# Patient Record
Sex: MALE | Race: WHITE | Employment: OTHER | URBAN - METROPOLITAN AREA
[De-identification: names, ages, dates, MRNs, and addresses within clinical notes are randomized per-mention and may not be internally consistent; named-entity substitution may affect disease eponyms.]

---

## 2017-01-23 ENCOUNTER — OFFICE VISIT (OUTPATIENT)
Dept: FAMILY MEDICINE CLINIC | Age: 72
End: 2017-01-23

## 2017-01-23 ENCOUNTER — HOSPITAL ENCOUNTER (OUTPATIENT)
Dept: GENERAL RADIOLOGY | Age: 72
Discharge: HOME | End: 2017-01-23
Payer: MEDICARE

## 2017-01-23 ENCOUNTER — HOSPITAL ENCOUNTER (OUTPATIENT)
Age: 72
Discharge: HOME | End: 2017-01-23
Payer: MEDICARE

## 2017-01-23 ENCOUNTER — TELEPHONE (OUTPATIENT)
Dept: FAMILY MEDICINE CLINIC | Age: 72
End: 2017-01-23

## 2017-01-23 DIAGNOSIS — R06.2 WHEEZE: ICD-10-CM

## 2017-01-23 DIAGNOSIS — J01.40 ACUTE PANSINUSITIS, RECURRENCE NOT SPECIFIED: Primary | ICD-10-CM

## 2017-01-23 PROCEDURE — G8427 DOCREV CUR MEDS BY ELIG CLIN: HCPCS | Performed by: NURSE PRACTITIONER

## 2017-01-23 PROCEDURE — G8598 ASA/ANTIPLAT THER USED: HCPCS | Performed by: NURSE PRACTITIONER

## 2017-01-23 PROCEDURE — 1036F TOBACCO NON-USER: CPT | Performed by: NURSE PRACTITIONER

## 2017-01-23 PROCEDURE — 1123F ACP DISCUSS/DSCN MKR DOCD: CPT | Performed by: NURSE PRACTITIONER

## 2017-01-23 PROCEDURE — 71020 XR CHEST STANDARD TWO VW: CPT

## 2017-01-23 PROCEDURE — 99213 OFFICE O/P EST LOW 20 MIN: CPT | Performed by: NURSE PRACTITIONER

## 2017-01-23 PROCEDURE — G8420 CALC BMI NORM PARAMETERS: HCPCS | Performed by: NURSE PRACTITIONER

## 2017-01-23 PROCEDURE — 4040F PNEUMOC VAC/ADMIN/RCVD: CPT | Performed by: NURSE PRACTITIONER

## 2017-01-23 PROCEDURE — 3017F COLORECTAL CA SCREEN DOC REV: CPT | Performed by: NURSE PRACTITIONER

## 2017-01-23 PROCEDURE — G8484 FLU IMMUNIZE NO ADMIN: HCPCS | Performed by: NURSE PRACTITIONER

## 2017-01-23 RX ORDER — DOXYCYCLINE HYCLATE 100 MG
100 TABLET ORAL 2 TIMES DAILY
Qty: 20 TABLET | Refills: 0 | Status: SHIPPED | OUTPATIENT
Start: 2017-01-23 | End: 2017-02-02

## 2017-01-23 RX ORDER — PREDNISONE 20 MG/1
40 TABLET ORAL DAILY
Qty: 10 TABLET | Refills: 0 | Status: SHIPPED | OUTPATIENT
Start: 2017-01-23 | End: 2017-01-28

## 2017-01-30 VITALS
BODY MASS INDEX: 27.77 KG/M2 | HEART RATE: 60 BPM | OXYGEN SATURATION: 94 % | SYSTOLIC BLOOD PRESSURE: 138 MMHG | DIASTOLIC BLOOD PRESSURE: 72 MMHG | WEIGHT: 198.38 LBS | HEIGHT: 71 IN | TEMPERATURE: 98.3 F

## 2017-01-30 ASSESSMENT — ENCOUNTER SYMPTOMS
EYE PAIN: 0
SORE THROAT: 0
NAUSEA: 0
SINUS PAIN: 1
EYE DISCHARGE: 0
DIARRHEA: 0
SWOLLEN GLANDS: 0
RHINORRHEA: 1
COUGH: 1
SHORTNESS OF BREATH: 0
TROUBLE SWALLOWING: 0
ABDOMINAL PAIN: 0
WHEEZING: 1
SINUS PRESSURE: 1
VOMITING: 0
FACIAL SWELLING: 0

## 2017-03-03 ENCOUNTER — HOSPITAL ENCOUNTER (OUTPATIENT)
Dept: LAB | Age: 72
Discharge: HOME | End: 2017-03-03
Payer: MEDICARE

## 2017-03-03 DIAGNOSIS — E11.9 TYPE 2 DIABETES MELLITUS WITHOUT COMPLICATION, WITHOUT LONG-TERM CURRENT USE OF INSULIN (HCC): ICD-10-CM

## 2017-03-03 DIAGNOSIS — I10 ESSENTIAL HYPERTENSION: Chronic | ICD-10-CM

## 2017-03-03 DIAGNOSIS — E78.5 HYPERLIPIDEMIA, UNSPECIFIED HYPERLIPIDEMIA TYPE: ICD-10-CM

## 2017-03-03 DIAGNOSIS — E83.52 HYPERCALCEMIA: Primary | ICD-10-CM

## 2017-03-03 LAB
ALBUMIN SERPL-MCNC: 4.4 G/DL (ref 3.9–4.9)
ALP BLD-CCNC: 50 U/L (ref 35–104)
ALT SERPL-CCNC: 32 U/L (ref 0–41)
ANION GAP SERPL CALCULATED.3IONS-SCNC: 12 MEQ/L (ref 7–13)
ANISOCYTOSIS: ABNORMAL
AST SERPL-CCNC: 24 U/L (ref 0–40)
BANDED NEUTROPHILS RELATIVE PERCENT: 3 %
BASOPHILS ABSOLUTE: 0 K/UL (ref 0–0.2)
BASOPHILS RELATIVE PERCENT: 0 %
BILIRUB SERPL-MCNC: 0.3 MG/DL (ref 0–1.2)
BUN BLDV-MCNC: 15 MG/DL (ref 8–23)
CALCIUM SERPL-MCNC: 10.5 MG/DL (ref 8.6–10.2)
CHLORIDE BLD-SCNC: 97 MEQ/L (ref 98–107)
CO2: 29 MEQ/L (ref 22–29)
CREAT SERPL-MCNC: 1.05 MG/DL (ref 0.7–1.2)
CREATININE URINE: 52.9 MG/DL
EOSINOPHILS ABSOLUTE: 0.2 K/UL (ref 0–0.7)
EOSINOPHILS RELATIVE PERCENT: 2 %
FOLATE: >20 NG/ML (ref 7.3–26.1)
GFR AFRICAN AMERICAN: >60
GFR NON-AFRICAN AMERICAN: >60
GLOBULIN: 2.7 G/DL (ref 2.3–3.5)
GLUCOSE BLD-MCNC: 98 MG/DL (ref 74–109)
HBA1C MFR BLD: 6.7 % (ref 4.8–5.9)
HCT VFR BLD CALC: 49.4 % (ref 42–52)
HEMOGLOBIN: 16.8 G/DL (ref 14–18)
LYMPHOCYTES ABSOLUTE: 2.5 K/UL (ref 1–4.8)
LYMPHOCYTES RELATIVE PERCENT: 30 %
MCH RBC QN AUTO: 31.7 PG (ref 27–31.3)
MCHC RBC AUTO-ENTMCNC: 33.9 % (ref 33–37)
MCV RBC AUTO: 93.5 FL (ref 80–100)
MICROALBUMIN UR-MCNC: 7.4 MG/DL
MICROALBUMIN/CREAT UR-RTO: 139.9 MG/G (ref 0–30)
MONOCYTES ABSOLUTE: 0.7 K/UL (ref 0.2–0.8)
MONOCYTES RELATIVE PERCENT: 8 %
NEUTROPHILS ABSOLUTE: 5 K/UL (ref 1.4–6.5)
NEUTROPHILS RELATIVE PERCENT: 57 %
PDW BLD-RTO: 13.4 % (ref 11.5–14.5)
PLATELET # BLD: 197 K/UL (ref 130–400)
PLATELET SLIDE REVIEW: ADEQUATE
POTASSIUM SERPL-SCNC: 3.7 MEQ/L (ref 3.5–5.1)
PROSTATE SPECIFIC ANTIGEN: 1.98 NG/ML (ref 0–6.22)
RBC # BLD: 5.28 M/UL (ref 4.7–6.1)
SLIDE REVIEW: ABNORMAL
SODIUM BLD-SCNC: 138 MEQ/L (ref 132–144)
TOTAL PROTEIN: 7.1 G/DL (ref 6.4–8.1)
VITAMIN B-12: 469 PG/ML (ref 211–946)
VITAMIN D 25-HYDROXY: 34.6 NG/ML (ref 30–100)
WBC # BLD: 8.4 K/UL (ref 4.8–10.8)

## 2017-03-03 PROCEDURE — 84403 ASSAY OF TOTAL TESTOSTERONE: CPT

## 2017-03-03 PROCEDURE — 83036 HEMOGLOBIN GLYCOSYLATED A1C: CPT

## 2017-03-03 PROCEDURE — 82607 VITAMIN B-12: CPT

## 2017-03-03 PROCEDURE — 84270 ASSAY OF SEX HORMONE GLOBUL: CPT

## 2017-03-03 PROCEDURE — 82306 VITAMIN D 25 HYDROXY: CPT

## 2017-03-03 PROCEDURE — 85025 COMPLETE CBC W/AUTO DIFF WBC: CPT

## 2017-03-03 PROCEDURE — 82627 DEHYDROEPIANDROSTERONE: CPT

## 2017-03-03 PROCEDURE — G0103 PSA SCREENING: HCPCS

## 2017-03-03 PROCEDURE — 82746 ASSAY OF FOLIC ACID SERUM: CPT

## 2017-03-03 PROCEDURE — 36415 COLL VENOUS BLD VENIPUNCTURE: CPT

## 2017-03-03 PROCEDURE — 82570 ASSAY OF URINE CREATININE: CPT

## 2017-03-03 PROCEDURE — 82043 UR ALBUMIN QUANTITATIVE: CPT

## 2017-03-03 PROCEDURE — 80053 COMPREHEN METABOLIC PANEL: CPT

## 2017-03-04 LAB
DHEAS (DHEA SULFATE): 129 UG/DL (ref 28–175)
SEX HORMONE BINDING GLOBULIN: 44 NMOL/L (ref 11–80)
TESTOSTERONE FREE PERCENT: 1.7 % (ref 1.6–2.9)
TESTOSTERONE FREE, CALC: 103 PG/ML (ref 47–244)
TESTOSTERONE TOTAL-MALE: 619 NG/DL (ref 300–720)

## 2017-03-06 ENCOUNTER — OFFICE VISIT (OUTPATIENT)
Dept: FAMILY MEDICINE CLINIC | Age: 72
End: 2017-03-06

## 2017-03-06 VITALS
WEIGHT: 195 LBS | SYSTOLIC BLOOD PRESSURE: 150 MMHG | HEART RATE: 60 BPM | BODY MASS INDEX: 27.3 KG/M2 | TEMPERATURE: 97.3 F | DIASTOLIC BLOOD PRESSURE: 78 MMHG | RESPIRATION RATE: 20 BRPM | HEIGHT: 71 IN

## 2017-03-06 DIAGNOSIS — N52.9 ERECTILE DYSFUNCTION, UNSPECIFIED ERECTILE DYSFUNCTION TYPE: Primary | Chronic | ICD-10-CM

## 2017-03-06 DIAGNOSIS — K21.9 GASTROESOPHAGEAL REFLUX DISEASE, ESOPHAGITIS PRESENCE NOT SPECIFIED: Chronic | ICD-10-CM

## 2017-03-06 DIAGNOSIS — E11.29 TYPE 2 DIABETES MELLITUS WITH MICROALBUMINURIA, WITHOUT LONG-TERM CURRENT USE OF INSULIN (HCC): Primary | Chronic | ICD-10-CM

## 2017-03-06 DIAGNOSIS — N52.9 ERECTILE DYSFUNCTION, UNSPECIFIED ERECTILE DYSFUNCTION TYPE: Chronic | ICD-10-CM

## 2017-03-06 DIAGNOSIS — Z23 NEED FOR VACCINATION: ICD-10-CM

## 2017-03-06 DIAGNOSIS — E83.52 HYPERCALCEMIA: ICD-10-CM

## 2017-03-06 DIAGNOSIS — I10 BENIGN HYPERTENSION: Chronic | ICD-10-CM

## 2017-03-06 DIAGNOSIS — I25.10 CAD IN NATIVE ARTERY: Chronic | ICD-10-CM

## 2017-03-06 DIAGNOSIS — R80.9 TYPE 2 DIABETES MELLITUS WITH MICROALBUMINURIA, WITHOUT LONG-TERM CURRENT USE OF INSULIN (HCC): Primary | Chronic | ICD-10-CM

## 2017-03-06 LAB — DIABETIC RETINOPATHY: NORMAL

## 2017-03-06 PROCEDURE — G0009 ADMIN PNEUMOCOCCAL VACCINE: HCPCS | Performed by: FAMILY MEDICINE

## 2017-03-06 PROCEDURE — 99214 OFFICE O/P EST MOD 30 MIN: CPT | Performed by: FAMILY MEDICINE

## 2017-03-06 PROCEDURE — G8427 DOCREV CUR MEDS BY ELIG CLIN: HCPCS | Performed by: FAMILY MEDICINE

## 2017-03-06 PROCEDURE — 3017F COLORECTAL CA SCREEN DOC REV: CPT | Performed by: FAMILY MEDICINE

## 2017-03-06 PROCEDURE — G8598 ASA/ANTIPLAT THER USED: HCPCS | Performed by: FAMILY MEDICINE

## 2017-03-06 PROCEDURE — G8420 CALC BMI NORM PARAMETERS: HCPCS | Performed by: FAMILY MEDICINE

## 2017-03-06 PROCEDURE — 90670 PCV13 VACCINE IM: CPT | Performed by: FAMILY MEDICINE

## 2017-03-06 PROCEDURE — 4040F PNEUMOC VAC/ADMIN/RCVD: CPT | Performed by: FAMILY MEDICINE

## 2017-03-06 PROCEDURE — 1036F TOBACCO NON-USER: CPT | Performed by: FAMILY MEDICINE

## 2017-03-06 PROCEDURE — 3044F HG A1C LEVEL LT 7.0%: CPT | Performed by: FAMILY MEDICINE

## 2017-03-06 PROCEDURE — G8484 FLU IMMUNIZE NO ADMIN: HCPCS | Performed by: FAMILY MEDICINE

## 2017-03-06 PROCEDURE — 1123F ACP DISCUSS/DSCN MKR DOCD: CPT | Performed by: FAMILY MEDICINE

## 2017-03-06 RX ORDER — RANITIDINE 150 MG/1
150 TABLET ORAL NIGHTLY PRN
Qty: 30 TABLET | Refills: 5 | Status: SHIPPED | OUTPATIENT
Start: 2017-03-06 | End: 2017-05-22 | Stop reason: SDUPTHER

## 2017-03-06 RX ORDER — TADALAFIL 20 MG/1
20 TABLET ORAL PRN
Qty: 5 TABLET | Refills: 3 | Status: SHIPPED | OUTPATIENT
Start: 2017-03-06 | End: 2017-06-06 | Stop reason: ALTCHOICE

## 2017-03-06 RX ORDER — LISINOPRIL 10 MG/1
10 TABLET ORAL DAILY
Qty: 30 TABLET | Refills: 11 | Status: SHIPPED | OUTPATIENT
Start: 2017-03-06 | End: 2017-05-22 | Stop reason: SDUPTHER

## 2017-03-06 RX ORDER — SILDENAFIL 100 MG/1
100 TABLET, FILM COATED ORAL PRN
Qty: 5 TABLET | Refills: 3 | Status: SHIPPED | OUTPATIENT
Start: 2017-03-06 | End: 2017-03-06 | Stop reason: CLARIF

## 2017-03-06 ASSESSMENT — ENCOUNTER SYMPTOMS
BLOOD IN STOOL: 0
SHORTNESS OF BREATH: 0
CONSTIPATION: 0
VOMITING: 0
CHEST TIGHTNESS: 0
WHEEZING: 0
NAUSEA: 0
ABDOMINAL PAIN: 0
DIARRHEA: 0
COUGH: 0

## 2017-03-10 DIAGNOSIS — R80.9 TYPE 2 DIABETES MELLITUS WITH MICROALBUMINURIA, WITHOUT LONG-TERM CURRENT USE OF INSULIN (HCC): Primary | Chronic | ICD-10-CM

## 2017-03-10 DIAGNOSIS — E11.29 TYPE 2 DIABETES MELLITUS WITH MICROALBUMINURIA, WITHOUT LONG-TERM CURRENT USE OF INSULIN (HCC): Primary | Chronic | ICD-10-CM

## 2017-03-13 DIAGNOSIS — N52.9 ERECTILE DYSFUNCTION, UNSPECIFIED ERECTILE DYSFUNCTION TYPE: Chronic | ICD-10-CM

## 2017-03-13 RX ORDER — SILDENAFIL 100 MG/1
100 TABLET, FILM COATED ORAL PRN
Qty: 27 TABLET | Refills: 3 | Status: SHIPPED | OUTPATIENT
Start: 2017-03-13 | End: 2017-03-14 | Stop reason: SDUPTHER

## 2017-03-14 RX ORDER — SILDENAFIL 100 MG/1
100 TABLET, FILM COATED ORAL PRN
Qty: 6 TABLET | Refills: 0 | COMMUNITY
Start: 2017-03-14 | End: 2017-12-11 | Stop reason: SDUPTHER

## 2017-03-27 ENCOUNTER — NURSE ONLY (OUTPATIENT)
Dept: FAMILY MEDICINE CLINIC | Age: 72
End: 2017-03-27

## 2017-03-27 ENCOUNTER — TELEPHONE (OUTPATIENT)
Dept: FAMILY MEDICINE CLINIC | Age: 72
End: 2017-03-27

## 2017-03-27 VITALS
SYSTOLIC BLOOD PRESSURE: 126 MMHG | TEMPERATURE: 98.5 F | WEIGHT: 195 LBS | BODY MASS INDEX: 27.2 KG/M2 | DIASTOLIC BLOOD PRESSURE: 72 MMHG | HEART RATE: 84 BPM | OXYGEN SATURATION: 96 %

## 2017-03-27 DIAGNOSIS — I15.9 SECONDARY HYPERTENSION: Primary | ICD-10-CM

## 2017-05-22 DIAGNOSIS — I10 BENIGN HYPERTENSION: Chronic | ICD-10-CM

## 2017-05-22 DIAGNOSIS — K21.9 GASTROESOPHAGEAL REFLUX DISEASE, ESOPHAGITIS PRESENCE NOT SPECIFIED: Chronic | ICD-10-CM

## 2017-05-22 DIAGNOSIS — E11.29 TYPE 2 DIABETES MELLITUS WITH MICROALBUMINURIA, WITHOUT LONG-TERM CURRENT USE OF INSULIN (HCC): Chronic | ICD-10-CM

## 2017-05-22 DIAGNOSIS — R80.9 TYPE 2 DIABETES MELLITUS WITH MICROALBUMINURIA, WITHOUT LONG-TERM CURRENT USE OF INSULIN (HCC): Chronic | ICD-10-CM

## 2017-05-22 RX ORDER — LISINOPRIL 10 MG/1
10 TABLET ORAL DAILY
Qty: 90 TABLET | Refills: 3 | Status: SHIPPED | OUTPATIENT
Start: 2017-05-22 | End: 2017-05-23 | Stop reason: SDUPTHER

## 2017-05-22 RX ORDER — RANITIDINE 150 MG/1
150 TABLET ORAL NIGHTLY PRN
Qty: 90 TABLET | Refills: 1 | Status: SHIPPED | OUTPATIENT
Start: 2017-05-22 | End: 2017-08-28

## 2017-05-23 ENCOUNTER — NURSE ONLY (OUTPATIENT)
Dept: FAMILY MEDICINE CLINIC | Age: 72
End: 2017-05-23

## 2017-05-23 ENCOUNTER — TELEPHONE (OUTPATIENT)
Dept: FAMILY MEDICINE CLINIC | Age: 72
End: 2017-05-23

## 2017-05-23 VITALS — DIASTOLIC BLOOD PRESSURE: 70 MMHG | SYSTOLIC BLOOD PRESSURE: 132 MMHG

## 2017-05-23 DIAGNOSIS — I10 BENIGN HYPERTENSION: Primary | Chronic | ICD-10-CM

## 2017-05-23 DIAGNOSIS — I10 BENIGN HYPERTENSION: Chronic | ICD-10-CM

## 2017-05-23 DIAGNOSIS — E11.29 TYPE 2 DIABETES MELLITUS WITH MICROALBUMINURIA, WITHOUT LONG-TERM CURRENT USE OF INSULIN (HCC): Chronic | ICD-10-CM

## 2017-05-23 DIAGNOSIS — R80.9 TYPE 2 DIABETES MELLITUS WITH MICROALBUMINURIA, WITHOUT LONG-TERM CURRENT USE OF INSULIN (HCC): Chronic | ICD-10-CM

## 2017-05-23 RX ORDER — LISINOPRIL 20 MG/1
20 TABLET ORAL DAILY
Qty: 90 TABLET | Refills: 3 | Status: SHIPPED | OUTPATIENT
Start: 2017-05-23 | End: 2017-08-14 | Stop reason: SDUPTHER

## 2017-06-06 ENCOUNTER — CARE COORDINATOR VISIT (OUTPATIENT)
Dept: FAMILY MEDICINE CLINIC | Age: 72
End: 2017-06-06

## 2017-06-06 ENCOUNTER — OFFICE VISIT (OUTPATIENT)
Dept: FAMILY MEDICINE CLINIC | Age: 72
End: 2017-06-06

## 2017-06-06 VITALS
HEIGHT: 71 IN | OXYGEN SATURATION: 96 % | HEART RATE: 75 BPM | BODY MASS INDEX: 27.16 KG/M2 | RESPIRATION RATE: 16 BRPM | SYSTOLIC BLOOD PRESSURE: 123 MMHG | DIASTOLIC BLOOD PRESSURE: 65 MMHG | TEMPERATURE: 97.3 F | WEIGHT: 194 LBS

## 2017-06-06 DIAGNOSIS — I25.10 CAD IN NATIVE ARTERY: Chronic | ICD-10-CM

## 2017-06-06 DIAGNOSIS — N52.9 ERECTILE DYSFUNCTION, UNSPECIFIED ERECTILE DYSFUNCTION TYPE: Chronic | ICD-10-CM

## 2017-06-06 DIAGNOSIS — Z91.81 AT HIGH RISK FOR FALLS: ICD-10-CM

## 2017-06-06 DIAGNOSIS — E11.29 TYPE 2 DIABETES MELLITUS WITH MICROALBUMINURIA, WITHOUT LONG-TERM CURRENT USE OF INSULIN (HCC): Primary | Chronic | ICD-10-CM

## 2017-06-06 DIAGNOSIS — J44.9 CHRONIC OBSTRUCTIVE PULMONARY DISEASE, UNSPECIFIED COPD TYPE (HCC): ICD-10-CM

## 2017-06-06 DIAGNOSIS — Z23 NEED FOR VACCINATION: ICD-10-CM

## 2017-06-06 DIAGNOSIS — R80.9 TYPE 2 DIABETES MELLITUS WITH MICROALBUMINURIA, WITHOUT LONG-TERM CURRENT USE OF INSULIN (HCC): Primary | Chronic | ICD-10-CM

## 2017-06-06 DIAGNOSIS — K21.9 GASTROESOPHAGEAL REFLUX DISEASE, ESOPHAGITIS PRESENCE NOT SPECIFIED: Chronic | ICD-10-CM

## 2017-06-06 DIAGNOSIS — E78.5 HYPERLIPIDEMIA, UNSPECIFIED HYPERLIPIDEMIA TYPE: Chronic | ICD-10-CM

## 2017-06-06 DIAGNOSIS — I10 BENIGN HYPERTENSION: Chronic | ICD-10-CM

## 2017-06-06 PROCEDURE — 1123F ACP DISCUSS/DSCN MKR DOCD: CPT | Performed by: FAMILY MEDICINE

## 2017-06-06 PROCEDURE — 1036F TOBACCO NON-USER: CPT | Performed by: FAMILY MEDICINE

## 2017-06-06 PROCEDURE — G8598 ASA/ANTIPLAT THER USED: HCPCS | Performed by: FAMILY MEDICINE

## 2017-06-06 PROCEDURE — G8420 CALC BMI NORM PARAMETERS: HCPCS | Performed by: FAMILY MEDICINE

## 2017-06-06 PROCEDURE — G8427 DOCREV CUR MEDS BY ELIG CLIN: HCPCS | Performed by: FAMILY MEDICINE

## 2017-06-06 PROCEDURE — 99214 OFFICE O/P EST MOD 30 MIN: CPT | Performed by: FAMILY MEDICINE

## 2017-06-06 PROCEDURE — 3044F HG A1C LEVEL LT 7.0%: CPT | Performed by: FAMILY MEDICINE

## 2017-06-06 PROCEDURE — G8926 SPIRO NO PERF OR DOC: HCPCS | Performed by: FAMILY MEDICINE

## 2017-06-06 PROCEDURE — 4040F PNEUMOC VAC/ADMIN/RCVD: CPT | Performed by: FAMILY MEDICINE

## 2017-06-06 PROCEDURE — 3017F COLORECTAL CA SCREEN DOC REV: CPT | Performed by: FAMILY MEDICINE

## 2017-06-06 PROCEDURE — 3023F SPIROM DOC REV: CPT | Performed by: FAMILY MEDICINE

## 2017-06-06 ASSESSMENT — ENCOUNTER SYMPTOMS
VOMITING: 0
WHEEZING: 0
ABDOMINAL PAIN: 0
SHORTNESS OF BREATH: 0
COUGH: 0
CHEST TIGHTNESS: 0
DIARRHEA: 0
NAUSEA: 0

## 2017-06-12 ENCOUNTER — HOSPITAL ENCOUNTER (OUTPATIENT)
Dept: LAB | Age: 72
Discharge: HOME | End: 2017-06-12
Payer: MEDICARE

## 2017-06-12 DIAGNOSIS — E78.5 HYPERLIPIDEMIA, UNSPECIFIED HYPERLIPIDEMIA TYPE: Chronic | ICD-10-CM

## 2017-06-12 DIAGNOSIS — R80.9 TYPE 2 DIABETES MELLITUS WITH MICROALBUMINURIA, WITHOUT LONG-TERM CURRENT USE OF INSULIN (HCC): Chronic | ICD-10-CM

## 2017-06-12 DIAGNOSIS — E11.29 TYPE 2 DIABETES MELLITUS WITH MICROALBUMINURIA, WITHOUT LONG-TERM CURRENT USE OF INSULIN (HCC): Chronic | ICD-10-CM

## 2017-06-12 LAB
ANION GAP SERPL CALCULATED.3IONS-SCNC: 11 MEQ/L (ref 7–13)
BUN BLDV-MCNC: 12 MG/DL (ref 8–23)
CALCIUM SERPL-MCNC: 9.7 MG/DL (ref 8.6–10.2)
CHLORIDE BLD-SCNC: 98 MEQ/L (ref 98–107)
CHOLESTEROL, TOTAL: 169 MG/DL (ref 0–199)
CO2: 28 MEQ/L (ref 22–29)
CREAT SERPL-MCNC: 0.93 MG/DL (ref 0.7–1.2)
GFR AFRICAN AMERICAN: >60
GFR NON-AFRICAN AMERICAN: >60
GLUCOSE BLD-MCNC: 126 MG/DL (ref 74–109)
HBA1C MFR BLD: 6.8 % (ref 4.8–5.9)
HDLC SERPL-MCNC: 40 MG/DL (ref 40–59)
LDL CHOLESTEROL CALCULATED: 75 MG/DL (ref 0–129)
POTASSIUM SERPL-SCNC: 4 MEQ/L (ref 3.5–5.1)
SODIUM BLD-SCNC: 137 MEQ/L (ref 132–144)
TRIGL SERPL-MCNC: 269 MG/DL (ref 0–200)

## 2017-06-12 PROCEDURE — 80048 BASIC METABOLIC PNL TOTAL CA: CPT

## 2017-06-12 PROCEDURE — 80061 LIPID PANEL: CPT

## 2017-06-12 PROCEDURE — 36415 COLL VENOUS BLD VENIPUNCTURE: CPT

## 2017-06-12 PROCEDURE — 83036 HEMOGLOBIN GLYCOSYLATED A1C: CPT

## 2017-06-20 ENCOUNTER — CARE COORDINATION (OUTPATIENT)
Dept: FAMILY MEDICINE CLINIC | Age: 72
End: 2017-06-20

## 2017-06-27 ENCOUNTER — CARE COORDINATION (OUTPATIENT)
Dept: FAMILY MEDICINE CLINIC | Age: 72
End: 2017-06-27

## 2017-07-06 ENCOUNTER — CARE COORDINATION (OUTPATIENT)
Dept: FAMILY MEDICINE CLINIC | Age: 72
End: 2017-07-06

## 2017-07-11 ENCOUNTER — CARE COORDINATION (OUTPATIENT)
Dept: FAMILY MEDICINE CLINIC | Age: 72
End: 2017-07-11

## 2017-07-20 ENCOUNTER — CARE COORDINATION (OUTPATIENT)
Dept: FAMILY MEDICINE CLINIC | Age: 72
End: 2017-07-20

## 2017-07-28 DIAGNOSIS — J30.89 PERENNIAL ALLERGIC RHINITIS, UNSPECIFIED ALLERGIC RHINITIS TRIGGER: ICD-10-CM

## 2017-07-28 RX ORDER — FLUTICASONE PROPIONATE 50 MCG
SPRAY, SUSPENSION (ML) NASAL
Qty: 48 G | Refills: 2 | Status: SHIPPED | OUTPATIENT
Start: 2017-07-28 | End: 2018-02-20 | Stop reason: SDUPTHER

## 2017-08-03 ENCOUNTER — CARE COORDINATION (OUTPATIENT)
Dept: FAMILY MEDICINE CLINIC | Age: 72
End: 2017-08-03

## 2017-08-09 ENCOUNTER — CARE COORDINATION (OUTPATIENT)
Dept: FAMILY MEDICINE CLINIC | Age: 72
End: 2017-08-09

## 2017-08-12 ENCOUNTER — HOSPITAL ENCOUNTER (EMERGENCY)
Age: 72
Discharge: HOME | End: 2017-08-12
Attending: EMERGENCY MEDICINE
Payer: MEDICARE

## 2017-08-12 ENCOUNTER — APPOINTMENT (OUTPATIENT)
Dept: CT IMAGING | Age: 72
End: 2017-08-12
Payer: MEDICARE

## 2017-08-12 VITALS
DIASTOLIC BLOOD PRESSURE: 76 MMHG | HEIGHT: 71 IN | BODY MASS INDEX: 26.6 KG/M2 | RESPIRATION RATE: 16 BRPM | WEIGHT: 190 LBS | TEMPERATURE: 98.1 F | OXYGEN SATURATION: 94 % | HEART RATE: 78 BPM | SYSTOLIC BLOOD PRESSURE: 136 MMHG

## 2017-08-12 DIAGNOSIS — I10 POORLY-CONTROLLED HYPERTENSION: Primary | ICD-10-CM

## 2017-08-12 LAB
ANION GAP SERPL CALCULATED.3IONS-SCNC: 14 MEQ/L (ref 7–13)
BACTERIA: ABNORMAL /HPF
BASOPHILS ABSOLUTE: 0 K/UL (ref 0–0.2)
BASOPHILS RELATIVE PERCENT: 0.4 %
BILIRUBIN URINE: NEGATIVE
BLOOD, URINE: NORMAL
BUN BLDV-MCNC: 15 MG/DL (ref 8–23)
CALCIUM SERPL-MCNC: 10.1 MG/DL (ref 8.6–10.2)
CHLORIDE BLD-SCNC: 97 MEQ/L (ref 98–107)
CLARITY: CLEAR
CO2: 28 MEQ/L (ref 22–29)
COLOR: YELLOW
CREAT SERPL-MCNC: 0.89 MG/DL (ref 0.7–1.2)
EOSINOPHILS ABSOLUTE: 0.2 K/UL (ref 0–0.7)
EOSINOPHILS RELATIVE PERCENT: 2.6 %
EPITHELIAL CELLS, UA: ABNORMAL /HPF
GFR AFRICAN AMERICAN: >60
GFR NON-AFRICAN AMERICAN: >60
GLUCOSE BLD-MCNC: 104 MG/DL (ref 74–109)
GLUCOSE URINE: NEGATIVE MG/DL
HCT VFR BLD CALC: 51.4 % (ref 42–52)
HEMOGLOBIN: 18 G/DL (ref 14–18)
KETONES, URINE: NEGATIVE MG/DL
LEUKOCYTE ESTERASE, URINE: NEGATIVE
LYMPHOCYTES ABSOLUTE: 2.3 K/UL (ref 1–4.8)
LYMPHOCYTES RELATIVE PERCENT: 25.2 %
MCH RBC QN AUTO: 32.8 PG (ref 27–31.3)
MCHC RBC AUTO-ENTMCNC: 35.1 % (ref 33–37)
MCV RBC AUTO: 93.4 FL (ref 80–100)
MONOCYTES ABSOLUTE: 0.8 K/UL (ref 0.2–0.8)
MONOCYTES RELATIVE PERCENT: 8.2 %
NEUTROPHILS ABSOLUTE: 5.9 K/UL (ref 1.4–6.5)
NEUTROPHILS RELATIVE PERCENT: 63.6 %
NITRITE, URINE: NEGATIVE
PDW BLD-RTO: 13.4 % (ref 11.5–14.5)
PH UA: 7 (ref 5–9)
PLATELET # BLD: 174 K/UL (ref 130–400)
POTASSIUM SERPL-SCNC: 4.3 MEQ/L (ref 3.5–5.1)
PROTEIN UA: NORMAL MG/DL
RBC # BLD: 5.5 M/UL (ref 4.7–6.1)
RBC UA: ABNORMAL /HPF (ref 0–2)
SODIUM BLD-SCNC: 139 MEQ/L (ref 132–144)
SPECIFIC GRAVITY UA: 1.01 (ref 1–1.03)
TROPONIN: <0.01 NG/ML (ref 0–0.01)
URINE REFLEX TO CULTURE: YES
UROBILINOGEN, URINE: 0.2 E.U./DL
WBC # BLD: 9.2 K/UL (ref 4.8–10.8)
WBC UA: ABNORMAL /HPF (ref 0–5)

## 2017-08-12 PROCEDURE — 84484 ASSAY OF TROPONIN QUANT: CPT

## 2017-08-12 PROCEDURE — 85025 COMPLETE CBC W/AUTO DIFF WBC: CPT

## 2017-08-12 PROCEDURE — 87086 URINE CULTURE/COLONY COUNT: CPT

## 2017-08-12 PROCEDURE — 93005 ELECTROCARDIOGRAM TRACING: CPT

## 2017-08-12 PROCEDURE — 80048 BASIC METABOLIC PNL TOTAL CA: CPT

## 2017-08-12 PROCEDURE — 99284 EMERGENCY DEPT VISIT MOD MDM: CPT

## 2017-08-12 PROCEDURE — 36415 COLL VENOUS BLD VENIPUNCTURE: CPT

## 2017-08-12 PROCEDURE — 70450 CT HEAD/BRAIN W/O DYE: CPT

## 2017-08-12 PROCEDURE — 81001 URINALYSIS AUTO W/SCOPE: CPT

## 2017-08-12 ASSESSMENT — ENCOUNTER SYMPTOMS
SORE THROAT: 0
ABDOMINAL PAIN: 0
SHORTNESS OF BREATH: 0
BACK PAIN: 0
DIARRHEA: 0
VOMITING: 0
NAUSEA: 0

## 2017-08-14 ENCOUNTER — OFFICE VISIT (OUTPATIENT)
Dept: FAMILY MEDICINE CLINIC | Age: 72
End: 2017-08-14

## 2017-08-14 ENCOUNTER — HOSPITAL ENCOUNTER (OUTPATIENT)
Age: 72
Setting detail: SPECIMEN
Discharge: HOME | End: 2017-08-14
Payer: MEDICARE

## 2017-08-14 VITALS
RESPIRATION RATE: 16 BRPM | HEIGHT: 71 IN | DIASTOLIC BLOOD PRESSURE: 82 MMHG | BODY MASS INDEX: 27.38 KG/M2 | HEART RATE: 89 BPM | SYSTOLIC BLOOD PRESSURE: 158 MMHG | TEMPERATURE: 97.7 F | WEIGHT: 195.6 LBS | OXYGEN SATURATION: 96 %

## 2017-08-14 DIAGNOSIS — R31.21 ASYMPTOMATIC MICROSCOPIC HEMATURIA: ICD-10-CM

## 2017-08-14 DIAGNOSIS — I10 BENIGN HYPERTENSION: Primary | Chronic | ICD-10-CM

## 2017-08-14 LAB
BILIRUBIN, POC: NORMAL
BLOOD URINE, POC: NORMAL
CLARITY, POC: CLEAR
COLOR, POC: YELLOW
GLUCOSE URINE, POC: NORMAL
KETONES, POC: NORMAL
LEUKOCYTE EST, POC: NORMAL
NITRITE, POC: NORMAL
PH, POC: 6
PROTEIN, POC: NORMAL
SPECIFIC GRAVITY, POC: 1.03
URINE CULTURE, ROUTINE: NORMAL
UROBILINOGEN, POC: NORMAL

## 2017-08-14 PROCEDURE — 99213 OFFICE O/P EST LOW 20 MIN: CPT | Performed by: FAMILY MEDICINE

## 2017-08-14 PROCEDURE — 1036F TOBACCO NON-USER: CPT | Performed by: FAMILY MEDICINE

## 2017-08-14 PROCEDURE — 1123F ACP DISCUSS/DSCN MKR DOCD: CPT | Performed by: FAMILY MEDICINE

## 2017-08-14 PROCEDURE — 87086 URINE CULTURE/COLONY COUNT: CPT

## 2017-08-14 PROCEDURE — G8427 DOCREV CUR MEDS BY ELIG CLIN: HCPCS | Performed by: FAMILY MEDICINE

## 2017-08-14 PROCEDURE — 81003 URINALYSIS AUTO W/O SCOPE: CPT | Performed by: FAMILY MEDICINE

## 2017-08-14 PROCEDURE — G8419 CALC BMI OUT NRM PARAM NOF/U: HCPCS | Performed by: FAMILY MEDICINE

## 2017-08-14 PROCEDURE — 81001 URINALYSIS AUTO W/SCOPE: CPT

## 2017-08-14 PROCEDURE — G8598 ASA/ANTIPLAT THER USED: HCPCS | Performed by: FAMILY MEDICINE

## 2017-08-14 PROCEDURE — 3017F COLORECTAL CA SCREEN DOC REV: CPT | Performed by: FAMILY MEDICINE

## 2017-08-14 PROCEDURE — 4040F PNEUMOC VAC/ADMIN/RCVD: CPT | Performed by: FAMILY MEDICINE

## 2017-08-14 RX ORDER — TOBRAMYCIN AND DEXAMETHASONE 3; 1 MG/ML; MG/ML
SUSPENSION/ DROPS OPHTHALMIC
COMMUNITY
Start: 2017-06-13 | End: 2018-09-20 | Stop reason: ALTCHOICE

## 2017-08-14 RX ORDER — LISINOPRIL 40 MG/1
40 TABLET ORAL DAILY
Qty: 90 TABLET | Refills: 3 | Status: SHIPPED | OUTPATIENT
Start: 2017-08-14 | End: 2017-11-07 | Stop reason: SDUPTHER

## 2017-08-14 RX ORDER — TESTOSTERONE CYPIONATE 200 MG/ML
200 INJECTION INTRAMUSCULAR ONCE
COMMUNITY
End: 2017-10-03 | Stop reason: SDUPTHER

## 2017-08-14 ASSESSMENT — ENCOUNTER SYMPTOMS
SHORTNESS OF BREATH: 0
DIARRHEA: 0
CHEST TIGHTNESS: 0
ABDOMINAL PAIN: 0
VOMITING: 0
WHEEZING: 0
CONSTIPATION: 0
NAUSEA: 0

## 2017-08-15 LAB
BACTERIA: NORMAL /HPF
BILIRUBIN URINE: NEGATIVE
BLOOD, URINE: NEGATIVE
CLARITY: CLEAR
COLOR: YELLOW
CRYSTALS, UA: NORMAL
EPITHELIAL CELLS, UA: NORMAL /HPF
GLUCOSE URINE: 100 MG/DL
KETONES, URINE: NEGATIVE MG/DL
LEUKOCYTE ESTERASE, URINE: ABNORMAL
NITRITE, URINE: NEGATIVE
PH UA: 6 (ref 5–9)
PROTEIN UA: ABNORMAL MG/DL
RBC UA: NORMAL /HPF (ref 0–2)
SPECIFIC GRAVITY UA: 1.02 (ref 1–1.03)
UROBILINOGEN, URINE: 0.2 E.U./DL
WBC UA: NORMAL /HPF (ref 0–5)

## 2017-08-16 ENCOUNTER — TELEPHONE (OUTPATIENT)
Dept: FAMILY MEDICINE CLINIC | Age: 72
End: 2017-08-16

## 2017-08-17 LAB — URINE CULTURE, ROUTINE: NORMAL

## 2017-08-24 ENCOUNTER — CARE COORDINATION (OUTPATIENT)
Dept: FAMILY MEDICINE CLINIC | Age: 72
End: 2017-08-24

## 2017-08-28 ENCOUNTER — TELEPHONE (OUTPATIENT)
Dept: FAMILY MEDICINE CLINIC | Age: 72
End: 2017-08-28

## 2017-08-28 ENCOUNTER — NURSE ONLY (OUTPATIENT)
Dept: FAMILY MEDICINE CLINIC | Age: 72
End: 2017-08-28

## 2017-08-28 ENCOUNTER — HOSPITAL ENCOUNTER (OUTPATIENT)
Dept: LAB | Age: 72
Discharge: HOME | End: 2017-08-28
Payer: MEDICARE

## 2017-08-28 ENCOUNTER — OFFICE VISIT (OUTPATIENT)
Dept: UROLOGY | Age: 72
End: 2017-08-28

## 2017-08-28 VITALS — SYSTOLIC BLOOD PRESSURE: 140 MMHG | DIASTOLIC BLOOD PRESSURE: 70 MMHG

## 2017-08-28 VITALS
HEART RATE: 76 BPM | DIASTOLIC BLOOD PRESSURE: 60 MMHG | SYSTOLIC BLOOD PRESSURE: 122 MMHG | WEIGHT: 195 LBS | HEIGHT: 71 IN | BODY MASS INDEX: 27.3 KG/M2

## 2017-08-28 DIAGNOSIS — E29.1 HYPOGONADISM IN MALE: Primary | ICD-10-CM

## 2017-08-28 DIAGNOSIS — I10 ESSENTIAL HYPERTENSION: Chronic | ICD-10-CM

## 2017-08-28 DIAGNOSIS — I10 BENIGN HYPERTENSION: Chronic | ICD-10-CM

## 2017-08-28 DIAGNOSIS — Z01.30 BLOOD PRESSURE CHECK: Primary | ICD-10-CM

## 2017-08-28 LAB
ANION GAP SERPL CALCULATED.3IONS-SCNC: 14 MEQ/L (ref 7–13)
BUN BLDV-MCNC: 14 MG/DL (ref 8–23)
CALCIUM SERPL-MCNC: 9.8 MG/DL (ref 8.6–10.2)
CHLORIDE BLD-SCNC: 96 MEQ/L (ref 98–107)
CO2: 29 MEQ/L (ref 22–29)
CREAT SERPL-MCNC: 0.9 MG/DL (ref 0.7–1.2)
GFR AFRICAN AMERICAN: >60
GFR NON-AFRICAN AMERICAN: >60
GLUCOSE BLD-MCNC: 183 MG/DL (ref 74–109)
POTASSIUM SERPL-SCNC: 4.7 MEQ/L (ref 3.5–5.1)
SODIUM BLD-SCNC: 139 MEQ/L (ref 132–144)

## 2017-08-28 PROCEDURE — 1123F ACP DISCUSS/DSCN MKR DOCD: CPT | Performed by: UROLOGY

## 2017-08-28 PROCEDURE — G8427 DOCREV CUR MEDS BY ELIG CLIN: HCPCS | Performed by: UROLOGY

## 2017-08-28 PROCEDURE — G8419 CALC BMI OUT NRM PARAM NOF/U: HCPCS | Performed by: UROLOGY

## 2017-08-28 PROCEDURE — 80048 BASIC METABOLIC PNL TOTAL CA: CPT

## 2017-08-28 PROCEDURE — 36415 COLL VENOUS BLD VENIPUNCTURE: CPT

## 2017-08-28 PROCEDURE — G8598 ASA/ANTIPLAT THER USED: HCPCS | Performed by: UROLOGY

## 2017-08-28 PROCEDURE — 4040F PNEUMOC VAC/ADMIN/RCVD: CPT | Performed by: UROLOGY

## 2017-08-28 PROCEDURE — 1036F TOBACCO NON-USER: CPT | Performed by: UROLOGY

## 2017-08-28 PROCEDURE — 99213 OFFICE O/P EST LOW 20 MIN: CPT | Performed by: UROLOGY

## 2017-08-28 PROCEDURE — 3017F COLORECTAL CA SCREEN DOC REV: CPT | Performed by: UROLOGY

## 2017-08-28 RX ORDER — METOPROLOL SUCCINATE 50 MG/1
150 TABLET, EXTENDED RELEASE ORAL DAILY
Qty: 90 TABLET | Refills: 5 | Status: SHIPPED | OUTPATIENT
Start: 2017-08-28 | End: 2018-02-21 | Stop reason: SDUPTHER

## 2017-09-11 ENCOUNTER — OFFICE VISIT (OUTPATIENT)
Dept: FAMILY MEDICINE CLINIC | Age: 72
End: 2017-09-11

## 2017-09-11 VITALS
RESPIRATION RATE: 16 BRPM | HEART RATE: 74 BPM | HEIGHT: 71 IN | WEIGHT: 197 LBS | SYSTOLIC BLOOD PRESSURE: 138 MMHG | OXYGEN SATURATION: 95 % | TEMPERATURE: 98 F | BODY MASS INDEX: 27.58 KG/M2 | DIASTOLIC BLOOD PRESSURE: 68 MMHG

## 2017-09-11 DIAGNOSIS — J44.9 CHRONIC OBSTRUCTIVE PULMONARY DISEASE, UNSPECIFIED COPD TYPE (HCC): Chronic | ICD-10-CM

## 2017-09-11 DIAGNOSIS — R80.9 TYPE 2 DIABETES MELLITUS WITH MICROALBUMINURIA, WITHOUT LONG-TERM CURRENT USE OF INSULIN (HCC): Chronic | ICD-10-CM

## 2017-09-11 DIAGNOSIS — E11.29 TYPE 2 DIABETES MELLITUS WITH MICROALBUMINURIA, WITHOUT LONG-TERM CURRENT USE OF INSULIN (HCC): Chronic | ICD-10-CM

## 2017-09-11 DIAGNOSIS — I25.10 CAD IN NATIVE ARTERY: Chronic | ICD-10-CM

## 2017-09-11 DIAGNOSIS — C43.30 MALIGNANT MELANOMA OF SKIN OF FACE (HCC): Chronic | ICD-10-CM

## 2017-09-11 DIAGNOSIS — Z23 NEED FOR INFLUENZA VACCINATION: ICD-10-CM

## 2017-09-11 DIAGNOSIS — E78.5 HYPERLIPIDEMIA, UNSPECIFIED HYPERLIPIDEMIA TYPE: Chronic | ICD-10-CM

## 2017-09-11 DIAGNOSIS — I10 ESSENTIAL HYPERTENSION: Primary | Chronic | ICD-10-CM

## 2017-09-11 DIAGNOSIS — I83.93 VARICOSE VEINS OF BOTH LOWER EXTREMITIES: Chronic | ICD-10-CM

## 2017-09-11 LAB — HBA1C MFR BLD: 7.5 %

## 2017-09-11 PROCEDURE — G8427 DOCREV CUR MEDS BY ELIG CLIN: HCPCS | Performed by: FAMILY MEDICINE

## 2017-09-11 PROCEDURE — 99214 OFFICE O/P EST MOD 30 MIN: CPT | Performed by: FAMILY MEDICINE

## 2017-09-11 PROCEDURE — G8926 SPIRO NO PERF OR DOC: HCPCS | Performed by: FAMILY MEDICINE

## 2017-09-11 PROCEDURE — 1036F TOBACCO NON-USER: CPT | Performed by: FAMILY MEDICINE

## 2017-09-11 PROCEDURE — 3046F HEMOGLOBIN A1C LEVEL >9.0%: CPT | Performed by: FAMILY MEDICINE

## 2017-09-11 PROCEDURE — 3017F COLORECTAL CA SCREEN DOC REV: CPT | Performed by: FAMILY MEDICINE

## 2017-09-11 PROCEDURE — G8598 ASA/ANTIPLAT THER USED: HCPCS | Performed by: FAMILY MEDICINE

## 2017-09-11 PROCEDURE — 83036 HEMOGLOBIN GLYCOSYLATED A1C: CPT | Performed by: FAMILY MEDICINE

## 2017-09-11 PROCEDURE — 1123F ACP DISCUSS/DSCN MKR DOCD: CPT | Performed by: FAMILY MEDICINE

## 2017-09-11 PROCEDURE — 4040F PNEUMOC VAC/ADMIN/RCVD: CPT | Performed by: FAMILY MEDICINE

## 2017-09-11 PROCEDURE — G0008 ADMIN INFLUENZA VIRUS VAC: HCPCS | Performed by: FAMILY MEDICINE

## 2017-09-11 PROCEDURE — 3023F SPIROM DOC REV: CPT | Performed by: FAMILY MEDICINE

## 2017-09-11 PROCEDURE — 90662 IIV NO PRSV INCREASED AG IM: CPT | Performed by: FAMILY MEDICINE

## 2017-09-11 PROCEDURE — G8417 CALC BMI ABV UP PARAM F/U: HCPCS | Performed by: FAMILY MEDICINE

## 2017-09-11 RX ORDER — ALBUTEROL SULFATE 90 UG/1
2 AEROSOL, METERED RESPIRATORY (INHALATION) EVERY 6 HOURS PRN
Qty: 1 INHALER | Refills: 3 | Status: SHIPPED | OUTPATIENT
Start: 2017-09-11 | End: 2020-07-28 | Stop reason: SDUPTHER

## 2017-09-11 ASSESSMENT — PATIENT HEALTH QUESTIONNAIRE - PHQ9
SUM OF ALL RESPONSES TO PHQ9 QUESTIONS 1 & 2: 0
SUM OF ALL RESPONSES TO PHQ QUESTIONS 1-9: 0
1. LITTLE INTEREST OR PLEASURE IN DOING THINGS: 0
2. FEELING DOWN, DEPRESSED OR HOPELESS: 0

## 2017-09-11 ASSESSMENT — ENCOUNTER SYMPTOMS
DIARRHEA: 0
SHORTNESS OF BREATH: 0
VOMITING: 0
NAUSEA: 0
WHEEZING: 0
COUGH: 0
CONSTIPATION: 0
CHEST TIGHTNESS: 0
ABDOMINAL PAIN: 0

## 2017-09-12 ENCOUNTER — OFFICE VISIT (OUTPATIENT)
Dept: FAMILY MEDICINE CLINIC | Age: 72
End: 2017-09-12

## 2017-09-12 VITALS
OXYGEN SATURATION: 92 % | BODY MASS INDEX: 27.51 KG/M2 | DIASTOLIC BLOOD PRESSURE: 60 MMHG | HEART RATE: 83 BPM | WEIGHT: 196.5 LBS | HEIGHT: 71 IN | RESPIRATION RATE: 18 BRPM | SYSTOLIC BLOOD PRESSURE: 110 MMHG | TEMPERATURE: 99.5 F

## 2017-09-12 DIAGNOSIS — J40 BRONCHITIS: Primary | ICD-10-CM

## 2017-09-12 PROCEDURE — 1123F ACP DISCUSS/DSCN MKR DOCD: CPT | Performed by: NURSE PRACTITIONER

## 2017-09-12 PROCEDURE — 1036F TOBACCO NON-USER: CPT | Performed by: NURSE PRACTITIONER

## 2017-09-12 PROCEDURE — G8598 ASA/ANTIPLAT THER USED: HCPCS | Performed by: NURSE PRACTITIONER

## 2017-09-12 PROCEDURE — 4040F PNEUMOC VAC/ADMIN/RCVD: CPT | Performed by: NURSE PRACTITIONER

## 2017-09-12 PROCEDURE — 99213 OFFICE O/P EST LOW 20 MIN: CPT | Performed by: NURSE PRACTITIONER

## 2017-09-12 PROCEDURE — G8427 DOCREV CUR MEDS BY ELIG CLIN: HCPCS | Performed by: NURSE PRACTITIONER

## 2017-09-12 PROCEDURE — 3017F COLORECTAL CA SCREEN DOC REV: CPT | Performed by: NURSE PRACTITIONER

## 2017-09-12 PROCEDURE — G8417 CALC BMI ABV UP PARAM F/U: HCPCS | Performed by: NURSE PRACTITIONER

## 2017-09-12 RX ORDER — LEVOFLOXACIN 500 MG/1
500 TABLET, FILM COATED ORAL DAILY
Qty: 10 TABLET | Refills: 0 | Status: SHIPPED | OUTPATIENT
Start: 2017-09-12 | End: 2017-09-22

## 2017-09-12 RX ORDER — PREDNISONE 20 MG/1
40 TABLET ORAL DAILY
Qty: 10 TABLET | Refills: 0 | Status: SHIPPED | OUTPATIENT
Start: 2017-09-12 | End: 2017-09-17

## 2017-09-19 LAB
EKG ATRIAL RATE: 83 BPM
EKG P AXIS: 75 DEGREES
EKG P-R INTERVAL: 148 MS
EKG Q-T INTERVAL: 354 MS
EKG QRS DURATION: 104 MS
EKG QTC CALCULATION (BAZETT): 415 MS
EKG R AXIS: 34 DEGREES
EKG T AXIS: 67 DEGREES
EKG VENTRICULAR RATE: 83 BPM

## 2017-09-23 ASSESSMENT — ENCOUNTER SYMPTOMS
NAUSEA: 0
SORE THROAT: 1
SWOLLEN GLANDS: 0
DIARRHEA: 0
SINUS PAIN: 0
SINUS PRESSURE: 0
COUGH: 1
ABDOMINAL PAIN: 0
RHINORRHEA: 0
EYE DISCHARGE: 0
WHEEZING: 1
VOMITING: 0
SHORTNESS OF BREATH: 1

## 2017-09-26 ENCOUNTER — CARE COORDINATION (OUTPATIENT)
Dept: FAMILY MEDICINE CLINIC | Age: 72
End: 2017-09-26

## 2017-10-03 ENCOUNTER — OFFICE VISIT (OUTPATIENT)
Dept: SURGERY | Age: 72
End: 2017-10-03

## 2017-10-03 VITALS
DIASTOLIC BLOOD PRESSURE: 76 MMHG | WEIGHT: 194 LBS | HEIGHT: 71 IN | SYSTOLIC BLOOD PRESSURE: 139 MMHG | HEART RATE: 73 BPM | BODY MASS INDEX: 27.16 KG/M2

## 2017-10-03 DIAGNOSIS — N40.0 BENIGN NON-NODULAR PROSTATIC HYPERPLASIA, PRESENCE OF LOWER URINARY TRACT SYMPTOMS UNSPECIFIED: ICD-10-CM

## 2017-10-03 DIAGNOSIS — E29.1 HYPOGONADISM MALE: Primary | ICD-10-CM

## 2017-10-03 PROCEDURE — 1123F ACP DISCUSS/DSCN MKR DOCD: CPT | Performed by: INTERNAL MEDICINE

## 2017-10-03 PROCEDURE — 4040F PNEUMOC VAC/ADMIN/RCVD: CPT | Performed by: INTERNAL MEDICINE

## 2017-10-03 PROCEDURE — G8484 FLU IMMUNIZE NO ADMIN: HCPCS | Performed by: INTERNAL MEDICINE

## 2017-10-03 PROCEDURE — G8598 ASA/ANTIPLAT THER USED: HCPCS | Performed by: INTERNAL MEDICINE

## 2017-10-03 PROCEDURE — 1036F TOBACCO NON-USER: CPT | Performed by: INTERNAL MEDICINE

## 2017-10-03 PROCEDURE — G8417 CALC BMI ABV UP PARAM F/U: HCPCS | Performed by: INTERNAL MEDICINE

## 2017-10-03 PROCEDURE — G8427 DOCREV CUR MEDS BY ELIG CLIN: HCPCS | Performed by: INTERNAL MEDICINE

## 2017-10-03 PROCEDURE — 3017F COLORECTAL CA SCREEN DOC REV: CPT | Performed by: INTERNAL MEDICINE

## 2017-10-03 PROCEDURE — 99203 OFFICE O/P NEW LOW 30 MIN: CPT | Performed by: INTERNAL MEDICINE

## 2017-10-03 RX ORDER — TESTOSTERONE CYPIONATE 200 MG/ML
INJECTION INTRAMUSCULAR
Qty: 10 ML | Refills: 1
Start: 2017-10-03 | End: 2018-12-06 | Stop reason: SDUPTHER

## 2017-10-03 NOTE — MR AVS SNAPSHOT
After Visit Summary             Berna Perez   10/3/2017 9:00 AM   Office Visit    Description:  Male : 1945   Provider:  Carlos Alberto Fernandez MD   Department:   [address] and Future Appointments         Below is a list of your follow-up and future appointments. This may not be a complete list as you may have made appointments directly with providers that we are not aware of or your providers may have made some for you. Please call your providers to confirm appointments. It is important to keep your appointments. Please bring your current insurance card, photo ID, co-pay, and all medication bottles to your appointment. If self-pay, payment is expected at the time of service. Your To-Do List     Future Appointments Provider Department Dept Phone    10/6/2017 9:30 AM [address] [phone]    Please arrive 15 minutes prior to appointment, bring photo ID and insurance card. 10/18/2017 9:30 AM SCHEDULE, [address] [facility] [phone]    If this is a fasting lab, please do not eat or drink past midnight other than water. 2017 9:30 AM Jean Claude Garcia MD [facility] at [phone]    Please arrive 15 minutes prior to appointment, bring photo ID and insurance card.     Future Orders Complete By Expires    CBC [CPU286 Custom]  10/3/2017 10/3/2018    PSA, Diagnostic [MIU7591 Custom]  10/3/2017 10/3/2018    Testosterone Free And Total Male [TXC3883 Custom]  10/3/2017 10/3/2018         Information from Your Visit        Department     Name Address Phone Fax    [facility] [address] [id number] [phone]      You Were Seen for:         Comments    Hypogonadism male   [[id number]]         Vital Signs     Blood Pressure Pulse Height Weight Body Mass Index Smoking Status 139/76 (Site: Right Arm, Position: Sitting, Cuff Size: Large Adult) 73 5' 11\" (1.803 m) 194 lb (88 kg) 27.06 kg/m2 Former Smoker      Additional Information about your Body Mass Index (BMI)           Your BMI as listed above is considered overweight (25.0-29.9). BMI is an estimate of body fat, calculated from your height and weight. The higher your BMI, the greater your risk of heart disease, high blood pressure, type 2 diabetes, stroke, gallstones, arthritis, sleep apnea, and certain cancers. BMI is not perfect. It may overestimate body fat in athletes and people who are more muscular. If your body fat is high you can improve your BMI by decreasing your calorie intake and becoming more physically active. Learn more at: [email or web address]             Today's Medication Changes          These changes are accurate as of: 10/3/17  9:31 AM.  If you have any questions, ask your nurse or doctor. CHANGE how you take these medications           testosterone cypionate 200 MG/ML injection   Commonly known as:  DEPOTESTOTERONE CYPIONATE   Instructions:  1 ML every 2 weeks   Quantity:  10 mL   Refills:  01   What changed:    - how much to take  - how to take this  - when to take this  - additional instructions   Changed by: Dennis Ames MD            Where to Get Your Medications      Information about where to get these medications is not yet available     !  Ask your nurse or doctor about these medications     testosterone cypionate 200 MG/ML injection               Your Current Medications Are              testosterone cypionate (DEPOTESTOTERONE CYPIONATE) 200 MG/ML injection 1 ML every 2 weeks    albuterol sulfate HFA (PROAIR HFA) 108 (90 Base) MCG/ACT inhaler Inhale 2 puffs into the lungs every 6 hours as needed for Wheezing    metoprolol succinate (TOPROL XL) 50 MG extended release tablet Take 3 tablets by mouth daily UNABLE TO FIND SUPER BETA PROSTATE OTC 250MG BETA-SITOSTEROL AND 400IU VIT D    tobramycin-dexamethasone (TOBRADEX) 0.3-0.1 % ophthalmic suspension     lisinopril (PRINIVIL;ZESTRIL) 40 MG tablet Take 1 tablet by mouth daily    fluticasone (FLONASE) 50 MCG/ACT nasal spray Use 2 sprays nasally daily    sildenafil (VIAGRA) 100 MG tablet Take 1 tablet by mouth as needed for Erectile Dysfunction Lot [id number] exp 01/2018 #4 Lot [id number] exp  #2    triamterene-hydrochlorothiazide (MAXZIDE-25) 37.5-25 MG per tablet Take 0.5 tablets by mouth daily    ascorbic acid (VITAMIN C) 500 MG tablet Take 1,000 mg by mouth daily    glipiZIDE (GLUCOTROL) 10 MG tablet Take 1 tablet by mouth 2 times daily (before meals)    loratadine (CLARITIN) 10 MG tablet Take 1 tablet by mouth daily    rosuvastatin (CRESTOR) 20 MG tablet Take 1 tablet by mouth nightly    tamsulosin (FLOMAX) 0.4 MG capsule Take 2 capsules by mouth daily    vitamin D (CHOLECALCIFEROL) 1000 UNITS TABS tablet Take 1,000 Units by mouth    acetaminophen (TYLENOL EX ST ARTHRITIS PAIN) 500 MG tablet Take 1 tablet by mouth every 6 hours as needed for Pain    CINNAMON PO Take 500 mg by mouth 2 times daily. aspirin 81 MG EC tablet Take 81 mg by mouth daily.         Allergies              Invokana [Canagliflozin] Diarrhea    Metformin And Related Diarrhea         Additional Information        Basic Information     Date Of Birth Sex Race Ethnicity Preferred Language    1945 Male White Non-/Non  English      Problem List as of 10/3/2017  Date Reviewed: 10/3/2017                Varicose veins of both lower extremities (Chronic)    Type 2 diabetes mellitus with microalbuminuria, without long-term current use of insulin (HCC) (Chronic)    Erectile dysfunction (Chronic)    Perennial allergic rhinitis (Chronic)    Hyperlipidemia (Chronic)    Personal history of colonic polyps (Chronic)    Osteoarthritis of multiple joints (Chronic) Pseudophakia of both eyes (Chronic)    Astigmatism    Astigmatism, regular    Fatty infiltration of liver    Lipoma of spermatic cord    Atypical chest pain    Bilateral leg pain    BPH (benign prostatic hypertrophy) (Chronic)    HTN (hypertension) (Chronic)    Chronic low back pain    Malignant melanoma of skin of face (HCC) (Chronic)    CAD in native artery (Chronic)    COPD (chronic obstructive pulmonary disease) (HCC) (Chronic)    Disorder of lipid metabolism    History of tobacco use      Your Goals as of 10/3/2017 at 9:31 AM              9/26/17    9/11/17    8/3/17       Care Coordination    Self Monitoring   Improving    Improving    Notes    Self-Monitored Blood Glucose - I will check my blood sugar Fasting blood sugar  I will notify my provider of any trends of increasing or decreasing blood sugars over a 1 month period. Blood Pressure - I will take my blood pressure as directed - Daily  I will notify my provider of any trends of increasing or decreasing blood pressures over a month period of time. Patient Reported Blood Pressure No flowsheet data found.     Barriers: none  Plan for overcoming my barriers: N/A  Confidence: 10/10  Anticipated Goal Completion Date: 7/31/2017           Result Component    HEMOGLOBIN A1C < 7.0     7.5      Related Problems    Type II or unspecified type diabetes mellitus without mention of complication, uncontrolled      Immunizations as of 10/3/2017     Name Date    Influenza, High Dose 9/11/2017, 9/25/2016, 11/23/2015    Pneumococcal 13-valent Conjugate ([id number]) 3/6/2017    Pneumococcal Conjugate 7-valent 9/17/2006    Tdap (Boostrix, Adacel) 8/1/2017    Zoster 8/1/2017      Preventive Care        Date Due    Eye Exam By An Eye Doctor 3/6/2018    Pneumococcal Vaccines (two) for all adults aged 72 and over (2 of 2 - PPSV23) 3/6/2018    Cholesterol Screening 6/12/2018    Diabetic Foot Exam 9/11/2018    Hemoglobin A1C (Test For Long-Term Glucose Control) 9/11/2018

## 2017-10-06 ENCOUNTER — OFFICE VISIT (OUTPATIENT)
Dept: FAMILY MEDICINE CLINIC | Age: 72
End: 2017-10-06

## 2017-10-06 VITALS
HEIGHT: 71 IN | RESPIRATION RATE: 16 BRPM | HEART RATE: 62 BPM | DIASTOLIC BLOOD PRESSURE: 80 MMHG | TEMPERATURE: 97.3 F | WEIGHT: 197 LBS | BODY MASS INDEX: 27.58 KG/M2 | SYSTOLIC BLOOD PRESSURE: 138 MMHG

## 2017-10-06 DIAGNOSIS — E78.5 HYPERLIPIDEMIA, UNSPECIFIED HYPERLIPIDEMIA TYPE: Chronic | ICD-10-CM

## 2017-10-06 DIAGNOSIS — E11.29 TYPE 2 DIABETES MELLITUS WITH MICROALBUMINURIA, WITHOUT LONG-TERM CURRENT USE OF INSULIN (HCC): Primary | Chronic | ICD-10-CM

## 2017-10-06 DIAGNOSIS — R80.9 TYPE 2 DIABETES MELLITUS WITH MICROALBUMINURIA, WITHOUT LONG-TERM CURRENT USE OF INSULIN (HCC): Primary | Chronic | ICD-10-CM

## 2017-10-06 DIAGNOSIS — I25.10 CAD IN NATIVE ARTERY: Chronic | ICD-10-CM

## 2017-10-06 DIAGNOSIS — J44.9 CHRONIC OBSTRUCTIVE PULMONARY DISEASE, UNSPECIFIED COPD TYPE (HCC): Chronic | ICD-10-CM

## 2017-10-06 DIAGNOSIS — N52.9 ERECTILE DYSFUNCTION, UNSPECIFIED ERECTILE DYSFUNCTION TYPE: Chronic | ICD-10-CM

## 2017-10-06 DIAGNOSIS — I10 ESSENTIAL HYPERTENSION: Chronic | ICD-10-CM

## 2017-10-06 PROCEDURE — G8417 CALC BMI ABV UP PARAM F/U: HCPCS | Performed by: FAMILY MEDICINE

## 2017-10-06 PROCEDURE — 3023F SPIROM DOC REV: CPT | Performed by: FAMILY MEDICINE

## 2017-10-06 PROCEDURE — 1036F TOBACCO NON-USER: CPT | Performed by: FAMILY MEDICINE

## 2017-10-06 PROCEDURE — 1123F ACP DISCUSS/DSCN MKR DOCD: CPT | Performed by: FAMILY MEDICINE

## 2017-10-06 PROCEDURE — G8598 ASA/ANTIPLAT THER USED: HCPCS | Performed by: FAMILY MEDICINE

## 2017-10-06 PROCEDURE — 3046F HEMOGLOBIN A1C LEVEL >9.0%: CPT | Performed by: FAMILY MEDICINE

## 2017-10-06 PROCEDURE — G8484 FLU IMMUNIZE NO ADMIN: HCPCS | Performed by: FAMILY MEDICINE

## 2017-10-06 PROCEDURE — G8427 DOCREV CUR MEDS BY ELIG CLIN: HCPCS | Performed by: FAMILY MEDICINE

## 2017-10-06 PROCEDURE — G8926 SPIRO NO PERF OR DOC: HCPCS | Performed by: FAMILY MEDICINE

## 2017-10-06 PROCEDURE — 4040F PNEUMOC VAC/ADMIN/RCVD: CPT | Performed by: FAMILY MEDICINE

## 2017-10-06 PROCEDURE — 99214 OFFICE O/P EST MOD 30 MIN: CPT | Performed by: FAMILY MEDICINE

## 2017-10-06 PROCEDURE — 3017F COLORECTAL CA SCREEN DOC REV: CPT | Performed by: FAMILY MEDICINE

## 2017-10-06 RX ORDER — RANITIDINE 150 MG/1
TABLET ORAL
COMMUNITY
Start: 2017-10-01 | End: 2017-12-27 | Stop reason: SDUPTHER

## 2017-10-06 RX ORDER — LOSARTAN POTASSIUM 50 MG/1
50 TABLET ORAL
COMMUNITY
Start: 2017-09-22 | End: 2018-02-07 | Stop reason: SDUPTHER

## 2017-10-06 ASSESSMENT — ENCOUNTER SYMPTOMS
WHEEZING: 0
ABDOMINAL PAIN: 0
COUGH: 0
VOMITING: 0
NAUSEA: 0
CHEST TIGHTNESS: 0
SHORTNESS OF BREATH: 0
DIARRHEA: 0

## 2017-10-06 NOTE — MR AVS SNAPSHOT
After Visit Summary             Shilpi Pathak   10/6/2017 9:30 AM   Office Visit    Description:  Male : 1945   Provider:  Leonora Aleman MD   Department:  [facility]              Your Follow-Up and Future Appointments         Below is a list of your follow-up and future appointments. This may not be a complete list as you may have made appointments directly with providers that we are not aware of or your providers may have made some for you. Please call your providers to confirm appointments. It is important to keep your appointments. Please bring your current insurance card, photo ID, co-pay, and all medication bottles to your appointment. If self-pay, payment is expected at the time of service. Your To-Do List     Future Appointments Provider Department Dept Phone    10/18/2017 9:30 AM SCHEDULE, Professor Leonel Pollard [facility] [phone]    If this is a fasting lab, please do not eat or drink past midnight other than water. 2017 9:30 AM Leonora Aleman MD [facility] [phone]    Future Orders Complete By Expires    Comprehensive Metabolic Panel [WXO23 Custom]  2017 (Approximate) 10/6/2018    Hemoglobin A1C [LAB90 Custom]  2017 (Approximate) 10/6/2018    Lipid Panel [LAB18 Custom]  2017 (Approximate) 10/6/2018    Follow-Up    Return for keep next scheduled AWV visit.          Information from Your Visit        Department     Name Address Phone Fax    [facility] [phone]      You Were Seen for:         Comments    Type 2 diabetes mellitus with microalbuminuria, without long-term current use of insulin [facility])   [[id number]]         Vital Signs     Blood Pressure Pulse Temperature Respirations Height Weight    138/80 (Site: Left Arm, Position: Sitting, Cuff Size: Large Adult) 62 97.3 °F (36.3 °C) (Temporal) 16 5' 11\" (1.803 m) 197 lb (89.4 kg) (before meals)    loratadine (CLARITIN) 10 MG tablet Take 1 tablet by mouth daily    rosuvastatin (CRESTOR) 20 MG tablet Take 1 tablet by mouth nightly    tamsulosin (FLOMAX) 0.4 MG capsule Take 2 capsules by mouth daily    vitamin D (CHOLECALCIFEROL) 1000 UNITS TABS tablet Take 1,000 Units by mouth    acetaminophen (TYLENOL EX ST ARTHRITIS PAIN) 500 MG tablet Take 1 tablet by mouth every 6 hours as needed for Pain    CINNAMON PO Take 500 mg by mouth 2 times daily. aspirin 81 MG EC tablet Take 81 mg by mouth daily.         Allergies              Invokana [Canagliflozin] Diarrhea    Metformin And Related Diarrhea         Additional Information        Basic Information     Date Of Birth Sex Race Ethnicity Preferred Language    1945 Male White Non-/Non  English      Problem List as of 10/6/2017  Date Reviewed: 10/6/2017                Varicose veins of both lower extremities (Chronic)    Type 2 diabetes mellitus with microalbuminuria, without long-term current use of insulin (HCC) (Chronic)    Erectile dysfunction (Chronic)    Perennial allergic rhinitis (Chronic)    Hyperlipidemia (Chronic)    Personal history of colonic polyps (Chronic)    Osteoarthritis of multiple joints (Chronic)    Pseudophakia of both eyes (Chronic)    Astigmatism    Astigmatism, regular    Fatty infiltration of liver    Lipoma of spermatic cord    Atypical chest pain    Bilateral leg pain    BPH (benign prostatic hypertrophy) (Chronic)    HTN (hypertension) (Chronic)    Chronic low back pain    Malignant melanoma of skin of face (HCC) (Chronic)    CAD in native artery (Chronic)    COPD (chronic obstructive pulmonary disease) (HCC) (Chronic)    Disorder of lipid metabolism    History of tobacco use      Your Goals as of 10/6/2017 at 10:19 AM              9/26/17    9/11/17    8/3/17       Care Coordination    Self Monitoring   Improving    Improving    Notes Self-Monitored Blood Glucose - I will check my blood sugar Fasting blood sugar  I will notify my provider of any trends of increasing or decreasing blood sugars over a 1 month period. Blood Pressure - I will take my blood pressure as directed - Daily  I will notify my provider of any trends of increasing or decreasing blood pressures over a month period of time. Patient Reported Blood Pressure No flowsheet data found. Barriers: none  Plan for overcoming my barriers: N/A  Confidence: 10/10  Anticipated Goal Completion Date: 7/31/2017           Result Component    HEMOGLOBIN A1C < 7.0     7.5      Related Problems    Type II or unspecified type diabetes mellitus without mention of complication, uncontrolled      Immunizations as of 10/6/2017     Name Date    Influenza, High Dose 9/11/2017, 9/25/2016, 11/23/2015    Pneumococcal 13-valent Conjugate ([id number]) 3/6/2017    Pneumococcal Conjugate 7-valent 9/17/2006    Tdap (Boostrix, Adacel) 8/1/2017    Zoster 8/1/2017      Preventive Care        Date Due    Eye Exam By An Eye Doctor 3/6/2018    Pneumococcal Vaccines (two) for all adults aged 72 and over (2 of 2 - PPSV23) 3/6/2018    Cholesterol Screening 6/12/2018    Diabetic Foot Exam 9/11/2018    Hemoglobin A1C (Test For Long-Term Glucose Control) 9/11/2018    Colonoscopy 3/7/2023    Tetanus Combination Vaccine (2 - Td) 8/1/2027            [employer] Signup           Our records indicate that you have an active [employer] account. You can view your After Visit Summary by going to [email or web address] and logging in with your [employer] username and password. If you don't have a [employer] username and password but a parent or guardian has access to your record, the parent or guardian should login with their own [employer] username and password and access your record to view the After Visit Summary.      Additional Information If you have questions, please contact the physician practice where you receive care. Remember, MyChart is NOT to be used for urgent needs. For medical emergencies, dial 911. For questions regarding your MyChart account call [phone]. If you have a clinical question, please call your doctor's office.

## 2017-10-06 NOTE — PROGRESS NOTES
Subjective:      Patient ID: Saad Miller is a 67 y.o. male who presents today for:  Chief Complaint   Patient presents with    Hypertension     Presents today for a routein follow up on HTN. Patient did get a BP cuff and has been checking his values at home. His BP has been running about 140/70-80 at home. [facility] [place] just RX'd a new BP medication (heart doctor) Cozzar 50 MG    URI     Also here to follow up on URI SX. Seen Thu Hyman NP on 9/12/2017 and RX Levaquin and Prednisone. States his SX  have gone away       HPI     Patient here for chronic diabetes, hypertension, lipid, COPD follow-up visit. He reports being seen by cardiology in the past month and cozaar 50 mg daily has been added to his regimen. He denies any chest pain, palpitations, lightheadedness, edema, or syncope. He has been checking home BG values once daily and getting random values 110's-160's. He denies adhering to any specific lower carbohydrate diet. No reported polydipsia or polyuria. No vision changes and no paresthesias or weakness.     Past Medical History:   Diagnosis Date    BPH (benign prostatic hypertrophy)     CAD (coronary artery disease)     Cataracts, bilateral     Chronic low back pain     COPD (chronic obstructive pulmonary disease) ([address] 6/1/2006    DM2 (diabetes mellitus, type 2) (HCC)     Duodenitis     Erectile dysfunction 3/6/2017    GERD (gastroesophageal reflux disease)     HTN (hypertension)     Hyperlipidemia     Malignant melanoma of face ([address]     Nephrolithiasis     Osteoarthritis of multiple joints     hands, hips    Perennial allergic rhinitis     Personal history of colonic polyps     Pseudophakia of both eyes     Type 2 diabetes mellitus with microalbuminuria, without long-term current use of insulin ([address] 3/6/2017    Varicose veins of both lower extremities 9/11/2017     Past Surgical History:   Procedure Laterality Date    ANGIOPLASTY      CARDIAC CATHETERIZATION      #17 01/2018 #4 Lot [id number] exp  #2 6 tablet 0    triamterene-hydrochlorothiazide (MAXZIDE-25) 37.5-25 MG per tablet Take 0.5 tablets by mouth daily 45 tablet 3    ascorbic acid (VITAMIN C) 500 MG tablet Take 1,000 mg by mouth daily      glipiZIDE (GLUCOTROL) 10 MG tablet Take 1 tablet by mouth 2 times daily (before meals) 180 tablet 3    loratadine (CLARITIN) 10 MG tablet Take 1 tablet by mouth daily 30 tablet 5    rosuvastatin (CRESTOR) 20 MG tablet Take 1 tablet by mouth nightly 90 tablet 3    tamsulosin (FLOMAX) 0.4 MG capsule Take 2 capsules by mouth daily 180 capsule 3    vitamin D (CHOLECALCIFEROL) 1000 UNITS TABS tablet Take 1,000 Units by mouth      acetaminophen (TYLENOL EX ST ARTHRITIS PAIN) 500 MG tablet Take 1 tablet by mouth every 6 hours as needed for Pain 120 tablet 3    CINNAMON PO Take 500 mg by mouth 2 times daily.  aspirin 81 MG EC tablet Take 81 mg by mouth daily. No current facility-administered medications on file prior to visit. Allergies:  Invokana [canagliflozin] and Metformin and related    Review of Systems   Constitutional: Negative for appetite change, chills, diaphoresis, fatigue and fever. Eyes: Negative for visual disturbance. Respiratory: Negative for cough, chest tightness, shortness of breath and wheezing. Cardiovascular: Negative for chest pain, palpitations and leg swelling. No orthopnea, No PND   Gastrointestinal: Negative for abdominal pain, diarrhea, nausea and vomiting. Endocrine: Negative for cold intolerance, heat intolerance, polydipsia, polyphagia and polyuria. Genitourinary: Negative for dysuria and hematuria. Musculoskeletal: Negative for myalgias. Skin: Negative for rash. Neurological: Negative for dizziness, syncope, weakness, light-headedness, numbness and headaches. Psychiatric/Behavioral: Negative for dysphoric mood and sleep disturbance. The patient is not nervous/anxious.         Objective:     /80 type  Will follow labwork over time    - Comprehensive Metabolic Panel; Future  - Lipid Panel; Future    5. Chronic obstructive pulmonary disease, unspecified COPD type ([facility]  No reported respiratory complaints. Continue current management    6. Erectile dysfunction, unspecified erectile dysfunction type  Patient has been established with endocrinology office for long-term management of low testosterone and erectile dysfunction. Samples of Viagra (2 tabs, 100 mg dose) given today in the office      Modified Medications    No medications on file       New Prescriptions    No medications on file       There are no discontinued medications. Return for keep next scheduled AWV visit.     Tj Tan MD

## 2017-10-07 NOTE — PROGRESS NOTES
Subjective:      Patient ID: Chris Honeycutt is a 67 y.o. male. HPIPt is 67 yy old presenting with hypogonadism testosterone levels reviewed between  does have a history of BPH and diabetes does c/o fatigue low libido and low muscle  strength   reviewed labs    Results for Reddy Syed (MRN [id number]) as of 10/9/2017 06:47   Ref.  Range 8/4/2016 09:29 3/3/2017 07:59   Total Testosterone Latest Ref Range: 300 - 720 ng/dL 88 (L) 619       Patient Active Problem List   Diagnosis    BPH (benign prostatic hypertrophy)    HTN (hypertension)    Chronic low back pain    Malignant melanoma of skin of face (HCC)    CAD in native artery    Bilateral leg pain    Chronic venous insufficiency    Acid reflux    Abnormal LFTs    Lipoma of spermatic cord    Fatty infiltration of liver    Perennial allergic rhinitis    Hyperlipidemia    Personal history of colonic polyps    Osteoarthritis of multiple joints    Pseudophakia of both eyes    Astigmatism    Atypical chest pain    COPD (chronic obstructive pulmonary disease) (HCC)    Disorder of lipid metabolism    History of tobacco use    Astigmatism, regular    Type 2 diabetes mellitus with microalbuminuria, without long-term current use of insulin (HCC)    Erectile dysfunction    Varicose veins of both lower extremities     Social History     Social History    Marital status:      Spouse name: Vanessa Inman Number of children: 2    Years of education: 12+     Occupational History    Farming      Social History Main Topics    Smoking status: Former Smoker     Packs/day: 1.00     Years: 41.00     Types: Cigarettes     Start date: 1961     Quit date: 9/28/2002    Smokeless tobacco: Never Used    Alcohol use 0.0 oz/week      Comment: drinks one beer at night occassionally    Drug use: No    Sexual activity: Not Currently     Partners: Female     Other Topics Concern    Not on file     Social History Narrative     Past Surgical History: Procedure Laterality Date    ANGIOPLASTY      CARDIAC CATHETERIZATION      #17    CATARACT REMOVAL WITH IMPLANT Left 1992    CATARACT REMOVAL WITH IMPLANT Right 1992    COLONOSCOPY  2009    tubular adenomas    COLONOSCOPY  2013    SKIN CANCER EXCISION Left 08/2017    TONSILLECTOMY  1955    UPPER GASTROINTESTINAL ENDOSCOPY      duodenitis/ poss early signs of celiac disease     Family History   Problem Relation Age of Onset    Heart Disease Mother     Heart Disease Brother     Diabetes Maternal Grandfather      Allergies   Allergen Reactions    Invokana [Canagliflozin] Diarrhea    Metformin And Related Diarrhea       Current Outpatient Prescriptions:     testosterone cypionate (DEPOTESTOTERONE CYPIONATE) 200 MG/ML injection, 1 ML every 2 weeks, Disp: 10 mL, Rfl: 01    albuterol sulfate HFA (PROAIR HFA) 108 (90 Base) MCG/ACT inhaler, Inhale 2 puffs into the lungs every 6 hours as needed for Wheezing, Disp: 1 Inhaler, Rfl: 3    metoprolol succinate (TOPROL XL) 50 MG extended release tablet, Take 3 tablets by mouth daily, Disp: 90 tablet, Rfl: 5    UNABLE TO FIND, SUPER BETA PROSTATE OTC 250MG BETA-SITOSTEROL AND 400IU VIT D, Disp: , Rfl:     tobramycin-dexamethasone (TOBRADEX) 0.3-0.1 % ophthalmic suspension, , Disp: , Rfl:     lisinopril (PRINIVIL;ZESTRIL) 40 MG tablet, Take 1 tablet by mouth daily, Disp: 90 tablet, Rfl: 3    fluticasone (FLONASE) 50 MCG/ACT nasal spray, Use 2 sprays nasally daily, Disp: 48 g, Rfl: 2    sildenafil (VIAGRA) 100 MG tablet, Take 1 tablet by mouth as needed for Erectile Dysfunction Lot [id number] exp 01/2018 #4 Lot [id number] exp  #2, Disp: 6 tablet, Rfl: 0    triamterene-hydrochlorothiazide (MAXZIDE-25) 37.5-25 MG per tablet, Take 0.5 tablets by mouth daily, Disp: 45 tablet, Rfl: 3    ascorbic acid (VITAMIN C) 500 MG tablet, Take 1,000 mg by mouth daily, Disp: , Rfl:     glipiZIDE (GLUCOTROL) 10 MG tablet, Take 1 tablet by mouth 2 times daily (before meals), Disp: 180 tablet, Rfl: 3    loratadine (CLARITIN) 10 MG tablet, Take 1 tablet by mouth daily, Disp: 30 tablet, Rfl: 5    rosuvastatin (CRESTOR) 20 MG tablet, Take 1 tablet by mouth nightly, Disp: 90 tablet, Rfl: 3    tamsulosin (FLOMAX) 0.4 MG capsule, Take 2 capsules by mouth daily, Disp: 180 capsule, Rfl: 3    vitamin D (CHOLECALCIFEROL) 1000 UNITS TABS tablet, Take 1,000 Units by mouth, Disp: , Rfl:     acetaminophen (TYLENOL EX ST ARTHRITIS PAIN) 500 MG tablet, Take 1 tablet by mouth every 6 hours as needed for Pain, Disp: 120 tablet, Rfl: 3    CINNAMON PO, Take 500 mg by mouth 2 times daily. , Disp: , Rfl:     aspirin 81 MG EC tablet, Take 81 mg by mouth daily. , Disp: , Rfl:     losartan (COZAAR) 50 MG tablet, Take 50 mg by mouth, Disp: , Rfl:     ranitidine (ZANTAC) 150 MG tablet, , Disp: , Rfl:     Review of Systems   Constitutional: Positive for fatigue. Eyes: Negative. Respiratory: Negative. Cardiovascular: Negative. Endocrine: Negative for cold intolerance and heat intolerance. Genitourinary: Positive for frequency. Psychiatric/Behavioral: Negative for dysphoric mood. The patient is not nervous/anxious. All other systems reviewed and are negative. Vitals:    10/03/17 0902   BP: 139/76   Site: Right Arm   Position: Sitting   Cuff Size: Large Adult   Pulse: 73   Weight: 194 lb (88 kg)   Height: 5' 11\" (1.803 m)       Objective:   Physical Exam   Constitutional: He is oriented to person, place, and time. He appears well-developed and well-nourished. HENT:   Head: Normocephalic and atraumatic. Right Ear: External ear normal.   Left Ear: External ear normal.   Eyes: Right eye exhibits no discharge. Left eye exhibits no discharge. No scleral icterus. Neck: No JVD present. No thyromegaly present. Cardiovascular: Normal rate and normal heart sounds. Pulmonary/Chest: Effort normal and breath sounds normal. He has no wheezes. He has no rales.    Musculoskeletal: Normal

## 2017-10-09 ASSESSMENT — ENCOUNTER SYMPTOMS
RESPIRATORY NEGATIVE: 1
EYES NEGATIVE: 1

## 2017-10-24 ENCOUNTER — HOSPITAL ENCOUNTER (OUTPATIENT)
Dept: LAB | Age: 72
Discharge: HOME | End: 2017-10-24
Payer: MEDICARE

## 2017-10-24 DIAGNOSIS — E29.1 HYPOGONADISM MALE: ICD-10-CM

## 2017-10-24 LAB
HCT VFR BLD CALC: 49.5 % (ref 42–52)
HEMOGLOBIN: 16.6 G/DL (ref 14–18)
MCH RBC QN AUTO: 33 PG (ref 27–31.3)
MCHC RBC AUTO-ENTMCNC: 33.6 % (ref 33–37)
MCV RBC AUTO: 98.2 FL (ref 80–100)
PDW BLD-RTO: 14.2 % (ref 11.5–14.5)
PLATELET # BLD: 185 K/UL (ref 130–400)
PROSTATE SPECIFIC ANTIGEN: 2.19 NG/ML (ref 0–6.22)
RBC # BLD: 5.04 M/UL (ref 4.7–6.1)
WBC # BLD: 7.8 K/UL (ref 4.8–10.8)

## 2017-10-24 PROCEDURE — 84153 ASSAY OF PSA TOTAL: CPT

## 2017-10-24 PROCEDURE — 84403 ASSAY OF TOTAL TESTOSTERONE: CPT

## 2017-10-24 PROCEDURE — 36415 COLL VENOUS BLD VENIPUNCTURE: CPT

## 2017-10-24 PROCEDURE — 84270 ASSAY OF SEX HORMONE GLOBUL: CPT

## 2017-10-24 PROCEDURE — 85027 COMPLETE CBC AUTOMATED: CPT

## 2017-10-26 ENCOUNTER — CARE COORDINATION (OUTPATIENT)
Dept: FAMILY MEDICINE CLINIC | Age: 72
End: 2017-10-26

## 2017-10-26 LAB
SEX HORMONE BINDING GLOBULIN: 39 NMOL/L (ref 11–80)
TESTOSTERONE FREE PERCENT: 2.1 % (ref 1.6–2.9)
TESTOSTERONE FREE, CALC: 271 PG/ML (ref 47–244)
TESTOSTERONE TOTAL-MALE: 1299 NG/DL (ref 300–720)

## 2017-10-26 NOTE — CARE COORDINATION
Ambulatory Care Coordination Note  10/26/2017  CM Risk Score: 2  Lottie Mortality Risk Score: 11.29    ACC: Maritza Gagnon RN    Summary Note: [facility] and he says that he is doing very well. He feels that his breathing is on track. 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 He tells me that his cab is completely enclosed with heat and air conditioning. He does say that he does take his Claritin from time to time. But, he tells me that he has not used his rescue inhaler in quite a while. He did say that his blood pressure is up a bit and he is taking his medications for this. He says that it has been running 140-150/70. He says that this is higher than it has been. He says that he usually checks this about an hour after he takes his medications. He denies any changes in his vision or any headaches. He says that he is staying about the same with his weight between 195-197. Diabetes Assessment    Medic Alert ID:  No  Meal Planning:  Avoidance of concentrated sweets, Calorie counting, Carb counting   How often do you test your blood sugar?:  Daily   Do you have barriers with adherence to non-pharmacologic self-management interventions?  (Nutrition/Exercise/Self-Monitoring):  No   Have you ever had to go to the ED for symptoms of low blood sugar?:  No       No patient-reported symptoms       and   COPD Assessment    Does the patient understand envrionmental exposure?:  Yes  Is the patient able to verbalize Rescue vs. Long Acting medications?:  Yes  Does the patient have a nebulizer?:  No  Does the patient use a space with inhaled medications?:  No     No patient-reported symptoms         Symptoms:               Care Coordination Interventions    Program Enrollment:  Rising Risk  Referral from Primary Care Provider:

## 2017-11-01 ENCOUNTER — NURSE ONLY (OUTPATIENT)
Dept: SURGERY | Age: 72
End: 2017-11-01

## 2017-11-01 DIAGNOSIS — E29.1 HYPOGONADISM MALE: ICD-10-CM

## 2017-11-01 PROCEDURE — 99999 PR OFFICE/OUTPT VISIT,PROCEDURE ONLY: CPT | Performed by: INTERNAL MEDICINE

## 2017-11-01 PROCEDURE — 96372 THER/PROPH/DIAG INJ SC/IM: CPT | Performed by: INTERNAL MEDICINE

## 2017-11-01 RX ORDER — TESTOSTERONE CYPIONATE 200 MG/ML
200 INJECTION INTRAMUSCULAR ONCE
Status: COMPLETED | OUTPATIENT
Start: 2017-11-01 | End: 2017-11-01

## 2017-11-01 RX ADMIN — TESTOSTERONE CYPIONATE 200 MG: 200 INJECTION INTRAMUSCULAR at 09:43

## 2017-11-07 DIAGNOSIS — I10 BENIGN HYPERTENSION: Chronic | ICD-10-CM

## 2017-11-07 RX ORDER — LISINOPRIL 40 MG/1
40 TABLET ORAL DAILY
Qty: 90 TABLET | Refills: 3 | Status: SHIPPED | OUTPATIENT
Start: 2017-11-07 | End: 2017-11-08 | Stop reason: ALTCHOICE

## 2017-11-08 ENCOUNTER — TELEPHONE (OUTPATIENT)
Dept: FAMILY MEDICINE CLINIC | Age: 72
End: 2017-11-08

## 2017-11-08 NOTE — TELEPHONE ENCOUNTER
Received phone call back from Dr. Nathan Gordillo office that the patient should be on Cozaar only. Form is on your desk to be revised.

## 2017-11-08 NOTE — TELEPHONE ENCOUNTER
Received fax from [facility] for clarification if patient should be both on Lisinopril and Cozaar. Dr. Elma Calabrese started patient on Lisinopril 40 MG 3/6/2017. Dr. Girma Mejia started patient on Cozaar 50 MG 9/22/17. I called Dr. Suzzane Brunner office and left a message with the  for Dr. Girma Mejia to call us back for clarification. Did Dr. Girma Mejia know he was on Lisinopril? Does he want him to take the Cozaar instead of Lisinopril? Is it ok if Cozaar is D/C and he continue with the Lisinopril? Form is on my desk with doc sig for D/C of Cozaar  If Dr. Girma Mejia calls back and wants Lisinopril D/C then we will need doc to revise the fax.

## 2017-11-09 NOTE — TELEPHONE ENCOUNTER
Called and spoke with patient he is aware to discontinue the Lisinopril and continue with Cozaar that was prescribed by by Dr. Dari Hernandez. Patient is concerned about his BP levels. Made patient aware that if his levels increase to give our office and/or Dr. Argelia Montero office a call in regards to increase to see what providers advise patient to do. Patient gives verbal understanding and will call either office if he notices any difference in BP levels. Patient was also made aware that once forms are filled out we will give him a call making him aware. Please advise.

## 2017-11-15 ENCOUNTER — NURSE ONLY (OUTPATIENT)
Dept: SURGERY | Age: 72
End: 2017-11-15

## 2017-11-15 DIAGNOSIS — E29.1 HYPOGONADISM MALE: Primary | ICD-10-CM

## 2017-11-15 PROCEDURE — 99999 PR OFFICE/OUTPT VISIT,PROCEDURE ONLY: CPT | Performed by: INTERNAL MEDICINE

## 2017-11-15 PROCEDURE — 96372 THER/PROPH/DIAG INJ SC/IM: CPT | Performed by: INTERNAL MEDICINE

## 2017-11-15 RX ORDER — TESTOSTERONE CYPIONATE 200 MG/ML
200 INJECTION INTRAMUSCULAR ONCE
Status: COMPLETED | OUTPATIENT
Start: 2017-11-15 | End: 2017-11-15

## 2017-11-15 RX ADMIN — TESTOSTERONE CYPIONATE 200 MG: 200 INJECTION INTRAMUSCULAR at 10:11

## 2017-11-20 RX ORDER — TAMSULOSIN HYDROCHLORIDE 0.4 MG/1
0.8 CAPSULE ORAL DAILY
Qty: 180 CAPSULE | Refills: 3 | Status: SHIPPED | OUTPATIENT
Start: 2017-11-20 | End: 2018-10-25 | Stop reason: SDUPTHER

## 2017-11-28 ENCOUNTER — CARE COORDINATION (OUTPATIENT)
Dept: CARE COORDINATION | Age: 72
End: 2017-11-28

## 2017-11-28 NOTE — CARE COORDINATION
Ambulatory Care Coordination Note  11/28/2017  CM Risk Score: 2  Lottie Mortality Risk Score: 11.29    ACC: Arie Martinez, RN    Summary Note: I called to check in on [employer] and I spoke with his wife Diallo Harmon. She tells me that he is doing very well. She says that he did have a recent check up with his endocrinologist and cardiologist at the [facility] and he was given a good report. 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 She says that he does get plenty of exercise and he is out moving everyday. She says that he is eating and drinking being very vigilant to what he needs to stay away from for his diabetes. She says that even for Thanks giving he had a piece of pie but that was all he ate as far as desserts. She tells me that he has not said anything about his breathing. She feels that he is doing well overall. I have asked her to call me with any questions or concerns for [employer]. Diabetes Assessment    Medic Alert ID:  No  Meal Planning:  Avoidance of concentrated sweets, Calorie counting, Carb counting   How often do you test your blood sugar?:  Daily   Do you have barriers with adherence to non-pharmacologic self-management interventions?  (Nutrition/Exercise/Self-Monitoring):  No   Have you ever had to go to the ED for symptoms of low blood sugar?:  No       No patient-reported symptoms       and   COPD Assessment    Does the patient understand envrionmental exposure?:  Yes  Is the patient able to verbalize Rescue vs. Long Acting medications?:  Yes  Does the patient have a nebulizer?:  No  Does the patient use a space with inhaled medications?:  No     No patient-reported symptoms         Symptoms:               Care Coordination Interventions    Program Enrollment:  Rising Risk  Referral from Primary Care Provider:  No  Suggested Interventions and Community Resources         Goals Addressed             Most Recent     Self Monitoring   On track (11/28/2017)             Self-Monitored Blood Glucose - I will check my blood sugar Fasting blood sugar  I will notify my provider of any trends of increasing or decreasing blood sugars over a 1 month period. Blood Pressure - I will take my blood pressure as directed - Daily  I will notify my provider of any trends of increasing or decreasing blood pressures over a month period of time. Patient Reported Blood Pressure No flowsheet data found. Barriers: none  Plan for overcoming my barriers: N/A  Confidence: 10/10  Anticipated Goal Completion Date: 7/31/2017              Prior to Admission medications    Medication Sig Start Date End Date Taking?  Authorizing Provider   tamsulosin (FLOMAX) 0.4 MG capsule Take 2 capsules by mouth daily 11/20/17   Omid Gonsalez MD   losartan (COZAAR) 50 MG tablet Take 50 mg by mouth 9/22/17   Historical Provider, MD   ranitidine (ZANTAC) 150 MG tablet  10/1/17   Historical Provider, MD   testosterone cypionate (DEPOTESTOTERONE CYPIONATE) 200 MG/ML injection 1 ML every 2 weeks 10/3/17   Joe Maldonado MD   albuterol sulfate HFA (PROAIR HFA) 108 (90 Base) MCG/ACT inhaler Inhale 2 puffs into the lungs every 6 hours as needed for Wheezing 9/11/17   Omid Gonsalez MD   metoprolol succinate (TOPROL XL) 50 MG extended release tablet Take 3 tablets by mouth daily 8/28/17   Omid Gonsalez MD   UNABLE TO FIND SUPER BETA PROSTATE OTC 250MG BETA-SITOSTEROL AND 400IU VIT D    Historical Provider, MD   tobramycin-dexamethasone (TOBRADEX) 0.3-0.1 % ophthalmic suspension  6/13/17   Historical Provider, MD   fluticasone [place]) 50 MCG/ACT nasal spray Use 2 sprays nasally daily 7/28/17   Vanita Sy NP   sildenafil (VIAGRA) 100 MG tablet Take 1 tablet by mouth as needed for Erectile Dysfunction Lot [id number] exp 01/2018 #4 Lot [id number] exp  #2 3/14/17   Omid Gonsalez MD   triamterene-hydrochlorothiazide [facility]) 37.5-25 MG per tablet Take 0.5 tablets by mouth daily 2/1/17   Omid Gonsalez MD   ascorbic acid

## 2017-11-29 ENCOUNTER — HOSPITAL ENCOUNTER (OUTPATIENT)
Dept: LAB | Age: 72
Discharge: HOME | End: 2017-11-29
Payer: MEDICARE

## 2017-11-29 ENCOUNTER — NURSE ONLY (OUTPATIENT)
Dept: SURGERY | Age: 72
End: 2017-11-29

## 2017-11-29 DIAGNOSIS — I25.10 CAD IN NATIVE ARTERY: Chronic | ICD-10-CM

## 2017-11-29 DIAGNOSIS — I10 ESSENTIAL HYPERTENSION: Chronic | ICD-10-CM

## 2017-11-29 DIAGNOSIS — E29.1 HYPOGONADISM MALE: Primary | ICD-10-CM

## 2017-11-29 DIAGNOSIS — R80.9 TYPE 2 DIABETES MELLITUS WITH MICROALBUMINURIA, WITHOUT LONG-TERM CURRENT USE OF INSULIN (HCC): Chronic | ICD-10-CM

## 2017-11-29 DIAGNOSIS — E78.5 HYPERLIPIDEMIA, UNSPECIFIED HYPERLIPIDEMIA TYPE: Chronic | ICD-10-CM

## 2017-11-29 DIAGNOSIS — E11.29 TYPE 2 DIABETES MELLITUS WITH MICROALBUMINURIA, WITHOUT LONG-TERM CURRENT USE OF INSULIN (HCC): Chronic | ICD-10-CM

## 2017-11-29 LAB
ALBUMIN SERPL-MCNC: 4.3 G/DL (ref 3.9–4.9)
ALP BLD-CCNC: 44 U/L (ref 35–104)
ALT SERPL-CCNC: 29 U/L (ref 0–41)
ANION GAP SERPL CALCULATED.3IONS-SCNC: 13 MEQ/L (ref 7–13)
AST SERPL-CCNC: 19 U/L (ref 0–40)
BILIRUB SERPL-MCNC: 0.4 MG/DL (ref 0–1.2)
BUN BLDV-MCNC: 14 MG/DL (ref 8–23)
CALCIUM SERPL-MCNC: 9.7 MG/DL (ref 8.6–10.2)
CHLORIDE BLD-SCNC: 97 MEQ/L (ref 98–107)
CHOLESTEROL, TOTAL: 190 MG/DL (ref 0–199)
CO2: 30 MEQ/L (ref 22–29)
CREAT SERPL-MCNC: 1 MG/DL (ref 0.7–1.2)
GFR AFRICAN AMERICAN: >60
GFR NON-AFRICAN AMERICAN: >60
GLOBULIN: 2.8 G/DL (ref 2.3–3.5)
GLUCOSE BLD-MCNC: 133 MG/DL (ref 74–109)
HBA1C MFR BLD: 7.2 % (ref 4.8–5.9)
HDLC SERPL-MCNC: 41 MG/DL (ref 40–59)
LDL CHOLESTEROL CALCULATED: 93 MG/DL (ref 0–129)
POTASSIUM SERPL-SCNC: 4.5 MEQ/L (ref 3.5–5.1)
SODIUM BLD-SCNC: 140 MEQ/L (ref 132–144)
TOTAL PROTEIN: 7.1 G/DL (ref 6.4–8.1)
TRIGL SERPL-MCNC: 278 MG/DL (ref 0–200)

## 2017-11-29 PROCEDURE — 80061 LIPID PANEL: CPT

## 2017-11-29 PROCEDURE — 96372 THER/PROPH/DIAG INJ SC/IM: CPT | Performed by: INTERNAL MEDICINE

## 2017-11-29 PROCEDURE — 83036 HEMOGLOBIN GLYCOSYLATED A1C: CPT

## 2017-11-29 PROCEDURE — 80053 COMPREHEN METABOLIC PANEL: CPT

## 2017-11-29 PROCEDURE — 99999 PR OFFICE/OUTPT VISIT,PROCEDURE ONLY: CPT | Performed by: INTERNAL MEDICINE

## 2017-11-29 PROCEDURE — 36415 COLL VENOUS BLD VENIPUNCTURE: CPT

## 2017-11-29 RX ORDER — TESTOSTERONE CYPIONATE 200 MG/ML
200 INJECTION INTRAMUSCULAR ONCE
Status: COMPLETED | OUTPATIENT
Start: 2017-11-29 | End: 2017-11-29

## 2017-11-29 RX ADMIN — TESTOSTERONE CYPIONATE 200 MG: 200 INJECTION INTRAMUSCULAR at 09:26

## 2017-11-30 DIAGNOSIS — R80.9 TYPE 2 DIABETES MELLITUS WITH MICROALBUMINURIA, WITHOUT LONG-TERM CURRENT USE OF INSULIN (HCC): Primary | Chronic | ICD-10-CM

## 2017-11-30 DIAGNOSIS — E11.29 TYPE 2 DIABETES MELLITUS WITH MICROALBUMINURIA, WITHOUT LONG-TERM CURRENT USE OF INSULIN (HCC): Primary | Chronic | ICD-10-CM

## 2017-12-11 ENCOUNTER — OFFICE VISIT (OUTPATIENT)
Dept: FAMILY MEDICINE CLINIC | Age: 72
End: 2017-12-11

## 2017-12-11 VITALS
DIASTOLIC BLOOD PRESSURE: 70 MMHG | RESPIRATION RATE: 16 BRPM | SYSTOLIC BLOOD PRESSURE: 138 MMHG | WEIGHT: 198.2 LBS | BODY MASS INDEX: 27.75 KG/M2 | HEIGHT: 71 IN | TEMPERATURE: 97.2 F | HEART RATE: 80 BPM

## 2017-12-11 DIAGNOSIS — J44.9 CHRONIC OBSTRUCTIVE PULMONARY DISEASE, UNSPECIFIED COPD TYPE (HCC): Chronic | ICD-10-CM

## 2017-12-11 DIAGNOSIS — I83.93 VARICOSE VEINS OF BOTH LOWER EXTREMITIES: Chronic | ICD-10-CM

## 2017-12-11 DIAGNOSIS — I87.2 CHRONIC VENOUS INSUFFICIENCY: ICD-10-CM

## 2017-12-11 DIAGNOSIS — R80.9 TYPE 2 DIABETES MELLITUS WITH MICROALBUMINURIA, WITHOUT LONG-TERM CURRENT USE OF INSULIN (HCC): Chronic | ICD-10-CM

## 2017-12-11 DIAGNOSIS — M15.9 PRIMARY OSTEOARTHRITIS INVOLVING MULTIPLE JOINTS: Chronic | ICD-10-CM

## 2017-12-11 DIAGNOSIS — E11.29 TYPE 2 DIABETES MELLITUS WITH MICROALBUMINURIA, WITHOUT LONG-TERM CURRENT USE OF INSULIN (HCC): Chronic | ICD-10-CM

## 2017-12-11 DIAGNOSIS — N52.9 ERECTILE DYSFUNCTION, UNSPECIFIED ERECTILE DYSFUNCTION TYPE: Chronic | ICD-10-CM

## 2017-12-11 DIAGNOSIS — Z00.00 ROUTINE GENERAL MEDICAL EXAMINATION AT A HEALTH CARE FACILITY: Primary | ICD-10-CM

## 2017-12-11 DIAGNOSIS — E78.5 HYPERLIPIDEMIA, UNSPECIFIED HYPERLIPIDEMIA TYPE: Chronic | ICD-10-CM

## 2017-12-11 DIAGNOSIS — K76.0 FATTY INFILTRATION OF LIVER: ICD-10-CM

## 2017-12-11 DIAGNOSIS — R35.1 BENIGN PROSTATIC HYPERPLASIA WITH NOCTURIA: Chronic | ICD-10-CM

## 2017-12-11 DIAGNOSIS — J30.89 PERENNIAL ALLERGIC RHINITIS: Chronic | ICD-10-CM

## 2017-12-11 DIAGNOSIS — I10 ESSENTIAL HYPERTENSION: Chronic | ICD-10-CM

## 2017-12-11 DIAGNOSIS — K21.9 GASTROESOPHAGEAL REFLUX DISEASE, ESOPHAGITIS PRESENCE NOT SPECIFIED: Chronic | ICD-10-CM

## 2017-12-11 DIAGNOSIS — Z98.890 HISTORY OF MELANOMA EXCISION: Chronic | ICD-10-CM

## 2017-12-11 DIAGNOSIS — I25.10 CAD IN NATIVE ARTERY: Chronic | ICD-10-CM

## 2017-12-11 DIAGNOSIS — Z85.820 HISTORY OF MELANOMA EXCISION: Chronic | ICD-10-CM

## 2017-12-11 DIAGNOSIS — E29.1 HYPOGONADISM MALE: ICD-10-CM

## 2017-12-11 DIAGNOSIS — N40.1 BENIGN PROSTATIC HYPERPLASIA WITH NOCTURIA: Chronic | ICD-10-CM

## 2017-12-11 PROCEDURE — G8598 ASA/ANTIPLAT THER USED: HCPCS | Performed by: FAMILY MEDICINE

## 2017-12-11 PROCEDURE — 3045F PR MOST RECENT HEMOGLOBIN A1C LEVEL 7.0-9.0%: CPT | Performed by: FAMILY MEDICINE

## 2017-12-11 PROCEDURE — G0438 PPPS, INITIAL VISIT: HCPCS | Performed by: FAMILY MEDICINE

## 2017-12-11 RX ORDER — SILDENAFIL 100 MG/1
100 TABLET, FILM COATED ORAL PRN
Qty: 2 TABLET | Refills: 0 | COMMUNITY
Start: 2017-12-11 | End: 2018-06-12 | Stop reason: SDUPTHER

## 2017-12-11 ASSESSMENT — ENCOUNTER SYMPTOMS
WHEEZING: 0
NAUSEA: 0
CONSTIPATION: 0
VOMITING: 0
SHORTNESS OF BREATH: 0
COUGH: 0
CHEST TIGHTNESS: 0
ABDOMINAL PAIN: 0

## 2017-12-11 ASSESSMENT — ANXIETY QUESTIONNAIRES: GAD7 TOTAL SCORE: 0

## 2017-12-11 ASSESSMENT — PATIENT HEALTH QUESTIONNAIRE - PHQ9: SUM OF ALL RESPONSES TO PHQ QUESTIONS 1-9: 0

## 2017-12-11 ASSESSMENT — LIFESTYLE VARIABLES: HOW OFTEN DO YOU HAVE A DRINK CONTAINING ALCOHOL: 0

## 2017-12-11 NOTE — PROGRESS NOTES
Medicare Annual Wellness Visit    Name: Catrachita Alvarez Date: 2017   MRN: [id number] Sex: male   : 1945 Age: 67 y.o. Amelia Kaba is here for   Chief Complaint   Patient presents with   [facility]     Screenings for behavioral, psychosocial and functional/safety risks, and cognitive dysfunction are all negative except as indicated below. These results, as well as other patient data from the [address] form, are documented in Flowsheets linked to this Encounter. Allergies   Allergen Reactions    Invokana [Canagliflozin] Diarrhea    Metformin And Related Diarrhea       Prior to Visit Medications    Medication Sig Taking?  Authorizing Provider   sildenafil (VIAGRA) 100 MG tablet Take 1 tablet by mouth as needed for Erectile Dysfunction Yes Chavez Collado MD   SITagliptin (JANUVIA) 100 MG tablet Take 1 tablet by mouth daily Yes Chavez Collado MD   tamsulosin (FLOMAX) 0.4 MG capsule Take 2 capsules by mouth daily Yes Chavez Collado MD   losartan (COZAAR) 50 MG tablet Take 50 mg by mouth Yes Historical Provider, MD   ranitidine (ZANTAC) 150 MG tablet  Yes Historical Provider, MD   testosterone cypionate (DEPOTESTOTERONE CYPIONATE) 200 MG/ML injection 1 ML every 2 weeks Yes Corey Lane MD   albuterol sulfate HFA (PROAIR HFA) 108 (90 Base) MCG/ACT inhaler Inhale 2 puffs into the lungs every 6 hours as needed for Wheezing Yes Chavez Collado MD   metoprolol succinate (TOPROL XL) 50 MG extended release tablet Take 3 tablets by mouth daily Yes Chavez Collado MD   UNABLE TO FIND SUPER BETA PROSTATE OTC 250MG BETA-SITOSTEROL AND 400IU VIT D Yes Historical Provider, MD   tobramycin-dexamethasone (TOBRADEX) 0.3-0.1 % ophthalmic suspension  Yes Historical Provider, MD   fluticasone (FLONASE) 50 MCG/ACT nasal spray Use 2 sprays nasally daily Yes Cynthia Torrez NP   triamterene-hydrochlorothiazide (MAXZIDE-25) 37.5-25 MG per tablet Take 0.5 tablets by mouth daily Yes Theo Munoz MD   ascorbic acid (VITAMIN C) 500 MG tablet Take 1,000 mg by mouth daily Yes Historical Provider, MD   glipiZIDE (GLUCOTROL) 10 MG tablet Take 1 tablet by mouth 2 times daily (before meals) Yes Theo Munoz MD   loratadine (CLARITIN) 10 MG tablet Take 1 tablet by mouth daily Yes Theo Munoz MD   rosuvastatin (CRESTOR) 20 MG tablet Take 1 tablet by mouth nightly Yes Theo Munoz MD   vitamin D (CHOLECALCIFEROL) 1000 UNITS TABS tablet Take 1,000 Units by mouth Yes Historical Provider, MD   acetaminophen (TYLENOL EX ST ARTHRITIS PAIN) 500 MG tablet Take 1 tablet by mouth every 6 hours as needed for Pain Yes Gabriella Perez MD   CINNAMON PO Take 500 mg by mouth 2 times daily. Yes Historical Provider, MD   aspirin 81 MG EC tablet Take 81 mg by mouth daily.    Yes Historical Provider, MD     Past Medical History:   Diagnosis Date    BPH (benign prostatic hypertrophy)     CAD (coronary artery disease)     Cataracts, bilateral     Chronic low back pain     COPD (chronic obstructive pulmonary disease) ([address] 6/1/2006    DM2 (diabetes mellitus, type 2) ([address]     Duodenitis     Erectile dysfunction 3/6/2017    GERD (gastroesophageal reflux disease)     History of melanoma excision 12/11/2017    Left ear 09/2017    HTN (hypertension)     Hyperlipidemia     Malignant melanoma of face ([address]     Malignant melanoma of skin of face ([address]     RT ear 2000, LT ear 2017    Nephrolithiasis     Osteoarthritis of multiple joints     hands, hips    Perennial allergic rhinitis     Personal history of colonic polyps     Pseudophakia of both eyes     Type 2 diabetes mellitus with microalbuminuria, without long-term current use of insulin ([address] 3/6/2017    Varicose veins of both lower extremities 9/11/2017     Past Surgical History:   Procedure Laterality Date    ANGIOPLASTY      CARDIAC CATHETERIZATION      #17    CATARACT REMOVAL WITH IMPLANT Left 1992    CATARACT REMOVAL WITH IMPLANT Right 1992    COLONOSCOPY  2009    tubular adenomas    COLONOSCOPY  2013    diverticulosis, polyps, 5y repeat (DR Magaly Kulkarni)    MOHS SURGERY Left 09/2017    melanoma of left ear (DR GODWIN)    SKIN CANCER EXCISION Left 08/2017    TONSILLECTOMY  1955    UPPER GASTROINTESTINAL ENDOSCOPY      duodenitis/ poss early signs of celiac disease       Family History   Problem Relation Age of Onset    Heart Disease Mother     Heart Disease Brother     Diabetes Maternal Grandfather        CareTeam (Including outside providers/suppliers regularly involved in providing care):   Patient Care Team:  Rozina Hernandez MD as PCP - General (Family Medicine)  Rozina Hernandez MD as PCP - S Attributed Provider  Lloyd Vivar MD as Consulting Physician (Urology)  Reece Diamond DO as Consulting Physician (Internal Medicine Cardiovascular Disease)  Mark Lopez MD as Consulting Physician (Gastroenterology)  Sharita Weeks MD as Consulting Physician (Dermatology)  Osmel Tovar (Optometry)  Sheng Faith RN as Care Coordinator  Divya Sims MD as Surgeon (General Surgery)    Wt Readings from Last 3 Encounters:   12/11/17 198 lb 3.2 oz (89.9 kg)   10/06/17 197 lb (89.4 kg)   10/03/17 194 lb (88 kg)       Vitals:    12/11/17 0926   BP: 138/70   Site: Left Arm   Position: Sitting   Cuff Size: Large Adult   Pulse: 80   Resp: 16   Temp: 97.2 °F (36.2 °C)   TempSrc: Temporal   Weight: 198 lb 3.2 oz (89.9 kg)   Height: 5' 11\" (1.803 m)       Review of Systems   Constitutional: Negative for appetite change, chills, diaphoresis, fatigue and fever. Eyes: Negative for visual disturbance. Respiratory: Negative for cough, chest tightness, shortness of breath and wheezing. Cardiovascular: Negative for chest pain, palpitations and leg swelling. No orthopnea, No PND   Gastrointestinal: Negative for abdominal pain, constipation, nausea and vomiting.         No heartburn, No melena   Endocrine: Negative for No medications on file       Medications Discontinued During This Encounter   Medication Reason    sildenafil (VIAGRA) 630 MG tablet Duplicate Order       Return for Chronic Disease Check in 3-4 Months.     Kevin Lopez MD

## 2017-12-11 NOTE — PATIENT INSTRUCTIONS
good cholesterol, this type of cholesterol actually carries cholesterol away from your arteries and may, therefore, help lower your risk of having a heart attack. You want this level to be high (ideally greater than 60). It is a risk to have a level less than 40. You can raise this good cholesterol by eating olive oil, canola oil, avocados, or nuts. Exercise raises this level, too. Fat    Fat is calorie dense and packs a lot of calories into a small amount of food. Even though fats should be limited due to their high calorie content, not all fats are bad. In fact, some fats are quite healthful. Fat can be broken down into four main types. The good-for-you fats are:   Monounsaturated fat  found in oils such as olive and canola, avocados, and nuts and natural nut butters; can decrease cholesterol levels, while keeping levels of HDL cholesterol high   Polyunsaturated fat  found in oils such as safflower, sunflower, soybean, corn, and sesame; can decrease total cholesterol and LDL cholesterol   Omega-3 fatty acids  particularly those found in fatty fish (such as salmon, trout, tuna, mackerel, herring, and sardines); can decrease risk of arrhythmias, decrease triglyceride levels, and slightly lower blood pressure   The fats that you want to limit are:   Saturated fat  found in animal products, many fast foods, and a few vegetables; increases total blood cholesterol, including LDL levels   Animal fats that are saturated include: butter, lard, whole-milk dairy products, meat fat, and poultry skin   Vegetable fats that are saturated include: hydrogenated shortening, palm oil, coconut oil, cocoa butter   Hydrogenated or trans fat  found in margarine and vegetable shortening, most shelf stable snack foods, and fried foods; increases LDL and decreases HDL     It is generally recommended that you limit your total fat for the day to less than 30% of your total calories.  If you follow an 1800-calorie heart healthy diet, for example, this would mean 60 grams of fat or less per day. Saturated fat and trans fat in your diet raises your blood cholesterol the most, much more than dietary cholesterol does. For this reason, on a heart-healthy diet, less than 7% of your calories should come from saturated fat and ideally 0% from trans fat. On an 1800-calorie diet, this translates into less than 14 grams of saturated fat per day, leaving 46 grams of fat to come from mono- and polyunsaturated fats.    Food Choices on a Heart Healthy Diet   Food Category   Foods Recommended   Foods to Avoid   Grains   Breads and rolls without salted tops Most dry and cooked cereals Unsalted crackers and breadsticks Low-sodium or homemade breadcrumbs or stuffing All rice and pastas   Breads, rolls, and crackers with salted tops High-fat baked goods (eg, muffins, donuts, pastries) Quick breads, self-rising flour, and biscuit mixes Regular bread crumbs Instant hot cereals Commercially prepared rice, pasta, or stuffing mixes   Vegetables   Most fresh, frozen, and low-sodium canned vegetables Low-sodium and salt-free vegetable juices Canned vegetables if unsalted or rinsed   Regular canned vegetables and juices, including sauerkraut and pickled vegetables Frozen vegetables with sauces Commercially prepared potato and vegetable mixes   Fruits   Most fresh, frozen, and canned fruits All fruit juices   Fruits processed with salt or sodium   Milk   Nonfat or low-fat (1%) milk Nonfat or low-fat yogurt Cottage cheese, low-fat ricotta, cheeses labeled as low-fat and low-sodium   Whole milk Reduced-fat (2%) milk Malted and chocolate milk Full fat yogurt Most cheeses (unless low-fat and low salt) Buttermilk (no more than 1 cup per week)   Meats and Beans   Lean cuts of fresh or frozen beef, veal, lamb, or pork (look for the word loin) Fresh or frozen poultry without the skin Fresh or frozen fish and some shellfish Egg whites and egg substitutes (Limit whole eggs to three per and cholesterol amounts. For products low in sodium, look for sodium free, very low sodium, low sodium, no added salt, and unsalted   Skip the salt when cooking or at the table; if food needs more flavor, get creative and try out different herbs and spices. Garlic and onion also add substantial flavor to foods. Trim any visible fat off meat and poultry before cooking, and drain the fat off after chavez. Use cooking methods that require little or no added fat, such as grilling, boiling, baking, poaching, broiling, roasting, steaming, stir-frying, and sauting. Avoid fast food and convenience food. They tend to be high in saturated and trans fat and have a lot of added salt. Talk to a registered dietitian for individualized diet advice. Last Reviewed: March 2011 William Becerra MS, MPH, RD   Updated: 3/29/2011   ·   Patient Education        Learning About Physical Activity  What is physical activity? Physical activity is any kind of activity that gets your body moving. The types of physical activity that can help you get fit and stay healthy include:  Aerobic or \"cardio\" activities that make your heart beat faster and make you breathe harder, such as brisk walking, riding a bike, or running. Aerobic activities strengthen your heart and lungs and build up your endurance. Strength training activities that make your muscles work against, or \"resist,\" something, such as lifting weights or doing push-ups. These activities help tone and strengthen your muscles. Stretches that allow you to move your joints and muscles through their full range of motion. Stretching helps you be more flexible and avoid injury. What are the benefits of physical activity? Being active is one of the best things you can do to get fit and stay healthy. It helps you to:  Feel stronger and have more energy to do all the things you like to do. Focus better at school or work and perform better in sports.   Feel, think, and sleep better. Reach and stay at a healthy weight. Lose fat and build lean muscle. Lower your risk for serious health problems. Keep your bones, muscles, and joints strong. Being fit lets you do more physical activity. And it lets you work out harder without as much effort. How can you make physical activity part of your life? Get at least 30 minutes of exercise on most days of the week. Walking is a good choice. You also may want to do other activities, such as running, swimming, cycling, or playing tennis or team sports. Pick activities that you like-ones that make your heart beat faster, your muscles stronger, and your muscles and joints more flexible. If you find more than one thing you like doing, do them all. You don't have to do the same thing every day. Get your heart pumping every day. Any activity that makes your heart beat faster and keeps it at that rate for a while counts. Here are some great ways to get your heart beating faster:  Go for a brisk walk, run, or bike ride. Go for a hike or swim. Go in-line skating. Play a game of touch football, basketball, or soccer. Ride a bike. Play tennis or racquetball. Climb stairs. Even some household chores can be aerobic-just do them at a faster pace. Vacuuming, raking or mowing the lawn, sweeping the garage, and washing and waxing the car all can help get your heart rate up. Strengthen your muscles during the week. You don't have to lift heavy weights or grow big, bulky muscles to get stronger. Doing a few simple activities that make your muscles work against, or \"resist,\" something can help you get stronger. For example, you can:  Do push-ups or sit-ups, which use your own body weight as resistance. Lift weights or dumbbells or use stretch bands at home or in a gym or community center. Stretch your muscles often. Stretching will help you as you become more active. It can help you stay flexible, loosen tight muscles, and avoid injury.  It can also help improve your balance and posture and can be a great way to relax. Be sure to stretch the muscles you'll be using when you work out. It's best to warm your muscles slightly before you stretch them. Walk or do some other light aerobic activity for a few minutes, and then start stretching. When you stretch your muscles:  Do it slowly. Stretching is not about going fast or making sudden movements. Don't push or bounce during a stretch. Hold each stretch for at least 15 to 30 seconds, if you can. You should feel a stretch in the muscle, but not pain. Breathe out as you do the stretch. Then breathe in as you hold the stretch. Don't hold your breath. If you're worried about how more activity might affect your health, have a checkup before you start. Follow any special advice your doctor gives you for getting a smart start. Where can you learn more? Go to https://[employer]. org and sign in to your [employer] account. Enter Q311 in the [employer] box to learn more about \"Learning About Physical Activity. \"     If you do not have an account, please click on the \"Sign Up Now\" link. Current as of: March 13, 2017  Content Version: 11.4  © 1421-8625 Healthwise, Incorporated. Care instructions adapted under license by [facility] ([facility]). If you have questions about a medical condition or this instruction, always ask your healthcare professional. [place] [address] any warranty or liability for your use of this information.

## 2017-12-13 ENCOUNTER — NURSE ONLY (OUTPATIENT)
Dept: SURGERY | Age: 72
End: 2017-12-13

## 2017-12-13 DIAGNOSIS — E29.1 HYPOGONADISM MALE: Primary | ICD-10-CM

## 2017-12-13 PROCEDURE — 99999 PR OFFICE/OUTPT VISIT,PROCEDURE ONLY: CPT | Performed by: INTERNAL MEDICINE

## 2017-12-13 PROCEDURE — 96372 THER/PROPH/DIAG INJ SC/IM: CPT | Performed by: INTERNAL MEDICINE

## 2017-12-13 RX ORDER — TESTOSTERONE CYPIONATE 200 MG/ML
200 INJECTION INTRAMUSCULAR ONCE
Status: COMPLETED | OUTPATIENT
Start: 2017-12-13 | End: 2017-12-13

## 2017-12-13 RX ADMIN — TESTOSTERONE CYPIONATE 200 MG: 200 INJECTION INTRAMUSCULAR at 09:24

## 2017-12-26 ENCOUNTER — TELEPHONE (OUTPATIENT)
Dept: CARE COORDINATION | Age: 72
End: 2017-12-26

## 2017-12-26 ENCOUNTER — CARE COORDINATION (OUTPATIENT)
Dept: CARE COORDINATION | Age: 72
End: 2017-12-26

## 2017-12-26 DIAGNOSIS — K21.9 GASTROESOPHAGEAL REFLUX DISEASE, ESOPHAGITIS PRESENCE NOT SPECIFIED: Chronic | ICD-10-CM

## 2017-12-26 NOTE — TELEPHONE ENCOUNTER
Myrna Farr is asking for his ranitidine to be sent to [facility] as it is less expensive.    Thank you

## 2017-12-27 RX ORDER — RANITIDINE 150 MG/1
150 TABLET ORAL NIGHTLY PRN
Qty: 90 TABLET | Refills: 1 | Status: SHIPPED | OUTPATIENT
Start: 2017-12-27 | End: 2018-05-07 | Stop reason: SDUPTHER

## 2018-01-03 ENCOUNTER — NURSE ONLY (OUTPATIENT)
Dept: SURGERY | Age: 73
End: 2018-01-03

## 2018-01-03 DIAGNOSIS — E29.1 HYPOGONADISM MALE: Primary | ICD-10-CM

## 2018-01-03 PROCEDURE — 96372 THER/PROPH/DIAG INJ SC/IM: CPT | Performed by: INTERNAL MEDICINE

## 2018-01-03 PROCEDURE — 99999 PR OFFICE/OUTPT VISIT,PROCEDURE ONLY: CPT | Performed by: INTERNAL MEDICINE

## 2018-01-03 RX ORDER — TESTOSTERONE CYPIONATE 200 MG/ML
200 INJECTION INTRAMUSCULAR ONCE
Status: COMPLETED | OUTPATIENT
Start: 2018-01-03 | End: 2018-01-03

## 2018-01-03 RX ADMIN — TESTOSTERONE CYPIONATE 200 MG: 200 INJECTION INTRAMUSCULAR at 09:33

## 2018-01-17 ENCOUNTER — NURSE ONLY (OUTPATIENT)
Dept: SURGERY | Age: 73
End: 2018-01-17

## 2018-01-17 DIAGNOSIS — E29.1 HYPOGONADISM MALE: Primary | ICD-10-CM

## 2018-01-17 PROCEDURE — 99999 PR OFFICE/OUTPT VISIT,PROCEDURE ONLY: CPT | Performed by: INTERNAL MEDICINE

## 2018-01-17 PROCEDURE — 96372 THER/PROPH/DIAG INJ SC/IM: CPT | Performed by: INTERNAL MEDICINE

## 2018-01-17 RX ORDER — TESTOSTERONE CYPIONATE 200 MG/ML
200 INJECTION INTRAMUSCULAR ONCE
Status: COMPLETED | OUTPATIENT
Start: 2018-01-17 | End: 2018-01-17

## 2018-01-17 RX ADMIN — TESTOSTERONE CYPIONATE 200 MG: 200 INJECTION INTRAMUSCULAR at 09:34

## 2018-01-22 DIAGNOSIS — E11.9 TYPE 2 DIABETES MELLITUS WITHOUT COMPLICATION, WITHOUT LONG-TERM CURRENT USE OF INSULIN (HCC): ICD-10-CM

## 2018-01-22 RX ORDER — GLIPIZIDE 10 MG/1
10 TABLET ORAL
Qty: 180 TABLET | Refills: 3 | Status: SHIPPED | OUTPATIENT
Start: 2018-01-22 | End: 2018-07-30 | Stop reason: ALTCHOICE

## 2018-01-22 RX ORDER — TRIAMTERENE AND HYDROCHLOROTHIAZIDE 37.5; 25 MG/1; MG/1
0.5 TABLET ORAL DAILY
Qty: 45 TABLET | Refills: 3 | Status: SHIPPED | OUTPATIENT
Start: 2018-01-22 | End: 2018-02-13 | Stop reason: SDUPTHER

## 2018-01-25 ENCOUNTER — CARE COORDINATION (OUTPATIENT)
Dept: CARE COORDINATION | Age: 73
End: 2018-01-25

## 2018-01-25 NOTE — CARE COORDINATION
pressure as directed - Daily  I will notify my provider of any trends of increasing or decreasing blood pressures over a month period of time. Patient Reported Blood Pressure No flowsheet data found. Barriers: none  Plan for overcoming my barriers: N/A  Confidence: 10/10  Anticipated Goal Completion Date: 7/31/2017              Prior to Admission medications    Medication Sig Start Date End Date Taking?  Authorizing Provider   glipiZIDE (GLUCOTROL) 10 MG tablet Take 1 tablet by mouth 2 times daily (before meals) 1/22/18   Nathalia Reynolds MD   triamterene-hydrochlorothiazide [facility]) 37.5-25 MG per tablet Take 0.5 tablets by mouth daily 1/22/18   Nathalia Reynolds MD   ranitidine (ZANTAC) 150 MG tablet Take 1 tablet by mouth nightly as needed for Heartburn 12/27/17   Nathalia Reynolds MD   sildenafil (VIAGRA) 100 MG tablet Take 1 tablet by mouth as needed for Erectile Dysfunction 12/11/17   Nathalia Reynolds MD   SITagliptin (JANUVIA) 100 MG tablet Take 1 tablet by mouth daily 11/30/17   Nathalia Reynolds MD   tamsulosin (FLOMAX) 0.4 MG capsule Take 2 capsules by mouth daily 11/20/17   Nathalia Reynolds MD   losartan (COZAAR) 50 MG tablet Take 50 mg by mouth 9/22/17   Historical Provider, MD   testosterone cypionate (DEPOTESTOTERONE CYPIONATE) 200 MG/ML injection 1 ML every 2 weeks 10/3/17   Yoanna Lunsford MD   albuterol sulfate HFA (PROAIR HFA) 108 (90 Base) MCG/ACT inhaler Inhale 2 puffs into the lungs every 6 hours as needed for Wheezing 9/11/17   Nathalia Reynolds MD   metoprolol succinate (TOPROL XL) 50 MG extended release tablet Take 3 tablets by mouth daily 8/28/17   Nathalia Reynolds MD   UNABLE TO FIND SUPER BETA PROSTATE OTC 250MG BETA-SITOSTEROL AND 400IU VIT D    Historical Provider, MD   tobramycin-dexamethasone (TOBRADEX) 0.3-0.1 % ophthalmic suspension  6/13/17   Historical Provider, MD   fluticasone [facility]) 50 MCG/ACT nasal spray Use 2 sprays nasally daily 7/28/17   Catrachita Appl, NP ascorbic acid (VITAMIN C) 500 MG tablet Take 1,000 mg by mouth daily    Historical Provider, MD   loratadine (CLARITIN) 10 MG tablet Take 1 tablet by mouth daily 11/22/16   Juan J Mosley MD   rosuvastatin (CRESTOR) 20 MG tablet Take 1 tablet by mouth nightly 10/24/16   Juan J Mosley MD   vitamin D (CHOLECALCIFEROL) 1000 UNITS TABS tablet Take 1,000 Units by mouth    Historical Provider, MD   acetaminophen (TYLENOL EX ST ARTHRITIS PAIN) 500 MG tablet Take 1 tablet by mouth every 6 hours as needed for Pain 8/24/15   Marifer Padron MD   CINNAMON PO Take 500 mg by mouth 2 times daily. Historical Provider, MD   aspirin 81 MG EC tablet Take 81 mg by mouth daily.       Historical Provider, MD       Future Appointments  Date Time Provider Kay Tri   1/31/2018 9:20 AM SCHEDULE, [place] [address]   3/20/2018 9:00 AM Juan J Mosley MD [address]

## 2018-01-31 ENCOUNTER — NURSE ONLY (OUTPATIENT)
Dept: ENDOCRINOLOGY | Age: 73
End: 2018-01-31
Payer: MEDICARE

## 2018-01-31 DIAGNOSIS — E29.1 HYPOGONADISM MALE: Primary | ICD-10-CM

## 2018-01-31 PROCEDURE — 99999 PR OFFICE/OUTPT VISIT,PROCEDURE ONLY: CPT | Performed by: INTERNAL MEDICINE

## 2018-01-31 PROCEDURE — 96372 THER/PROPH/DIAG INJ SC/IM: CPT | Performed by: INTERNAL MEDICINE

## 2018-01-31 RX ORDER — TESTOSTERONE CYPIONATE 200 MG/ML
200 INJECTION INTRAMUSCULAR ONCE
Status: COMPLETED | OUTPATIENT
Start: 2018-01-31 | End: 2018-01-31

## 2018-01-31 RX ADMIN — TESTOSTERONE CYPIONATE 200 MG: 200 INJECTION INTRAMUSCULAR at 09:52

## 2018-02-07 ENCOUNTER — TELEPHONE (OUTPATIENT)
Dept: FAMILY MEDICINE CLINIC | Age: 73
End: 2018-02-07

## 2018-02-07 ENCOUNTER — CARE COORDINATION (OUTPATIENT)
Dept: FAMILY MEDICINE CLINIC | Age: 73
End: 2018-02-07

## 2018-02-07 DIAGNOSIS — I10 ESSENTIAL HYPERTENSION: Primary | Chronic | ICD-10-CM

## 2018-02-07 RX ORDER — LOSARTAN POTASSIUM 100 MG/1
100 TABLET ORAL DAILY
Qty: 30 TABLET | Refills: 5 | Status: SHIPPED | OUTPATIENT
Start: 2018-02-07 | End: 2018-09-20 | Stop reason: SDUPTHER

## 2018-02-07 NOTE — TELEPHONE ENCOUNTER
I spoke with Anshu's wife and made her aware to increase the losartan to two tablets daily. She repeated this information back to me. I also made her aware that he needs to come in one week for a nurse visit for blood pressure check.      Thank you

## 2018-02-07 NOTE — TELEPHONE ENCOUNTER
Please instruct patient to increase his dose of losartan to 100 mg daily and return to the office in 1 week for a nurse visit BP check. He can simply take two of his current 50 mg losartan tablets at the same time until he runs out of them. I have sent a new prescription for the 100 mg dose to his pharmacy.  Please help him schedule nurse visit

## 2018-02-13 ENCOUNTER — TELEPHONE (OUTPATIENT)
Dept: FAMILY MEDICINE CLINIC | Age: 73
End: 2018-02-13

## 2018-02-13 ENCOUNTER — NURSE ONLY (OUTPATIENT)
Dept: FAMILY MEDICINE CLINIC | Age: 73
End: 2018-02-13

## 2018-02-13 VITALS — SYSTOLIC BLOOD PRESSURE: 150 MMHG | DIASTOLIC BLOOD PRESSURE: 76 MMHG

## 2018-02-13 VITALS — SYSTOLIC BLOOD PRESSURE: 150 MMHG | DIASTOLIC BLOOD PRESSURE: 70 MMHG

## 2018-02-13 DIAGNOSIS — I10 ESSENTIAL HYPERTENSION: Primary | Chronic | ICD-10-CM

## 2018-02-13 RX ORDER — TRIAMTERENE AND HYDROCHLOROTHIAZIDE 37.5; 25 MG/1; MG/1
1 TABLET ORAL DAILY
Qty: 90 TABLET | Refills: 3 | Status: SHIPPED | OUTPATIENT
Start: 2018-02-13 | End: 2018-10-25 | Stop reason: SDUPTHER

## 2018-02-15 ENCOUNTER — NURSE ONLY (OUTPATIENT)
Dept: FAMILY MEDICINE CLINIC | Age: 73
End: 2018-02-15
Payer: MEDICARE

## 2018-02-15 DIAGNOSIS — E29.1 HYPOGONADISM MALE: Primary | ICD-10-CM

## 2018-02-15 PROCEDURE — 96372 THER/PROPH/DIAG INJ SC/IM: CPT | Performed by: FAMILY MEDICINE

## 2018-02-15 RX ORDER — TESTOSTERONE CYPIONATE 200 MG/ML
200 INJECTION INTRAMUSCULAR ONCE
Status: COMPLETED | OUTPATIENT
Start: 2018-02-15 | End: 2018-02-15

## 2018-02-15 RX ADMIN — TESTOSTERONE CYPIONATE 200 MG: 200 INJECTION INTRAMUSCULAR at 10:18

## 2018-02-20 ENCOUNTER — NURSE ONLY (OUTPATIENT)
Dept: FAMILY MEDICINE CLINIC | Age: 73
End: 2018-02-20

## 2018-02-20 ENCOUNTER — TELEPHONE (OUTPATIENT)
Dept: PEDIATRICS CLINIC | Age: 73
End: 2018-02-20

## 2018-02-20 VITALS — SYSTOLIC BLOOD PRESSURE: 138 MMHG | DIASTOLIC BLOOD PRESSURE: 78 MMHG

## 2018-02-20 DIAGNOSIS — Z01.30 BLOOD PRESSURE CHECK: Primary | ICD-10-CM

## 2018-02-20 DIAGNOSIS — I10 ESSENTIAL HYPERTENSION: Chronic | ICD-10-CM

## 2018-02-20 RX ORDER — FLUTICASONE PROPIONATE 50 MCG
SPRAY, SUSPENSION (ML) NASAL
Qty: 3 BOTTLE | Refills: 3 | Status: SHIPPED | OUTPATIENT
Start: 2018-02-20 | End: 2018-10-25 | Stop reason: SDUPTHER

## 2018-02-20 NOTE — TELEPHONE ENCOUNTER
Patient in office today for BP check. Last Nurse Visit you increased his Microzide to a full tablet daily  BP has been running 136/80's at home since the increase     BP today was 138/78      BP Readings from Last 3 Encounters:   02/13/18 (!) 150/70   02/13/18 (!) 150/76   12/11/17 138/70       Please advise.

## 2018-02-21 DIAGNOSIS — I10 ESSENTIAL HYPERTENSION: Chronic | ICD-10-CM

## 2018-02-21 RX ORDER — METOPROLOL SUCCINATE 50 MG/1
150 TABLET, EXTENDED RELEASE ORAL DAILY
Qty: 90 TABLET | Refills: 1 | Status: SHIPPED | OUTPATIENT
Start: 2018-02-21 | End: 2018-06-20 | Stop reason: SDUPTHER

## 2018-02-21 NOTE — TELEPHONE ENCOUNTER
Pt requesting refill states the rx was sent to [facility] and this makes it too expensive, pt requesting this be resent to [facility]. Please approve or deny this refill request. The order is pended with the correct pharmacy. Thank you.     LOV 12/11/2017    Next Visit Date:  Future Appointments  Date Time Provider Kay Tri   3/1/2018 10:15 AM SCHEDULE, [facility] PCP TESTOSTERONE MLOX [facility]   3/20/2018 9:00 AM MD [place] [address]     Requested Prescriptions     Pending Prescriptions Disp Refills    metoprolol succinate (TOPROL XL) 50 MG extended release tablet 90 tablet 5     Sig: Take 3 tablets by mouth daily

## 2018-02-27 ENCOUNTER — CARE COORDINATION (OUTPATIENT)
Dept: FAMILY MEDICINE CLINIC | Age: 73
End: 2018-02-27

## 2018-02-27 RX ORDER — GLUCOSAMINE HCL/CHONDROITIN SU 500-400 MG
CAPSULE ORAL
Qty: 300 STRIP | Refills: 3 | Status: SHIPPED | OUTPATIENT
Start: 2018-02-27 | End: 2018-03-02 | Stop reason: SDUPTHER

## 2018-02-27 NOTE — CARE COORDINATION
notify my provider of any trends of increasing or decreasing blood sugars over a 1 month period. Blood Pressure - I will take my blood pressure as directed - Daily  I will notify my provider of any trends of increasing or decreasing blood pressures over a month period of time. Patient Reported Blood Pressure No flowsheet data found. Barriers: none  Plan for overcoming my barriers: N/A  Confidence: 10/10  Anticipated Goal Completion Date: 7/31/2017              Prior to Admission medications    Medication Sig Start Date End Date Taking?  Authorizing Provider   metoprolol succinate (TOPROL XL) 50 MG extended release tablet Take 3 tablets by mouth daily 2/21/18   Radha Clemente MD   fluticasone [facility]) 50 MCG/ACT nasal spray Use 2 sprays nasally daily 2/20/18   Phillip Bell MD   triamterene-hydrochlorothiazide [facility]) 37.5-25 MG per tablet Take 1 tablet by mouth daily 2/13/18   Phillip Bell MD   losartan (COZAAR) 100 MG tablet Take 1 tablet by mouth daily 2/7/18   Phillip Bell MD   glipiZIDE (GLUCOTROL) 10 MG tablet Take 1 tablet by mouth 2 times daily (before meals) 1/22/18   Phillip Bell MD   ranitidine (ZANTAC) 150 MG tablet Take 1 tablet by mouth nightly as needed for Heartburn 12/27/17   Phillip Bell MD   sildenafil (VIAGRA) 100 MG tablet Take 1 tablet by mouth as needed for Erectile Dysfunction 12/11/17   Phillip Bell MD   SITagliptin (JANUVIA) 100 MG tablet Take 1 tablet by mouth daily 11/30/17   Phillip Bell MD   tamsulosin (FLOMAX) 0.4 MG capsule Take 2 capsules by mouth daily 11/20/17   Phillip Bell MD   testosterone cypionate (DEPOTESTOTERONE CYPIONATE) 200 MG/ML injection 1 ML every 2 weeks 10/3/17   Baldo Mendenhall MD   albuterol sulfate HFA (PROAIR HFA) 108 (90 Base) MCG/ACT inhaler Inhale 2 puffs into the lungs every 6 hours as needed for Wheezing 9/11/17   Phillip Bell MD   UNABLE TO FIND SUPER BETA PROSTATE OTC 250MG BETA-SITOSTEROL AND 400IU VIT D Historical Provider, MD   tobramycin-dexamethasone (TOBRADEX) 0.3-0.1 % ophthalmic suspension  6/13/17   Historical Provider, MD   ascorbic acid (VITAMIN C) 500 MG tablet Take 1,000 mg by mouth daily    Historical Provider, MD   loratadine (CLARITIN) 10 MG tablet Take 1 tablet by mouth daily 11/22/16   May MD Emerald   rosuvastatin (CRESTOR) 20 MG tablet Take 1 tablet by mouth nightly 10/24/16   May MD [place]   vitamin D (CHOLECALCIFEROL) 1000 UNITS TABS tablet Take 1,000 Units by mouth    Historical Provider, MD   acetaminophen (TYLENOL EX ST ARTHRITIS PAIN) 500 MG tablet Take 1 tablet by mouth every 6 hours as needed for Pain 8/24/15   Juan Don MD   CINNAMON PO Take 500 mg by mouth 2 times daily. Historical Provider, MD   aspirin 81 MG EC tablet Take 81 mg by mouth daily.       Historical Provider, MD       Future Appointments  Date Time Provider Kay Bartlett   3/1/2018 10:15 AM [place] [facility]CP TESTOSTERONE MLOX [facility]   3/20/2018 9:00 AM MD [place]

## 2018-03-01 ENCOUNTER — NURSE ONLY (OUTPATIENT)
Dept: FAMILY MEDICINE CLINIC | Age: 73
End: 2018-03-01
Payer: MEDICARE

## 2018-03-01 DIAGNOSIS — E29.1 HYPOGONADISM MALE: Primary | ICD-10-CM

## 2018-03-01 PROCEDURE — 96372 THER/PROPH/DIAG INJ SC/IM: CPT | Performed by: FAMILY MEDICINE

## 2018-03-01 RX ORDER — TESTOSTERONE CYPIONATE 200 MG/ML
200 INJECTION INTRAMUSCULAR ONCE
Status: COMPLETED | OUTPATIENT
Start: 2018-03-01 | End: 2018-03-01

## 2018-03-01 RX ADMIN — TESTOSTERONE CYPIONATE 200 MG: 200 INJECTION INTRAMUSCULAR at 10:07

## 2018-03-02 RX ORDER — GLUCOSAMINE HCL/CHONDROITIN SU 500-400 MG
CAPSULE ORAL
Qty: 300 STRIP | Refills: 3 | Status: SHIPPED | OUTPATIENT
Start: 2018-03-02 | End: 2018-03-07 | Stop reason: SDUPTHER

## 2018-03-07 RX ORDER — GLUCOSAMINE HCL/CHONDROITIN SU 500-400 MG
CAPSULE ORAL
Qty: 300 STRIP | Refills: 3 | OUTPATIENT
Start: 2018-03-07 | End: 2018-03-20 | Stop reason: SDUPTHER

## 2018-03-08 RX ORDER — LANCETS 30 GAUGE
EACH MISCELLANEOUS
Qty: 100 EACH | Refills: 3 | Status: SHIPPED | OUTPATIENT
Start: 2018-03-08

## 2018-03-14 ENCOUNTER — HOSPITAL ENCOUNTER (OUTPATIENT)
Dept: LAB | Age: 73
Discharge: HOME | End: 2018-03-14
Payer: MEDICARE

## 2018-03-14 DIAGNOSIS — I10 ESSENTIAL HYPERTENSION: Chronic | ICD-10-CM

## 2018-03-14 DIAGNOSIS — R80.9 TYPE 2 DIABETES MELLITUS WITH MICROALBUMINURIA, WITHOUT LONG-TERM CURRENT USE OF INSULIN (HCC): Chronic | ICD-10-CM

## 2018-03-14 DIAGNOSIS — I25.10 CAD IN NATIVE ARTERY: Chronic | ICD-10-CM

## 2018-03-14 DIAGNOSIS — K76.0 FATTY INFILTRATION OF LIVER: ICD-10-CM

## 2018-03-14 DIAGNOSIS — E11.29 TYPE 2 DIABETES MELLITUS WITH MICROALBUMINURIA, WITHOUT LONG-TERM CURRENT USE OF INSULIN (HCC): Chronic | ICD-10-CM

## 2018-03-14 DIAGNOSIS — E78.5 HYPERLIPIDEMIA, UNSPECIFIED HYPERLIPIDEMIA TYPE: Chronic | ICD-10-CM

## 2018-03-14 LAB
ALBUMIN SERPL-MCNC: 4.3 G/DL (ref 3.9–4.9)
ALP BLD-CCNC: 45 U/L (ref 35–104)
ALT SERPL-CCNC: 26 U/L (ref 0–41)
ANION GAP SERPL CALCULATED.3IONS-SCNC: 14 MEQ/L (ref 7–13)
AST SERPL-CCNC: 19 U/L (ref 0–40)
BILIRUB SERPL-MCNC: 0.6 MG/DL (ref 0–1.2)
BUN BLDV-MCNC: 11 MG/DL (ref 8–23)
CALCIUM SERPL-MCNC: 9.7 MG/DL (ref 8.6–10.2)
CHLORIDE BLD-SCNC: 95 MEQ/L (ref 98–107)
CHOLESTEROL, TOTAL: 147 MG/DL (ref 0–199)
CO2: 28 MEQ/L (ref 22–29)
CREAT SERPL-MCNC: 0.94 MG/DL (ref 0.7–1.2)
GFR AFRICAN AMERICAN: >60
GFR NON-AFRICAN AMERICAN: >60
GLOBULIN: 3 G/DL (ref 2.3–3.5)
GLUCOSE BLD-MCNC: 149 MG/DL (ref 74–109)
HBA1C MFR BLD: 7.3 % (ref 4.8–5.9)
HDLC SERPL-MCNC: 40 MG/DL (ref 40–59)
LDL CHOLESTEROL CALCULATED: 61 MG/DL (ref 0–129)
POTASSIUM SERPL-SCNC: 4.7 MEQ/L (ref 3.5–5.1)
SODIUM BLD-SCNC: 137 MEQ/L (ref 132–144)
TOTAL PROTEIN: 7.3 G/DL (ref 6.4–8.1)
TRIGL SERPL-MCNC: 230 MG/DL (ref 0–200)

## 2018-03-14 PROCEDURE — 80053 COMPREHEN METABOLIC PANEL: CPT

## 2018-03-14 PROCEDURE — 83036 HEMOGLOBIN GLYCOSYLATED A1C: CPT

## 2018-03-14 PROCEDURE — 80061 LIPID PANEL: CPT

## 2018-03-14 PROCEDURE — 36415 COLL VENOUS BLD VENIPUNCTURE: CPT

## 2018-03-15 ENCOUNTER — NURSE ONLY (OUTPATIENT)
Dept: FAMILY MEDICINE CLINIC | Age: 73
End: 2018-03-15
Payer: MEDICARE

## 2018-03-15 DIAGNOSIS — E29.1 HYPOGONADISM MALE: Primary | ICD-10-CM

## 2018-03-15 PROCEDURE — 96372 THER/PROPH/DIAG INJ SC/IM: CPT | Performed by: FAMILY MEDICINE

## 2018-03-15 RX ORDER — TESTOSTERONE CYPIONATE 200 MG/ML
200 INJECTION INTRAMUSCULAR ONCE
Status: COMPLETED | OUTPATIENT
Start: 2018-03-15 | End: 2018-03-15

## 2018-03-15 RX ADMIN — TESTOSTERONE CYPIONATE 200 MG: 200 INJECTION INTRAMUSCULAR at 11:41

## 2018-03-20 ENCOUNTER — OFFICE VISIT (OUTPATIENT)
Dept: FAMILY MEDICINE CLINIC | Age: 73
End: 2018-03-20
Payer: MEDICARE

## 2018-03-20 ENCOUNTER — TELEPHONE (OUTPATIENT)
Dept: FAMILY MEDICINE CLINIC | Age: 73
End: 2018-03-20

## 2018-03-20 VITALS
TEMPERATURE: 97.2 F | SYSTOLIC BLOOD PRESSURE: 138 MMHG | HEIGHT: 71 IN | RESPIRATION RATE: 16 BRPM | BODY MASS INDEX: 27.58 KG/M2 | HEART RATE: 80 BPM | OXYGEN SATURATION: 96 % | WEIGHT: 197 LBS | DIASTOLIC BLOOD PRESSURE: 82 MMHG

## 2018-03-20 DIAGNOSIS — I25.10 CAD IN NATIVE ARTERY: Chronic | ICD-10-CM

## 2018-03-20 DIAGNOSIS — N40.1 BENIGN PROSTATIC HYPERPLASIA WITH NOCTURIA: Chronic | ICD-10-CM

## 2018-03-20 DIAGNOSIS — R80.9 TYPE 2 DIABETES MELLITUS WITH MICROALBUMINURIA, WITHOUT LONG-TERM CURRENT USE OF INSULIN (HCC): Chronic | ICD-10-CM

## 2018-03-20 DIAGNOSIS — E11.29 TYPE 2 DIABETES MELLITUS WITH MICROALBUMINURIA, WITHOUT LONG-TERM CURRENT USE OF INSULIN (HCC): Chronic | ICD-10-CM

## 2018-03-20 DIAGNOSIS — J44.9 CHRONIC OBSTRUCTIVE PULMONARY DISEASE, UNSPECIFIED COPD TYPE (HCC): Chronic | ICD-10-CM

## 2018-03-20 DIAGNOSIS — Z23 NEED FOR VACCINATION: ICD-10-CM

## 2018-03-20 DIAGNOSIS — M15.9 PRIMARY OSTEOARTHRITIS INVOLVING MULTIPLE JOINTS: Chronic | ICD-10-CM

## 2018-03-20 DIAGNOSIS — E78.5 HYPERLIPIDEMIA, UNSPECIFIED HYPERLIPIDEMIA TYPE: Chronic | ICD-10-CM

## 2018-03-20 DIAGNOSIS — R80.9 TYPE 2 DIABETES MELLITUS WITH MICROALBUMINURIA, WITHOUT LONG-TERM CURRENT USE OF INSULIN (HCC): Primary | Chronic | ICD-10-CM

## 2018-03-20 DIAGNOSIS — I83.93 VARICOSE VEINS OF BOTH LOWER EXTREMITIES: Chronic | ICD-10-CM

## 2018-03-20 DIAGNOSIS — R35.1 BENIGN PROSTATIC HYPERPLASIA WITH NOCTURIA: Chronic | ICD-10-CM

## 2018-03-20 DIAGNOSIS — K76.0 FATTY INFILTRATION OF LIVER: ICD-10-CM

## 2018-03-20 DIAGNOSIS — I10 ESSENTIAL HYPERTENSION: Chronic | ICD-10-CM

## 2018-03-20 DIAGNOSIS — I87.2 CHRONIC VENOUS INSUFFICIENCY: ICD-10-CM

## 2018-03-20 DIAGNOSIS — E11.29 TYPE 2 DIABETES MELLITUS WITH MICROALBUMINURIA, WITHOUT LONG-TERM CURRENT USE OF INSULIN (HCC): Primary | Chronic | ICD-10-CM

## 2018-03-20 PROCEDURE — G0009 ADMIN PNEUMOCOCCAL VACCINE: HCPCS | Performed by: FAMILY MEDICINE

## 2018-03-20 PROCEDURE — 90732 PPSV23 VACC 2 YRS+ SUBQ/IM: CPT | Performed by: FAMILY MEDICINE

## 2018-03-20 PROCEDURE — 99214 OFFICE O/P EST MOD 30 MIN: CPT | Performed by: FAMILY MEDICINE

## 2018-03-20 RX ORDER — GLUCOSAMINE HCL/CHONDROITIN SU 500-400 MG
CAPSULE ORAL
Qty: 300 STRIP | Refills: 3 | Status: SHIPPED | OUTPATIENT
Start: 2018-03-20 | End: 2018-06-20 | Stop reason: SDUPTHER

## 2018-03-20 RX ORDER — GLUCOSAMINE HCL/CHONDROITIN SU 500-400 MG
CAPSULE ORAL
Qty: 300 STRIP | Refills: 3 | Status: SHIPPED | OUTPATIENT
Start: 2018-03-20 | End: 2018-03-20 | Stop reason: SDUPTHER

## 2018-03-20 ASSESSMENT — ENCOUNTER SYMPTOMS
NAUSEA: 0
CONSTIPATION: 0
ABDOMINAL PAIN: 0
VOMITING: 0
WHEEZING: 0
CHEST TIGHTNESS: 0
COUGH: 0
SHORTNESS OF BREATH: 0
DIARRHEA: 0

## 2018-03-20 NOTE — PROGRESS NOTES
Subjective:      Patient ID: Lynnette Suarez is a 67 y.o. male who presents today for:  Chief Complaint   Patient presents with    Diabetes     Presents today for his routine chronic check up       HPI     Patient here for chronic follow-up visit. He reports only taking half pill of januvia due to diarrhea with full pill. He denies adhering to any specific lower carbohydrate diet. He denies any exercise outside of this routine day-to-day activity. No reported polydipsia or polyuria, no vision changes, no paresthesias. He denies any chest pain, dyspnea, palpitations, lightheadedness, dizziness, edema, or syncope.     Past Medical History:   Diagnosis Date    BPH (benign prostatic hypertrophy)     CAD (coronary artery disease)     Cataracts, bilateral     Chronic low back pain     COPD (chronic obstructive pulmonary disease) ([address] 6/1/2006    DM2 (diabetes mellitus, type 2) ([address]     Duodenitis     Erectile dysfunction 3/6/2017    GERD (gastroesophageal reflux disease)     History of melanoma excision 12/11/2017    Left ear 09/2017    HTN (hypertension)     Hyperlipidemia     Malignant melanoma of face ([address]     Malignant melanoma of skin of face ([address]     RT ear 2000, LT ear 2017    Nephrolithiasis     Osteoarthritis of multiple joints     hands, hips    Perennial allergic rhinitis     Personal history of colonic polyps     Pseudophakia of both eyes     Type 2 diabetes mellitus with microalbuminuria, without long-term current use of insulin ([address] 3/6/2017    Varicose veins of both lower extremities 9/11/2017     Past Surgical History:   Procedure Laterality Date    ANGIOPLASTY      CARDIAC CATHETERIZATION      #17    CATARACT REMOVAL WITH IMPLANT Left 1992    CATARACT REMOVAL WITH IMPLANT Right 1992    COLONOSCOPY  2009    tubular adenomas    COLONOSCOPY  2013    diverticulosis, polyps, 5y repeat (DR NAVAS)    MOHS SURGERY Left 09/2017    melanoma of left ear (DR GODWIN)    SKIN CANCER cypionate (DEPOTESTOTERONE CYPIONATE) 200 MG/ML injection 1 ML every 2 weeks 10 mL 01    albuterol sulfate HFA (PROAIR HFA) 108 (90 Base) MCG/ACT inhaler Inhale 2 puffs into the lungs every 6 hours as needed for Wheezing 1 Inhaler 3    UNABLE TO FIND SUPER BETA PROSTATE OTC 250MG BETA-SITOSTEROL AND 400IU VIT D      tobramycin-dexamethasone (TOBRADEX) 0.3-0.1 % ophthalmic suspension       ascorbic acid (VITAMIN C) 500 MG tablet Take 1,000 mg by mouth daily      loratadine (CLARITIN) 10 MG tablet Take 1 tablet by mouth daily 30 tablet 5    vitamin D (CHOLECALCIFEROL) 1000 UNITS TABS tablet Take 1,000 Units by mouth      acetaminophen (TYLENOL EX ST ARTHRITIS PAIN) 500 MG tablet Take 1 tablet by mouth every 6 hours as needed for Pain 120 tablet 3    CINNAMON PO Take 500 mg by mouth 2 times daily.  aspirin 81 MG EC tablet Take 81 mg by mouth daily. No current facility-administered medications on file prior to visit. Allergies:  Invokana [canagliflozin] and Metformin and related    Review of Systems   Constitutional: Negative for appetite change, chills, diaphoresis, fatigue and fever. Eyes: Negative for visual disturbance. Respiratory: Negative for cough, chest tightness, shortness of breath and wheezing. Cardiovascular: Negative for chest pain, palpitations and leg swelling. No orthopnea, No PND   Gastrointestinal: Negative for abdominal pain, constipation, diarrhea, nausea and vomiting. No heartburn   Endocrine: Negative for cold intolerance, heat intolerance, polydipsia, polyphagia and polyuria. Genitourinary: Negative for dysuria and hematuria. Musculoskeletal: Negative for myalgias. Skin: Negative for rash. Neurological: Negative for dizziness, syncope, weakness, light-headedness, numbness and headaches. Psychiatric/Behavioral: Negative for dysphoric mood and sleep disturbance. The patient is not nervous/anxious.         Objective:     /82 (Site: During This Encounter   Medication Reason    rosuvastatin (CRESTOR) 20 MG tablet Therapy completed    Glucose Blood (BLOOD GLUCOSE TEST STRIPS) STRP Reorder    SITagliptin (JANUVIA) 100 MG tablet Reorder       Return in about 3 months (around 6/20/2018) for Chronic Disease Check.     Jc Gaitan MD

## 2018-03-20 NOTE — TELEPHONE ENCOUNTER
Received another fax from [phone] patient needing a new rx sent in for a glucose moniter test strips and supplies---they are pended---ah

## 2018-03-27 ENCOUNTER — CARE COORDINATION (OUTPATIENT)
Dept: FAMILY MEDICINE CLINIC | Age: 73
End: 2018-03-27

## 2018-03-27 NOTE — CARE COORDINATION
Ambulatory Care Coordination Note  3/27/2018  CM Risk Score: 3  Lottie Mortality Risk Score: 11.29    ACC: Jeremy Carter, LILIANA    Summary Note: I called to check in on Monique De Souza and to discuss diabetes and COPD. He says that he is doing amazing. He says that he is back to doing his blood sugars and they are running between 90-17 mg/dl. He denies any issues with hypo or hyper glycemia. He says that he is eating fairly normal and he is not eating anything that he should not be eating. He tells me that in general he does avoid eating sweets except on rare occasions. I asked about his breathing and he says that his breathing is fine and that he has not had an issue for a while. He denies any shortness of breath at rest or with activity. He says that his medications are fine. Vick Mackay has been doing very well and I will monitor him for one more month. If he remains healthy and without symptoms I will discharge him from care coordination next month. Diabetes Assessment    Medic Alert ID:  No  Meal Planning:  Avoidance of concentrated sweets, Calorie counting, Carb counting   How often do you test your blood sugar?:  Daily   Do you have barriers with adherence to non-pharmacologic self-management interventions?  (Nutrition/Exercise/Self-Monitoring):  No   Have you ever had to go to the ED for symptoms of low blood sugar?:  No           and   COPD Assessment    Does the patient understand envrionmental exposure?:  Yes  Is the patient able to verbalize Rescue vs. Long Acting medications?:  Yes  Does the patient have a nebulizer?:  No  Does the patient use a space with inhaled medications?:  No            Symptoms:               Care Coordination Interventions    Program Enrollment:  Rising Risk  Referral from Primary Care Provider:  No  Suggested Interventions and Community Resources         Goals Addressed             Most Recent     Self Monitoring   Improving (3/27/2018)             Self-Monitored Blood Glucose - I

## 2018-03-29 ENCOUNTER — NURSE ONLY (OUTPATIENT)
Dept: FAMILY MEDICINE CLINIC | Age: 73
End: 2018-03-29
Payer: MEDICARE

## 2018-03-29 DIAGNOSIS — E29.1 HYPOGONADISM MALE: Primary | ICD-10-CM

## 2018-03-29 PROCEDURE — 96372 THER/PROPH/DIAG INJ SC/IM: CPT | Performed by: FAMILY MEDICINE

## 2018-03-29 RX ORDER — TESTOSTERONE CYPIONATE 200 MG/ML
200 INJECTION INTRAMUSCULAR ONCE
Status: COMPLETED | OUTPATIENT
Start: 2018-03-29 | End: 2018-03-29

## 2018-03-29 RX ADMIN — TESTOSTERONE CYPIONATE 200 MG: 200 INJECTION INTRAMUSCULAR at 10:25

## 2018-04-12 ENCOUNTER — NURSE ONLY (OUTPATIENT)
Dept: FAMILY MEDICINE CLINIC | Age: 73
End: 2018-04-12
Payer: MEDICARE

## 2018-04-12 DIAGNOSIS — E29.1 HYPOGONADISM MALE: Primary | ICD-10-CM

## 2018-04-12 PROCEDURE — 96372 THER/PROPH/DIAG INJ SC/IM: CPT | Performed by: FAMILY MEDICINE

## 2018-04-12 RX ORDER — TESTOSTERONE CYPIONATE 200 MG/ML
200 INJECTION INTRAMUSCULAR ONCE
Status: COMPLETED | OUTPATIENT
Start: 2018-04-12 | End: 2018-04-12

## 2018-04-12 RX ADMIN — TESTOSTERONE CYPIONATE 200 MG: 200 INJECTION INTRAMUSCULAR at 10:15

## 2018-04-26 ENCOUNTER — NURSE ONLY (OUTPATIENT)
Dept: FAMILY MEDICINE CLINIC | Age: 73
End: 2018-04-26
Payer: MEDICARE

## 2018-04-26 ENCOUNTER — CARE COORDINATION (OUTPATIENT)
Dept: FAMILY MEDICINE CLINIC | Age: 73
End: 2018-04-26

## 2018-04-26 DIAGNOSIS — E29.1 HYPOGONADISM MALE: Primary | ICD-10-CM

## 2018-04-26 PROCEDURE — 96372 THER/PROPH/DIAG INJ SC/IM: CPT | Performed by: NURSE PRACTITIONER

## 2018-04-26 RX ORDER — TESTOSTERONE CYPIONATE 200 MG/ML
200 INJECTION INTRAMUSCULAR ONCE
Status: COMPLETED | OUTPATIENT
Start: 2018-04-26 | End: 2018-04-26

## 2018-04-26 RX ADMIN — TESTOSTERONE CYPIONATE 200 MG: 200 INJECTION INTRAMUSCULAR at 09:59

## 2018-05-07 DIAGNOSIS — K21.9 GASTROESOPHAGEAL REFLUX DISEASE, ESOPHAGITIS PRESENCE NOT SPECIFIED: Chronic | ICD-10-CM

## 2018-05-07 RX ORDER — RANITIDINE 150 MG/1
150 TABLET ORAL NIGHTLY PRN
Qty: 90 TABLET | Refills: 1 | Status: SHIPPED | OUTPATIENT
Start: 2018-05-07 | End: 2018-09-16 | Stop reason: SDUPTHER

## 2018-05-10 ENCOUNTER — NURSE ONLY (OUTPATIENT)
Dept: FAMILY MEDICINE CLINIC | Age: 73
End: 2018-05-10
Payer: MEDICARE

## 2018-05-10 DIAGNOSIS — E29.1 HYPOGONADISM MALE: Primary | ICD-10-CM

## 2018-05-10 PROCEDURE — 96372 THER/PROPH/DIAG INJ SC/IM: CPT | Performed by: FAMILY MEDICINE

## 2018-05-10 RX ORDER — TESTOSTERONE CYPIONATE 200 MG/ML
200 INJECTION INTRAMUSCULAR ONCE
Status: COMPLETED | OUTPATIENT
Start: 2018-05-10 | End: 2018-05-10

## 2018-05-10 RX ADMIN — TESTOSTERONE CYPIONATE 200 MG: 200 INJECTION INTRAMUSCULAR at 10:00

## 2018-05-24 ENCOUNTER — NURSE ONLY (OUTPATIENT)
Dept: FAMILY MEDICINE CLINIC | Age: 73
End: 2018-05-24
Payer: MEDICARE

## 2018-05-24 DIAGNOSIS — N52.9 ERECTILE DYSFUNCTION, UNSPECIFIED ERECTILE DYSFUNCTION TYPE: Primary | Chronic | ICD-10-CM

## 2018-05-24 PROCEDURE — 96372 THER/PROPH/DIAG INJ SC/IM: CPT | Performed by: FAMILY MEDICINE

## 2018-05-24 RX ORDER — TESTOSTERONE CYPIONATE 200 MG/ML
200 INJECTION INTRAMUSCULAR ONCE
Status: COMPLETED | OUTPATIENT
Start: 2018-05-24 | End: 2018-05-24

## 2018-05-24 RX ADMIN — TESTOSTERONE CYPIONATE 200 MG: 200 INJECTION INTRAMUSCULAR at 12:06

## 2018-05-31 ENCOUNTER — CARE COORDINATION (OUTPATIENT)
Dept: FAMILY MEDICINE CLINIC | Age: 73
End: 2018-05-31

## 2018-06-07 ENCOUNTER — NURSE ONLY (OUTPATIENT)
Dept: FAMILY MEDICINE CLINIC | Age: 73
End: 2018-06-07
Payer: MEDICARE

## 2018-06-07 DIAGNOSIS — E29.1 HYPOGONADISM MALE: Primary | ICD-10-CM

## 2018-06-07 PROCEDURE — 96372 THER/PROPH/DIAG INJ SC/IM: CPT | Performed by: FAMILY MEDICINE

## 2018-06-07 RX ORDER — TESTOSTERONE CYPIONATE 200 MG/ML
200 INJECTION INTRAMUSCULAR ONCE
Status: COMPLETED | OUTPATIENT
Start: 2018-06-07 | End: 2018-06-07

## 2018-06-07 RX ADMIN — TESTOSTERONE CYPIONATE 200 MG: 200 INJECTION INTRAMUSCULAR at 10:30

## 2018-06-10 DIAGNOSIS — N52.9 ERECTILE DYSFUNCTION, UNSPECIFIED ERECTILE DYSFUNCTION TYPE: Chronic | ICD-10-CM

## 2018-06-11 DIAGNOSIS — N52.9 ERECTILE DYSFUNCTION, UNSPECIFIED ERECTILE DYSFUNCTION TYPE: Chronic | ICD-10-CM

## 2018-06-12 RX ORDER — SILDENAFIL CITRATE 100 MG
TABLET ORAL
Qty: 5 TABLET | Refills: 3 | Status: SHIPPED | OUTPATIENT
Start: 2018-06-12 | End: 2018-06-20 | Stop reason: SDUPTHER

## 2018-06-12 RX ORDER — SILDENAFIL 100 MG/1
100 TABLET, FILM COATED ORAL PRN
Qty: 5 TABLET | Refills: 0 | OUTPATIENT
Start: 2018-06-12

## 2018-06-15 ENCOUNTER — HOSPITAL ENCOUNTER (OUTPATIENT)
Dept: LAB | Age: 73
Discharge: HOME | End: 2018-06-15
Payer: MEDICARE

## 2018-06-15 DIAGNOSIS — E11.29 TYPE 2 DIABETES MELLITUS WITH MICROALBUMINURIA, WITHOUT LONG-TERM CURRENT USE OF INSULIN (HCC): Primary | Chronic | ICD-10-CM

## 2018-06-15 DIAGNOSIS — E78.5 HYPERLIPIDEMIA, UNSPECIFIED HYPERLIPIDEMIA TYPE: Chronic | ICD-10-CM

## 2018-06-15 DIAGNOSIS — K76.0 FATTY INFILTRATION OF LIVER: ICD-10-CM

## 2018-06-15 DIAGNOSIS — R80.9 TYPE 2 DIABETES MELLITUS WITH MICROALBUMINURIA, WITHOUT LONG-TERM CURRENT USE OF INSULIN (HCC): Chronic | ICD-10-CM

## 2018-06-15 DIAGNOSIS — E11.29 TYPE 2 DIABETES MELLITUS WITH MICROALBUMINURIA, WITHOUT LONG-TERM CURRENT USE OF INSULIN (HCC): Chronic | ICD-10-CM

## 2018-06-15 DIAGNOSIS — R80.9 TYPE 2 DIABETES MELLITUS WITH MICROALBUMINURIA, WITHOUT LONG-TERM CURRENT USE OF INSULIN (HCC): Primary | Chronic | ICD-10-CM

## 2018-06-15 LAB
ALBUMIN SERPL-MCNC: 4 G/DL (ref 3.9–4.9)
ALP BLD-CCNC: 38 U/L (ref 35–104)
ALT SERPL-CCNC: 18 U/L (ref 0–41)
ANION GAP SERPL CALCULATED.3IONS-SCNC: 14 MEQ/L (ref 7–13)
AST SERPL-CCNC: 16 U/L (ref 0–40)
BILIRUB SERPL-MCNC: 0.5 MG/DL (ref 0–1.2)
BUN BLDV-MCNC: 17 MG/DL (ref 8–23)
CALCIUM SERPL-MCNC: 9.6 MG/DL (ref 8.6–10.2)
CHLORIDE BLD-SCNC: 96 MEQ/L (ref 98–107)
CO2: 26 MEQ/L (ref 22–29)
CREAT SERPL-MCNC: 0.98 MG/DL (ref 0.7–1.2)
GFR AFRICAN AMERICAN: >60
GFR NON-AFRICAN AMERICAN: >60
GLOBULIN: 3.2 G/DL (ref 2.3–3.5)
GLUCOSE BLD-MCNC: 165 MG/DL (ref 74–109)
HBA1C MFR BLD: 7.4 % (ref 4.8–5.9)
POTASSIUM SERPL-SCNC: 4.2 MEQ/L (ref 3.5–5.1)
SODIUM BLD-SCNC: 136 MEQ/L (ref 132–144)
TOTAL PROTEIN: 7.2 G/DL (ref 6.4–8.1)

## 2018-06-15 PROCEDURE — 36415 COLL VENOUS BLD VENIPUNCTURE: CPT

## 2018-06-15 PROCEDURE — 83036 HEMOGLOBIN GLYCOSYLATED A1C: CPT

## 2018-06-15 PROCEDURE — 80053 COMPREHEN METABOLIC PANEL: CPT

## 2018-06-20 ENCOUNTER — OFFICE VISIT (OUTPATIENT)
Dept: FAMILY MEDICINE CLINIC | Age: 73
End: 2018-06-20
Payer: MEDICARE

## 2018-06-20 VITALS
TEMPERATURE: 97.6 F | SYSTOLIC BLOOD PRESSURE: 136 MMHG | RESPIRATION RATE: 16 BRPM | OXYGEN SATURATION: 93 % | DIASTOLIC BLOOD PRESSURE: 82 MMHG | HEART RATE: 72 BPM | WEIGHT: 198.2 LBS | HEIGHT: 71 IN | BODY MASS INDEX: 27.75 KG/M2

## 2018-06-20 DIAGNOSIS — I25.10 CAD IN NATIVE ARTERY: Chronic | ICD-10-CM

## 2018-06-20 DIAGNOSIS — E11.29 TYPE 2 DIABETES MELLITUS WITH MICROALBUMINURIA, WITHOUT LONG-TERM CURRENT USE OF INSULIN (HCC): Primary | Chronic | ICD-10-CM

## 2018-06-20 DIAGNOSIS — R80.9 TYPE 2 DIABETES MELLITUS WITH MICROALBUMINURIA, WITHOUT LONG-TERM CURRENT USE OF INSULIN (HCC): Primary | Chronic | ICD-10-CM

## 2018-06-20 DIAGNOSIS — R53.83 FATIGUE, UNSPECIFIED TYPE: ICD-10-CM

## 2018-06-20 DIAGNOSIS — J44.9 CHRONIC OBSTRUCTIVE PULMONARY DISEASE, UNSPECIFIED COPD TYPE (HCC): Chronic | ICD-10-CM

## 2018-06-20 DIAGNOSIS — N52.9 ERECTILE DYSFUNCTION, UNSPECIFIED ERECTILE DYSFUNCTION TYPE: Chronic | ICD-10-CM

## 2018-06-20 DIAGNOSIS — E78.5 HYPERLIPIDEMIA, UNSPECIFIED HYPERLIPIDEMIA TYPE: Chronic | ICD-10-CM

## 2018-06-20 DIAGNOSIS — I10 ESSENTIAL HYPERTENSION: Chronic | ICD-10-CM

## 2018-06-20 PROCEDURE — 3017F COLORECTAL CA SCREEN DOC REV: CPT | Performed by: FAMILY MEDICINE

## 2018-06-20 PROCEDURE — 2022F DILAT RTA XM EVC RTNOPTHY: CPT | Performed by: FAMILY MEDICINE

## 2018-06-20 PROCEDURE — 99214 OFFICE O/P EST MOD 30 MIN: CPT | Performed by: FAMILY MEDICINE

## 2018-06-20 PROCEDURE — 1123F ACP DISCUSS/DSCN MKR DOCD: CPT | Performed by: FAMILY MEDICINE

## 2018-06-20 PROCEDURE — G8926 SPIRO NO PERF OR DOC: HCPCS | Performed by: FAMILY MEDICINE

## 2018-06-20 PROCEDURE — G8598 ASA/ANTIPLAT THER USED: HCPCS | Performed by: FAMILY MEDICINE

## 2018-06-20 PROCEDURE — G8417 CALC BMI ABV UP PARAM F/U: HCPCS | Performed by: FAMILY MEDICINE

## 2018-06-20 PROCEDURE — 1036F TOBACCO NON-USER: CPT | Performed by: FAMILY MEDICINE

## 2018-06-20 PROCEDURE — 3045F PR MOST RECENT HEMOGLOBIN A1C LEVEL 7.0-9.0%: CPT | Performed by: FAMILY MEDICINE

## 2018-06-20 PROCEDURE — 3023F SPIROM DOC REV: CPT | Performed by: FAMILY MEDICINE

## 2018-06-20 PROCEDURE — 4040F PNEUMOC VAC/ADMIN/RCVD: CPT | Performed by: FAMILY MEDICINE

## 2018-06-20 PROCEDURE — G8427 DOCREV CUR MEDS BY ELIG CLIN: HCPCS | Performed by: FAMILY MEDICINE

## 2018-06-20 RX ORDER — GLUCOSAMINE HCL/CHONDROITIN SU 500-400 MG
CAPSULE ORAL
Qty: 300 STRIP | Refills: 3 | Status: SHIPPED | OUTPATIENT
Start: 2018-06-20 | End: 2018-09-20 | Stop reason: SDUPTHER

## 2018-06-20 RX ORDER — METOPROLOL SUCCINATE 50 MG/1
150 TABLET, EXTENDED RELEASE ORAL DAILY
Qty: 90 TABLET | Refills: 1 | Status: SHIPPED | OUTPATIENT
Start: 2018-06-20 | End: 2018-10-20 | Stop reason: SDUPTHER

## 2018-06-20 ASSESSMENT — ENCOUNTER SYMPTOMS
NAUSEA: 0
COUGH: 0
SHORTNESS OF BREATH: 0
CONSTIPATION: 0
DIARRHEA: 0
CHEST TIGHTNESS: 0
ABDOMINAL PAIN: 0
WHEEZING: 0
VOMITING: 0

## 2018-06-21 ENCOUNTER — NURSE ONLY (OUTPATIENT)
Dept: FAMILY MEDICINE CLINIC | Age: 73
End: 2018-06-21
Payer: MEDICARE

## 2018-06-21 DIAGNOSIS — N52.9 ERECTILE DYSFUNCTION, UNSPECIFIED ERECTILE DYSFUNCTION TYPE: Primary | Chronic | ICD-10-CM

## 2018-06-21 PROCEDURE — 96372 THER/PROPH/DIAG INJ SC/IM: CPT | Performed by: FAMILY MEDICINE

## 2018-06-21 RX ORDER — TESTOSTERONE CYPIONATE 200 MG/ML
200 INJECTION INTRAMUSCULAR ONCE
Status: COMPLETED | OUTPATIENT
Start: 2018-06-21 | End: 2018-06-21

## 2018-06-21 RX ORDER — SILDENAFIL 100 MG/1
TABLET, FILM COATED ORAL
Qty: 6 TABLET | Refills: 3 | Status: SHIPPED | OUTPATIENT
Start: 2018-06-21 | End: 2022-03-17 | Stop reason: SDUPTHER

## 2018-06-21 RX ADMIN — TESTOSTERONE CYPIONATE 200 MG: 200 INJECTION INTRAMUSCULAR at 12:12

## 2018-06-26 ENCOUNTER — CARE COORDINATION (OUTPATIENT)
Dept: FAMILY MEDICINE CLINIC | Age: 73
End: 2018-06-26

## 2018-07-03 ENCOUNTER — CARE COORDINATION (OUTPATIENT)
Dept: FAMILY MEDICINE CLINIC | Age: 73
End: 2018-07-03

## 2018-07-03 NOTE — CARE COORDINATION
During our conversation last week Patrizia Patiño stated that he would be going in for a heart catherization Thursday June 28 th.  I reviewed his chart and he has had a 5 vessel bypass yesterday. I will follow up with Patrizia Patiño once he returns home.

## 2018-07-19 ENCOUNTER — NURSE ONLY (OUTPATIENT)
Dept: FAMILY MEDICINE CLINIC | Age: 73
End: 2018-07-19
Payer: MEDICARE

## 2018-07-19 DIAGNOSIS — E29.1 HYPOGONADISM MALE: Primary | ICD-10-CM

## 2018-07-19 PROCEDURE — 96372 THER/PROPH/DIAG INJ SC/IM: CPT | Performed by: FAMILY MEDICINE

## 2018-07-19 RX ORDER — TESTOSTERONE CYPIONATE 200 MG/ML
200 INJECTION INTRAMUSCULAR ONCE
Status: COMPLETED | OUTPATIENT
Start: 2018-07-19 | End: 2018-07-19

## 2018-07-19 RX ADMIN — TESTOSTERONE CYPIONATE 200 MG: 200 INJECTION INTRAMUSCULAR at 10:29

## 2018-07-25 ENCOUNTER — HOSPITAL ENCOUNTER (OUTPATIENT)
Dept: LAB | Age: 73
Discharge: HOME | End: 2018-07-25
Payer: MEDICARE

## 2018-07-25 DIAGNOSIS — E78.5 HYPERLIPIDEMIA, UNSPECIFIED HYPERLIPIDEMIA TYPE: Chronic | ICD-10-CM

## 2018-07-25 DIAGNOSIS — E11.29 TYPE 2 DIABETES MELLITUS WITH MICROALBUMINURIA, WITHOUT LONG-TERM CURRENT USE OF INSULIN (HCC): Chronic | ICD-10-CM

## 2018-07-25 DIAGNOSIS — I10 ESSENTIAL HYPERTENSION: Chronic | ICD-10-CM

## 2018-07-25 DIAGNOSIS — R80.9 TYPE 2 DIABETES MELLITUS WITH MICROALBUMINURIA, WITHOUT LONG-TERM CURRENT USE OF INSULIN (HCC): Chronic | ICD-10-CM

## 2018-07-25 DIAGNOSIS — I25.10 CAD IN NATIVE ARTERY: Chronic | ICD-10-CM

## 2018-07-25 DIAGNOSIS — R53.83 FATIGUE, UNSPECIFIED TYPE: ICD-10-CM

## 2018-07-25 LAB
ALBUMIN SERPL-MCNC: 3.6 G/DL (ref 3.9–4.9)
ALP BLD-CCNC: 68 U/L (ref 35–104)
ALT SERPL-CCNC: 74 U/L (ref 0–41)
ANION GAP SERPL CALCULATED.3IONS-SCNC: 11 MEQ/L (ref 7–13)
AST SERPL-CCNC: 32 U/L (ref 0–40)
BILIRUB SERPL-MCNC: 0.3 MG/DL (ref 0–1.2)
BUN BLDV-MCNC: 10 MG/DL (ref 8–23)
CALCIUM SERPL-MCNC: 9 MG/DL (ref 8.6–10.2)
CHLORIDE BLD-SCNC: 100 MEQ/L (ref 98–107)
CHOLESTEROL, TOTAL: 87 MG/DL (ref 0–199)
CO2: 26 MEQ/L (ref 22–29)
CREAT SERPL-MCNC: 0.86 MG/DL (ref 0.7–1.2)
CREATININE URINE: 174.3 MG/DL
GFR AFRICAN AMERICAN: >60
GFR NON-AFRICAN AMERICAN: >60
GLOBULIN: 3.3 G/DL (ref 2.3–3.5)
GLUCOSE BLD-MCNC: 127 MG/DL (ref 74–109)
HBA1C MFR BLD: 7.1 % (ref 4.8–5.9)
HDLC SERPL-MCNC: 41 MG/DL (ref 40–59)
LDL CHOLESTEROL CALCULATED: 31 MG/DL (ref 0–129)
MICROALBUMIN UR-MCNC: 13.9 MG/DL
MICROALBUMIN/CREAT UR-RTO: 79.7 MG/G (ref 0–30)
POTASSIUM SERPL-SCNC: 4.9 MEQ/L (ref 3.5–5.1)
SODIUM BLD-SCNC: 137 MEQ/L (ref 132–144)
TOTAL PROTEIN: 6.9 G/DL (ref 6.4–8.1)
TRIGL SERPL-MCNC: 77 MG/DL (ref 0–200)
TSH SERPL DL<=0.05 MIU/L-ACNC: 0.6 UIU/ML (ref 0.27–4.2)

## 2018-07-25 PROCEDURE — 82570 ASSAY OF URINE CREATININE: CPT

## 2018-07-25 PROCEDURE — 83036 HEMOGLOBIN GLYCOSYLATED A1C: CPT

## 2018-07-25 PROCEDURE — 80061 LIPID PANEL: CPT

## 2018-07-25 PROCEDURE — 80053 COMPREHEN METABOLIC PANEL: CPT

## 2018-07-25 PROCEDURE — 82043 UR ALBUMIN QUANTITATIVE: CPT

## 2018-07-25 PROCEDURE — 36415 COLL VENOUS BLD VENIPUNCTURE: CPT

## 2018-07-25 PROCEDURE — 84443 ASSAY THYROID STIM HORMONE: CPT

## 2018-07-26 ENCOUNTER — CARE COORDINATION (OUTPATIENT)
Dept: FAMILY MEDICINE CLINIC | Age: 73
End: 2018-07-26

## 2018-07-26 NOTE — CARE COORDINATION
patient-reported symptoms         Symptoms:               Care Coordination Interventions    Program Enrollment:  Rising Risk  Referral from Primary Care Provider:  No  Suggested Interventions and Community Resources         Goals Addressed             Most Recent     Self Monitoring   Improving (7/26/2018)             Self-Monitored Blood Glucose - I will check my blood sugar Fasting blood sugar  I will notify my provider of any trends of increasing or decreasing blood sugars over a 1 month period. Blood Pressure - I will take my blood pressure as directed - Daily  I will notify my provider of any trends of increasing or decreasing blood pressures over a month period of time. Patient Reported Blood Pressure No flowsheet data found. Barriers: none  Plan for overcoming my barriers: N/A  Confidence: 10/10  Anticipated Goal Completion Date: 7/31/2017              Prior to Admission medications    Medication Sig Start Date End Date Taking? Authorizing Provider   sildenafil (VIAGRA) 100 MG tablet TAKE ONE TABLET BY MOUTH AS NEEDED APPROXIMATELY ONE HOUR BEFORE SEXUAL ACTIVITY. DO NOT USE MORE THAN ONE DOSE DAILY 6/21/18   Salina Hoyt MD   metoprolol succinate (TOPROL XL) 50 MG extended release tablet Take 3 tablets by mouth daily 6/20/18   Salina Hoyt MD   Glucose Blood (BLOOD GLUCOSE TEST STRIPS) STRP Test as directed Test once daily. Dx:  E11.29 6/20/18   Salina Hoyt MD   ranitidine (ZANTAC) 150 MG tablet TAKE 1 TABLET BY MOUTH  NIGHTLY AS NEEDED FOR  HEARTBURN 5/7/18   Salina Hoyt MD   SITagliptin (JANUVIA) 100 MG tablet Take 0.5 tablets by mouth 2 times daily 3/20/18   Salina Hoyt MD   Blood Glucose Monitoring Suppl CHENCHO Test once daily. Dx: E11.29 3/20/18   Salina Hoyt MD   Lancets MISC Test once daily.  Dx: E11.29 3/8/18   Salina Hoyt MD   fluticasone [facility]) 50 MCG/ACT nasal spray Use 2 sprays nasally daily 2/20/18   Salina Hoyt MD triamterene-hydrochlorothiazide (MAXZIDE-25) 37.5-25 MG per tablet Take 1 tablet by mouth daily 2/13/18   Gonzales Calderon MD   losartan (COZAAR) 100 MG tablet Take 1 tablet by mouth daily 2/7/18   Gonzales Calderon MD   glipiZIDE (GLUCOTROL) 10 MG tablet Take 1 tablet by mouth 2 times daily (before meals) 1/22/18   Gonzales Calderon MD   tamsulosin [facility]) 0.4 MG capsule Take 2 capsules by mouth daily 11/20/17   Gonzales Calderon MD   testosterone cypionate (DEPOTESTOTERONE CYPIONATE) 200 MG/ML injection 1 ML every 2 weeks 10/3/17   Wendy Handley MD   albuterol sulfate HFA (PROAIR HFA) 108 (90 Base) MCG/ACT inhaler Inhale 2 puffs into the lungs every 6 hours as needed for Wheezing 9/11/17   Gonzales Calderon MD   UNABLE TO FIND SUPER BETA PROSTATE OTC 250MG BETA-SITOSTEROL AND 400IU VIT D    Historical Provider, MD   tobramycin-dexamethasone (TOBRADEX) 0.3-0.1 % ophthalmic suspension  6/13/17   Historical Provider, MD   ascorbic acid (VITAMIN C) 500 MG tablet Take 1,000 mg by mouth daily    Historical Provider, MD   loratadine (CLARITIN) 10 MG tablet Take 1 tablet by mouth daily 11/22/16   Gonzales Calderon MD   vitamin D (CHOLECALCIFEROL) 1000 UNITS TABS tablet Take 1,000 Units by mouth    Historical Provider, MD   acetaminophen (TYLENOL EX ST ARTHRITIS PAIN) 500 MG tablet Take 1 tablet by mouth every 6 hours as needed for Pain 8/24/15   Gelacio Swain MD   CINNAMON PO Take 500 mg by mouth 2 times daily. Historical Provider, MD   aspirin 81 MG EC tablet Take 81 mg by mouth daily.       Historical Provider, MD       Future Appointments  Date Time Provider Kay Tri   7/30/2018 11:20 AM MD [place] [address]   8/2/2018 10:10 AM SCHEDULE, [facility] [facility] PCP TESTOSTERONE [facility]X [facility]   9/20/2018 9:20 AM MD [place] [address]

## 2018-07-30 ENCOUNTER — HOSPITAL ENCOUNTER (OUTPATIENT)
Dept: LAB | Age: 73
Discharge: HOME | End: 2018-07-30
Payer: MEDICARE

## 2018-07-30 ENCOUNTER — OFFICE VISIT (OUTPATIENT)
Dept: FAMILY MEDICINE CLINIC | Age: 73
End: 2018-07-30
Payer: MEDICARE

## 2018-07-30 VITALS
HEIGHT: 71 IN | TEMPERATURE: 97.5 F | DIASTOLIC BLOOD PRESSURE: 70 MMHG | HEART RATE: 80 BPM | WEIGHT: 194.4 LBS | BODY MASS INDEX: 27.22 KG/M2 | SYSTOLIC BLOOD PRESSURE: 126 MMHG | OXYGEN SATURATION: 96 %

## 2018-07-30 DIAGNOSIS — D72.829 LEUKOCYTOSIS, UNSPECIFIED TYPE: Primary | ICD-10-CM

## 2018-07-30 DIAGNOSIS — J44.9 CHRONIC OBSTRUCTIVE PULMONARY DISEASE, UNSPECIFIED COPD TYPE (HCC): Chronic | ICD-10-CM

## 2018-07-30 DIAGNOSIS — E78.5 HYPERLIPIDEMIA, UNSPECIFIED HYPERLIPIDEMIA TYPE: Chronic | ICD-10-CM

## 2018-07-30 DIAGNOSIS — H02.401 ACQUIRED PTOSIS OF RIGHT EYELID: ICD-10-CM

## 2018-07-30 DIAGNOSIS — Z95.1 STATUS POST CORONARY ARTERY BYPASS GRAFTS X 5: Chronic | ICD-10-CM

## 2018-07-30 DIAGNOSIS — I10 ESSENTIAL HYPERTENSION: Chronic | ICD-10-CM

## 2018-07-30 DIAGNOSIS — E11.29 TYPE 2 DIABETES MELLITUS WITH MICROALBUMINURIA, WITHOUT LONG-TERM CURRENT USE OF INSULIN (HCC): Chronic | ICD-10-CM

## 2018-07-30 DIAGNOSIS — D72.829 LEUKOCYTOSIS, UNSPECIFIED TYPE: ICD-10-CM

## 2018-07-30 DIAGNOSIS — I25.10 CAD IN NATIVE ARTERY: Primary | Chronic | ICD-10-CM

## 2018-07-30 DIAGNOSIS — R80.9 TYPE 2 DIABETES MELLITUS WITH MICROALBUMINURIA, WITHOUT LONG-TERM CURRENT USE OF INSULIN (HCC): Chronic | ICD-10-CM

## 2018-07-30 LAB
BASOPHILS ABSOLUTE: 0.1 K/UL (ref 0–0.2)
BASOPHILS RELATIVE PERCENT: 0.7 %
EOSINOPHILS ABSOLUTE: 0.7 K/UL (ref 0–0.7)
EOSINOPHILS RELATIVE PERCENT: 6.6 %
HCT VFR BLD CALC: 41.1 % (ref 42–52)
HEMOGLOBIN: 14 G/DL (ref 14–18)
LYMPHOCYTES ABSOLUTE: 2.2 K/UL (ref 1–4.8)
LYMPHOCYTES RELATIVE PERCENT: 19.8 %
MCH RBC QN AUTO: 34.3 PG (ref 27–31.3)
MCHC RBC AUTO-ENTMCNC: 34.2 % (ref 33–37)
MCV RBC AUTO: 100.3 FL (ref 80–100)
MONOCYTES ABSOLUTE: 0.9 K/UL (ref 0.2–0.8)
MONOCYTES RELATIVE PERCENT: 8 %
NEUTROPHILS ABSOLUTE: 7.1 K/UL (ref 1.4–6.5)
NEUTROPHILS RELATIVE PERCENT: 64.9 %
PDW BLD-RTO: 14.5 % (ref 11.5–14.5)
PLATELET # BLD: 297 K/UL (ref 130–400)
RBC # BLD: 4.09 M/UL (ref 4.7–6.1)
WBC # BLD: 11 K/UL (ref 4.8–10.8)

## 2018-07-30 PROCEDURE — 3045F PR MOST RECENT HEMOGLOBIN A1C LEVEL 7.0-9.0%: CPT | Performed by: FAMILY MEDICINE

## 2018-07-30 PROCEDURE — 1123F ACP DISCUSS/DSCN MKR DOCD: CPT | Performed by: FAMILY MEDICINE

## 2018-07-30 PROCEDURE — 2022F DILAT RTA XM EVC RTNOPTHY: CPT | Performed by: FAMILY MEDICINE

## 2018-07-30 PROCEDURE — 1101F PT FALLS ASSESS-DOCD LE1/YR: CPT | Performed by: FAMILY MEDICINE

## 2018-07-30 PROCEDURE — G8417 CALC BMI ABV UP PARAM F/U: HCPCS | Performed by: FAMILY MEDICINE

## 2018-07-30 PROCEDURE — 1036F TOBACCO NON-USER: CPT | Performed by: FAMILY MEDICINE

## 2018-07-30 PROCEDURE — 4040F PNEUMOC VAC/ADMIN/RCVD: CPT | Performed by: FAMILY MEDICINE

## 2018-07-30 PROCEDURE — G8427 DOCREV CUR MEDS BY ELIG CLIN: HCPCS | Performed by: FAMILY MEDICINE

## 2018-07-30 PROCEDURE — 3023F SPIROM DOC REV: CPT | Performed by: FAMILY MEDICINE

## 2018-07-30 PROCEDURE — 85025 COMPLETE CBC W/AUTO DIFF WBC: CPT

## 2018-07-30 PROCEDURE — 3017F COLORECTAL CA SCREEN DOC REV: CPT | Performed by: FAMILY MEDICINE

## 2018-07-30 PROCEDURE — 99215 OFFICE O/P EST HI 40 MIN: CPT | Performed by: FAMILY MEDICINE

## 2018-07-30 PROCEDURE — G8926 SPIRO NO PERF OR DOC: HCPCS | Performed by: FAMILY MEDICINE

## 2018-07-30 PROCEDURE — 36415 COLL VENOUS BLD VENIPUNCTURE: CPT

## 2018-07-30 PROCEDURE — G8598 ASA/ANTIPLAT THER USED: HCPCS | Performed by: FAMILY MEDICINE

## 2018-07-30 RX ORDER — FLUTICASONE FUROATE AND VILANTEROL 100; 25 UG/1; UG/1
POWDER RESPIRATORY (INHALATION)
COMMUNITY
Start: 2018-07-11 | End: 2019-04-16 | Stop reason: ALTCHOICE

## 2018-07-30 RX ORDER — NATEGLINIDE 120 MG/1
120 TABLET ORAL
COMMUNITY
Start: 2018-07-10 | End: 2018-07-30 | Stop reason: SDUPTHER

## 2018-07-30 RX ORDER — NATEGLINIDE 120 MG/1
120 TABLET ORAL
Qty: 270 TABLET | Refills: 2 | Status: SHIPPED | OUTPATIENT
Start: 2018-07-30 | End: 2018-10-25 | Stop reason: SDUPTHER

## 2018-07-30 ASSESSMENT — ENCOUNTER SYMPTOMS
ABDOMINAL PAIN: 0
CONSTIPATION: 0
EYE DISCHARGE: 0
CHEST TIGHTNESS: 0
WHEEZING: 0
VOMITING: 0
EYE REDNESS: 0
NAUSEA: 0
PHOTOPHOBIA: 0
DIARRHEA: 0
SHORTNESS OF BREATH: 0
EYE PAIN: 0

## 2018-07-30 NOTE — PROGRESS NOTES
swallowing issues. He reports problems are intermittent, sometimes easier to keep lids open than other times.      Past Medical History:   Diagnosis Date    Atypical chest pain 8/30/2012    BPH (benign prostatic hypertrophy)     CAD (coronary artery disease)     Cataracts, bilateral     Chronic low back pain     COPD (chronic obstructive pulmonary disease) ([address] 6/1/2006    DM2 (diabetes mellitus, type 2) ([address]     Duodenitis     Erectile dysfunction 3/6/2017    Fatty infiltration of liver 11/19/2014    GERD (gastroesophageal reflux disease)     History of melanoma excision 12/11/2017    Left ear 09/2017    History of tobacco use 6/1/2006    HTN (hypertension)     Hyperlipidemia     Malignant melanoma of face ([address]     Malignant melanoma of skin of face ([address]     RT ear 2000, LT ear 2017    Nephrolithiasis     Osteoarthritis of multiple joints     hands, hips    Perennial allergic rhinitis     Personal history of colonic polyps     Pseudophakia of both eyes     Status post coronary artery bypass grafts x 5 7/30/2018 07/2018 @ [facility]    Type 2 diabetes mellitus with microalbuminuria, without long-term current use of insulin ([address] 3/6/2017    Varicose veins of both lower extremities 9/11/2017     Past Surgical History:   Procedure Laterality Date    ANGIOPLASTY      CARDIAC CATHETERIZATION      #17    CATARACT REMOVAL WITH IMPLANT Left 1992    CATARACT REMOVAL WITH IMPLANT Right 1992    COLONOSCOPY  2009    tubular adenomas    COLONOSCOPY  2013    diverticulosis, polyps, 5y repeat (DR iMtzy Bartlett)    CORONARY ARTERY BYPASS GRAFT  07/02/2018    x5 (DR JETER @ [facility])    MOHS SURGERY Left 09/2017    melanoma of left ear (DR Archie Merritt)    SKIN CANCER EXCISION Left 08/2017    TONSILLECTOMY  1955    UPPER GASTROINTESTINAL ENDOSCOPY      duodenitis/ poss early signs of celiac disease     Family History   Problem Relation Age of Onset    Heart Disease Mother     Heart Disease Brother     Diabetes Maternal Grandfather      Social History     Social History    Marital status:      Spouse name: Clifton Kilpatrick Number of children: 2    Years of education: 12+     Occupational History    Farming      Social History Main Topics    Smoking status: Former Smoker     Packs/day: 1.00     Years: 41.00     Types: Cigarettes     Start date: 1961     Quit date: 9/28/2002    Smokeless tobacco: Never Used    Alcohol use 0.0 oz/week      Comment: drinks one beer at night occassionally    Drug use: No    Sexual activity: Not Currently     Partners: Female     Other Topics Concern    Not on file     Social History Narrative    No narrative on file     Current Outpatient Prescriptions on File Prior to Visit   Medication Sig Dispense Refill    sildenafil (VIAGRA) 100 MG tablet TAKE ONE TABLET BY MOUTH AS NEEDED APPROXIMATELY ONE HOUR BEFORE SEXUAL ACTIVITY. DO NOT USE MORE THAN ONE DOSE DAILY 6 tablet 3    metoprolol succinate (TOPROL XL) 50 MG extended release tablet Take 3 tablets by mouth daily 90 tablet 1    Glucose Blood (BLOOD GLUCOSE TEST STRIPS) STRP Test as directed Test once daily. Dx:  E11.29 300 strip 3    ranitidine (ZANTAC) 150 MG tablet TAKE 1 TABLET BY MOUTH  NIGHTLY AS NEEDED FOR  HEARTBURN 90 tablet 1    SITagliptin (JANUVIA) 100 MG tablet Take 0.5 tablets by mouth 2 times daily 90 tablet 1    Blood Glucose Monitoring Suppl CHENCHO Test once daily. Dx: E11.29 1 Device 0    Lancets MISC Test once daily.  Dx: E11.29 100 each 3    fluticasone (FLONASE) 50 MCG/ACT nasal spray Use 2 sprays nasally daily 3 Bottle 3    triamterene-hydrochlorothiazide (MAXZIDE-25) 37.5-25 MG per tablet Take 1 tablet by mouth daily 90 tablet 3    tamsulosin (FLOMAX) 0.4 MG capsule Take 2 capsules by mouth daily 180 capsule 3    testosterone cypionate (DEPOTESTOTERONE CYPIONATE) 200 MG/ML injection 1 ML every 2 weeks 10 mL 01    albuterol sulfate HFA (PROAIR HFA) 108 (90 Base) MCG/ACT inhaler Inhale 2 puffs into Pulse 80   Temp 97.5 °F (36.4 °C) (Temporal)   Ht 5' 11\" (1.803 m)   Wt 194 lb 6.4 oz (88.2 kg)   SpO2 96%   BMI 27.11 kg/m²     Physical Exam   Constitutional: He is oriented to person, place, and time. He appears well-developed and well-nourished. Eyes: Conjunctivae and EOM are normal. Pupils are equal, round, and reactive to light. Right eye exhibits no discharge. Left eye exhibits no discharge. No diplopia on eye movement   Neck: Neck supple. Carotid bruit is not present. No thyromegaly present. Cardiovascular: Normal rate, regular rhythm, normal heart sounds and intact distal pulses. No murmur heard. Pulmonary/Chest: Effort normal and breath sounds normal. No respiratory distress. He has no wheezes. He exhibits no mass and no swelling. Abdominal: Soft. Bowel sounds are normal. He exhibits no distension. There is no tenderness. There is no rebound and no guarding. Musculoskeletal: Normal range of motion. He exhibits no edema. Lymphadenopathy:     He has no cervical adenopathy. Neurological: He is alert and oriented to person, place, and time. He displays no tremor. A cranial nerve deficit (right lid ptosis) is present. No sensory deficit. Gait normal.   +5/5 strength upper and lower extremities bilaterally  No facial droop appreciated, normal smile and forehead raise   Skin: Skin is warm. No rash noted. Psychiatric: He has a normal mood and affect. Ortho Exam (If Applicable)      Assessment & Plan:      1. CAD in native artery  Currently stable status post bypass surgery. No reported chest pain or dyspnea. We reviewed the importance of tight blood glucose, lipid, and blood pressure control long term. Will follow over time    2. Status post coronary artery bypass grafts x 5    - Amb External Referral To Neurology    3. Acquired ptosis of right eyelid  Patient without acute vision change or diplopia.   Will refer him to ophthalmology and neurology for further evaluation since ptosis was noted after his bypass surgery. Patient was instructed to go to the emergency room immediately for any facial drooping, paresthesias to the face, slurred speech, difficulty swallowing/choking, or any new onset confusion.    - Amb External Referral To Neurology    4. Essential hypertension  Blood pressure within normal limits today in the office. Continue current management    5. Chronic obstructive pulmonary disease, unspecified COPD type ([address]  No reported respiratory complaints and normal baseline respiration. Continue current medication    6. Type 2 diabetes mellitus with microalbuminuria, without long-term current use of insulin ([facility])  Most recent A1c within reasonable control. Medication was changed in the hospital.  We will continue his current medication regimen and follow long-term    7. Hyperlipidemia, unspecified hyperlipidemia type  Most recent lipid panel at goal control. Continue current medication    8. Leukocytosis, unspecified type  Will follow labwork over time as recommended by the surgeon    - CBC Auto Differential; Future      Modified Medications    Modified Medication Previous Medication    NATEGLINIDE (STARLIX) 120 MG TABLET nateglinide (STARLIX) 120 MG tablet       Take 1 tablet by mouth 3 times daily (before meals)    Take 120 mg by mouth       New Prescriptions    No medications on file       Medications Discontinued During This Encounter   Medication Reason    nateglinide (STARLIX) 120 MG tablet REORDER    glipiZIDE (GLUCOTROL) 10 MG tablet Alternate therapy       Return for keep next scheduled visit 09/20/18.     Reggie De La Cruz MD

## 2018-08-01 LAB — DIABETIC RETINOPATHY: NEGATIVE

## 2018-08-02 ENCOUNTER — OFFICE VISIT (OUTPATIENT)
Dept: FAMILY MEDICINE CLINIC | Age: 73
End: 2018-08-02
Payer: MEDICARE

## 2018-08-02 ENCOUNTER — NURSE ONLY (OUTPATIENT)
Dept: FAMILY MEDICINE CLINIC | Age: 73
End: 2018-08-02
Payer: MEDICARE

## 2018-08-02 VITALS
OXYGEN SATURATION: 95 % | HEIGHT: 71 IN | SYSTOLIC BLOOD PRESSURE: 120 MMHG | WEIGHT: 196 LBS | RESPIRATION RATE: 16 BRPM | HEART RATE: 90 BPM | BODY MASS INDEX: 27.44 KG/M2 | DIASTOLIC BLOOD PRESSURE: 78 MMHG | TEMPERATURE: 97.1 F

## 2018-08-02 DIAGNOSIS — E29.1 HYPOGONADISM MALE: Primary | ICD-10-CM

## 2018-08-02 DIAGNOSIS — T81.30XA EXTRUSION OF SUTURE, INITIAL ENCOUNTER: Primary | ICD-10-CM

## 2018-08-02 PROCEDURE — 1123F ACP DISCUSS/DSCN MKR DOCD: CPT | Performed by: NURSE PRACTITIONER

## 2018-08-02 PROCEDURE — G8427 DOCREV CUR MEDS BY ELIG CLIN: HCPCS | Performed by: NURSE PRACTITIONER

## 2018-08-02 PROCEDURE — 96372 THER/PROPH/DIAG INJ SC/IM: CPT | Performed by: FAMILY MEDICINE

## 2018-08-02 PROCEDURE — 3017F COLORECTAL CA SCREEN DOC REV: CPT | Performed by: NURSE PRACTITIONER

## 2018-08-02 PROCEDURE — 99212 OFFICE O/P EST SF 10 MIN: CPT | Performed by: NURSE PRACTITIONER

## 2018-08-02 PROCEDURE — 1101F PT FALLS ASSESS-DOCD LE1/YR: CPT | Performed by: NURSE PRACTITIONER

## 2018-08-02 PROCEDURE — G8417 CALC BMI ABV UP PARAM F/U: HCPCS | Performed by: NURSE PRACTITIONER

## 2018-08-02 PROCEDURE — 4040F PNEUMOC VAC/ADMIN/RCVD: CPT | Performed by: NURSE PRACTITIONER

## 2018-08-02 PROCEDURE — G8598 ASA/ANTIPLAT THER USED: HCPCS | Performed by: NURSE PRACTITIONER

## 2018-08-02 PROCEDURE — 1036F TOBACCO NON-USER: CPT | Performed by: NURSE PRACTITIONER

## 2018-08-02 RX ORDER — TESTOSTERONE CYPIONATE 200 MG/ML
200 INJECTION INTRAMUSCULAR ONCE
Status: COMPLETED | OUTPATIENT
Start: 2018-08-02 | End: 2018-08-02

## 2018-08-02 RX ORDER — TROPICAMIDE 10 MG/ML
SOLUTION/ DROPS OPHTHALMIC
COMMUNITY
Start: 2018-08-01 | End: 2018-08-02

## 2018-08-02 RX ORDER — FUROSEMIDE 40 MG/1
40 TABLET ORAL
COMMUNITY
Start: 2018-07-11 | End: 2018-10-25 | Stop reason: SDUPTHER

## 2018-08-02 RX ADMIN — TESTOSTERONE CYPIONATE 200 MG: 200 INJECTION INTRAMUSCULAR at 10:53

## 2018-08-02 NOTE — PROGRESS NOTES
the lungs      nateglinide (STARLIX) 120 MG tablet Take 1 tablet by mouth 3 times daily (before meals) 270 tablet 2    sildenafil (VIAGRA) 100 MG tablet TAKE ONE TABLET BY MOUTH AS NEEDED APPROXIMATELY ONE HOUR BEFORE SEXUAL ACTIVITY. DO NOT USE MORE THAN ONE DOSE DAILY 6 tablet 3    metoprolol succinate (TOPROL XL) 50 MG extended release tablet Take 3 tablets by mouth daily 90 tablet 1    Glucose Blood (BLOOD GLUCOSE TEST STRIPS) STRP Test as directed Test once daily. Dx:  E11.29 300 strip 3    ranitidine (ZANTAC) 150 MG tablet TAKE 1 TABLET BY MOUTH  NIGHTLY AS NEEDED FOR  HEARTBURN 90 tablet 1    Blood Glucose Monitoring Suppl CHENCHO Test once daily. Dx: E11.29 1 Device 0    Lancets MISC Test once daily. Dx: E11.29 100 each 3    fluticasone (FLONASE) 50 MCG/ACT nasal spray Use 2 sprays nasally daily 3 Bottle 3    triamterene-hydrochlorothiazide (MAXZIDE-25) 37.5-25 MG per tablet Take 1 tablet by mouth daily 90 tablet 3    losartan (COZAAR) 100 MG tablet Take 1 tablet by mouth daily 30 tablet 5    tamsulosin (FLOMAX) 0.4 MG capsule Take 2 capsules by mouth daily 180 capsule 3    testosterone cypionate (DEPOTESTOTERONE CYPIONATE) 200 MG/ML injection 1 ML every 2 weeks 10 mL 01    albuterol sulfate HFA (PROAIR HFA) 108 (90 Base) MCG/ACT inhaler Inhale 2 puffs into the lungs every 6 hours as needed for Wheezing 1 Inhaler 3    UNABLE TO FIND SUPER BETA PROSTATE OTC 250MG BETA-SITOSTEROL AND 400IU VIT D      tobramycin-dexamethasone (TOBRADEX) 0.3-0.1 % ophthalmic suspension       ascorbic acid (VITAMIN C) 500 MG tablet Take 1,000 mg by mouth daily      loratadine (CLARITIN) 10 MG tablet Take 1 tablet by mouth daily 30 tablet 5    vitamin D (CHOLECALCIFEROL) 1000 UNITS TABS tablet Take 1,000 Units by mouth      acetaminophen (TYLENOL EX ST ARTHRITIS PAIN) 500 MG tablet Take 1 tablet by mouth every 6 hours as needed for Pain 120 tablet 3    CINNAMON PO Take 500 mg by mouth 2 times daily.         Pulse 90   Temp 97.1 °F (36.2 °C) (Temporal)   Resp 16   Ht 5' 11\" (1.803 m)   Wt 196 lb (88.9 kg)   SpO2 95%   BMI 27.34 kg/m²     Physical Exam   Constitutional: He is oriented to person, place, and time. Vital signs are normal. He appears well-developed and well-nourished. He is active. He does not appear ill. No distress. HENT:   Head: Normocephalic and atraumatic. Eyes: Conjunctivae and lids are normal.   Neck: Normal range of motion and full passive range of motion without pain. Cardiovascular: Normal rate and regular rhythm. Pulmonary/Chest: Effort normal and breath sounds normal.       Neurological: He is alert and oriented to person, place, and time. Skin: Skin is warm and dry. He is not diaphoretic. No erythema. Psychiatric: He has a normal mood and affect. His behavior is normal.     Assessment & Plan:      Diagnosis Orders   1. Extrusion of suture, initial encounter     Return if symptoms worsen or fail to improve, for follow up with cardiology. Suture disinfected and trimmed. Patient to follow up with cardiology and instructed not to pull of suture and to keep the area clean. Reviewed with the patient: current clinical status, medications, activities and diet. Side effects, adverse effects of the medication prescribed today, as well as treatment plan/ rationale and result expectations have been discussed with the patient who expresses understanding and desires to proceed. Pt instructions reviewed and given to patient.     Close follow up to evaluate treatment results and for coordination of care. I have reviewed the patient's medical history in detail and updated the computerized patient record.     Chiqui Woodward, CHEMO - CNP

## 2018-08-02 NOTE — PROGRESS NOTES
Patient given Testosterone 200mg IM left gluteal..  Will return in 2 weeks for next injection    Patient tolerated injection well.     Administrations This Visit     testosterone cypionate (DEPOTESTOTERONE CYPIONATE) injection 200 mg     Admin Date  08/02/2018 Action  Given Dose  200 mg Route  Intramuscular Administered By  Gil Marshall

## 2018-08-09 ENCOUNTER — CARE COORDINATION (OUTPATIENT)
Dept: FAMILY MEDICINE CLINIC | Age: 73
End: 2018-08-09

## 2018-08-09 NOTE — CARE COORDINATION
Ambulatory Care Coordination Note  8/9/2018  CM Risk Score: 3  Lottie Mortality Risk Score: 12.17    ACC: Melba Sebastian, RN    Summary Note: I called to check in on Rigoberto Fonjazlyn and to discuss his recent CABG along with diabetes, and COPD. Jacob Sierra began the conversation by stating that he is extremely frustrated that he cannot not get a surgical follow up at the [facility] with Dr Clifton Graves. He states that he has been a patient at [facility] for over 27 years and he should be able to get to see his doctor as previously scheduled for his surgical follow up. I did allow him to vent. He denies any issues with chest pain, shortness of breath, dizziness, or palpitations. He states that the incisions on his legs are good and without redness or drainage. He also tells me that he is no longer having any issues with the sutures coming out since he was in the walk in clinic. He says that he would like to get into cardiac rehab but he has to be cleared by Dr. Clifton Graves. He tells me that he had his wife Isabel Vasquez fax it over to his office from the cardiac rehab dept at [facility]. He does tell me that he is \"so tired\" from time to time. I did explain that he may have this symptom intermittent over the next year. He tells me that he is eating and drinking well. He does go out to [employer] his lawn on the riding lawnmower. Overall, he feels that he is doing well. Diabetes Assessment    Medic Alert ID:  No  Meal Planning:  Avoidance of concentrated sweets, Calorie counting, Carb counting   How often do you test your blood sugar?:  Daily   Do you have barriers with adherence to non-pharmacologic self-management interventions?  (Nutrition/Exercise/Self-Monitoring):  No   Have you ever had to go to the ED for symptoms of low blood sugar?:  No           and   COPD Assessment    Does the patient understand envrionmental exposure?:  Yes  Is the patient able to verbalize Rescue vs. Long Acting medications?:  Yes  Does the patient have a nebulizer?: No  Does the patient use a space with inhaled medications?:  No            Symptoms:               Care Coordination Interventions    Program Enrollment:  Rising Risk  Referral from Primary Care Provider:  No  Suggested Interventions and Community Resources         Goals Addressed             Most Recent     Self Monitoring   On track (8/9/2018)             Self-Monitored Blood Glucose - I will check my blood sugar Fasting blood sugar  I will notify my provider of any trends of increasing or decreasing blood sugars over a 1 month period. Blood Pressure - I will take my blood pressure as directed - Daily  I will notify my provider of any trends of increasing or decreasing blood pressures over a month period of time. Patient Reported Blood Pressure No flowsheet data found. Barriers: none  Plan for overcoming my barriers: N/A  Confidence: 10/10  Anticipated Goal Completion Date: 7/31/2017              Prior to Admission medications    Medication Sig Start Date End Date Taking? Authorizing Provider   furosemide (LASIX) 40 MG tablet Take 40 mg by mouth 7/11/18   Historical Provider, MD   fluticasone-vilanterol (BREO ELLIPTA) 100-25 MCG/INH AEPB inhaler Inhale into the lungs 7/11/18   Historical Provider, MD   nateglinide (STARLIX) 120 MG tablet Take 1 tablet by mouth 3 times daily (before meals) 7/30/18   Jessica Aguilar MD   sildenafil (VIAGRA) 100 MG tablet TAKE ONE TABLET BY MOUTH AS NEEDED APPROXIMATELY ONE HOUR BEFORE SEXUAL ACTIVITY. DO NOT USE MORE THAN ONE DOSE DAILY 6/21/18   Jessica Aguilar MD   metoprolol succinate (TOPROL XL) 50 MG extended release tablet Take 3 tablets by mouth daily 6/20/18   Jessica Aguilar MD   Glucose Blood (BLOOD GLUCOSE TEST STRIPS) STRP Test as directed Test once daily.  Dx:  E11.29 6/20/18   Jessica Aguilar MD   ranitidine (ZANTAC) 150 MG tablet TAKE 1 TABLET BY MOUTH  NIGHTLY AS NEEDED FOR  HEARTBURN 5/7/18   Jessica Aguilar MD   SITagliptin (JANUVIA) 100 MG tablet Take 0.5 tablets by mouth 2 times daily 3/20/18   Sarah Goyal MD   Blood Glucose Monitoring Suppl CHENCHO Test once daily. Dx: E11.29 3/20/18   Sarah Goyal MD   Lancets MISC Test once daily. Dx: E11.29 3/8/18   Sarah Goyal MD   fluticasone [facility]) 50 MCG/ACT nasal spray Use 2 sprays nasally daily 2/20/18   Sarah Goyal MD   triamterene-hydrochlorothiazide [facility]) 37.5-25 MG per tablet Take 1 tablet by mouth daily 2/13/18   Sarah Goyal MD   losartan (COZAAR) 100 MG tablet Take 1 tablet by mouth daily 2/7/18   Sarah Goyal MD   tamsulosin [facility]) 0.4 MG capsule Take 2 capsules by mouth daily 11/20/17   Sarah Goyal MD   testosterone cypionate (DEPOTESTOTERONE CYPIONATE) 200 MG/ML injection 1 ML every 2 weeks 10/3/17   Jeremy Thornton MD   albuterol sulfate HFA (PROAIR HFA) 108 (90 Base) MCG/ACT inhaler Inhale 2 puffs into the lungs every 6 hours as needed for Wheezing 9/11/17   Sarah Goyal MD   UNABLE TO FIND SUPER BETA PROSTATE OTC 250MG BETA-SITOSTEROL AND 400IU VIT D    Historical Provider, MD   tobramycin-dexamethasone (TOBRADEX) 0.3-0.1 % ophthalmic suspension  6/13/17   Historical Provider, MD   ascorbic acid (VITAMIN C) 500 MG tablet Take 1,000 mg by mouth daily    Historical Provider, MD   loratadine (CLARITIN) 10 MG tablet Take 1 tablet by mouth daily 11/22/16   Sarah Goyal MD   vitamin D (CHOLECALCIFEROL) 1000 UNITS TABS tablet Take 1,000 Units by mouth    Historical Provider, MD   acetaminophen (TYLENOL EX ST ARTHRITIS PAIN) 500 MG tablet Take 1 tablet by mouth every 6 hours as needed for Pain 8/24/15   Flora Wynne MD   CINNAMON PO Take 500 mg by mouth 2 times daily. Historical Provider, MD   aspirin 81 MG EC tablet Take 81 mg by mouth daily.       Historical Provider, MD       Future Appointments  Date Time Provider Kay Bartlett   8/16/2018 10:10 AM SCHEDULE, GERBER COBB PCP TESTOSTERONE MLOX [facility]   8/30/2018 10:00 AM SCHEDULE, GERBER MURRAY AMHERST PCP TESTOSTERONE MLOX [facility]   9/13/2018 10:00 AM SCHEDULE, GERBER MURRAY AMHERST PCP TESTOSTERONE MLOX [facility]   9/20/2018 9:20 AM MD [place] [address]

## 2018-08-10 ENCOUNTER — TELEPHONE (OUTPATIENT)
Dept: FAMILY MEDICINE CLINIC | Age: 73
End: 2018-08-10

## 2018-08-10 NOTE — TELEPHONE ENCOUNTER
Dr. Wil Henson asked me to call Dr. Evelina Mooney office to see if he can get a closer visit and when can he start cardiac rehab services. I called Dr. Evelina Monoey office @ [phone] and the  reports that patient was seen by Dr. Angelica Zacarias today.     ELY

## 2018-08-16 ENCOUNTER — HOSPITAL ENCOUNTER (OUTPATIENT)
Dept: CARDIAC REHAB | Age: 73
Setting detail: THERAPIES SERIES
Discharge: HOME | End: 2018-08-16
Payer: MEDICARE

## 2018-08-16 ENCOUNTER — NURSE ONLY (OUTPATIENT)
Dept: FAMILY MEDICINE CLINIC | Age: 73
End: 2018-08-16
Payer: MEDICARE

## 2018-08-16 DIAGNOSIS — E29.1 HYPOGONADISM MALE: Primary | ICD-10-CM

## 2018-08-16 DIAGNOSIS — E78.2 MIXED HYPERLIPIDEMIA: Primary | Chronic | ICD-10-CM

## 2018-08-16 PROCEDURE — 93798 PHYS/QHP OP CAR RHAB W/ECG: CPT

## 2018-08-16 PROCEDURE — 96372 THER/PROPH/DIAG INJ SC/IM: CPT | Performed by: FAMILY MEDICINE

## 2018-08-16 RX ORDER — ROSUVASTATIN CALCIUM 20 MG/1
20 TABLET, COATED ORAL NIGHTLY
Qty: 90 TABLET | Refills: 3 | Status: SHIPPED | OUTPATIENT
Start: 2018-08-16 | End: 2018-10-25 | Stop reason: SDUPTHER

## 2018-08-16 RX ORDER — NITROGLYCERIN 0.4 MG/1
0.4 TABLET SUBLINGUAL
COMMUNITY
Start: 2018-08-10 | End: 2018-10-25 | Stop reason: SDUPTHER

## 2018-08-16 RX ORDER — TESTOSTERONE CYPIONATE 200 MG/ML
200 INJECTION INTRAMUSCULAR ONCE
Status: COMPLETED | OUTPATIENT
Start: 2018-08-16 | End: 2018-08-16

## 2018-08-16 RX ADMIN — TESTOSTERONE CYPIONATE 200 MG: 200 INJECTION INTRAMUSCULAR at 10:34

## 2018-08-16 NOTE — TELEPHONE ENCOUNTER
Received a request from [facility] fro a 90 day supply for rosuvastatin for patient. It does not appear that patient has had this medication filled through us since 08/28/2017. I called to verify with patient and he said he had open heart surgery 07/2018 @ [facility] [place] and was put on this medication again. He currently sees Dr. Launie Peabody through the [facility]. I suggested patient call him to discuss getting this refilled. Please review and advise.

## 2018-08-21 ENCOUNTER — HOSPITAL ENCOUNTER (OUTPATIENT)
Dept: CARDIAC REHAB | Age: 73
Setting detail: THERAPIES SERIES
Discharge: HOME | End: 2018-08-21
Payer: MEDICARE

## 2018-08-21 PROCEDURE — 93798 PHYS/QHP OP CAR RHAB W/ECG: CPT

## 2018-08-22 ASSESSMENT — ENCOUNTER SYMPTOMS
WHEEZING: 0
CHEST TIGHTNESS: 0
SHORTNESS OF BREATH: 0
COUGH: 0

## 2018-08-23 ENCOUNTER — HOSPITAL ENCOUNTER (OUTPATIENT)
Dept: CARDIAC REHAB | Age: 73
Setting detail: THERAPIES SERIES
Discharge: HOME | End: 2018-08-23
Payer: MEDICARE

## 2018-08-23 ENCOUNTER — CARE COORDINATION (OUTPATIENT)
Dept: FAMILY MEDICINE CLINIC | Age: 73
End: 2018-08-23

## 2018-08-23 PROCEDURE — 93798 PHYS/QHP OP CAR RHAB W/ECG: CPT

## 2018-08-28 ENCOUNTER — HOSPITAL ENCOUNTER (OUTPATIENT)
Dept: CARDIAC REHAB | Age: 73
Setting detail: THERAPIES SERIES
Discharge: HOME | End: 2018-08-28
Payer: MEDICARE

## 2018-08-28 PROBLEM — G70.00 OCULAR MYASTHENIA GRAVIS (HCC): Chronic | Status: ACTIVE | Noted: 2018-08-28

## 2018-08-28 PROCEDURE — 93798 PHYS/QHP OP CAR RHAB W/ECG: CPT

## 2018-08-30 ENCOUNTER — APPOINTMENT (OUTPATIENT)
Dept: CARDIAC REHAB | Age: 73
End: 2018-08-30
Payer: MEDICARE

## 2018-08-30 ENCOUNTER — NURSE ONLY (OUTPATIENT)
Dept: FAMILY MEDICINE CLINIC | Age: 73
End: 2018-08-30
Payer: MEDICARE

## 2018-08-30 DIAGNOSIS — E29.1 HYPOGONADISM MALE: Primary | ICD-10-CM

## 2018-08-30 PROCEDURE — 96372 THER/PROPH/DIAG INJ SC/IM: CPT | Performed by: FAMILY MEDICINE

## 2018-08-30 RX ORDER — TESTOSTERONE CYPIONATE 200 MG/ML
200 INJECTION INTRAMUSCULAR ONCE
Status: COMPLETED | OUTPATIENT
Start: 2018-08-30 | End: 2018-08-30

## 2018-08-30 RX ADMIN — TESTOSTERONE CYPIONATE 200 MG: 200 INJECTION INTRAMUSCULAR at 11:05

## 2018-08-30 NOTE — PROGRESS NOTES
Patient given Testosterone 200mg IM lrft gluteal..  Will return in 2 weeks for next injection    Patient tolerated injection well.     Administrations This Visit     testosterone cypionate (DEPOTESTOTERONE CYPIONATE) injection 200 mg     Admin Date  08/30/2018 Action  Given Dose  200 mg Route  Intramuscular Administered By  Brittany Wilson

## 2018-09-04 ENCOUNTER — HOSPITAL ENCOUNTER (OUTPATIENT)
Dept: CARDIAC REHAB | Age: 73
Setting detail: THERAPIES SERIES
Discharge: HOME | End: 2018-09-04
Payer: MEDICARE

## 2018-09-04 PROCEDURE — 93798 PHYS/QHP OP CAR RHAB W/ECG: CPT

## 2018-09-06 ENCOUNTER — HOSPITAL ENCOUNTER (OUTPATIENT)
Dept: CARDIAC REHAB | Age: 73
Setting detail: THERAPIES SERIES
Discharge: HOME | End: 2018-09-06
Payer: MEDICARE

## 2018-09-06 PROCEDURE — 93798 PHYS/QHP OP CAR RHAB W/ECG: CPT

## 2018-09-11 ENCOUNTER — HOSPITAL ENCOUNTER (OUTPATIENT)
Dept: CARDIAC REHAB | Age: 73
Setting detail: THERAPIES SERIES
Discharge: HOME | End: 2018-09-11
Payer: MEDICARE

## 2018-09-11 PROCEDURE — 93798 PHYS/QHP OP CAR RHAB W/ECG: CPT

## 2018-09-13 ENCOUNTER — NURSE ONLY (OUTPATIENT)
Dept: FAMILY MEDICINE CLINIC | Age: 73
End: 2018-09-13
Payer: MEDICARE

## 2018-09-13 ENCOUNTER — HOSPITAL ENCOUNTER (OUTPATIENT)
Dept: CARDIAC REHAB | Age: 73
Setting detail: THERAPIES SERIES
Discharge: HOME | End: 2018-09-13
Payer: MEDICARE

## 2018-09-13 DIAGNOSIS — E29.1 HYPOGONADISM MALE: Primary | ICD-10-CM

## 2018-09-13 PROCEDURE — 96372 THER/PROPH/DIAG INJ SC/IM: CPT | Performed by: FAMILY MEDICINE

## 2018-09-13 PROCEDURE — 93798 PHYS/QHP OP CAR RHAB W/ECG: CPT

## 2018-09-13 RX ORDER — TESTOSTERONE CYPIONATE 200 MG/ML
200 INJECTION INTRAMUSCULAR ONCE
Status: COMPLETED | OUTPATIENT
Start: 2018-09-13 | End: 2018-09-13

## 2018-09-13 RX ADMIN — TESTOSTERONE CYPIONATE 200 MG: 200 INJECTION INTRAMUSCULAR at 11:02

## 2018-09-20 ENCOUNTER — TELEPHONE (OUTPATIENT)
Dept: FAMILY MEDICINE CLINIC | Age: 73
End: 2018-09-20

## 2018-09-20 ENCOUNTER — OFFICE VISIT (OUTPATIENT)
Dept: FAMILY MEDICINE CLINIC | Age: 73
End: 2018-09-20
Payer: MEDICARE

## 2018-09-20 VITALS
DIASTOLIC BLOOD PRESSURE: 86 MMHG | TEMPERATURE: 97.5 F | BODY MASS INDEX: 27.16 KG/M2 | SYSTOLIC BLOOD PRESSURE: 152 MMHG | HEART RATE: 86 BPM | HEIGHT: 71 IN | RESPIRATION RATE: 14 BRPM | WEIGHT: 194 LBS

## 2018-09-20 DIAGNOSIS — R80.9 TYPE 2 DIABETES MELLITUS WITH MICROALBUMINURIA, WITHOUT LONG-TERM CURRENT USE OF INSULIN (HCC): Primary | Chronic | ICD-10-CM

## 2018-09-20 DIAGNOSIS — Z23 NEED FOR VACCINATION: ICD-10-CM

## 2018-09-20 DIAGNOSIS — G70.01 MYASTHENIA GRAVIS WITH EXACERBATION (HCC): ICD-10-CM

## 2018-09-20 DIAGNOSIS — E78.2 MIXED HYPERLIPIDEMIA: Chronic | ICD-10-CM

## 2018-09-20 DIAGNOSIS — I25.10 CAD IN NATIVE ARTERY: Chronic | ICD-10-CM

## 2018-09-20 DIAGNOSIS — E11.29 TYPE 2 DIABETES MELLITUS WITH MICROALBUMINURIA, WITHOUT LONG-TERM CURRENT USE OF INSULIN (HCC): Primary | Chronic | ICD-10-CM

## 2018-09-20 DIAGNOSIS — I10 ESSENTIAL HYPERTENSION: Chronic | ICD-10-CM

## 2018-09-20 DIAGNOSIS — K76.0 FATTY INFILTRATION OF LIVER: Chronic | ICD-10-CM

## 2018-09-20 DIAGNOSIS — J44.9 CHRONIC OBSTRUCTIVE PULMONARY DISEASE, UNSPECIFIED COPD TYPE (HCC): Chronic | ICD-10-CM

## 2018-09-20 DIAGNOSIS — Z95.1 STATUS POST CORONARY ARTERY BYPASS GRAFTS X 5: Chronic | ICD-10-CM

## 2018-09-20 PROCEDURE — 99214 OFFICE O/P EST MOD 30 MIN: CPT | Performed by: FAMILY MEDICINE

## 2018-09-20 PROCEDURE — 3045F PR MOST RECENT HEMOGLOBIN A1C LEVEL 7.0-9.0%: CPT | Performed by: FAMILY MEDICINE

## 2018-09-20 PROCEDURE — G8417 CALC BMI ABV UP PARAM F/U: HCPCS | Performed by: FAMILY MEDICINE

## 2018-09-20 PROCEDURE — 3017F COLORECTAL CA SCREEN DOC REV: CPT | Performed by: FAMILY MEDICINE

## 2018-09-20 PROCEDURE — G8427 DOCREV CUR MEDS BY ELIG CLIN: HCPCS | Performed by: FAMILY MEDICINE

## 2018-09-20 PROCEDURE — G8598 ASA/ANTIPLAT THER USED: HCPCS | Performed by: FAMILY MEDICINE

## 2018-09-20 PROCEDURE — 4040F PNEUMOC VAC/ADMIN/RCVD: CPT | Performed by: FAMILY MEDICINE

## 2018-09-20 PROCEDURE — G8510 SCR DEP NEG, NO PLAN REQD: HCPCS | Performed by: FAMILY MEDICINE

## 2018-09-20 PROCEDURE — 1101F PT FALLS ASSESS-DOCD LE1/YR: CPT | Performed by: FAMILY MEDICINE

## 2018-09-20 PROCEDURE — 2022F DILAT RTA XM EVC RTNOPTHY: CPT | Performed by: FAMILY MEDICINE

## 2018-09-20 PROCEDURE — 1123F ACP DISCUSS/DSCN MKR DOCD: CPT | Performed by: FAMILY MEDICINE

## 2018-09-20 PROCEDURE — 1036F TOBACCO NON-USER: CPT | Performed by: FAMILY MEDICINE

## 2018-09-20 PROCEDURE — G8926 SPIRO NO PERF OR DOC: HCPCS | Performed by: FAMILY MEDICINE

## 2018-09-20 PROCEDURE — 3023F SPIROM DOC REV: CPT | Performed by: FAMILY MEDICINE

## 2018-09-20 RX ORDER — PREDNISONE 20 MG/1
20 TABLET ORAL
COMMUNITY
Start: 2018-09-06 | End: 2019-03-20 | Stop reason: ALTCHOICE

## 2018-09-20 RX ORDER — LOSARTAN POTASSIUM 100 MG/1
100 TABLET ORAL DAILY
Qty: 90 TABLET | Refills: 3 | Status: SHIPPED | OUTPATIENT
Start: 2018-09-20 | End: 2018-10-25 | Stop reason: SDUPTHER

## 2018-09-20 RX ORDER — PYRIDOSTIGMINE BROMIDE 60 MG/1
60 TABLET ORAL
COMMUNITY
Start: 2018-09-06 | End: 2021-05-17

## 2018-09-20 RX ORDER — GLUCOSAMINE HCL/CHONDROITIN SU 500-400 MG
CAPSULE ORAL
Qty: 300 STRIP | Refills: 3 | Status: SHIPPED | OUTPATIENT
Start: 2018-09-20 | End: 2019-06-18 | Stop reason: SDUPTHER

## 2018-09-20 ASSESSMENT — PATIENT HEALTH QUESTIONNAIRE - PHQ9
2. FEELING DOWN, DEPRESSED OR HOPELESS: 0
SUM OF ALL RESPONSES TO PHQ9 QUESTIONS 1 & 2: 0
SUM OF ALL RESPONSES TO PHQ QUESTIONS 1-9: 0
1. LITTLE INTEREST OR PLEASURE IN DOING THINGS: 0
SUM OF ALL RESPONSES TO PHQ QUESTIONS 1-9: 0

## 2018-09-20 ASSESSMENT — ENCOUNTER SYMPTOMS
CHEST TIGHTNESS: 0
ABDOMINAL PAIN: 0
VOMITING: 0
DIARRHEA: 0
WHEEZING: 0
COUGH: 0
NAUSEA: 0
SHORTNESS OF BREATH: 0

## 2018-09-25 ENCOUNTER — HOSPITAL ENCOUNTER (OUTPATIENT)
Dept: CARDIAC REHAB | Age: 73
Setting detail: THERAPIES SERIES
Discharge: HOME | End: 2018-09-25
Payer: MEDICARE

## 2018-09-25 PROCEDURE — 93798 PHYS/QHP OP CAR RHAB W/ECG: CPT

## 2018-09-27 ENCOUNTER — NURSE ONLY (OUTPATIENT)
Dept: FAMILY MEDICINE CLINIC | Age: 73
End: 2018-09-27
Payer: MEDICARE

## 2018-09-27 ENCOUNTER — APPOINTMENT (OUTPATIENT)
Dept: CARDIAC REHAB | Age: 73
End: 2018-09-27
Payer: MEDICARE

## 2018-09-27 ENCOUNTER — HOSPITAL ENCOUNTER (OUTPATIENT)
Dept: CARDIAC REHAB | Age: 73
Setting detail: THERAPIES SERIES
Discharge: HOME | End: 2018-09-27
Payer: MEDICARE

## 2018-09-27 DIAGNOSIS — E29.1 HYPOGONADISM MALE: Primary | ICD-10-CM

## 2018-09-27 PROCEDURE — 96372 THER/PROPH/DIAG INJ SC/IM: CPT | Performed by: FAMILY MEDICINE

## 2018-09-27 PROCEDURE — 93798 PHYS/QHP OP CAR RHAB W/ECG: CPT

## 2018-09-27 RX ORDER — TESTOSTERONE CYPIONATE 200 MG/ML
200 INJECTION INTRAMUSCULAR ONCE
Status: COMPLETED | OUTPATIENT
Start: 2018-09-27 | End: 2018-09-27

## 2018-09-27 RX ADMIN — TESTOSTERONE CYPIONATE 200 MG: 200 INJECTION INTRAMUSCULAR at 12:42

## 2018-09-27 NOTE — PROGRESS NOTES
Patient given Testosterone 200mg IM left gluteal..  Will return in 2 weeks for next injection    Patient tolerated injection well.     Administrations This Visit     testosterone cypionate (DEPOTESTOTERONE CYPIONATE) injection 200 mg     Admin Date  09/27/2018 Action  Given Dose  200 mg Route  Intramuscular Administered By  Maldonado Gonzalez

## 2018-10-01 ENCOUNTER — APPOINTMENT (OUTPATIENT)
Dept: CARDIAC REHAB | Age: 73
End: 2018-10-01
Payer: MEDICARE

## 2018-10-04 ENCOUNTER — OFFICE VISIT (OUTPATIENT)
Dept: FAMILY MEDICINE CLINIC | Age: 73
End: 2018-10-04
Payer: MEDICARE

## 2018-10-04 ENCOUNTER — APPOINTMENT (OUTPATIENT)
Dept: CARDIAC REHAB | Age: 73
End: 2018-10-04
Payer: MEDICARE

## 2018-10-04 ENCOUNTER — HOSPITAL ENCOUNTER (OUTPATIENT)
Dept: GENERAL RADIOLOGY | Age: 73
Discharge: HOME | End: 2018-10-06
Payer: MEDICARE

## 2018-10-04 ENCOUNTER — HOSPITAL ENCOUNTER (OUTPATIENT)
Dept: CARDIAC REHAB | Age: 73
Setting detail: THERAPIES SERIES
Discharge: HOME | End: 2018-10-04
Payer: MEDICARE

## 2018-10-04 ENCOUNTER — HOSPITAL ENCOUNTER (OUTPATIENT)
Age: 73
Discharge: HOME | End: 2018-10-06
Payer: MEDICARE

## 2018-10-04 VITALS
OXYGEN SATURATION: 98 % | HEIGHT: 71 IN | BODY MASS INDEX: 27.3 KG/M2 | DIASTOLIC BLOOD PRESSURE: 77 MMHG | SYSTOLIC BLOOD PRESSURE: 128 MMHG | HEART RATE: 70 BPM | RESPIRATION RATE: 18 BRPM | WEIGHT: 195 LBS | TEMPERATURE: 98 F

## 2018-10-04 DIAGNOSIS — S99.922A FOOT INJURY, LEFT, INITIAL ENCOUNTER: Primary | ICD-10-CM

## 2018-10-04 DIAGNOSIS — S99.922A FOOT INJURY, LEFT, INITIAL ENCOUNTER: ICD-10-CM

## 2018-10-04 PROCEDURE — 73630 X-RAY EXAM OF FOOT: CPT

## 2018-10-04 PROCEDURE — G8598 ASA/ANTIPLAT THER USED: HCPCS | Performed by: NURSE PRACTITIONER

## 2018-10-04 PROCEDURE — G8417 CALC BMI ABV UP PARAM F/U: HCPCS | Performed by: NURSE PRACTITIONER

## 2018-10-04 PROCEDURE — 1123F ACP DISCUSS/DSCN MKR DOCD: CPT | Performed by: NURSE PRACTITIONER

## 2018-10-04 PROCEDURE — 4040F PNEUMOC VAC/ADMIN/RCVD: CPT | Performed by: NURSE PRACTITIONER

## 2018-10-04 PROCEDURE — G8427 DOCREV CUR MEDS BY ELIG CLIN: HCPCS | Performed by: NURSE PRACTITIONER

## 2018-10-04 PROCEDURE — 1101F PT FALLS ASSESS-DOCD LE1/YR: CPT | Performed by: NURSE PRACTITIONER

## 2018-10-04 PROCEDURE — 93798 PHYS/QHP OP CAR RHAB W/ECG: CPT

## 2018-10-04 PROCEDURE — G8484 FLU IMMUNIZE NO ADMIN: HCPCS | Performed by: NURSE PRACTITIONER

## 2018-10-04 PROCEDURE — 99213 OFFICE O/P EST LOW 20 MIN: CPT | Performed by: NURSE PRACTITIONER

## 2018-10-04 PROCEDURE — 3017F COLORECTAL CA SCREEN DOC REV: CPT | Performed by: NURSE PRACTITIONER

## 2018-10-04 PROCEDURE — 1036F TOBACCO NON-USER: CPT | Performed by: NURSE PRACTITIONER

## 2018-10-04 NOTE — PROGRESS NOTES
D      ascorbic acid (VITAMIN C) 500 MG tablet Take 1,000 mg by mouth daily      loratadine (CLARITIN) 10 MG tablet Take 1 tablet by mouth daily 30 tablet 5    vitamin D (CHOLECALCIFEROL) 1000 UNITS TABS tablet Take 1,000 Units by mouth      acetaminophen (TYLENOL EX ST ARTHRITIS PAIN) 500 MG tablet Take 1 tablet by mouth every 6 hours as needed for Pain 120 tablet 3    CINNAMON PO Take 500 mg by mouth 2 times daily.  aspirin 81 MG EC tablet Take 81 mg by mouth daily. No current facility-administered medications on file prior to visit.       Past Surgical History:   Procedure Laterality Date    ANGIOPLASTY      CARDIAC CATHETERIZATION      #17    CATARACT REMOVAL WITH IMPLANT Left 1992    CATARACT REMOVAL WITH IMPLANT Right 1992    COLONOSCOPY  2009    tubular adenomas    COLONOSCOPY  2013    diverticulosis, polyps, 5y repeat (DR NAVAS)    CORONARY ARTERY BYPASS GRAFT  07/02/2018    x5 ([facility] @ [facility])    MOHS SURGERY Left 09/2017    melanoma of left ear (DR GODWIN)    SKIN CANCER EXCISION Left 08/2017    TONSILLECTOMY  1955    UPPER GASTROINTESTINAL ENDOSCOPY      duodenitis/ poss early signs of celiac disease     Family History   Problem Relation Age of Onset    Heart Disease Mother     Heart Disease Brother     Diabetes Maternal Grandfather      Social History     Social History    Marital status:      Spouse name: Veronica Moss Number of children: 2    Years of education: 12+     Occupational History    Farming      Social History Main Topics    Smoking status: Former Smoker     Packs/day: 1.00     Years: 41.00     Types: Cigarettes     Start date: 1961     Quit date: 9/28/2002    Smokeless tobacco: Never Used    Alcohol use 0.0 oz/week      Comment: drinks one beer at night occassionally    Drug use: No    Sexual activity: Not Currently     Partners: Female     Other Topics Concern    Not on file     Social History Narrative    No narrative on file

## 2018-10-08 ENCOUNTER — APPOINTMENT (OUTPATIENT)
Dept: CARDIAC REHAB | Age: 73
End: 2018-10-08
Payer: MEDICARE

## 2018-10-09 ENCOUNTER — HOSPITAL ENCOUNTER (OUTPATIENT)
Dept: CARDIAC REHAB | Age: 73
Setting detail: THERAPIES SERIES
Discharge: HOME | End: 2018-10-09
Payer: MEDICARE

## 2018-10-09 PROCEDURE — 93798 PHYS/QHP OP CAR RHAB W/ECG: CPT

## 2018-10-11 ENCOUNTER — NURSE ONLY (OUTPATIENT)
Dept: FAMILY MEDICINE CLINIC | Age: 73
End: 2018-10-11
Payer: MEDICARE

## 2018-10-11 ENCOUNTER — APPOINTMENT (OUTPATIENT)
Dept: CARDIAC REHAB | Age: 73
End: 2018-10-11
Payer: MEDICARE

## 2018-10-11 ENCOUNTER — HOSPITAL ENCOUNTER (OUTPATIENT)
Dept: CARDIAC REHAB | Age: 73
Setting detail: THERAPIES SERIES
Discharge: HOME | End: 2018-10-11
Payer: MEDICARE

## 2018-10-11 DIAGNOSIS — E29.1 HYPOGONADISM MALE: Primary | ICD-10-CM

## 2018-10-11 PROCEDURE — 93798 PHYS/QHP OP CAR RHAB W/ECG: CPT

## 2018-10-11 PROCEDURE — 96372 THER/PROPH/DIAG INJ SC/IM: CPT | Performed by: FAMILY MEDICINE

## 2018-10-11 RX ORDER — TESTOSTERONE CYPIONATE 200 MG/ML
200 INJECTION INTRAMUSCULAR ONCE
Status: COMPLETED | OUTPATIENT
Start: 2018-10-11 | End: 2018-10-11

## 2018-10-11 RX ADMIN — TESTOSTERONE CYPIONATE 200 MG: 200 INJECTION INTRAMUSCULAR at 11:42

## 2018-10-15 ENCOUNTER — APPOINTMENT (OUTPATIENT)
Dept: CARDIAC REHAB | Age: 73
End: 2018-10-15
Payer: MEDICARE

## 2018-10-16 ENCOUNTER — HOSPITAL ENCOUNTER (OUTPATIENT)
Dept: CARDIAC REHAB | Age: 73
Setting detail: THERAPIES SERIES
Discharge: HOME | End: 2018-10-16
Payer: MEDICARE

## 2018-10-16 ENCOUNTER — CARE COORDINATION (OUTPATIENT)
Dept: FAMILY MEDICINE CLINIC | Age: 73
End: 2018-10-16

## 2018-10-16 PROCEDURE — 93798 PHYS/QHP OP CAR RHAB W/ECG: CPT

## 2018-10-18 ENCOUNTER — APPOINTMENT (OUTPATIENT)
Dept: CARDIAC REHAB | Age: 73
End: 2018-10-18
Payer: MEDICARE

## 2018-10-22 ENCOUNTER — APPOINTMENT (OUTPATIENT)
Dept: CARDIAC REHAB | Age: 73
End: 2018-10-22
Payer: MEDICARE

## 2018-10-23 ENCOUNTER — HOSPITAL ENCOUNTER (OUTPATIENT)
Dept: CARDIAC REHAB | Age: 73
Setting detail: THERAPIES SERIES
Discharge: HOME | End: 2018-10-23
Payer: MEDICARE

## 2018-10-23 PROCEDURE — 93798 PHYS/QHP OP CAR RHAB W/ECG: CPT

## 2018-10-23 NOTE — CARE COORDINATION
Ambulatory Care Coordination Note  10/23/2018  CM Risk Score: 6  Lottie Mortality Risk Score: 12.17    ACC: Leann Morton RN    Summary Note: Dedrick Diez returned my call. He says that he is having a rough time. He tells me that he has been diagnosed with myasthenia gravis which was brought on by the stress of his OHS. He says that he has had issues with weakness and fatigue along with right eye drooping. 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 He says that he has been to a few different doctors and he was finally diagnosed at [facility]. He tells me that he is trying to learn as much as he can about this disease and how to best manage the symptoms. He says that he is doing well with his diabetes and is not having issues with hyper or hypoglycemia. He states that his heart is in good shape. He is participating in cardiac rehab. He denies any chest pain, discomfort, or shortness of breath. We spoke at length about the cost of health care including the cost of his medications. I will reach out to our community health worker Tha Jeffries to assist with medication cost.        Diabetes Assessment    Medic Alert ID:  No  Meal Planning:  Avoidance of concentrated sweets, Calorie counting, Carb counting   How often do you test your blood sugar?:  Daily   Do you have barriers with adherence to non-pharmacologic self-management interventions?  (Nutrition/Exercise/Self-Monitoring):  No   Have you ever had to go to the ED for symptoms of low blood sugar?:  No           and   COPD Assessment    Does the patient understand envrionmental exposure?:  Yes  Is the patient able to verbalize Rescue vs. Long Acting medications?:  Yes  Does the patient have a nebulizer?:  No  Does the patient use a space with inhaled medications?:  No            Symptoms:               Care Coordination Interventions    Program Enrollment:  Rising Risk  Referral from

## 2018-10-24 ENCOUNTER — CARE COORDINATION (OUTPATIENT)
Dept: CARE COORDINATION | Age: 73
End: 2018-10-24

## 2018-10-24 DIAGNOSIS — K21.9 GASTROESOPHAGEAL REFLUX DISEASE, ESOPHAGITIS PRESENCE NOT SPECIFIED: Chronic | ICD-10-CM

## 2018-10-24 DIAGNOSIS — I10 ESSENTIAL HYPERTENSION: Chronic | ICD-10-CM

## 2018-10-24 DIAGNOSIS — E11.29 TYPE 2 DIABETES MELLITUS WITH MICROALBUMINURIA, WITHOUT LONG-TERM CURRENT USE OF INSULIN (HCC): Chronic | ICD-10-CM

## 2018-10-24 DIAGNOSIS — E78.2 MIXED HYPERLIPIDEMIA: Chronic | ICD-10-CM

## 2018-10-24 DIAGNOSIS — G70.01 MYASTHENIA GRAVIS WITH EXACERBATION (HCC): Primary | ICD-10-CM

## 2018-10-24 DIAGNOSIS — R80.9 TYPE 2 DIABETES MELLITUS WITH MICROALBUMINURIA, WITHOUT LONG-TERM CURRENT USE OF INSULIN (HCC): Chronic | ICD-10-CM

## 2018-10-24 DIAGNOSIS — I25.10 CAD IN NATIVE ARTERY: Chronic | ICD-10-CM

## 2018-10-24 DIAGNOSIS — N40.1 BENIGN PROSTATIC HYPERPLASIA WITH NOCTURIA: Chronic | ICD-10-CM

## 2018-10-24 DIAGNOSIS — R35.1 BENIGN PROSTATIC HYPERPLASIA WITH NOCTURIA: Chronic | ICD-10-CM

## 2018-10-24 DIAGNOSIS — J44.9 CHRONIC OBSTRUCTIVE PULMONARY DISEASE, UNSPECIFIED COPD TYPE (HCC): Chronic | ICD-10-CM

## 2018-10-24 NOTE — CARE COORDINATION
Placed call to Kay Bustillo (Drug Repository).  The following medications are available:     Metoprolol Succinate (Toprol XL)   Pyridostigmine (Mestinon)   Prednisone (Deltasone)   Losartan (Cozaar)   Ranitidine (Zantac)   Nitroglycerin (Nitrostat)   Rosuvastatin (Crestor)   Furosemide (Lasix)   Nateglinide (Starlix)   Fluticasone (Flonase)   Triamterence-hydrochlorothiazide (Maxzide-25)   Tamsulosin (Flomax)

## 2018-10-25 ENCOUNTER — NURSE ONLY (OUTPATIENT)
Dept: FAMILY MEDICINE CLINIC | Age: 73
End: 2018-10-25
Payer: MEDICARE

## 2018-10-25 ENCOUNTER — HOSPITAL ENCOUNTER (OUTPATIENT)
Dept: CARDIAC REHAB | Age: 73
Setting detail: THERAPIES SERIES
Discharge: HOME | End: 2018-10-25
Payer: MEDICARE

## 2018-10-25 ENCOUNTER — APPOINTMENT (OUTPATIENT)
Dept: CARDIAC REHAB | Age: 73
End: 2018-10-25
Payer: MEDICARE

## 2018-10-25 DIAGNOSIS — E29.1 HYPOGONADISM MALE: Primary | ICD-10-CM

## 2018-10-25 PROCEDURE — 96372 THER/PROPH/DIAG INJ SC/IM: CPT | Performed by: FAMILY MEDICINE

## 2018-10-25 PROCEDURE — 93798 PHYS/QHP OP CAR RHAB W/ECG: CPT

## 2018-10-25 RX ORDER — NATEGLINIDE 120 MG/1
120 TABLET ORAL
Qty: 270 TABLET | Refills: 3 | Status: SHIPPED | OUTPATIENT
Start: 2018-10-25 | End: 2019-03-20

## 2018-10-25 RX ORDER — TESTOSTERONE CYPIONATE 200 MG/ML
200 INJECTION INTRAMUSCULAR ONCE
Status: COMPLETED | OUTPATIENT
Start: 2018-10-25 | End: 2018-10-25

## 2018-10-25 RX ORDER — FUROSEMIDE 40 MG/1
40 TABLET ORAL DAILY
Qty: 90 TABLET | Refills: 3 | Status: SHIPPED | OUTPATIENT
Start: 2018-10-25 | End: 2019-04-16 | Stop reason: ALTCHOICE

## 2018-10-25 RX ORDER — LOSARTAN POTASSIUM 100 MG/1
100 TABLET ORAL DAILY
Qty: 90 TABLET | Refills: 3 | Status: SHIPPED | OUTPATIENT
Start: 2018-10-25 | End: 2019-10-14 | Stop reason: SDUPTHER

## 2018-10-25 RX ORDER — FLUTICASONE PROPIONATE 50 MCG
SPRAY, SUSPENSION (ML) NASAL
Qty: 3 BOTTLE | Refills: 3 | Status: SHIPPED | OUTPATIENT
Start: 2018-10-25 | End: 2019-04-16 | Stop reason: ALTCHOICE

## 2018-10-25 RX ORDER — TAMSULOSIN HYDROCHLORIDE 0.4 MG/1
0.8 CAPSULE ORAL DAILY
Qty: 180 CAPSULE | Refills: 3 | Status: SHIPPED | OUTPATIENT
Start: 2018-10-25 | End: 2019-07-03 | Stop reason: SDUPTHER

## 2018-10-25 RX ORDER — RANITIDINE 150 MG/1
150 TABLET ORAL NIGHTLY PRN
Qty: 90 TABLET | Refills: 3 | Status: SHIPPED | OUTPATIENT
Start: 2018-10-25 | End: 2019-04-24 | Stop reason: SDUPTHER

## 2018-10-25 RX ORDER — PREDNISONE 20 MG/1
20 TABLET ORAL DAILY
Qty: 90 TABLET | Refills: 3 | Status: CANCELLED | OUTPATIENT
Start: 2018-10-25

## 2018-10-25 RX ORDER — ROSUVASTATIN CALCIUM 20 MG/1
20 TABLET, COATED ORAL NIGHTLY
Qty: 90 TABLET | Refills: 3 | Status: SHIPPED | OUTPATIENT
Start: 2018-10-25 | End: 2019-10-14 | Stop reason: SDUPTHER

## 2018-10-25 RX ORDER — TRIAMTERENE AND HYDROCHLOROTHIAZIDE 37.5; 25 MG/1; MG/1
1 TABLET ORAL DAILY
Qty: 90 TABLET | Refills: 3 | Status: SHIPPED | OUTPATIENT
Start: 2018-10-25 | End: 2019-04-16 | Stop reason: ALTCHOICE

## 2018-10-25 RX ORDER — METOPROLOL SUCCINATE 50 MG/1
150 TABLET, EXTENDED RELEASE ORAL DAILY
Qty: 270 TABLET | Refills: 3 | Status: SHIPPED | OUTPATIENT
Start: 2018-10-25 | End: 2019-03-04 | Stop reason: SDUPTHER

## 2018-10-25 RX ORDER — NITROGLYCERIN 0.4 MG/1
0.4 TABLET SUBLINGUAL EVERY 5 MIN PRN
Qty: 25 TABLET | Refills: 0 | Status: SHIPPED | OUTPATIENT
Start: 2018-10-25

## 2018-10-25 RX ORDER — PYRIDOSTIGMINE BROMIDE 60 MG/1
60 TABLET ORAL
Qty: 60 TABLET | Refills: 3 | Status: CANCELLED | OUTPATIENT
Start: 2018-10-25

## 2018-10-25 RX ADMIN — TESTOSTERONE CYPIONATE 200 MG: 200 INJECTION INTRAMUSCULAR at 11:47

## 2018-10-25 NOTE — CARE COORDINATION
Prescriptions printed and signed.  Mestinon and Prednisone are prescribed by the specialist (neurology) and need to be written by the specialist (Dr. Mcdaniels )

## 2018-10-29 ENCOUNTER — APPOINTMENT (OUTPATIENT)
Dept: CARDIAC REHAB | Age: 73
End: 2018-10-29
Payer: MEDICARE

## 2018-10-29 ENCOUNTER — CARE COORDINATION (OUTPATIENT)
Dept: CARE COORDINATION | Age: 73
End: 2018-10-29

## 2018-10-30 ENCOUNTER — CARE COORDINATION (OUTPATIENT)
Dept: CARE COORDINATION | Age: 73
End: 2018-10-30

## 2018-11-01 ENCOUNTER — APPOINTMENT (OUTPATIENT)
Dept: CARDIAC REHAB | Age: 73
End: 2018-11-01
Payer: MEDICARE

## 2018-11-01 ENCOUNTER — HOSPITAL ENCOUNTER (OUTPATIENT)
Dept: CARDIAC REHAB | Age: 73
Setting detail: THERAPIES SERIES
Discharge: HOME | End: 2018-11-01
Payer: MEDICARE

## 2018-11-01 ENCOUNTER — TELEPHONE (OUTPATIENT)
Dept: FAMILY MEDICINE CLINIC | Age: 73
End: 2018-11-01

## 2018-11-01 PROCEDURE — 93798 PHYS/QHP OP CAR RHAB W/ECG: CPT

## 2018-11-05 ENCOUNTER — HOSPITAL ENCOUNTER (OUTPATIENT)
Dept: LAB | Age: 73
Discharge: HOME | End: 2018-11-05
Payer: MEDICARE

## 2018-11-05 ENCOUNTER — APPOINTMENT (OUTPATIENT)
Dept: CARDIAC REHAB | Age: 73
End: 2018-11-05
Payer: MEDICARE

## 2018-11-05 DIAGNOSIS — E78.2 MIXED HYPERLIPIDEMIA: Chronic | ICD-10-CM

## 2018-11-05 DIAGNOSIS — I25.10 CAD IN NATIVE ARTERY: Chronic | ICD-10-CM

## 2018-11-05 DIAGNOSIS — I10 ESSENTIAL HYPERTENSION: Chronic | ICD-10-CM

## 2018-11-05 DIAGNOSIS — E11.29 TYPE 2 DIABETES MELLITUS WITH MICROALBUMINURIA, WITHOUT LONG-TERM CURRENT USE OF INSULIN (HCC): Chronic | ICD-10-CM

## 2018-11-05 DIAGNOSIS — R80.9 TYPE 2 DIABETES MELLITUS WITH MICROALBUMINURIA, WITHOUT LONG-TERM CURRENT USE OF INSULIN (HCC): Chronic | ICD-10-CM

## 2018-11-05 DIAGNOSIS — K76.0 FATTY INFILTRATION OF LIVER: Chronic | ICD-10-CM

## 2018-11-05 LAB
ALBUMIN SERPL-MCNC: 3.8 G/DL (ref 3.9–4.9)
ALP BLD-CCNC: 40 U/L (ref 35–104)
ALT SERPL-CCNC: 29 U/L (ref 0–41)
ANION GAP SERPL CALCULATED.3IONS-SCNC: 10 MEQ/L (ref 7–13)
ANISOCYTOSIS: ABNORMAL
AST SERPL-CCNC: 23 U/L (ref 0–40)
ATYPICAL LYMPHOCYTE RELATIVE PERCENT: 2 %
BANDED NEUTROPHILS RELATIVE PERCENT: 1 %
BASOPHILS ABSOLUTE: 0 K/UL (ref 0–0.2)
BASOPHILS RELATIVE PERCENT: 0.6 %
BILIRUB SERPL-MCNC: 0.4 MG/DL (ref 0–1.2)
BUN BLDV-MCNC: 10 MG/DL (ref 8–23)
CALCIUM SERPL-MCNC: 9.4 MG/DL (ref 8.6–10.2)
CHLORIDE BLD-SCNC: 101 MEQ/L (ref 98–107)
CHOLESTEROL, TOTAL: 118 MG/DL (ref 0–199)
CO2: 29 MEQ/L (ref 22–29)
CREAT SERPL-MCNC: 0.93 MG/DL (ref 0.7–1.2)
EOSINOPHILS ABSOLUTE: 0.2 K/UL (ref 0–0.7)
EOSINOPHILS RELATIVE PERCENT: 2 %
GFR AFRICAN AMERICAN: >60
GFR NON-AFRICAN AMERICAN: >60
GLOBULIN: 2.9 G/DL (ref 2.3–3.5)
GLUCOSE BLD-MCNC: 147 MG/DL (ref 74–109)
HBA1C MFR BLD: 7.9 % (ref 4.8–5.9)
HCT VFR BLD CALC: 52.4 % (ref 42–52)
HDLC SERPL-MCNC: 53 MG/DL (ref 40–59)
HEMOGLOBIN: 17.6 G/DL (ref 14–18)
LDL CHOLESTEROL CALCULATED: 31 MG/DL (ref 0–129)
LYMPHOCYTES ABSOLUTE: 1.8 K/UL (ref 1–4.8)
LYMPHOCYTES RELATIVE PERCENT: 16 %
MCH RBC QN AUTO: 32.7 PG (ref 27–31.3)
MCHC RBC AUTO-ENTMCNC: 33.6 % (ref 33–37)
MCV RBC AUTO: 97.2 FL (ref 80–100)
MONOCYTES ABSOLUTE: 0.3 K/UL (ref 0.2–0.8)
MONOCYTES RELATIVE PERCENT: 2.9 %
NEUTROPHILS ABSOLUTE: 7.8 K/UL (ref 1.4–6.5)
NEUTROPHILS RELATIVE PERCENT: 76 %
PDW BLD-RTO: 15.4 % (ref 11.5–14.5)
PLATELET # BLD: 170 K/UL (ref 130–400)
PLATELET SLIDE REVIEW: ADEQUATE
POTASSIUM SERPL-SCNC: 4.4 MEQ/L (ref 3.5–5.1)
RBC # BLD: 5.39 M/UL (ref 4.7–6.1)
SLIDE REVIEW: ABNORMAL
SODIUM BLD-SCNC: 140 MEQ/L (ref 132–144)
TOTAL PROTEIN: 6.7 G/DL (ref 6.4–8.1)
TRIGL SERPL-MCNC: 170 MG/DL (ref 0–200)
WBC # BLD: 10.1 K/UL (ref 4.8–10.8)

## 2018-11-05 PROCEDURE — 83036 HEMOGLOBIN GLYCOSYLATED A1C: CPT

## 2018-11-05 PROCEDURE — 85025 COMPLETE CBC W/AUTO DIFF WBC: CPT

## 2018-11-05 PROCEDURE — 80053 COMPREHEN METABOLIC PANEL: CPT

## 2018-11-05 PROCEDURE — 80061 LIPID PANEL: CPT

## 2018-11-05 PROCEDURE — 36415 COLL VENOUS BLD VENIPUNCTURE: CPT

## 2018-11-06 ENCOUNTER — HOSPITAL ENCOUNTER (OUTPATIENT)
Dept: CARDIAC REHAB | Age: 73
Setting detail: THERAPIES SERIES
Discharge: HOME | End: 2018-11-06
Payer: MEDICARE

## 2018-11-06 PROCEDURE — 93798 PHYS/QHP OP CAR RHAB W/ECG: CPT

## 2018-11-08 ENCOUNTER — NURSE ONLY (OUTPATIENT)
Dept: FAMILY MEDICINE CLINIC | Age: 73
End: 2018-11-08
Payer: MEDICARE

## 2018-11-08 ENCOUNTER — HOSPITAL ENCOUNTER (OUTPATIENT)
Dept: CARDIAC REHAB | Age: 73
Setting detail: THERAPIES SERIES
Discharge: HOME | End: 2018-11-08
Payer: MEDICARE

## 2018-11-08 ENCOUNTER — APPOINTMENT (OUTPATIENT)
Dept: CARDIAC REHAB | Age: 73
End: 2018-11-08
Payer: MEDICARE

## 2018-11-08 DIAGNOSIS — E29.1 HYPOGONADISM MALE: Primary | ICD-10-CM

## 2018-11-08 PROCEDURE — 96372 THER/PROPH/DIAG INJ SC/IM: CPT | Performed by: FAMILY MEDICINE

## 2018-11-08 PROCEDURE — 93798 PHYS/QHP OP CAR RHAB W/ECG: CPT

## 2018-11-08 RX ORDER — TESTOSTERONE CYPIONATE 200 MG/ML
200 INJECTION INTRAMUSCULAR ONCE
Status: COMPLETED | OUTPATIENT
Start: 2018-11-08 | End: 2018-11-08

## 2018-11-08 RX ADMIN — TESTOSTERONE CYPIONATE 200 MG: 200 INJECTION INTRAMUSCULAR at 11:43

## 2018-11-08 NOTE — PROGRESS NOTES
Patient given Testosterone 200mg IM right gluteal..  Will return in 2 weeks for next injection    Patient tolerated injection well.     Administrations This Visit     testosterone cypionate (DEPOTESTOTERONE CYPIONATE) injection 200 mg     Admin Date  11/08/2018 Action  Given Dose  200 mg Route  Intramuscular Administered By  Jose Hardin

## 2018-11-12 ENCOUNTER — APPOINTMENT (OUTPATIENT)
Dept: CARDIAC REHAB | Age: 73
End: 2018-11-12
Payer: MEDICARE

## 2018-11-13 ENCOUNTER — CARE COORDINATION (OUTPATIENT)
Dept: CARE COORDINATION | Age: 73
End: 2018-11-13

## 2018-11-13 ENCOUNTER — HOSPITAL ENCOUNTER (OUTPATIENT)
Dept: CARDIAC REHAB | Age: 73
Setting detail: THERAPIES SERIES
Discharge: HOME | End: 2018-11-13
Payer: MEDICARE

## 2018-11-13 PROCEDURE — 93798 PHYS/QHP OP CAR RHAB W/ECG: CPT

## 2018-11-15 ENCOUNTER — APPOINTMENT (OUTPATIENT)
Dept: CARDIAC REHAB | Age: 73
End: 2018-11-15
Payer: MEDICARE

## 2018-11-19 ENCOUNTER — APPOINTMENT (OUTPATIENT)
Dept: CARDIAC REHAB | Age: 73
End: 2018-11-19
Payer: MEDICARE

## 2018-11-20 ENCOUNTER — CARE COORDINATION (OUTPATIENT)
Dept: FAMILY MEDICINE CLINIC | Age: 73
End: 2018-11-20

## 2018-11-26 ENCOUNTER — APPOINTMENT (OUTPATIENT)
Dept: CARDIAC REHAB | Age: 73
End: 2018-11-26
Payer: MEDICARE

## 2018-11-27 ENCOUNTER — HOSPITAL ENCOUNTER (OUTPATIENT)
Dept: CARDIAC REHAB | Age: 73
Setting detail: THERAPIES SERIES
Discharge: HOME | End: 2018-11-27
Payer: MEDICARE

## 2018-11-27 PROCEDURE — 93798 PHYS/QHP OP CAR RHAB W/ECG: CPT

## 2018-11-27 NOTE — CARE COORDINATION
Ambulatory Care Coordination Note  11/27/2018  CM Risk Score: 6  Lottie Mortality Risk Score: 16    ACC: Ritu Caldwell, RN    Summary Note: I called to check in on Pinky and to discuss COPD. He tells me that he is extremely fatigued. He understands that he may have this for a little while after surgery but he says that even doing small things really make him feel tired. He tells me that he has not changed his sleeping routine. He says that he always sleeps 5-6 hours every night. I have reviewed his latest lab work and his blood counts look pretty good. He denies any issues with shortness of breath, chest pain, dizziness, or edema. He tells me that his weight is going down. He says that he is down about 20 pounds since his surgery. He says that he really does not have an appetite and the food just doesn't taste good. He says that he has really cut down on eating because of the lack of appetite and taste. He is scheduled to see his cardiologist next week at [facility]. He tells me that the drooping that he had with his eye seems to be improving but he is still not driving because he does not feel that he has great \"depth perception. \"       Diabetes Assessment    Medic Alert ID:  No  Meal Planning:  Avoidance of concentrated sweets, Calorie counting, Carb counting   How often do you test your blood sugar?:  Daily   Do you have barriers with adherence to non-pharmacologic self-management interventions?  (Nutrition/Exercise/Self-Monitoring):  No   Have you ever had to go to the ED for symptoms of low blood sugar?:  No       No patient-reported symptoms       and   COPD Assessment    Does the patient understand envrionmental exposure?:  Yes  Is the patient able to verbalize Rescue vs. Long Acting medications?:  Yes  Does the patient have a nebulizer?:  No  Does the patient use a space with inhaled medications?:  No     No patient-reported symptoms         Symptoms:               Care Coordination Interventions Program Enrollment:  Rising Risk  Referral from Primary Care Provider:  No  Suggested Interventions and Community Resources         Goals Addressed             Most Recent     Self Monitoring   Improving (11/20/2018)             Self-Monitored Blood Glucose - I will check my blood sugar Fasting blood sugar  I will notify my provider of any trends of increasing or decreasing blood sugars over a 1 month period. Blood Pressure - I will take my blood pressure as directed - Daily  I will notify my provider of any trends of increasing or decreasing blood pressures over a month period of time. Patient Reported Blood Pressure No flowsheet data found. Barriers: none  Plan for overcoming my barriers: N/A  Confidence: 10/10  Anticipated Goal Completion Date: 7/31/2017              Prior to Admission medications    Medication Sig Start Date End Date Taking?  Authorizing Provider   metoprolol succinate (TOPROL XL) 50 MG extended release tablet Take 3 tablets by mouth daily 10/25/18   Uriel Marsh MD   losartan (COZAAR) 100 MG tablet Take 1 tablet by mouth daily 10/25/18   Uriel Marsh MD   ranitidine (ZANTAC) 150 MG tablet Take 1 tablet by mouth nightly as needed for Heartburn 10/25/18   Uriel Marsh MD   nitroGLYCERIN (NITROSTAT) 0.4 MG SL tablet Place 1 tablet under the tongue every 5 minutes as needed for Chest pain 10/25/18   Uriel Marsh MD   rosuvastatin (CRESTOR) 20 MG tablet Take 1 tablet by mouth nightly 10/25/18   Uriel Marsh MD   SITagliptin (JANUVIA) 100 MG tablet Take 0.5 tablets by mouth 2 times daily 10/25/18   Uriel Marsh MD   furosemide (LASIX) 40 MG tablet Take 1 tablet by mouth daily Take 40 mg by mouth 10/25/18   Uriel Marsh MD   nateglinide (STARLIX) 120 MG tablet Take 1 tablet by mouth 3 times daily (before meals) 10/25/18   Uriel Marsh MD   fluticasone Agatha Kawasaki) 50 MCG/ACT nasal spray Use 2 sprays nasally daily 10/25/18   Uriel Marsh MD triamterene-hydrochlorothiazide (MAXZIDE-25) 37.5-25 MG per tablet Take 1 tablet by mouth daily 10/25/18   Tatiana Doherty MD   tamsulosin [facility]) 0.4 MG capsule Take 2 capsules by mouth daily 10/25/18   Tatiana Doherty MD   pyridostigmine (MESTINON) 60 MG tablet Take 60 mg by mouth 9/6/18   Historical Provider, MD   predniSONE (DELTASONE) 20 MG tablet Take 20 mg by mouth 9/6/18   Historical Provider, MD   Immune Globulin, Human, (GAMUNEX-C IJ) Infuse 400 mg/kg once daily for 5 days. Premedicate with Tylenol 650 mg and Benedryl 25 mg PO prior to each infusion. Infuse per protocol. 9/13/18   Historical Provider, MD   blood glucose monitor strips Test as directed Test once daily. Dx:  E11.29 9/20/18   Tatiana Doherty MD   fluticasone-vilanterol (BREO ELLIPTA) 100-25 MCG/INH AEPB inhaler Inhale into the lungs 7/11/18   Historical Provider, MD   sildenafil (VIAGRA) 100 MG tablet TAKE ONE TABLET BY MOUTH AS NEEDED APPROXIMATELY ONE HOUR BEFORE SEXUAL ACTIVITY. DO NOT USE MORE THAN ONE DOSE DAILY 6/21/18   Tatiana Doherty MD   Blood Glucose Monitoring Suppl CHENCHO Test once daily. Dx: E11.29 3/20/18   Tatiana Doherty MD   Lancets MISC Test once daily.  Dx: E11.29 3/8/18   Tatiana Doherty MD   testosterone cypionate (DEPOTESTOTERONE CYPIONATE) 200 MG/ML injection 1 ML every 2 weeks 10/3/17   Arun Kilpatrick MD   albuterol sulfate HFA (PROAIR HFA) 108 (90 Base) MCG/ACT inhaler Inhale 2 puffs into the lungs every 6 hours as needed for Wheezing 9/11/17   Tatiana Doherty MD   UNABLE TO FIND SUPER BETA PROSTATE OTC 250MG BETA-SITOSTEROL AND 400IU VIT D    Historical Provider, MD   ascorbic acid (VITAMIN C) 500 MG tablet Take 1,000 mg by mouth daily    Historical Provider, MD   loratadine (CLARITIN) 10 MG tablet Take 1 tablet by mouth daily 11/22/16   Tatiana Doherty MD   vitamin D (CHOLECALCIFEROL) 1000 UNITS TABS tablet Take 1,000 Units by mouth    Historical Provider, MD   acetaminophen (TYLENOL EX ST ARTHRITIS PAIN) 500 MG tablet Take 1 tablet by mouth every 6 hours as needed for Pain 8/24/15   Toya Crabtree MD   CINNAMON PO Take 500 mg by mouth 2 times daily. Historical Provider, MD   aspirin 81 MG EC tablet Take 81 mg by mouth daily.       Historical Provider, MD       Future Appointments  Date Time Provider Kay Tri   11/29/2018 10:30 AM SCHEDULE, GERBER COBB PCP TESTOSTERONE MLOX [facility]   12/20/2018 11:00 AM MD [place] [address]

## 2018-11-29 ENCOUNTER — APPOINTMENT (OUTPATIENT)
Dept: CARDIAC REHAB | Age: 73
End: 2018-11-29
Payer: MEDICARE

## 2018-11-29 ENCOUNTER — NURSE ONLY (OUTPATIENT)
Dept: FAMILY MEDICINE CLINIC | Age: 73
End: 2018-11-29
Payer: MEDICARE

## 2018-11-29 DIAGNOSIS — E29.1 HYPOGONADISM MALE: Primary | ICD-10-CM

## 2018-11-29 PROCEDURE — 96372 THER/PROPH/DIAG INJ SC/IM: CPT | Performed by: FAMILY MEDICINE

## 2018-11-29 RX ORDER — TESTOSTERONE CYPIONATE 200 MG/ML
200 INJECTION INTRAMUSCULAR ONCE
Status: COMPLETED | OUTPATIENT
Start: 2018-11-29 | End: 2018-11-29

## 2018-11-29 RX ADMIN — TESTOSTERONE CYPIONATE 200 MG: 200 INJECTION INTRAMUSCULAR at 10:24

## 2018-11-30 ENCOUNTER — TELEPHONE (OUTPATIENT)
Dept: ENDOCRINOLOGY | Age: 73
End: 2018-11-30

## 2018-11-30 ENCOUNTER — TELEPHONE (OUTPATIENT)
Dept: FAMILY MEDICINE CLINIC | Age: 73
End: 2018-11-30

## 2018-11-30 DIAGNOSIS — E29.1 HYPOGONADISM MALE: Primary | ICD-10-CM

## 2018-12-03 ENCOUNTER — APPOINTMENT (OUTPATIENT)
Dept: CARDIAC REHAB | Age: 73
End: 2018-12-03
Payer: MEDICARE

## 2018-12-04 ENCOUNTER — HOSPITAL ENCOUNTER (OUTPATIENT)
Dept: CARDIAC REHAB | Age: 73
Setting detail: THERAPIES SERIES
Discharge: HOME | End: 2018-12-04
Payer: MEDICARE

## 2018-12-04 PROCEDURE — 93798 PHYS/QHP OP CAR RHAB W/ECG: CPT

## 2018-12-06 ENCOUNTER — OFFICE VISIT (OUTPATIENT)
Dept: ENDOCRINOLOGY | Age: 73
End: 2018-12-06
Payer: MEDICARE

## 2018-12-06 ENCOUNTER — HOSPITAL ENCOUNTER (OUTPATIENT)
Dept: CARDIAC REHAB | Age: 73
Setting detail: THERAPIES SERIES
Discharge: HOME | End: 2018-12-06
Payer: MEDICARE

## 2018-12-06 ENCOUNTER — APPOINTMENT (OUTPATIENT)
Dept: CARDIAC REHAB | Age: 73
End: 2018-12-06
Payer: MEDICARE

## 2018-12-06 VITALS
DIASTOLIC BLOOD PRESSURE: 77 MMHG | SYSTOLIC BLOOD PRESSURE: 156 MMHG | WEIGHT: 189 LBS | HEIGHT: 71 IN | BODY MASS INDEX: 26.46 KG/M2 | HEART RATE: 81 BPM

## 2018-12-06 DIAGNOSIS — E29.1 HYPOGONADISM MALE: Primary | ICD-10-CM

## 2018-12-06 DIAGNOSIS — B37.42 CANDIDIASIS OF PENIS: ICD-10-CM

## 2018-12-06 PROCEDURE — 4040F PNEUMOC VAC/ADMIN/RCVD: CPT | Performed by: INTERNAL MEDICINE

## 2018-12-06 PROCEDURE — G8417 CALC BMI ABV UP PARAM F/U: HCPCS | Performed by: INTERNAL MEDICINE

## 2018-12-06 PROCEDURE — 1101F PT FALLS ASSESS-DOCD LE1/YR: CPT | Performed by: INTERNAL MEDICINE

## 2018-12-06 PROCEDURE — 93798 PHYS/QHP OP CAR RHAB W/ECG: CPT

## 2018-12-06 PROCEDURE — G8427 DOCREV CUR MEDS BY ELIG CLIN: HCPCS | Performed by: INTERNAL MEDICINE

## 2018-12-06 PROCEDURE — 1123F ACP DISCUSS/DSCN MKR DOCD: CPT | Performed by: INTERNAL MEDICINE

## 2018-12-06 PROCEDURE — 1036F TOBACCO NON-USER: CPT | Performed by: INTERNAL MEDICINE

## 2018-12-06 PROCEDURE — 99213 OFFICE O/P EST LOW 20 MIN: CPT | Performed by: INTERNAL MEDICINE

## 2018-12-06 PROCEDURE — 3017F COLORECTAL CA SCREEN DOC REV: CPT | Performed by: INTERNAL MEDICINE

## 2018-12-06 PROCEDURE — G8482 FLU IMMUNIZE ORDER/ADMIN: HCPCS | Performed by: INTERNAL MEDICINE

## 2018-12-06 PROCEDURE — G8598 ASA/ANTIPLAT THER USED: HCPCS | Performed by: INTERNAL MEDICINE

## 2018-12-06 RX ORDER — TESTOSTERONE CYPIONATE 200 MG/ML
INJECTION INTRAMUSCULAR
Qty: 10 ML | Refills: 1
Start: 2018-12-06 | End: 2019-11-26 | Stop reason: SDUPTHER

## 2018-12-06 RX ORDER — FLUCONAZOLE 150 MG/1
150 TABLET ORAL ONCE
Qty: 1 TABLET | Refills: 2 | Status: SHIPPED | OUTPATIENT
Start: 2018-12-06 | End: 2018-12-06

## 2018-12-10 ENCOUNTER — APPOINTMENT (OUTPATIENT)
Dept: CARDIAC REHAB | Age: 73
End: 2018-12-10
Payer: MEDICARE

## 2018-12-11 ENCOUNTER — HOSPITAL ENCOUNTER (OUTPATIENT)
Dept: CARDIAC REHAB | Age: 73
Setting detail: THERAPIES SERIES
Discharge: HOME | End: 2018-12-11
Payer: MEDICARE

## 2018-12-11 ENCOUNTER — HOSPITAL ENCOUNTER (OUTPATIENT)
Dept: LAB | Age: 73
Discharge: HOME | End: 2018-12-11
Payer: MEDICARE

## 2018-12-11 DIAGNOSIS — E29.1 HYPOGONADISM MALE: ICD-10-CM

## 2018-12-11 LAB
HCT VFR BLD CALC: 49.4 % (ref 42–52)
HEMOGLOBIN: 16.7 G/DL (ref 14–18)
MCH RBC QN AUTO: 32.5 PG (ref 27–31.3)
MCHC RBC AUTO-ENTMCNC: 33.8 % (ref 33–37)
MCV RBC AUTO: 96 FL (ref 80–100)
PDW BLD-RTO: 15.3 % (ref 11.5–14.5)
PLATELET # BLD: 224 K/UL (ref 130–400)
PLATELET SLIDE REVIEW: NORMAL
PROSTATE SPECIFIC ANTIGEN: 2.43 NG/ML (ref 0–6.22)
RBC # BLD: 5.15 M/UL (ref 4.7–6.1)
SLIDE REVIEW: ABNORMAL
WBC # BLD: 9.3 K/UL (ref 4.8–10.8)

## 2018-12-11 PROCEDURE — 36415 COLL VENOUS BLD VENIPUNCTURE: CPT

## 2018-12-11 PROCEDURE — 84153 ASSAY OF PSA TOTAL: CPT

## 2018-12-11 PROCEDURE — 85027 COMPLETE CBC AUTOMATED: CPT

## 2018-12-11 PROCEDURE — 84270 ASSAY OF SEX HORMONE GLOBUL: CPT

## 2018-12-11 PROCEDURE — 93798 PHYS/QHP OP CAR RHAB W/ECG: CPT

## 2018-12-11 PROCEDURE — 84403 ASSAY OF TOTAL TESTOSTERONE: CPT

## 2018-12-13 ENCOUNTER — NURSE ONLY (OUTPATIENT)
Dept: FAMILY MEDICINE CLINIC | Age: 73
End: 2018-12-13
Payer: MEDICARE

## 2018-12-13 ENCOUNTER — HOSPITAL ENCOUNTER (OUTPATIENT)
Dept: CARDIAC REHAB | Age: 73
Setting detail: THERAPIES SERIES
Discharge: HOME | End: 2018-12-13
Payer: MEDICARE

## 2018-12-13 ENCOUNTER — APPOINTMENT (OUTPATIENT)
Dept: CARDIAC REHAB | Age: 73
End: 2018-12-13
Payer: MEDICARE

## 2018-12-13 DIAGNOSIS — E29.1 HYPOGONADISM MALE: Primary | ICD-10-CM

## 2018-12-13 LAB
SEX HORMONE BINDING GLOBULIN: 38 NMOL/L (ref 11–80)
TESTOSTERONE FREE PERCENT: 1.8 % (ref 1.6–2.9)
TESTOSTERONE FREE, CALC: 97 PG/ML (ref 47–244)
TESTOSTERONE TOTAL-MALE: 546 NG/DL (ref 300–720)

## 2018-12-13 PROCEDURE — 93798 PHYS/QHP OP CAR RHAB W/ECG: CPT

## 2018-12-13 PROCEDURE — 96372 THER/PROPH/DIAG INJ SC/IM: CPT | Performed by: FAMILY MEDICINE

## 2018-12-13 RX ORDER — TESTOSTERONE CYPIONATE 200 MG/ML
200 INJECTION INTRAMUSCULAR ONCE
Status: COMPLETED | OUTPATIENT
Start: 2018-12-13 | End: 2018-12-13

## 2018-12-13 RX ADMIN — TESTOSTERONE CYPIONATE 200 MG: 200 INJECTION INTRAMUSCULAR at 10:23

## 2018-12-13 NOTE — PROGRESS NOTES
Patient given Testosterone 200mg IM right gluteal..  Will return in 2 weeks for next injection    Patient tolerated injection well.     Administrations This Visit     testosterone cypionate (DEPOTESTOTERONE CYPIONATE) injection 200 mg     Admin Date  12/13/2018 Action  Given Dose  200 mg Route  Intramuscular Administered By  Kelsie Johnson

## 2018-12-17 ENCOUNTER — APPOINTMENT (OUTPATIENT)
Dept: CARDIAC REHAB | Age: 73
End: 2018-12-17
Payer: MEDICARE

## 2018-12-18 ENCOUNTER — HOSPITAL ENCOUNTER (OUTPATIENT)
Dept: CARDIAC REHAB | Age: 73
Setting detail: THERAPIES SERIES
Discharge: HOME | End: 2018-12-18
Payer: MEDICARE

## 2018-12-18 PROCEDURE — 93798 PHYS/QHP OP CAR RHAB W/ECG: CPT

## 2018-12-19 ENCOUNTER — TELEPHONE (OUTPATIENT)
Dept: FAMILY MEDICINE CLINIC | Age: 73
End: 2018-12-19

## 2018-12-19 ENCOUNTER — OFFICE VISIT (OUTPATIENT)
Dept: FAMILY MEDICINE CLINIC | Age: 73
End: 2018-12-19
Payer: MEDICARE

## 2018-12-19 VITALS
OXYGEN SATURATION: 98 % | RESPIRATION RATE: 14 BRPM | HEIGHT: 71 IN | DIASTOLIC BLOOD PRESSURE: 68 MMHG | WEIGHT: 190.4 LBS | BODY MASS INDEX: 26.65 KG/M2 | SYSTOLIC BLOOD PRESSURE: 132 MMHG | HEART RATE: 71 BPM | TEMPERATURE: 97.8 F

## 2018-12-19 DIAGNOSIS — I25.110 ATHEROSCLEROSIS OF NATIVE CORONARY ARTERY OF NATIVE HEART WITH UNSTABLE ANGINA PECTORIS (HCC): Chronic | ICD-10-CM

## 2018-12-19 DIAGNOSIS — R80.9 TYPE 2 DIABETES MELLITUS WITH MICROALBUMINURIA, WITHOUT LONG-TERM CURRENT USE OF INSULIN (HCC): Primary | Chronic | ICD-10-CM

## 2018-12-19 DIAGNOSIS — I10 ESSENTIAL HYPERTENSION: Chronic | ICD-10-CM

## 2018-12-19 DIAGNOSIS — N40.1 BENIGN PROSTATIC HYPERPLASIA WITH NOCTURIA: Chronic | ICD-10-CM

## 2018-12-19 DIAGNOSIS — E78.2 MIXED HYPERLIPIDEMIA: Chronic | ICD-10-CM

## 2018-12-19 DIAGNOSIS — E11.29 TYPE 2 DIABETES MELLITUS WITH MICROALBUMINURIA, WITHOUT LONG-TERM CURRENT USE OF INSULIN (HCC): Primary | Chronic | ICD-10-CM

## 2018-12-19 DIAGNOSIS — R35.1 BENIGN PROSTATIC HYPERPLASIA WITH NOCTURIA: Chronic | ICD-10-CM

## 2018-12-19 DIAGNOSIS — J30.89 PERENNIAL ALLERGIC RHINITIS: Chronic | ICD-10-CM

## 2018-12-19 DIAGNOSIS — K76.0 FATTY INFILTRATION OF LIVER: Chronic | ICD-10-CM

## 2018-12-19 DIAGNOSIS — J44.9 CHRONIC OBSTRUCTIVE PULMONARY DISEASE, UNSPECIFIED COPD TYPE (HCC): Chronic | ICD-10-CM

## 2018-12-19 PROCEDURE — G8926 SPIRO NO PERF OR DOC: HCPCS | Performed by: FAMILY MEDICINE

## 2018-12-19 PROCEDURE — G8482 FLU IMMUNIZE ORDER/ADMIN: HCPCS | Performed by: FAMILY MEDICINE

## 2018-12-19 PROCEDURE — 99214 OFFICE O/P EST MOD 30 MIN: CPT | Performed by: FAMILY MEDICINE

## 2018-12-19 PROCEDURE — 3045F PR MOST RECENT HEMOGLOBIN A1C LEVEL 7.0-9.0%: CPT | Performed by: FAMILY MEDICINE

## 2018-12-19 PROCEDURE — 1036F TOBACCO NON-USER: CPT | Performed by: FAMILY MEDICINE

## 2018-12-19 PROCEDURE — G8598 ASA/ANTIPLAT THER USED: HCPCS | Performed by: FAMILY MEDICINE

## 2018-12-19 PROCEDURE — 1123F ACP DISCUSS/DSCN MKR DOCD: CPT | Performed by: FAMILY MEDICINE

## 2018-12-19 PROCEDURE — G8417 CALC BMI ABV UP PARAM F/U: HCPCS | Performed by: FAMILY MEDICINE

## 2018-12-19 PROCEDURE — G8427 DOCREV CUR MEDS BY ELIG CLIN: HCPCS | Performed by: FAMILY MEDICINE

## 2018-12-19 PROCEDURE — 2022F DILAT RTA XM EVC RTNOPTHY: CPT | Performed by: FAMILY MEDICINE

## 2018-12-19 PROCEDURE — 1101F PT FALLS ASSESS-DOCD LE1/YR: CPT | Performed by: FAMILY MEDICINE

## 2018-12-19 PROCEDURE — 3017F COLORECTAL CA SCREEN DOC REV: CPT | Performed by: FAMILY MEDICINE

## 2018-12-19 PROCEDURE — 4040F PNEUMOC VAC/ADMIN/RCVD: CPT | Performed by: FAMILY MEDICINE

## 2018-12-19 PROCEDURE — 3023F SPIROM DOC REV: CPT | Performed by: FAMILY MEDICINE

## 2018-12-19 RX ORDER — IPRATROPIUM BROMIDE 42 UG/1
2 SPRAY, METERED NASAL 3 TIMES DAILY
Qty: 3 BOTTLE | Refills: 1 | Status: SHIPPED | OUTPATIENT
Start: 2018-12-19 | End: 2019-07-03 | Stop reason: SDUPTHER

## 2018-12-19 RX ORDER — AZATHIOPRINE 50 MG/1
TABLET ORAL
COMMUNITY
Start: 2018-10-05 | End: 2019-03-20

## 2018-12-19 ASSESSMENT — ENCOUNTER SYMPTOMS
COUGH: 0
CHEST TIGHTNESS: 0
NAUSEA: 0
CONSTIPATION: 0
VOMITING: 0
ABDOMINAL PAIN: 0
SHORTNESS OF BREATH: 0
DIARRHEA: 0
WHEEZING: 0

## 2018-12-19 NOTE — PROGRESS NOTES
12/11/2017    Left ear 09/2017    History of tobacco use 6/1/2006    HTN (hypertension)     Hyperlipidemia     Malignant melanoma of face ([address]     Malignant melanoma of skin of face ([address]     RT ear 2000, LT ear 2017    Nephrolithiasis     Ocular myasthenia gravis ([address] 8/28/2018    Osteoarthritis of multiple joints     hands, hips    Perennial allergic rhinitis     Personal history of colonic polyps     Pseudophakia of both eyes     Status post coronary artery bypass grafts x 5 7/30/2018 07/2018 @ [facility]    Type 2 diabetes mellitus with microalbuminuria, without long-term current use of insulin ([address] 3/6/2017    Varicose veins of both lower extremities 9/11/2017     Past Surgical History:   Procedure Laterality Date    ANGIOPLASTY      CARDIAC CATHETERIZATION      #17    CATARACT REMOVAL WITH IMPLANT Left 1992    CATARACT REMOVAL WITH IMPLANT Right 1992    COLONOSCOPY  2009    tubular adenomas    COLONOSCOPY  2013    diverticulosis, polyps, 5y repeat ( [address] [place] [address])    CORONARY ARTERY BYPASS GRAFT  07/02/2018    x5 (DR JETER @ [facility])    MOHS SURGERY Left 09/2017    melanoma of left ear (DR GODWIN)    SKIN CANCER EXCISION Left 08/2017    TONSILLECTOMY  1955    UPPER GASTROINTESTINAL ENDOSCOPY      duodenitis/ poss early signs of celiac disease     Family History   Problem Relation Age of Onset    Heart Disease Mother     Heart Disease Brother     Diabetes Maternal Grandfather      Social History     Social History    Marital status:      Spouse name: Aaron Wynne Number of children: 2    Years of education: 12+     Occupational History    Farming      Social History Main Topics    Smoking status: Former Smoker     Packs/day: 1.00     Years: 41.00     Types: Cigarettes     Start date: 1961     Quit date: 9/28/2002    Smokeless tobacco: Never Used    Alcohol use 0.0 oz/week      Comment: drinks one beer at night occassionally    Drug use: No    Sexual activity: Not Currently

## 2018-12-20 ENCOUNTER — CARE COORDINATION (OUTPATIENT)
Dept: CARE COORDINATION | Age: 73
End: 2018-12-20

## 2018-12-20 ENCOUNTER — APPOINTMENT (OUTPATIENT)
Dept: CARDIAC REHAB | Age: 73
End: 2018-12-20
Payer: MEDICARE

## 2018-12-20 NOTE — TELEPHONE ENCOUNTER
I talked to patient and told him that [facility] only had his prednisone. He just had it filled for three months so he said would not need.

## 2018-12-21 ENCOUNTER — CARE COORDINATION (OUTPATIENT)
Dept: CARE COORDINATION | Age: 73
End: 2018-12-21

## 2018-12-24 ENCOUNTER — APPOINTMENT (OUTPATIENT)
Dept: CARDIAC REHAB | Age: 73
End: 2018-12-24
Payer: MEDICARE

## 2018-12-27 ENCOUNTER — NURSE ONLY (OUTPATIENT)
Dept: FAMILY MEDICINE CLINIC | Age: 73
End: 2018-12-27
Payer: MEDICARE

## 2018-12-27 ENCOUNTER — CARE COORDINATION (OUTPATIENT)
Dept: CARE COORDINATION | Age: 73
End: 2018-12-27

## 2018-12-27 DIAGNOSIS — E29.1 HYPOGONADISM MALE: Primary | ICD-10-CM

## 2018-12-27 PROCEDURE — 96372 THER/PROPH/DIAG INJ SC/IM: CPT | Performed by: FAMILY MEDICINE

## 2018-12-27 RX ORDER — TESTOSTERONE CYPIONATE 200 MG/ML
200 INJECTION INTRAMUSCULAR ONCE
Status: COMPLETED | OUTPATIENT
Start: 2018-12-27 | End: 2018-12-27

## 2018-12-27 RX ADMIN — TESTOSTERONE CYPIONATE 200 MG: 200 INJECTION INTRAMUSCULAR at 10:14

## 2018-12-28 ENCOUNTER — OFFICE VISIT (OUTPATIENT)
Dept: UROLOGY | Age: 73
End: 2018-12-28
Payer: MEDICARE

## 2018-12-28 VITALS
BODY MASS INDEX: 26.6 KG/M2 | SYSTOLIC BLOOD PRESSURE: 136 MMHG | HEIGHT: 71 IN | WEIGHT: 190 LBS | HEART RATE: 87 BPM | DIASTOLIC BLOOD PRESSURE: 66 MMHG

## 2018-12-28 DIAGNOSIS — N48.1 BALANITIS: Primary | ICD-10-CM

## 2018-12-28 PROCEDURE — G8417 CALC BMI ABV UP PARAM F/U: HCPCS | Performed by: UROLOGY

## 2018-12-28 PROCEDURE — 3017F COLORECTAL CA SCREEN DOC REV: CPT | Performed by: UROLOGY

## 2018-12-28 PROCEDURE — 99213 OFFICE O/P EST LOW 20 MIN: CPT | Performed by: UROLOGY

## 2018-12-28 PROCEDURE — 4040F PNEUMOC VAC/ADMIN/RCVD: CPT | Performed by: UROLOGY

## 2018-12-28 PROCEDURE — G8428 CUR MEDS NOT DOCUMENT: HCPCS | Performed by: UROLOGY

## 2018-12-28 PROCEDURE — G8598 ASA/ANTIPLAT THER USED: HCPCS | Performed by: UROLOGY

## 2018-12-28 PROCEDURE — 1036F TOBACCO NON-USER: CPT | Performed by: UROLOGY

## 2018-12-28 PROCEDURE — 1123F ACP DISCUSS/DSCN MKR DOCD: CPT | Performed by: UROLOGY

## 2018-12-28 PROCEDURE — 1101F PT FALLS ASSESS-DOCD LE1/YR: CPT | Performed by: UROLOGY

## 2018-12-28 PROCEDURE — G8482 FLU IMMUNIZE ORDER/ADMIN: HCPCS | Performed by: UROLOGY

## 2018-12-28 RX ORDER — CLOTRIMAZOLE AND BETAMETHASONE DIPROPIONATE 10; .64 MG/G; MG/G
CREAM TOPICAL
Qty: 30 G | Refills: 2 | Status: SHIPPED | OUTPATIENT
Start: 2018-12-28 | End: 2019-04-16 | Stop reason: ALTCHOICE

## 2018-12-28 NOTE — PROGRESS NOTES
[facility] EVALUATION NOTE                                                 H&P                                                                                                                                                 Reason for Visit  Balanitis, phimosis    History of Present Illness  70-year-old male who recently underwent coronary bypass surgery after which she developed myasthenia gravis  Patient received steroids for this issue which made his blood sugars go up further  Patient developed balanitis and mild phimosis  Was given p.o. antifungals which improved his balanitis  Continues to have some irritation of the foreskin      Urologic Review of Systems/Symptoms  Denies hematuria  Denies dysuria  Denies incontinence  Denies flank pain  Other Urologic: History of BPH with good control voiding symptoms    Review of Systems  Head and neck: No issues/reviewed  Cardiac: No recent issues/reviewed  Pulmonary: No issues/reviewed  Gastrointestinal: No issues/reviewed  Neurologic: No recent issues/reviewed  Extremities: No issues/reviewed  Lymphatics: No lymphadenopathy no change  Genitourinary: See above  Skin: No issues/reviewed  Hospitalization: None recent  Medications reviewed  All 14 categories of Review of Systems otherwise reviewed no other findings reported.     Past Medical History:   Diagnosis Date    Atypical chest pain 8/30/2012    BPH (benign prostatic hypertrophy)     CAD (coronary artery disease)     Cataracts, bilateral     Chronic low back pain     COPD [facility]chronic obstructive pulmonary disease) [facility] 6/1/2006    DM2 (diabetes mellitus, type 2) ([facility]     Duodenitis     Erectile dysfunction 3/6/2017    Fatty infiltration of liver 11/19/2014    GERD (gastroesophageal reflux disease)     History of melanoma excision 12/11/2017    Left ear 09/2017    History of tobacco use 6/1/2006    HTN (hypertension)     Hyperlipidemia     Malignant melanoma of face [facility]     Malignant melanoma of skin of face ([facility]     RT ear 2000, LT ear 2017    Nephrolithiasis     Ocular myasthenia gravis [facility] 8/28/2018    Osteoarthritis of multiple joints     hands, hips    Perennial allergic rhinitis     Personal history of colonic polyps     Pseudophakia of both eyes     Status post coronary artery bypass grafts x 5 7/30/2018 07/2018 @ [facility]    Type 2 diabetes mellitus with microalbuminuria, without long-term current use of insulin ([facility] 3/6/2017    Varicose veins of both lower extremities 9/11/2017     Past Surgical History:   Procedure Laterality Date    ANGIOPLASTY      CARDIAC CATHETERIZATION      #17    CATARACT REMOVAL WITH IMPLANT Left 1992    CATARACT REMOVAL WITH IMPLANT Right 1992    COLONOSCOPY  2009    tubular adenomas    COLONOSCOPY  2013    diverticulosis, polyps, 5y repeat (DR NAVAS)    CORONARY ARTERY BYPASS GRAFT  07/02/2018    x5 (DR JETER @ [facility])    MOHS SURGERY Left 09/2017    melanoma of left ear (DR GODWIN)    SKIN CANCER EXCISION Left 08/2017    TONSILLECTOMY  1955    UPPER GASTROINTESTINAL ENDOSCOPY      duodenitis/ poss early signs of celiac disease     Social History     Social History    Marital status:      Spouse name: Veronica Moss Number of children: 2    Years of education: 12+     Occupational History    Farming      Social History Main Topics    Smoking status: Former Smoker     Packs/day: 1.00     Years: 41.00     Types: Cigarettes     Start date: 1961     Quit date: 9/28/2002    Smokeless tobacco: Never Used    Alcohol use 0.0 oz/week      Comment: drinks one beer at night occassionally    Drug use: No    Sexual activity: Not Currently     Partners: Female     Other Topics Concern    None     Social History Narrative    None     Family History   Problem Relation Age of Onset    Heart Disease Mother     Heart Disease Brother     Diabetes Maternal Grandfather      Current Outpatient Prescriptions   Medication Sig Dispense Refill    m)     Constitutional: patient is oriented to person, place, and time. patient appears well-developed. pleasant and cooperative. Ears: Adequate hearing/no hearing loss  Head: Normocephalic. Atraumatic  Neck: Normal range of motion. Cardiovascular: Normal rate, BP reviewed. sinus rhythm  Pulmonary/Chest: Normal respiratory effort  no shortness of breath  Abdominal: Not distended. No hernias  Urologic Exam  Mild balanitis noted minimal discharge mild phimosis. Testis within normal limits. Patient defers prostate exam .  Musculoskeletal: Normal range of motion. Normal strength. Extremities: No edema   Neurological: Cranial nerves intact   Skin: Skin is warm and dry. No lesions. No rashes or ulcers   Psychiatric:  Normal affect. Assessment/Medical Necessity-Decision Making  Balanitis due to temporary poor control of blood sugars from needing to take steroids for myasthenia gravis  With tapering of this steroids his balanitis is improved to a degree  Patient may eventually require circumcision  Plan  Lotrisone cream given  Follow-up as needed  Greater than 50% of 35 minutes spent consulting patient face-to-face  No orders of the defined types were placed in this encounter. Orders Placed This Encounter   Medications    clotrimazole-betamethasone (LOTRISONE) 1-0.05 % cream     Sig: Apply topically 2 times daily. 30cc Tube     Dispense:  30 g     Refill:  2     Carolann Waggoner MD       Please note this report has been partially produced using speech recognition software  And may cause contain errors related to that system including grammar, punctuation and spelling as well as words and phrases that may seem inappropriate. If there are questions or concerns please feel free to contact me to clarify.

## 2019-01-03 ENCOUNTER — HOSPITAL ENCOUNTER (OUTPATIENT)
Dept: CARDIAC REHAB | Age: 74
Setting detail: THERAPIES SERIES
Discharge: HOME | End: 2019-01-03
Payer: MEDICARE

## 2019-01-03 PROCEDURE — 93798 PHYS/QHP OP CAR RHAB W/ECG: CPT

## 2019-01-07 ENCOUNTER — CARE COORDINATION (OUTPATIENT)
Dept: CARE COORDINATION | Age: 74
End: 2019-01-07

## 2019-01-08 ENCOUNTER — HOSPITAL ENCOUNTER (OUTPATIENT)
Dept: CARDIAC REHAB | Age: 74
Setting detail: THERAPIES SERIES
Discharge: HOME | End: 2019-01-08
Payer: MEDICARE

## 2019-01-08 PROCEDURE — 93798 PHYS/QHP OP CAR RHAB W/ECG: CPT

## 2019-01-10 ENCOUNTER — NURSE ONLY (OUTPATIENT)
Dept: FAMILY MEDICINE CLINIC | Age: 74
End: 2019-01-10
Payer: MEDICARE

## 2019-01-10 DIAGNOSIS — E29.1 HYPOGONADISM MALE: Primary | ICD-10-CM

## 2019-01-10 PROCEDURE — 96372 THER/PROPH/DIAG INJ SC/IM: CPT | Performed by: FAMILY MEDICINE

## 2019-01-10 RX ORDER — TESTOSTERONE CYPIONATE 200 MG/ML
200 INJECTION INTRAMUSCULAR ONCE
Status: COMPLETED | OUTPATIENT
Start: 2019-01-10 | End: 2019-01-10

## 2019-01-10 RX ADMIN — TESTOSTERONE CYPIONATE 200 MG: 200 INJECTION INTRAMUSCULAR at 11:43

## 2019-01-15 ENCOUNTER — TELEPHONE (OUTPATIENT)
Dept: FAMILY MEDICINE CLINIC | Age: 74
End: 2019-01-15

## 2019-01-15 ENCOUNTER — HOSPITAL ENCOUNTER (OUTPATIENT)
Dept: CARDIAC REHAB | Age: 74
Setting detail: THERAPIES SERIES
Discharge: HOME | End: 2019-01-15
Payer: MEDICARE

## 2019-01-15 DIAGNOSIS — R80.9 TYPE 2 DIABETES MELLITUS WITH MICROALBUMINURIA, WITHOUT LONG-TERM CURRENT USE OF INSULIN (HCC): Chronic | ICD-10-CM

## 2019-01-15 DIAGNOSIS — E11.29 TYPE 2 DIABETES MELLITUS WITH MICROALBUMINURIA, WITHOUT LONG-TERM CURRENT USE OF INSULIN (HCC): Chronic | ICD-10-CM

## 2019-01-15 PROCEDURE — 93798 PHYS/QHP OP CAR RHAB W/ECG: CPT

## 2019-01-16 ENCOUNTER — CARE COORDINATION (OUTPATIENT)
Dept: CARE COORDINATION | Age: 74
End: 2019-01-16

## 2019-01-24 ENCOUNTER — HOSPITAL ENCOUNTER (OUTPATIENT)
Dept: CARDIAC REHAB | Age: 74
Setting detail: THERAPIES SERIES
Discharge: HOME | End: 2019-01-24
Payer: MEDICARE

## 2019-01-24 ENCOUNTER — NURSE ONLY (OUTPATIENT)
Dept: FAMILY MEDICINE CLINIC | Age: 74
End: 2019-01-24
Payer: MEDICARE

## 2019-01-24 DIAGNOSIS — R79.89 LOW TESTOSTERONE IN MALE: Primary | ICD-10-CM

## 2019-01-24 PROCEDURE — 96372 THER/PROPH/DIAG INJ SC/IM: CPT | Performed by: FAMILY MEDICINE

## 2019-01-24 PROCEDURE — 93798 PHYS/QHP OP CAR RHAB W/ECG: CPT

## 2019-01-24 RX ORDER — TESTOSTERONE CYPIONATE 200 MG/ML
200 INJECTION INTRAMUSCULAR ONCE
Status: COMPLETED | OUTPATIENT
Start: 2019-01-24 | End: 2019-01-24

## 2019-01-24 RX ADMIN — TESTOSTERONE CYPIONATE 200 MG: 200 INJECTION INTRAMUSCULAR at 10:27

## 2019-01-25 DIAGNOSIS — E11.9 TYPE 2 DIABETES MELLITUS WITHOUT COMPLICATION, WITHOUT LONG-TERM CURRENT USE OF INSULIN (HCC): ICD-10-CM

## 2019-01-25 RX ORDER — GLIPIZIDE 10 MG/1
10 TABLET ORAL
Qty: 180 TABLET | Refills: 3 | OUTPATIENT
Start: 2019-01-25

## 2019-01-29 ENCOUNTER — HOSPITAL ENCOUNTER (OUTPATIENT)
Dept: CARDIAC REHAB | Age: 74
Setting detail: THERAPIES SERIES
Discharge: HOME | End: 2019-01-29
Payer: MEDICARE

## 2019-01-29 ENCOUNTER — CARE COORDINATION (OUTPATIENT)
Dept: CARE COORDINATION | Age: 74
End: 2019-01-29

## 2019-01-29 ENCOUNTER — TELEPHONE (OUTPATIENT)
Dept: FAMILY MEDICINE CLINIC | Age: 74
End: 2019-01-29

## 2019-01-29 PROCEDURE — 93798 PHYS/QHP OP CAR RHAB W/ECG: CPT

## 2019-01-30 ENCOUNTER — CARE COORDINATION (OUTPATIENT)
Dept: CARE COORDINATION | Age: 74
End: 2019-01-30

## 2019-02-01 ENCOUNTER — OFFICE VISIT (OUTPATIENT)
Dept: INTERNAL MEDICINE | Age: 74
End: 2019-02-01
Payer: MEDICARE

## 2019-02-01 VITALS
HEART RATE: 86 BPM | WEIGHT: 196 LBS | SYSTOLIC BLOOD PRESSURE: 132 MMHG | BODY MASS INDEX: 27.44 KG/M2 | DIASTOLIC BLOOD PRESSURE: 82 MMHG | TEMPERATURE: 97.8 F | HEIGHT: 71 IN | OXYGEN SATURATION: 98 %

## 2019-02-01 DIAGNOSIS — J06.9 UPPER RESPIRATORY TRACT INFECTION, UNSPECIFIED TYPE: Primary | ICD-10-CM

## 2019-02-01 DIAGNOSIS — H66.90 ACUTE OTITIS MEDIA, UNSPECIFIED OTITIS MEDIA TYPE: ICD-10-CM

## 2019-02-01 DIAGNOSIS — J98.9 RESPIRATORY ILLNESS: ICD-10-CM

## 2019-02-01 PROCEDURE — 1036F TOBACCO NON-USER: CPT | Performed by: NURSE PRACTITIONER

## 2019-02-01 PROCEDURE — G8482 FLU IMMUNIZE ORDER/ADMIN: HCPCS | Performed by: NURSE PRACTITIONER

## 2019-02-01 PROCEDURE — G8598 ASA/ANTIPLAT THER USED: HCPCS | Performed by: NURSE PRACTITIONER

## 2019-02-01 PROCEDURE — G8417 CALC BMI ABV UP PARAM F/U: HCPCS | Performed by: NURSE PRACTITIONER

## 2019-02-01 PROCEDURE — 3017F COLORECTAL CA SCREEN DOC REV: CPT | Performed by: NURSE PRACTITIONER

## 2019-02-01 PROCEDURE — 99213 OFFICE O/P EST LOW 20 MIN: CPT | Performed by: NURSE PRACTITIONER

## 2019-02-01 PROCEDURE — 4040F PNEUMOC VAC/ADMIN/RCVD: CPT | Performed by: NURSE PRACTITIONER

## 2019-02-01 PROCEDURE — G8427 DOCREV CUR MEDS BY ELIG CLIN: HCPCS | Performed by: NURSE PRACTITIONER

## 2019-02-01 PROCEDURE — 1101F PT FALLS ASSESS-DOCD LE1/YR: CPT | Performed by: NURSE PRACTITIONER

## 2019-02-01 PROCEDURE — 1123F ACP DISCUSS/DSCN MKR DOCD: CPT | Performed by: NURSE PRACTITIONER

## 2019-02-01 RX ORDER — METHYLPREDNISOLONE 4 MG/1
TABLET ORAL
Qty: 1 KIT | Refills: 0 | Status: SHIPPED | OUTPATIENT
Start: 2019-02-01 | End: 2019-02-07

## 2019-02-01 RX ORDER — PREDNISONE 10 MG/1
5 TABLET ORAL
COMMUNITY
Start: 2018-10-01

## 2019-02-01 RX ORDER — AZITHROMYCIN 250 MG/1
250 TABLET, FILM COATED ORAL SEE ADMIN INSTRUCTIONS
Qty: 6 TABLET | Refills: 0 | Status: SHIPPED | OUTPATIENT
Start: 2019-02-01 | End: 2019-02-06

## 2019-02-01 ASSESSMENT — ENCOUNTER SYMPTOMS
SHORTNESS OF BREATH: 0
VOMITING: 0
NAUSEA: 0
RHINORRHEA: 1
SINUS PRESSURE: 0
GASTROINTESTINAL NEGATIVE: 1
SORE THROAT: 1
EYES NEGATIVE: 1
COUGH: 1

## 2019-02-05 ENCOUNTER — HOSPITAL ENCOUNTER (OUTPATIENT)
Dept: CARDIAC REHAB | Age: 74
Setting detail: THERAPIES SERIES
Discharge: HOME | End: 2019-02-05
Payer: MEDICARE

## 2019-02-05 PROCEDURE — 93798 PHYS/QHP OP CAR RHAB W/ECG: CPT

## 2019-02-07 ENCOUNTER — HOSPITAL ENCOUNTER (OUTPATIENT)
Dept: CARDIAC REHAB | Age: 74
Setting detail: THERAPIES SERIES
Discharge: HOME | End: 2019-02-07
Payer: MEDICARE

## 2019-02-07 ENCOUNTER — NURSE ONLY (OUTPATIENT)
Dept: FAMILY MEDICINE CLINIC | Age: 74
End: 2019-02-07
Payer: MEDICARE

## 2019-02-07 DIAGNOSIS — E29.1 HYPOGONADISM MALE: Primary | ICD-10-CM

## 2019-02-07 PROCEDURE — 96372 THER/PROPH/DIAG INJ SC/IM: CPT | Performed by: FAMILY MEDICINE

## 2019-02-07 PROCEDURE — 93798 PHYS/QHP OP CAR RHAB W/ECG: CPT

## 2019-02-07 RX ORDER — TESTOSTERONE CYPIONATE 200 MG/ML
200 INJECTION INTRAMUSCULAR ONCE
Status: COMPLETED | OUTPATIENT
Start: 2019-02-07 | End: 2019-02-07

## 2019-02-07 RX ADMIN — TESTOSTERONE CYPIONATE 200 MG: 200 INJECTION INTRAMUSCULAR at 10:43

## 2019-02-12 ENCOUNTER — HOSPITAL ENCOUNTER (OUTPATIENT)
Dept: CARDIAC REHAB | Age: 74
Setting detail: THERAPIES SERIES
Discharge: HOME | End: 2019-02-12
Payer: MEDICARE

## 2019-02-12 PROCEDURE — 93798 PHYS/QHP OP CAR RHAB W/ECG: CPT

## 2019-02-15 RX ORDER — GLIPIZIDE 10 MG/1
10 TABLET ORAL
Qty: 180 TABLET | Refills: 1 | Status: SHIPPED | OUTPATIENT
Start: 2019-02-15 | End: 2019-07-03 | Stop reason: SDUPTHER

## 2019-02-19 ENCOUNTER — HOSPITAL ENCOUNTER (OUTPATIENT)
Dept: CARDIAC REHAB | Age: 74
Setting detail: THERAPIES SERIES
Discharge: HOME | End: 2019-02-19
Payer: MEDICARE

## 2019-02-19 PROCEDURE — 93798 PHYS/QHP OP CAR RHAB W/ECG: CPT

## 2019-02-21 ENCOUNTER — APPOINTMENT (OUTPATIENT)
Dept: CARDIAC REHAB | Age: 74
End: 2019-02-21
Payer: MEDICARE

## 2019-02-21 ENCOUNTER — NURSE ONLY (OUTPATIENT)
Dept: FAMILY MEDICINE CLINIC | Age: 74
End: 2019-02-21
Payer: MEDICARE

## 2019-02-21 DIAGNOSIS — E29.1 HYPOGONADISM MALE: Primary | ICD-10-CM

## 2019-02-21 PROCEDURE — 96372 THER/PROPH/DIAG INJ SC/IM: CPT | Performed by: FAMILY MEDICINE

## 2019-02-21 RX ORDER — TESTOSTERONE CYPIONATE 200 MG/ML
200 INJECTION INTRAMUSCULAR ONCE
Status: COMPLETED | OUTPATIENT
Start: 2019-02-21 | End: 2019-02-21

## 2019-02-21 RX ADMIN — TESTOSTERONE CYPIONATE 200 MG: 200 INJECTION INTRAMUSCULAR at 10:39

## 2019-02-25 ENCOUNTER — APPOINTMENT (OUTPATIENT)
Dept: CARDIAC REHAB | Age: 74
End: 2019-02-25
Payer: MEDICARE

## 2019-02-26 ENCOUNTER — APPOINTMENT (OUTPATIENT)
Dept: CARDIAC REHAB | Age: 74
End: 2019-02-26
Payer: MEDICARE

## 2019-02-28 ENCOUNTER — APPOINTMENT (OUTPATIENT)
Dept: CARDIAC REHAB | Age: 74
End: 2019-02-28
Payer: MEDICARE

## 2019-02-28 ENCOUNTER — CARE COORDINATION (OUTPATIENT)
Dept: CARE COORDINATION | Age: 74
End: 2019-02-28

## 2019-03-07 ENCOUNTER — NURSE ONLY (OUTPATIENT)
Dept: FAMILY MEDICINE CLINIC | Age: 74
End: 2019-03-07
Payer: MEDICARE

## 2019-03-07 DIAGNOSIS — E29.1 HYPOGONADISM MALE: Primary | ICD-10-CM

## 2019-03-07 PROCEDURE — 96372 THER/PROPH/DIAG INJ SC/IM: CPT | Performed by: FAMILY MEDICINE

## 2019-03-07 RX ORDER — TESTOSTERONE CYPIONATE 200 MG/ML
200 INJECTION INTRAMUSCULAR ONCE
Status: COMPLETED | OUTPATIENT
Start: 2019-03-07 | End: 2019-03-07

## 2019-03-07 RX ADMIN — TESTOSTERONE CYPIONATE 200 MG: 200 INJECTION INTRAMUSCULAR at 12:28

## 2019-03-11 ENCOUNTER — OFFICE VISIT (OUTPATIENT)
Dept: ENDOCRINOLOGY | Age: 74
End: 2019-03-11
Payer: MEDICARE

## 2019-03-11 VITALS
SYSTOLIC BLOOD PRESSURE: 170 MMHG | WEIGHT: 196 LBS | HEART RATE: 72 BPM | BODY MASS INDEX: 27.44 KG/M2 | OXYGEN SATURATION: 93 % | HEIGHT: 71 IN | DIASTOLIC BLOOD PRESSURE: 88 MMHG

## 2019-03-11 DIAGNOSIS — E29.1 HYPOGONADISM MALE: Primary | ICD-10-CM

## 2019-03-11 PROCEDURE — 3017F COLORECTAL CA SCREEN DOC REV: CPT | Performed by: INTERNAL MEDICINE

## 2019-03-11 PROCEDURE — 1101F PT FALLS ASSESS-DOCD LE1/YR: CPT | Performed by: INTERNAL MEDICINE

## 2019-03-11 PROCEDURE — G8598 ASA/ANTIPLAT THER USED: HCPCS | Performed by: INTERNAL MEDICINE

## 2019-03-11 PROCEDURE — G8482 FLU IMMUNIZE ORDER/ADMIN: HCPCS | Performed by: INTERNAL MEDICINE

## 2019-03-11 PROCEDURE — G8417 CALC BMI ABV UP PARAM F/U: HCPCS | Performed by: INTERNAL MEDICINE

## 2019-03-11 PROCEDURE — 1123F ACP DISCUSS/DSCN MKR DOCD: CPT | Performed by: INTERNAL MEDICINE

## 2019-03-11 PROCEDURE — G8427 DOCREV CUR MEDS BY ELIG CLIN: HCPCS | Performed by: INTERNAL MEDICINE

## 2019-03-11 PROCEDURE — 99213 OFFICE O/P EST LOW 20 MIN: CPT | Performed by: INTERNAL MEDICINE

## 2019-03-11 PROCEDURE — 4040F PNEUMOC VAC/ADMIN/RCVD: CPT | Performed by: INTERNAL MEDICINE

## 2019-03-11 PROCEDURE — 1036F TOBACCO NON-USER: CPT | Performed by: INTERNAL MEDICINE

## 2019-03-15 ENCOUNTER — HOSPITAL ENCOUNTER (OUTPATIENT)
Dept: LAB | Age: 74
Discharge: HOME | End: 2019-03-15
Payer: MEDICARE

## 2019-03-15 DIAGNOSIS — E11.29 TYPE 2 DIABETES MELLITUS WITH MICROALBUMINURIA, WITHOUT LONG-TERM CURRENT USE OF INSULIN (HCC): Chronic | ICD-10-CM

## 2019-03-15 DIAGNOSIS — I10 ESSENTIAL HYPERTENSION: Chronic | ICD-10-CM

## 2019-03-15 DIAGNOSIS — R80.9 TYPE 2 DIABETES MELLITUS WITH MICROALBUMINURIA, WITHOUT LONG-TERM CURRENT USE OF INSULIN (HCC): Chronic | ICD-10-CM

## 2019-03-15 LAB
ANION GAP SERPL CALCULATED.3IONS-SCNC: 12 MEQ/L (ref 9–15)
BUN BLDV-MCNC: 14 MG/DL (ref 8–23)
CALCIUM SERPL-MCNC: 9.1 MG/DL (ref 8.5–9.9)
CHLORIDE BLD-SCNC: 98 MEQ/L (ref 95–107)
CO2: 28 MEQ/L (ref 20–31)
CREAT SERPL-MCNC: 0.91 MG/DL (ref 0.7–1.2)
GFR AFRICAN AMERICAN: >60
GFR NON-AFRICAN AMERICAN: >60
GLUCOSE BLD-MCNC: 249 MG/DL (ref 70–99)
HBA1C MFR BLD: 7.5 % (ref 4.8–5.9)
POTASSIUM SERPL-SCNC: 4.7 MEQ/L (ref 3.4–4.9)
SODIUM BLD-SCNC: 138 MEQ/L (ref 135–144)

## 2019-03-15 PROCEDURE — 83036 HEMOGLOBIN GLYCOSYLATED A1C: CPT

## 2019-03-15 PROCEDURE — 80048 BASIC METABOLIC PNL TOTAL CA: CPT

## 2019-03-15 PROCEDURE — 36415 COLL VENOUS BLD VENIPUNCTURE: CPT

## 2019-03-20 ENCOUNTER — OFFICE VISIT (OUTPATIENT)
Dept: FAMILY MEDICINE CLINIC | Age: 74
End: 2019-03-20
Payer: MEDICARE

## 2019-03-20 VITALS
TEMPERATURE: 97.3 F | HEART RATE: 71 BPM | DIASTOLIC BLOOD PRESSURE: 84 MMHG | RESPIRATION RATE: 16 BRPM | BODY MASS INDEX: 27.3 KG/M2 | HEIGHT: 71 IN | SYSTOLIC BLOOD PRESSURE: 136 MMHG | WEIGHT: 195 LBS | OXYGEN SATURATION: 95 %

## 2019-03-20 DIAGNOSIS — E78.2 MIXED HYPERLIPIDEMIA: ICD-10-CM

## 2019-03-20 DIAGNOSIS — R80.9 TYPE 2 DIABETES MELLITUS WITH MICROALBUMINURIA, WITHOUT LONG-TERM CURRENT USE OF INSULIN (HCC): Primary | ICD-10-CM

## 2019-03-20 DIAGNOSIS — I25.10 CAD IN NATIVE ARTERY: ICD-10-CM

## 2019-03-20 DIAGNOSIS — I10 ESSENTIAL HYPERTENSION: ICD-10-CM

## 2019-03-20 DIAGNOSIS — J44.9 CHRONIC OBSTRUCTIVE PULMONARY DISEASE, UNSPECIFIED COPD TYPE (HCC): ICD-10-CM

## 2019-03-20 DIAGNOSIS — E11.29 TYPE 2 DIABETES MELLITUS WITH MICROALBUMINURIA, WITHOUT LONG-TERM CURRENT USE OF INSULIN (HCC): Primary | ICD-10-CM

## 2019-03-20 PROCEDURE — 1123F ACP DISCUSS/DSCN MKR DOCD: CPT | Performed by: FAMILY MEDICINE

## 2019-03-20 PROCEDURE — G8427 DOCREV CUR MEDS BY ELIG CLIN: HCPCS | Performed by: FAMILY MEDICINE

## 2019-03-20 PROCEDURE — 1036F TOBACCO NON-USER: CPT | Performed by: FAMILY MEDICINE

## 2019-03-20 PROCEDURE — 2022F DILAT RTA XM EVC RTNOPTHY: CPT | Performed by: FAMILY MEDICINE

## 2019-03-20 PROCEDURE — G8598 ASA/ANTIPLAT THER USED: HCPCS | Performed by: FAMILY MEDICINE

## 2019-03-20 PROCEDURE — G8482 FLU IMMUNIZE ORDER/ADMIN: HCPCS | Performed by: FAMILY MEDICINE

## 2019-03-20 PROCEDURE — 3023F SPIROM DOC REV: CPT | Performed by: FAMILY MEDICINE

## 2019-03-20 PROCEDURE — 1101F PT FALLS ASSESS-DOCD LE1/YR: CPT | Performed by: FAMILY MEDICINE

## 2019-03-20 PROCEDURE — 99214 OFFICE O/P EST MOD 30 MIN: CPT | Performed by: FAMILY MEDICINE

## 2019-03-20 PROCEDURE — 3045F PR MOST RECENT HEMOGLOBIN A1C LEVEL 7.0-9.0%: CPT | Performed by: FAMILY MEDICINE

## 2019-03-20 PROCEDURE — 3017F COLORECTAL CA SCREEN DOC REV: CPT | Performed by: FAMILY MEDICINE

## 2019-03-20 PROCEDURE — G8417 CALC BMI ABV UP PARAM F/U: HCPCS | Performed by: FAMILY MEDICINE

## 2019-03-20 PROCEDURE — G8926 SPIRO NO PERF OR DOC: HCPCS | Performed by: FAMILY MEDICINE

## 2019-03-20 PROCEDURE — 4040F PNEUMOC VAC/ADMIN/RCVD: CPT | Performed by: FAMILY MEDICINE

## 2019-03-20 RX ORDER — ASCORBATE CALCIUM 500 MG
500 TABLET ORAL
COMMUNITY
Start: 2010-03-02 | End: 2019-03-20

## 2019-03-20 ASSESSMENT — PATIENT HEALTH QUESTIONNAIRE - PHQ9
SUM OF ALL RESPONSES TO PHQ9 QUESTIONS 1 & 2: 0
SUM OF ALL RESPONSES TO PHQ QUESTIONS 1-9: 0
SUM OF ALL RESPONSES TO PHQ QUESTIONS 1-9: 0
1. LITTLE INTEREST OR PLEASURE IN DOING THINGS: 0
2. FEELING DOWN, DEPRESSED OR HOPELESS: 0

## 2019-03-20 ASSESSMENT — ENCOUNTER SYMPTOMS
VOMITING: 0
WHEEZING: 0
CONSTIPATION: 0
NAUSEA: 0
ABDOMINAL PAIN: 0
DIARRHEA: 0
SHORTNESS OF BREATH: 0
CHEST TIGHTNESS: 0
COUGH: 0

## 2019-03-21 ENCOUNTER — NURSE ONLY (OUTPATIENT)
Dept: FAMILY MEDICINE CLINIC | Age: 74
End: 2019-03-21
Payer: MEDICARE

## 2019-03-21 ENCOUNTER — CARE COORDINATION (OUTPATIENT)
Dept: CARE COORDINATION | Age: 74
End: 2019-03-21

## 2019-03-21 DIAGNOSIS — E29.1 HYPOGONADISM MALE: Primary | ICD-10-CM

## 2019-03-21 PROCEDURE — 96372 THER/PROPH/DIAG INJ SC/IM: CPT | Performed by: FAMILY MEDICINE

## 2019-03-21 RX ORDER — TESTOSTERONE CYPIONATE 200 MG/ML
200 INJECTION INTRAMUSCULAR ONCE
Status: COMPLETED | OUTPATIENT
Start: 2019-03-21 | End: 2019-03-21

## 2019-03-21 RX ADMIN — TESTOSTERONE CYPIONATE 200 MG: 200 INJECTION INTRAMUSCULAR at 10:41

## 2019-03-26 ENCOUNTER — CARE COORDINATION (OUTPATIENT)
Dept: CARE COORDINATION | Age: 74
End: 2019-03-26

## 2019-04-04 ENCOUNTER — NURSE ONLY (OUTPATIENT)
Dept: FAMILY MEDICINE CLINIC | Age: 74
End: 2019-04-04
Payer: MEDICARE

## 2019-04-04 DIAGNOSIS — E29.1 HYPOGONADISM MALE: Primary | ICD-10-CM

## 2019-04-04 PROCEDURE — 96372 THER/PROPH/DIAG INJ SC/IM: CPT | Performed by: INTERNAL MEDICINE

## 2019-04-04 RX ORDER — TESTOSTERONE CYPIONATE 200 MG/ML
200 INJECTION INTRAMUSCULAR ONCE
Status: COMPLETED | OUTPATIENT
Start: 2019-04-04 | End: 2019-04-04

## 2019-04-04 RX ADMIN — TESTOSTERONE CYPIONATE 200 MG: 200 INJECTION INTRAMUSCULAR at 10:48

## 2019-04-13 ENCOUNTER — HOSPITAL ENCOUNTER (EMERGENCY)
Age: 74
Discharge: HOME | End: 2019-04-13
Attending: EMERGENCY MEDICINE
Payer: MEDICARE

## 2019-04-13 ENCOUNTER — APPOINTMENT (OUTPATIENT)
Dept: GENERAL RADIOLOGY | Age: 74
End: 2019-04-13
Payer: MEDICARE

## 2019-04-13 VITALS
TEMPERATURE: 98.3 F | OXYGEN SATURATION: 94 % | BODY MASS INDEX: 27.3 KG/M2 | DIASTOLIC BLOOD PRESSURE: 72 MMHG | WEIGHT: 195 LBS | SYSTOLIC BLOOD PRESSURE: 151 MMHG | HEART RATE: 82 BPM | HEIGHT: 71 IN | RESPIRATION RATE: 18 BRPM

## 2019-04-13 DIAGNOSIS — I10 ESSENTIAL HYPERTENSION: Primary | ICD-10-CM

## 2019-04-13 LAB
ALBUMIN SERPL-MCNC: 4 G/DL (ref 3.5–4.6)
ALP BLD-CCNC: 41 U/L (ref 35–104)
ALT SERPL-CCNC: 23 U/L (ref 0–41)
ANION GAP SERPL CALCULATED.3IONS-SCNC: 12 MEQ/L (ref 9–15)
AST SERPL-CCNC: 20 U/L (ref 0–40)
BASOPHILS ABSOLUTE: 0.1 K/UL (ref 0–0.2)
BASOPHILS RELATIVE PERCENT: 0.6 %
BILIRUB SERPL-MCNC: 0.3 MG/DL (ref 0.2–0.7)
BUN BLDV-MCNC: 20 MG/DL (ref 8–23)
CALCIUM SERPL-MCNC: 10.1 MG/DL (ref 8.5–9.9)
CHLORIDE BLD-SCNC: 101 MEQ/L (ref 95–107)
CO2: 28 MEQ/L (ref 20–31)
CREAT SERPL-MCNC: 1.13 MG/DL (ref 0.7–1.2)
EKG ATRIAL RATE: 84 BPM
EKG P AXIS: 78 DEGREES
EKG P-R INTERVAL: 152 MS
EKG Q-T INTERVAL: 358 MS
EKG QRS DURATION: 102 MS
EKG QTC CALCULATION (BAZETT): 423 MS
EKG R AXIS: 24 DEGREES
EKG T AXIS: 123 DEGREES
EKG VENTRICULAR RATE: 84 BPM
EOSINOPHILS ABSOLUTE: 0.2 K/UL (ref 0–0.7)
EOSINOPHILS RELATIVE PERCENT: 1.8 %
GFR AFRICAN AMERICAN: >60
GFR NON-AFRICAN AMERICAN: >60
GLOBULIN: 2.8 G/DL (ref 2.3–3.5)
GLUCOSE BLD-MCNC: 231 MG/DL (ref 70–99)
HCT VFR BLD CALC: 52.1 % (ref 42–52)
HEMOGLOBIN: 17.5 G/DL (ref 14–18)
LYMPHOCYTES ABSOLUTE: 1.9 K/UL (ref 1–4.8)
LYMPHOCYTES RELATIVE PERCENT: 17.9 %
MAGNESIUM: 1.9 MG/DL (ref 1.7–2.4)
MCH RBC QN AUTO: 33 PG (ref 27–31.3)
MCHC RBC AUTO-ENTMCNC: 33.6 % (ref 33–37)
MCV RBC AUTO: 98 FL (ref 80–100)
MONOCYTES ABSOLUTE: 0.7 K/UL (ref 0.2–0.8)
MONOCYTES RELATIVE PERCENT: 6.8 %
NEUTROPHILS ABSOLUTE: 7.6 K/UL (ref 1.4–6.5)
NEUTROPHILS RELATIVE PERCENT: 72.9 %
PDW BLD-RTO: 14.6 % (ref 11.5–14.5)
PLATELET # BLD: 179 K/UL (ref 130–400)
POTASSIUM SERPL-SCNC: 4 MEQ/L (ref 3.4–4.9)
RBC # BLD: 5.31 M/UL (ref 4.7–6.1)
SODIUM BLD-SCNC: 141 MEQ/L (ref 135–144)
TOTAL PROTEIN: 6.8 G/DL (ref 6.3–8)
WBC # BLD: 10.4 K/UL (ref 4.8–10.8)

## 2019-04-13 PROCEDURE — 80053 COMPREHEN METABOLIC PANEL: CPT

## 2019-04-13 PROCEDURE — 36415 COLL VENOUS BLD VENIPUNCTURE: CPT

## 2019-04-13 PROCEDURE — 96374 THER/PROPH/DIAG INJ IV PUSH: CPT

## 2019-04-13 PROCEDURE — 99284 EMERGENCY DEPT VISIT MOD MDM: CPT

## 2019-04-13 PROCEDURE — 71045 X-RAY EXAM CHEST 1 VIEW: CPT

## 2019-04-13 PROCEDURE — 6370000000 HC RX 637 (ALT 250 FOR IP): Performed by: EMERGENCY MEDICINE

## 2019-04-13 PROCEDURE — 83735 ASSAY OF MAGNESIUM: CPT

## 2019-04-13 PROCEDURE — 93005 ELECTROCARDIOGRAM TRACING: CPT

## 2019-04-13 PROCEDURE — 85025 COMPLETE CBC W/AUTO DIFF WBC: CPT

## 2019-04-13 PROCEDURE — 2580000003 HC RX 258: Performed by: EMERGENCY MEDICINE

## 2019-04-13 PROCEDURE — 6360000002 HC RX W HCPCS: Performed by: EMERGENCY MEDICINE

## 2019-04-13 RX ORDER — CLONIDINE HYDROCHLORIDE 0.1 MG/1
0.2 TABLET ORAL ONCE
Status: COMPLETED | OUTPATIENT
Start: 2019-04-13 | End: 2019-04-13

## 2019-04-13 RX ORDER — HYDRALAZINE HYDROCHLORIDE 20 MG/ML
10 INJECTION INTRAMUSCULAR; INTRAVENOUS ONCE
Status: COMPLETED | OUTPATIENT
Start: 2019-04-13 | End: 2019-04-13

## 2019-04-13 RX ORDER — 0.9 % SODIUM CHLORIDE 0.9 %
500 INTRAVENOUS SOLUTION INTRAVENOUS ONCE
Status: COMPLETED | OUTPATIENT
Start: 2019-04-13 | End: 2019-04-13

## 2019-04-13 RX ADMIN — SODIUM CHLORIDE 500 ML: 9 INJECTION, SOLUTION INTRAVENOUS at 20:41

## 2019-04-13 RX ADMIN — HYDRALAZINE HYDROCHLORIDE 10 MG: 20 INJECTION INTRAMUSCULAR; INTRAVENOUS at 20:43

## 2019-04-13 RX ADMIN — CLONIDINE HYDROCHLORIDE 0.2 MG: 0.1 TABLET ORAL at 19:25

## 2019-04-13 ASSESSMENT — ENCOUNTER SYMPTOMS
COLOR CHANGE: 0
NAUSEA: 0
DIARRHEA: 0
APNEA: 0
VOMITING: 0
CONSTIPATION: 0
WHEEZING: 0
SINUS PRESSURE: 0
ABDOMINAL PAIN: 0
ABDOMINAL DISTENTION: 0
PHOTOPHOBIA: 0
SORE THROAT: 0
COUGH: 0
BACK PAIN: 0
RHINORRHEA: 0
EYE PAIN: 0
SHORTNESS OF BREATH: 0

## 2019-04-13 NOTE — ED PROVIDER NOTES
[address] [place] [address] ED  eMERGENCY dEPARTMENT eNCOUnter      Pt Name: Sonal Downs  MRN: [id number]  [place] [birth year]  Date of evaluation: 4/13/2019  Provider: Lizzie Burns MD    CHIEF COMPLAINT       Chief Complaint   Patient presents with    Hypertension     223/117 at home         HISTORY OF PRESENT ILLNESS   (Location/Symptom, Timing/Onset,Context/Setting, Quality, Duration, Modifying Factors, Severity)  Note limiting factors. Sonal Downs is a 68 y.o. male who presents to the emergency department with complaint of  elevated blood pressure at home. Denies chest pain, nausea, vomiting, cough or expectoration. Denies any other systemic symptoms. Blood pressure at home was 223/117. HPI    Nursing Notes were reviewed. REVIEW OF SYSTEMS    (2-9 systems for level 4, 10 or more for level 5)     Review of Systems   Constitutional: Negative. Negative for activity change, appetite change, chills, fatigue and fever. HENT: Negative for congestion, ear discharge, ear pain, hearing loss, rhinorrhea, sinus pressure and sore throat. Eyes: Negative for photophobia, pain and visual disturbance. Respiratory: Negative for apnea, cough, shortness of breath and wheezing. Cardiovascular: Negative for chest pain, palpitations and leg swelling. Gastrointestinal: Negative for abdominal distention, abdominal pain, constipation, diarrhea, nausea and vomiting. Endocrine: Negative for cold intolerance, heat intolerance and polyuria. Genitourinary: Negative for dysuria, flank pain, frequency and urgency. Musculoskeletal: Negative for arthralgias, back pain, gait problem, myalgias and neck stiffness. Skin: Negative for color change, pallor and rash. Allergic/Immunologic: Negative for food allergies and immunocompromised state. Neurological: Negative for dizziness, tremors, syncope, weakness, light-headedness and headaches.    Psychiatric/Behavioral: Negative for agitation, confusion and hallucinations. All other systems reviewed and are negative. Except as noted above the remainder of the review of systems was reviewed and negative.        PAST MEDICAL HISTORY     Past Medical History:   Diagnosis Date    Atypical chest pain 8/30/2012    BPH (benign prostatic hypertrophy)     CAD (coronary artery disease)     Cataracts, bilateral     Chronic low back pain     COPD (chronic obstructive pulmonary disease) ([address] 6/1/2006    DM2 (diabetes mellitus, type 2) ([address]     Duodenitis     Erectile dysfunction 3/6/2017    Fatty infiltration of liver 11/19/2014    GERD (gastroesophageal reflux disease)     History of melanoma excision 12/11/2017    Left ear 09/2017    History of tobacco use 6/1/2006    HTN (hypertension)     Hyperlipidemia     Malignant melanoma of face ([address]     Malignant melanoma of skin of face ([address]     RT ear 2000, LT ear 2017    MG (myasthenia gravis) ([address]     Nephrolithiasis     Ocular myasthenia gravis ([address] 8/28/2018    Osteoarthritis of multiple joints     hands, hips    Perennial allergic rhinitis     Personal history of colonic polyps     Pseudophakia of both eyes     Status post coronary artery bypass grafts x 5 7/30/2018 07/2018 @ [facility]    Type 2 diabetes mellitus with microalbuminuria, without long-term current use of insulin ([address] 3/6/2017    Varicose veins of both lower extremities 9/11/2017         SURGICAL HISTORY       Past Surgical History:   Procedure Laterality Date    ANGIOPLASTY      CARDIAC CATHETERIZATION      #17    CATARACT REMOVAL WITH IMPLANT Left 1992    CATARACT REMOVAL WITH IMPLANT Right 1992    COLONOSCOPY  2009    tubular adenomas    COLONOSCOPY  2013    diverticulosis, polyps, 5y repeat (DR NAVAS)    CORONARY ARTERY BYPASS GRAFT  07/02/2018    x5 (DR JETER @ [facility])    CORONARY ARTERY BYPASS GRAFT      5 vessel    MOHS SURGERY Left 09/2017    melanoma of left ear (DR GODWIN)    SKIN CANCER EXCISION Left 08/2017    TONSILLECTOMY  1955    UPPER GASTROINTESTINAL ENDOSCOPY      duodenitis/ poss early signs of celiac disease         CURRENT MEDICATIONS       Current Discharge Medication List      CONTINUE these medications which have NOT CHANGED    Details   furosemide (LASIX) 40 MG tablet Take 1 tablet by mouth daily Take 40 mg by mouth  Qty: 90 tablet, Refills: 3      triamterene-hydrochlorothiazide (MAXZIDE-25) 37.5-25 MG per tablet Take 1 tablet by mouth daily  Qty: 90 tablet, Refills: 3    Associated Diagnoses: Essential hypertension      Cyanocobalamin (VITAMIN B 12 PO) Take 1,000 mg by mouth      metoprolol succinate (TOPROL XL) 50 MG extended release tablet Take 3 tablets by mouth daily  Qty: 270 tablet, Refills: 3    Associated Diagnoses: Essential hypertension      glipiZIDE (GLUCOTROL) 10 MG tablet Take 1 tablet by mouth 2 times daily (before meals)  Qty: 180 tablet, Refills: 1      predniSONE (DELTASONE) 10 MG tablet Take 15 mg by mouth      SITagliptin (JANUVIA) 100 MG tablet Take 0.5 tablets by mouth 2 times daily  Qty: 90 tablet, Refills: 3    Associated Diagnoses: Type 2 diabetes mellitus with microalbuminuria, without long-term current use of insulin ([facility])      clotrimazole-betamethasone (LOTRISONE) 1-0.05 % cream Apply topically 2 times daily. 30cc Tube  Qty: 30 g, Refills: 2      ipratropium (ATROVENT) 0.06 % nasal spray 2 sprays by Nasal route 3 times daily  Qty: 3 Bottle, Refills: 1    Associated Diagnoses: Perennial allergic rhinitis      testosterone cypionate (DEPOTESTOTERONE CYPIONATE) 200 MG/ML injection 1 ML every 2 weeks.   Qty: 10 mL, Refills: 01      losartan (COZAAR) 100 MG tablet Take 1 tablet by mouth daily  Qty: 90 tablet, Refills: 3    Associated Diagnoses: Essential hypertension      ranitidine (ZANTAC) 150 MG tablet Take 1 tablet by mouth nightly as needed for Heartburn  Qty: 90 tablet, Refills: 3    Associated Diagnoses: Gastroesophageal reflux disease, esophagitis presence not specified nitroGLYCERIN (NITROSTAT) 0.4 MG SL tablet Place 1 tablet under the tongue every 5 minutes as needed for Chest pain  Qty: 25 tablet, Refills: 0      rosuvastatin (CRESTOR) 20 MG tablet Take 1 tablet by mouth nightly  Qty: 90 tablet, Refills: 3      fluticasone (FLONASE) 50 MCG/ACT nasal spray Use 2 sprays nasally daily  Qty: 3 Bottle, Refills: 3      tamsulosin (FLOMAX) 0.4 MG capsule Take 2 capsules by mouth daily  Qty: 180 capsule, Refills: 3      pyridostigmine (MESTINON) 60 MG tablet Take 60 mg by mouth      Immune Globulin, Human, (GAMUNEX-C IJ) Infuse 400 mg/kg once daily for 5 days. Premedicate with Tylenol 650 mg and Benedryl 25 mg PO prior to each infusion. Infuse per protocol. blood glucose monitor strips Test as directed Test once daily. Dx:  E11.29  Qty: 300 strip, Refills: 3    Comments: FREESTYLE LITE METER TEST STRIPS  Associated Diagnoses: Type 2 diabetes mellitus with microalbuminuria, without long-term current use of insulin ([facility])      fluticasone-vilanterol (BREO ELLIPTA) 100-25 MCG/INH AEPB inhaler Inhale into the lungs      sildenafil (VIAGRA) 100 MG tablet TAKE ONE TABLET BY MOUTH AS NEEDED APPROXIMATELY ONE HOUR BEFORE SEXUAL ACTIVITY. DO NOT USE MORE THAN ONE DOSE DAILY  Qty: 6 tablet, Refills: 3    Comments: Please consider 90 day supplies to promote better adherence  Associated Diagnoses: Erectile dysfunction, unspecified erectile dysfunction type      Blood Glucose Monitoring Suppl CHENCHO Test once daily. Dx: E11.29  Qty: 1 Device, Refills: 0    Comments: DISPENSE WHAT IS COVERED BY INSURANCE  Associated Diagnoses: Type 2 diabetes mellitus with microalbuminuria, without long-term current use of insulin ([facility])      Lancets MISC Test once daily.  Dx: E11.29  Qty: 100 each, Refills: 3      albuterol sulfate HFA (PROAIR HFA) 108 (90 Base) MCG/ACT inhaler Inhale 2 puffs into the lungs every 6 hours as needed for Wheezing  Qty: 1 Inhaler, Refills: 3    Associated Diagnoses: Chronic session: None    Stress: None   Relationships    Social connections:     Talks on phone: None     Gets together: None     Attends Jew service: None     Active member of club or organization: None     Attends meetings of clubs or organizations: None     Relationship status: None    Intimate partner violence:     Fear of current or ex partner: None     Emotionally abused: None     Physically abused: None     Forced sexual activity: None   Other Topics Concern    None   Social History Narrative    None       SCREENINGS    [place] Coma Scale  Eye Opening: Spontaneous  Best Verbal Response: Oriented  Best Motor Response: Obeys commands  Sb Coma Scale Score: 15        PHYSICAL EXAM    (up to 7 for level 4, 8 or more for level 5)     ED Triage Vitals [04/13/19 1849]   BP Temp Temp Source Pulse Resp SpO2 Height Weight   (!) 192/92 98.3 °F (36.8 °C) Oral 89 18 94 % 5' 11\" (1.803 m) 195 lb (88.5 kg)       Physical Exam   Constitutional: He is oriented to person, place, and time. He appears well-developed and well-nourished. No distress. HENT:   Head: Normocephalic and atraumatic. Nose: Nose normal.   Mouth/Throat: Oropharynx is clear and moist. No oropharyngeal exudate. Eyes: Pupils are equal, round, and reactive to light. Conjunctivae and EOM are normal. Right eye exhibits no discharge. Left eye exhibits no discharge. No scleral icterus. Neck: Normal range of motion. Neck supple. No JVD present. No tracheal deviation present. No thyromegaly present. Cardiovascular: Normal rate, regular rhythm, normal heart sounds and intact distal pulses. Exam reveals no gallop and no friction rub. No murmur heard. Pulmonary/Chest: Effort normal and breath sounds normal. No stridor. No respiratory distress. He has no wheezes. He has no rales. He exhibits no tenderness. Abdominal: Soft. Bowel sounds are normal. He exhibits no distension and no mass. There is no tenderness. There is no rebound and no guarding. Musculoskeletal: Normal range of motion. He exhibits no edema, tenderness or deformity. Lymphadenopathy:     He has no cervical adenopathy. Neurological: He is alert and oriented to person, place, and time. He has normal reflexes. He displays normal reflexes. No cranial nerve deficit. He exhibits normal muscle tone. Coordination normal.   Skin: Skin is warm and dry. No rash noted. He is not diaphoretic. No erythema. No pallor. Psychiatric: He has a normal mood and affect. His behavior is normal. Judgment and thought content normal.   Nursing note and vitals reviewed. DIAGNOSTIC RESULTS     EKG: All EKG's are interpreted by the Emergency Department Physician who either signs or Co-signs this chart in the absence of a cardiologist.    Twelve-lead EKG shows normal sinus rhythm, rate 84 bpm, normal electrical axis, normal intervals, no acute ST-T wave changes. RADIOLOGY:   Non-plain film images such as CT, Ultrasound and MRI are read by the radiologist. Paola Purvis radiographicimages are visualized and preliminarily interpreted by the emergency physician with the below findings:    Chest x-ray shows no acute cardiopulmonary process. Interpretation per the Radiologist below, if available at the time of this note:    XR CHEST PORTABLE    (Results Pending)         ED BEDSIDE ULTRASOUND:   Performed by ED Physician - none    LABS:  Labs Reviewed   COMPREHENSIVE METABOLIC PANEL - Abnormal; Notable for the following components:       Result Value    Glucose 231 (*)     Calcium 10.1 (*)     All other components within normal limits   CBC WITH AUTO DIFFERENTIAL - Abnormal; Notable for the following components:    Hematocrit 52.1 (*)     MCH 33.0 (*)     RDW 14.6 (*)     Neutrophils # 7.6 (*)     All other components within normal limits   MAGNESIUM       All other labs were within normal range or not returned as of this dictation.     EMERGENCY DEPARTMENT COURSE and DIFFERENTIALDIAGNOSIS/MDM:   Vitals:    Vitals:

## 2019-04-13 NOTE — ED TRIAGE NOTES
Pt Reporting 223/117 BP at home. Pt concerned, denies pain. Recent changes to BP medication, was taken off of metoprolol.

## 2019-04-16 ENCOUNTER — CARE COORDINATION (OUTPATIENT)
Dept: CARE COORDINATION | Age: 74
End: 2019-04-16

## 2019-04-16 ENCOUNTER — OFFICE VISIT (OUTPATIENT)
Dept: FAMILY MEDICINE CLINIC | Age: 74
End: 2019-04-16
Payer: MEDICARE

## 2019-04-16 VITALS
TEMPERATURE: 98.1 F | BODY MASS INDEX: 27.58 KG/M2 | DIASTOLIC BLOOD PRESSURE: 68 MMHG | SYSTOLIC BLOOD PRESSURE: 158 MMHG | HEART RATE: 78 BPM | WEIGHT: 197 LBS | RESPIRATION RATE: 16 BRPM | OXYGEN SATURATION: 95 % | HEIGHT: 71 IN

## 2019-04-16 DIAGNOSIS — I10 ESSENTIAL HYPERTENSION: Primary | Chronic | ICD-10-CM

## 2019-04-16 PROCEDURE — G8417 CALC BMI ABV UP PARAM F/U: HCPCS | Performed by: FAMILY MEDICINE

## 2019-04-16 PROCEDURE — 3017F COLORECTAL CA SCREEN DOC REV: CPT | Performed by: FAMILY MEDICINE

## 2019-04-16 PROCEDURE — G8427 DOCREV CUR MEDS BY ELIG CLIN: HCPCS | Performed by: FAMILY MEDICINE

## 2019-04-16 PROCEDURE — 99213 OFFICE O/P EST LOW 20 MIN: CPT | Performed by: FAMILY MEDICINE

## 2019-04-16 PROCEDURE — 4040F PNEUMOC VAC/ADMIN/RCVD: CPT | Performed by: FAMILY MEDICINE

## 2019-04-16 PROCEDURE — G8598 ASA/ANTIPLAT THER USED: HCPCS | Performed by: FAMILY MEDICINE

## 2019-04-16 PROCEDURE — 1123F ACP DISCUSS/DSCN MKR DOCD: CPT | Performed by: FAMILY MEDICINE

## 2019-04-16 PROCEDURE — 1036F TOBACCO NON-USER: CPT | Performed by: FAMILY MEDICINE

## 2019-04-16 RX ORDER — AZATHIOPRINE 50 MG/1
50 TABLET ORAL
COMMUNITY

## 2019-04-16 RX ORDER — IPRATROPIUM BROMIDE 42 UG/1
2 SPRAY, METERED NASAL 3 TIMES DAILY
Qty: 3 BOTTLE | Refills: 1 | Status: CANCELLED | OUTPATIENT
Start: 2019-04-16

## 2019-04-16 ASSESSMENT — PATIENT HEALTH QUESTIONNAIRE - PHQ9
1. LITTLE INTEREST OR PLEASURE IN DOING THINGS: 0
2. FEELING DOWN, DEPRESSED OR HOPELESS: 0
SUM OF ALL RESPONSES TO PHQ QUESTIONS 1-9: 0
SUM OF ALL RESPONSES TO PHQ9 QUESTIONS 1 & 2: 0
SUM OF ALL RESPONSES TO PHQ QUESTIONS 1-9: 0

## 2019-04-16 NOTE — CARE COORDINATION
No  Suggested Interventions and Community Resources         Goals Addressed     None          Prior to Admission medications    Medication Sig Start Date End Date Taking? Authorizing Provider   azaTHIOprine (IMURAN) 50 MG tablet Take 50 mg by mouth Take 2 tablets by mouth in the morning and 1 tablet in the evening. Historical Provider, MD   Cyanocobalamin (VITAMIN B 12 PO) Take 1,000 mg by mouth    Historical Provider, MD   metoprolol succinate (TOPROL XL) 50 MG extended release tablet Take 3 tablets by mouth daily 3/5/19   [place], MD   glipiZIDE (GLUCOTROL) 10 MG tablet Take 1 tablet by mouth 2 times daily (before meals) 2/15/19   [place], MD   predniSONE (DELTASONE) 10 MG tablet Take 15 mg by mouth 10/1/18   Historical Provider, MD   SITagliptin (JANUVIA) 100 MG tablet Take 0.5 tablets by mouth 2 times daily 1/15/19   [place], MD   ipratropium (ATROVENT) 0.06 % nasal spray 2 sprays by Nasal route 3 times daily 12/19/18   [place], MD   testosterone cypionate (DEPOTESTOTERONE CYPIONATE) 200 MG/ML injection 1 ML every 2 weeks. 12/6/18 5/6/19  Tiffany Phillips MD   losartan (COZAAR) 100 MG tablet Take 1 tablet by mouth daily 10/25/18   [place], MD   ranitidine (ZANTAC) 150 MG tablet Take 1 tablet by mouth nightly as needed for Heartburn 10/25/18   [place], MD   nitroGLYCERIN (NITROSTAT) 0.4 MG SL tablet Place 1 tablet under the tongue every 5 minutes as needed for Chest pain 10/25/18   [place], MD   rosuvastatin (CRESTOR) 20 MG tablet Take 1 tablet by mouth nightly 10/25/18   [place], MD   tamsulosin [facility]) 0.4 MG capsule Take 2 capsules by mouth daily 10/25/18   [place], MD   pyridostigmine (MESTINON) 60 MG tablet Take 60 mg by mouth 9/6/18   Historical Provider, MD   blood glucose monitor strips Test as directed Test once daily.  Dx:  E11.29 9/20/18   [place], MD   sildenafil (VIAGRA) 100 MG tablet TAKE ONE TABLET BY MOUTH AS NEEDED APPROXIMATELY ONE HOUR BEFORE SEXUAL ACTIVITY. DO NOT USE MORE THAN ONE DOSE DAILY 6/21/18   Everette Worthy MD   Blood Glucose Monitoring Suppl CHENCHO Test once daily. Dx: E11.29 3/20/18   Everette Worthy MD   Lancets MISC Test once daily. Dx: E11.29 3/8/18   Everette Worthy MD   albuterol sulfate HFA (PROAIR HFA) 108 (90 Base) MCG/ACT inhaler Inhale 2 puffs into the lungs every 6 hours as needed for Wheezing 9/11/17   Everette Worthy MD   ascorbic acid (VITAMIN C) 500 MG tablet Take 1,000 mg by mouth daily    Historical Provider, MD   loratadine (CLARITIN) 10 MG tablet Take 1 tablet by mouth daily 11/22/16   Everette Worthy MD   acetaminophen (TYLENOL EX ST ARTHRITIS PAIN) 500 MG tablet Take 1 tablet by mouth every 6 hours as needed for Pain 8/24/15   Natasha Ibarra MD   CINNAMON PO Take 500 mg by mouth 2 times daily. Historical Provider, MD   aspirin 81 MG EC tablet Take 81 mg by mouth daily.       Historical Provider, MD       Future Appointments   Date Time Provider Kay Bartlett   4/18/2019 10:00 AM SCHEDULE, [facility]   4/18/2019 10:10 AM Sal Crane APRN - CNP [facility]   7/10/2019 10:00 AM MD [facility] [address]   8/12/2019 10:15 AM Maricarmen Whitmore MD [facility]

## 2019-04-16 NOTE — CARE COORDINATION
Asked by Niurka Barahona RNCC to reach out to patient for assistance with medication costs. Telephone call to patient. Left voicemail of nature of call with request for return phone call. Call back number was provided.

## 2019-04-16 NOTE — CARE COORDINATION
Telephone call to [facility] Patient Assistance Program.  They indicated that they sent patient out application along with attestation form on 2/6/2019 and have not receive anything back from patient.

## 2019-04-16 NOTE — PROGRESS NOTES
Subjective:      Patient ID: Bridger Peterson is a 76 y.o. male who presents today for:     Chief Complaint   Patient presents with    Follow-Up from Hospital     patient is following up on htn. Hypertension   This is a chronic problem. The problem is controlled. Pertinent negatives include no anxiety, blurred vision, chest pain, headaches, malaise/fatigue, neck pain, orthopnea, palpitations, peripheral edema, PND, shortness of breath or sweats. Risk factors for coronary artery disease include male gender and diabetes mellitus. Past treatments include beta blockers, calcium channel blockers and angiotensin blockers. The current treatment provides significant improvement. There are no compliance problems. she brought medications in and was determined the patient was taking his metoprolol on a twice a day basis instead of 3 times a day as instructed.  Also patient has not been taking Maxzide and is not sure whether or not it was supposed to be taken off his medication regimen    Past Medical History:   Diagnosis Date    Atypical chest pain 8/30/2012    BPH (benign prostatic hypertrophy)     CAD (coronary artery disease)     Cataracts, bilateral     Chronic low back pain     COPD (chronic obstructive pulmonary disease) ([address] 6/1/2006    DM2 (diabetes mellitus, type 2) ([address]     Duodenitis     Erectile dysfunction 3/6/2017    Fatty infiltration of liver 11/19/2014    GERD (gastroesophageal reflux disease)     History of melanoma excision 12/11/2017    Left ear 09/2017    History of tobacco use 6/1/2006    HTN (hypertension)     Hyperlipidemia     Malignant melanoma of face ([address]     Malignant melanoma of skin of face ([address]     RT ear 2000, LT ear 2017    MG (myasthenia gravis) ([address]     Nephrolithiasis     Ocular myasthenia gravis ([address] 8/28/2018    Osteoarthritis of multiple joints     hands, hips    Perennial allergic rhinitis     Personal history of colonic polyps     Pseudophakia of Lifestyle    Physical activity:     Days per week: Not on file     Minutes per session: Not on file    Stress: Not on file   Relationships    Social connections:     Talks on phone: Not on file     Gets together: Not on file     Attends Ep[employer]alian service: Not on file     Active member of club or organization: Not on file     Attends meetings of clubs or organizations: Not on file     Relationship status: Not on file    Intimate partner violence:     Fear of current or ex partner: Not on file     Emotionally abused: Not on file     Physically abused: Not on file     Forced sexual activity: Not on file   Other Topics Concern    Not on file   Social History Narrative    Not on file     Current Outpatient Medications on File Prior to Visit   Medication Sig Dispense Refill    azaTHIOprine (IMURAN) 50 MG tablet Take 50 mg by mouth Take 2 tablets by mouth in the morning and 1 tablet in the evening.  Cyanocobalamin (VITAMIN B 12 PO) Take 1,000 mg by mouth      metoprolol succinate (TOPROL XL) 50 MG extended release tablet Take 3 tablets by mouth daily 270 tablet 3    glipiZIDE (GLUCOTROL) 10 MG tablet Take 1 tablet by mouth 2 times daily (before meals) 180 tablet 1    predniSONE (DELTASONE) 10 MG tablet Take 15 mg by mouth      SITagliptin (JANUVIA) 100 MG tablet Take 0.5 tablets by mouth 2 times daily 90 tablet 3    ipratropium (ATROVENT) 0.06 % nasal spray 2 sprays by Nasal route 3 times daily 3 Bottle 1    testosterone cypionate (DEPOTESTOTERONE CYPIONATE) 200 MG/ML injection 1 ML every 2 weeks.  10 mL 01    losartan (COZAAR) 100 MG tablet Take 1 tablet by mouth daily 90 tablet 3    nitroGLYCERIN (NITROSTAT) 0.4 MG SL tablet Place 1 tablet under the tongue every 5 minutes as needed for Chest pain 25 tablet 0    rosuvastatin (CRESTOR) 20 MG tablet Take 1 tablet by mouth nightly 90 tablet 3    tamsulosin (FLOMAX) 0.4 MG capsule Take 2 capsules by mouth daily 180 capsule 3    pyridostigmine (MESTINON) 60 MG tablet Take 60 mg by mouth      blood glucose monitor strips Test as directed Test once daily. Dx:  E11.29 300 strip 3    sildenafil (VIAGRA) 100 MG tablet TAKE ONE TABLET BY MOUTH AS NEEDED APPROXIMATELY ONE HOUR BEFORE SEXUAL ACTIVITY. DO NOT USE MORE THAN ONE DOSE DAILY 6 tablet 3    Blood Glucose Monitoring Suppl CHENCHO Test once daily. Dx: E11.29 1 Device 0    Lancets MISC Test once daily. Dx: E11.29 100 each 3    albuterol sulfate HFA (PROAIR HFA) 108 (90 Base) MCG/ACT inhaler Inhale 2 puffs into the lungs every 6 hours as needed for Wheezing 1 Inhaler 3    ascorbic acid (VITAMIN C) 500 MG tablet Take 1,000 mg by mouth daily      loratadine (CLARITIN) 10 MG tablet Take 1 tablet by mouth daily 30 tablet 5    acetaminophen (TYLENOL EX ST ARTHRITIS PAIN) 500 MG tablet Take 1 tablet by mouth every 6 hours as needed for Pain 120 tablet 3    CINNAMON PO Take 500 mg by mouth 2 times daily.  aspirin 81 MG EC tablet Take 81 mg by mouth daily. No current facility-administered medications on file prior to visit. Allergies:  Rupa [place]; Metformin and related; and Other    Review of Systems   Constitutional: Negative for activity change, appetite change, fatigue and malaise/fatigue. Eyes: Negative for blurred vision. Respiratory: Negative for apnea, cough, chest tightness and shortness of breath. Cardiovascular: Negative for chest pain, palpitations, orthopnea, leg swelling and PND. Gastrointestinal: Negative for abdominal pain, blood in stool, constipation, diarrhea, nausea and vomiting. Musculoskeletal: Negative for arthralgias and neck pain. Neurological: Negative for seizures and headaches. Psychiatric/Behavioral: Negative for hallucinations and suicidal ideas.        Objective:   BP (!) 158/68 (Site: Right Upper Arm, Position: Sitting, Cuff Size: Large Adult)   Pulse 78   Temp 98.1 °F (36.7 °C) (Temporal)   Resp 16   Ht 5' 11\" (1.803 m) Wt 197 lb (89.4 kg)   SpO2 95%   BMI 27.48 kg/m²     Physical Exam   Constitutional: He is oriented to person, place, and time. He appears well-developed and well-nourished. No distress. HENT:   Head: Normocephalic and atraumatic. Eyes: Pupils are equal, round, and reactive to light. Conjunctivae and EOM are normal.   Neck: Normal range of motion. Cardiovascular: Normal rate, regular rhythm and normal heart sounds. Exam reveals no gallop and no friction rub. No murmur heard. Pulmonary/Chest: Effort normal and breath sounds normal. No respiratory distress. He has no wheezes. He has no rales. He exhibits no tenderness. Neurological: He is alert and oriented to person, place, and time. Skin: Skin is warm and dry. He is not diaphoretic. Psychiatric: He has a normal mood and affect. His behavior is normal. Judgment and thought content normal.   Nursing note and vitals reviewed. Assessment & Plan:     1. Essential hypertension  Uncontrolled: advised that patient take medication as directed and check home blood pressure readings    Investigate whether patient is supposed be taking additional medication by reviewing the medical record and up      Return if symptoms worsen or fail to improve.     Spring Madden MD

## 2019-04-17 ENCOUNTER — HOSPITAL ENCOUNTER (OUTPATIENT)
Dept: GENERAL RADIOLOGY | Age: 74
Discharge: HOME | End: 2019-04-19
Payer: MEDICARE

## 2019-04-17 DIAGNOSIS — M25.512 BILATERAL SHOULDER PAIN, UNSPECIFIED CHRONICITY: ICD-10-CM

## 2019-04-17 DIAGNOSIS — M25.511 BILATERAL SHOULDER PAIN, UNSPECIFIED CHRONICITY: ICD-10-CM

## 2019-04-17 PROCEDURE — 73030 X-RAY EXAM OF SHOULDER: CPT

## 2019-04-18 ENCOUNTER — NURSE ONLY (OUTPATIENT)
Dept: FAMILY MEDICINE CLINIC | Age: 74
End: 2019-04-18

## 2019-04-18 ENCOUNTER — NURSE ONLY (OUTPATIENT)
Dept: FAMILY MEDICINE CLINIC | Age: 74
End: 2019-04-18
Payer: MEDICARE

## 2019-04-18 ENCOUNTER — TELEPHONE (OUTPATIENT)
Dept: FAMILY MEDICINE CLINIC | Age: 74
End: 2019-04-18

## 2019-04-18 ENCOUNTER — CARE COORDINATION (OUTPATIENT)
Dept: CARE COORDINATION | Age: 74
End: 2019-04-18

## 2019-04-18 VITALS — SYSTOLIC BLOOD PRESSURE: 160 MMHG | DIASTOLIC BLOOD PRESSURE: 90 MMHG

## 2019-04-18 DIAGNOSIS — E29.1 HYPOGONADISM MALE: Primary | ICD-10-CM

## 2019-04-18 DIAGNOSIS — I10 ESSENTIAL HYPERTENSION: Chronic | ICD-10-CM

## 2019-04-18 DIAGNOSIS — Z01.30 BLOOD PRESSURE CHECK: Primary | ICD-10-CM

## 2019-04-18 PROCEDURE — 96372 THER/PROPH/DIAG INJ SC/IM: CPT | Performed by: INTERNAL MEDICINE

## 2019-04-18 RX ORDER — TESTOSTERONE CYPIONATE 200 MG/ML
200 INJECTION INTRAMUSCULAR ONCE
Status: COMPLETED | OUTPATIENT
Start: 2019-04-18 | End: 2019-04-18

## 2019-04-18 RX ADMIN — TESTOSTERONE CYPIONATE 200 MG: 200 INJECTION INTRAMUSCULAR at 10:59

## 2019-04-18 NOTE — CARE COORDINATION
Ambulatory Care Coordination Note  4/18/2019  CM Risk Score: 6  Lottie Mortality Risk Score: 16    ACC: Alecia Alvarenga, RN    Summary Note:     I met with Yaritza Zacarias as he is here for a BP check. He is alert oriented, face is extremely flushed. LIZ x 4 He denies headache, numbness, tingling, or weakness. He tells me that his blood pressure this morning was 202/115 at 6 am. He took the Toprol 50 mg 2 tablets as instructed by Dr Rusty Aviles. His BP at 8 am was 216/120. He is extremely stressed as his wife was admitted to [facility] [place] and she was transferred to ICU right before he came into our office for a BP check. He did bring in his triamterene HCTZ. He still cannot remember who told him to stop taking this medication. PLAN:  Yaritza Zacarias will take all medications as prescribed  Yaritza Zacarias will continue to take his blood pressure at least every morning one hour after taking his medications. Yaritza Zacarias will utilize deep breathing, prayer to help with his stress. Yaritza Zacarias will call me if his BP remains > 150/80 mmHg. Care Coordination Interventions    Program Enrollment:  Rising Risk  Referral from Primary Care Provider:  No  Suggested Interventions and Community Resources         Goals Addressed     None          Prior to Admission medications    Medication Sig Start Date End Date Taking? Authorizing Provider   azaTHIOprine (IMURAN) 50 MG tablet Take 50 mg by mouth Take 2 tablets by mouth in the morning and 1 tablet in the evening.     Historical Provider, MD   Cyanocobalamin (VITAMIN B 12 PO) Take 1,000 mg by mouth    Historical Provider, MD   metoprolol succinate (TOPROL XL) 50 MG extended release tablet Take 3 tablets by mouth daily 3/5/19   Pola Dueñas MD   glipiZIDE (GLUCOTROL) 10 MG tablet Take 1 tablet by mouth 2 times daily (before meals) 2/15/19   Pola Dueñas MD   predniSONE (DELTASONE) 10 MG tablet Take 15 mg by mouth 10/1/18   Historical Provider, MD   SITagliptin (JANUVIA) 100 MG tablet Take 0.5 tablets by

## 2019-04-18 NOTE — TELEPHONE ENCOUNTER
Can we pend the triamterene as a medication refill? I will send to pharmacy and get him restarted. Return to office in 1 week for MA blood pressure check and schedule follow up soon.

## 2019-04-18 NOTE — PROGRESS NOTES
Patient given Testosterone 200mg IM right gluteal..  Will return in 2 weeks for next injection    Patient tolerated injection well.     Administrations This Visit     testosterone cypionate (DEPOTESTOTERONE CYPIONATE) injection 200 mg     Admin Date  04/18/2019 Action  Given Dose  200 mg Route  Intramuscular Administered By  Jen Melvin

## 2019-04-19 ENCOUNTER — CARE COORDINATION (OUTPATIENT)
Dept: CARE COORDINATION | Age: 74
End: 2019-04-19

## 2019-04-19 RX ORDER — TRIAMTERENE AND HYDROCHLOROTHIAZIDE 37.5; 25 MG/1; MG/1
1 TABLET ORAL DAILY
Qty: 30 TABLET | Refills: 1 | Status: SHIPPED | OUTPATIENT
Start: 2019-04-19 | End: 2019-12-17 | Stop reason: SDUPTHER

## 2019-04-19 NOTE — CARE COORDINATION
Telephone call to patient. Left voicemail of nature of call with request for return phone call. Call back number was provided.

## 2019-04-19 NOTE — TELEPHONE ENCOUNTER
Patient is aware but, already has several left overs about #180's tablet. He would like for us to cancel at [address] I have left a detailed message on secure [employer] [place] for pharmacy to cancel RX.

## 2019-04-23 ENCOUNTER — CARE COORDINATION (OUTPATIENT)
Dept: CARE COORDINATION | Age: 74
End: 2019-04-23

## 2019-04-23 NOTE — CARE COORDINATION
Ambulatory Care Coordination Note  4/23/2019  CM Risk Score: 6  Lottie Mortality Risk Score: 17    ACC: Alecia Alvarenga, RN    Summary Note:     I called to check in on Anshu. He says that he is extremely tired. He is spending most of his time at the hospital with his wife Martha Harrell. He tells me that he is not sleeping well. He is eating mostly at the hospital.   He states that his blood pressure is better but it is still high in the 495-672 systolic range. I have asked him to have the nurses in the unit check his pressure for him. I spoke with him about the importance of taking his medication and reducing his salt intake. He assures me that he is taking all of his prescribed medications. He knows that he is not eating right but he hopes to get back on a more normal routine once Martha Harrell is discharged home. PLAN:  Yaritza Zacarias will take his blood pressure at least daily. If his BP is greater than 922 systolic he will come into the office for a nurse visit/double check  Yaritza Zacarias will take his blood pressure at least one hour after taking his medications. Yaritza Zacarias will try to eat a low sodium diet. Yaritza Zacarias will not add any additional salt to his food unless he absolutely must due to lack of taste. Yaritza Zacarias will try to spend at least a half hour relaxing taking deep breaths and clearing his mind. Yaritza Zacarias will review the information that I will send him to help him find methods to relax and de stress to help improve his BP    Care Coordination Interventions    Program Enrollment:  Rising Risk  Referral from Primary Care Provider:  No  Suggested Interventions and Community Resources         Goals Addressed     None          Prior to Admission medications    Medication Sig Start Date End Date Taking?  Authorizing Provider   triamterene-hydrochlorothiazide (MAXZIDE-25) 37.5-25 MG per tablet Take 1 tablet by mouth daily 4/19/19   CHEMO Fortune - CNP   azaTHIOprine (IMURAN) 50 MG tablet Take 50 mg by mouth Take 2 tablets by mouth in the morning and 1 tablet in the evening. Historical Provider, MD   Cyanocobalamin (VITAMIN B 12 PO) Take 1,000 mg by mouth    Historical Provider, MD   metoprolol succinate (TOPROL XL) 50 MG extended release tablet Take 3 tablets by mouth daily 3/5/19   Qatari Republic, MD   glipiZIDE (GLUCOTROL) 10 MG tablet Take 1 tablet by mouth 2 times daily (before meals) 2/15/19   Qatari Republic, MD   predniSONE (DELTASONE) 10 MG tablet Take 15 mg by mouth 10/1/18   Historical Provider, MD   SITagliptin (JANUVIA) 100 MG tablet Take 0.5 tablets by mouth 2 times daily 1/15/19   Qatari Republic, MD   ipratropium (ATROVENT) 0.06 % nasal spray 2 sprays by Nasal route 3 times daily 12/19/18   Qatari Republic, MD   testosterone cypionate (DEPOTESTOTERONE CYPIONATE) 200 MG/ML injection 1 ML every 2 weeks. 12/6/18 5/6/19  Paula Greer MD   losartan (COZAAR) 100 MG tablet Take 1 tablet by mouth daily 10/25/18   Qatari Republic, MD   ranitidine (ZANTAC) 150 MG tablet Take 1 tablet by mouth nightly as needed for Heartburn 10/25/18   Qatari Republic, MD   nitroGLYCERIN (NITROSTAT) 0.4 MG SL tablet Place 1 tablet under the tongue every 5 minutes as needed for Chest pain 10/25/18   Qatari Republic, MD   rosuvastatin (CRESTOR) 20 MG tablet Take 1 tablet by mouth nightly 10/25/18   Qatari Republic, MD   tamsulosin [facility]) 0.4 MG capsule Take 2 capsules by mouth daily 10/25/18   Qatari Republic, MD   pyridostigmine (MESTINON) 60 MG tablet Take 60 mg by mouth 9/6/18   Historical Provider, MD   blood glucose monitor strips Test as directed Test once daily. Dx:  E11.29 9/20/18   Qatari Republic, MD   sildenafil (VIAGRA) 100 MG tablet TAKE ONE TABLET BY MOUTH AS NEEDED APPROXIMATELY ONE HOUR BEFORE SEXUAL ACTIVITY. DO NOT USE MORE THAN ONE DOSE DAILY 6/21/18   Qatari Republic, MD   Blood Glucose Monitoring Suppl CHENCHO Test once daily. Dx: E11.29 3/20/18   Qatari Republic, MD   Lancets MISC Test once daily.  Dx: E11.29 3/8/18

## 2019-04-24 DIAGNOSIS — K21.9 GASTROESOPHAGEAL REFLUX DISEASE, ESOPHAGITIS PRESENCE NOT SPECIFIED: Chronic | ICD-10-CM

## 2019-04-24 RX ORDER — RANITIDINE 150 MG/1
150 TABLET ORAL NIGHTLY PRN
Qty: 90 TABLET | Refills: 0 | Status: SHIPPED | OUTPATIENT
Start: 2019-04-24 | End: 2019-04-26 | Stop reason: SDUPTHER

## 2019-04-24 NOTE — TELEPHONE ENCOUNTER
Pharmacy  requesting medication refill.  Please approve or deny this request.    Rx requested:  Requested Prescriptions     Pending Prescriptions Disp Refills    ranitidine (ZANTAC) 150 MG tablet 90 tablet 3     Sig: Take 1 tablet by mouth nightly as needed for Heartburn         Last Office Visit:   3/20/2019      Next Visit Date:  Future Appointments   Date Time Provider Kay Bartlett   5/2/2019 10:10 AM SCHEDULE, MLTESSA COBB PCP TESTOSTERONE [facility]   5/16/2019 10:00 AM SCHEDULE, MLOR TC AMHERST PCP [facility]   5/30/2019 10:10 AM SCHEDULE, MLOR TC JOSEERST PCP TESTOSTERONE [facility]   7/10/2019 10:00 AM Khadijah Carlisle MD [facility]   8/12/2019 10:15 AM Felix Rodas MD [facility]

## 2019-04-25 ASSESSMENT — ENCOUNTER SYMPTOMS
NAUSEA: 0
ORTHOPNEA: 0
VOMITING: 0
ABDOMINAL PAIN: 0
BLOOD IN STOOL: 0
CONSTIPATION: 0
BLURRED VISION: 0
SHORTNESS OF BREATH: 0
CHEST TIGHTNESS: 0
APNEA: 0
DIARRHEA: 0
COUGH: 0

## 2019-04-26 ENCOUNTER — CARE COORDINATION (OUTPATIENT)
Dept: CARE COORDINATION | Age: 74
End: 2019-04-26

## 2019-04-26 DIAGNOSIS — K21.9 GASTROESOPHAGEAL REFLUX DISEASE, ESOPHAGITIS PRESENCE NOT SPECIFIED: Chronic | ICD-10-CM

## 2019-04-26 RX ORDER — TAMSULOSIN HYDROCHLORIDE 0.4 MG/1
0.8 CAPSULE ORAL DAILY
Qty: 180 CAPSULE | Refills: 0 | Status: SHIPPED | OUTPATIENT
Start: 2019-04-26 | End: 2019-07-03 | Stop reason: SDUPTHER

## 2019-04-26 RX ORDER — RANITIDINE 150 MG/1
150 TABLET ORAL NIGHTLY PRN
Qty: 90 TABLET | Refills: 0 | Status: SHIPPED | OUTPATIENT
Start: 2019-04-26 | End: 2019-07-14 | Stop reason: SDUPTHER

## 2019-04-29 ENCOUNTER — CARE COORDINATION (OUTPATIENT)
Dept: CARE COORDINATION | Age: 74
End: 2019-04-29

## 2019-04-29 NOTE — CARE COORDINATION
Telephone call with Bernice Sotelo office. Explained need for physician signature on form and to return pt writer. Obtained fax number [phone].

## 2019-05-01 ENCOUNTER — CARE COORDINATION (OUTPATIENT)
Dept: CARE COORDINATION | Age: 74
End: 2019-05-01

## 2019-05-01 NOTE — CARE COORDINATION
Received updated [employer] Patient Assistance Program Enrollment from Elieser Sylvester. [employer] does not accept faxes. Form mailed to address noted on form.

## 2019-05-02 ENCOUNTER — NURSE ONLY (OUTPATIENT)
Dept: FAMILY MEDICINE CLINIC | Age: 74
End: 2019-05-02
Payer: MEDICARE

## 2019-05-02 ENCOUNTER — CARE COORDINATION (OUTPATIENT)
Dept: CARE COORDINATION | Age: 74
End: 2019-05-02

## 2019-05-02 DIAGNOSIS — E29.1 HYPOGONADISM MALE: Primary | ICD-10-CM

## 2019-05-02 PROCEDURE — 96372 THER/PROPH/DIAG INJ SC/IM: CPT | Performed by: FAMILY MEDICINE

## 2019-05-02 RX ORDER — TESTOSTERONE CYPIONATE 200 MG/ML
200 INJECTION INTRAMUSCULAR ONCE
Status: COMPLETED | OUTPATIENT
Start: 2019-05-02 | End: 2019-05-02

## 2019-05-02 RX ADMIN — TESTOSTERONE CYPIONATE 200 MG: 200 INJECTION INTRAMUSCULAR at 12:01

## 2019-05-02 ASSESSMENT — ENCOUNTER SYMPTOMS: DYSPNEA ASSOCIATED WITH: EXERTION

## 2019-05-02 NOTE — CARE COORDINATION
stay on track with my health  Confidence: 8/10  Anticipated Goal Completion Date: 6/30/19              Prior to Admission medications    Medication Sig Start Date End Date Taking? Authorizing Provider   ranitidine (ZANTAC) 150 MG tablet Take 1 tablet by mouth nightly as needed for Heartburn 4/26/19   CHEMO Coker CNP   tamsulosin (FLOMAX) 0.4 MG capsule TAKE 2 CAPSULES BY MOUTH  DAILY 4/26/19   CHEMO Coker CNP   triamterene-hydrochlorothiazide (MAXZIDE-25) 37.5-25 MG per tablet Take 1 tablet by mouth daily 4/19/19   CHEMO Coker CNP   azaTHIOprine (IMURAN) 50 MG tablet Take 50 mg by mouth Take 2 tablets by mouth in the morning and 1 tablet in the evening. Historical Provider, MD   Cyanocobalamin (VITAMIN B 12 PO) Take 1,000 mg by mouth    Historical Provider, MD   metoprolol succinate (TOPROL XL) 50 MG extended release tablet Take 3 tablets by mouth daily 3/5/19   Joseph Arreola MD   glipiZIDE (GLUCOTROL) 10 MG tablet Take 1 tablet by mouth 2 times daily (before meals) 2/15/19   Joseph Arreola MD   predniSONE (DELTASONE) 10 MG tablet Take 15 mg by mouth 10/1/18   Historical Provider, MD   SITagliptin (JANUVIA) 100 MG tablet Take 0.5 tablets by mouth 2 times daily 1/15/19   Joseph Arreola MD   ipratropium (ATROVENT) 0.06 % nasal spray 2 sprays by Nasal route 3 times daily 12/19/18   Joseph Arreola MD   testosterone cypionate (DEPOTESTOTERONE CYPIONATE) 200 MG/ML injection 1 ML every 2 weeks.  12/6/18 5/6/19  Otmariana Witt MD   losartan (COZAAR) 100 MG tablet Take 1 tablet by mouth daily 10/25/18   Joseph Arreola MD   nitroGLYCERIN (NITROSTAT) 0.4 MG SL tablet Place 1 tablet under the tongue every 5 minutes as needed for Chest pain 10/25/18   Joseph Arreola MD   rosuvastatin (CRESTOR) 20 MG tablet Take 1 tablet by mouth nightly 10/25/18   Joseph Arreola MD   tamsulosin [facility]) 0.4 MG capsule Take 2 capsules by mouth daily 10/25/18   Joseph Arreola MD pyridostigmine (MESTINON) 60 MG tablet Take 60 mg by mouth 9/6/18   Historical Provider, MD   blood glucose monitor strips Test as directed Test once daily. Dx:  E11.29 9/20/18   Cheyenne Payne MD   sildenafil (VIAGRA) 100 MG tablet TAKE ONE TABLET BY MOUTH AS NEEDED APPROXIMATELY ONE HOUR BEFORE SEXUAL ACTIVITY. DO NOT USE MORE THAN ONE DOSE DAILY 6/21/18   Cheyenne Payne MD   Blood Glucose Monitoring Suppl CHENCHO Test once daily. Dx: E11.29 3/20/18   Cheyenne Payne MD   Lancets MISC Test once daily. Dx: E11.29 3/8/18   Cheyenne Payne MD   albuterol sulfate HFA (PROAIR HFA) 108 (90 Base) MCG/ACT inhaler Inhale 2 puffs into the lungs every 6 hours as needed for Wheezing 9/11/17   Cheyenne Payne MD   ascorbic acid (VITAMIN C) 500 MG tablet Take 1,000 mg by mouth daily    Historical Provider, MD   loratadine (CLARITIN) 10 MG tablet Take 1 tablet by mouth daily 11/22/16   Cheyenne Payne MD   acetaminophen (TYLENOL EX ST ARTHRITIS PAIN) 500 MG tablet Take 1 tablet by mouth every 6 hours as needed for Pain 8/24/15   Dedrick Diego MD   CINNAMON PO Take 500 mg by mouth 2 times daily. Historical Provider, MD   aspirin 81 MG EC tablet Take 81 mg by mouth daily.       Historical Provider, MD       Future Appointments   Date Time Provider Kay Bartlett   5/2/2019 10:10 AM SCHEDULE, MLOR TC NIKOLAYT PCP TESTOSTERONE [facility]X [facility]   5/16/2019 10:00 AM SCHEDULE, MLOR TC AMHERST PCP TESTOSTERONE [facility]X [facility]   5/30/2019 10:10 AM SCHEDULE, MLOR TC AMHERST PCP TESTOSTERONE MLOX [facility]   7/10/2019 10:00 AM Cheyenne Payne MD [address] [place] [address]   8/12/2019 10:15 AM Joseifna Valdes MD [facility]     ,   Diabetes Assessment    Medic Alert ID:  No  Meal Planning:  Avoidance of concentrated sweets, Calorie counting, Carb counting   How often do you test your blood sugar?:  Daily   Do you have barriers with adherence to non-pharmacologic self-management interventions?  (Nutrition/Exercise/Self-Monitoring):  No   Have you ever had to go to the ED for symptoms of low blood sugar?:  No       No patient-reported symptoms       and   COPD Assessment    Does the patient understand envrionmental exposure?:  Yes  Is the patient able to verbalize Rescue vs. Long Acting medications?:  Yes  Does the patient have a nebulizer?:  No  Does the patient use a space with inhaled medications?:  No     No patient-reported symptoms         Symptoms:   COPD associated increased fatigue: Pos      Symptom course:  stable  Breathlessness:  exertion  Increase use of rapid acting/rescue inhaled medications?:  No  Change in chronic cough?:  No/At Baseline  Change in sputum?:  No/At Baseline  Sputum characteristics:  Clear, White  Self Monitoring - SaO2:  No       .

## 2019-05-07 ENCOUNTER — CARE COORDINATION (OUTPATIENT)
Dept: CARE COORDINATION | Age: 74
End: 2019-05-07

## 2019-05-07 DIAGNOSIS — G70.01 MYASTHENIA GRAVIS WITH EXACERBATION (HCC): ICD-10-CM

## 2019-05-07 DIAGNOSIS — J44.9 CHRONIC OBSTRUCTIVE PULMONARY DISEASE, UNSPECIFIED COPD TYPE (HCC): Primary | Chronic | ICD-10-CM

## 2019-05-07 DIAGNOSIS — M15.9 PRIMARY OSTEOARTHRITIS INVOLVING MULTIPLE JOINTS: Chronic | ICD-10-CM

## 2019-05-07 ASSESSMENT — ENCOUNTER SYMPTOMS: DYSPNEA ASSOCIATED WITH: EXERTION

## 2019-05-07 NOTE — CARE COORDINATION
Trever Leaver asking for prescription/letter for handicap placard  He would like two letters as he has two vehicles.    Thank you

## 2019-05-07 NOTE — CARE COORDINATION
Ambulatory Care Coordination Note  5/7/2019  CM Risk Score: 6  Lottie Mortality Risk Score: 17    ACC: Anjelica Alexander, LILIANA    Summary Note:     Latrell Soto tells me that he feels that he is doing better. He says that the stress levels are coming down now that Susie Broussard is finally improving. He is now taking all medications as prescribed. He is checking his blood pressure everyday and he will ask one of the nurses to check his BP while he is visiting Pat at the hospital.  He states that today his BP was 124/60 and this is \"right around where it has been running for the last week or so. \"   I spoke with him about the importance of keeping his stress levels low along with methods to help decrease including focus change and breathing techniques. I also spoke with him about his diet and how salt and sodium play a part on increasing BP   He states that he is eating one meal per day at the hospital and the dietician asks him what he would like for dinner. He is not sleeping well, although he feels that this is slowly improving (he slept 5 hours last night), he naps during the day with Pat. PLAN:  Latrell Soto will continue to check his BP daily and if it is greater than 160/90 he will call me to discuss with Dr Anant Che. Latrell Soto will take all medications as prescribed. Latrell Soto will monitor his sodium intake by reading food labels and avoiding prepackaged and processed foods. Care Coordination Interventions    Program Enrollment:  Rising Risk  Referral from Primary Care Provider:  No  Suggested Interventions and Community Resources         Goals Addressed                 This Visit's Progress     Wellness Goal   Worsening     Patient Self-Management Goal for Health Maintenance  Goal: I will exercise for 30 minutes 3-5 days per week. I will follow a healthy diet as discussed by my provider. I will schedule a yearly preventative care visit.   Barriers: stress  Plan for overcoming my barriers: I will work with my care coordinator and Blu Tucker MD   pyridostigmine (MESTINON) 60 MG tablet Take 60 mg by mouth 9/6/18   Historical Provider, MD   blood glucose monitor strips Test as directed Test once daily. Dx:  E11.29 9/20/18   Anuel Moreno MD   sildenafil (VIAGRA) 100 MG tablet TAKE ONE TABLET BY MOUTH AS NEEDED APPROXIMATELY ONE HOUR BEFORE SEXUAL ACTIVITY. DO NOT USE MORE THAN ONE DOSE DAILY 6/21/18   Anuel Moreno MD   Blood Glucose Monitoring Suppl CHENCHO Test once daily. Dx: E11.29 3/20/18   Anuel Moreno MD   Lancets MISC Test once daily. Dx: E11.29 3/8/18   Anuel Moreno MD   albuterol sulfate HFA (PROAIR HFA) 108 (90 Base) MCG/ACT inhaler Inhale 2 puffs into the lungs every 6 hours as needed for Wheezing 9/11/17   Anuel Moreno MD   ascorbic acid (VITAMIN C) 500 MG tablet Take 1,000 mg by mouth daily    Historical Provider, MD   loratadine (CLARITIN) 10 MG tablet Take 1 tablet by mouth daily 11/22/16   Anuel Moreno MD   acetaminophen (TYLENOL EX ST ARTHRITIS PAIN) 500 MG tablet Take 1 tablet by mouth every 6 hours as needed for Pain 8/24/15   Bob Iraheta MD   CINNAMON PO Take 500 mg by mouth 2 times daily. Historical Provider, MD   aspirin 81 MG EC tablet Take 81 mg by mouth daily. Historical Provider, MD       Future Appointments   Date Time Provider Kay Bartlett   5/16/2019 10:00 AM SCHEDULE, GERBER COBB PCP TESTOSTERONE [facility]X [facility]   5/30/2019 12:20 PM SCHEDULE, GERBER COBB PCP TESTOSTERONE [facility]X [facility]   7/10/2019 10:00 AM Anuel Moreno MD [address]   8/12/2019 10:15 AM Jose Bryant MD [facility]     ,   Diabetes Assessment    Medic Alert ID:  No  Meal Planning:  Avoidance of concentrated sweets, Calorie counting, Carb counting   How often do you test your blood sugar?:  Daily   Do you have barriers with adherence to non-pharmacologic self-management interventions?  (Nutrition/Exercise/Self-Monitoring):  No   Have you ever had

## 2019-05-15 ENCOUNTER — CARE COORDINATION (OUTPATIENT)
Dept: CARE COORDINATION | Age: 74
End: 2019-05-15

## 2019-05-15 NOTE — CARE COORDINATION
Telephone call with patient to discuss patient assistance form/[employer] for Januvia. He confirmed that he did obtain attestation form in the mail yesterday. .  Explained would need to sign attestation form and send back with original of application that they sent back to him. Patient expressed understanding of what he needs to do.

## 2019-05-16 ENCOUNTER — NURSE ONLY (OUTPATIENT)
Dept: FAMILY MEDICINE CLINIC | Age: 74
End: 2019-05-16
Payer: MEDICARE

## 2019-05-16 DIAGNOSIS — E29.1 HYPOGONADISM MALE: Primary | ICD-10-CM

## 2019-05-16 PROCEDURE — 96372 THER/PROPH/DIAG INJ SC/IM: CPT | Performed by: FAMILY MEDICINE

## 2019-05-16 RX ORDER — TESTOSTERONE CYPIONATE 200 MG/ML
200 INJECTION INTRAMUSCULAR ONCE
Status: COMPLETED | OUTPATIENT
Start: 2019-05-16 | End: 2019-05-16

## 2019-05-16 RX ADMIN — TESTOSTERONE CYPIONATE 200 MG: 200 INJECTION INTRAMUSCULAR at 10:19

## 2019-05-16 NOTE — PROGRESS NOTES
Patient given Testosterone 200mg IM right gluteal..  Will return in 2 weeks for next injection    Patient tolerated injection well.     Administrations This Visit     testosterone cypionate (DEPOTESTOTERONE CYPIONATE) injection 200 mg     Admin Date  05/16/2019 Action  Given Dose  200 mg Route  Intramuscular Administered By  Jesu Soler

## 2019-05-28 ENCOUNTER — CARE COORDINATION (OUTPATIENT)
Dept: CARE COORDINATION | Age: 74
End: 2019-05-28

## 2019-05-28 NOTE — CARE COORDINATION
Ambulatory Care Coordination Note  5/28/2019  CM Risk Score: 6  Lottie Mortality Risk Score: 17    ACC: Carlos Dorman RN    Summary Note:     Portillo Dickens is not available for the follow up call and I spoke with his wife Jaison Smith. She tells me that he seems to be doing better. She adds that his blood pressure is much better than it has been   She says that he is also getting ready to have a stem cell injection in his shoulder to help alleviate the pain. Portillo Dickens will be in the office with Jaison Smith on Thursday and I will follow up with him at that time. Care Coordination Interventions    Program Enrollment:  Rising Risk  Referral from Primary Care Provider:  No  Suggested Interventions and Community Resources         Goals Addressed     None          Prior to Admission medications    Medication Sig Start Date End Date Taking? 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     Historical Provider, MD   Cyanocobalamin (VITAMIN B 12 PO) Take 1,000 mg by mouth    Historical Provider, MD   metoprolol succinate (TOPROL XL) 50 MG extended release tablet Take 3 tablets by mouth daily 3/5/19   Anuel Moreno MD   glipiZIDE (GLUCOTROL) 10 MG tablet Take 1 tablet by mouth 2 times daily (before meals) 2/15/19   Anuel Moreno MD   predniSONE ([employer]) 10 MG tablet Take 15 mg by mouth 10/1/18   Historical Provider, MD   SITagliptin (JANUVIA) 100 MG tablet Take 0.5 tablets by mouth 2 times daily 1/15/19   Estonian Republic, MD   ipratropium (ATROVENT) 0.06 % nasal spray 2 sprays by Nasal route 3 times daily 12/19/18   Estonian Republic, MD   testosterone cypionate (DEPOTESTOTERONE CYPIONATE) 200 MG/ML injection 1 ML every 2 weeks. 12/6/18 5/6/19  Tigre Clark MD   losartan (COZAAR) 100 MG tablet Take 1 tablet by mouth daily 10/25/18   Estonian Republic, MD   nitroGLYCERIN (NITROSTAT) 0.4 MG SL tablet Place 1 tablet under the tongue every 5 minutes as needed for Chest pain 10/25/18   Estonian Republic, MD   rosuvastatin (CRESTOR) 20 MG tablet Take 1 tablet by mouth nightly 10/25/18   Estonian Republic, MD   tamsulosin [facility]) 0.4 MG capsule Take 2 capsules by mouth daily 10/25/18   Estonian Republic, MD   pyridostigmine (MESTINON) 60 MG tablet Take 60 mg by mouth 9/6/18   Historical Provider, MD   blood glucose monitor strips Test as directed Test once daily. Dx:  E11.29 9/20/18   Estonian Republic, MD   sildenafil (VIAGRA) 100 MG tablet TAKE ONE TABLET BY MOUTH AS NEEDED APPROXIMATELY ONE HOUR BEFORE SEXUAL ACTIVITY. DO NOT USE MORE THAN ONE DOSE DAILY 6/21/18   Estonian Republic, MD   Blood Glucose Monitoring Suppl CHENCHO Test once daily. Dx: E11.29 3/20/18   Estonian Republic, MD   Lancets MISC Test once daily.  Dx: E11.29 3/8/18   Estonian Republic, MD   albuterol sulfate HFA (PROAIR HFA) 108 (90 Base) MCG/ACT inhaler Inhale 2 puffs into the lungs every 6 hours as needed for Wheezing 9/11/17   Estonian Republic, MD   ascorbic acid (VITAMIN C) 500 MG tablet Take 1,000 mg by mouth daily    Historical Provider, MD   loratadine (CLARITIN) 10 MG tablet Take 1 tablet by mouth daily 11/22/16   Estonian Republic, MD   acetaminophen (TYLENOL EX ST ARTHRITIS PAIN) 500 MG tablet Take 1 tablet by mouth every 6 hours as needed for Pain 8/24/15   Ilsa Barraza MD   CINNAMON PO Take 500 mg by mouth 2 times daily. Historical Provider, MD   aspirin 81 MG EC tablet Take 81 mg by mouth daily.       Historical Provider, MD       Future Appointments   Date Time Provider Kay Bartlett   5/30/2019 12:20 PM GERBER Hancock PCP TESTOSTERONE [facility]X [facility]   7/10/2019 10:00 AM [facility]   8/12/2019 10:15 AM Renan Del Toro MD [facility]

## 2019-05-30 ENCOUNTER — CARE COORDINATION (OUTPATIENT)
Dept: CARE COORDINATION | Age: 74
End: 2019-05-30

## 2019-05-30 ENCOUNTER — NURSE ONLY (OUTPATIENT)
Dept: FAMILY MEDICINE CLINIC | Age: 74
End: 2019-05-30
Payer: MEDICARE

## 2019-05-30 DIAGNOSIS — E29.1 HYPOGONADISM MALE: Primary | ICD-10-CM

## 2019-05-30 PROCEDURE — 96372 THER/PROPH/DIAG INJ SC/IM: CPT | Performed by: FAMILY MEDICINE

## 2019-05-30 RX ORDER — TESTOSTERONE CYPIONATE 200 MG/ML
200 INJECTION INTRAMUSCULAR ONCE
Status: COMPLETED | OUTPATIENT
Start: 2019-05-30 | End: 2019-05-30

## 2019-05-30 RX ADMIN — TESTOSTERONE CYPIONATE 200 MG: 200 INJECTION INTRAMUSCULAR at 14:04

## 2019-05-30 NOTE — CARE COORDINATION
week.  I will follow a healthy diet as discussed by my provider. I will schedule a yearly preventative care visit. Barriers: stress  Plan for overcoming my barriers: I will work with my care coordinator and health care team to stay on track with my health  Confidence: 8/10  Anticipated Goal Completion Date: 6/30/19              Prior to Admission medications    Medication Sig Start Date End Date Taking? 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), myasthenia gravis with exacerbation (G70.01)     EXPIRES: 05/2024 5/9/19   Cheyenne Payne MD   ranitidine (ZANTAC) 150 MG tablet Take 1 tablet by mouth nightly as needed for Heartburn 4/26/19   Gracie Boxer Riedy, APRN - CNP   tamsulosin (FLOMAX) 0.4 MG capsule TAKE 2 CAPSULES BY MOUTH  DAILY 4/26/19   Gracie Boxer Riedy, APRN - CNP   triamterene-hydrochlorothiazide (MAXZIDE-25) 37.5-25 MG per tablet Take 1 tablet by mouth daily 4/19/19   Gracie Boxer Riedy, APRN - CNP   azaTHIOprine (IMURAN) 50 MG tablet Take 50 mg by mouth Take 2 tablets by mouth in the morning and 1 tablet in the evening. Historical Provider, MD   Cyanocobalamin (VITAMIN B 12 PO) Take 1,000 mg by mouth    Historical Provider, MD   metoprolol succinate (TOPROL XL) 50 MG extended release tablet Take 3 tablets by mouth daily 3/5/19   Cheyenne Payne MD   glipiZIDE (GLUCOTROL) 10 MG tablet Take 1 tablet by mouth 2 times daily (before meals) 2/15/19   Cheyenne Payne MD   predniSONE (DELTASONE) 10 MG tablet Take 15 mg by mouth 10/1/18   Historical Provider, MD   SITagliptin (JANUVIA) 100 MG tablet Take 0.5 tablets by mouth 2 times daily 1/15/19   Cheyenne Payne MD   ipratropium (ATROVENT) 0.06 % nasal spray 2 sprays by Nasal route 3 times daily 12/19/18   Cheyenne Payne MD   testosterone cypionate (DEPOTESTOTERONE CYPIONATE) 200 MG/ML injection 1 ML every 2 weeks.  12/6/18 5/6/19  Josefina Valdes MD losartan (COZAAR) 100 MG tablet Take 1 tablet by mouth daily 10/25/18   Israeli Republic, MD   nitroGLYCERIN (NITROSTAT) 0.4 MG SL tablet Place 1 tablet under the tongue every 5 minutes as needed for Chest pain 10/25/18   Israeli Republic, MD   rosuvastatin (CRESTOR) 20 MG tablet Take 1 tablet by mouth nightly 10/25/18   Israeli Republic, MD   tamsulosin [facility]) 0.4 MG capsule Take 2 capsules by mouth daily 10/25/18   Israeli Republic, MD   pyridostigmine (MESTINON) 60 MG tablet Take 60 mg by mouth 9/6/18   Historical Provider, MD   blood glucose monitor strips Test as directed Test once daily. Dx:  E11.29 9/20/18   Israeli Republic, MD   sildenafil (VIAGRA) 100 MG tablet TAKE ONE TABLET BY MOUTH AS NEEDED APPROXIMATELY ONE HOUR BEFORE SEXUAL ACTIVITY. DO NOT USE MORE THAN ONE DOSE DAILY 6/21/18   Israeli Republic, MD   Blood Glucose Monitoring Suppl CHENCHO Test once daily. Dx: E11.29 3/20/18   Israeli Republic, MD   Lancets MISC Test once daily. Dx: E11.29 3/8/18   Israeli Republic, MD   albuterol sulfate HFA (PROAIR HFA) 108 (90 Base) MCG/ACT inhaler Inhale 2 puffs into the lungs every 6 hours as needed for Wheezing 9/11/17   Israeli Republic, MD   ascorbic acid (VITAMIN C) 500 MG tablet Take 1,000 mg by mouth daily    Historical Provider, MD   loratadine (CLARITIN) 10 MG tablet Take 1 tablet by mouth daily 11/22/16   Israeli Republic, MD   acetaminophen (TYLENOL EX ST ARTHRITIS PAIN) 500 MG tablet Take 1 tablet by mouth every 6 hours as needed for Pain 8/24/15   Mumtaz Salcido MD   CINNAMON PO Take 500 mg by mouth 2 times daily. Historical Provider, MD   aspirin 81 MG EC tablet Take 81 mg by mouth daily.       Historical Provider, MD       Future Appointments   Date Time Provider Kay Bartlett   6/13/2019 10:10 AM SCHEDULE, GERBER COBB PCP TESTOSTERONE MLOX [facility]   7/10/2019 10:00 AM MD [place] [facility] [place] [address]   8/12/2019 10:15 AM Ana Pradhan MD [facility]     ,   Diabetes Assessment    Medic Alert ID:  No  Meal Planning:  Avoidance of concentrated sweets, Calorie counting, Carb counting   How often do you test your blood sugar?:  Daily   Do you have barriers with adherence to non-pharmacologic self-management interventions?  (Nutrition/Exercise/Self-Monitoring):  No   Have you ever had to go to the ED for symptoms of low blood sugar?:  No       No patient-reported symptoms   Do you have hyperglycemia symptoms?:  No   Do you have hypoglycemia symptoms?:  No   Blood Sugar Monitoring Regimen:  Morning Fasting   Blood Sugar Trends:  No Change       and   COPD Assessment    Does the patient understand envrionmental exposure?:  Yes  Is the patient able to verbalize Rescue vs. Long Acting medications?:  Yes  Does the patient have a nebulizer?:  No  Does the patient use a space with inhaled medications?:  No            Symptoms:   None:  Yes      Symptom course:  stable  Breathlessness:  none  Increase use of rapid acting/rescue inhaled medications?:  No  Change in chronic cough?:  No/At Baseline  Change in sputum?:  No/At Baseline  Self Monitoring - SaO2:  No

## 2019-06-07 ENCOUNTER — CARE COORDINATION (OUTPATIENT)
Dept: CARE COORDINATION | Age: 74
End: 2019-06-07

## 2019-06-07 NOTE — CARE COORDINATION
Telephone call to patient. Informed him that he has been approved for [facility] Patient Assistance Program.  Noted that he should receive [place] in seven to ten business days.

## 2019-06-07 NOTE — CARE COORDINATION
Telephone call to [facility] Patient Assistance Program/Januvia. Representative indicated that patient was approved for program on 6/5/2019. Januvia will be sent out to patient's home and should receive in seven to ten days.

## 2019-06-11 ENCOUNTER — TELEPHONE (OUTPATIENT)
Dept: FAMILY MEDICINE CLINIC | Age: 74
End: 2019-06-11

## 2019-06-11 NOTE — TELEPHONE ENCOUNTER
Received a fax from the [employer] for patients [address] stating that the submission could not be processed because the [employer] Patient Assistance  Program is ment for people who do no have prescription drug coverage. However, patient may be eligible for an exception so they needed the patient to fill out a form and fax it again. I called the patient to see if he could come in to fill out the form so that we can resubmit ASA as Pam  believes there is only a 60 day erin period for this and the fax was sent over on 5/7/2019. Patient states he has no problem doing this but, he thinks he already did it last week with Ting Moreno. Looking in to patients chart I do see a scan in Media however it does not have the first page that the patient would need to fill out is scanned in but, the other pages that are in our fax are scanned in. I called Glo Escalante to clarify if I need to have patient fill this out or not or if she already took care of this. I left a detailed message on Paulas secure voicemail to call us back in regards to this. I advised patient I would keep him posted with what I find out. The form is in my pink polka dot folder at my desk. Waiting on Paulas call back.

## 2019-06-12 ENCOUNTER — TELEPHONE (OUTPATIENT)
Dept: FAMILY MEDICINE CLINIC | Age: 74
End: 2019-06-12

## 2019-06-12 ENCOUNTER — CARE COORDINATION (OUTPATIENT)
Dept: CARE COORDINATION | Age: 74
End: 2019-06-12

## 2019-06-12 NOTE — TELEPHONE ENCOUNTER
I called the [employer] Patient Assistance Program this morning. He has been approved and medication is being shipped out. I guess there was a number of applications sent in for this medication and could account why you were getting additional faxes. Everything things to be okay so just disregard what you received.

## 2019-06-12 NOTE — CARE COORDINATION
Received message from Jovanna Cruz for Dr. Esau Caballero asking about patient about patient assistance program for Januvia . Office received fax from [facility] requesting additional information. Telephone call to [facility] Patient Assistance Program .  They indicated that patient has been approved and Januvia is in process of being shipped to patient. Representative indicated that three applications had been received for patient and could be why office may have received request for additional information. Spoke with pharmacy at [facility] and they confirmed that medication is being shipped and patient will receive on or before 6/17/2019. Patient has refills until 12/31/2019.

## 2019-06-12 NOTE — CARE COORDINATION
Telephone call to [facility] [place] for Dr. Cliff Michaud. Explained that patient has been approved for patient assistance program for Januvia. No further paperwork or information is necessary.

## 2019-06-12 NOTE — TELEPHONE ENCOUNTER
Juan Persaud for [employer] called and said that we can disregard the paper work from the office because the patient has been approved for the [facility].

## 2019-06-13 ENCOUNTER — NURSE ONLY (OUTPATIENT)
Dept: FAMILY MEDICINE CLINIC | Age: 74
End: 2019-06-13
Payer: MEDICARE

## 2019-06-13 DIAGNOSIS — E29.1 HYPOGONADISM IN MALE: Primary | ICD-10-CM

## 2019-06-13 PROCEDURE — 96372 THER/PROPH/DIAG INJ SC/IM: CPT | Performed by: FAMILY MEDICINE

## 2019-06-13 RX ORDER — TESTOSTERONE CYPIONATE 200 MG/ML
200 INJECTION INTRAMUSCULAR ONCE
Status: COMPLETED | OUTPATIENT
Start: 2019-06-13 | End: 2019-06-13

## 2019-06-13 RX ADMIN — TESTOSTERONE CYPIONATE 200 MG: 200 INJECTION INTRAMUSCULAR at 10:05

## 2019-06-13 NOTE — TELEPHONE ENCOUNTER
Maricarmen Torres MA at 6/12/2019  8:19 AM     Status: Signed      Dragan gutierrez for [employer] called and said that we can disregard the paper work from the office because the patient has been approved for the [facility].

## 2019-06-18 ENCOUNTER — CARE COORDINATION (OUTPATIENT)
Dept: CARE COORDINATION | Age: 74
End: 2019-06-18

## 2019-06-18 NOTE — CARE COORDINATION
team to stay on track with my health  Confidence: 8/10  Anticipated Goal Completion Date: 6/30/19              Prior to Admission medications    Medication Sig Start Date End Date Taking? 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 Historical Provider, MD   Cyanocobalamin (VITAMIN B 12 PO) Take 1,000 mg by mouth    Historical Provider, MD   metoprolol succinate (TOPROL XL) 50 MG extended release tablet Take 3 tablets by mouth daily 3/5/19   Nadine Reyes MD   glipiZIDE (GLUCOTROL) 10 MG tablet Take 1 tablet by mouth 2 times daily (before meals) 2/15/19   Nadine Reyes MD   predniSONE (DELTASONE) 10 MG tablet Take 15 mg by mouth 10/1/18   Historical Provider, MD   SITagliptin (JANUVIA) 100 MG tablet Take 0.5 tablets by mouth 2 times daily 1/15/19   Nadine Reyes MD   ipratropium (ATROVENT) 0.06 % nasal spray 2 sprays by Nasal route 3 times daily 12/19/18   Nadine Reyes MD   testosterone cypionate (DEPOTESTOTERONE CYPIONATE) 200 MG/ML injection 1 ML every 2 weeks.  12/6/18 5/6/19  Felicity Larsen MD   losartan (COZAAR) 100 MG tablet Take 1 tablet by mouth daily 10/25/18   Nadine Reyes, Calorie counting, Carb counting   How often do you test your blood sugar?:  Daily   Do you have barriers with adherence to non-pharmacologic self-management interventions?  (Nutrition/Exercise/Self-Monitoring):  No   Have you ever had to go to the ED for symptoms of low blood sugar?:  No       No patient-reported symptoms   Do you have hyperglycemia symptoms?:  No   Do you have hypoglycemia symptoms?:  No   Last Blood Sugar Value:  115   Blood Sugar Monitoring Regimen:  Once a Day, Morning Fasting   Blood Sugar Trends:  No Change       and   COPD Assessment    Does the patient understand envrionmental exposure?:  Yes  Is the patient able to verbalize Rescue vs. Long Acting medications?:  Yes  Does the patient have a nebulizer?:  No  Does the patient use a space with inhaled medications?:  No     No patient-reported symptoms         Symptoms:   None:  Yes      Symptom course:  stable  Breathlessness:  none  Increase use of rapid acting/rescue inhaled medications?:  No  Change in chronic cough?:  No/At Baseline  Change in sputum?:  No/At Baseline  Sputum characteristics:  Clear, White  Self Monitoring - SaO2:  No

## 2019-06-27 ENCOUNTER — NURSE ONLY (OUTPATIENT)
Dept: FAMILY MEDICINE CLINIC | Age: 74
End: 2019-06-27
Payer: MEDICARE

## 2019-06-27 DIAGNOSIS — E29.1 HYPOGONADISM IN MALE: Primary | ICD-10-CM

## 2019-06-27 PROCEDURE — 96372 THER/PROPH/DIAG INJ SC/IM: CPT | Performed by: FAMILY MEDICINE

## 2019-06-27 RX ORDER — TESTOSTERONE CYPIONATE 200 MG/ML
200 INJECTION INTRAMUSCULAR ONCE
Status: COMPLETED | OUTPATIENT
Start: 2019-06-27 | End: 2019-06-27

## 2019-06-27 RX ADMIN — TESTOSTERONE CYPIONATE 200 MG: 200 INJECTION INTRAMUSCULAR at 16:49

## 2019-06-27 NOTE — PROGRESS NOTES
Patient given Testosterone 200mg IM left gluteal..  Will return in 2 weeks for next injection    Patient tolerated injection well.

## 2019-07-03 DIAGNOSIS — R80.9 TYPE 2 DIABETES MELLITUS WITH MICROALBUMINURIA, WITHOUT LONG-TERM CURRENT USE OF INSULIN (HCC): Primary | Chronic | ICD-10-CM

## 2019-07-03 DIAGNOSIS — J30.89 PERENNIAL ALLERGIC RHINITIS: Chronic | ICD-10-CM

## 2019-07-03 DIAGNOSIS — E11.29 TYPE 2 DIABETES MELLITUS WITH MICROALBUMINURIA, WITHOUT LONG-TERM CURRENT USE OF INSULIN (HCC): Primary | Chronic | ICD-10-CM

## 2019-07-03 RX ORDER — IPRATROPIUM BROMIDE 42 UG/1
2 SPRAY, METERED NASAL 3 TIMES DAILY
Qty: 3 BOTTLE | Refills: 3 | Status: SHIPPED | OUTPATIENT
Start: 2019-07-03 | End: 2019-09-10 | Stop reason: SDUPTHER

## 2019-07-03 RX ORDER — GLIPIZIDE 10 MG/1
10 TABLET ORAL
Qty: 180 TABLET | Refills: 1 | Status: SHIPPED | OUTPATIENT
Start: 2019-07-03 | End: 2019-08-21 | Stop reason: SDUPTHER

## 2019-07-03 RX ORDER — GLIPIZIDE 10 MG/1
10 TABLET ORAL
Qty: 180 TABLET | Refills: 1 | OUTPATIENT
Start: 2019-07-03

## 2019-07-09 ENCOUNTER — CARE COORDINATION (OUTPATIENT)
Dept: CARE COORDINATION | Age: 74
End: 2019-07-09

## 2019-07-10 ENCOUNTER — TELEPHONE (OUTPATIENT)
Dept: FAMILY MEDICINE CLINIC | Age: 74
End: 2019-07-10

## 2019-07-10 ENCOUNTER — OFFICE VISIT (OUTPATIENT)
Dept: FAMILY MEDICINE CLINIC | Age: 74
End: 2019-07-10
Payer: MEDICARE

## 2019-07-10 ENCOUNTER — NURSE ONLY (OUTPATIENT)
Dept: FAMILY MEDICINE CLINIC | Age: 74
End: 2019-07-10
Payer: MEDICARE

## 2019-07-10 VITALS
SYSTOLIC BLOOD PRESSURE: 128 MMHG | RESPIRATION RATE: 15 BRPM | DIASTOLIC BLOOD PRESSURE: 76 MMHG | HEART RATE: 77 BPM | TEMPERATURE: 98.4 F | BODY MASS INDEX: 28.28 KG/M2 | WEIGHT: 202 LBS | HEIGHT: 71 IN | OXYGEN SATURATION: 95 %

## 2019-07-10 DIAGNOSIS — E11.29 TYPE 2 DIABETES MELLITUS WITH MICROALBUMINURIA, WITHOUT LONG-TERM CURRENT USE OF INSULIN (HCC): Chronic | ICD-10-CM

## 2019-07-10 DIAGNOSIS — E78.2 MIXED HYPERLIPIDEMIA: Chronic | ICD-10-CM

## 2019-07-10 DIAGNOSIS — I25.110 ATHEROSCLEROSIS OF NATIVE CORONARY ARTERY OF NATIVE HEART WITH UNSTABLE ANGINA PECTORIS (HCC): Chronic | ICD-10-CM

## 2019-07-10 DIAGNOSIS — R80.9 TYPE 2 DIABETES MELLITUS WITH MICROALBUMINURIA, WITHOUT LONG-TERM CURRENT USE OF INSULIN (HCC): Chronic | ICD-10-CM

## 2019-07-10 DIAGNOSIS — M25.512 LEFT SHOULDER PAIN, UNSPECIFIED CHRONICITY: ICD-10-CM

## 2019-07-10 DIAGNOSIS — I10 ESSENTIAL HYPERTENSION: Chronic | ICD-10-CM

## 2019-07-10 DIAGNOSIS — E29.1 HYPOGONADISM IN MALE: Primary | ICD-10-CM

## 2019-07-10 DIAGNOSIS — Z00.00 ROUTINE GENERAL MEDICAL EXAMINATION AT A HEALTH CARE FACILITY: Primary | ICD-10-CM

## 2019-07-10 DIAGNOSIS — K76.0 FATTY INFILTRATION OF LIVER: Chronic | ICD-10-CM

## 2019-07-10 PROCEDURE — 96372 THER/PROPH/DIAG INJ SC/IM: CPT | Performed by: FAMILY MEDICINE

## 2019-07-10 PROCEDURE — G0439 PPPS, SUBSEQ VISIT: HCPCS | Performed by: FAMILY MEDICINE

## 2019-07-10 PROCEDURE — 3045F PR MOST RECENT HEMOGLOBIN A1C LEVEL 7.0-9.0%: CPT | Performed by: FAMILY MEDICINE

## 2019-07-10 PROCEDURE — 3017F COLORECTAL CA SCREEN DOC REV: CPT | Performed by: FAMILY MEDICINE

## 2019-07-10 PROCEDURE — G8598 ASA/ANTIPLAT THER USED: HCPCS | Performed by: FAMILY MEDICINE

## 2019-07-10 PROCEDURE — 4040F PNEUMOC VAC/ADMIN/RCVD: CPT | Performed by: FAMILY MEDICINE

## 2019-07-10 PROCEDURE — 1123F ACP DISCUSS/DSCN MKR DOCD: CPT | Performed by: FAMILY MEDICINE

## 2019-07-10 RX ORDER — ASCORBIC ACID 500 MG
TABLET ORAL
COMMUNITY
Start: 2010-03-02 | End: 2021-05-17

## 2019-07-10 RX ORDER — TESTOSTERONE CYPIONATE 200 MG/ML
200 INJECTION INTRAMUSCULAR ONCE
Status: COMPLETED | OUTPATIENT
Start: 2019-07-10 | End: 2019-07-10

## 2019-07-10 RX ADMIN — TESTOSTERONE CYPIONATE 200 MG: 200 INJECTION INTRAMUSCULAR at 11:10

## 2019-07-10 ASSESSMENT — LIFESTYLE VARIABLES: HOW OFTEN DO YOU HAVE A DRINK CONTAINING ALCOHOL: 0

## 2019-07-10 ASSESSMENT — ANXIETY QUESTIONNAIRES: GAD7 TOTAL SCORE: 0

## 2019-07-10 ASSESSMENT — PATIENT HEALTH QUESTIONNAIRE - PHQ9
SUM OF ALL RESPONSES TO PHQ QUESTIONS 1-9: 0
SUM OF ALL RESPONSES TO PHQ QUESTIONS 1-9: 0

## 2019-07-10 NOTE — PROGRESS NOTES
morning and 1 tablet in the evening. Yes Historical Provider, MD   Cyanocobalamin (VITAMIN B 12 PO) Take 1,000 mg by mouth Yes Historical Provider, MD   metoprolol succinate (TOPROL XL) 50 MG extended release tablet Take 3 tablets by mouth daily Yes Julianna Bryant MD   predniSONE (DELTASONE) 10 MG tablet Take 15 mg by mouth Yes Historical Provider, MD   SITagliptin (JANUVIA) 100 MG tablet Take 0.5 tablets by mouth 2 times daily Yes Japanese Republic, MD   losartan (COZAAR) 100 MG tablet Take 1 tablet by mouth daily Yes Japanese Republic, MD   nitroGLYCERIN (NITROSTAT) 0.4 MG SL tablet Place 1 tablet under the tongue every 5 minutes as needed for Chest pain Yes Japanese Republic, MD   rosuvastatin (CRESTOR) 20 MG tablet Take 1 tablet by mouth nightly Yes Japanese Republic, MD   pyridostigmine (MESTINON) 60 MG tablet Take 60 mg by mouth Yes Historical Provider, MD   sildenafil (VIAGRA) 100 MG tablet TAKE ONE TABLET BY MOUTH AS NEEDED APPROXIMATELY ONE HOUR BEFORE SEXUAL ACTIVITY. DO NOT USE MORE THAN ONE DOSE DAILY Yes Japanese Republic, MD   Blood Glucose Monitoring Suppl CHENCHO Test once daily. Dx: E11.29 Yes Japanese Republic, MD   Lancets MISC Test once daily. Dx: E11.29 Yes Japanese Republic, MD   albuterol sulfate HFA (PROAIR HFA) 108 (90 Base) MCG/ACT inhaler Inhale 2 puffs into the lungs every 6 hours as needed for Wheezing Yes Japanese Republic, MD   ascorbic acid (VITAMIN C) 500 MG tablet Take 1,000 mg by mouth daily Yes Historical Provider, MD   loratadine (CLARITIN) 10 MG tablet Take 1 tablet by mouth daily Yes Japanese Republic, MD   acetaminophen (TYLENOL EX ST ARTHRITIS PAIN) 500 MG tablet Take 1 tablet by mouth every 6 hours as needed for Pain Yes Ashlie Weems MD   CINNAMON PO Take 500 mg by mouth 2 times daily. Yes Historical Provider, MD   aspirin 81 MG EC tablet Take 81 mg by mouth daily.    Yes Historical Provider, MD   testosterone cypionate (DEPOTESTOTERONE CYPIONATE) 200 MG/ML injection 1 ML support that you need?: Yes  Do you have a Living Will?: Yes  General Health Risk Interventions:  · Pain issues: left shoulder pain, he is s/p left shoulder injection per Dr. Jose Enrique iLu, ortho is considering procedural management. He has F/U with Dr. Jose Enrique Liu in place    Safety:  Safety  Do you have working smoke detectors?: Yes  Have all throw rugs been removed or fastened?: (!) No  Do you have non-slip mats in all bathtubs?: Yes  Do all of your stairways have a railing or banister?: Yes  Are your doorways, halls and stairs free of clutter?: Yes  Do you always fasten your seatbelt when you are in a car?: Yes  Safety Interventions:  · Home safety tips provided    Personalized Preventive Plan   Current Health Maintenance Status  Immunization History   Administered Date(s) Administered    Influenza, High Dose (Fluzone 65 yrs and older) 11/23/2015, 09/25/2016, 09/11/2017, 10/09/2018    Pneumococcal Conjugate 13-valent ([id number]) 03/06/2017    Pneumococcal Conjugate 7-valent (Prevnar7) 09/17/2006    Pneumococcal Polysaccharide ([id number]) 03/20/2018    Tdap (Boostrix, Adacel) 08/04/2017    Zoster Live (Zostavax) 08/04/2017        Health Maintenance   Topic Date Due    Annual Wellness Visit (AWV)  12/11/2018    Shingles Vaccine (2 of 3) 12/02/2019 (Originally 9/29/2017)    Flu vaccine (1) 09/01/2019    Diabetic foot exam  09/20/2019    Lipid screen  11/05/2019    A1C test (Diabetic or Prediabetic)  03/15/2020    Potassium monitoring  04/13/2020    Creatinine monitoring  04/13/2020    Diabetic retinal exam  08/01/2020    Colon cancer screen colonoscopy  03/07/2023    DTaP/Tdap/Td vaccine (2 - Td) 08/04/2027    Pneumococcal 65+ years Vaccine  Completed    AAA screen  Completed    Hepatitis C screen  Completed     Recommendations for Preventive Services Due: see orders and patient instructions/AVS.  .   Recommended screening schedule for the next 5-10 years is provided to the patient in written form: see

## 2019-07-10 NOTE — PATIENT INSTRUCTIONS
not use built-in soap holders or glass shower doors as grab bars.)    Tub seats fitted with non-slip material on the legs allow you to wash sitting down. For people with limited mobility, bathtub transfer benches allow you to slide safely into the tub. Raised toilet seats and toilet safety rails are helpful for those with knee or hip problems. In the [facility]    Make sure you use a nightlight and that the area around your bed is clear of potential obstacles. Be careful with electric blankets and never go to sleep with a heating pad, which can cause serious burns even if on a low setting. Use fire-resistant mattress covers and pillows, and NEVER smoke in bed. Keep a phone next to the bed that is programmed to dial 911 at the push of a button. If you have a chronic condition, you may want to sign on with an automatic call-in service. Typically the system includes a small pendant that connects directly to an emergency medical voice-response system. You should also make arrangements to stay in contact with someonefriend, neighbor, family memberon a regular schedule. Fire Prevention   According to the [employer]. (Smoke Alarms for Every) [address] [place] [address], senior citizens are one of the two highest risk groups for death and serious injuries due to residential fires. When cooking, wear short-sleeved items, never a bulky long-sleeved robe. The [facility]'s Safety Checklist for Older Consumers emphasizes the importance of checking basements, garages, workshops and storage areas for fire hazards, such as volatile liquids, piles of old rags or clothing and overloaded circuits. Never smoke in bed or when lying down on a couch or recliner chair. Small portable electric or kerosene heaters are responsible for many home fires and should be used cautiously if at all. If you do use one, be sure to keep them away from flammable materials.     In case of fire, make sure you have a pre-established

## 2019-07-10 NOTE — TELEPHONE ENCOUNTER
AND MA'S:    PLEASE CALL PATIENT LATER TO HELP SCHEDULE FOLLOW UP APPOINTMENT IN 4-6 MONTHS FOR FOLLOW UP PER DR. FITZPATRICK HE HAS PLACED LABS IN HIS CHART FOR HIM TO DO. THANK YOU.

## 2019-07-10 NOTE — PROGRESS NOTES
Patient given Testosterone 200mg IM right gluteal..  Will return in 2 weeks for next injection    Patient tolerated injection well.     Administrations This Visit     testosterone cypionate (DEPOTESTOTERONE CYPIONATE) injection 200 mg     Admin Date  07/10/2019 Action  Given Dose  200 mg Route  Intramuscular Administered By  Taty Ascencio MA

## 2019-07-18 ENCOUNTER — CARE COORDINATION (OUTPATIENT)
Dept: CARE COORDINATION | Age: 74
End: 2019-07-18

## 2019-07-23 ENCOUNTER — CARE COORDINATION (OUTPATIENT)
Dept: CARE COORDINATION | Age: 74
End: 2019-07-23

## 2019-07-23 NOTE — CARE COORDINATION
Telephone call with patient. He indicated that he did receive his Januvia. He noted no new needs or concerns voiced at this time. Discussed plan to discharge from [address] [place] [address]  and patient was in agreement.

## 2019-07-24 ENCOUNTER — NURSE ONLY (OUTPATIENT)
Dept: FAMILY MEDICINE CLINIC | Age: 74
End: 2019-07-24
Payer: MEDICARE

## 2019-07-24 DIAGNOSIS — E29.1 HYPOGONADISM IN MALE: Primary | ICD-10-CM

## 2019-07-24 PROCEDURE — 96372 THER/PROPH/DIAG INJ SC/IM: CPT | Performed by: FAMILY MEDICINE

## 2019-07-24 RX ORDER — TESTOSTERONE CYPIONATE 200 MG/ML
200 INJECTION INTRAMUSCULAR ONCE
Status: COMPLETED | OUTPATIENT
Start: 2019-07-24 | End: 2019-07-24

## 2019-07-24 RX ADMIN — TESTOSTERONE CYPIONATE 200 MG: 200 INJECTION INTRAMUSCULAR at 09:50

## 2019-07-25 ENCOUNTER — CARE COORDINATION (OUTPATIENT)
Dept: CARE COORDINATION | Age: 74
End: 2019-07-25

## 2019-07-25 NOTE — CARE COORDINATION
improve my overall health and self management of my health  Confidence: 8/10  Anticipated Goal Completion Date: 10/31/19       COMPLETED: Wellness Goal        Patient Self-Management Goal for Health Maintenance  Goal: I will exercise for 30 minutes 3-5 days per week. I will follow a healthy diet as discussed by my provider. I will schedule a yearly preventative care visit. Barriers: stress  Plan for overcoming my barriers: I will work with my care coordinator and health care team to stay on track with my health  Confidence: 8/10  Anticipated Goal Completion Date: 6/30/19              Prior to Admission medications    Medication Sig Start Date End Date Taking? Authorizing Provider   ranitidine (ZANTAC) 150 MG tablet Take 1 tablet by mouth nightly as needed for Heartburn 7/14/19   Dino Peralta MD   vitamin C (ASCORBIC ACID) 500 MG tablet Take by mouth 3/2/10   Historical Provider, MD   ipratropium (ATROVENT) 0.06 % nasal spray 2 sprays by Nasal route 3 times daily 7/3/19   Dino Peralta MD   tamsulosin (FLOMAX) 0.4 MG capsule Take 2 capsules by mouth daily 7/3/19   Dino Peralta MD   glipiZIDE (GLUCOTROL) 10 MG tablet Take 1 tablet by mouth 2 times daily (before meals) 7/3/19   Dino Peralta MD   blood glucose test strips (FREESTYLE LITE) strip USE 1 STRIP TO CHECK GLUCOSE ONCE DAILY AS DIRECTED 6/18/19   Dino Peralta MD   Handicap Placard MISC by Does not apply route DX: COPD (J44.9), 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     Historical Provider, MD   Cyanocobalamin (VITAMIN B 12 PO) Take 1,000 mg by mouth    Historical Appointments   Date Time Provider Kay Tri   8/7/2019 10:00 AM SCHEDULE, GERBER COBB PCP TESTOSTERONE MLOX [facility]   8/12/2019 10:15 AM Michi Ortiz MD [facility]     ,   Diabetes Assessment    Medic Alert ID:  No  Meal Planning:  Avoidance of concentrated sweets, Calorie counting, Carb counting   How often do you test your blood sugar?:  Daily   Do you have barriers with adherence to non-pharmacologic self-management interventions?  (Nutrition/Exercise/Self-Monitoring):  No   Have you ever had to go to the ED for symptoms of low blood sugar?:  No           and   COPD Assessment    Does the patient understand envrionmental exposure?:  Yes  Is the patient able to verbalize Rescue vs. Long Acting medications?:  Yes  Does the patient have a nebulizer?:  No  Does the patient use a space with inhaled medications?:  No     No patient-reported symptoms         Symptoms:   None:  Yes      Symptom course:  stable  Breathlessness:  none  Increase use of rapid acting/rescue inhaled medications?:  No  Change in chronic cough?:  No/At Baseline  Change in sputum?:  No/At Baseline  Sputum characteristics:  Clear  Self Monitoring - SaO2:  No

## 2019-08-07 ENCOUNTER — NURSE ONLY (OUTPATIENT)
Dept: FAMILY MEDICINE CLINIC | Age: 74
End: 2019-08-07
Payer: MEDICARE

## 2019-08-07 DIAGNOSIS — E29.1 HYPOGONADISM IN MALE: Primary | ICD-10-CM

## 2019-08-07 PROCEDURE — 96372 THER/PROPH/DIAG INJ SC/IM: CPT | Performed by: FAMILY MEDICINE

## 2019-08-07 RX ORDER — TESTOSTERONE CYPIONATE 200 MG/ML
200 INJECTION INTRAMUSCULAR ONCE
Status: COMPLETED | OUTPATIENT
Start: 2019-08-07 | End: 2019-08-07

## 2019-08-07 RX ADMIN — TESTOSTERONE CYPIONATE 200 MG: 200 INJECTION INTRAMUSCULAR at 10:53

## 2019-08-12 ENCOUNTER — OFFICE VISIT (OUTPATIENT)
Dept: ENDOCRINOLOGY | Age: 74
End: 2019-08-12
Payer: MEDICARE

## 2019-08-12 VITALS
BODY MASS INDEX: 28.56 KG/M2 | DIASTOLIC BLOOD PRESSURE: 85 MMHG | SYSTOLIC BLOOD PRESSURE: 167 MMHG | HEIGHT: 71 IN | WEIGHT: 204 LBS | HEART RATE: 65 BPM

## 2019-08-12 DIAGNOSIS — E29.1 HYPOGONADISM MALE: ICD-10-CM

## 2019-08-12 DIAGNOSIS — E29.1 HYPOGONADISM MALE: Primary | ICD-10-CM

## 2019-08-12 LAB — PROSTATE SPECIFIC ANTIGEN: 2.35 NG/ML (ref 0–6.22)

## 2019-08-12 PROCEDURE — 4040F PNEUMOC VAC/ADMIN/RCVD: CPT | Performed by: INTERNAL MEDICINE

## 2019-08-12 PROCEDURE — G8427 DOCREV CUR MEDS BY ELIG CLIN: HCPCS | Performed by: INTERNAL MEDICINE

## 2019-08-12 PROCEDURE — 1123F ACP DISCUSS/DSCN MKR DOCD: CPT | Performed by: INTERNAL MEDICINE

## 2019-08-12 PROCEDURE — G8417 CALC BMI ABV UP PARAM F/U: HCPCS | Performed by: INTERNAL MEDICINE

## 2019-08-12 PROCEDURE — 99213 OFFICE O/P EST LOW 20 MIN: CPT | Performed by: INTERNAL MEDICINE

## 2019-08-12 PROCEDURE — G8598 ASA/ANTIPLAT THER USED: HCPCS | Performed by: INTERNAL MEDICINE

## 2019-08-12 PROCEDURE — 3017F COLORECTAL CA SCREEN DOC REV: CPT | Performed by: INTERNAL MEDICINE

## 2019-08-12 PROCEDURE — 1036F TOBACCO NON-USER: CPT | Performed by: INTERNAL MEDICINE

## 2019-08-14 LAB
SEX HORMONE BINDING GLOBULIN: 37 NMOL/L (ref 11–80)
TESTOSTERONE FREE PERCENT: 2.1 % (ref 1.6–2.9)
TESTOSTERONE FREE, CALC: 223 PG/ML (ref 47–244)
TESTOSTERONE TOTAL-MALE: 1086 NG/DL (ref 300–720)

## 2019-08-21 ENCOUNTER — NURSE ONLY (OUTPATIENT)
Dept: FAMILY MEDICINE CLINIC | Age: 74
End: 2019-08-21
Payer: MEDICARE

## 2019-08-21 DIAGNOSIS — E29.1 HYPOGONADISM IN MALE: Primary | ICD-10-CM

## 2019-08-21 PROCEDURE — 96372 THER/PROPH/DIAG INJ SC/IM: CPT | Performed by: FAMILY MEDICINE

## 2019-08-21 RX ORDER — GLIPIZIDE 10 MG/1
10 TABLET ORAL
Qty: 180 TABLET | Refills: 1 | Status: SHIPPED | OUTPATIENT
Start: 2019-08-21 | End: 2020-01-08

## 2019-08-21 RX ORDER — TESTOSTERONE CYPIONATE 200 MG/ML
200 INJECTION INTRAMUSCULAR ONCE
Status: COMPLETED | OUTPATIENT
Start: 2019-08-21 | End: 2019-08-21

## 2019-08-21 RX ADMIN — TESTOSTERONE CYPIONATE 200 MG: 200 INJECTION INTRAMUSCULAR at 10:08

## 2019-08-27 ENCOUNTER — CARE COORDINATION (OUTPATIENT)
Dept: CARE COORDINATION | Age: 74
End: 2019-08-27

## 2019-09-04 ENCOUNTER — NURSE ONLY (OUTPATIENT)
Dept: FAMILY MEDICINE CLINIC | Age: 74
End: 2019-09-04
Payer: MEDICARE

## 2019-09-04 DIAGNOSIS — E29.1 HYPOGONADISM IN MALE: Primary | ICD-10-CM

## 2019-09-04 PROCEDURE — 96372 THER/PROPH/DIAG INJ SC/IM: CPT | Performed by: FAMILY MEDICINE

## 2019-09-04 RX ORDER — TESTOSTERONE CYPIONATE 200 MG/ML
200 INJECTION INTRAMUSCULAR ONCE
Status: COMPLETED | OUTPATIENT
Start: 2019-09-04 | End: 2019-09-04

## 2019-09-04 RX ADMIN — TESTOSTERONE CYPIONATE 200 MG: 200 INJECTION INTRAMUSCULAR at 10:21

## 2019-09-04 NOTE — PROGRESS NOTES
Patient given Testosterone 200mg IM right gluteal..  Will return in 2 weeks for next injection    Patient tolerated injection well.     Administrations This Visit     testosterone cypionate (DEPOTESTOTERONE CYPIONATE) injection 200 mg     Admin Date  09/04/2019 Action  Given Dose  200 mg Route  Intramuscular Administered By  Erica Jackson LPN

## 2019-09-10 DIAGNOSIS — J30.89 PERENNIAL ALLERGIC RHINITIS: Chronic | ICD-10-CM

## 2019-09-10 RX ORDER — IPRATROPIUM BROMIDE 42 UG/1
2 SPRAY, METERED NASAL 3 TIMES DAILY
Qty: 3 BOTTLE | Refills: 3 | Status: SHIPPED | OUTPATIENT
Start: 2019-09-10 | End: 2020-07-28 | Stop reason: SDUPTHER

## 2019-09-18 ENCOUNTER — NURSE ONLY (OUTPATIENT)
Dept: FAMILY MEDICINE CLINIC | Age: 74
End: 2019-09-18
Payer: MEDICARE

## 2019-09-18 DIAGNOSIS — E29.1 HYPOGONADISM IN MALE: Primary | ICD-10-CM

## 2019-09-18 PROCEDURE — 96372 THER/PROPH/DIAG INJ SC/IM: CPT | Performed by: FAMILY MEDICINE

## 2019-09-18 RX ORDER — TESTOSTERONE CYPIONATE 200 MG/ML
200 INJECTION INTRAMUSCULAR ONCE
Status: COMPLETED | OUTPATIENT
Start: 2019-09-18 | End: 2019-09-18

## 2019-09-18 RX ADMIN — TESTOSTERONE CYPIONATE 200 MG: 200 INJECTION INTRAMUSCULAR at 10:50

## 2019-09-23 ENCOUNTER — CARE COORDINATION (OUTPATIENT)
Dept: CARE COORDINATION | Age: 74
End: 2019-09-23

## 2019-09-27 ENCOUNTER — NURSE ONLY (OUTPATIENT)
Dept: FAMILY MEDICINE CLINIC | Age: 74
End: 2019-09-27
Payer: MEDICARE

## 2019-09-27 DIAGNOSIS — Z23 NEED FOR INFLUENZA VACCINATION: Primary | ICD-10-CM

## 2019-09-27 PROCEDURE — 90653 IIV ADJUVANT VACCINE IM: CPT | Performed by: FAMILY MEDICINE

## 2019-09-27 PROCEDURE — G0008 ADMIN INFLUENZA VIRUS VAC: HCPCS | Performed by: FAMILY MEDICINE

## 2019-10-02 ENCOUNTER — NURSE ONLY (OUTPATIENT)
Dept: FAMILY MEDICINE CLINIC | Age: 74
End: 2019-10-02
Payer: MEDICARE

## 2019-10-02 DIAGNOSIS — E29.1 HYPOGONADISM IN MALE: Primary | ICD-10-CM

## 2019-10-02 PROCEDURE — 96372 THER/PROPH/DIAG INJ SC/IM: CPT | Performed by: FAMILY MEDICINE

## 2019-10-02 RX ORDER — TESTOSTERONE CYPIONATE 200 MG/ML
200 INJECTION INTRAMUSCULAR ONCE
Status: COMPLETED | OUTPATIENT
Start: 2019-10-02 | End: 2019-10-02

## 2019-10-02 RX ADMIN — TESTOSTERONE CYPIONATE 200 MG: 200 INJECTION INTRAMUSCULAR at 10:29

## 2019-10-03 ENCOUNTER — CARE COORDINATION (OUTPATIENT)
Dept: CARE COORDINATION | Age: 74
End: 2019-10-03

## 2019-10-04 ENCOUNTER — TELEPHONE (OUTPATIENT)
Dept: FAMILY MEDICINE CLINIC | Age: 74
End: 2019-10-04

## 2019-10-07 ENCOUNTER — TELEPHONE (OUTPATIENT)
Dept: FAMILY MEDICINE CLINIC | Age: 74
End: 2019-10-07

## 2019-10-07 DIAGNOSIS — E11.29 TYPE 2 DIABETES MELLITUS WITH MICROALBUMINURIA, WITHOUT LONG-TERM CURRENT USE OF INSULIN (HCC): Chronic | ICD-10-CM

## 2019-10-07 DIAGNOSIS — R80.9 TYPE 2 DIABETES MELLITUS WITH MICROALBUMINURIA, WITHOUT LONG-TERM CURRENT USE OF INSULIN (HCC): Chronic | ICD-10-CM

## 2019-10-08 ENCOUNTER — CARE COORDINATION (OUTPATIENT)
Dept: CARE COORDINATION | Age: 74
End: 2019-10-08

## 2019-10-10 ENCOUNTER — OFFICE VISIT (OUTPATIENT)
Dept: FAMILY MEDICINE CLINIC | Age: 74
End: 2019-10-10
Payer: MEDICARE

## 2019-10-10 ENCOUNTER — NURSE ONLY (OUTPATIENT)
Dept: INTERNAL MEDICINE | Age: 74
End: 2019-10-10

## 2019-10-10 VITALS — DIASTOLIC BLOOD PRESSURE: 70 MMHG | SYSTOLIC BLOOD PRESSURE: 160 MMHG

## 2019-10-10 VITALS
HEART RATE: 76 BPM | DIASTOLIC BLOOD PRESSURE: 90 MMHG | WEIGHT: 199 LBS | SYSTOLIC BLOOD PRESSURE: 150 MMHG | OXYGEN SATURATION: 95 % | TEMPERATURE: 97.9 F | BODY MASS INDEX: 27.86 KG/M2 | HEIGHT: 71 IN

## 2019-10-10 DIAGNOSIS — J44.9 CHRONIC OBSTRUCTIVE PULMONARY DISEASE, UNSPECIFIED COPD TYPE (HCC): Chronic | ICD-10-CM

## 2019-10-10 DIAGNOSIS — I10 ESSENTIAL HYPERTENSION: Primary | Chronic | ICD-10-CM

## 2019-10-10 DIAGNOSIS — I25.110 ATHEROSCLEROSIS OF NATIVE CORONARY ARTERY OF NATIVE HEART WITH UNSTABLE ANGINA PECTORIS (HCC): Chronic | ICD-10-CM

## 2019-10-10 PROCEDURE — 4040F PNEUMOC VAC/ADMIN/RCVD: CPT | Performed by: FAMILY MEDICINE

## 2019-10-10 PROCEDURE — 1123F ACP DISCUSS/DSCN MKR DOCD: CPT | Performed by: FAMILY MEDICINE

## 2019-10-10 PROCEDURE — G8417 CALC BMI ABV UP PARAM F/U: HCPCS | Performed by: FAMILY MEDICINE

## 2019-10-10 PROCEDURE — G8482 FLU IMMUNIZE ORDER/ADMIN: HCPCS | Performed by: FAMILY MEDICINE

## 2019-10-10 PROCEDURE — 99214 OFFICE O/P EST MOD 30 MIN: CPT | Performed by: FAMILY MEDICINE

## 2019-10-10 PROCEDURE — 1036F TOBACCO NON-USER: CPT | Performed by: FAMILY MEDICINE

## 2019-10-10 PROCEDURE — G8427 DOCREV CUR MEDS BY ELIG CLIN: HCPCS | Performed by: FAMILY MEDICINE

## 2019-10-10 PROCEDURE — 3017F COLORECTAL CA SCREEN DOC REV: CPT | Performed by: FAMILY MEDICINE

## 2019-10-10 PROCEDURE — G8598 ASA/ANTIPLAT THER USED: HCPCS | Performed by: FAMILY MEDICINE

## 2019-10-10 PROCEDURE — G8926 SPIRO NO PERF OR DOC: HCPCS | Performed by: FAMILY MEDICINE

## 2019-10-10 PROCEDURE — 3023F SPIROM DOC REV: CPT | Performed by: FAMILY MEDICINE

## 2019-10-10 RX ORDER — AMLODIPINE BESYLATE 5 MG/1
5 TABLET ORAL DAILY
Qty: 30 TABLET | Refills: 1 | Status: SHIPPED | OUTPATIENT
Start: 2019-10-10 | End: 2019-11-05 | Stop reason: SDUPTHER

## 2019-10-10 ASSESSMENT — ENCOUNTER SYMPTOMS
SHORTNESS OF BREATH: 0
DIARRHEA: 0
CHEST TIGHTNESS: 0
ANAL BLEEDING: 0
VOMITING: 0
COUGH: 0
BLOOD IN STOOL: 0
CONSTIPATION: 0
WHEEZING: 0
ABDOMINAL PAIN: 0
NAUSEA: 0

## 2019-10-11 LAB — DIABETIC RETINOPATHY: NEGATIVE

## 2019-10-12 DIAGNOSIS — I10 ESSENTIAL HYPERTENSION: Chronic | ICD-10-CM

## 2019-10-14 RX ORDER — LOSARTAN POTASSIUM 100 MG/1
100 TABLET ORAL DAILY
Qty: 90 TABLET | Refills: 3 | Status: SHIPPED | OUTPATIENT
Start: 2019-10-14 | End: 2020-09-30

## 2019-10-14 RX ORDER — ROSUVASTATIN CALCIUM 20 MG/1
20 TABLET, COATED ORAL NIGHTLY
Qty: 90 TABLET | Refills: 3 | Status: SHIPPED | OUTPATIENT
Start: 2019-10-14 | End: 2021-01-13

## 2019-10-16 ENCOUNTER — NURSE ONLY (OUTPATIENT)
Dept: FAMILY MEDICINE CLINIC | Age: 74
End: 2019-10-16
Payer: MEDICARE

## 2019-10-16 ENCOUNTER — NURSE ONLY (OUTPATIENT)
Dept: FAMILY MEDICINE CLINIC | Age: 74
End: 2019-10-16

## 2019-10-16 ENCOUNTER — TELEPHONE (OUTPATIENT)
Dept: FAMILY MEDICINE CLINIC | Age: 74
End: 2019-10-16

## 2019-10-16 VITALS — SYSTOLIC BLOOD PRESSURE: 130 MMHG | DIASTOLIC BLOOD PRESSURE: 64 MMHG

## 2019-10-16 DIAGNOSIS — Z01.30 BLOOD PRESSURE CHECK: Primary | ICD-10-CM

## 2019-10-16 DIAGNOSIS — E29.1 HYPOGONADISM IN MALE: Primary | ICD-10-CM

## 2019-10-16 PROCEDURE — 96372 THER/PROPH/DIAG INJ SC/IM: CPT | Performed by: FAMILY MEDICINE

## 2019-10-16 RX ORDER — TESTOSTERONE CYPIONATE 200 MG/ML
200 INJECTION INTRAMUSCULAR ONCE
Status: COMPLETED | OUTPATIENT
Start: 2019-10-16 | End: 2019-10-16

## 2019-10-16 RX ADMIN — TESTOSTERONE CYPIONATE 200 MG: 200 INJECTION INTRAMUSCULAR at 09:42

## 2019-10-30 ENCOUNTER — NURSE ONLY (OUTPATIENT)
Dept: FAMILY MEDICINE CLINIC | Age: 74
End: 2019-10-30
Payer: MEDICARE

## 2019-10-30 DIAGNOSIS — E29.1 HYPOGONADISM IN MALE: Primary | ICD-10-CM

## 2019-10-30 PROCEDURE — 96372 THER/PROPH/DIAG INJ SC/IM: CPT | Performed by: FAMILY MEDICINE

## 2019-10-30 RX ORDER — TESTOSTERONE CYPIONATE 200 MG/ML
200 INJECTION INTRAMUSCULAR ONCE
Status: COMPLETED | OUTPATIENT
Start: 2019-10-30 | End: 2019-10-30

## 2019-10-30 RX ADMIN — TESTOSTERONE CYPIONATE 200 MG: 200 INJECTION INTRAMUSCULAR at 09:29

## 2019-11-05 ENCOUNTER — OFFICE VISIT (OUTPATIENT)
Dept: FAMILY MEDICINE CLINIC | Age: 74
End: 2019-11-05
Payer: MEDICARE

## 2019-11-05 VITALS
DIASTOLIC BLOOD PRESSURE: 80 MMHG | WEIGHT: 203 LBS | SYSTOLIC BLOOD PRESSURE: 136 MMHG | HEIGHT: 71 IN | RESPIRATION RATE: 16 BRPM | TEMPERATURE: 97.5 F | HEART RATE: 80 BPM | BODY MASS INDEX: 28.42 KG/M2

## 2019-11-05 DIAGNOSIS — R80.9 TYPE 2 DIABETES MELLITUS WITH MICROALBUMINURIA, WITHOUT LONG-TERM CURRENT USE OF INSULIN (HCC): Primary | Chronic | ICD-10-CM

## 2019-11-05 DIAGNOSIS — I10 ESSENTIAL HYPERTENSION: Chronic | ICD-10-CM

## 2019-11-05 DIAGNOSIS — M15.9 PRIMARY OSTEOARTHRITIS INVOLVING MULTIPLE JOINTS: Chronic | ICD-10-CM

## 2019-11-05 DIAGNOSIS — I25.119 CORONARY ARTERY DISEASE INVOLVING NATIVE CORONARY ARTERY OF NATIVE HEART WITH ANGINA PECTORIS (HCC): Chronic | ICD-10-CM

## 2019-11-05 DIAGNOSIS — E78.2 MIXED HYPERLIPIDEMIA: Chronic | ICD-10-CM

## 2019-11-05 DIAGNOSIS — J44.9 CHRONIC OBSTRUCTIVE PULMONARY DISEASE, UNSPECIFIED COPD TYPE (HCC): Chronic | ICD-10-CM

## 2019-11-05 DIAGNOSIS — E11.29 TYPE 2 DIABETES MELLITUS WITH MICROALBUMINURIA, WITHOUT LONG-TERM CURRENT USE OF INSULIN (HCC): Primary | Chronic | ICD-10-CM

## 2019-11-05 LAB — HBA1C MFR BLD: 7.7 %

## 2019-11-05 PROCEDURE — G8926 SPIRO NO PERF OR DOC: HCPCS | Performed by: FAMILY MEDICINE

## 2019-11-05 PROCEDURE — G8417 CALC BMI ABV UP PARAM F/U: HCPCS | Performed by: FAMILY MEDICINE

## 2019-11-05 PROCEDURE — 3023F SPIROM DOC REV: CPT | Performed by: FAMILY MEDICINE

## 2019-11-05 PROCEDURE — 99214 OFFICE O/P EST MOD 30 MIN: CPT | Performed by: FAMILY MEDICINE

## 2019-11-05 PROCEDURE — G8482 FLU IMMUNIZE ORDER/ADMIN: HCPCS | Performed by: FAMILY MEDICINE

## 2019-11-05 PROCEDURE — G8427 DOCREV CUR MEDS BY ELIG CLIN: HCPCS | Performed by: FAMILY MEDICINE

## 2019-11-05 PROCEDURE — G8598 ASA/ANTIPLAT THER USED: HCPCS | Performed by: FAMILY MEDICINE

## 2019-11-05 PROCEDURE — 83036 HEMOGLOBIN GLYCOSYLATED A1C: CPT | Performed by: FAMILY MEDICINE

## 2019-11-05 PROCEDURE — 3017F COLORECTAL CA SCREEN DOC REV: CPT | Performed by: FAMILY MEDICINE

## 2019-11-05 PROCEDURE — 2022F DILAT RTA XM EVC RTNOPTHY: CPT | Performed by: FAMILY MEDICINE

## 2019-11-05 PROCEDURE — 4040F PNEUMOC VAC/ADMIN/RCVD: CPT | Performed by: FAMILY MEDICINE

## 2019-11-05 PROCEDURE — 1123F ACP DISCUSS/DSCN MKR DOCD: CPT | Performed by: FAMILY MEDICINE

## 2019-11-05 PROCEDURE — 1036F TOBACCO NON-USER: CPT | Performed by: FAMILY MEDICINE

## 2019-11-05 RX ORDER — AMLODIPINE BESYLATE 10 MG/1
10 TABLET ORAL DAILY
Qty: 90 TABLET | Refills: 3 | Status: SHIPPED | OUTPATIENT
Start: 2019-11-05 | End: 2020-01-25

## 2019-11-05 ASSESSMENT — ENCOUNTER SYMPTOMS
CHEST TIGHTNESS: 0
ABDOMINAL PAIN: 0
VOMITING: 0
DIARRHEA: 0
NAUSEA: 0
WHEEZING: 0
CONSTIPATION: 0
ANAL BLEEDING: 0
SHORTNESS OF BREATH: 0
BLOOD IN STOOL: 0
COUGH: 0

## 2019-11-08 ENCOUNTER — HOSPITAL ENCOUNTER (OUTPATIENT)
Dept: LAB | Age: 74
Discharge: HOME | End: 2019-11-08
Payer: MEDICARE

## 2019-11-08 DIAGNOSIS — I25.110 ATHEROSCLEROSIS OF NATIVE CORONARY ARTERY OF NATIVE HEART WITH UNSTABLE ANGINA PECTORIS (HCC): Chronic | ICD-10-CM

## 2019-11-08 DIAGNOSIS — I10 ESSENTIAL HYPERTENSION: Chronic | ICD-10-CM

## 2019-11-08 DIAGNOSIS — E78.2 MIXED HYPERLIPIDEMIA: Chronic | ICD-10-CM

## 2019-11-08 DIAGNOSIS — R80.9 TYPE 2 DIABETES MELLITUS WITH MICROALBUMINURIA, WITHOUT LONG-TERM CURRENT USE OF INSULIN (HCC): Chronic | ICD-10-CM

## 2019-11-08 DIAGNOSIS — K76.0 FATTY INFILTRATION OF LIVER: Chronic | ICD-10-CM

## 2019-11-08 DIAGNOSIS — E11.29 TYPE 2 DIABETES MELLITUS WITH MICROALBUMINURIA, WITHOUT LONG-TERM CURRENT USE OF INSULIN (HCC): Chronic | ICD-10-CM

## 2019-11-08 LAB
ALBUMIN SERPL-MCNC: 4.3 G/DL (ref 3.5–4.6)
ALP BLD-CCNC: 35 U/L (ref 35–104)
ALT SERPL-CCNC: 24 U/L (ref 0–41)
ANION GAP SERPL CALCULATED.3IONS-SCNC: 10 MEQ/L (ref 9–15)
AST SERPL-CCNC: 19 U/L (ref 0–40)
BASOPHILS ABSOLUTE: 0.1 K/UL (ref 0–0.2)
BASOPHILS RELATIVE PERCENT: 0.9 %
BILIRUB SERPL-MCNC: 0.5 MG/DL (ref 0.2–0.7)
BUN BLDV-MCNC: 17 MG/DL (ref 8–23)
CALCIUM SERPL-MCNC: 9.4 MG/DL (ref 8.5–9.9)
CHLORIDE BLD-SCNC: 95 MEQ/L (ref 95–107)
CHOLESTEROL, TOTAL: 131 MG/DL (ref 0–199)
CO2: 31 MEQ/L (ref 20–31)
CREAT SERPL-MCNC: 1.08 MG/DL (ref 0.7–1.2)
CREATININE URINE: 116.2 MG/DL
EOSINOPHILS ABSOLUTE: 0.2 K/UL (ref 0–0.7)
EOSINOPHILS RELATIVE PERCENT: 2.2 %
GFR AFRICAN AMERICAN: >60
GFR NON-AFRICAN AMERICAN: >60
GLOBULIN: 2.6 G/DL (ref 2.3–3.5)
GLUCOSE BLD-MCNC: 143 MG/DL (ref 70–99)
HCT VFR BLD CALC: 52.1 % (ref 42–52)
HDLC SERPL-MCNC: 43 MG/DL (ref 40–59)
HEMOGLOBIN: 17.6 G/DL (ref 14–18)
LDL CHOLESTEROL CALCULATED: 51 MG/DL (ref 0–129)
LYMPHOCYTES ABSOLUTE: 1.4 K/UL (ref 1–4.8)
LYMPHOCYTES RELATIVE PERCENT: 16.4 %
MCH RBC QN AUTO: 34 PG (ref 27–31.3)
MCHC RBC AUTO-ENTMCNC: 33.7 % (ref 33–37)
MCV RBC AUTO: 100.8 FL (ref 80–100)
MICROALBUMIN UR-MCNC: 20.5 MG/DL
MICROALBUMIN/CREAT UR-RTO: 176.4 MG/G (ref 0–30)
MONOCYTES ABSOLUTE: 0.9 K/UL (ref 0.2–0.8)
MONOCYTES RELATIVE PERCENT: 10.3 %
NEUTROPHILS ABSOLUTE: 6 K/UL (ref 1.4–6.5)
NEUTROPHILS RELATIVE PERCENT: 70.2 %
PDW BLD-RTO: 14.7 % (ref 11.5–14.5)
PLATELET # BLD: 178 K/UL (ref 130–400)
POTASSIUM SERPL-SCNC: 4.4 MEQ/L (ref 3.4–4.9)
RBC # BLD: 5.17 M/UL (ref 4.7–6.1)
SODIUM BLD-SCNC: 136 MEQ/L (ref 135–144)
TOTAL PROTEIN: 6.9 G/DL (ref 6.3–8)
TRIGL SERPL-MCNC: 184 MG/DL (ref 0–150)
WBC # BLD: 8.6 K/UL (ref 4.8–10.8)

## 2019-11-08 PROCEDURE — 85025 COMPLETE CBC W/AUTO DIFF WBC: CPT

## 2019-11-08 PROCEDURE — 36415 COLL VENOUS BLD VENIPUNCTURE: CPT

## 2019-11-08 PROCEDURE — 80053 COMPREHEN METABOLIC PANEL: CPT

## 2019-11-08 PROCEDURE — 82043 UR ALBUMIN QUANTITATIVE: CPT

## 2019-11-08 PROCEDURE — 82570 ASSAY OF URINE CREATININE: CPT

## 2019-11-08 PROCEDURE — 80061 LIPID PANEL: CPT

## 2019-11-13 ENCOUNTER — TELEPHONE (OUTPATIENT)
Dept: FAMILY MEDICINE CLINIC | Age: 74
End: 2019-11-13

## 2019-11-13 ENCOUNTER — NURSE ONLY (OUTPATIENT)
Dept: FAMILY MEDICINE CLINIC | Age: 74
End: 2019-11-13

## 2019-11-13 VITALS — SYSTOLIC BLOOD PRESSURE: 124 MMHG | DIASTOLIC BLOOD PRESSURE: 60 MMHG

## 2019-11-20 ENCOUNTER — NURSE ONLY (OUTPATIENT)
Dept: FAMILY MEDICINE CLINIC | Age: 74
End: 2019-11-20
Payer: MEDICARE

## 2019-11-20 DIAGNOSIS — E29.1 HYPOGONADISM IN MALE: Primary | ICD-10-CM

## 2019-11-20 PROCEDURE — 96372 THER/PROPH/DIAG INJ SC/IM: CPT | Performed by: FAMILY MEDICINE

## 2019-11-20 RX ORDER — TESTOSTERONE CYPIONATE 200 MG/ML
200 INJECTION INTRAMUSCULAR ONCE
Status: COMPLETED | OUTPATIENT
Start: 2019-11-20 | End: 2019-11-20

## 2019-11-20 RX ADMIN — TESTOSTERONE CYPIONATE 200 MG: 200 INJECTION INTRAMUSCULAR at 09:23

## 2019-11-26 ENCOUNTER — OFFICE VISIT (OUTPATIENT)
Dept: ENDOCRINOLOGY | Age: 74
End: 2019-11-26
Payer: MEDICARE

## 2019-11-26 VITALS
HEART RATE: 82 BPM | BODY MASS INDEX: 27.58 KG/M2 | DIASTOLIC BLOOD PRESSURE: 69 MMHG | HEIGHT: 71 IN | SYSTOLIC BLOOD PRESSURE: 114 MMHG | WEIGHT: 197 LBS

## 2019-11-26 DIAGNOSIS — E29.1 HYPOGONADISM MALE: Primary | ICD-10-CM

## 2019-11-26 DIAGNOSIS — E29.1 HYPOGONADISM MALE: ICD-10-CM

## 2019-11-26 PROCEDURE — G8428 CUR MEDS NOT DOCUMENT: HCPCS | Performed by: INTERNAL MEDICINE

## 2019-11-26 PROCEDURE — 99213 OFFICE O/P EST LOW 20 MIN: CPT | Performed by: INTERNAL MEDICINE

## 2019-11-26 PROCEDURE — G8417 CALC BMI ABV UP PARAM F/U: HCPCS | Performed by: INTERNAL MEDICINE

## 2019-11-26 PROCEDURE — 1036F TOBACCO NON-USER: CPT | Performed by: INTERNAL MEDICINE

## 2019-11-26 PROCEDURE — 4040F PNEUMOC VAC/ADMIN/RCVD: CPT | Performed by: INTERNAL MEDICINE

## 2019-11-26 PROCEDURE — G8482 FLU IMMUNIZE ORDER/ADMIN: HCPCS | Performed by: INTERNAL MEDICINE

## 2019-11-26 PROCEDURE — 1123F ACP DISCUSS/DSCN MKR DOCD: CPT | Performed by: INTERNAL MEDICINE

## 2019-11-26 PROCEDURE — 3017F COLORECTAL CA SCREEN DOC REV: CPT | Performed by: INTERNAL MEDICINE

## 2019-11-26 PROCEDURE — G8598 ASA/ANTIPLAT THER USED: HCPCS | Performed by: INTERNAL MEDICINE

## 2019-11-26 RX ORDER — TESTOSTERONE CYPIONATE 200 MG/ML
INJECTION INTRAMUSCULAR
Qty: 10 ML | Refills: 1
Start: 2019-11-26 | End: 2020-06-03

## 2019-11-28 LAB
SEX HORMONE BINDING GLOBULIN: 34 NMOL/L (ref 11–80)
TESTOSTERONE FREE PERCENT: 2.1 % (ref 1.6–2.9)
TESTOSTERONE FREE, CALC: 191 PG/ML (ref 47–244)
TESTOSTERONE TOTAL-MALE: 922 NG/DL (ref 300–720)

## 2019-12-03 ENCOUNTER — CARE COORDINATION (OUTPATIENT)
Dept: CARE COORDINATION | Age: 74
End: 2019-12-03

## 2019-12-04 ENCOUNTER — NURSE ONLY (OUTPATIENT)
Dept: FAMILY MEDICINE CLINIC | Age: 74
End: 2019-12-04
Payer: MEDICARE

## 2019-12-04 DIAGNOSIS — E29.1 HYPOGONADISM IN MALE: Primary | ICD-10-CM

## 2019-12-04 PROCEDURE — 96372 THER/PROPH/DIAG INJ SC/IM: CPT | Performed by: FAMILY MEDICINE

## 2019-12-04 RX ORDER — TESTOSTERONE CYPIONATE 200 MG/ML
200 INJECTION INTRAMUSCULAR ONCE
Status: COMPLETED | OUTPATIENT
Start: 2019-12-04 | End: 2019-12-04

## 2019-12-04 RX ADMIN — TESTOSTERONE CYPIONATE 200 MG: 200 INJECTION INTRAMUSCULAR at 13:29

## 2019-12-06 NOTE — TELEPHONE ENCOUNTER
Does he know why the triamterine was stopped before? It looks like kidney function is good but I am wondering if we can figure out why it was discontinued in the past. We may be able to restart this. Please have him schedule a follow up in the next month if possible for blood pressure management. Hospice referral  pleurex catheter ordered-appt to be scheduled  Message sent to urology to move up stent change

## 2019-12-10 ENCOUNTER — CARE COORDINATION (OUTPATIENT)
Dept: CARE COORDINATION | Age: 74
End: 2019-12-10

## 2019-12-10 ENCOUNTER — TELEPHONE (OUTPATIENT)
Dept: FAMILY MEDICINE CLINIC | Age: 74
End: 2019-12-10

## 2019-12-11 ENCOUNTER — CARE COORDINATION (OUTPATIENT)
Dept: CARE COORDINATION | Age: 74
End: 2019-12-11

## 2019-12-12 ENCOUNTER — CARE COORDINATION (OUTPATIENT)
Dept: CARE COORDINATION | Age: 74
End: 2019-12-12

## 2019-12-17 DIAGNOSIS — I10 ESSENTIAL HYPERTENSION: Chronic | ICD-10-CM

## 2019-12-17 RX ORDER — TRIAMTERENE AND HYDROCHLOROTHIAZIDE 37.5; 25 MG/1; MG/1
1 TABLET ORAL DAILY
Qty: 90 TABLET | Refills: 1 | Status: SHIPPED | OUTPATIENT
Start: 2019-12-17 | End: 2020-05-04

## 2019-12-18 ENCOUNTER — NURSE ONLY (OUTPATIENT)
Dept: FAMILY MEDICINE CLINIC | Age: 74
End: 2019-12-18
Payer: MEDICARE

## 2019-12-18 DIAGNOSIS — E29.1 HYPOGONADISM IN MALE: Primary | ICD-10-CM

## 2019-12-18 PROCEDURE — 96372 THER/PROPH/DIAG INJ SC/IM: CPT | Performed by: FAMILY MEDICINE

## 2019-12-18 RX ORDER — TESTOSTERONE CYPIONATE 200 MG/ML
200 INJECTION INTRAMUSCULAR ONCE
Status: COMPLETED | OUTPATIENT
Start: 2019-12-18 | End: 2019-12-18

## 2019-12-18 RX ADMIN — TESTOSTERONE CYPIONATE 200 MG: 200 INJECTION INTRAMUSCULAR at 09:22

## 2019-12-23 ENCOUNTER — CARE COORDINATION (OUTPATIENT)
Dept: CARE COORDINATION | Age: 74
End: 2019-12-23

## 2019-12-26 ENCOUNTER — CARE COORDINATION (OUTPATIENT)
Dept: CARE COORDINATION | Age: 74
End: 2019-12-26

## 2019-12-27 ENCOUNTER — TELEPHONE (OUTPATIENT)
Dept: FAMILY MEDICINE CLINIC | Age: 74
End: 2019-12-27

## 2019-12-27 DIAGNOSIS — K21.9 GASTROESOPHAGEAL REFLUX DISEASE, ESOPHAGITIS PRESENCE NOT SPECIFIED: Primary | Chronic | ICD-10-CM

## 2019-12-27 RX ORDER — FAMOTIDINE 40 MG/1
40 TABLET, FILM COATED ORAL EVERY EVENING
Qty: 90 TABLET | Refills: 3 | Status: SHIPPED | OUTPATIENT
Start: 2019-12-27 | End: 2020-07-28 | Stop reason: SDUPTHER

## 2019-12-30 ENCOUNTER — CARE COORDINATION (OUTPATIENT)
Dept: CARE COORDINATION | Age: 74
End: 2019-12-30

## 2019-12-30 DIAGNOSIS — N40.1 BENIGN PROSTATIC HYPERPLASIA WITH NOCTURIA: Chronic | ICD-10-CM

## 2019-12-30 DIAGNOSIS — E11.29 TYPE 2 DIABETES MELLITUS WITH MICROALBUMINURIA, WITHOUT LONG-TERM CURRENT USE OF INSULIN (HCC): Chronic | ICD-10-CM

## 2019-12-30 DIAGNOSIS — R80.9 TYPE 2 DIABETES MELLITUS WITH MICROALBUMINURIA, WITHOUT LONG-TERM CURRENT USE OF INSULIN (HCC): Chronic | ICD-10-CM

## 2019-12-30 DIAGNOSIS — R35.1 BENIGN PROSTATIC HYPERPLASIA WITH NOCTURIA: Chronic | ICD-10-CM

## 2019-12-30 RX ORDER — TAMSULOSIN HYDROCHLORIDE 0.4 MG/1
0.8 CAPSULE ORAL DAILY
Qty: 180 CAPSULE | Refills: 1 | Status: SHIPPED | OUTPATIENT
Start: 2019-12-30 | End: 2020-06-29

## 2020-01-06 ENCOUNTER — CARE COORDINATION (OUTPATIENT)
Dept: CARE COORDINATION | Age: 75
End: 2020-01-06

## 2020-01-07 ENCOUNTER — CARE COORDINATION (OUTPATIENT)
Dept: CARE COORDINATION | Age: 75
End: 2020-01-07

## 2020-01-07 NOTE — CARE COORDINATION
Telephone call with Pat/Spouse. Discussed [employer] [address] [place] [address] Patient Assistance Program application for patient. She indicated that she has not filled out form. She expressed plan to complete form and return to this writer.

## 2020-01-08 ENCOUNTER — NURSE ONLY (OUTPATIENT)
Dept: FAMILY MEDICINE CLINIC | Age: 75
End: 2020-01-08
Payer: MEDICARE

## 2020-01-08 ENCOUNTER — CARE COORDINATION (OUTPATIENT)
Dept: CARE COORDINATION | Age: 75
End: 2020-01-08

## 2020-01-08 PROCEDURE — 96372 THER/PROPH/DIAG INJ SC/IM: CPT | Performed by: FAMILY MEDICINE

## 2020-01-08 RX ORDER — TESTOSTERONE CYPIONATE 200 MG/ML
200 INJECTION INTRAMUSCULAR ONCE
Status: COMPLETED | OUTPATIENT
Start: 2020-01-08 | End: 2020-01-08

## 2020-01-08 RX ADMIN — TESTOSTERONE CYPIONATE 200 MG: 200 INJECTION INTRAMUSCULAR at 09:34

## 2020-01-08 NOTE — CARE COORDINATION
Spouse dropped off completed [employer] form for patient. Faxed this form to [facility] [place] [place]/Dr. Kyle Burk for his completion and return to this writer.

## 2020-01-10 ENCOUNTER — CARE COORDINATION (OUTPATIENT)
Dept: CARE COORDINATION | Age: 75
End: 2020-01-10

## 2020-01-10 NOTE — CARE COORDINATION
Obtained completed paperwork from Dr. Manuel Vigil for [employer]. Faxed completed patient assistance application to [facility]. Received confirmation that fax was sent successfully.

## 2020-01-17 ENCOUNTER — CARE COORDINATION (OUTPATIENT)
Dept: CARE COORDINATION | Age: 75
End: 2020-01-17

## 2020-01-21 ENCOUNTER — TELEPHONE (OUTPATIENT)
Dept: FAMILY MEDICINE CLINIC | Age: 75
End: 2020-01-21

## 2020-01-21 NOTE — TELEPHONE ENCOUNTER
Patient can take pepcid over the counter as an alternative. It is the same medication. He can take 2 of the OTC dose tablets once daily until the prescription generic is back in stock or I can send a rx for the generic to a local pharmacy instead. Ask patient which he prefers.

## 2020-01-21 NOTE — TELEPHONE ENCOUNTER
Received a fax from [employer] that Famotidine 40 MG is currently out of stock and the patient is requesting a alternative. Please advise.

## 2020-01-24 ENCOUNTER — CARE COORDINATION (OUTPATIENT)
Dept: CARE COORDINATION | Age: 75
End: 2020-01-24

## 2020-01-24 NOTE — CARE COORDINATION
Telephone call to [facility] Patient Assistance Program. [place]. They indicated that patient's application was approved on 1/17/2020. They advised to have patient call 2 to 3 weeks ahead of time when refill is needed.

## 2020-01-25 ENCOUNTER — OFFICE VISIT (OUTPATIENT)
Dept: FAMILY MEDICINE CLINIC | Age: 75
End: 2020-01-25
Payer: MEDICARE

## 2020-01-25 VITALS
HEART RATE: 83 BPM | BODY MASS INDEX: 27.83 KG/M2 | RESPIRATION RATE: 18 BRPM | WEIGHT: 198.8 LBS | DIASTOLIC BLOOD PRESSURE: 74 MMHG | OXYGEN SATURATION: 95 % | HEIGHT: 71 IN | TEMPERATURE: 98.5 F | SYSTOLIC BLOOD PRESSURE: 130 MMHG

## 2020-01-25 PROCEDURE — 3017F COLORECTAL CA SCREEN DOC REV: CPT | Performed by: PHYSICIAN ASSISTANT

## 2020-01-25 PROCEDURE — 99213 OFFICE O/P EST LOW 20 MIN: CPT | Performed by: PHYSICIAN ASSISTANT

## 2020-01-25 PROCEDURE — 1123F ACP DISCUSS/DSCN MKR DOCD: CPT | Performed by: PHYSICIAN ASSISTANT

## 2020-01-25 PROCEDURE — 1036F TOBACCO NON-USER: CPT | Performed by: PHYSICIAN ASSISTANT

## 2020-01-25 PROCEDURE — G8427 DOCREV CUR MEDS BY ELIG CLIN: HCPCS | Performed by: PHYSICIAN ASSISTANT

## 2020-01-25 PROCEDURE — G8482 FLU IMMUNIZE ORDER/ADMIN: HCPCS | Performed by: PHYSICIAN ASSISTANT

## 2020-01-25 PROCEDURE — G8510 SCR DEP NEG, NO PLAN REQD: HCPCS | Performed by: PHYSICIAN ASSISTANT

## 2020-01-25 PROCEDURE — G8417 CALC BMI ABV UP PARAM F/U: HCPCS | Performed by: PHYSICIAN ASSISTANT

## 2020-01-25 PROCEDURE — 4040F PNEUMOC VAC/ADMIN/RCVD: CPT | Performed by: PHYSICIAN ASSISTANT

## 2020-01-25 RX ORDER — CEFDINIR 300 MG/1
300 CAPSULE ORAL 2 TIMES DAILY
Qty: 20 CAPSULE | Refills: 0 | Status: SHIPPED | OUTPATIENT
Start: 2020-01-25 | End: 2020-02-04

## 2020-01-25 ASSESSMENT — ENCOUNTER SYMPTOMS
WHEEZING: 0
CHOKING: 0
STRIDOR: 0
SHORTNESS OF BREATH: 0
ABDOMINAL PAIN: 0
SINUS PRESSURE: 1
SORE THROAT: 0
COUGH: 1

## 2020-01-25 ASSESSMENT — PATIENT HEALTH QUESTIONNAIRE - PHQ9
SUM OF ALL RESPONSES TO PHQ QUESTIONS 1-9: 0
SUM OF ALL RESPONSES TO PHQ9 QUESTIONS 1 & 2: 0
SUM OF ALL RESPONSES TO PHQ QUESTIONS 1-9: 0
2. FEELING DOWN, DEPRESSED OR HOPELESS: 0
1. LITTLE INTEREST OR PLEASURE IN DOING THINGS: 0

## 2020-01-25 NOTE — PROGRESS NOTES
(DELTASONE) 10 MG tablet Take 15 mg by mouth      nitroGLYCERIN (NITROSTAT) 0.4 MG SL tablet Place 1 tablet under the tongue every 5 minutes as needed for Chest pain 25 tablet 0    sildenafil (VIAGRA) 100 MG tablet TAKE ONE TABLET BY MOUTH AS NEEDED APPROXIMATELY ONE HOUR BEFORE SEXUAL ACTIVITY. DO NOT USE MORE THAN ONE DOSE DAILY 6 tablet 3    Blood Glucose Monitoring Suppl CHENCHO Test once daily. Dx: E11.29 1 Device 0    Lancets MISC Test once daily. Dx: E11.29 100 each 3    albuterol sulfate HFA (PROAIR HFA) 108 (90 Base) MCG/ACT inhaler Inhale 2 puffs into the lungs every 6 hours as needed for Wheezing 1 Inhaler 3    ascorbic acid (VITAMIN C) 500 MG tablet Take 1,000 mg by mouth daily      loratadine (CLARITIN) 10 MG tablet Take 1 tablet by mouth daily 30 tablet 5    acetaminophen (TYLENOL EX ST ARTHRITIS PAIN) 500 MG tablet Take 1 tablet by mouth every 6 hours as needed for Pain 120 tablet 3    aspirin 81 MG EC tablet Take 81 mg by mouth daily.         glipiZIDE (GLUCOTROL) 10 MG tablet Take 1 tablet by mouth 2 times daily (before meals) 180 tablet 3    Dulaglutide 0.75 MG/0.5ML SOPN Inject 0.75 mg into the skin once a week 4 pen 3    tamsulosin (FLOMAX) 0.4 MG capsule TAKE 2 CAPSULES BY MOUTH  DAILY 180 capsule 1    famotidine (PEPCID) 40 MG tablet Take 1 tablet by mouth every evening 90 tablet 3    triamterene-hydrochlorothiazide (MAXZIDE-25) 37.5-25 MG per tablet Take 1 tablet by mouth daily 90 tablet 1    testosterone cypionate (DEPOTESTOTERONE CYPIONATE) 200 MG/ML injection 1 ML every 3  weeks 10 mL 01    SITagliptin (JANUVIA) 100 MG tablet Take 0.5 tablets by mouth 2 times daily 90 tablet 3    vitamin C (ASCORBIC ACID) 500 MG tablet Take by mouth      blood glucose test strips (FREESTYLE LITE) strip USE 1 STRIP TO CHECK GLUCOSE ONCE DAILY AS DIRECTED 100 strip 3    Cyanocobalamin (VITAMIN B 12 PO) Take 1,000 mg by mouth      pyridostigmine (MESTINON) 60 MG tablet Take 60 mg by mouth  CINNAMON PO Take 500 mg by mouth 2 times daily. No current facility-administered medications for this visit. Review of Systems   Constitutional: Positive for fatigue. Negative for chills and fever. HENT: Positive for congestion and sinus pressure. Negative for ear pain and sore throat. Respiratory: Positive for cough. Negative for choking, shortness of breath, wheezing and stridor. Cardiovascular: Negative for chest pain. Gastrointestinal: Negative for abdominal pain. Musculoskeletal: Positive for myalgias. Neurological: Positive for headaches. Objective    Vitals:    01/25/20 1110   BP: 130/74   Site: Right Upper Arm   Position: Sitting   Cuff Size: Large Adult   Pulse: 83   Resp: 18   Temp: 98.5 °F (36.9 °C)   TempSrc: Temporal   SpO2: 95%   Weight: 198 lb 12.8 oz (90.2 kg)   Height: 5' 11\" (1.803 m)       Physical Exam  Vitals signs and nursing note reviewed. Constitutional:       General: He is not in acute distress. Appearance: He is well-developed. He is not toxic-appearing. HENT:      Head: Normocephalic and atraumatic. Right Ear: Tympanic membrane and ear canal normal. Tympanic membrane is not erythematous. Left Ear: Tympanic membrane and ear canal normal. Tympanic membrane is not erythematous. Nose: Mucosal edema and congestion present. No rhinorrhea. Right Sinus: Maxillary sinus tenderness present. No frontal sinus tenderness. Left Sinus: Maxillary sinus tenderness present. No frontal sinus tenderness. Mouth/Throat:      Pharynx: No oropharyngeal exudate or posterior oropharyngeal erythema. Eyes:      General:         Right eye: No discharge. Left eye: No discharge. Conjunctiva/sclera: Conjunctivae normal.   Cardiovascular:      Rate and Rhythm: Normal rate and regular rhythm. Heart sounds: Normal heart sounds. Pulmonary:      Effort: Pulmonary effort is normal.      Breath sounds: Normal breath sounds. Lymphadenopathy:      Cervical: No cervical adenopathy. Skin:     General: Skin is warm and dry. Neurological:      General: No focal deficit present. Mental Status: He is alert. Assessment and Plan      ICD-10-CM    1. Acute non-recurrent maxillary sinusitis J01.00        Orders Placed This Encounter   Medications    cefdinir (OMNICEF) 300 MG capsule     Sig: Take 1 capsule by mouth 2 times daily for 10 days     Dispense:  20 capsule     Refill:  0       Reviewed with the patient: current clinicalstatus, medications, activities and diet. Side effects, adverse effects of the medication prescribed today, as well as treatment plan and result expectations have been discussed with the patient who expressesunderstanding and desires to proceed. Close follow up to evaluate treatment results and for coordination of care. I have reviewed the patient's medical history in detail and updated the computerized patientrecord. Return if symptoms worsen or fail to improve, for follow up with PCP.     FLOR Hardwick

## 2020-01-28 ENCOUNTER — CARE COORDINATION (OUTPATIENT)
Dept: CARE COORDINATION | Age: 75
End: 2020-01-28

## 2020-01-28 NOTE — CARE COORDINATION
Received call from spouse. She indicated getting a letter from [employer] for patient and them needing information. She also received another attestation from [employer] and wondering if she needs to send back. Reviewed with wife was told patient was approved for Januvia. Also, discussed that [employer] they did received signed page 8 and application was to be sent for processing. Discussed with spouse would contact [facility] and [employer] to get further information.

## 2020-01-29 ENCOUNTER — NURSE ONLY (OUTPATIENT)
Dept: FAMILY MEDICINE CLINIC | Age: 75
End: 2020-01-29
Payer: MEDICARE

## 2020-01-29 PROCEDURE — 96372 THER/PROPH/DIAG INJ SC/IM: CPT | Performed by: FAMILY MEDICINE

## 2020-01-29 RX ORDER — TESTOSTERONE CYPIONATE 200 MG/ML
200 INJECTION INTRAMUSCULAR ONCE
Status: COMPLETED | OUTPATIENT
Start: 2020-01-29 | End: 2020-01-29

## 2020-01-29 RX ADMIN — TESTOSTERONE CYPIONATE 200 MG: 200 INJECTION INTRAMUSCULAR at 09:49

## 2020-01-29 NOTE — PROGRESS NOTES
Patient given Testosterone 200mg IM left gluteal..  Will return in 2 weeks for next injection  Pt states he was restarted every two weeks per Dr. Murillo Samples office  Patient tolerated injection well.     Administrations This Visit     testosterone cypionate (DEPOTESTOTERONE CYPIONATE) injection 200 mg     Admin Date  01/29/2020 Action  Given Dose  200 mg Route  Intramuscular Administered By  Debbi Fernandez LPN

## 2020-02-12 ENCOUNTER — NURSE ONLY (OUTPATIENT)
Dept: FAMILY MEDICINE CLINIC | Age: 75
End: 2020-02-12
Payer: MEDICARE

## 2020-02-12 ENCOUNTER — TELEPHONE (OUTPATIENT)
Dept: FAMILY MEDICINE CLINIC | Age: 75
End: 2020-02-12

## 2020-02-12 ENCOUNTER — CARE COORDINATION (OUTPATIENT)
Dept: CARE COORDINATION | Age: 75
End: 2020-02-12

## 2020-02-12 PROCEDURE — 96372 THER/PROPH/DIAG INJ SC/IM: CPT | Performed by: FAMILY MEDICINE

## 2020-02-12 RX ORDER — TESTOSTERONE CYPIONATE 100 MG/ML
200 INJECTION, SOLUTION INTRAMUSCULAR ONCE
Status: COMPLETED | OUTPATIENT
Start: 2020-02-12 | End: 2020-02-12

## 2020-02-12 RX ADMIN — TESTOSTERONE CYPIONATE 200 MG: 100 INJECTION, SOLUTION INTRAMUSCULAR at 09:40

## 2020-02-12 NOTE — CARE COORDINATION
Telephone call to [facility], [facility]. She will check with Dr. Hernández Number office regarding Trulicity on next day she is there.

## 2020-02-12 NOTE — CARE COORDINATION
Telephone call with patient. He indicated he did receive his Januvia but is waiting to hear about his Trulicity. Discussed plan to contact [facility] to check on status of Trulcity.

## 2020-02-12 NOTE — TELEPHONE ENCOUNTER
Patient's spouse called to check on Trulicity that were sent to our office from [facility] on 1/30/2020, I advised patient I would have medical assistant check refrigerator to see if samples were available, I called patient back and advised we had samples available for him, patient stated he would stop by office in a few days to  Trulicity

## 2020-02-12 NOTE — PROGRESS NOTES
Patient given Testosterone 200mg IM right gluteal..  Will return in 3 weeks for next injection    Patient tolerated injection well.     Administrations This Visit     testosterone cypionate (DEPOTESTOTERONE CYPIONATE) injection 200 mg     Admin Date  02/12/2020 Action  Given Dose  200 mg Route  Intramuscular Administered By  Jean Carlos Lopez LPN

## 2020-02-26 ENCOUNTER — CARE COORDINATION (OUTPATIENT)
Dept: CARE COORDINATION | Age: 75
End: 2020-02-26

## 2020-02-26 NOTE — CARE COORDINATION
Telephone call with patient. He indicated that he did receive his Trulicity from physician office but only received a one month supply. Patient unsure why he did not receive a three month supply. Discussed plan to check on situation with [employer] regarding Trulicity. Patient has contact information on how to obtain refills for Januvia.

## 2020-02-26 NOTE — CARE COORDINATION
Telephone call to Dr. Saul Carpenter office and asked to speak with MARTHA Gauthier for patient was not in today. Left message for [place] to contact this writer tomorrow when she is back in the office.

## 2020-02-27 ENCOUNTER — CARE COORDINATION (OUTPATIENT)
Dept: CARE COORDINATION | Age: 75
End: 2020-02-27

## 2020-02-27 NOTE — CARE COORDINATION
Telephone call to [facility]. Representative indicated that [facility] Brown/Coordinator not available at time of call. Left message with representative to have [facility] return phone call. Call back number was provided.

## 2020-02-27 NOTE — CARE COORDINATION
Telephone call with [facility] Brown/Coordinator. She explained that they have 4 boxes of Trulcity  and are in the refrigerator. Patient can  medication.

## 2020-03-04 ENCOUNTER — NURSE ONLY (OUTPATIENT)
Dept: FAMILY MEDICINE CLINIC | Age: 75
End: 2020-03-04
Payer: MEDICARE

## 2020-03-04 PROCEDURE — 96372 THER/PROPH/DIAG INJ SC/IM: CPT | Performed by: FAMILY MEDICINE

## 2020-03-04 RX ORDER — TESTOSTERONE CYPIONATE 200 MG/ML
200 INJECTION INTRAMUSCULAR ONCE
Status: COMPLETED | OUTPATIENT
Start: 2020-03-04 | End: 2020-03-04

## 2020-03-04 RX ADMIN — TESTOSTERONE CYPIONATE 200 MG: 200 INJECTION INTRAMUSCULAR at 09:57

## 2020-03-05 RX ORDER — METOPROLOL SUCCINATE 50 MG/1
150 TABLET, EXTENDED RELEASE ORAL DAILY
Qty: 270 TABLET | Refills: 3 | Status: SHIPPED | OUTPATIENT
Start: 2020-03-05 | End: 2021-03-07

## 2020-03-05 NOTE — TELEPHONE ENCOUNTER
pharmacy requesting medication refill.  Please approve or deny this request.    Rx requested:  Requested Prescriptions     Pending Prescriptions Disp Refills    metoprolol succinate (TOPROL XL) 50 MG extended release tablet [Pharmacy Med Name: METOPROLOL SUCC ER 50MG TABLET] 270 tablet 3     Sig: TAKE 3 TABLETS BY MOUTH  DAILY         Last Office Visit:   11/5/2019      Next Visit Date:  Future Appointments   Date Time Provider Kay Bartlett   3/25/2020  9:00 AM SCHEDULE, GERBER COBB PCP TESTOSTERONE MLOX [facility]   3/26/2020  9:45 AM TOAN Soares MD [address] [place] [address]   5/5/2020 10:00 AM Anneliese Mike MD [place] [id number]

## 2020-03-11 ENCOUNTER — CARE COORDINATION (OUTPATIENT)
Dept: CARE COORDINATION | Age: 75
End: 2020-03-11

## 2020-03-12 ENCOUNTER — HOSPITAL ENCOUNTER (OUTPATIENT)
Dept: LAB | Age: 75
Discharge: HOME | End: 2020-03-12
Payer: MEDICARE

## 2020-03-12 PROCEDURE — 84270 ASSAY OF SEX HORMONE GLOBUL: CPT

## 2020-03-12 PROCEDURE — 84403 ASSAY OF TOTAL TESTOSTERONE: CPT

## 2020-03-17 LAB
REASON FOR REJECTION: NORMAL
REJECTED TEST: NORMAL

## 2020-03-20 ENCOUNTER — CARE COORDINATION (OUTPATIENT)
Dept: CARE COORDINATION | Age: 75
End: 2020-03-20

## 2020-03-20 NOTE — CARE COORDINATION
Telephone call with patient who was indicating that he needs more Trulicity. Discussed  would check with physician office about Trulicity as it is too soon to order medication. Explained that medication was shipped to physician office.

## 2020-03-20 NOTE — CARE COORDINATION
Telephone call to Dr. Erika Zepeda. They checked and patient has three boxes of Trulicity in their refrigerator.

## 2020-03-20 NOTE — CARE COORDINATION
Telephone call to patient. Explained that Dr. Norma Orellana has three boxes if Trulicity in their refrigerator for him. Patient expressed plan to go to physician office to  Trulicity.

## 2020-04-01 ENCOUNTER — NURSE ONLY (OUTPATIENT)
Dept: FAMILY MEDICINE CLINIC | Age: 75
End: 2020-04-01
Payer: MEDICARE

## 2020-04-01 PROCEDURE — 96372 THER/PROPH/DIAG INJ SC/IM: CPT | Performed by: FAMILY MEDICINE

## 2020-04-01 RX ORDER — TESTOSTERONE CYPIONATE 200 MG/ML
200 INJECTION INTRAMUSCULAR ONCE
Status: COMPLETED | OUTPATIENT
Start: 2020-04-01 | End: 2020-04-01

## 2020-04-01 RX ADMIN — TESTOSTERONE CYPIONATE 200 MG: 200 INJECTION INTRAMUSCULAR at 10:29

## 2020-04-22 ENCOUNTER — NURSE ONLY (OUTPATIENT)
Dept: FAMILY MEDICINE CLINIC | Age: 75
End: 2020-04-22
Payer: MEDICARE

## 2020-04-22 PROCEDURE — 96372 THER/PROPH/DIAG INJ SC/IM: CPT | Performed by: FAMILY MEDICINE

## 2020-04-22 RX ORDER — TESTOSTERONE CYPIONATE 100 MG/ML
200 INJECTION, SOLUTION INTRAMUSCULAR ONCE
Status: COMPLETED | OUTPATIENT
Start: 2020-04-22 | End: 2020-04-22

## 2020-04-22 RX ADMIN — TESTOSTERONE CYPIONATE 200 MG: 100 INJECTION, SOLUTION INTRAMUSCULAR at 10:16

## 2020-04-22 NOTE — PROGRESS NOTES
Patient given Testosterone 200mg IM right gluteal..  Will return in 3 weeks for next injection    Patient tolerated injection well.     Administrations This Visit     testosterone cypionate (DEPOTESTOTERONE CYPIONATE) injection 200 mg     Admin Date  04/22/2020 Action  Given Dose  200 mg Route  Intramuscular Administered By  Leandra Dumont LPN

## 2020-05-01 ENCOUNTER — CARE COORDINATION (OUTPATIENT)
Dept: CARE COORDINATION | Age: 75
End: 2020-05-01

## 2020-05-13 ENCOUNTER — NURSE ONLY (OUTPATIENT)
Dept: FAMILY MEDICINE CLINIC | Age: 75
End: 2020-05-13
Payer: MEDICARE

## 2020-05-13 PROCEDURE — 96372 THER/PROPH/DIAG INJ SC/IM: CPT | Performed by: FAMILY MEDICINE

## 2020-05-13 RX ORDER — TESTOSTERONE CYPIONATE 200 MG/ML
200 INJECTION INTRAMUSCULAR ONCE
Status: COMPLETED | OUTPATIENT
Start: 2020-05-13 | End: 2020-05-13

## 2020-05-13 RX ADMIN — TESTOSTERONE CYPIONATE 200 MG: 200 INJECTION INTRAMUSCULAR at 10:11

## 2020-05-15 ENCOUNTER — VIRTUAL VISIT (OUTPATIENT)
Dept: ENDOCRINOLOGY | Age: 75
End: 2020-05-15
Payer: MEDICARE

## 2020-05-15 PROCEDURE — 99442 PR PHYS/QHP TELEPHONE EVALUATION 11-20 MIN: CPT | Performed by: INTERNAL MEDICINE

## 2020-05-26 ENCOUNTER — CARE COORDINATION (OUTPATIENT)
Dept: CARE COORDINATION | Age: 75
End: 2020-05-26

## 2020-05-27 ENCOUNTER — CARE COORDINATION (OUTPATIENT)
Dept: CARE COORDINATION | Age: 75
End: 2020-05-27

## 2020-05-29 ENCOUNTER — CARE COORDINATION (OUTPATIENT)
Dept: CARE COORDINATION | Age: 75
End: 2020-05-29

## 2020-05-29 NOTE — CARE COORDINATION
Requested by Dr. Ancel Meckel office to refax patient assistance forms for Januvia and Trulicity. Received confirmation that fax was sent successfully.

## 2020-06-02 ENCOUNTER — CARE COORDINATION (OUTPATIENT)
Dept: CARE COORDINATION | Age: 75
End: 2020-06-02

## 2020-06-03 ENCOUNTER — NURSE ONLY (OUTPATIENT)
Dept: FAMILY MEDICINE CLINIC | Age: 75
End: 2020-06-03
Payer: MEDICARE

## 2020-06-03 PROCEDURE — 96372 THER/PROPH/DIAG INJ SC/IM: CPT | Performed by: FAMILY MEDICINE

## 2020-06-03 RX ORDER — TESTOSTERONE CYPIONATE 200 MG/ML
200 INJECTION INTRAMUSCULAR ONCE
Status: COMPLETED | OUTPATIENT
Start: 2020-06-03 | End: 2020-06-03

## 2020-06-03 RX ORDER — TESTOSTERONE CYPIONATE 200 MG/ML
200 INJECTION INTRAMUSCULAR
COMMUNITY
Start: 2020-09-16 | End: 2021-03-17 | Stop reason: SDUPTHER

## 2020-06-03 RX ADMIN — TESTOSTERONE CYPIONATE 200 MG: 200 INJECTION INTRAMUSCULAR at 10:18

## 2020-06-03 NOTE — PROGRESS NOTES
Patient given Testosterone 200mg IM right gluteal..  Will return in 2 weeks for next injection    Patient tolerated injection well.     Administrations This Visit     testosterone cypionate (DEPOTESTOTERONE CYPIONATE) injection 200 mg     Admin Date  06/03/2020 Action  Given Dose  200 mg Route  Intramuscular Administered By  Puma Azul LPN

## 2020-06-05 ENCOUNTER — CARE COORDINATION (OUTPATIENT)
Dept: CARE COORDINATION | Age: 75
End: 2020-06-05

## 2020-06-05 NOTE — CARE COORDINATION
Received fax from Dr. Sherin Mark of completed form for patient assistance application for Januvia and Trulicity.

## 2020-06-08 ENCOUNTER — CARE COORDINATION (OUTPATIENT)
Dept: CARE COORDINATION | Age: 75
End: 2020-06-08

## 2020-06-12 ENCOUNTER — CARE COORDINATION (OUTPATIENT)
Dept: CARE COORDINATION | Age: 75
End: 2020-06-12

## 2020-06-12 NOTE — CARE COORDINATION
Telephone call to patient. Left voicemail explaining did receive his information that he sent me in the mail  Explained in message that mailed [facility] Patient Assistance Program Application/Januvia. Also, discussed that faxed [employer] [facility] Patient Assistance Program Application / Rivka Damico.

## 2020-06-17 ENCOUNTER — TELEPHONE (OUTPATIENT)
Dept: FAMILY MEDICINE CLINIC | Age: 75
End: 2020-06-17

## 2020-06-17 ENCOUNTER — CARE COORDINATION (OUTPATIENT)
Dept: CARE COORDINATION | Age: 75
End: 2020-06-17

## 2020-06-17 ENCOUNTER — NURSE ONLY (OUTPATIENT)
Dept: FAMILY MEDICINE CLINIC | Age: 75
End: 2020-06-17
Payer: MEDICARE

## 2020-06-17 PROCEDURE — 96372 THER/PROPH/DIAG INJ SC/IM: CPT | Performed by: FAMILY MEDICINE

## 2020-06-17 RX ORDER — TESTOSTERONE CYPIONATE 200 MG/ML
200 INJECTION INTRAMUSCULAR ONCE
Status: COMPLETED | OUTPATIENT
Start: 2020-06-17 | End: 2020-06-17

## 2020-06-17 RX ADMIN — TESTOSTERONE CYPIONATE 200 MG: 200 INJECTION INTRAMUSCULAR at 09:18

## 2020-06-17 NOTE — TELEPHONE ENCOUNTER
I'm not quite sure what the problem is with the patient assistance form and why clarification is needed. I have taken time to fill it out at least 3 different times and there always seems to be an issue. Marcin Bo has seen me fill it out multiple times and Luda Clark brought it to me recently to fill out again. It's scanned in his chart multiple times. Please find out what is needed on this conundrum of a form. I'd like to go more than one or two weeks without having to fill it out again. Patient's correct medication dose for Trulicity is listed in his medication list as it always has been.      Trulicity (Dulaglutide) 0.75 mg/0.5mL, inject 0.75 mg subcutaneously once per week #12 pens with 3 refills

## 2020-06-17 NOTE — PROGRESS NOTES
Patient given Testosterone 200mg IM left gluteal..  Will return in 2 weeks for next injection    Patient tolerated injection well.     Administrations This Visit     testosterone cypionate (DEPOTESTOTERONE CYPIONATE) injection 200 mg     Admin Date  06/17/2020 Action  Given Dose  200 mg Route  Intramuscular Administered By  Noe Banda LPN

## 2020-06-17 NOTE — TELEPHONE ENCOUNTER
Sarah Uribe called states she is a social worked for the [employer] and that [facility] is out of his Trulicity and needs his pharmacy called in order to get the medication. He is getting this medication through the [email or web address] patient assistance program and their number is [phone] and they need clarification on the order before they can ship the medication to the patient. Can you verify the correct dosage for patient before I call the pharmacy to let them know what the patient should be taking?

## 2020-06-18 ENCOUNTER — CARE COORDINATION (OUTPATIENT)
Dept: CARE COORDINATION | Age: 75
End: 2020-06-18

## 2020-06-18 NOTE — CARE COORDINATION
Telephone call to [facility]iz[facility] Lang office. Left message for Jovanna Batres to return phone call. Call back number was provided.

## 2020-06-19 ENCOUNTER — CARE COORDINATION (OUTPATIENT)
Dept: CARE COORDINATION | Age: 75
End: 2020-06-19

## 2020-06-19 ENCOUNTER — TELEPHONE (OUTPATIENT)
Dept: FAMILY MEDICINE CLINIC | Age: 75
End: 2020-06-19

## 2020-06-19 NOTE — TELEPHONE ENCOUNTER
Received a phone call from Danielle Choe () in regards to patients [address] [facility]. Patient goes through [facility] Patient Assistance and they don't have more refills on his Januvia in their system. She is wondering if we can phone in [address] [facility] [address] to pharmacy at [phone]. RX pending to be phoned in.

## 2020-06-26 ENCOUNTER — CARE COORDINATION (OUTPATIENT)
Dept: CARE COORDINATION | Age: 75
End: 2020-06-26

## 2020-06-26 NOTE — CARE COORDINATION
Telephone call to Dr. Claudia Arriaga Spoke with office Jovanna Amezcua. Discussed need for prescription to be faxed to [facility] for [employer]. Isa Esqueda expressed plan to obtain prescription for Januvia and fax it to them.

## 2020-07-01 ENCOUNTER — NURSE ONLY (OUTPATIENT)
Dept: FAMILY MEDICINE CLINIC | Age: 75
End: 2020-07-01
Payer: MEDICARE

## 2020-07-01 PROCEDURE — 96372 THER/PROPH/DIAG INJ SC/IM: CPT | Performed by: FAMILY MEDICINE

## 2020-07-01 RX ORDER — TESTOSTERONE CYPIONATE 200 MG/ML
200 INJECTION INTRAMUSCULAR ONCE
Status: COMPLETED | OUTPATIENT
Start: 2020-07-01 | End: 2020-07-01

## 2020-07-01 RX ADMIN — TESTOSTERONE CYPIONATE 200 MG: 200 INJECTION INTRAMUSCULAR at 09:11

## 2020-07-01 NOTE — PROGRESS NOTES
Patient given Testosterone 200mg IM right gluteal..  Will return in 2 weeks for next injection    Patient tolerated injection well.     Administrations This Visit     testosterone cypionate (DEPOTESTOTERONE CYPIONATE) injection 200 mg     Admin Date  07/01/2020 Action  Given Dose  200 mg Route  Intramuscular Administered By  Amber Spears LPN

## 2020-07-02 ENCOUNTER — CARE COORDINATION (OUTPATIENT)
Dept: CARE COORDINATION | Age: 75
End: 2020-07-02

## 2020-07-02 NOTE — CARE COORDINATION
Telephone call with patient. He stated that he was just at physician office and was told that information was just faxed to [employer] today. Discussed with patient that this writer contacted [employer] today but they were closed for the holiday. Patient indicated he has enough of the Januvia for 7 to 10 days.

## 2020-07-07 ENCOUNTER — CARE COORDINATION (OUTPATIENT)
Dept: CARE COORDINATION | Age: 75
End: 2020-07-07

## 2020-07-07 NOTE — CARE COORDINATION
Telephone call to [facility] Patient Assistance Program.  Spoke with Randal Abrams . She did some investigation and found the [place] prescription for patient. She expressed plan to have medication be expedited for delivery.

## 2020-07-10 RX ORDER — TAMSULOSIN HYDROCHLORIDE 0.4 MG/1
0.8 CAPSULE ORAL DAILY
Qty: 180 CAPSULE | Refills: 1 | Status: SHIPPED | OUTPATIENT
Start: 2020-07-10 | End: 2020-11-27

## 2020-07-10 NOTE — TELEPHONE ENCOUNTER
Patient is requesting medication refill via [employer].  Please approve or deny this request.    Rx requested:  Requested Prescriptions     Pending Prescriptions Disp Refills    tamsulosin (FLOMAX) 0.4 MG capsule 180 capsule 1     Sig: Take 2 capsules by mouth daily         Last Office Visit:   11/5/2019      Next Visit Date:  Future Appointments   Date Time Provider Kay Bartlett   7/15/2020  8:50 AM SCHEDULE, GERBER COBB PCP TESTOSTERONE MLOX [facility]   7/28/2020  3:00 PM Valentina Loaiza MD [place] [id number]   7/29/2020  8:50 AM SCHEDULE, GERBER COBB PCP TESTOSTERONE MLOX [facility] [place]   9/16/2020 10:00 AM Juan J Ferraro MD [facility]

## 2020-07-15 ENCOUNTER — NURSE ONLY (OUTPATIENT)
Dept: FAMILY MEDICINE CLINIC | Age: 75
End: 2020-07-15
Payer: MEDICARE

## 2020-07-15 PROCEDURE — 96372 THER/PROPH/DIAG INJ SC/IM: CPT | Performed by: FAMILY MEDICINE

## 2020-07-15 RX ORDER — TESTOSTERONE CYPIONATE 200 MG/ML
200 INJECTION INTRAMUSCULAR ONCE
Status: COMPLETED | OUTPATIENT
Start: 2020-07-15 | End: 2020-07-15

## 2020-07-15 RX ADMIN — TESTOSTERONE CYPIONATE 200 MG: 200 INJECTION INTRAMUSCULAR at 09:54

## 2020-07-15 NOTE — PROGRESS NOTES
Patient given Testosterone 200mg IM left gluteal..  Will return in 2 weeks for next injection    Patient tolerated injection well.     Administrations This Visit     testosterone cypionate (DEPOTESTOTERONE CYPIONATE) injection 200 mg     Admin Date  07/15/2020 Action  Given Dose  200 mg Route  Intramuscular Administered By  Cadence Dodson LPN

## 2020-07-16 ENCOUNTER — CARE COORDINATION (OUTPATIENT)
Dept: CARE COORDINATION | Age: 75
End: 2020-07-16

## 2020-07-16 NOTE — CARE COORDINATION
Telephone call to [facility] Patient Assistance Program/Januvia. Representative indicated that refill request was submitted on 7/10/2020. Renuvia in process of being shipped. Expected date of delivery is 7/20/2020 to home of patient.

## 2020-07-16 NOTE — CARE COORDINATION
Telephone call with patient. He indicated that he has not received his Januvia.   Discussed plan to check on status of Januvia with [facility] Patient Assistance Program.

## 2020-07-16 NOTE — CARE COORDINATION
Telephone call to patient. Discussed results of phone call  With [employer] Patient Assistance Program and expected date of delivery is 7/20/2020. Patient stated that he has enough of the medication until the delivery on 7/20/2020.

## 2020-07-21 ENCOUNTER — CARE COORDINATION (OUTPATIENT)
Dept: CARE COORDINATION | Age: 75
End: 2020-07-21

## 2020-07-28 ENCOUNTER — OFFICE VISIT (OUTPATIENT)
Dept: FAMILY MEDICINE CLINIC | Age: 75
End: 2020-07-28
Payer: MEDICARE

## 2020-07-28 VITALS
SYSTOLIC BLOOD PRESSURE: 110 MMHG | RESPIRATION RATE: 18 BRPM | BODY MASS INDEX: 27.3 KG/M2 | DIASTOLIC BLOOD PRESSURE: 60 MMHG | OXYGEN SATURATION: 94 % | HEIGHT: 71 IN | TEMPERATURE: 98.2 F | WEIGHT: 195 LBS | HEART RATE: 74 BPM

## 2020-07-28 PROCEDURE — G0439 PPPS, SUBSEQ VISIT: HCPCS | Performed by: FAMILY MEDICINE

## 2020-07-28 RX ORDER — ALBUTEROL SULFATE 90 UG/1
2 AEROSOL, METERED RESPIRATORY (INHALATION) EVERY 6 HOURS PRN
Qty: 3 INHALER | Refills: 0 | Status: SHIPPED | OUTPATIENT
Start: 2020-07-28 | End: 2021-05-17 | Stop reason: SDUPTHER

## 2020-07-28 RX ORDER — IPRATROPIUM BROMIDE 42 UG/1
2 SPRAY, METERED NASAL 3 TIMES DAILY
Qty: 3 BOTTLE | Refills: 3 | Status: SHIPPED | OUTPATIENT
Start: 2020-07-28 | End: 2021-05-17 | Stop reason: SDUPTHER

## 2020-07-28 RX ORDER — FAMOTIDINE 40 MG/1
40 TABLET, FILM COATED ORAL EVERY EVENING
Qty: 90 TABLET | Refills: 3 | Status: SHIPPED | OUTPATIENT
Start: 2020-07-28

## 2020-07-28 ASSESSMENT — LIFESTYLE VARIABLES
HAS A RELATIVE, FRIEND, DOCTOR, OR ANOTHER HEALTH PROFESSIONAL EXPRESSED CONCERN ABOUT YOUR DRINKING OR SUGGESTED YOU CUT DOWN: 0
HOW OFTEN DURING THE LAST YEAR HAVE YOU FOUND THAT YOU WERE NOT ABLE TO STOP DRINKING ONCE YOU HAD STARTED: 0
HOW OFTEN DURING THE LAST YEAR HAVE YOU NEEDED AN ALCOHOLIC DRINK FIRST THING IN THE MORNING TO GET YOURSELF GOING AFTER A NIGHT OF HEAVY DRINKING: 0
HOW MANY STANDARD DRINKS CONTAINING ALCOHOL DO YOU HAVE ON A TYPICAL DAY: 0
HOW OFTEN DO YOU HAVE SIX OR MORE DRINKS ON ONE OCCASION: 0
AUDIT-C TOTAL SCORE: 1
HAVE YOU OR SOMEONE ELSE BEEN INJURED AS A RESULT OF YOUR DRINKING: 0
HOW OFTEN DURING THE LAST YEAR HAVE YOU FAILED TO DO WHAT WAS NORMALLY EXPECTED FROM YOU BECAUSE OF DRINKING: 0
HOW OFTEN DO YOU HAVE A DRINK CONTAINING ALCOHOL: 1
HOW OFTEN DURING THE LAST YEAR HAVE YOU HAD A FEELING OF GUILT OR REMORSE AFTER DRINKING: 0

## 2020-07-28 ASSESSMENT — PATIENT HEALTH QUESTIONNAIRE - PHQ9
SUM OF ALL RESPONSES TO PHQ QUESTIONS 1-9: 0
SUM OF ALL RESPONSES TO PHQ QUESTIONS 1-9: 0

## 2020-07-28 NOTE — PATIENT INSTRUCTIONS
Personalized Preventive Plan for Varsha Shed - 7/28/2020  Medicare offers a range of preventive health benefits. Some of the tests and screenings are paid in full while other may be subject to a deductible, co-insurance, and/or copay. Some of these benefits include a comprehensive review of your medical history including lifestyle, illnesses that may run in your family, and various assessments and screenings as appropriate. After reviewing your medical record and screening and assessments performed today your provider may have ordered immunizations, labs, imaging, and/or referrals for you. A list of these orders (if applicable) as well as your Preventive Care list are included within your After Visit Summary for your review. Other Preventive Recommendations:    · A preventive eye exam performed by an eye specialist is recommended every 1-2 years to screen for glaucoma; cataracts, macular degeneration, and other eye disorders. · A preventive dental visit is recommended every 6 months. · Try to get at least 150 minutes of exercise per week or 10,000 steps per day on a pedometer . · Order or download the FREE \"Exercise & Physical Activity: Your Everyday Guide\" from [employer]. Call 8-125.500.5646 or search [employer] online. · You need 4090-0199 mg of calcium and 6130-0089 IU of vitamin D per day. It is possible to meet your calcium requirement with diet alone, but a vitamin D supplement is usually necessary to meet this goal.  · When exposed to the sun, use a sunscreen that protects against both UVA and UVB radiation with an SPF of 30 or greater. Reapply every 2 to 3 hours or after sweating, drying off with a towel, or swimming. · Always wear a seat belt when traveling in a car. Always wear a helmet when riding a bicycle or motorcycle. Heart-Healthy Diet   Sodium, Fat, and Cholesterol Controlled Diet       What Is a Heart Healthy Diet?    A heart-healthy diet is one that limits sodium , certain types of fat , and cholesterol . This type of diet is recommended for:   People with any form of cardiovascular disease (eg, coronary heart disease , peripheral vascular disease , previous heart attack , previous stroke )   People with risk factors for cardiovascular disease, such as high blood pressure , high cholesterol , or diabetes   Anyone who wants to lower their risk of developing cardiovascular disease   Sodium    Sodium is a mineral found in many foods. In general, most people consume much more sodium than they need. Diets high in sodium can increase blood pressure and lead to edema (water retention). On a heart-healthy diet, you should consume no more than 2,300 mg (milligrams) of sodium per dayabout the amount in one teaspoon of table salt. The foods highest in sodium include table salt (about 50% sodium), processed foods, convenience foods, and preserved foods. Cholesterol    Cholesterol is a fat-like, waxy substance in your blood. Our bodies make some cholesterol. It is also found in animal products, with the highest amounts in fatty meat, egg yolks, whole milk, cheese, shellfish, and organ meats. On a heart-healthy diet, you should limit your cholesterol intake to less than 200 mg per day. It is normal and important to have some cholesterol in your bloodstream. But too much cholesterol can cause plaque to build up within your arteries, which can eventually lead to a heart attack or stroke. The two types of cholesterol that are most commonly referred to are:   Low-density lipoprotein (LDL) cholesterol  Also known as bad cholesterol, this is the cholesterol that tends to build up along your arteries. Bad cholesterol levels are increased by eating fats that are saturated or hydrogenated. Optimal level of this cholesterol is less than 100. Over 130 starts to get risky for heart disease.    High-density lipoprotein (HDL) cholesterol  Also known as good cholesterol, this type of cholesterol actually carries cholesterol away from your arteries and may, therefore, help lower your risk of having a heart attack. You want this level to be high (ideally greater than 60). It is a risk to have a level less than 40. You can raise this good cholesterol by eating olive oil, canola oil, avocados, or nuts. Exercise raises this level, too. Fat    Fat is calorie dense and packs a lot of calories into a small amount of food. Even though fats should be limited due to their high calorie content, not all fats are bad. In fact, some fats are quite healthful. Fat can be broken down into four main types. The good-for-you fats are:   Monounsaturated fat  found in oils such as olive and canola, avocados, and nuts and natural nut butters; can decrease cholesterol levels, while keeping levels of HDL cholesterol high   Polyunsaturated fat  found in oils such as safflower, sunflower, soybean, corn, and sesame; can decrease total cholesterol and LDL cholesterol   Omega-3 fatty acids  particularly those found in fatty fish (such as salmon, trout, tuna, mackerel, herring, and sardines); can decrease risk of arrhythmias, decrease triglyceride levels, and slightly lower blood pressure   The fats that you want to limit are:   Saturated fat  found in animal products, many fast foods, and a few vegetables; increases total blood cholesterol, including LDL levels   Animal fats that are saturated include: butter, lard, whole-milk dairy products, meat fat, and poultry skin   Vegetable fats that are saturated include: hydrogenated shortening, palm oil, coconut oil, cocoa butter   Hydrogenated or trans fat  found in margarine and vegetable shortening, most shelf stable snack foods, and fried foods; increases LDL and decreases HDL     It is generally recommended that you limit your total fat for the day to less than 30% of your total calories.  If you follow an 1800-calorie heart healthy diet, for example, this would mean 60 grams of fat or less per day. Saturated fat and trans fat in your diet raises your blood cholesterol the most, much more than dietary cholesterol does. For this reason, on a heart-healthy diet, less than 7% of your calories should come from saturated fat and ideally 0% from trans fat. On an 1800-calorie diet, this translates into less than 14 grams of saturated fat per day, leaving 46 grams of fat to come from mono- and polyunsaturated fats.    Food Choices on a Heart Healthy Diet   Food Category   Foods Recommended   Foods to Avoid   Grains   Breads and rolls without salted tops Most dry and cooked cereals Unsalted crackers and breadsticks Low-sodium or homemade breadcrumbs or stuffing All rice and pastas   Breads, rolls, and crackers with salted tops High-fat baked goods (eg, muffins, donuts, pastries) Quick breads, self-rising flour, and biscuit mixes Regular bread crumbs Instant hot cereals Commercially prepared rice, pasta, or stuffing mixes   Vegetables   Most fresh, frozen, and low-sodium canned vegetables Low-sodium and salt-free vegetable juices Canned vegetables if unsalted or rinsed   Regular canned vegetables and juices, including sauerkraut and pickled vegetables Frozen vegetables with sauces Commercially prepared potato and vegetable mixes   Fruits   Most fresh, frozen, and canned fruits All fruit juices   Fruits processed with salt or sodium   Milk   Nonfat or low-fat (1%) milk Nonfat or low-fat yogurt Cottage cheese, low-fat ricotta, cheeses labeled as low-fat and low-sodium   Whole milk Reduced-fat (2%) milk Malted and chocolate milk Full fat yogurt Most cheeses (unless low-fat and low salt) Buttermilk (no more than 1 cup per week)   Meats and Beans   Lean cuts of fresh or frozen beef, veal, lamb, or pork (look for the word loin) Fresh or frozen poultry without the skin Fresh or frozen fish and some shellfish Egg whites and egg substitutes (Limit whole eggs to three per week) Tofu Nuts or seeds (unsalted, dry-roasted), low-sodium peanut butter Dried peas, beans, and lentils   Any smoked, cured, salted, or canned meat, fish, or poultry (including benoit, chipped beef, cold cuts, hot dogs, sausages, sardines, and anchovies) Poultry skins Breaded and/or fried fish or meats Canned peas, beans, and lentils Salted nuts   Fats and Oils   Olive oil and canola oil Low-sodium, low-fat salad dressings and mayonnaise   Butter, margarine, coconut and palm oils, benoit fat   Snacks, Sweets, and Condiments   Low-sodium or unsalted versions of broths, soups, soy sauce, and condiments Pepper, herbs, and spices; vinegar, lemon, or lime juice Low-fat frozen desserts (yogurt, sherbet, fruit bars) Sugar, cocoa powder, honey, syrup, jam, and preserves Low-fat, trans-fat free cookies, cakes, and pies Gregg and animal crackers, fig bars, chloe snaps   High-fat desserts Broth, soups, gravies, and sauces, made from instant mixes or other high-sodium ingredients Salted snack foods Canned olives Meat tenderizers, seasoning salt, and most flavored vinegars   Beverages   Low-sodium carbonated beverages Tea and coffee in moderation Soy milk   Commercially softened water   Suggestions   Make whole grains, fruits, and vegetables the base of your diet. Choose heart-healthy fats such as canola, olive, and flaxseed oil, and foods high in heart-healthy fats, such as nuts, seeds, soybeans, tofu, and fish. Eat fish at least twice per week; the fish highest in omega-3 fatty acids and lowest in mercury include salmon, herring, mackerel, sardines, and canned chunk light tuna. If you eat fish less than twice per week or have high triglycerides, talk to your doctor about taking fish oil supplements. Read food labels.    For products low in fat and cholesterol, look for fat free, low-fat, cholesterol free, saturated fat free, and trans fat freeAlso scan the Nutrition Facts Label, which lists saturated fat, trans fat, and cholesterol amounts. For products low in sodium, look for sodium free, very low sodium, low sodium, no added salt, and unsalted   Skip the salt when cooking or at the table; if food needs more flavor, get creative and try out different herbs and spices. Garlic and onion also add substantial flavor to foods. Trim any visible fat off meat and poultry before cooking, and drain the fat off after chavez. Use cooking methods that require little or no added fat, such as grilling, boiling, baking, poaching, broiling, roasting, steaming, stir-frying, and sauting. Avoid fast food and convenience food. They tend to be high in saturated and trans fat and have a lot of added salt. Talk to a registered dietitian for individualized diet advice. Last Reviewed: March 2011 Taz Davis MS, MPH, RD   Updated: 3/29/2011   ·   Patient information: Weight loss treatments    INTRODUCTION -- Obesity is a major international problem, and Americans are among the heaviest people in the world. The percentage of obese people in the [place] has risen steadily. Many people find that although they initially lose weight by dieting, they quickly regain the weight after the diet ends. Because it so hard to keep weight off over time, it is important to have as much information and support as possible before starting a diet. You are most likely to be successful in losing weight and keeping it off when you believe that your body weight can be controlled. STARTING A WEIGHT LOSS PROGRAM -- Some people like to talk to their doctor or nurse to get help choosing the best plan, monitoring progress, and getting advice and support along the way. To know what treatment (or combination of treatments) will work best, determine your body mass index (BMI) and waist circumference (measurement). The BMI is calculated from your height and weight.   A person with a BMI between 25 and 29.9 is considered overweight   A person with a BMI of 30 or greater is considered to be obese  A waist circumference greater than 35 inches (88 cm) in women and 40 inches (102 cm) in men increases the risk of obesity-related complications, such as heart disease and diabetes. People who are obese and who have a larger waist size may need more aggressive weight loss treatment than others. Talk to your doctor or nurse for advice. Types of treatment -- Based on your measurements and your medical history, your doctor or nurse can determine what combination of weight loss treatments would work best for you. Treatments may include changes in lifestyle, exercise, dieting, and, in some cases, weight loss medicines or weight loss surgery. Weight loss surgery, also called bariatric surgery, is reserved for people with severe obesity who have not responded to other weight loss treatments. SETTING A WEIGHT LOSS GOAL -- It is important to set a realistic weight loss goal. Your first goal should be to avoid gaining more weight and staying at your current weight (or within 5 percent). Many people have a \"dream\" weight that is difficult or impossible to achieve. People at high risk of developing diabetes who are able to lose 5 percent of their body weight and maintain this weight will reduce their risk of developing diabetes by about 50 percent and reduce their blood pressure. This is a success. Losing more than 15 percent of your body weight and staying at this weight is an extremely good result, even if you never reach your \"dream\" or \"ideal\" weight. LIFESTYLE CHANGES -- Programs that help you to change your lifestyle are usually run by psychologists or other professionals. The goals of lifestyle changes are to help you change your eating habits, become more active, and be more aware of how much you eat and exercise, helping you to make healthier choices. This type of treatment can be broken down into three steps:   The triggers that make you want to eat   Eating   What happens after you eat  Triggers to eat -- Determining what triggers you to eat involves figuring out what foods you eat and where and when you eat. To figure out what triggers you to eat, keep a record for a few days of everything you eat, the places where you eat, how often you eat, and the emotions you were feeling when you ate. For some people, the trigger is related to a certain time of day or night. For others, the trigger is related to a certain place, like sitting at a desk working. Eating -- You can change your eating habits by breaking the chain of events between the trigger for eating and eating itself. There are many ways to do this. For instance, you can:  Limit where you eat to a few places (eg, dining room)   Restrict the number of utensils (eg, only a fork) used for eating   Drink a sip of water between each bite   Chew your food a certain number of times   Get up and stop eating every few minutes  What happens after you eat -- Rewarding yourself for good eating behaviors can help you to develop better habits. This is not a reward for weight loss; instead, it is a reward for changing unhealthy behaviors. Do not use food as a reward. Some people find money, clothing, or personal care (eg, a hair cut, manicure, or massage) to be effective rewards. Treat yourself immediately after making better eating choices to reinforce the value of the good behavior. You need to have clear behavior goals, and you must have a time frame for reaching your goals. Reward small changes along the way to your final goal.  Other factors that contribute to successful weight loss -- Changing your behavior involves more than just changing unhealthy eating habits; it also involves finding people around you to support your weight loss, reducing stress, and learning to be strong when tempted by food. Establish a \"margaret\" system -- Having a friend or family member available to provide support and reinforce good behavior is very helpful. The support person needs to understand your goals. Learn to be strong -- Learning to be strong when tempted by food is an important part of losing weight. As an example, you will need to learn how to say \"no\" and continue to say no when urged to eat at parties and social gatherings. Develop strategies for events before you go, such as eating before you go or taking low-calorie snacks and drinks with you. Develop a support system -- Having a support system is helpful when losing weight. This is why many [facility] groups are successful. Family support is also essential; if your family does not support your efforts to lose weight, this can slow your progress or even keep you from losing weight. Positive thinking -- People often have conversations with themselves in their head; these conversations can be positive or negative. If you eat a piece of cake that was not planned, you may respond by thinking, \"Oh, you stupid idiot, you've blown your diet! \" and as a result, you may eat more cake. A positive thought for the same event could be, \"Well, I ate cake when it was not on my plan. Now I should do something to get back on track. \" A positive approach is much more likely to be successful than a negative one. Reduce stress -- Although stress is a part of everyday life, it can trigger uncontrolled eating in some people. It is important to find a way to get through these difficult times without eating or by eating low-calorie food, like raw vegetables. It may be helpful to imagine a relaxing place that allows you to temporarily escape from stress. With deep breaths and closed eyes, you can imagine this relaxing place for a few minutes. Self-help programs -- Self-help programs like [employer] Watchers®, Overeaters Anonymous®, and Take Off Javier (TOPS)© work for some people.  As with all weight loss programs, you are most likely to be successful with these plans if you make long-term changes in how you eat.  CHOOSING A DIET -- A calorie is a unit of energy found in food. Your body needs calories to function. The goal of any diet is to burn up more calories than you eat. How quickly you lose weight depends upon several factors, such as your age, gender, and starting weight. Older people have a slower metabolism than young people, so they lose weight more slowly. Men lose more weight than women of similar height and weight when dieting because they use more energy. People who are extremely overweight lose weight more quickly than those who are only mildly overweight. Try not to drink alcohol or drinks with added sugar, and most sweets (candy, cakes, cookies), since they rarely contain important nutrients. Portion-controlled diets -- One simple way to diet is to buy packaged foods, like frozen low-calorie meals or meal-replacement canned drinks. A typical meal plan for one day may include:  A meal-replacement drink or breakfast bar for breakfast   A meal-replacement drink or a frozen low-calorie (250 to 350 calories) meal for lunch   A frozen low-calorie meal or other prepackaged, calorie-controlled meal, along with extra vegetables for dinner  This would give you 1000 to 1500 calories per day. Low-fat diet -- To reduce the amount of fat in your diet, you can:  Eat low-fat foods. Low-fat foods are those that contain less than 30 percent of calories from fat. Fat is listed on the food facts label (figure 1). Count fat grams. For a 1500 calorie diet, this would mean about 45 g or fewer of fat per day. Low-carbohydrate diet -- Low- and very-low-carbohydrate diets (eg, Atkins diet, [employer]) have become popular ways to lose weight quickly.   With a very-low-carbohydrate diet, you eat between 0 and 60 grams of carbohydrates per day (a standard diet contains 200 to 300 grams of carbohydrates)   With a low-carbohydrate diet, you eat between 60 and 130 grams of carbohydrates per day  Carbohydrates are found in fruits, vegetables, and grains (including breads, rice, pasta, and cereal), alcoholic beverages, and in dairy products. Meat and fish do not contain carbohydrates. Side effects of very-low-carbohydrate diets can include constipation, headache, bad breath, muscle cramps, diarrhea, and weakness. Mediterranean diet -- The term \"Mediterranean diet\" refers to a way of eating that is common in [place]growing regions around the [facility]. Although there is some variation in Mediterranean diets, there are some similarities. Most Mediterranean diets include:  A high level of monounsaturated fats (from olive or canola oil, walnuts, pecans, almonds) and a low level of saturated fats (from butter)   A high amount of vegetables, fruits, legumes, and grains (7 to 10 servings of fruits and vegetables per day)   A moderate amount of milk and dairy products, mostly in the form of cheese. Use low-fat dairy products (skim milk, fat-free yogurt, low-fat cheese). A relatively low amount of red meat and meat products. Substitute fish or poultry for red meat. For those who drink alcohol, a modest amount (mainly as red wine) may help to protect against cardiovascular disease. A modest amount is up to one (4 ounce) glass per day for women and up to two glasses per day for men. Which diet is best? -- Studies have compared different diets, including:  Very-low-carbohydrate (Atkins)   Macronutrient balance controlling glycemic load (Zone®)   Reduced-calorie (Weight Watchers®)   Very-low-fat (Ornish)  No one diet is \"best\" for weight loss. Any diet will help you to lose weight if you stick with the diet. Therefore, it is important to choose a diet that includes foods you like. Fad diets -- Fad diets often promise quick weight loss (more than 1 to 2 pounds per week) and may claim that you do not need to exercise or give up favorite foods. Some fad diets cost a lot of money, because you have to pay for seminars or pills. Fad diets generally lack any scientific evidence that they are safe and effective, but instead rely on \"before\" and \"after\" photos or testimonials. Diets that sound too good to be true usually are. These plans are a waste of time and money and are not recommended. A doctor, nurse, or nutritionist can help you find a safe and effective way to lose weight and keep it off. WEIGHT LOSS MEDICINES -- Taking a weight loss medicine may be helpful when used in combination with diet, exercise, and lifestyle changes. However, it is important to understand the risks and benefits of these medicines. It is also important to be realistic about your goal weight using a weight loss medicine; you may not reach your \"dream\" weight, but you may be able to reduce your risk of diabetes or heart disease. Weight loss medicines may be recommended for people who have not been able to lose weight with diet and exercise who have a:  BMI of 30 or more    BMI between 27 and 29.9 and have other medical problems, such as diabetes, high cholesterol, or high blood pressure  Two weight loss medicines are approved in the [facility] for long-term use. These are sibutramine and orlistat. Other weight loss medicines (phentermine, diethylpropion) are available but are only approved for short-term use (up to 12 weeks). Sibutramine -- Sibutramine (Meridia®, Reductil®) is a medicine that reduces your appetite. In people who take the medicine for one year, the average weight loss is 10 percent of the initial body weight (5 percent more than those who took a placebo treatment). Side effects of sibutramine include insomnia, dry mouth, and constipation. Increases in blood pressure can occur. Therefore, blood pressure is usually monitored during treatment. There is no evidence that sibutramine causes heart or lung problems (like dexfenfluramine and fenfluramine (Phen/Fen)).  However, experts agree that sibutramine should not used by people with coronary heart disease, heart failure, uncontrolled hypertension, stroke, irregular heart rhythms, or peripheral vascular disease (poor circulation in the legs). Orlistat -- Orlistat (Xenical® 120 mg capsules) is a medicine that reduces the amount of fat your body absorbs from the foods you eat. A lower-dose version is now available without a prescription (Moy® 60 mg capsules) in many countries, including the [place]. The medicine is recommended three times per day, taken with a meal; you can skip a dose if you skip a meal or if the meal contains no fat. After one year of treatment with orlistat, the average weight loss is approximately 8 to 10 percent of initial body weight (4 percent more than in those who took a placebo). Cholesterol levels often improve, and blood pressure sometimes falls. In people with diabetes, orlistat may help control blood sugar levels. Side effects occur in 15 to 10 percent of people and may include stomach cramps, gas, diarrhea, leakage of stool, or oily stools. These problems are more likely when you take orlistat with a high-fat meal (if more than 30 percent of calories in the meal are from fat). Side effects usually improve as you learn to avoid high-fat foods. Severe liver injury has been reported rarely in patients taking orlistat, but it is not known if orlistat caused the liver problems. Diet supplements -- Diet supplements are widely used by people who are trying to lose weight, although the safety and efficacy of these supplements are often unproven. A few of the more common diet supplements are discussed below; none of these are recommended because they have not been studied carefully, and there is no proof they are safe or effective. Chitosan and wheat dextrin are ineffective for weight loss, and their use is not recommended. Ephedra, a compound related to ephedrine, is no longer available in the [place] due to safety concerns.  Many nonprescription diet pills previously contained ephedra. Although some studies have shown that ephedra helps with weight loss, there can be serious side effects (psychiatric symptoms, palpitations, and stomach upset), including death. There are not enough data about the safety and efficacy of chromium, ginseng, glucomannan, green tea, hydroxycitric acid, L carnitine, psyllium, pyruvate supplements, [place] wort, and conjugated linoleic acid. Two supplements from [facility], [address] [place] (also known as the Kellysadi Maciel 15 pill) and Herbathin dietary supplement, have been shown to contain prescription drugs. Hoodia gordonii is a dietary supplement derived from a plant in [place]. It is not recommended because there is no proof that it is safe or effective. Bitter orange (Citrus aurantium) can increase your heart rate and blood pressure and is not recommended. SHOULD I HAVE SURGERY TO LOSE WEIGHT? -- Weight loss surgery is recommended ONLY for people with one of the following:  Severe obesity (body mass index above 40) (calculator 1 and calculator 2) who have not responded to diet, exercise, or weight loss medicines   Body mass index between 35 and 40, along with a serious medical problem (including diabetes, severe joint pain, or sleep apnea) that would improve with weight loss  You should be sure that you understand the potential risks and benefits of weight loss surgery. You must be motivated and willing to make lifelong changes in how you eat to reach and maintain a healthier weight after surgery. You must also be realistic about weight loss after surgery (see 'Effectiveness of weight loss surgery' below). PREPARING FOR WEIGHT LOSS SURGERY -- Most people who have weight loss surgery will meet with several specialists before surgery is scheduled. This often includes a dietitian, mental health counselor, a doctor who specializes in care of obese people, and a surgeon who performs weight loss surgery (bariatric surgeon).  You may need to work with these providers for several weeks or months before surgery. The nutritionist will explain what and how much you will be able to eat after surgery. You may also need to lose a small amount of weight before surgery. The mental health specialist will help you to cope with stress and other factors that can make it harder to lose weight or trigger you to eat   The medical doctor will determine whether you need other tests, counseling, or treatment before surgery. He or she might also help you begin a medical weight loss program so that you can lose some weight before surgery. The bariatric surgeon will meet with you to discuss the surgeries available to treat obesity. He or she will also make sure you are a good candidate for surgery. TYPES OF WEIGHT LOSS SURGERY -- There are several types of weight loss surgeries, the most common being lap banding, gastric bypass, and gastric sleeve. Lap banding -- Laparoscopic adjustable gastric banding (LAGB), or lap banding, is a surgery that uses an adjustable band around the opening to the stomach (figure 1). This reduces the amount of food that you can eat at one time. Lap banding is done through small incisions, with a laparoscope. The band can be adjusted after surgery, allowing you to eat more or less food. Adjustments to the size and tightness of the band are made by using a needle to add or remove fluid from a port (a small container under the skin that is connected to the band). Adding fluid to the band makes it tighter which restricts the amount of food you can eat and may help you to lose more weight. Lap banding is a popular choice because it is relatively simple to perform, can be adjusted or removed, and has a low risk of serious complications immediately after surgery. However, weight loss with the lap band depends on your ability to follow the program closely.   You will need to prepare nutritious meals that \"work with\" the band, not against sleeve -- Gastric sleeve, also known as sleeve gastrectomy, is a surgery that reduces the size of the stomach and makes it into a narrow tube (figure 3). The new stomach is much smaller and produces less of the hormone (ghrelin) that causes hunger, helping you feel satisfied with less food. Sleeve gastrectomy is safer than gastric bypass because the intestines are not rearranged, and there is less chance of malnutrition. It also appears to control hunger better than lap banding. It might be safer than the lap banding because no foreign materials are used. The gastric sleeve has a good success rate, and people lose an average of 33 percent of their excess body weight in the first year. For a person who is 120 pounds overweight, this would mean losing about 40 pounds in the first year. WEIGHT LOSS SURGERY COMPLICATIONS -- A variety of complications can occur with weight loss surgery. The risks of surgery depend upon which surgery you have and any medical problems you had before surgery. Some of the more common early surgical complications (one to six weeks after surgery) include:  Bleeding   Infection   Blockage or tear in the bowels   Need for further surgery  Important medical complications after surgery can include blood clots in the legs or lungs, heart attack, pneumonia, and urinary tract infection. Complications are less likely when surgery is performed in centers that are experienced in weight loss surgery. In general, centers with experience in weight loss surgery have:  Board-certified doctors and surgeons   A team of support staff (dietitians, counselors, nurses)   Long-term follow-up after surgery   Hospital staff experienced with the care of weight loss patients. This includes nurses who are trained in the care of patients immediately after surgery and anesthesiologists who are experienced in caring for the morbidly obese.   EFFECTIVENESS OF WEIGHT LOSS SURGERY -- The goal of weight loss surgery is to reduce the risk of illness or death associated with obesity. Weight loss surgery can also help you to feel and look better, reduce the amount of money you spend on medicines, and cut down on sick days. As an example, weight loss surgery can improve health problems related to obesity (diabetes, high blood pressure, high cholesterol, sleep apnea) to the point that you need less or no medicine. Finally, weight loss surgery might reduce your risk of developing heart disease, cancer, and certain infections. AFTER WEIGHT LOSS SURGERY -- You will need to stay in the hospital until your team feels that it is safe for you to leave (on average, one to three days). Do not drive if you are taking prescription pain medicine. Begin exercising as soon as possible once you have healed; most weight loss centers will design an exercise program for you. Once you are home, it is important to eat and drink exactly what your doctor and dietitian recommend. You will see your doctor, nurse, and dietitian on a regular basis after surgery to monitor your health, diet, and weight loss. You will be able to slowly increase how much you eat over time, although it will always be important to:  Eat small, frequent meals and not skip meals   Chew your food slowly and completely   Avoid eating while \"distracted\" (such as eating while watching TV)   Stop eating when you feel full   Drink liquids at least 30 minutes before or after eating   Avoid foods high in fat or sugar   Take vitamin supplements, as recommended  It can take several months to learn to listen to your body so that you know when you are hungry and when you are full. You may dislike foods you previously loved, and you may begin to prefer new foods. This can be a frustrating process for some people, so talk to your dietitian if you are having trouble. It usually takes between one and two years to lose weight after surgery.  After reaching their goal weight, some people have plastic surgery (called \"body contouring\") to remove excess skin from the body, particularly in the abdominal area. Before you decide to have weight loss surgery, you must commit to staying healthy for life. This includes following up with your healthcare team, exercising most days of the week, and eating a sensible diet every day. It can be difficult to develop new eating and exercise habits after weight loss surgery, and you will have to work hard to stick to your goals. Recovering from surgery and losing weight can be stressful and emotional, and it is important to have the support of family and friends. Working with a , therapist, or support group can help you through the ups and downs. WHERE TO GET MORE INFORMATION -- Your healthcare provider is the best source of information for questions and concerns related to your medical problem.   This article will be updated as needed every four months on our Web site ([email or web address])  ·

## 2020-07-29 ENCOUNTER — HOSPITAL ENCOUNTER (OUTPATIENT)
Dept: LAB | Age: 75
Discharge: HOME | End: 2020-07-29
Payer: MEDICARE

## 2020-07-29 LAB
ALBUMIN SERPL-MCNC: 4.1 G/DL (ref 3.5–4.6)
ALP BLD-CCNC: 36 U/L (ref 35–104)
ALT SERPL-CCNC: 18 U/L (ref 0–41)
ANION GAP SERPL CALCULATED.3IONS-SCNC: 10 MEQ/L (ref 9–15)
AST SERPL-CCNC: 19 U/L (ref 0–40)
BILIRUB SERPL-MCNC: 0.3 MG/DL (ref 0.2–0.7)
BUN BLDV-MCNC: 22 MG/DL (ref 8–23)
CALCIUM SERPL-MCNC: 9.2 MG/DL (ref 8.5–9.9)
CHLORIDE BLD-SCNC: 96 MEQ/L (ref 95–107)
CHOLESTEROL, TOTAL: 119 MG/DL (ref 0–199)
CO2: 29 MEQ/L (ref 20–31)
CREAT SERPL-MCNC: 1.19 MG/DL (ref 0.7–1.2)
GFR AFRICAN AMERICAN: >60
GFR NON-AFRICAN AMERICAN: 59.5
GLOBULIN: 2.8 G/DL (ref 2.3–3.5)
GLUCOSE BLD-MCNC: 144 MG/DL (ref 70–99)
HBA1C MFR BLD: 6.4 % (ref 4.8–5.9)
HDLC SERPL-MCNC: 37 MG/DL (ref 40–59)
LDL CHOLESTEROL CALCULATED: 39 MG/DL (ref 0–129)
POTASSIUM SERPL-SCNC: 5 MEQ/L (ref 3.4–4.9)
SODIUM BLD-SCNC: 135 MEQ/L (ref 135–144)
TOTAL PROTEIN: 6.9 G/DL (ref 6.3–8)
TRIGL SERPL-MCNC: 213 MG/DL (ref 0–150)

## 2020-07-29 PROCEDURE — 80053 COMPREHEN METABOLIC PANEL: CPT

## 2020-07-29 PROCEDURE — 80061 LIPID PANEL: CPT

## 2020-07-29 PROCEDURE — 83036 HEMOGLOBIN GLYCOSYLATED A1C: CPT

## 2020-07-29 PROCEDURE — 36415 COLL VENOUS BLD VENIPUNCTURE: CPT

## 2020-08-05 ENCOUNTER — NURSE ONLY (OUTPATIENT)
Dept: FAMILY MEDICINE CLINIC | Age: 75
End: 2020-08-05
Payer: MEDICARE

## 2020-08-05 PROCEDURE — 96372 THER/PROPH/DIAG INJ SC/IM: CPT | Performed by: FAMILY MEDICINE

## 2020-08-05 RX ORDER — TESTOSTERONE CYPIONATE 200 MG/ML
200 INJECTION INTRAMUSCULAR ONCE
Status: COMPLETED | OUTPATIENT
Start: 2020-08-05 | End: 2020-08-05

## 2020-08-05 RX ADMIN — TESTOSTERONE CYPIONATE 200 MG: 200 INJECTION INTRAMUSCULAR at 09:07

## 2020-08-05 NOTE — PROGRESS NOTES
Patient given Testosterone 200mg IM right gluteal..  Will return in 2 weeks for next injection  Patient tolerated injection well.     Administrations This Visit     testosterone cypionate (DEPOTESTOTERONE CYPIONATE) injection 200 mg     Admin Date  08/05/2020 Action  Given Dose  200 mg Route  Intramuscular Administered By  Rabia Jerez LPN

## 2020-08-06 ENCOUNTER — CARE COORDINATION (OUTPATIENT)
Dept: CARE COORDINATION | Age: 75
End: 2020-08-06

## 2020-08-06 NOTE — CARE COORDINATION
Telephone call with patient. He indicated that his has all of his prescribed medications. No other needs or concerns were voiced at time of phone call.

## 2020-08-19 ENCOUNTER — NURSE ONLY (OUTPATIENT)
Dept: FAMILY MEDICINE CLINIC | Age: 75
End: 2020-08-19
Payer: MEDICARE

## 2020-08-19 PROCEDURE — 96372 THER/PROPH/DIAG INJ SC/IM: CPT | Performed by: FAMILY MEDICINE

## 2020-08-19 RX ORDER — TESTOSTERONE CYPIONATE 200 MG/ML
200 INJECTION INTRAMUSCULAR ONCE
Status: COMPLETED | OUTPATIENT
Start: 2020-08-19 | End: 2020-08-19

## 2020-08-19 RX ADMIN — TESTOSTERONE CYPIONATE 200 MG: 200 INJECTION INTRAMUSCULAR at 09:30

## 2020-08-19 NOTE — PROGRESS NOTES
Patient given Testosterone 200mg IM left gluteal..  Will return in 2 weeks for next injection    Patient tolerated injection well.     Administrations This Visit     testosterone cypionate (DEPOTESTOTERONE CYPIONATE) injection 200 mg     Admin Date  08/19/2020 Action  Given Dose  200 mg Route  Intramuscular Administered By  Genie Carlisle LPN

## 2020-08-26 ENCOUNTER — CARE COORDINATION (OUTPATIENT)
Dept: CARE COORDINATION | Age: 75
End: 2020-08-26

## 2020-08-26 NOTE — CARE COORDINATION
Telephone call to patient. He indicated that he has all of his prescribed medications. No new needs or concerns were voiced at time of phone call. Patient indicated that he has completed Advance Directives. Reviewed Health Care Decision Makers. No changes indicated at this time.

## 2020-09-02 ENCOUNTER — NURSE ONLY (OUTPATIENT)
Dept: FAMILY MEDICINE CLINIC | Age: 75
End: 2020-09-02
Payer: MEDICARE

## 2020-09-02 PROCEDURE — 96372 THER/PROPH/DIAG INJ SC/IM: CPT | Performed by: FAMILY MEDICINE

## 2020-09-02 RX ORDER — TESTOSTERONE CYPIONATE 200 MG/ML
200 INJECTION INTRAMUSCULAR ONCE
Status: COMPLETED | OUTPATIENT
Start: 2020-09-02 | End: 2020-09-02

## 2020-09-02 RX ADMIN — TESTOSTERONE CYPIONATE 200 MG: 200 INJECTION INTRAMUSCULAR at 09:25

## 2020-09-02 NOTE — PROGRESS NOTES
Patient given Testosterone 200mg IM right gluteal..  Will return in 2 weeks for next injection    Patient tolerated injection well.     Administrations This Visit     testosterone cypionate (DEPOTESTOTERONE CYPIONATE) injection 200 mg     Admin Date  09/02/2020 Action  Given Dose  200 mg Route  Intramuscular Administered By  Gera Jansen LPN

## 2020-09-10 ENCOUNTER — CARE COORDINATION (OUTPATIENT)
Dept: CARE COORDINATION | Age: 75
End: 2020-09-10

## 2020-09-10 NOTE — CARE COORDINATION
Telephone call to spouse. Left voicemail of nature of call with request for return phone call. Call back number was provided.

## 2020-09-16 ENCOUNTER — NURSE ONLY (OUTPATIENT)
Dept: FAMILY MEDICINE CLINIC | Age: 75
End: 2020-09-16
Payer: MEDICARE

## 2020-09-16 ENCOUNTER — OFFICE VISIT (OUTPATIENT)
Dept: ENDOCRINOLOGY | Age: 75
End: 2020-09-16
Payer: MEDICARE

## 2020-09-16 ENCOUNTER — HOSPITAL ENCOUNTER (OUTPATIENT)
Dept: LAB | Age: 75
Discharge: HOME | End: 2020-09-16
Payer: MEDICARE

## 2020-09-16 VITALS
WEIGHT: 194 LBS | DIASTOLIC BLOOD PRESSURE: 70 MMHG | HEART RATE: 74 BPM | SYSTOLIC BLOOD PRESSURE: 118 MMHG | OXYGEN SATURATION: 95 % | BODY MASS INDEX: 27.06 KG/M2

## 2020-09-16 PROCEDURE — 1123F ACP DISCUSS/DSCN MKR DOCD: CPT | Performed by: INTERNAL MEDICINE

## 2020-09-16 PROCEDURE — 84403 ASSAY OF TOTAL TESTOSTERONE: CPT

## 2020-09-16 PROCEDURE — 96372 THER/PROPH/DIAG INJ SC/IM: CPT | Performed by: FAMILY MEDICINE

## 2020-09-16 PROCEDURE — 4040F PNEUMOC VAC/ADMIN/RCVD: CPT | Performed by: INTERNAL MEDICINE

## 2020-09-16 PROCEDURE — G8417 CALC BMI ABV UP PARAM F/U: HCPCS | Performed by: INTERNAL MEDICINE

## 2020-09-16 PROCEDURE — 99213 OFFICE O/P EST LOW 20 MIN: CPT | Performed by: INTERNAL MEDICINE

## 2020-09-16 PROCEDURE — 1036F TOBACCO NON-USER: CPT | Performed by: INTERNAL MEDICINE

## 2020-09-16 PROCEDURE — G8427 DOCREV CUR MEDS BY ELIG CLIN: HCPCS | Performed by: INTERNAL MEDICINE

## 2020-09-16 PROCEDURE — 3017F COLORECTAL CA SCREEN DOC REV: CPT | Performed by: INTERNAL MEDICINE

## 2020-09-16 PROCEDURE — 84270 ASSAY OF SEX HORMONE GLOBUL: CPT

## 2020-09-16 RX ORDER — TESTOSTERONE CYPIONATE 200 MG/ML
200 INJECTION INTRAMUSCULAR ONCE
Status: COMPLETED | OUTPATIENT
Start: 2020-09-16 | End: 2020-09-16

## 2020-09-16 RX ADMIN — TESTOSTERONE CYPIONATE 200 MG: 200 INJECTION INTRAMUSCULAR at 09:24

## 2020-09-16 NOTE — PROGRESS NOTES
Patient given Testosterone 200mg IM left gluteal..  Will return in 2 weeks for next injection    Patient tolerated injection well.     Administrations This Visit     testosterone cypionate (DEPOTESTOTERONE CYPIONATE) injection 200 mg     Admin Date  09/16/2020 Action  Given Dose  200 mg Route  Intramuscular Administered By  Miguel Merida LPN

## 2020-09-16 NOTE — PROGRESS NOTES
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), myasthenia gravis with exacerbation (G70.01)     EXPIRES: 05/2024, Disp: 2 each, Rfl: 0    azaTHIOprine (IMURAN) 50 MG tablet, Take 50 mg by mouth Take 2 tablets by mouth in the morning and 1 tablet in the evening., Disp: , Rfl:     Cyanocobalamin (VITAMIN B 12 PO), Take 1,000 mg by mouth, Disp: , Rfl:     predniSONE (DELTASONE) 10 MG tablet, Take 5 mg by mouth three times a week , Disp: , Rfl:     nitroGLYCERIN (NITROSTAT) 0.4 MG SL tablet, Place 1 tablet under the tongue every 5 minutes as needed for Chest pain, Disp: 25 tablet, Rfl: 0    pyridostigmine (MESTINON) 60 MG tablet, Take 60 mg by mouth, Disp: , Rfl:     sildenafil (VIAGRA) 100 MG tablet, TAKE ONE TABLET BY MOUTH AS NEEDED APPROXIMATELY ONE HOUR BEFORE SEXUAL ACTIVITY. DO NOT USE MORE THAN ONE DOSE DAILY, Disp: 6 tablet, Rfl: 3    Blood Glucose Monitoring Suppl [facility], Test once daily. Dx: E11.29, Disp: 1 Device, Rfl: 0    Lancets MISC, Test once daily. Dx: E11.29, Disp: 100 each, Rfl: 3    ascorbic acid (VITAMIN C) 500 MG tablet, Take 1,000 mg by mouth daily, Disp: , Rfl:     loratadine (CLARITIN) 10 MG tablet, Take 1 tablet by mouth daily, Disp: 30 tablet, Rfl: 5    acetaminophen (TYLENOL EX ST ARTHRITIS PAIN) 500 MG tablet, Take 1 tablet by mouth every 6 hours as needed for Pain, Disp: 120 tablet, Rfl: 3    CINNAMON PO, Take 500 mg by mouth 2 times daily. , Disp: , Rfl:     aspirin 81 MG EC tablet, Take 81 mg by mouth daily. , Disp: , Rfl:     Review of Systems   Genitourinary: Negative. Vitals:    09/16/20 1015   BP: 118/70   Pulse: 74   SpO2: 95%   Weight: 194 lb (88 kg)       Objective:   Physical Exam  Constitutional:       Appearance: Normal appearance. He is normal weight. HENT:      Head: Normocephalic and atraumatic.    Neck:

## 2020-09-18 LAB
SEX HORMONE BINDING GLOBULIN: 35 NMOL/L (ref 11–80)
TESTOSTERONE FREE PERCENT: 1.9 % (ref 1.6–2.9)
TESTOSTERONE FREE, CALC: 129 PG/ML (ref 47–244)
TESTOSTERONE TOTAL-MALE: 674 NG/DL (ref 300–720)

## 2020-09-22 ENCOUNTER — CARE COORDINATION (OUTPATIENT)
Dept: CARE COORDINATION | Age: 75
End: 2020-09-22

## 2020-09-22 NOTE — CARE COORDINATION
Telephone call with Pat/spouse. She indicated that patient has all of his prescribed medications with patient assistance programs. Discussed Advance Directives and Hope Angle noted that patient has completed these documents.

## 2020-09-30 ENCOUNTER — NURSE ONLY (OUTPATIENT)
Dept: FAMILY MEDICINE CLINIC | Age: 75
End: 2020-09-30
Payer: MEDICARE

## 2020-09-30 PROCEDURE — 96372 THER/PROPH/DIAG INJ SC/IM: CPT | Performed by: FAMILY MEDICINE

## 2020-09-30 RX ORDER — TESTOSTERONE CYPIONATE 200 MG/ML
200 INJECTION INTRAMUSCULAR ONCE
Status: COMPLETED | OUTPATIENT
Start: 2020-09-30 | End: 2020-09-30

## 2020-09-30 RX ADMIN — TESTOSTERONE CYPIONATE 200 MG: 200 INJECTION INTRAMUSCULAR at 08:58

## 2020-09-30 NOTE — PROGRESS NOTES
Patient given Testosterone 200mg IM right gluteal..  Will return in 2 weeks for next injection    Patient tolerated injection well.     Administrations This Visit     testosterone cypionate (DEPOTESTOTERONE CYPIONATE) injection 200 mg     Admin Date  09/30/2020 Action  Given Dose  200 mg Route  Intramuscular Administered By  William Jones LPN

## 2020-10-05 ENCOUNTER — CARE COORDINATION (OUTPATIENT)
Dept: CARE COORDINATION | Age: 75
End: 2020-10-05

## 2020-10-07 ENCOUNTER — CARE COORDINATION (OUTPATIENT)
Dept: CARE COORDINATION | Age: 75
End: 2020-10-07

## 2020-10-07 NOTE — CARE COORDINATION
Telephone call with [facility]/Trulicity. Ordered refill for Trulicity and they indicated that medication would be shipped to patient in 7 to 10 days. Representative indicated that patient could call and ask for pharmacy and medication could be shipped sooner. Representative indicated that patient assistance application for patient is good until 12/31/2020. New application could be submitted after 11/1/2020.

## 2020-10-07 NOTE — CARE COORDINATION
Telephone call to [facility]/Januvia. Refill of medication ordered. Medication to be shipped to patient in 7 to 10 days. Two refills are remaining. Representative indicated that patient assistance application for patient  Is good until 12/31/2020. New applications would be accepted starting the last week of November or first week in December.

## 2020-10-09 ENCOUNTER — TELEPHONE (OUTPATIENT)
Dept: FAMILY MEDICINE CLINIC | Age: 75
End: 2020-10-09

## 2020-10-14 ENCOUNTER — NURSE ONLY (OUTPATIENT)
Dept: FAMILY MEDICINE CLINIC | Age: 75
End: 2020-10-14
Payer: MEDICARE

## 2020-10-14 PROCEDURE — 96372 THER/PROPH/DIAG INJ SC/IM: CPT | Performed by: FAMILY MEDICINE

## 2020-10-14 RX ORDER — TESTOSTERONE CYPIONATE 200 MG/ML
200 INJECTION INTRAMUSCULAR ONCE
Status: COMPLETED | OUTPATIENT
Start: 2020-10-14 | End: 2020-10-14

## 2020-10-14 RX ADMIN — TESTOSTERONE CYPIONATE 200 MG: 200 INJECTION INTRAMUSCULAR at 08:58

## 2020-10-14 NOTE — PROGRESS NOTES
Patient given Testosterone 200mg IM left gluteal..  Will return in 2 weeks for next injection    Patient tolerated injection well.     Administrations This Visit     testosterone cypionate (DEPOTESTOTERONE CYPIONATE) injection 200 mg     Admin Date  10/14/2020 Action  Given Dose  200 mg Route  Intramuscular Administered By  Sanchez Olson LPN

## 2020-10-16 ENCOUNTER — NURSE ONLY (OUTPATIENT)
Dept: FAMILY MEDICINE CLINIC | Age: 75
End: 2020-10-16
Payer: MEDICARE

## 2020-10-16 PROCEDURE — 90694 VACC AIIV4 NO PRSRV 0.5ML IM: CPT | Performed by: FAMILY MEDICINE

## 2020-10-16 PROCEDURE — G0008 ADMIN INFLUENZA VIRUS VAC: HCPCS | Performed by: FAMILY MEDICINE

## 2020-10-16 NOTE — PROGRESS NOTES
Vaccine Information Sheet, \"Influenza - Inactivated\"  given to Anitra Joel, or parent/legal guardian of  Anitra Joel and verbalized understanding. Patient responses:    Have you ever had a reaction to a flu vaccine? No  Are you able to eat eggs without adverse effects? Yes  Do you have any current illness? No  Have you ever had Guillian [place] Syndrome? No    Flu vaccine given per order. Please see immunization tab.

## 2020-10-19 ENCOUNTER — CARE COORDINATION (OUTPATIENT)
Dept: CARE COORDINATION | Age: 75
End: 2020-10-19

## 2020-10-19 NOTE — CARE COORDINATION
Telephone call with patient. Discussed would be sending him out patient assistance applications for [employer] and [employer].   These are needed for renewal of patient assistance program.

## 2020-10-19 NOTE — LETTER
Anand Lo  [address] [id number]      Dear Maeve Cutler,    I hope this letter finds you doing well! I am sending you patient assistance applications for Januvia and Trulicity. .  I hope you find the information helpful. Please look them over and let me know if you have any questions or need further assistance. You can contact me at any of the numbers listed below. Sincerely,    Azar Fong, [address]  , [facility] [employer]  Cell Phone: [phone]  Email: [email or web address]

## 2020-10-26 ENCOUNTER — CARE COORDINATION (OUTPATIENT)
Dept: CARE COORDINATION | Age: 75
End: 2020-10-26

## 2020-10-26 NOTE — CARE COORDINATION
Telephone call to Pat/spouse. Explained that this writer reordered patient's Trulciity and will be delivered on 10/30/2020.

## 2020-10-26 NOTE — CARE COORDINATION
Asked by patient to check on status of Trulicity being shipped. Telephone to [facility]. Spoke with representative and ordered patient's Trulicity. Medication to be shipped out on 10/29/2020 and delivered on 10/30/2020. Aminata Kohler

## 2020-10-28 ENCOUNTER — NURSE ONLY (OUTPATIENT)
Dept: FAMILY MEDICINE CLINIC | Age: 75
End: 2020-10-28
Payer: MEDICARE

## 2020-10-28 PROCEDURE — 96372 THER/PROPH/DIAG INJ SC/IM: CPT | Performed by: FAMILY MEDICINE

## 2020-10-28 RX ORDER — TESTOSTERONE CYPIONATE 200 MG/ML
200 INJECTION INTRAMUSCULAR ONCE
Status: COMPLETED | OUTPATIENT
Start: 2020-10-28 | End: 2020-10-28

## 2020-10-28 RX ADMIN — TESTOSTERONE CYPIONATE 200 MG: 200 INJECTION INTRAMUSCULAR at 09:21

## 2020-10-28 NOTE — PROGRESS NOTES
Patient given Testosterone 200mg IM right gluteal..  Will return in 2 weeks for next injection    Patient tolerated injection well.     Administrations This Visit     testosterone cypionate (DEPOTESTOTERONE CYPIONATE) injection 200 mg     Admin Date  10/28/2020 Action  Given Dose  200 mg Route  Intramuscular Administered By  Gypsy Campos LPN

## 2020-10-30 ENCOUNTER — CARE COORDINATION (OUTPATIENT)
Dept: CARE COORDINATION | Age: 75
End: 2020-10-30

## 2020-10-30 NOTE — CARE COORDINATION
Faxed physician portion of Leatha Holder and Mayra/Patient Assistance Program/Melany to Dr. Lisa Blancas. Received confirmation that fax was sent successfully.

## 2020-11-06 ENCOUNTER — CARE COORDINATION (OUTPATIENT)
Dept: CARE COORDINATION | Age: 75
End: 2020-11-06

## 2020-11-06 ENCOUNTER — TELEPHONE (OUTPATIENT)
Dept: FAMILY MEDICINE CLINIC | Age: 75
End: 2020-11-06

## 2020-11-06 NOTE — CARE COORDINATION
Telephone call to Dr. Anel Inman. Spoke with representative, inquiring about Trulcity/[employer] Cares and Neha Ryan forms for physician to complete that were faxed to him last week. Representative indicated plan to do some checking and get back to this writer.

## 2020-11-06 NOTE — TELEPHONE ENCOUNTER
Evie Farias, [facility]     Called regarding  Assistance paperwork faxed for the patient regarding the following  medication assistance programs:    Tony [employer] for [employer] Domenico for [employer]

## 2020-11-11 ENCOUNTER — NURSE ONLY (OUTPATIENT)
Dept: FAMILY MEDICINE CLINIC | Age: 75
End: 2020-11-11
Payer: MEDICARE

## 2020-11-11 PROCEDURE — 96372 THER/PROPH/DIAG INJ SC/IM: CPT | Performed by: FAMILY MEDICINE

## 2020-11-11 RX ORDER — TESTOSTERONE CYPIONATE 200 MG/ML
200 INJECTION INTRAMUSCULAR ONCE
Status: COMPLETED | OUTPATIENT
Start: 2020-11-11 | End: 2020-11-11

## 2020-11-11 RX ADMIN — TESTOSTERONE CYPIONATE 200 MG: 200 INJECTION INTRAMUSCULAR at 09:24

## 2020-11-11 NOTE — PROGRESS NOTES
Patient given Testosterone 200mg IM left gluteal..  Will return in 2 weeks for next injection    Patient tolerated injection well.     Administrations This Visit     testosterone cypionate (DEPOTESTOTERONE CYPIONATE) injection 200 mg     Admin Date  11/11/2020 Action  Given Dose  200 mg Route  Intramuscular Administered By  Guero Parry LPN

## 2020-11-12 ENCOUNTER — TELEPHONE (OUTPATIENT)
Dept: FAMILY MEDICINE CLINIC | Age: 75
End: 2020-11-12

## 2020-11-12 ENCOUNTER — CARE COORDINATION (OUTPATIENT)
Dept: CARE COORDINATION | Age: 75
End: 2020-11-12

## 2020-11-12 NOTE — CARE COORDINATION
Telephone call with Keyur Drummond. Explained looking for Patient Assistance Applications, [address] [employer] faxed last week for Dr. Alyssa Dasr completion. Noted in chart that he did complete forms and were left at . Sarah Barboza expressed plan to look for forms. Asked that forms be faxed to this writer. Fax number was provided.

## 2020-11-12 NOTE — CARE COORDINATION
Telephone call with [facility]. She indicated that she found forms and faxed to this writer.

## 2020-11-12 NOTE — CARE COORDINATION
Faxed completed Patient Assistance Program Application/Trulicity to [facility]. Received confirmation that fax was sent successfully.

## 2020-11-20 ENCOUNTER — CARE COORDINATION (OUTPATIENT)
Dept: CARE COORDINATION | Age: 75
End: 2020-11-20

## 2020-11-20 NOTE — CARE COORDINATION
Telephone call to [employer]. Representative indicated that they only accept [phone] tax form for proof of income. They will not accept the 5454 tax form that was sent.

## 2020-11-20 NOTE — CARE COORDINATION
Telephone call to patient. Explained need copy of tax form [phone] for proof of income for [employer]. Patient expressed plan to mail to this writer.

## 2020-11-23 ENCOUNTER — CARE COORDINATION (OUTPATIENT)
Dept: CARE COORDINATION | Age: 75
End: 2020-11-23

## 2020-11-23 NOTE — CARE COORDINATION
Telephone call with Jennifer/spouse. She indicated that she found 1040 tax form and will be mailing a copy to this writer.

## 2020-11-23 NOTE — CARE COORDINATION
Telephone call with Jennifer/spouse. Explained that [employer] is requesting copy of [phone] tax form they would not accept form 8879. Derek Mcfadden expressed plan to do some checking and get back with this writer.

## 2020-11-25 ENCOUNTER — NURSE ONLY (OUTPATIENT)
Dept: FAMILY MEDICINE CLINIC | Age: 75
End: 2020-11-25
Payer: MEDICARE

## 2020-11-25 PROCEDURE — 96372 THER/PROPH/DIAG INJ SC/IM: CPT | Performed by: FAMILY MEDICINE

## 2020-11-25 RX ORDER — TESTOSTERONE CYPIONATE 200 MG/ML
200 INJECTION INTRAMUSCULAR ONCE
Status: COMPLETED | OUTPATIENT
Start: 2020-11-25 | End: 2020-11-25

## 2020-11-25 RX ADMIN — TESTOSTERONE CYPIONATE 200 MG: 200 INJECTION INTRAMUSCULAR at 09:06

## 2020-11-25 NOTE — PROGRESS NOTES
Patient given Testosterone 200mg IM right gluteal..  Will return in 2 weeks for next injection    Patient tolerated injection well.     Administrations This Visit     testosterone cypionate (DEPOTESTOTERONE CYPIONATE) injection 200 mg     Admin Date  11/25/2020 Action  Given Dose  200 mg Route  Intramuscular Administered By  Lloyd Casarez LPN

## 2020-12-01 ENCOUNTER — CARE COORDINATION (OUTPATIENT)
Dept: CARE COORDINATION | Age: 75
End: 2020-12-01

## 2020-12-01 NOTE — CARE COORDINATION
Telephone call with Jennifer/spouse. Explained that this writer sent in by [employer] Patient Assistance Program Application/Januvia. Also discussed did not receive 1040 form yet needed for [employer]. Suzanna expressed plan to resend [phone] information to this writer.

## 2020-12-08 ENCOUNTER — CARE COORDINATION (OUTPATIENT)
Dept: CARE COORDINATION | Age: 75
End: 2020-12-08

## 2020-12-08 NOTE — CARE COORDINATION
Telephone call to [facility]. Representative indicated they have not received new application and to call back in a week.

## 2020-12-09 ENCOUNTER — NURSE ONLY (OUTPATIENT)
Dept: FAMILY MEDICINE CLINIC | Age: 75
End: 2020-12-09
Payer: MEDICARE

## 2020-12-09 PROCEDURE — 96372 THER/PROPH/DIAG INJ SC/IM: CPT | Performed by: FAMILY MEDICINE

## 2020-12-09 RX ORDER — TESTOSTERONE CYPIONATE 200 MG/ML
200 INJECTION INTRAMUSCULAR ONCE
Status: COMPLETED | OUTPATIENT
Start: 2020-12-09 | End: 2020-12-09

## 2020-12-09 RX ADMIN — TESTOSTERONE CYPIONATE 200 MG: 200 INJECTION INTRAMUSCULAR at 10:06

## 2020-12-09 NOTE — PROGRESS NOTES
Patient given Testosterone 200mg IM left gluteal..  Will return in 2 weeks for next injection    Patient tolerated injection well.     Administrations This Visit     testosterone cypionate (DEPOTESTOTERONE CYPIONATE) injection 200 mg     Admin Date  12/09/2020 Action  Given Dose  200 mg Route  Intramuscular Administered By  Cassi Cueva LPN

## 2020-12-14 ENCOUNTER — CARE COORDINATION (OUTPATIENT)
Dept: CARE COORDINATION | Age: 75
End: 2020-12-14

## 2020-12-14 NOTE — CARE COORDINATION
Telephone call with [employer] Patient Assistance/Januvia. Representative indicated they have not received new application and only has July application in their system. Representative asked that this writer call back the end of the week.

## 2020-12-14 NOTE — CARE COORDINATION
Telephone call with [employer]. Representative did indicate they did receive 1040 Form. Confirmed that patient will have the same insurance next year. Representative noted this information and will send patient's updated application for processing. Representative indicated that patient's application should be approved in the next 24/48 hours. No other information is being requested at this time.

## 2020-12-15 ENCOUNTER — VIRTUAL VISIT (OUTPATIENT)
Dept: FAMILY MEDICINE CLINIC | Age: 75
End: 2020-12-15
Payer: MEDICARE

## 2020-12-15 PROCEDURE — 99442 PR PHYS/QHP TELEPHONE EVALUATION 11-20 MIN: CPT | Performed by: FAMILY MEDICINE

## 2020-12-15 ASSESSMENT — ENCOUNTER SYMPTOMS
COUGH: 0
DIARRHEA: 0
CONSTIPATION: 0
VOMITING: 0
ANAL BLEEDING: 0
ABDOMINAL PAIN: 0
NAUSEA: 0
CHEST TIGHTNESS: 0
SHORTNESS OF BREATH: 0
WHEEZING: 0
BLOOD IN STOOL: 0

## 2020-12-15 ASSESSMENT — PATIENT HEALTH QUESTIONNAIRE - PHQ9
SUM OF ALL RESPONSES TO PHQ QUESTIONS 1-9: 0
SUM OF ALL RESPONSES TO PHQ QUESTIONS 1-9: 0
1. LITTLE INTEREST OR PLEASURE IN DOING THINGS: 0
SUM OF ALL RESPONSES TO PHQ QUESTIONS 1-9: 0
SUM OF ALL RESPONSES TO PHQ9 QUESTIONS 1 & 2: 0
2. FEELING DOWN, DEPRESSED OR HOPELESS: 0

## 2020-12-15 NOTE — ASSESSMENT & PLAN NOTE
Most recent A1c value at goal control. Patient reporting home blood glucose values within normal range.   Continue current medication

## 2020-12-15 NOTE — PROGRESS NOTES
Elda Stern is a 76 y.o. male evaluated via telephone on 12/15/2020. Consent:  He and/or health care decision maker is aware that that he may receive a bill for this telephone service, depending on his insurance coverage, and has provided verbal consent to proceed: Yes      Documentation:  I communicated with the patient and/or health care decision maker about chronic disease management       Details of this discussion including any medical advice provided:     Patient presents for virtual telephone visit for chronic diabetes, hypertension, hyperlipidemia, CAD, COPD, and fatty liver management. They remain established at [facility] for long-term follow-up and management of myasthenia gravis. Patient reports taking medications as prescribed since the most recent visit. They deny adhering to any specific lower carbohydrate or lower salt diet. They deny any routine exercise outside of normal day-to-day activity. Patient reports home blood glucose values ranging from 70s to 100s since the most recent office visit. They deny any polyuria or polydipsia, new onset vision changes, or new onset paresthesias. Patient denies any chest pain, dyspnea, palpitations, lightheadedness, dizziness, worsening lower extremity edema, or syncope. Patient denies any dyspnea at rest, persistent wheezing, worsening cough, chest tightness, or limitation in normal day-to-day activity due to breathing. Review of Systems   Constitutional: Negative for appetite change, chills, diaphoresis, fatigue, fever and unexpected weight change. Eyes: Negative for visual disturbance. Respiratory: Negative for cough, chest tightness, shortness of breath and wheezing. Cardiovascular: Negative for chest pain, palpitations and leg swelling. No orthopnea, No PND   Gastrointestinal: Negative for abdominal pain, anal bleeding, blood in stool, constipation, diarrhea, nausea and vomiting.         No heartburn, No melena Endocrine: Negative for cold intolerance, heat intolerance, polydipsia, polyphagia and polyuria. Genitourinary: Negative for dysuria and hematuria. Musculoskeletal: Negative for myalgias. Skin: Negative for rash. Neurological: Negative for dizziness, syncope, weakness, light-headedness, numbness and headaches. Psychiatric/Behavioral: Negative for dysphoric mood. The patient is not nervous/anxious. Problem List Items Addressed This Visit        Circulatory    HTN (hypertension) (Chronic)     Blood pressure values historically well controlled. Continue current medication         Relevant Orders    Comprehensive Metabolic Panel    Coronary artery disease involving native coronary artery of native heart with angina pectoris (HCC) (Chronic)     Stable. No reported change in activity tolerance. Continue current medication to help with aggressively managing blood pressure, cholesterol, and blood sugar. Relevant Orders    Comprehensive Metabolic Panel    Lipid Panel       Respiratory    COPD (chronic obstructive pulmonary disease) ([facility]) (Chronic)     Stable. Managed well with use of as needed medication. No reported change in activity tolerance. Digestive    Fatty infiltration of liver (Chronic)    Relevant Orders    Comprehensive Metabolic Panel       Endocrine    Type 2 diabetes mellitus with microalbuminuria, without long-term current use of insulin ([facility]) - Primary (Chronic)     Most recent A1c value at goal control. Patient reporting home blood glucose values within normal range. Continue current medication         Relevant Orders    Hemoglobin A1C    Microalbumin / Creatinine Urine Ratio    Comprehensive Metabolic Panel    Lipid Panel       Nervous and Auditory    Myasthenia gravis with exacerbation ([facility])     Stable with no reported weakness. Patient established with specialist at the [facility] for long-term follow-up and management.             Other Hyperlipidemia (Chronic)    Relevant Orders    Comprehensive Metabolic Panel    Lipid Panel      Other Visit Diagnoses     Need for vaccination        Relevant Medications    zoster recombinant adjuvanted vaccine [facility]) 50 MCG/0.5ML SUSR injection          Follow-up in 6 Months for Medicare Annual Wellness Visit      I affirm this is a Patient Initiated Episode with a Patient who has not had a related appointment within my department in the past 7 days or scheduled within the next 24 hours.     Patient identification was verified at the start of the visit: Yes    Total Time: minutes: 11-20 minutes     Patient location: Individual Home  Provider location: Office      Note: not billable if this call serves to triage the patient into an appointment for the relevant concern      KAREN Purvis

## 2020-12-15 NOTE — ASSESSMENT & PLAN NOTE
Stable. No reported change in activity tolerance. Continue current medication to help with aggressively managing blood pressure, cholesterol, and blood sugar.

## 2020-12-16 ENCOUNTER — CARE COORDINATION (OUTPATIENT)
Dept: CARE COORDINATION | Age: 75
End: 2020-12-16

## 2020-12-16 NOTE — CARE COORDINATION
Telephone call to [employer]. Representative indicated the are still processing patient's insurance. Confirming insurance is needed before patient can be approved. Representative asked that this writer call back on 12/18/2020.

## 2020-12-18 ENCOUNTER — CARE COORDINATION (OUTPATIENT)
Dept: CARE COORDINATION | Age: 75
End: 2020-12-18

## 2020-12-18 NOTE — CARE COORDINATION
Telephone call to [employer]. Representative indicated they are still awaiting insurance verification. Patient had marked commercial insurance and not Medicare Part D for insurance type. Representative indicated that are expediting insurance confirmation and approval.  Representative asked that this writer call back on 12/21/2020 for update.

## 2020-12-18 NOTE — CARE COORDINATION
Telephone call to [facility] Patient Assistance Program/[employer]. Representative indicated they have not received new application. Only application in the mail was from this year. Representative asked that this writer call back next week to check on status before mailing in form again. Representative explained that patient will be mailed back form with attestation form that will need to be signed and returned.

## 2020-12-21 ENCOUNTER — CARE COORDINATION (OUTPATIENT)
Dept: CARE COORDINATION | Age: 75
End: 2020-12-21

## 2020-12-21 NOTE — CARE COORDINATION
Telephone call to [employer]. Representative indicated that application is still be processed. Representative stated that she was going to send an email marking application as urgent.

## 2020-12-22 ENCOUNTER — CARE COORDINATION (OUTPATIENT)
Dept: CARE COORDINATION | Age: 75
End: 2020-12-22

## 2020-12-22 NOTE — CARE COORDINATION
Telephone call to [facility] Patient Assistance Program/[place]. Representative indicated they have not received new application for 0538. Application that they do have expires 12/31/2020. Representative asked that this writer call back in a week to see if application is received.

## 2020-12-23 ENCOUNTER — NURSE ONLY (OUTPATIENT)
Dept: FAMILY MEDICINE CLINIC | Age: 75
End: 2020-12-23
Payer: MEDICARE

## 2020-12-23 ENCOUNTER — HOSPITAL ENCOUNTER (OUTPATIENT)
Dept: LAB | Age: 75
Discharge: HOME | End: 2020-12-23
Payer: MEDICARE

## 2020-12-23 ENCOUNTER — CARE COORDINATION (OUTPATIENT)
Dept: CARE COORDINATION | Age: 75
End: 2020-12-23

## 2020-12-23 LAB
ALBUMIN SERPL-MCNC: 4.3 G/DL (ref 3.5–4.6)
ALP BLD-CCNC: 39 U/L (ref 35–104)
ALT SERPL-CCNC: 14 U/L (ref 0–41)
ANION GAP SERPL CALCULATED.3IONS-SCNC: 7 MEQ/L (ref 9–15)
AST SERPL-CCNC: 15 U/L (ref 0–40)
BILIRUB SERPL-MCNC: 0.5 MG/DL (ref 0.2–0.7)
BUN BLDV-MCNC: 17 MG/DL (ref 8–23)
CALCIUM SERPL-MCNC: 9.4 MG/DL (ref 8.5–9.9)
CHLORIDE BLD-SCNC: 98 MEQ/L (ref 95–107)
CHOLESTEROL, TOTAL: 107 MG/DL (ref 0–199)
CO2: 30 MEQ/L (ref 20–31)
CREAT SERPL-MCNC: 1.18 MG/DL (ref 0.7–1.2)
CREATININE URINE: 136.5 MG/DL
GFR AFRICAN AMERICAN: >60
GFR NON-AFRICAN AMERICAN: >60
GLOBULIN: 2.3 G/DL (ref 2.3–3.5)
GLUCOSE BLD-MCNC: 149 MG/DL (ref 70–99)
HBA1C MFR BLD: 7.2 % (ref 4.8–5.9)
HDLC SERPL-MCNC: 42 MG/DL (ref 40–59)
LDL CHOLESTEROL CALCULATED: 36 MG/DL (ref 0–129)
MICROALBUMIN UR-MCNC: 4.7 MG/DL
MICROALBUMIN/CREAT UR-RTO: 34.4 MG/G (ref 0–30)
POTASSIUM SERPL-SCNC: 4.4 MEQ/L (ref 3.4–4.9)
SODIUM BLD-SCNC: 135 MEQ/L (ref 135–144)
TOTAL PROTEIN: 6.6 G/DL (ref 6.3–8)
TRIGL SERPL-MCNC: 143 MG/DL (ref 0–150)

## 2020-12-23 PROCEDURE — 83036 HEMOGLOBIN GLYCOSYLATED A1C: CPT

## 2020-12-23 PROCEDURE — 80053 COMPREHEN METABOLIC PANEL: CPT

## 2020-12-23 PROCEDURE — 36415 COLL VENOUS BLD VENIPUNCTURE: CPT

## 2020-12-23 PROCEDURE — 82570 ASSAY OF URINE CREATININE: CPT

## 2020-12-23 PROCEDURE — 96372 THER/PROPH/DIAG INJ SC/IM: CPT | Performed by: FAMILY MEDICINE

## 2020-12-23 PROCEDURE — 80061 LIPID PANEL: CPT

## 2020-12-23 PROCEDURE — 82043 UR ALBUMIN QUANTITATIVE: CPT

## 2020-12-23 RX ORDER — TESTOSTERONE CYPIONATE 200 MG/ML
200 INJECTION INTRAMUSCULAR ONCE
Status: COMPLETED | OUTPATIENT
Start: 2020-12-23 | End: 2020-12-23

## 2020-12-23 RX ADMIN — TESTOSTERONE CYPIONATE 200 MG: 200 INJECTION INTRAMUSCULAR at 09:32

## 2020-12-23 NOTE — PROGRESS NOTES
Patient given Trightestosterone 200mg IM right gluteal..  Will return in 2 weeks for next injection    Patient tolerated injection well.     Administrations This Visit     testosterone cypionate (DEPOTESTOTERONE CYPIONATE) injection 200 mg     Admin Date  12/23/2020 Action  Given Dose  200 mg Route  Intramuscular Administered By  Romana Peal, LPN

## 2020-12-31 ENCOUNTER — CARE COORDINATION (OUTPATIENT)
Dept: CARE COORDINATION | Age: 75
End: 2020-12-31

## 2020-12-31 NOTE — CARE COORDINATION
Telephone call to [employer]. Representative indicated that patient was approved for next year. Approval is good until 12/31/2021.

## 2020-12-31 NOTE — CARE COORDINATION
Telephone call to patient. Explained that he was approved from [facility], Also, discussed would be faxing in [employer] as they have not received application by mail.

## 2021-01-06 ENCOUNTER — CARE COORDINATION (OUTPATIENT)
Dept: CARE COORDINATION | Age: 76
End: 2021-01-06

## 2021-01-06 NOTE — CARE COORDINATION
At request of patient called [facility] to reorder Trulicity. Representative took refill request and sending it to their pharmacy. Told that patient will hear something in next 3 to 5 business days. Medication will be sent to patient's home. Instructed if patient does not hear anything in 3 to 5 business days to call them back.

## 2021-01-06 NOTE — CARE COORDINATION
Telephone call to patient. Explained that refill request was made with [employer]. Discussed if he doesn't hear something in next 3 to 5 days to contact [facility]. [facility] was provided.

## 2021-01-08 ENCOUNTER — CARE COORDINATION (OUTPATIENT)
Dept: CARE COORDINATION | Age: 76
End: 2021-01-08

## 2021-01-08 NOTE — CARE COORDINATION
Telephone call to patient. Summary of  phone call with [facility] Patient Assistance Program was  Provided.

## 2021-01-08 NOTE — CARE COORDINATION
Telephone call to [facility]. Representative indicated they did receive application on 9/4/0979. On 1/6/2021 they mailed an attestation form to patient ( he needs to complete questions 1 and 2), sign and date form and mail back to them.

## 2021-01-14 ENCOUNTER — CARE COORDINATION (OUTPATIENT)
Dept: CARE COORDINATION | Age: 76
End: 2021-01-14

## 2021-01-14 NOTE — CARE COORDINATION
Telephone call with patient. He indicated that he has not received his Trulicity from [facility]. Provided patient with contact information for [facility] and he expressed plan to call them. Patient indicated that he did receive original application and questionnaire from [facility]. Explained he needed to complete questions one and two on questionnaire. Provided patient with license number for Dr. Catracho Lorenzo that was requested by [facility]. Explained that he needed to send original application and questionnaire back to [facility] by mail.

## 2021-01-18 ENCOUNTER — CARE COORDINATION (OUTPATIENT)
Dept: CARE COORDINATION | Age: 76
End: 2021-01-18

## 2021-01-18 NOTE — CARE COORDINATION
Telephone call with patient to discuss status of [employer]. He was told that he was approved and to call back on 1/19/2021.

## 2021-01-19 ENCOUNTER — CARE COORDINATION (OUTPATIENT)
Dept: CARE COORDINATION | Age: 76
End: 2021-01-19

## 2021-01-19 NOTE — CARE COORDINATION
Telephone call to [employer]. Representative indicated that patient has been approved and has been sent to [employer] ([phone]). Call was transferred to [facility]. Representative at pharmacy indicated that they need worksheet sent over from [facility] and has requested this information. Representative asked that this writer call back in 48 hours to check on status of medication being shipped. Explained that patient is in need of medication. Representative did indicated they maybe able to over night medication to patient.

## 2021-01-20 ENCOUNTER — NURSE ONLY (OUTPATIENT)
Dept: FAMILY MEDICINE CLINIC | Age: 76
End: 2021-01-20
Payer: MEDICARE

## 2021-01-20 DIAGNOSIS — E29.1 HYPOGONADISM IN MALE: Primary | ICD-10-CM

## 2021-01-20 PROCEDURE — 96372 THER/PROPH/DIAG INJ SC/IM: CPT | Performed by: FAMILY MEDICINE

## 2021-01-20 RX ORDER — TESTOSTERONE CYPIONATE 200 MG/ML
200 INJECTION INTRAMUSCULAR ONCE
Status: COMPLETED | OUTPATIENT
Start: 2021-01-20 | End: 2021-01-20

## 2021-01-20 RX ADMIN — TESTOSTERONE CYPIONATE 200 MG: 200 INJECTION INTRAMUSCULAR at 09:26

## 2021-01-21 ENCOUNTER — CARE COORDINATION (OUTPATIENT)
Dept: CARE COORDINATION | Age: 76
End: 2021-01-21

## 2021-01-21 NOTE — CARE COORDINATION
Telephone call with [employer] Patient Assistance Program/[employer]. Representative indicated they have not received attestation form and original application back yet from patient. Representative requested that this writer call back in a week to check on status of application.

## 2021-01-21 NOTE — CARE COORDINATION
Telephone call with patient and relayed results of phone call with [facility] and [facility] Patient Assistance Program.

## 2021-01-21 NOTE — CARE COORDINATION
Telephone call to [facility] Representative indicated that patient's medication has been sent out and due to be delivered today.

## 2021-01-28 ENCOUNTER — CARE COORDINATION (OUTPATIENT)
Dept: CARE COORDINATION | Age: 76
End: 2021-01-28

## 2021-01-28 NOTE — CARE COORDINATION
Telephone call to patient. Relayed results of phone call with [employer] Patient Assistance Program/Januvia. Patient feels he has enough of medication to last until it arrives. If he runs low he will contact physician office for samples.

## 2021-02-02 ENCOUNTER — CARE COORDINATION (OUTPATIENT)
Dept: CARE COORDINATION | Age: 76
End: 2021-02-02

## 2021-02-02 NOTE — CARE COORDINATION
Telephone call with patient. He indicated that he did receive his Januvia and Trulicity from Patient Assistance Programs.

## 2021-02-03 ENCOUNTER — NURSE ONLY (OUTPATIENT)
Dept: FAMILY MEDICINE CLINIC | Age: 76
End: 2021-02-03
Payer: MEDICARE

## 2021-02-03 DIAGNOSIS — E29.1 HYPOGONADISM IN MALE: Primary | ICD-10-CM

## 2021-02-03 PROCEDURE — 96372 THER/PROPH/DIAG INJ SC/IM: CPT | Performed by: FAMILY MEDICINE

## 2021-02-03 RX ORDER — TESTOSTERONE CYPIONATE 200 MG/ML
200 INJECTION INTRAMUSCULAR ONCE
Status: COMPLETED | OUTPATIENT
Start: 2021-02-03 | End: 2021-02-03

## 2021-02-03 RX ADMIN — TESTOSTERONE CYPIONATE 200 MG: 200 INJECTION INTRAMUSCULAR at 09:05

## 2021-02-03 NOTE — PROGRESS NOTES
Patient given Testosterone 200mg IM right gluteal..  Will return in 2 weeks for next injection    Patient tolerated injection well.     Administrations This Visit     testosterone cypionate (DEPOTESTOTERONE CYPIONATE) injection 200 mg     Admin Date  02/03/2021 Action  Given Dose  200 mg Route  Intramuscular Administered By  Faby Sutherland LPN

## 2021-02-16 ENCOUNTER — CARE COORDINATION (OUTPATIENT)
Dept: CARE COORDINATION | Age: 76
End: 2021-02-16

## 2021-02-16 NOTE — CARE COORDINATION
Telephone call with Jennifer/spouse and patient. At this time patient has all of his prescribed medications. Patient to call if he needs assistance in obtaining medications from patient assistance programs. Will discharge from [facility] . Patient has this writer's contact information.

## 2021-02-24 ENCOUNTER — NURSE ONLY (OUTPATIENT)
Dept: FAMILY MEDICINE CLINIC | Age: 76
End: 2021-02-24
Payer: MEDICARE

## 2021-02-24 DIAGNOSIS — E29.1 HYPOGONADISM IN MALE: Primary | ICD-10-CM

## 2021-02-24 PROCEDURE — 96372 THER/PROPH/DIAG INJ SC/IM: CPT | Performed by: FAMILY MEDICINE

## 2021-02-24 RX ORDER — TESTOSTERONE CYPIONATE 200 MG/ML
200 INJECTION INTRAMUSCULAR ONCE
Status: COMPLETED | OUTPATIENT
Start: 2021-02-24 | End: 2021-02-24

## 2021-02-24 RX ADMIN — TESTOSTERONE CYPIONATE 200 MG: 200 INJECTION INTRAMUSCULAR at 09:57

## 2021-03-17 ENCOUNTER — OFFICE VISIT (OUTPATIENT)
Dept: ENDOCRINOLOGY | Age: 76
End: 2021-03-17
Payer: MEDICARE

## 2021-03-17 ENCOUNTER — NURSE ONLY (OUTPATIENT)
Dept: FAMILY MEDICINE CLINIC | Age: 76
End: 2021-03-17
Payer: MEDICARE

## 2021-03-17 VITALS
DIASTOLIC BLOOD PRESSURE: 68 MMHG | HEIGHT: 71 IN | OXYGEN SATURATION: 95 % | BODY MASS INDEX: 26.32 KG/M2 | SYSTOLIC BLOOD PRESSURE: 98 MMHG | HEART RATE: 74 BPM | WEIGHT: 188 LBS

## 2021-03-17 DIAGNOSIS — E29.1 HYPOGONADISM MALE: Primary | ICD-10-CM

## 2021-03-17 DIAGNOSIS — E29.1 HYPOGONADISM IN MALE: Primary | ICD-10-CM

## 2021-03-17 PROCEDURE — 96372 THER/PROPH/DIAG INJ SC/IM: CPT | Performed by: FAMILY MEDICINE

## 2021-03-17 PROCEDURE — 3017F COLORECTAL CA SCREEN DOC REV: CPT | Performed by: INTERNAL MEDICINE

## 2021-03-17 PROCEDURE — 99213 OFFICE O/P EST LOW 20 MIN: CPT | Performed by: INTERNAL MEDICINE

## 2021-03-17 PROCEDURE — G8417 CALC BMI ABV UP PARAM F/U: HCPCS | Performed by: INTERNAL MEDICINE

## 2021-03-17 PROCEDURE — G8484 FLU IMMUNIZE NO ADMIN: HCPCS | Performed by: INTERNAL MEDICINE

## 2021-03-17 PROCEDURE — 4040F PNEUMOC VAC/ADMIN/RCVD: CPT | Performed by: INTERNAL MEDICINE

## 2021-03-17 PROCEDURE — 1123F ACP DISCUSS/DSCN MKR DOCD: CPT | Performed by: INTERNAL MEDICINE

## 2021-03-17 PROCEDURE — G8427 DOCREV CUR MEDS BY ELIG CLIN: HCPCS | Performed by: INTERNAL MEDICINE

## 2021-03-17 PROCEDURE — 1036F TOBACCO NON-USER: CPT | Performed by: INTERNAL MEDICINE

## 2021-03-17 RX ORDER — TESTOSTERONE CYPIONATE 200 MG/ML
200 INJECTION INTRAMUSCULAR
Qty: 2 ML | Refills: 3
Start: 2021-03-17 | End: 2022-11-03

## 2021-03-17 RX ORDER — TESTOSTERONE CYPIONATE 200 MG/ML
200 INJECTION INTRAMUSCULAR ONCE
Status: COMPLETED | OUTPATIENT
Start: 2021-03-17 | End: 2021-03-17

## 2021-03-17 RX ADMIN — TESTOSTERONE CYPIONATE 200 MG: 200 INJECTION INTRAMUSCULAR at 10:38

## 2021-03-17 NOTE — PROGRESS NOTES
Patient given Testosterone 200mg IM right gluteal..  Will return in 2 weeks for next injection    Patient tolerated injection well.     Administrations This Visit     testosterone cypionate (DEPOTESTOTERONE CYPIONATE) injection 200 mg     Admin Date  03/17/2021 Action  Given Dose  200 mg Route  Intramuscular Administered By  Felicity Heller LPN

## 2021-03-17 NOTE — PROGRESS NOTES
Subjective:      Patient ID: Preet Zhou is a 76 y.o. male. Follow-up on hypogonadism on testosterone replacement through injections had labs done last year testosterone levels have been stable last PSA was done 2 years ago was normal denies any signs symptoms of prostate enlargement at this time  Other  This is a chronic (Hypogonadism) problem. The current episode started more than 1 year ago. The problem has been unchanged. Treatments tried: Testosterone replacement. The treatment provided moderate relief. Results for Verenice Lazcano (MRN [id number]) as of 3/17/2021 09:45   Ref.  Range 5/22/2020 09:29 7/29/2020 15:13 9/16/2020 11:23 12/23/2020 08:04   Sodium Latest Ref Range: 135 - 144 mEq/L 137 135  135   Potassium Latest Ref Range: 3.4 - 4.9 mEq/L 4.4 5.0 (H)  4.4   Chloride Latest Ref Range: 95 - 107 mEq/L 98 96  98   CO2 Latest Ref Range: 20 - 31 mEq/L 26 29  30   BUN Latest Ref Range: 8 - 23 mg/dL 17 22  17   Creatinine Latest Ref Range: 0.70 - 1.20 mg/dL 1.11 1.19  1.18   Anion Gap Latest Ref Range: 9 - 15 mEq/L 13 10  7 (L)   GFR Non- Latest Ref Range: >60  >60.0 59.5 (L)  >60.0   GFR African American Latest Ref Range: >60  >60.0 >60.0  >60.0   Glucose Latest Ref Range: 70 - 99 mg/dL 154 (H) 144 (H)  149 (H)   Calcium Latest Ref Range: 8.5 - 9.9 mg/dL 10.8 (H) 9.2  9.4   Total Protein Latest Ref Range: 6.3 - 8.0 g/dL  6.9  6.6   Cholesterol, Total Latest Ref Range: 0 - 199 mg/dL  119  107   HDL Cholesterol Latest Ref Range: 40 - 59 mg/dL  37 (L)  42   LDL Calculated Latest Ref Range: 0 - 129 mg/dL  39  36   Triglycerides Latest Ref Range: 0 - 150 mg/dL  213 (H)  143   Albumin Latest Ref Range: 3.5 - 4.6 g/dL  4.1  4.3   Globulin Latest Ref Range: 2.3 - 3.5 g/dL  2.8  2.3   Alk Phos Latest Ref Range: 35 - 104 U/L  36  39   ALT Latest Ref Range: 0 - 41 U/L  18  14   AST Latest Ref Range: 0 - 40 U/L  19  15   Bilirubin Latest Ref Range: 0.2 - 0.7 mg/dL  0.3  0.5   Hemoglobin A1C Latest Ref Range: 4.8 - 5.9 %  6.4 (H)  7.2 (H)   Sex Hormone Binding Latest Ref Range: 11 - 80 nmol/L 35  35    Testosterone Free, Calc Latest Ref Range: 47 - 244 pg/mL 179  129    Total Testosterone Latest Ref Range: 300 - 720 ng/dL 886 (H)  674    Testosterone % Free Latest Ref Range: 1.6 - 2.9 % 2.0  1.9    WBC Latest Ref Range: 4.8 - 10.8 K/uL 9.6      RBC Latest Ref Range: 4.70 - 6.10 M/uL 4.71      Hemoglobin Quant Latest Ref Range: 14.0 - 18.0 g/dL 15.8      Hematocrit Latest Ref Range: 42.0 - 52.0 % 47.1      MCV Latest Ref Range: 80.0 - 100.0 fL 100.0      MCH Latest Ref Range: 27.0 - 31.3 pg 33.6 (H)      MCHC Latest Ref Range: 33.0 - 37.0 % 33.6      RDW Latest Ref Range: 11.5 - 14.5 % 15.1 (H)      Platelet Count Latest Ref Range: 130 - 400 K/uL 191      Creatinine, Ur Latest Ref Range: Not Established mg/dL    136.5   Microalbumin Creatinine Ratio Latest Ref Range: 0.0 - 30.0 mg/G    34.4 (H)   Microalbumin, Random Urine Latest Ref Range: Not Established mg/dL    4.70 (H)     Patient Active Problem List   Diagnosis    Benign prostatic hyperplasia    HTN (hypertension)    Chronic low back pain    Coronary artery disease involving native coronary artery of native heart with angina pectoris (HCC)    Bilateral leg pain    Chronic venous insufficiency    Gastroesophageal reflux disease    Abnormal LFTs    Lipoma of spermatic cord    Fatty infiltration of liver    Perennial allergic rhinitis    Hyperlipidemia    History of colonic polyps    Osteoarthritis of multiple joints    Pseudophakia of both eyes    COPD (chronic obstructive pulmonary disease) (HCC)    History of tobacco use    Astigmatism, regular    Type 2 diabetes mellitus with microalbuminuria, without long-term current use of insulin ([facility])    Erectile dysfunction    Varicose veins of both lower extremities    Hypogonadism male    History of melanoma excision    Status post coronary artery bypass grafts x 5    Myasthenia gravis with exacerbation ([facility] [address]     Allergies   Allergen Reactions    Invokana [Canagliflozin] Diarrhea    Metformin And Related Diarrhea    Other      There is a list scanned in media that pt can not take due to myasthenia gravis ,  Antibiotics       Current Outpatient Medications:     testosterone cypionate (DEPOTESTOTERONE CYPIONATE) 200 MG/ML injection, Inject 1 mL into the muscle every 14 days.  Indications: PER ORDER in OV note 9/16/20, Disp: 2 mL, Rfl: 3    metoprolol succinate (TOPROL XL) 50 MG extended release tablet, Take 3 tablets by mouth daily, Disp: 270 tablet, Rfl: 3    rosuvastatin (CRESTOR) 20 MG tablet, Take 1 tablet by mouth nightly, Disp: 90 tablet, Rfl: 3    glipiZIDE (GLUCOTROL) 10 MG tablet, Take 1 tablet by mouth 2 times daily (before meals), Disp: 180 tablet, Rfl: 3    tamsulosin (FLOMAX) 0.4 MG capsule, Take 2 capsules by mouth daily, Disp: 180 capsule, Rfl: 3    losartan (COZAAR) 100 MG tablet, Take 1 tablet by mouth daily, Disp: 90 tablet, Rfl: 3    triamterene-hydroCHLOROthiazide (MAXZIDE-25) 37.5-25 MG per tablet, Take 1 tablet by mouth daily, Disp: 90 tablet, Rfl: 3    amLODIPine (NORVASC) 10 MG tablet, Take 1 tablet by mouth daily, Disp: 90 tablet, Rfl: 3    blood glucose test strips (FREESTYLE LITE) strip, USE 1 STRIP TO CHECK GLUCOSE ONCE DAILY AS DIRECTED, Disp: 100 each, Rfl: 5    ipratropium (ATROVENT) 0.06 % nasal spray, 2 sprays by Nasal route 3 times daily, Disp: 3 Bottle, Rfl: 3    albuterol sulfate HFA (PROAIR HFA) 108 (90 Base) MCG/ACT inhaler, Inhale 2 puffs into the lungs every 6 hours as needed for Wheezing, Disp: 3 Inhaler, Rfl: 0    famotidine (PEPCID) 40 MG tablet, Take 1 tablet by mouth every evening, Disp: 90 tablet, Rfl: 3    SITagliptin (JANUVIA) 50 MG tablet, Take 1 tablet by mouth 2 times daily, Disp: 180 tablet, Rfl: 3    SITagliptin (JANUVIA) 100 MG tablet, Take 0.5 tablets by mouth 2 times daily, Disp: 90 tablet, Rfl: 3    Dulaglutide 0.75 MG/0.5ML SOPN, Inject 0.75 mg into the skin once a week, Disp: 12 pen, Rfl: 3    vitamin C 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), myasthenia gravis with exacerbation (G70.01)     EXPIRES: 05/2024, Disp: 2 each, Rfl: 0    azaTHIOprine (IMURAN) 50 MG tablet, Take 50 mg by mouth Take 2 tablets by mouth in the morning and 1 tablet in the evening., Disp: , Rfl:     Cyanocobalamin (VITAMIN B 12 PO), Take 1,000 mg by mouth, Disp: , Rfl:     predniSONE (DELTASONE) 10 MG tablet, Take 5 mg by mouth three times a week , Disp: , Rfl:     nitroGLYCERIN (NITROSTAT) 0.4 MG SL tablet, Place 1 tablet under the tongue every 5 minutes as needed for Chest pain, Disp: 25 tablet, Rfl: 0    pyridostigmine (MESTINON) 60 MG tablet, Take 60 mg by mouth, Disp: , Rfl:     sildenafil (VIAGRA) 100 MG tablet, TAKE ONE TABLET BY MOUTH AS NEEDED APPROXIMATELY ONE HOUR BEFORE SEXUAL ACTIVITY. DO NOT USE MORE THAN ONE DOSE DAILY, Disp: 6 tablet, Rfl: 3    Blood Glucose Monitoring [facility], Test once daily. Dx: E11.29, Disp: 1 Device, Rfl: 0    Lancets MISC, Test once daily. Dx: E11.29, Disp: 100 each, Rfl: 3    ascorbic acid (VITAMIN C) 500 MG tablet, Take 1,000 mg by mouth daily, Disp: , Rfl:     loratadine (CLARITIN) 10 MG tablet, Take 1 tablet by mouth daily, Disp: 30 tablet, Rfl: 5    acetaminophen (TYLENOL EX ST ARTHRITIS PAIN) 500 MG tablet, Take 1 tablet by mouth every 6 hours as needed for Pain, Disp: 120 tablet, Rfl: 3    CINNAMON PO, Take 500 mg by mouth 2 times daily. , Disp: , Rfl:     aspirin 81 MG EC tablet, Take 81 mg by mouth daily. , Disp: , Rfl:       Review of Systems   Cardiovascular: Negative. Endocrine: Negative. Genitourinary: Negative. All other systems reviewed and are negative.       Vitals:    03/17/21 0939   BP: 98/68   Pulse: 74   SpO2: 95%   Weight: 188 lb (85.3 kg)   Height: 5' 11\" (1.803 m)       Objective:   Physical Exam  Vitals signs reviewed. Constitutional:       Appearance: Normal appearance. He is normal weight. HENT:      Head: Normocephalic and atraumatic. Right Ear: External ear normal.      Left Ear: External ear normal.      Nose: Nose normal.   Neck:      Musculoskeletal: Normal range of motion and neck supple. Cardiovascular:      Rate and Rhythm: Normal rate. Musculoskeletal: Normal range of motion. Neurological:      General: No focal deficit present. Mental Status: He is alert. Assessment:       Diagnosis Orders   1. Hypogonadism male  Testosterone Free And Total Male    CBC    PSA, Diagnostic           Plan:      Orders Placed This Encounter   Procedures    Testosterone Free And Total Male     Standing Status:   Future     Standing Expiration Date:   3/17/2022    CBC     Standing Status:   Future     Standing Expiration Date:   3/17/2022    PSA, Diagnostic     Standing Status:   Future     Standing Expiration Date:   3/17/2022     Orders Placed This Encounter   Medications    testosterone cypionate (DEPOTESTOTERONE CYPIONATE) 200 MG/ML injection     Sig: Inject 1 mL into the muscle every 14 days.  Indications: PER ORDER in OV note 9/16/20     Dispense:  2 mL     Refill:  3       Continue testosterone injections through clinic 200 mg every 2 weeks patient to follow-up in 6 months we will also get a PSA CBC          Clem Herzog MD

## 2021-03-31 ENCOUNTER — HOSPITAL ENCOUNTER (OUTPATIENT)
Dept: LAB | Age: 76
Discharge: HOME | End: 2021-03-31
Payer: MEDICARE

## 2021-03-31 ENCOUNTER — NURSE ONLY (OUTPATIENT)
Dept: FAMILY MEDICINE CLINIC | Age: 76
End: 2021-03-31
Payer: MEDICARE

## 2021-03-31 DIAGNOSIS — E29.1 HYPOGONADISM MALE: ICD-10-CM

## 2021-03-31 DIAGNOSIS — E29.1 HYPOGONADISM IN MALE: Primary | ICD-10-CM

## 2021-03-31 LAB
HCT VFR BLD CALC: 46.8 % (ref 42–52)
HEMOGLOBIN: 16 G/DL (ref 14–18)
MCH RBC QN AUTO: 33.4 PG (ref 27–31.3)
MCHC RBC AUTO-ENTMCNC: 34.2 % (ref 33–37)
MCV RBC AUTO: 97.9 FL (ref 80–100)
PDW BLD-RTO: 14.7 % (ref 11.5–14.5)
PLATELET # BLD: 173 K/UL (ref 130–400)
PROSTATE SPECIFIC ANTIGEN: 3.63 NG/ML (ref 0–6.22)
RBC # BLD: 4.78 M/UL (ref 4.7–6.1)
WBC # BLD: 10.5 K/UL (ref 4.8–10.8)

## 2021-03-31 PROCEDURE — 84403 ASSAY OF TOTAL TESTOSTERONE: CPT

## 2021-03-31 PROCEDURE — 36415 COLL VENOUS BLD VENIPUNCTURE: CPT

## 2021-03-31 PROCEDURE — 96372 THER/PROPH/DIAG INJ SC/IM: CPT | Performed by: FAMILY MEDICINE

## 2021-03-31 PROCEDURE — 84270 ASSAY OF SEX HORMONE GLOBUL: CPT

## 2021-03-31 PROCEDURE — 85027 COMPLETE CBC AUTOMATED: CPT

## 2021-03-31 PROCEDURE — 84153 ASSAY OF PSA TOTAL: CPT

## 2021-03-31 RX ORDER — TESTOSTERONE CYPIONATE 200 MG/ML
200 INJECTION INTRAMUSCULAR ONCE
Status: COMPLETED | OUTPATIENT
Start: 2021-03-31 | End: 2021-03-31

## 2021-03-31 RX ADMIN — TESTOSTERONE CYPIONATE 200 MG: 200 INJECTION INTRAMUSCULAR at 10:00

## 2021-03-31 NOTE — PROGRESS NOTES
Patient given Testosterone 200mg IM left gluteal..  Will return in 2 weeks for next injection    Patient tolerated injection well.     Administrations This Visit     testosterone cypionate (DEPOTESTOTERONE CYPIONATE) injection 200 mg     Admin Date  03/31/2021 Action  Given Dose  200 mg Route  Intramuscular Administered By  Bimal Sue LPN

## 2021-04-01 LAB
SEX HORMONE BINDING GLOBULIN: 39 NMOL/L (ref 11–80)
TESTOSTERONE FREE PERCENT: 1.7 % (ref 1.6–2.9)
TESTOSTERONE FREE, CALC: 71 PG/ML (ref 47–244)
TESTOSTERONE TOTAL-MALE: 418 NG/DL (ref 300–720)

## 2021-04-14 ENCOUNTER — NURSE ONLY (OUTPATIENT)
Dept: FAMILY MEDICINE CLINIC | Age: 76
End: 2021-04-14
Payer: MEDICARE

## 2021-04-14 DIAGNOSIS — E29.1 HYPOGONADISM IN MALE: Primary | ICD-10-CM

## 2021-04-14 PROCEDURE — 96372 THER/PROPH/DIAG INJ SC/IM: CPT | Performed by: FAMILY MEDICINE

## 2021-04-14 RX ORDER — TESTOSTERONE CYPIONATE 200 MG/ML
200 INJECTION INTRAMUSCULAR ONCE
Status: COMPLETED | OUTPATIENT
Start: 2021-04-14 | End: 2021-04-14

## 2021-04-14 RX ADMIN — TESTOSTERONE CYPIONATE 200 MG: 200 INJECTION INTRAMUSCULAR at 09:51

## 2021-04-24 ENCOUNTER — VIRTUAL VISIT (OUTPATIENT)
Dept: INTERNAL MEDICINE | Age: 76
End: 2021-04-24
Payer: MEDICARE

## 2021-04-24 DIAGNOSIS — Z20.822 CLOSE EXPOSURE TO COVID-19 VIRUS: Primary | ICD-10-CM

## 2021-04-24 PROCEDURE — 99203 OFFICE O/P NEW LOW 30 MIN: CPT | Performed by: NURSE PRACTITIONER

## 2021-04-24 PROCEDURE — 1123F ACP DISCUSS/DSCN MKR DOCD: CPT | Performed by: NURSE PRACTITIONER

## 2021-04-24 PROCEDURE — 4040F PNEUMOC VAC/ADMIN/RCVD: CPT | Performed by: NURSE PRACTITIONER

## 2021-04-24 PROCEDURE — G8427 DOCREV CUR MEDS BY ELIG CLIN: HCPCS | Performed by: NURSE PRACTITIONER

## 2021-04-24 ASSESSMENT — ENCOUNTER SYMPTOMS
CHEST TIGHTNESS: 0
SORE THROAT: 0
WHEEZING: 0
SHORTNESS OF BREATH: 0
TROUBLE SWALLOWING: 0
COUGH: 0

## 2021-04-24 ASSESSMENT — PATIENT HEALTH QUESTIONNAIRE - PHQ9
2. FEELING DOWN, DEPRESSED OR HOPELESS: 0
SUM OF ALL RESPONSES TO PHQ QUESTIONS 1-9: 0

## 2021-04-24 NOTE — PROGRESS NOTES
Mo Chiu (:  ) is a 68 y.o. male, New patient, here for evaluation of the following chief complaint(s):  Covid Testing (pos exposure x5 days ago, no symptoms. )      No flowsheet data found. ASSESSMENT/PLAN:  1. Close exposure to COVID-19 virus  -     COVID-19 Ambulatory; Future  - Self quarantine, only going out for Dr's appts/essentials  - OTC symptom control  - Continue to wear mask, social distance, and wash hands frequently  - Call 911 or go to the ER should symptoms become difficult to manage        Return if symptoms worsen or fail to improve. SUBJECTIVE/OBJECTIVE:    HPI      Positive covid exposure x5 days ago on   Currently has no symptoms, although his spouse does  Spouse had same covid+ exposure as pt: took food to friend from [employer] who tested Covid+ the day after  Vaccine #2 Lilly Lathe was approx 3 weeks ago      Review of Systems   Constitutional: Negative for chills, fatigue and fever. HENT: Negative for congestion, sore throat and trouble swallowing. Respiratory: Negative for cough, chest tightness, shortness of breath and wheezing. Cardiovascular: Negative for chest pain and palpitations. Musculoskeletal: Negative for arthralgias, myalgias and neck pain. Skin: Negative for pallor, rash and wound. Neurological: Negative for dizziness, light-headedness and headaches. Physical Exam  PHYSICAL EXAMINATION:  [ INSTRUCTIONS:  \"[x]\" Indicates a positive item  \"[]\" Indicates a negative item  -- DELETE ALL ITEMS NOT EXAMINED]    [x] Alert  [x] Oriented to person/place/time      [x] No apparent distress      [x] Breathing appears normal      [x] Normal Mood      [] Poor short term memory  [] Poor long term memory    [] OTHER:      Due to this being a TeleHealth encounter, evaluation of the following organ systems is limited: Vitals/Constitutional/EENT/Resp/CV/GI//MS/Neuro/Skin/Heme-Lymph-Imm.        On this date 2021 I have spent 11 minutes reviewing previous notes, test results and face to face (virtual) with the patient discussing the diagnosis and importance of compliance with the treatment plan as well as documenting on the day of the visit. Val Oliver is a 68 y.o. male being evaluated by a Virtual Visit (video visit) encounter to address concerns as mentioned above. A caregiver was present when appropriate. Due to this being a TeleHealth encounter (During ZJFIO-71 public health emergency), evaluation of the following organ systems was limited: Vitals/Constitutional/EENT/Resp/CV/GI//MS/Neuro/Skin/Heme-Lymph-Imm. Pursuant to the emergency declaration under the [address] [place] [address], [address] [place] [address] authority and the [employer] Act, this Virtual Visit was conducted with patient's (and/or legal guardian's) consent, to reduce the patient's risk of exposure to COVID-19 and provide necessary medical care. The patient (and/or legal guardian) has also been advised to contact this office for worsening conditions or problems, and seek emergency medical treatment and/or call 911 if deemed necessary. Patient identification was verified at the start of the visit: Yes    Services were provided through a video synchronous discussion virtually to substitute for in-person clinic visit. Patient was located at home and provider was located in office or at home. An electronic signature was used to authenticate this note.     --Ranae Skiff, APRN

## 2021-04-25 DIAGNOSIS — Z20.822 CLOSE EXPOSURE TO COVID-19 VIRUS: ICD-10-CM

## 2021-04-27 LAB
SARS-COV-2: NOT DETECTED
SOURCE: NORMAL

## 2021-04-28 ENCOUNTER — NURSE ONLY (OUTPATIENT)
Dept: FAMILY MEDICINE CLINIC | Age: 76
End: 2021-04-28
Payer: MEDICARE

## 2021-04-28 DIAGNOSIS — E29.1 HYPOGONADISM IN MALE: Primary | ICD-10-CM

## 2021-04-28 PROCEDURE — 96372 THER/PROPH/DIAG INJ SC/IM: CPT | Performed by: FAMILY MEDICINE

## 2021-04-28 RX ORDER — TESTOSTERONE CYPIONATE 200 MG/ML
200 INJECTION INTRAMUSCULAR ONCE
Status: COMPLETED | OUTPATIENT
Start: 2021-04-28 | End: 2021-04-28

## 2021-04-28 RX ADMIN — TESTOSTERONE CYPIONATE 200 MG: 200 INJECTION INTRAMUSCULAR at 10:12

## 2021-05-11 ENCOUNTER — NURSE ONLY (OUTPATIENT)
Dept: FAMILY MEDICINE CLINIC | Age: 76
End: 2021-05-11
Payer: MEDICARE

## 2021-05-11 DIAGNOSIS — E29.1 HYPOGONADISM IN MALE: Primary | ICD-10-CM

## 2021-05-11 PROCEDURE — 96372 THER/PROPH/DIAG INJ SC/IM: CPT | Performed by: INTERNAL MEDICINE

## 2021-05-11 RX ORDER — TESTOSTERONE CYPIONATE 200 MG/ML
200 INJECTION INTRAMUSCULAR ONCE
Status: COMPLETED | OUTPATIENT
Start: 2021-05-11 | End: 2021-05-11

## 2021-05-11 RX ADMIN — TESTOSTERONE CYPIONATE 200 MG: 200 INJECTION INTRAMUSCULAR at 09:37

## 2021-05-11 NOTE — PROGRESS NOTES
Patient given Testosterone 200mg IM right gluteal..  Will return in 2 weeks for next injection    Patient tolerated injection well.     Administrations This Visit     testosterone cypionate (DEPOTESTOTERONE CYPIONATE) injection 200 mg     Admin Date  05/11/2021 Action  Given Dose  200 mg Route  Intramuscular Administered By  Ioana Kelly LPN

## 2021-05-17 ENCOUNTER — TELEPHONE (OUTPATIENT)
Dept: FAMILY MEDICINE CLINIC | Age: 76
End: 2021-05-17

## 2021-05-17 ENCOUNTER — OFFICE VISIT (OUTPATIENT)
Dept: FAMILY MEDICINE CLINIC | Age: 76
End: 2021-05-17
Payer: MEDICARE

## 2021-05-17 VITALS
OXYGEN SATURATION: 95 % | DIASTOLIC BLOOD PRESSURE: 54 MMHG | TEMPERATURE: 97.1 F | HEART RATE: 69 BPM | WEIGHT: 189 LBS | HEIGHT: 71 IN | RESPIRATION RATE: 14 BRPM | SYSTOLIC BLOOD PRESSURE: 112 MMHG | BODY MASS INDEX: 26.46 KG/M2

## 2021-05-17 DIAGNOSIS — G70.01 MYASTHENIA GRAVIS WITH EXACERBATION (HCC): Primary | ICD-10-CM

## 2021-05-17 DIAGNOSIS — J44.9 CHRONIC OBSTRUCTIVE PULMONARY DISEASE, UNSPECIFIED COPD TYPE (HCC): ICD-10-CM

## 2021-05-17 DIAGNOSIS — I25.119 CORONARY ARTERY DISEASE INVOLVING NATIVE CORONARY ARTERY OF NATIVE HEART WITH ANGINA PECTORIS (HCC): ICD-10-CM

## 2021-05-17 DIAGNOSIS — R53.83 FATIGUE, UNSPECIFIED TYPE: ICD-10-CM

## 2021-05-17 DIAGNOSIS — K76.0 FATTY INFILTRATION OF LIVER: Chronic | ICD-10-CM

## 2021-05-17 DIAGNOSIS — G70.01 MYASTHENIA GRAVIS WITH EXACERBATION (HCC): ICD-10-CM

## 2021-05-17 DIAGNOSIS — J30.89 PERENNIAL ALLERGIC RHINITIS: Chronic | ICD-10-CM

## 2021-05-17 DIAGNOSIS — E78.2 MIXED HYPERLIPIDEMIA: Chronic | ICD-10-CM

## 2021-05-17 DIAGNOSIS — H53.2 DIPLOPIA: ICD-10-CM

## 2021-05-17 DIAGNOSIS — I10 ESSENTIAL HYPERTENSION: Chronic | ICD-10-CM

## 2021-05-17 DIAGNOSIS — E11.29 TYPE 2 DIABETES MELLITUS WITH MICROALBUMINURIA, WITHOUT LONG-TERM CURRENT USE OF INSULIN (HCC): Primary | ICD-10-CM

## 2021-05-17 DIAGNOSIS — R80.9 TYPE 2 DIABETES MELLITUS WITH MICROALBUMINURIA, WITHOUT LONG-TERM CURRENT USE OF INSULIN (HCC): Primary | ICD-10-CM

## 2021-05-17 LAB — HBA1C MFR BLD: 6.4 %

## 2021-05-17 PROCEDURE — 83036 HEMOGLOBIN GLYCOSYLATED A1C: CPT | Performed by: FAMILY MEDICINE

## 2021-05-17 PROCEDURE — 99214 OFFICE O/P EST MOD 30 MIN: CPT | Performed by: FAMILY MEDICINE

## 2021-05-17 RX ORDER — ALBUTEROL SULFATE 90 UG/1
2 AEROSOL, METERED RESPIRATORY (INHALATION) EVERY 6 HOURS PRN
Qty: 3 INHALER | Refills: 0 | Status: SHIPPED | OUTPATIENT
Start: 2021-05-17 | End: 2021-07-08

## 2021-05-17 RX ORDER — IPRATROPIUM BROMIDE 42 UG/1
2 SPRAY, METERED NASAL 3 TIMES DAILY
Qty: 3 BOTTLE | Refills: 3 | Status: SHIPPED | OUTPATIENT
Start: 2021-05-17 | End: 2021-07-19

## 2021-05-17 SDOH — ECONOMIC STABILITY: FOOD INSECURITY: WITHIN THE PAST 12 MONTHS, YOU WORRIED THAT YOUR FOOD WOULD RUN OUT BEFORE YOU GOT MONEY TO BUY MORE.: NEVER TRUE

## 2021-05-17 ASSESSMENT — SOCIAL DETERMINANTS OF HEALTH (SDOH): HOW HARD IS IT FOR YOU TO PAY FOR THE VERY BASICS LIKE FOOD, HOUSING, MEDICAL CARE, AND HEATING?: NOT HARD AT ALL

## 2021-05-17 ASSESSMENT — ENCOUNTER SYMPTOMS
BLOOD IN STOOL: 0
CONSTIPATION: 0
CHEST TIGHTNESS: 0
VOMITING: 0
COUGH: 0
NAUSEA: 0
ANAL BLEEDING: 0
WHEEZING: 0
ABDOMINAL PAIN: 0
DIARRHEA: 0
SHORTNESS OF BREATH: 0

## 2021-05-17 NOTE — TELEPHONE ENCOUNTER
Called to scheduled patient with neuro Dr. Reid Pu they are requiring a new referral please advise      I was able to get patient scheduled with Paul Lang Do June 14th 8am location [address] [facility] 9th floor [address]    And with Dr. Qian Watkins June 2nd 10am in [facility] to see NP [employer]

## 2021-05-17 NOTE — TELEPHONE ENCOUNTER
Called patient to relay message.      He want's to advise us he is no longer taking triant-actz 35 1/2 37.5 -25 mg a blood pressure medication

## 2021-05-17 NOTE — ASSESSMENT & PLAN NOTE
No reported change in activity tolerance. I will have patient follow-up with cardiology ASAP to determine if any further evaluation is indicated with reported persistent fatigue.

## 2021-05-17 NOTE — ASSESSMENT & PLAN NOTE
Borderline low/hypotensive today in the office. Patient reports holding one of his blood pressure medications but is not sure which one. He was instructed to go home look at his medication list and call the office with name of medication that he is currently not taking. Consider that low BP values may be contributing to persistent fatigue.   We will have him follow up with cardiology to discuss further medication adjustment recommendations in the setting of his known significant coronary artery disease history status post multiple coronary artery bypass

## 2021-05-17 NOTE — PROGRESS NOTES
Noe Vilchis (: ) is a 68 y.o. male, Established patient, who presents today for:    Chief Complaint   Patient presents with    Fatigue     Pt presents today to discuss feeling \"wore out/tired\" for the last several months.  Mouth Lesions     Pt reports 1 month ago he had a sore in mouth on bottom right side. Pt states this is no longer there but would like to get it looked at. ASSESSMENT/PLAN    1. Type 2 diabetes mellitus with microalbuminuria, without long-term current use of insulin (HCC)  -     POCT glycosylated hemoglobin (Hb A1C)  -     Comprehensive Metabolic Panel; Future  -     Lipid Panel; Future  2. Essential hypertension  Assessment & Plan:  Borderline low/hypotensive today in the office. Patient reports holding one of his blood pressure medications but is not sure which one. He was instructed to go home look at his medication list and call the office with name of medication that he is currently not taking. Consider that low BP values may be contributing to persistent fatigue. We will have him follow up with cardiology to discuss further medication adjustment recommendations in the setting of his known significant coronary artery disease history status post multiple coronary artery bypass   Orders:  -     Comprehensive Metabolic Panel; Future  -     CBC Auto Differential; Future  3. Mixed hyperlipidemia  -     Comprehensive Metabolic Panel; Future  -     Lipid Panel; Future  4. Coronary artery disease involving native coronary artery of native heart with angina pectoris [facility])  Assessment & Plan:  No reported change in activity tolerance. I will have patient follow-up with cardiology ASAP to determine if any further evaluation is indicated with reported persistent fatigue. Orders:  -     Comprehensive Metabolic Panel; Future  -     Lipid Panel; Future  -     CBC Auto Differential; Future  5.  Chronic obstructive pulmonary disease, unspecified COPD type ([address]  - albuterol sulfate HFA (PROAIR HFA) 108 (90 Base) MCG/ACT inhaler; Inhale 2 puffs into the lungs every 6 hours as needed for Wheezing or Shortness of Breath, Disp-3 Inhaler, R-0Normal  6. Fatty infiltration of liver  -     Comprehensive Metabolic Panel; Future  -     CBC Auto Differential; Future  7. Myasthenia gravis with exacerbation ([address]  Assessment & Plan:  Remained stable with no reported new onset weakness. Patient [facility] neurology for long-term follow-up and management. Continue current treatment recommendations per the specialist   8. Diplopia  Comments:  Likely related to myasthenia gravis exacerbation based on hx and exam.  Patient has upcoming ophthalmology visit and will help schedule neurology visit ASAP  9. Fatigue, unspecified type  Comments:  Multiple potential etiologies. Will obtain labwork and help schedule cardiology F/U ASAP. Consider some relation to myesthenia gravis exacerbation  Orders:  -     Comprehensive Metabolic Panel; Future  -     CBC Auto Differential; Future  -     TSH with Reflex; Future  10. Perennial allergic rhinitis  -     ipratropium (ATROVENT) 0.06 % nasal spray; 2 sprays by Nasal route 3 times daily, Disp-3 Bottle, R-3Normal      Return in about 3 months (around 8/17/2021) for Chronic Disease Check . SUBJECTIVE/OBJECTIVE:    HPI    Patient presents for chronic diabetes, hypertension, hyperlipidemia, coronary artery disease, COPD, and fatty liver follow-up visit. They remain established with [facility] neurology for long-term follow-up and management of myasthenia gravis and denies any new onset muscle weakness, however, there is reported left eye double vision which was similar to presentation with this condition. Patient reports being overdue for neurology visit, but reports there is a visit scheduled with optho in the next week to further evaluate.      Patient reports lower BP values at home going down to 522C systolic and 67Z diastolic over the past 2-3 months causing him to hold one of his AM blood pressure medications. They deny adhering to any specific lower carbohydrate or lower salt diet. They deny any routine exercise outside of normal day-to-day activity. Patient denies any chest pain, dyspnea, palpitations, lightheadedness, dizziness, worsening lower extremity edema, or syncope. Patient denies any dyspnea at rest, persistent wheezing, worsening cough, chest tightness, or limitation in normal day-to-day activity due to breathing. They do report increased fatigue/decreased energy level over the past 1 to 2 months. There is no reported temperature intolerance, weight changes, appetite changes, or fever/chills/sweats. Patient reports home blood glucose values ranging from 90s to 110s since the most recent office visit. They deny any polyuria or polydipsia or new onset paresthesias. Current Outpatient Medications on File Prior to Visit   Medication Sig Dispense Refill    testosterone cypionate (DEPOTESTOTERONE CYPIONATE) 200 MG/ML injection Inject 1 mL into the muscle every 14 days. Indications: PER ORDER in OV note 9/16/20 (Patient taking differently: Inject 200 mg into the muscle every 14 days.  Indications: PER ORDER in OV note 3/17/21 ) 2 mL 3    metoprolol succinate (TOPROL XL) 50 MG extended release tablet Take 3 tablets by mouth daily 270 tablet 3    rosuvastatin (CRESTOR) 20 MG tablet Take 1 tablet by mouth nightly 90 tablet 3    glipiZIDE (GLUCOTROL) 10 MG tablet Take 1 tablet by mouth 2 times daily (before meals) 180 tablet 3    tamsulosin (FLOMAX) 0.4 MG capsule Take 2 capsules by mouth daily 180 capsule 3    losartan (COZAAR) 100 MG tablet Take 1 tablet by mouth daily 90 tablet 3    triamterene-hydroCHLOROthiazide (MAXZIDE-25) 37.5-25 MG per tablet Take 1 tablet by mouth daily 90 tablet 3    amLODIPine (NORVASC) 10 MG tablet Take 1 tablet by mouth daily 90 tablet 3    blood glucose test strips (FREESTYLE LITE) strip USE 1 STRIP TO CHECK GLUCOSE ONCE DAILY AS DIRECTED 100 each 5    famotidine (PEPCID) 40 MG tablet Take 1 tablet by mouth every evening 90 tablet 3    SITagliptin (JANUVIA) 100 MG tablet Take 0.5 tablets by mouth 2 times daily 90 tablet 3    Dulaglutide 0.75 MG/0.5ML SOPN Inject 0.75 mg into the skin once a week 12 pen 3    Handicap Placard MISC by Does not apply route DX: COPD (J44.9), 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  predniSONE (DELTASONE) 10 MG tablet Take 5 mg by mouth three times a week       nitroGLYCERIN (NITROSTAT) 0.4 MG SL tablet Place 1 tablet under the tongue every 5 minutes as needed for Chest pain 25 tablet 0    sildenafil (VIAGRA) 100 MG tablet TAKE ONE TABLET BY MOUTH AS NEEDED APPROXIMATELY ONE HOUR BEFORE SEXUAL ACTIVITY. DO NOT USE MORE THAN ONE DOSE DAILY 6 tablet 3    Blood Glucose Monitoring Suppl CHENCHO Test once daily. Dx: E11.29 1 Device 0    Lancets MISC Test once daily. Dx: E11.29 100 each 3    ascorbic acid (VITAMIN C) 500 MG tablet Take 1,000 mg by mouth daily      loratadine (CLARITIN) 10 MG tablet Take 1 tablet by mouth daily 30 tablet 5    acetaminophen (TYLENOL EX ST ARTHRITIS PAIN) 500 MG tablet Take 1 tablet by mouth every 6 hours as needed for Pain 120 tablet 3    CINNAMON PO Take 500 mg by mouth 2 times daily.  aspirin 81 MG EC tablet Take 81 mg by mouth daily.  Cyanocobalamin (VITAMIN B 12 PO) Take 1,000 mg by mouth       No current facility-administered medications on file prior to visit.        Allergies   Allergen Reactions    Invokana [Canagliflozin] Diarrhea    Metformin And Related Diarrhea    Other      There is a list scanned in media that pt can not take due to myasthenia gravis , Left conjunctiva is not injected. Pupils: Pupils are equal, round, and reactive to light. Neck:      Thyroid: No thyromegaly. Vascular: No carotid bruit. Cardiovascular:      Rate and Rhythm: Normal rate and regular rhythm. Heart sounds: Normal heart sounds. No murmur heard. Pulmonary:      Effort: Pulmonary effort is normal. No respiratory distress. Breath sounds: Normal breath sounds. No wheezing. Abdominal:      General: Bowel sounds are normal. There is no distension. Palpations: Abdomen is soft. Tenderness: There is no abdominal tenderness. There is no right CVA tenderness, left CVA tenderness, guarding or rebound. Musculoskeletal:         General: Normal range of motion. Cervical back: Neck supple. Right lower leg: No edema. Left lower leg: No edema. Lymphadenopathy:      Cervical: No cervical adenopathy. Skin:     General: Skin is warm. Findings: No rash. Neurological:      Mental Status: He is alert and oriented to person, place, and time. Cranial Nerves: No facial asymmetry. Sensory: Sensation is intact. No sensory deficit. Motor: No tremor. Gait: Gait is intact. Gait normal.      Comments: +5/5 strength BUE and BLE   Psychiatric:         Mood and Affect: Mood normal.         Behavior: Behavior normal.         Ortho Exam (If Applicable)              An electronic signature was used to authenticate this note.      Vineet Solis MD

## 2021-05-17 NOTE — ASSESSMENT & PLAN NOTE
Remained stable with no reported new onset weakness. Patient remains established with [facility] neurology for long-term follow-up and management.   Continue current treatment recommendations per the specialist

## 2021-05-18 NOTE — TELEPHONE ENCOUNTER
Noted. Ok to hold the medication until he sees cardiology. He should bring meds with him when he sees the cardiologist so they know everything he's taking and can make adjustments. New neurology referral for Dr. Smith Rowan placed in chart. Please send where needed.

## 2021-05-20 NOTE — TELEPHONE ENCOUNTER
1st attempt to reach patient. Called patient @ [phone] and left message on machine for patient to return call during normal business hours of 8:30 AM and 5 PM @ [phone] option 2..

## 2021-05-24 ENCOUNTER — HOSPITAL ENCOUNTER (OUTPATIENT)
Dept: LAB | Age: 76
Discharge: HOME | End: 2021-05-24
Payer: MEDICARE

## 2021-05-24 DIAGNOSIS — K76.0 FATTY INFILTRATION OF LIVER: Chronic | ICD-10-CM

## 2021-05-24 DIAGNOSIS — I10 ESSENTIAL HYPERTENSION: Chronic | ICD-10-CM

## 2021-05-24 DIAGNOSIS — E11.29 TYPE 2 DIABETES MELLITUS WITH MICROALBUMINURIA, WITHOUT LONG-TERM CURRENT USE OF INSULIN (HCC): ICD-10-CM

## 2021-05-24 DIAGNOSIS — R53.83 FATIGUE, UNSPECIFIED TYPE: ICD-10-CM

## 2021-05-24 DIAGNOSIS — R80.9 TYPE 2 DIABETES MELLITUS WITH MICROALBUMINURIA, WITHOUT LONG-TERM CURRENT USE OF INSULIN (HCC): ICD-10-CM

## 2021-05-24 DIAGNOSIS — E78.2 MIXED HYPERLIPIDEMIA: Chronic | ICD-10-CM

## 2021-05-24 DIAGNOSIS — I25.119 CORONARY ARTERY DISEASE INVOLVING NATIVE CORONARY ARTERY OF NATIVE HEART WITH ANGINA PECTORIS (HCC): ICD-10-CM

## 2021-05-24 LAB
ALBUMIN SERPL-MCNC: 4.1 G/DL (ref 3.5–4.6)
ALP BLD-CCNC: 42 U/L (ref 35–104)
ALT SERPL-CCNC: 15 U/L (ref 0–41)
ANION GAP SERPL CALCULATED.3IONS-SCNC: 10 MEQ/L (ref 9–15)
AST SERPL-CCNC: 16 U/L (ref 0–40)
BASOPHILS ABSOLUTE: 0 K/UL (ref 0–0.2)
BASOPHILS RELATIVE PERCENT: 0.4 %
BILIRUB SERPL-MCNC: 0.5 MG/DL (ref 0.2–0.7)
BUN BLDV-MCNC: 15 MG/DL (ref 8–23)
CALCIUM SERPL-MCNC: 10.3 MG/DL (ref 8.5–9.9)
CHLORIDE BLD-SCNC: 99 MEQ/L (ref 95–107)
CHOLESTEROL, TOTAL: 112 MG/DL (ref 0–199)
CO2: 30 MEQ/L (ref 20–31)
CREAT SERPL-MCNC: 1.18 MG/DL (ref 0.7–1.2)
EOSINOPHILS ABSOLUTE: 0.1 K/UL (ref 0–0.7)
EOSINOPHILS RELATIVE PERCENT: 1.4 %
GFR AFRICAN AMERICAN: >60
GFR NON-AFRICAN AMERICAN: >60
GLOBULIN: 2.7 G/DL (ref 2.3–3.5)
GLUCOSE BLD-MCNC: 146 MG/DL (ref 70–99)
HCT VFR BLD CALC: 48.8 % (ref 42–52)
HDLC SERPL-MCNC: 50 MG/DL (ref 40–59)
HEMOGLOBIN: 16.5 G/DL (ref 14–18)
LDL CHOLESTEROL CALCULATED: 39 MG/DL (ref 0–129)
LYMPHOCYTES ABSOLUTE: 0.8 K/UL (ref 1–4.8)
LYMPHOCYTES RELATIVE PERCENT: 7.3 %
MCH RBC QN AUTO: 34.5 PG (ref 27–31.3)
MCHC RBC AUTO-ENTMCNC: 33.8 % (ref 33–37)
MCV RBC AUTO: 102.1 FL (ref 80–100)
MONOCYTES ABSOLUTE: 0.7 K/UL (ref 0.2–0.8)
MONOCYTES RELATIVE PERCENT: 6.5 %
NEUTROPHILS ABSOLUTE: 9.1 K/UL (ref 1.4–6.5)
NEUTROPHILS RELATIVE PERCENT: 84.4 %
PDW BLD-RTO: 14.9 % (ref 11.5–14.5)
PLATELET # BLD: 175 K/UL (ref 130–400)
POTASSIUM SERPL-SCNC: 4.1 MEQ/L (ref 3.4–4.9)
RBC # BLD: 4.78 M/UL (ref 4.7–6.1)
SODIUM BLD-SCNC: 139 MEQ/L (ref 135–144)
TOTAL PROTEIN: 6.8 G/DL (ref 6.3–8)
TRIGL SERPL-MCNC: 117 MG/DL (ref 0–150)
TSH REFLEX: 1.15 UIU/ML (ref 0.44–3.86)
WBC # BLD: 10.7 K/UL (ref 4.8–10.8)

## 2021-05-24 PROCEDURE — 85025 COMPLETE CBC W/AUTO DIFF WBC: CPT

## 2021-05-24 PROCEDURE — 36415 COLL VENOUS BLD VENIPUNCTURE: CPT

## 2021-05-24 PROCEDURE — 80061 LIPID PANEL: CPT

## 2021-05-24 PROCEDURE — 84443 ASSAY THYROID STIM HORMONE: CPT

## 2021-05-24 PROCEDURE — 80053 COMPREHEN METABOLIC PANEL: CPT

## 2021-05-26 ENCOUNTER — NURSE ONLY (OUTPATIENT)
Dept: FAMILY MEDICINE CLINIC | Age: 76
End: 2021-05-26
Payer: MEDICARE

## 2021-05-26 DIAGNOSIS — E29.1 HYPOGONADISM IN MALE: Primary | ICD-10-CM

## 2021-05-26 PROCEDURE — 96372 THER/PROPH/DIAG INJ SC/IM: CPT | Performed by: FAMILY MEDICINE

## 2021-05-26 RX ORDER — TESTOSTERONE CYPIONATE 200 MG/ML
200 INJECTION INTRAMUSCULAR ONCE
Status: COMPLETED | OUTPATIENT
Start: 2021-05-26 | End: 2021-05-26

## 2021-05-26 RX ADMIN — TESTOSTERONE CYPIONATE 200 MG: 200 INJECTION INTRAMUSCULAR at 10:16

## 2021-05-26 NOTE — PROGRESS NOTES
Patient given Testosterone 200mg IM left gluteal..  Will return in 2 weeks for next injection    Patient tolerated injection well.     Administrations This Visit     testosterone cypionate (DEPOTESTOTERONE CYPIONATE) injection 200 mg     Admin Date  05/26/2021 Action  Given Dose  200 mg Route  Intramuscular Administered By  Malorie Forde LPN

## 2021-06-02 DIAGNOSIS — D75.89 MACROCYTOSIS: Primary | ICD-10-CM

## 2021-06-02 NOTE — RESULT ENCOUNTER NOTE
Please call patient to inform him that recent lab work shows normal kidney and liver function testing, normal cholesterol testing, and normal thyroid testing. His blood counts show no signs of anemia, however, the size/volume of his individual red blood cells is elevated. I would like to obtain follow-up lab work to further assess. Please instruct him to return to the lab to have this follow-up testing done.

## 2021-06-07 ENCOUNTER — HOSPITAL ENCOUNTER (OUTPATIENT)
Dept: LAB | Age: 76
Discharge: HOME | End: 2021-06-07
Payer: MEDICARE

## 2021-06-07 DIAGNOSIS — D75.89 MACROCYTOSIS: ICD-10-CM

## 2021-06-07 LAB
FOLATE: 16 NG/ML (ref 7.3–26.1)
VITAMIN B-12: 901 PG/ML (ref 232–1245)

## 2021-06-07 PROCEDURE — 36415 COLL VENOUS BLD VENIPUNCTURE: CPT

## 2021-06-07 PROCEDURE — 82607 VITAMIN B-12: CPT

## 2021-06-07 PROCEDURE — 82746 ASSAY OF FOLIC ACID SERUM: CPT

## 2021-06-09 ENCOUNTER — NURSE ONLY (OUTPATIENT)
Dept: FAMILY MEDICINE CLINIC | Age: 76
End: 2021-06-09
Payer: MEDICARE

## 2021-06-09 DIAGNOSIS — E29.1 HYPOGONADISM IN MALE: Primary | ICD-10-CM

## 2021-06-09 PROCEDURE — 96372 THER/PROPH/DIAG INJ SC/IM: CPT | Performed by: FAMILY MEDICINE

## 2021-06-09 RX ORDER — TESTOSTERONE CYPIONATE 200 MG/ML
200 INJECTION INTRAMUSCULAR ONCE
Status: COMPLETED | OUTPATIENT
Start: 2021-06-09 | End: 2021-06-09

## 2021-06-09 RX ADMIN — TESTOSTERONE CYPIONATE 200 MG: 200 INJECTION INTRAMUSCULAR at 10:17

## 2021-06-10 DIAGNOSIS — D75.89 MACROCYTOSIS: Primary | ICD-10-CM

## 2021-06-11 NOTE — RESULT ENCOUNTER NOTE
Please notify patient that recent lab work shows his vitamin B12 and folate levels are normal.  I do not have an explanation for his larger sized red blood cells on recent CBC. I would suggest that he avoid alcohol intake. I would like to repeat his blood count testing in 2 months to reassess. Future CBC order has been left in his chart, please instruct him to return to the lab in August to have this testing done.

## 2021-06-23 ENCOUNTER — NURSE ONLY (OUTPATIENT)
Dept: FAMILY MEDICINE CLINIC | Age: 76
End: 2021-06-23
Payer: MEDICARE

## 2021-06-23 DIAGNOSIS — E29.1 HYPOGONADISM IN MALE: Primary | ICD-10-CM

## 2021-06-23 PROCEDURE — 96372 THER/PROPH/DIAG INJ SC/IM: CPT | Performed by: FAMILY MEDICINE

## 2021-06-23 RX ORDER — TESTOSTERONE CYPIONATE 200 MG/ML
200 INJECTION INTRAMUSCULAR ONCE
Status: COMPLETED | OUTPATIENT
Start: 2021-06-23 | End: 2021-06-23

## 2021-06-23 RX ADMIN — TESTOSTERONE CYPIONATE 200 MG: 200 INJECTION INTRAMUSCULAR at 09:23

## 2021-07-07 ENCOUNTER — NURSE ONLY (OUTPATIENT)
Dept: FAMILY MEDICINE CLINIC | Age: 76
End: 2021-07-07
Payer: MEDICARE

## 2021-07-07 DIAGNOSIS — E29.1 HYPOGONADISM IN MALE: Primary | ICD-10-CM

## 2021-07-07 PROCEDURE — 96372 THER/PROPH/DIAG INJ SC/IM: CPT | Performed by: FAMILY MEDICINE

## 2021-07-07 RX ORDER — TESTOSTERONE CYPIONATE 200 MG/ML
200 INJECTION INTRAMUSCULAR ONCE
Status: COMPLETED | OUTPATIENT
Start: 2021-07-07 | End: 2021-07-07

## 2021-07-07 RX ADMIN — TESTOSTERONE CYPIONATE 200 MG: 200 INJECTION INTRAMUSCULAR at 09:21

## 2021-07-07 NOTE — PROGRESS NOTES
Patient given Testosterone 200mg IM right gluteal..  Will return in 2 weeks for next injection    Patient tolerated injection well.     Administrations This Visit     testosterone cypionate (DEPOTESTOTERONE CYPIONATE) injection 200 mg     Admin Date  07/07/2021 Action  Given Dose  200 mg Route  Intramuscular Administered By  Renee Beltran LPN

## 2021-07-21 ENCOUNTER — NURSE ONLY (OUTPATIENT)
Dept: FAMILY MEDICINE CLINIC | Age: 76
End: 2021-07-21
Payer: MEDICARE

## 2021-07-21 DIAGNOSIS — E29.1 HYPOGONADISM IN MALE: Primary | ICD-10-CM

## 2021-07-21 PROCEDURE — 96372 THER/PROPH/DIAG INJ SC/IM: CPT | Performed by: FAMILY MEDICINE

## 2021-07-21 RX ORDER — TESTOSTERONE CYPIONATE 200 MG/ML
200 INJECTION INTRAMUSCULAR ONCE
Status: COMPLETED | OUTPATIENT
Start: 2021-07-21 | End: 2021-07-21

## 2021-07-21 RX ADMIN — TESTOSTERONE CYPIONATE 200 MG: 200 INJECTION INTRAMUSCULAR at 09:18

## 2021-07-30 ENCOUNTER — TELEPHONE (OUTPATIENT)
Dept: FAMILY MEDICINE CLINIC | Age: 76
End: 2021-07-30

## 2021-07-30 NOTE — TELEPHONE ENCOUNTER
----- Message from Jose Landaverdes sent at 7/27/2021 11:56 AM EDT -----  Subject: Appointment Request    Reason for Call: Routine Medicare AWV    QUESTIONS  Type of Appointment? Established Patient  Reason for appointment request? No appointments available during search  Additional Information for Provider? patient has an appointment on 7/30   for AWV that he wants to reschedule but no appointments were available.  ---------------------------------------------------------------------------  --------------  CALL BACK INFO  What is the best way for the office to contact you? OK to leave message on   voicemail  Preferred Call Back Phone Number? [phone]  ---------------------------------------------------------------------------  --------------  SCRIPT ANSWERS  Relationship to Patient? Self  Have your symptoms changed? No  (If the patient has Medicare as their primary insurance coverage ask this   question) Are you requesting a Medicare Annual Wellness Visit? Yes   Have you been diagnosed with, awaiting test results for, or told that you   are suspected of having COVID-19 (Coronavirus)? (If patient has tested   negative or was tested as a requirement for work, school, or travel and   not based on symptoms, answer no)? No  Do you currently have flu-like symptoms including fever or chills, cough,   shortness of breath, difficulty breathing, or new loss of taste or smell? No  Have you had close contact with someone with COVID-19 in the last 14 days? No  (Service Expert  click yes below to proceed with [employer] As Usual   Scheduling)?  Yes

## 2021-08-04 ENCOUNTER — NURSE ONLY (OUTPATIENT)
Dept: FAMILY MEDICINE CLINIC | Age: 76
End: 2021-08-04
Payer: MEDICARE

## 2021-08-04 DIAGNOSIS — E29.1 HYPOGONADISM IN MALE: Primary | ICD-10-CM

## 2021-08-04 PROCEDURE — 96372 THER/PROPH/DIAG INJ SC/IM: CPT | Performed by: FAMILY MEDICINE

## 2021-08-04 RX ORDER — TESTOSTERONE CYPIONATE 200 MG/ML
200 INJECTION INTRAMUSCULAR ONCE
Status: COMPLETED | OUTPATIENT
Start: 2021-08-04 | End: 2021-08-04

## 2021-08-04 RX ADMIN — TESTOSTERONE CYPIONATE 200 MG: 200 INJECTION INTRAMUSCULAR at 09:52

## 2021-08-04 NOTE — PROGRESS NOTES
Patient given Testosterone 200mg IM left gluteal..  Will return in 2 weeks for next injection    Patient tolerated injection well.     Administrations This Visit     testosterone cypionate (DEPOTESTOTERONE CYPIONATE) injection 200 mg     Admin Date  08/04/2021 Action  Given Dose  200 mg Route  Intramuscular Administered By  Patrica Le LPN

## 2021-08-16 ENCOUNTER — TELEPHONE (OUTPATIENT)
Dept: ADMINISTRATIVE | Age: 76
End: 2021-08-16

## 2021-08-16 NOTE — TELEPHONE ENCOUNTER
Please contact patient   To reschedule   F. He had an emergency with wife  .  Please call pt.  [phone] cell [phone]

## 2021-08-18 ENCOUNTER — CARE COORDINATION (OUTPATIENT)
Dept: CARE COORDINATION | Age: 76
End: 2021-08-18

## 2021-08-18 NOTE — CARE COORDINATION
Francisco Coley states that he had to cancel his testosterone injection at [facility] as his wife had a heart cath at [facility] this morning. He states that he has made multiple attempts to get this rescheduled without being able to make contact with the office. He states the phone just rings and no one answers. I have placed a call and left a message with the  Ashish Kruse     Second message left for Ashish Kruse. I spoke with the office to discuss rescheduling  They will reach out to Raul.

## 2021-08-19 ENCOUNTER — VIRTUAL VISIT (OUTPATIENT)
Dept: INTERNAL MEDICINE | Age: 76
End: 2021-08-19
Payer: MEDICARE

## 2021-08-19 DIAGNOSIS — J01.90 ACUTE SINUSITIS, RECURRENCE NOT SPECIFIED, UNSPECIFIED LOCATION: Primary | ICD-10-CM

## 2021-08-19 PROCEDURE — G2025 DIS SITE TELE SVCS RHC/FQHC: HCPCS | Performed by: NURSE PRACTITIONER

## 2021-08-19 RX ORDER — AZITHROMYCIN 250 MG/1
TABLET, FILM COATED ORAL
Qty: 6 TABLET | Refills: 0 | Status: SHIPPED | OUTPATIENT
Start: 2021-08-19 | End: 2021-08-24

## 2021-08-19 RX ORDER — GUAIFENESIN 100 MG/5ML
200 SOLUTION ORAL EVERY 4 HOURS PRN
Qty: 200 ML | Refills: 0 | Status: SHIPPED | OUTPATIENT
Start: 2021-08-19 | End: 2021-08-24

## 2021-08-19 RX ORDER — FLUTICASONE PROPIONATE 50 MCG
1 SPRAY, SUSPENSION (ML) NASAL DAILY
Qty: 1 BOTTLE | Refills: 0 | Status: SHIPPED | OUTPATIENT
Start: 2021-08-19

## 2021-08-19 ASSESSMENT — ENCOUNTER SYMPTOMS
SHORTNESS OF BREATH: 0
EYE DISCHARGE: 0
EYE REDNESS: 0
RHINORRHEA: 1
SORE THROAT: 1
EYE ITCHING: 0
SINUS PRESSURE: 1
COUGH: 1
WHEEZING: 0
CHEST TIGHTNESS: 0

## 2021-08-19 NOTE — PATIENT INSTRUCTIONS
Patient Education        Sinusitis: Care Instructions  Your Care Instructions     Sinusitis is an infection of the lining of the sinus cavities in your head. Sinusitis often follows a cold. It causes pain and pressure in your head and face. In most cases, sinusitis gets better on its own in 1 to 2 weeks. But some mild symptoms may last for several weeks. Sometimes antibiotics are needed. Follow-up care is a key part of your treatment and safety. Be sure to make and go to all appointments, and call your doctor if you are having problems. It's also a good idea to know your test results and keep a list of the medicines you take. How can you care for yourself at home? · Take an over-the-counter pain medicine, such as acetaminophen (Tylenol), ibuprofen (Advil, Motrin), or naproxen (Aleve). Read and follow all instructions on the label. · If the doctor prescribed antibiotics, take them as directed. Do not stop taking them just because you feel better. You need to take the full course of antibiotics. · Be careful when taking over-the-counter cold or flu medicines and Tylenol at the same time. Many of these medicines have acetaminophen, which is Tylenol. Read the labels to make sure that you are not taking more than the recommended dose. Too much acetaminophen (Tylenol) can be harmful. · Breathe warm, moist air from a steamy shower, a hot bath, or a sink filled with hot water. Avoid cold, dry air. Using a humidifier in your home may help. Follow the directions for cleaning the machine. · Use saline (saltwater) nasal washes. This can help keep your nasal passages open and wash out mucus and bacteria. You can buy saline nose drops at a grocery store or drugstore. Or you can make your own at home by adding 1 teaspoon of salt and 1 teaspoon of baking soda to 2 cups of distilled water. If you make your own, fill a bulb syringe with the solution, insert the tip into your nostril, and squeeze gently.  Juarez Huyen your nose.  · Put a hot, wet towel or a warm gel pack on your face 3 or 4 times a day for 5 to 10 minutes each time. · Try a decongestant nasal spray like oxymetazoline (Afrin). Do not use it for more than 3 days in a row. Using it for more than 3 days can make your congestion worse. When should you call for help? Call your doctor now or seek immediate medical care if:    · You have new or worse swelling or redness in your face or around your eyes.     · You have a new or higher fever. Watch closely for changes in your health, and be sure to contact your doctor if:    · You have new or worse facial pain.     · The mucus from your nose becomes thicker (like pus) or has new blood in it.     · You are not getting better as expected. Where can you learn more? Go to https://[employer]. org and sign in to your [employer] account. Enter Z694 in the [employer] box to learn more about \"Sinusitis: Care Instructions. \"     If you do not have an account, please click on the \"Sign Up Now\" link. Current as of: December 2, 2020               Content Version: 12.9  © 2006-2021 Healthwise, Incorporated. Care instructions adapted under license by [facility] ([facility]). If you have questions about a medical condition or this instruction, always ask your healthcare professional. [place] [address] any warranty or liability for your use of this information.

## 2021-08-19 NOTE — PROGRESS NOTES
Maria L Palmer (:  [id number]) is a 68 y.o. male, Established patient, here for evaluation of the following chief complaint(s):  Cough (ST, fatigue x3-4 days states hes getting worse )      No flowsheet data found. ASSESSMENT/PLAN:  1. Acute sinusitis, recurrence not specified, unspecified location  -     azithromycin (ZITHROMAX) 250 MG tablet; 500 mg on day 1, then 250 mg days 2-5., Disp-6 tablet, R-0Normal  -     guaiFENesin (ROBITUSSIN) 200 MG/10ML SOLN oral solution; Take 10 mLs by mouth every 4 hours as needed for Cough Take with a full glass of water., Disp-200 mL, R-0Normal  -     fluticasone (FLONASE) 50 MCG/ACT nasal spray; 1 spray by Nasal route daily, Disp-1 Bottle, R-0Normal        -    Continue an OTC antihistamine, but advised pt to switch antihistamine as he has been on same one for years     Return if symptoms worsen or fail to improve. SUBJECTIVE/OBJECTIVE:    HPI     Symptoms started 21  Cough, clearing throat  Sore throat, worse in am, eases up during the day  Fatigue  No fever  No chills  No sneezing  No pressure  Runny nose  Stuffy nose  No shortness of breath  No chest pain   No chest tightness  No body aches  Has been taking antihistamine everyday for the past couple years, no relief      Review of Systems   Constitutional: Positive for fatigue. Negative for chills and fever. HENT: Positive for congestion, rhinorrhea, sinus pressure and sore throat. Negative for ear pain and postnasal drip. Eyes: Negative for discharge, redness and itching. Respiratory: Positive for cough. Negative for chest tightness, shortness of breath and wheezing. Cardiovascular: Negative for chest pain and palpitations. Musculoskeletal: Negative for arthralgias, myalgias and neck pain. Neurological: Negative for dizziness, light-headedness and headaches.        Physical Exam  PHYSICAL EXAMINATION:  [ INSTRUCTIONS:  \"[x]\" Indicates a positive item  \"[]\" Indicates a negative item  -- DELETE ALL ITEMS NOT EXAMINED]    [x] Alert  [x] Oriented to person/place/time      [x] No apparent distress      [x] Breathing appears normal      [x] Normal Mood      [] Poor short term memory  [] Poor long term memory    [] OTHER:      Due to this being a TeleHealth encounter, evaluation of the following organ systems is limited: Vitals/Constitutional/EENT/Resp/CV/GI//MS/Neuro/Skin/Heme-Lymph-Imm. On this date 8/19/2021 I have spent 11 minutes reviewing previous notes, test results and face to face (virtual) with the patient discussing the diagnosis and importance of compliance with the treatment plan as well as documenting on the day of the visit. Nico Queen is a 68 y.o. male being evaluated by a Virtual Visit (video visit) encounter to address concerns as mentioned above. A caregiver was present when appropriate. Due to this being a TeleHealth encounter (During HJDWD-01 public health emergency), evaluation of the following organ systems was limited: Vitals/Constitutional/EENT/Resp/CV/GI//MS/Neuro/Skin/Heme-Lymph-Imm. Pursuant to the emergency declaration under the [address] [place] [address], [address] [place] [address] and the [email or web address] and [employer] Act, this Virtual Visit was conducted with patient's (and/or legal guardian's) consent, to reduce the patient's risk of exposure to COVID-19 and provide necessary medical care. The patient (and/or legal guardian) has also been advised to contact this office for worsening conditions or problems, and seek emergency medical treatment and/or call 911 if deemed necessary. Patient identification was verified at the start of the visit: Yes    Services were provided through a video synchronous discussion virtually to substitute for in-person clinic visit. Patient was located at home and provider was located in office or at home.      An electronic signature was used to authenticate this note.    --Will Gi, APRN

## 2021-08-23 ENCOUNTER — HOSPITAL ENCOUNTER (OUTPATIENT)
Dept: LAB | Age: 76
Discharge: HOME | End: 2021-08-23
Payer: MEDICARE

## 2021-08-23 ENCOUNTER — OFFICE VISIT (OUTPATIENT)
Dept: FAMILY MEDICINE CLINIC | Age: 76
End: 2021-08-23
Payer: MEDICARE

## 2021-08-23 VITALS
OXYGEN SATURATION: 93 % | BODY MASS INDEX: 27.1 KG/M2 | RESPIRATION RATE: 18 BRPM | DIASTOLIC BLOOD PRESSURE: 58 MMHG | HEIGHT: 71 IN | WEIGHT: 193.6 LBS | HEART RATE: 79 BPM | TEMPERATURE: 97.7 F | SYSTOLIC BLOOD PRESSURE: 112 MMHG

## 2021-08-23 DIAGNOSIS — R80.9 TYPE 2 DIABETES MELLITUS WITH MICROALBUMINURIA, WITHOUT LONG-TERM CURRENT USE OF INSULIN (HCC): ICD-10-CM

## 2021-08-23 DIAGNOSIS — I10 ESSENTIAL HYPERTENSION: ICD-10-CM

## 2021-08-23 DIAGNOSIS — Z00.00 ROUTINE GENERAL MEDICAL EXAMINATION AT A HEALTH CARE FACILITY: Primary | ICD-10-CM

## 2021-08-23 DIAGNOSIS — E11.29 TYPE 2 DIABETES MELLITUS WITH MICROALBUMINURIA, WITHOUT LONG-TERM CURRENT USE OF INSULIN (HCC): ICD-10-CM

## 2021-08-23 DIAGNOSIS — D75.89 MACROCYTOSIS: ICD-10-CM

## 2021-08-23 DIAGNOSIS — E55.9 VITAMIN D DEFICIENCY: ICD-10-CM

## 2021-08-23 LAB
ANION GAP SERPL CALCULATED.3IONS-SCNC: 8 MEQ/L (ref 9–15)
BASOPHILS ABSOLUTE: 0.1 K/UL (ref 0–0.2)
BASOPHILS RELATIVE PERCENT: 0.5 %
BUN BLDV-MCNC: 18 MG/DL (ref 8–23)
CALCIUM SERPL-MCNC: 9.7 MG/DL (ref 8.5–9.9)
CHLORIDE BLD-SCNC: 100 MEQ/L (ref 95–107)
CO2: 29 MEQ/L (ref 20–31)
CREAT SERPL-MCNC: 1.03 MG/DL (ref 0.7–1.2)
EOSINOPHILS ABSOLUTE: 0.1 K/UL (ref 0–0.7)
EOSINOPHILS RELATIVE PERCENT: 0.8 %
GFR AFRICAN AMERICAN: >60
GFR NON-AFRICAN AMERICAN: >60
GLUCOSE BLD-MCNC: 127 MG/DL (ref 70–99)
HBA1C MFR BLD: 7.2 % (ref 4.8–5.9)
HCT VFR BLD CALC: 42.9 % (ref 42–52)
HEMOGLOBIN: 14.3 G/DL (ref 14–18)
LYMPHOCYTES ABSOLUTE: 0.9 K/UL (ref 1–4.8)
LYMPHOCYTES RELATIVE PERCENT: 6.9 %
MCH RBC QN AUTO: 33 PG (ref 27–31.3)
MCHC RBC AUTO-ENTMCNC: 33.4 % (ref 33–37)
MCV RBC AUTO: 98.9 FL (ref 80–100)
MONOCYTES ABSOLUTE: 1 K/UL (ref 0.2–0.8)
MONOCYTES RELATIVE PERCENT: 7.6 %
NEUTROPHILS ABSOLUTE: 10.7 K/UL (ref 1.4–6.5)
NEUTROPHILS RELATIVE PERCENT: 84.2 %
PDW BLD-RTO: 14 % (ref 11.5–14.5)
PLATELET # BLD: 235 K/UL (ref 130–400)
POTASSIUM SERPL-SCNC: 4.3 MEQ/L (ref 3.4–4.9)
RBC # BLD: 4.34 M/UL (ref 4.7–6.1)
SODIUM BLD-SCNC: 137 MEQ/L (ref 135–144)
WBC # BLD: 12.7 K/UL (ref 4.8–10.8)

## 2021-08-23 PROCEDURE — 36415 COLL VENOUS BLD VENIPUNCTURE: CPT

## 2021-08-23 PROCEDURE — G0439 PPPS, SUBSEQ VISIT: HCPCS | Performed by: FAMILY MEDICINE

## 2021-08-23 PROCEDURE — 82306 VITAMIN D 25 HYDROXY: CPT

## 2021-08-23 PROCEDURE — 80048 BASIC METABOLIC PNL TOTAL CA: CPT

## 2021-08-23 PROCEDURE — 85025 COMPLETE CBC W/AUTO DIFF WBC: CPT

## 2021-08-23 PROCEDURE — 83036 HEMOGLOBIN GLYCOSYLATED A1C: CPT

## 2021-08-23 RX ORDER — FLUOROURACIL 50 MG/G
CREAM TOPICAL
COMMUNITY
Start: 2021-08-16 | End: 2022-10-07

## 2021-08-23 RX ORDER — ERGOCALCIFEROL 1.25 MG/1
CAPSULE ORAL
COMMUNITY
Start: 2021-06-04 | End: 2021-09-07 | Stop reason: DRUGHIGH

## 2021-08-23 ASSESSMENT — PATIENT HEALTH QUESTIONNAIRE - PHQ9
2. FEELING DOWN, DEPRESSED OR HOPELESS: 0
SUM OF ALL RESPONSES TO PHQ QUESTIONS 1-9: 0
1. LITTLE INTEREST OR PLEASURE IN DOING THINGS: 0
SUM OF ALL RESPONSES TO PHQ QUESTIONS 1-9: 0
SUM OF ALL RESPONSES TO PHQ9 QUESTIONS 1 & 2: 0
SUM OF ALL RESPONSES TO PHQ QUESTIONS 1-9: 0

## 2021-08-23 ASSESSMENT — LIFESTYLE VARIABLES: HOW OFTEN DO YOU HAVE A DRINK CONTAINING ALCOHOL: 0

## 2021-08-23 NOTE — PROGRESS NOTES
Medicare Annual Wellness Visit  Name: Josep Galvan Date: 2021   MRN: [id number] Sex: Male   Age: 68 y.o. Ethnicity: Non- / Non    : 1945 Race: White (non-)      Jasmyne Carlin is here for Medicare AWV    Screenings for behavioral, psychosocial and functional/safety risks, and cognitive dysfunction are all negative except as indicated below. These results, as well as other patient data from the [address] [facility], are documented in Flowsheets linked to this Encounter. Allergies   Allergen Reactions    Invokana [Canagliflozin] Diarrhea    Metformin And Related Diarrhea    Other      There is a list scanned in media that pt can not take due to myasthenia gravis ,  Antibiotics         Prior to Visit Medications    Medication Sig Taking? Authorizing Provider   fluorouracil (EFUDEX) 5 % cream APPLY A THIN LAYER TO BILATERAL EARS TWICE A DAY FOR 2 WEEKS. [address] YOUR HANDS AFTER APPLICATION. Yes Historical Provider, MD   vitamin D (ERGOCALCIFEROL) 1.25 MG ([address] UT) CAPS capsule TAKE 1 CAPSULE BY MOUTH ONCE A WEEK TAKE WITH DINNER HOLD MAINTENANCE DOSE WHILE TAKING HIGH DOSE Yes Historical Provider, MD   azithromycin (ZITHROMAX) 250 MG tablet 500 mg on day 1, then 250 mg days 2-5. Yes CHEMO Carmichael   guaiFENesin (ROBITUSSIN) 200 MG/10ML SOLN oral solution Take 10 mLs by mouth every 4 hours as needed for Cough Take with a full glass of water. Yes CHEMO Carmichael   fluticasone (FLONASE) 50 MCG/ACT nasal spray 1 spray by Nasal route daily Yes CHEMO Mckeon   ipratropium (ATROVENT) 0.06 % nasal spray 2 sprays by Nasal route 3 times daily Yes [place], MD   albuterol sulfate  (90 Base) MCG/ACT inhaler Inhale 2 puffs into the lungs every 6 hours as needed for Wheezing Yes [place], MD   testosterone cypionate (DEPOTESTOTERONE CYPIONATE) 200 MG/ML injection Inject 1 mL into the muscle every 14 days.  Indications: PER awaiting hospitalist evaluation/awaiting bed, no change every 5 minutes as needed for Chest pain Yes Aaron Mckeon MD   sildenafil (VIAGRA) 100 MG tablet TAKE ONE TABLET BY MOUTH AS NEEDED APPROXIMATELY ONE HOUR BEFORE SEXUAL ACTIVITY. DO NOT USE MORE THAN ONE DOSE DAILY Yes Aaron Mckeon MD   Blood Glucose Monitoring Suppl CHENCHO Test once daily. Dx: E11.29 Yes Aaron Mckeon MD   Lancets MISC Test once daily. Dx: E11.29 Yes Aaron Mckeon MD   ascorbic acid (VITAMIN C) 500 MG tablet Take 1,000 mg by mouth daily Yes Historical Provider, MD   loratadine (CLARITIN) 10 MG tablet Take 1 tablet by mouth daily Yes Aaron Mckeon MD   acetaminophen (TYLENOL EX ST ARTHRITIS PAIN) 500 MG tablet Take 1 tablet by mouth every 6 hours as needed for Pain Yes Blima Severance, MD   CINNAMON PO Take 500 mg by mouth 2 times daily. Yes Historical Provider, MD   aspirin 81 MG EC tablet Take 81 mg by mouth daily.    Yes Historical Provider, MD         Past Medical History:   Diagnosis Date    Atypical chest pain 8/30/2012    BPH (benign prostatic hypertrophy)     CAD (coronary artery disease)     Cataracts, bilateral     Chronic low back pain     COPD (chronic obstructive pulmonary disease) ([address] 6/1/2006    DM2 (diabetes mellitus, type 2) ([address]     Duodenitis     Erectile dysfunction 3/6/2017    Fatty infiltration of liver 11/19/2014    GERD (gastroesophageal reflux disease)     History of melanoma excision 12/11/2017    Left ear 09/2017    History of tobacco use 6/1/2006    HTN (hypertension)     Hyperlipidemia     Malignant melanoma of face ([address]     Malignant melanoma of skin of face ([address]     RT ear 2000, LT ear 2017    MG (myasthenia gravis) ([address]     Nephrolithiasis     Ocular myasthenia gravis ([address] 8/28/2018    Osteoarthritis of multiple joints     hands, hips    Perennial allergic rhinitis     Personal history of colonic polyps     Pseudophakia of both eyes     Status post coronary artery bypass grafts x 5 7/30/2018 07/2018 @ [facility]    Type 2 diabetes mellitus with microalbuminuria, without long-term current use of insulin ([facility] 3/6/2017    Varicose veins of both lower extremities 9/11/2017       Past Surgical History:   Procedure Laterality Date    ANGIOPLASTY      CARDIAC CATHETERIZATION      #17    CATARACT REMOVAL WITH IMPLANT Left 1992    CATARACT REMOVAL WITH IMPLANT Right 1992    COLONOSCOPY  2009    tubular adenomas    COLONOSCOPY  2013    diverticulosis, polyps, 5y repeat (DR NAVAS)    CORONARY ARTERY BYPASS GRAFT  07/02/2018    x5 ([facility])    CORONARY ARTERY BYPASS GRAFT      5 vessel    MOHS SURGERY Left 09/2017    melanoma of left ear (DR GODWIN)    SKIN CANCER EXCISION Left 08/2017    TONSILLECTOMY  1955    UPPER GASTROINTESTINAL ENDOSCOPY      duodenitis/ poss early signs of celiac disease         Family History   Problem Relation Age of Onset    Heart Disease Mother     Heart Disease Brother     Diabetes Maternal Grandfather        CareTeam (Including outside providers/suppliers regularly involved in providing care):   Patient Care Team:  Rupinder Horn MD as PCP - General (Family Medicine)  Rupinder Horn MD as PCP - [facility] Empaneled Provider  Sariah Cueto MD as Consulting Physician (Urology)  Irma Fowler DO as Consulting Physician (Internal Medicine Cardiovascular Disease)  Marek Rollins MD as Consulting Physician (Gastroenterology)  Bg Collier MD as Consulting Physician (Dermatology)  David Becerril (Optometry)  Kenyon Santillan MD as Surgeon (General Surgery)  Elias Hernandez (Optometry)  Jamar Yates MD as Consulting Physician (Neurology)  IVONNE Cantrell as     Wt Readings from Last 3 Encounters:   08/23/21 193 lb 9.6 oz (87.8 kg)   05/17/21 189 lb (85.7 kg)   03/17/21 188 lb (85.3 kg)     Vitals:    08/23/21 1305   BP: (!) 112/58   Site: Right Upper Arm   Position: Sitting   Cuff Size: Medium Adult   Pulse: 79   Resp: 18   Temp: 97.7 °F (36.5 °C) TempSrc: Temporal   SpO2: 93%   Weight: 193 lb 9.6 oz (87.8 kg)   Height: 5' 11\" (1.803 m)     Body mass index is 27 kg/m². Physical Exam  Vitals reviewed. Constitutional:       General: He is not in acute distress. Appearance: Normal appearance. Cardiovascular:      Rate and Rhythm: Normal rate and regular rhythm. Heart sounds: No murmur heard. Pulmonary:      Effort: Pulmonary effort is normal. No respiratory distress. Breath sounds: Normal breath sounds. No wheezing, rhonchi or rales. Abdominal:      General: Bowel sounds are normal.      Palpations: Abdomen is soft. Tenderness: There is no abdominal tenderness. There is no guarding or rebound. Musculoskeletal:      Right lower leg: No edema. Left lower leg: No edema. Skin:     Findings: No rash. Neurological:      Mental Status: He is alert and oriented to person, place, and time. Psychiatric:         Mood and Affect: Mood normal.         Behavior: Behavior normal.         Thought Content: Thought content normal.           Based upon direct observation of the patient, evaluation of cognition reveals recent and remote memory intact. Patient's complete Health Risk Assessment and screening values have been reviewed and are found in Flowsheets. The following problems were reviewed today and where indicated follow up appointments were made and/or referrals ordered. Positive Risk Factor Screenings with Interventions:            General Health and ACP:  General  In general, how would you say your health is?: Good  In the past 7 days, have you experienced any of the following?  New or Increased Pain, New or Increased Fatigue, Loneliness, Social Isolation, Stress or Anger?: None of These  Do you get the social and emotional support that you need?: Yes  Do you have a Living Will?: Yes  Advance Directives     Power of  Living Will ACP-Advance Directive ACP-Power of     Not on File Not on File Not on File Not on File      General Health Risk Interventions:  · No Living Will: Patient referred to ACP Clinical Specialist        Personalized Preventive Plan   Current Health Maintenance Status  Immunization History   Administered Date(s) Administered    COVID-19, Garza Peter, PF, 30mcg/0.3mL 03/05/2021, 03/26/2021    Influenza, High Dose (Fluzone 65 yrs and older) 11/23/2015, 09/25/2016, 09/11/2017, 10/09/2018    Influenza, Quadv, adjuvanted, 65 yrs +, IM, PF (Fluad) 10/16/2020    Influenza, Triv, inactivated, subunit, adjuvanted, IM (Fluad 65 yrs and older) 09/27/2019    Pneumococcal Conjugate 13-valent ([id number]) 03/06/2017    Pneumococcal Conjugate 7-valent (Prevnar7) 09/17/2006    Pneumococcal Polysaccharide ([id number]) 03/20/2018    Tdap (Boostrix, Adacel) 08/04/2017    Zoster Live (Zostavax) 08/04/2017        Health Maintenance   Topic Date Due    Shingles Vaccine (2 of 3) 09/29/2017    Annual Wellness Visit (AWV)  Never done    Flu vaccine (1) 09/01/2021    Lipid screen  05/24/2022    Potassium monitoring  05/24/2022    Creatinine monitoring  05/24/2022    DTaP/Tdap/Td vaccine (2 - Td or Tdap) 08/04/2027    Pneumococcal 65+ years Vaccine  Completed    COVID-19 Vaccine  Completed    Hepatitis C screen  Completed    Hepatitis A vaccine  Aged Out    Hib vaccine  Aged Out    Meningococcal (ACWY) vaccine  Aged Out     Recommendations for [place] Due: see orders and patient instructions/AVS.  . Recommended screening schedule for the next 5-10 years is provided to the patient in written form: see Patient Instructions/AVS.    Vicky Rash was seen today for medicare awv. Diagnoses and all orders for this visit:    1. Routine general medical examination at a health care facility  [facility] Referral to [facility] Specialist  2.  Type 2 diabetes mellitus with microalbuminuria, without long-term current use of insulin (HCC)  -     Hemoglobin A1C; Future  -     Basic Metabolic Panel; Future  3. Essential hypertension  -     Basic Metabolic Panel; Future  4. Vitamin D deficiency  -     Vitamin D 25 Hydroxy; Future  5.  Macrocytosis  -     CBC Auto Differential; Future         Return in 4 to 6 months for chronic disease check

## 2021-08-23 NOTE — PATIENT INSTRUCTIONS
Personalized Preventive Plan for Balwinder Bennett - 8/23/2021  Medicare offers a range of preventive health benefits. Some of the tests and screenings are paid in full while other may be subject to a deductible, co-insurance, and/or copay. Some of these benefits include a comprehensive review of your medical history including lifestyle, illnesses that may run in your family, and various assessments and screenings as appropriate. After reviewing your medical record and screening and assessments performed today your provider may have ordered immunizations, labs, imaging, and/or referrals for you. A list of these orders (if applicable) as well as your Preventive Care list are included within your After Visit Summary for your review. Other Preventive Recommendations:    · A preventive eye exam performed by an eye specialist is recommended every 1-2 years to screen for glaucoma; cataracts, macular degeneration, and other eye disorders. · A preventive dental visit is recommended every 6 months. · Try to get at least 150 minutes of exercise per week or 10,000 steps per day on a pedometer . · Order or download the FREE \"Exercise & Physical Activity: Your Everyday Guide\" from [employer]. Call 1-666.535.2600 or search [employer] online. · You need 8445-4200 mg of calcium and 6644-9223 IU of vitamin D per day. It is possible to meet your calcium requirement with diet alone, but a vitamin D supplement is usually necessary to meet this goal.  · When exposed to the sun, use a sunscreen that protects against both UVA and UVB radiation with an SPF of 30 or greater. Reapply every 2 to 3 hours or after sweating, drying off with a towel, or swimming. · Always wear a seat belt when traveling in a car. Always wear a helmet when riding a bicycle or motorcycle. Heart-Healthy Diet   Sodium, Fat, and Cholesterol Controlled Diet       What Is a Heart Healthy Diet?    A heart-healthy diet is one that limits sodium , certain types of fat , and cholesterol . This type of diet is recommended for:   People with any form of cardiovascular disease (eg, coronary heart disease , peripheral vascular disease , previous heart attack , previous stroke )   People with risk factors for cardiovascular disease, such as high blood pressure , high cholesterol , or diabetes   Anyone who wants to lower their risk of developing cardiovascular disease   Sodium    Sodium is a mineral found in many foods. In general, most people consume much more sodium than they need. Diets high in sodium can increase blood pressure and lead to edema (water retention). On a heart-healthy diet, you should consume no more than 2,300 mg (milligrams) of sodium per dayabout the amount in one teaspoon of table salt. The foods highest in sodium include table salt (about 50% sodium), processed foods, convenience foods, and preserved foods. Cholesterol    Cholesterol is a fat-like, waxy substance in your blood. Our bodies make some cholesterol. It is also found in animal products, with the highest amounts in fatty meat, egg yolks, whole milk, cheese, shellfish, and organ meats. On a heart-healthy diet, you should limit your cholesterol intake to less than 200 mg per day. It is normal and important to have some cholesterol in your bloodstream. But too much cholesterol can cause plaque to build up within your arteries, which can eventually lead to a heart attack or stroke. The two types of cholesterol that are most commonly referred to are:   Low-density lipoprotein (LDL) cholesterol  Also known as bad cholesterol, this is the cholesterol that tends to build up along your arteries. Bad cholesterol levels are increased by eating fats that are saturated or hydrogenated. Optimal level of this cholesterol is less than 100. Over 130 starts to get risky for heart disease.    High-density lipoprotein (HDL) cholesterol  Also known as good cholesterol, this type of cholesterol actually carries cholesterol away from your arteries and may, therefore, help lower your risk of having a heart attack. You want this level to be high (ideally greater than 60). It is a risk to have a level less than 40. You can raise this good cholesterol by eating olive oil, canola oil, avocados, or nuts. Exercise raises this level, too. Fat    Fat is calorie dense and packs a lot of calories into a small amount of food. Even though fats should be limited due to their high calorie content, not all fats are bad. In fact, some fats are quite healthful. Fat can be broken down into four main types. The good-for-you fats are:   Monounsaturated fat  found in oils such as olive and canola, avocados, and nuts and natural nut butters; can decrease cholesterol levels, while keeping levels of HDL cholesterol high   Polyunsaturated fat  found in oils such as safflower, sunflower, soybean, corn, and sesame; can decrease total cholesterol and LDL cholesterol   Omega-3 fatty acids  particularly those found in fatty fish (such as salmon, trout, tuna, mackerel, herring, and sardines); can decrease risk of arrhythmias, decrease triglyceride levels, and slightly lower blood pressure   The fats that you want to limit are:   Saturated fat  found in animal products, many fast foods, and a few vegetables; increases total blood cholesterol, including LDL levels   Animal fats that are saturated include: butter, lard, whole-milk dairy products, meat fat, and poultry skin   Vegetable fats that are saturated include: hydrogenated shortening, palm oil, coconut oil, cocoa butter   Hydrogenated or trans fat  found in margarine and vegetable shortening, most shelf stable snack foods, and fried foods; increases LDL and decreases HDL     It is generally recommended that you limit your total fat for the day to less than 30% of your total calories.  If you follow an 1800-calorie heart healthy diet, for example, this would mean 60 grams of fat or less per day. Saturated fat and trans fat in your diet raises your blood cholesterol the most, much more than dietary cholesterol does. For this reason, on a heart-healthy diet, less than 7% of your calories should come from saturated fat and ideally 0% from trans fat. On an 1800-calorie diet, this translates into less than 14 grams of saturated fat per day, leaving 46 grams of fat to come from mono- and polyunsaturated fats.    Food Choices on a Heart Healthy Diet   Food Category   Foods Recommended   Foods to Avoid   Grains   Breads and rolls without salted tops Most dry and cooked cereals Unsalted crackers and breadsticks Low-sodium or homemade breadcrumbs or stuffing All rice and pastas   Breads, rolls, and crackers with salted tops High-fat baked goods (eg, muffins, donuts, pastries) Quick breads, self-rising flour, and biscuit mixes Regular bread crumbs Instant hot cereals Commercially prepared rice, pasta, or stuffing mixes   Vegetables   Most fresh, frozen, and low-sodium canned vegetables Low-sodium and salt-free vegetable juices Canned vegetables if unsalted or rinsed   Regular canned vegetables and juices, including sauerkraut and pickled vegetables Frozen vegetables with sauces Commercially prepared potato and vegetable mixes   Fruits   Most fresh, frozen, and canned fruits All fruit juices   Fruits processed with salt or sodium   Milk   Nonfat or low-fat (1%) milk Nonfat or low-fat yogurt Cottage cheese, low-fat ricotta, cheeses labeled as low-fat and low-sodium   Whole milk Reduced-fat (2%) milk Malted and chocolate milk Full fat yogurt Most cheeses (unless low-fat and low salt) Buttermilk (no more than 1 cup per week)   Meats and Beans   Lean cuts of fresh or frozen beef, veal, lamb, or pork (look for the word loin) Fresh or frozen poultry without the skin Fresh or frozen fish and some shellfish Egg whites and egg substitutes (Limit whole eggs to three per week) Tofu Nuts or seeds (unsalted, dry-roasted), low-sodium peanut butter Dried peas, beans, and lentils   Any smoked, cured, salted, or canned meat, fish, or poultry (including benoit, chipped beef, cold cuts, hot dogs, sausages, sardines, and anchovies) Poultry skins Breaded and/or fried fish or meats Canned peas, beans, and lentils Salted nuts   Fats and Oils   Olive oil and canola oil Low-sodium, low-fat salad dressings and mayonnaise   Butter, margarine, coconut and palm oils, benoit fat   Snacks, Sweets, and Condiments   Low-sodium or unsalted versions of broths, soups, soy sauce, and condiments Pepper, herbs, and spices; vinegar, lemon, or lime juice Low-fat frozen desserts (yogurt, sherbet, fruit bars) Sugar, cocoa powder, honey, syrup, jam, and preserves Low-fat, trans-fat free cookies, cakes, and pies Gregg and animal crackers, fig bars, chloe snaps   High-fat desserts Broth, soups, gravies, and sauces, made from instant mixes or other high-sodium ingredients Salted snack foods Canned olives Meat tenderizers, seasoning salt, and most flavored vinegars   Beverages   Low-sodium carbonated beverages Tea and coffee in moderation Soy milk   Commercially softened water   Suggestions   Make whole grains, fruits, and vegetables the base of your diet. Choose heart-healthy fats such as canola, olive, and flaxseed oil, and foods high in heart-healthy fats, such as nuts, seeds, soybeans, tofu, and fish. Eat fish at least twice per week; the fish highest in omega-3 fatty acids and lowest in mercury include salmon, herring, mackerel, sardines, and canned chunk light tuna. If you eat fish less than twice per week or have high triglycerides, talk to your doctor about taking fish oil supplements. Read food labels.    For products low in fat and cholesterol, look for fat free, low-fat, cholesterol free, saturated fat free, and trans fat freeAlso scan the Nutrition Facts Label, which lists saturated fat, trans fat, and cholesterol amounts. For products low in sodium, look for sodium free, very low sodium, low sodium, no added salt, and unsalted   Skip the salt when cooking or at the table; if food needs more flavor, get creative and try out different herbs and spices. Garlic and onion also add substantial flavor to foods. Trim any visible fat off meat and poultry before cooking, and drain the fat off after chavez. Use cooking methods that require little or no added fat, such as grilling, boiling, baking, poaching, broiling, roasting, steaming, stir-frying, and sauting. Avoid fast food and convenience food. They tend to be high in saturated and trans fat and have a lot of added salt. Talk to a registered dietitian for individualized diet advice. Last Reviewed: March 2011 Sarah Bagley MS, MPH, RD   Updated: 3/29/2011   ·   Patient information: Weight loss treatments    INTRODUCTION -- Obesity is a major international problem, and Americans are among the heaviest people in the world. The percentage of obese people in the [facility] has risen steadily. Many people find that although they initially lose weight by dieting, they quickly regain the weight after the diet ends. Because it so hard to keep weight off over time, it is important to have as much information and support as possible before starting a diet. You are most likely to be successful in losing weight and keeping it off when you believe that your body weight can be controlled. STARTING A WEIGHT LOSS PROGRAM -- Some people like to talk to their doctor or nurse to get help choosing the best plan, monitoring progress, and getting advice and support along the way. To know what treatment (or combination of treatments) will work best, determine your body mass index (BMI) and waist circumference (measurement). The BMI is calculated from your height and weight.   A person with a BMI between 25 and 29.9 is considered overweight   A person with a BMI of 30 or greater is considered to be obese  A waist circumference greater than 35 inches (88 cm) in women and 40 inches (102 cm) in men increases the risk of obesity-related complications, such as heart disease and diabetes. People who are obese and who have a larger waist size may need more aggressive weight loss treatment than others. Talk to your doctor or nurse for advice. Types of treatment -- Based on your measurements and your medical history, your doctor or nurse can determine what combination of weight loss treatments would work best for you. Treatments may include changes in lifestyle, exercise, dieting, and, in some cases, weight loss medicines or weight loss surgery. Weight loss surgery, also called bariatric surgery, is reserved for people with severe obesity who have not responded to other weight loss treatments. SETTING A WEIGHT LOSS GOAL -- It is important to set a realistic weight loss goal. Your first goal should be to avoid gaining more weight and staying at your current weight (or within 5 percent). Many people have a \"dream\" weight that is difficult or impossible to achieve. People at high risk of developing diabetes who are able to lose 5 percent of their body weight and maintain this weight will reduce their risk of developing diabetes by about 50 percent and reduce their blood pressure. This is a success. Losing more than 15 percent of your body weight and staying at this weight is an extremely good result, even if you never reach your \"dream\" or \"ideal\" weight. LIFESTYLE CHANGES -- Programs that help you to change your lifestyle are usually run by psychologists or other professionals. The goals of lifestyle changes are to help you change your eating habits, become more active, and be more aware of how much you eat and exercise, helping you to make healthier choices. This type of treatment can be broken down into three steps:   The triggers that make you want to eat   Eating   What happens after you eat  Triggers to eat -- Determining what triggers you to eat involves figuring out what foods you eat and where and when you eat. To figure out what triggers you to eat, keep a record for a few days of everything you eat, the places where you eat, how often you eat, and the emotions you were feeling when you ate. For some people, the trigger is related to a certain time of day or night. For others, the trigger is related to a certain place, like sitting at a desk working. Eating -- You can change your eating habits by breaking the chain of events between the trigger for eating and eating itself. There are many ways to do this. For instance, you can:  Limit where you eat to a few places (eg, dining room)   Restrict the number of utensils (eg, only a fork) used for eating   Drink a sip of water between each bite   Chew your food a certain number of times   Get up and stop eating every few minutes  What happens after you eat -- Rewarding yourself for good eating behaviors can help you to develop better habits. This is not a reward for weight loss; instead, it is a reward for changing unhealthy behaviors. Do not use food as a reward. Some people find money, clothing, or personal care (eg, a hair cut, manicure, or massage) to be effective rewards. Treat yourself immediately after making better eating choices to reinforce the value of the good behavior. You need to have clear behavior goals, and you must have a time frame for reaching your goals. Reward small changes along the way to your final goal.  Other factors that contribute to successful weight loss -- Changing your behavior involves more than just changing unhealthy eating habits; it also involves finding people around you to support your weight loss, reducing stress, and learning to be strong when tempted by food. Establish a \"margaret\" system -- Having a friend or family member available to provide support and reinforce good behavior is very helpful. The support person needs to understand your goals. Learn to be strong -- Learning to be strong when tempted by food is an important part of losing weight. As an example, you will need to learn how to say \"no\" and continue to say no when urged to eat at parties and social gatherings. Develop strategies for events before you go, such as eating before you go or taking low-calorie snacks and drinks with you. Develop a support system -- Having a support system is helpful when losing weight. This is why many [employer] groups are successful. Family support is also essential; if your family does not support your efforts to lose weight, this can slow your progress or even keep you from losing weight. Positive thinking -- People often have conversations with themselves in their head; these conversations can be positive or negative. If you eat a piece of cake that was not planned, you may respond by thinking, \"Oh, you stupid idiot, you've blown your diet! \" and as a result, you may eat more cake. A positive thought for the same event could be, \"Well, I ate cake when it was not on my plan. Now I should do something to get back on track. \" A positive approach is much more likely to be successful than a negative one. Reduce stress -- Although stress is a part of everyday life, it can trigger uncontrolled eating in some people. It is important to find a way to get through these difficult times without eating or by eating low-calorie food, like raw vegetables. It may be helpful to imagine a relaxing place that allows you to temporarily escape from stress. With deep breaths and closed eyes, you can imagine this relaxing place for a few minutes. Self-help programs -- Self-help programs like [employer] Watchers®, Overeaters Anonymous®, and Take Off Javier (TOPS)© work for some people.  As with all weight loss programs, you are most likely to be successful with these plans if you make long-term changes in how you eat.  CHOOSING A DIET -- A calorie is a unit of energy found in food. Your body needs calories to function. The goal of any diet is to burn up more calories than you eat. How quickly you lose weight depends upon several factors, such as your age, gender, and starting weight. Older people have a slower metabolism than young people, so they lose weight more slowly. Men lose more weight than women of similar height and weight when dieting because they use more energy. People who are extremely overweight lose weight more quickly than those who are only mildly overweight. Try not to drink alcohol or drinks with added sugar, and most sweets (candy, cakes, cookies), since they rarely contain important nutrients. Portion-controlled diets -- One simple way to diet is to buy packaged foods, like frozen low-calorie meals or meal-replacement canned drinks. A typical meal plan for one day may include:  A meal-replacement drink or breakfast bar for breakfast   A meal-replacement drink or a frozen low-calorie (250 to 350 calories) meal for lunch   A frozen low-calorie meal or other prepackaged, calorie-controlled meal, along with extra vegetables for dinner  This would give you 1000 to 1500 calories per day. Low-fat diet -- To reduce the amount of fat in your diet, you can:  Eat low-fat foods. Low-fat foods are those that contain less than 30 percent of calories from fat. Fat is listed on the food facts label (figure 1). Count fat grams. For a 1500 calorie diet, this would mean about 45 g or fewer of fat per day. Low-carbohydrate diet -- Low- and very-low-carbohydrate diets (eg, Atkins diet, [employer]) have become popular ways to lose weight quickly.   With a very-low-carbohydrate diet, you eat between 0 and 60 grams of carbohydrates per day (a standard diet contains 200 to 300 grams of carbohydrates)   With a low-carbohydrate diet, you eat between 60 and 130 grams of carbohydrates per day  Carbohydrates are found in fruits, vegetables, and grains (including breads, rice, pasta, and cereal), alcoholic beverages, and in dairy products. Meat and fish do not contain carbohydrates. Side effects of very-low-carbohydrate diets can include constipation, headache, bad breath, muscle cramps, diarrhea, and weakness. Mediterranean diet -- The term \"Mediterranean diet\" refers to a way of eating that is common in [place] regions around the [facility]. Although there is some variation in Mediterranean diets, there are some similarities. Most Mediterranean diets include:  A high level of monounsaturated fats (from olive or canola oil, walnuts, pecans, almonds) and a low level of saturated fats (from butter)   A high amount of vegetables, fruits, legumes, and grains (7 to 10 servings of fruits and vegetables per day)   A moderate amount of milk and dairy products, mostly in the form of cheese. Use low-fat dairy products (skim milk, fat-free yogurt, low-fat cheese). A relatively low amount of red meat and meat products. Substitute fish or poultry for red meat. For those who drink alcohol, a modest amount (mainly as red wine) may help to protect against cardiovascular disease. A modest amount is up to one (4 ounce) glass per day for women and up to two glasses per day for men. Which diet is best? -- Studies have compared different diets, including:  Very-low-carbohydrate (Atkins)   Macronutrient balance controlling glycemic load (Zone®)   Reduced-calorie (Weight Watchers®)   Very-low-fat (Ornish)  No one diet is \"best\" for weight loss. Any diet will help you to lose weight if you stick with the diet. Therefore, it is important to choose a diet that includes foods you like. Fad diets -- Fad diets often promise quick weight loss (more than 1 to 2 pounds per week) and may claim that you do not need to exercise or give up favorite foods. Some fad diets cost a lot of money, because you have to pay for seminars or pills. Fad diets generally lack any scientific evidence that they are safe and effective, but instead rely on \"before\" and \"after\" photos or testimonials. Diets that sound too good to be true usually are. These plans are a waste of time and money and are not recommended. A doctor, nurse, or nutritionist can help you find a safe and effective way to lose weight and keep it off. WEIGHT LOSS MEDICINES -- Taking a weight loss medicine may be helpful when used in combination with diet, exercise, and lifestyle changes. However, it is important to understand the risks and benefits of these medicines. It is also important to be realistic about your goal weight using a weight loss medicine; you may not reach your \"dream\" weight, but you may be able to reduce your risk of diabetes or heart disease. Weight loss medicines may be recommended for people who have not been able to lose weight with diet and exercise who have a:  BMI of 30 or more    BMI between 27 and 29.9 and have other medical problems, such as diabetes, high cholesterol, or high blood pressure  Two weight loss medicines are approved in the [place] for long-term use. These are sibutramine and orlistat. Other weight loss medicines (phentermine, diethylpropion) are available but are only approved for short-term use (up to 12 weeks). Sibutramine -- Sibutramine (Meridia®, Reductil®) is a medicine that reduces your appetite. In people who take the medicine for one year, the average weight loss is 10 percent of the initial body weight (5 percent more than those who took a placebo treatment). Side effects of sibutramine include insomnia, dry mouth, and constipation. Increases in blood pressure can occur. Therefore, blood pressure is usually monitored during treatment. There is no evidence that sibutramine causes heart or lung problems (like dexfenfluramine and fenfluramine (Phen/Fen)).  However, experts agree that sibutramine should not used by people with coronary heart disease, heart failure, uncontrolled hypertension, stroke, irregular heart rhythms, or peripheral vascular disease (poor circulation in the legs). Orlistat -- Orlistat (Xenical® 120 mg capsules) is a medicine that reduces the amount of fat your body absorbs from the foods you eat. A lower-dose version is now available without a prescription (Moy® 60 mg capsules) in many countries, including the [facility]. The medicine is recommended three times per day, taken with a meal; you can skip a dose if you skip a meal or if the meal contains no fat. After one year of treatment with orlistat, the average weight loss is approximately 8 to 10 percent of initial body weight (4 percent more than in those who took a placebo). Cholesterol levels often improve, and blood pressure sometimes falls. In people with diabetes, orlistat may help control blood sugar levels. Side effects occur in 15 to 10 percent of people and may include stomach cramps, gas, diarrhea, leakage of stool, or oily stools. These problems are more likely when you take orlistat with a high-fat meal (if more than 30 percent of calories in the meal are from fat). Side effects usually improve as you learn to avoid high-fat foods. Severe liver injury has been reported rarely in patients taking orlistat, but it is not known if orlistat caused the liver problems. Diet supplements -- Diet supplements are widely used by people who are trying to lose weight, although the safety and efficacy of these supplements are often unproven. A few of the more common diet supplements are discussed below; none of these are recommended because they have not been studied carefully, and there is no proof they are safe or effective. Chitosan and wheat dextrin are ineffective for weight loss, and their use is not recommended. Ephedra, a compound related to ephedrine, is no longer available in the [facility] due to safety concerns.  Many nonprescription diet pills previously contained ephedra. Although some studies have shown that ephedra helps with weight loss, there can be serious side effects (psychiatric symptoms, palpitations, and stomach upset), including death. There are not enough data about the safety and efficacy of chromium, ginseng, glucomannan, green tea, hydroxycitric acid, L carnitine, psyllium, pyruvate supplements, Robinette wort, and conjugated linoleic acid. Two supplements from [facility], [address] [place] (also known as the Kellysadi Maciel 15 pill) and Herbathin dietary supplement, have been shown to contain prescription drugs. Hoodia gordonii is a dietary supplement derived from a plant in [place]. It is not recommended because there is no proof that it is safe or effective. Bitter orange (Citrus aurantium) can increase your heart rate and blood pressure and is not recommended. SHOULD I HAVE SURGERY TO LOSE WEIGHT? -- Weight loss surgery is recommended ONLY for people with one of the following:  Severe obesity (body mass index above 40) (calculator 1 and calculator 2) who have not responded to diet, exercise, or weight loss medicines   Body mass index between 35 and 40, along with a serious medical problem (including diabetes, severe joint pain, or sleep apnea) that would improve with weight loss  You should be sure that you understand the potential risks and benefits of weight loss surgery. You must be motivated and willing to make lifelong changes in how you eat to reach and maintain a healthier weight after surgery. You must also be realistic about weight loss after surgery (see 'Effectiveness of weight loss surgery' below). PREPARING FOR WEIGHT LOSS SURGERY -- Most people who have weight loss surgery will meet with several specialists before surgery is scheduled. This often includes a dietitian, mental health counselor, a doctor who specializes in care of obese people, and a surgeon who performs weight loss surgery (bariatric surgeon).  You may need to work with these providers for several weeks or months before surgery. The nutritionist will explain what and how much you will be able to eat after surgery. You may also need to lose a small amount of weight before surgery. The mental health specialist will help you to cope with stress and other factors that can make it harder to lose weight or trigger you to eat   The medical doctor will determine whether you need other tests, counseling, or treatment before surgery. He or she might also help you begin a medical weight loss program so that you can lose some weight before surgery. The bariatric surgeon will meet with you to discuss the surgeries available to treat obesity. He or she will also make sure you are a good candidate for surgery. TYPES OF WEIGHT LOSS SURGERY -- There are several types of weight loss surgeries, the most common being lap banding, gastric bypass, and gastric sleeve. Lap banding -- Laparoscopic adjustable gastric banding (LAGB), or lap banding, is a surgery that uses an adjustable band around the opening to the stomach (figure 1). This reduces the amount of food that you can eat at one time. Lap banding is done through small incisions, with a laparoscope. The band can be adjusted after surgery, allowing you to eat more or less food. Adjustments to the size and tightness of the band are made by using a needle to add or remove fluid from a port (a small container under the skin that is connected to the band). Adding fluid to the band makes it tighter which restricts the amount of food you can eat and may help you to lose more weight. Lap banding is a popular choice because it is relatively simple to perform, can be adjusted or removed, and has a low risk of serious complications immediately after surgery. However, weight loss with the lap band depends on your ability to follow the program closely.   You will need to prepare nutritious meals that \"work with\" the band, not against it. For example, the lap band will not work well if you eat or drink a large amount of liquid calories (like ice cream). The band will not help you to feel full when you eat/drink liquid calories. Weight loss ranges from 45 to 75 percent after two years. As an example, a person who is 120 pounds overweight could expect to lose approximately 54 to 90 pounds in the two years after lap banding. Gastric bypass -- Kayce-en-Y gastric bypass, also called gastric bypass, helps you to lose weight by reducing the amount of food you can eat and reducing the number of calories and nutrients you absorb from the food you eat. To perform gastric bypass, a surgeon creates a small stomach pouch by dividing the stomach and attaching it to the small intestine. This helps you to lose weight in two ways: The smaller stomach can hold less food than before surgery. This causes you to feel full after eating a very small amount of food or liquid. Over time, the pouch might stretch, allowing you to eat more food. The body absorbs fewer calories, since food bypasses most of the stomach as well as the upper small intestine. This new arrangement seems to decrease your appetite and change how you break down foods by changing the release of various hormones. Gastric bypass can be performed as open surgery (through an incision on the abdomen) or laparoscopically, which uses smaller incisions and smaller instruments. Both the laparoscopic and open techniques have risks and benefits. You and your surgeon should work together to decide which surgery, if any, is right for you. Gastric bypass has a high success rate, and people lose an average of 62 to 68 percent of their excess body weight in the first year. Weight loss typically levels off after one to two years, with an overall excess weight loss between 50 and 75 percent. For a person who is 120 pounds overweight, an average of 60 to 90 pounds of weight loss would be expected.   Gastric sleeve -- Gastric sleeve, also known as sleeve gastrectomy, is a surgery that reduces the size of the stomach and makes it into a narrow tube (figure 3). The new stomach is much smaller and produces less of the hormone (ghrelin) that causes hunger, helping you feel satisfied with less food. Sleeve gastrectomy is safer than gastric bypass because the intestines are not rearranged, and there is less chance of malnutrition. It also appears to control hunger better than lap banding. It might be safer than the lap banding because no foreign materials are used. The gastric sleeve has a good success rate, and people lose an average of 33 percent of their excess body weight in the first year. For a person who is 120 pounds overweight, this would mean losing about 40 pounds in the first year. WEIGHT LOSS SURGERY COMPLICATIONS -- A variety of complications can occur with weight loss surgery. The risks of surgery depend upon which surgery you have and any medical problems you had before surgery. Some of the more common early surgical complications (one to six weeks after surgery) include:  Bleeding   Infection   Blockage or tear in the bowels   Need for further surgery  Important medical complications after surgery can include blood clots in the legs or lungs, heart attack, pneumonia, and urinary tract infection. Complications are less likely when surgery is performed in centers that are experienced in weight loss surgery. In general, centers with experience in weight loss surgery have:  Board-certified doctors and surgeons   A team of support staff (dietitians, counselors, nurses)   Long-term follow-up after surgery   Hospital staff experienced with the care of weight loss patients. This includes nurses who are trained in the care of patients immediately after surgery and anesthesiologists who are experienced in caring for the morbidly obese.   EFFECTIVENESS OF WEIGHT LOSS SURGERY -- The goal of weight loss surgery is to reduce the risk of illness or death associated with obesity. Weight loss surgery can also help you to feel and look better, reduce the amount of money you spend on medicines, and cut down on sick days. As an example, weight loss surgery can improve health problems related to obesity (diabetes, high blood pressure, high cholesterol, sleep apnea) to the point that you need less or no medicine. Finally, weight loss surgery might reduce your risk of developing heart disease, cancer, and certain infections. AFTER WEIGHT LOSS SURGERY -- You will need to stay in the hospital until your team feels that it is safe for you to leave (on average, one to three days). Do not drive if you are taking prescription pain medicine. Begin exercising as soon as possible once you have healed; most weight loss centers will design an exercise program for you. Once you are home, it is important to eat and drink exactly what your doctor and dietitian recommend. You will see your doctor, nurse, and dietitian on a regular basis after surgery to monitor your health, diet, and weight loss. You will be able to slowly increase how much you eat over time, although it will always be important to:  Eat small, frequent meals and not skip meals   Chew your food slowly and completely   Avoid eating while \"distracted\" (such as eating while watching TV)   Stop eating when you feel full   Drink liquids at least 30 minutes before or after eating   Avoid foods high in fat or sugar   Take vitamin supplements, as recommended  It can take several months to learn to listen to your body so that you know when you are hungry and when you are full. You may dislike foods you previously loved, and you may begin to prefer new foods. This can be a frustrating process for some people, so talk to your dietitian if you are having trouble. It usually takes between one and two years to lose weight after surgery.  After reaching their goal weight, some people have plastic surgery (called \"body contouring\") to remove excess skin from the body, particularly in the abdominal area. Before you decide to have weight loss surgery, you must commit to staying healthy for life. This includes following up with your healthcare team, exercising most days of the week, and eating a sensible diet every day. It can be difficult to develop new eating and exercise habits after weight loss surgery, and you will have to work hard to stick to your goals. Recovering from surgery and losing weight can be stressful and emotional, and it is important to have the support of family and friends. Working with a , therapist, or support group can help you through the ups and downs. WHERE TO GET MORE INFORMATION -- Your healthcare provider is the best source of information for questions and concerns related to your medical problem. This article will be updated as needed every four months on our Web site ([email or web address])  ·        Advance Care Planning: Care Instructions  Your Care Instructions     It can be hard to live with an illness that cannot be cured. But if your health is getting worse, you may want to make decisions about end-of-life care. Planning for the end of your life does not mean that you are giving up. It is a way to make sure that your wishes are met. Clearly stating your wishes can make it easier for your loved ones. Making plans while you are still able may also ease your mind and make your final days less stressful and more meaningful. Follow-up care is a key part of your treatment and safety. Be sure to make and go to all appointments, and call your doctor if you are having problems. It's also a good idea to know your test results and keep a list of the medicines you take. What can you do to plan for the end of life? You can bring these issues up with your doctor. You do not need to wait until your doctor starts the conversation.  You might start with, \"What makes life worth living for me is. Catherine Colwell Catherine Colwell \" And then follow it with, \"I would not be willing to live with . Catherine Colwell Catherine Colwell Catherine Colwell \" When you complete this sentence it helps your doctor understand your wishes. Talk openly and honestly with your doctor. This is the best way to understand the decisions you will need to make as your health changes. Know that you can always change your mind. Ask your doctor about commonly used life-support measures. These include tube feedings, breathing machines, and fluids given through a vein (IV). Understanding these treatments will help you decide whether you want them. You may choose to have these life-supporting treatments for a limited time. This allows a trial period to see whether they will help you. You may also decide that you want your doctor to take only certain measures to keep you alive. It may help to think about the big picture, like what makes life worth living for you or what your values and goals are. Talk to your doctor about how long you are likely to live. Your doctor may be able to give you an idea of what usually happens with your specific illness. Think about preparing papers that state your wishes. These papers are called advance directives. If you do this early and review them often, there will not be any confusion about what you want. You can change your instructions at any time. Which papers should you prepare? Advance directives are legal papers that tell doctors how you want to be cared for at the end of your life. You do not need a  to write these papers. Ask your doctor or your state health department for information on how to write your advance directives. They may have the forms for each of these types of papers. Make sure your doctor has a copy of these on file, and give a copy to a family member or close friend. Consider a do-not-resuscitate order (DNR). This order asks that no extra treatments be done if your heart stops or you stop breathing.  Extra treatments may include cardiopulmonary resuscitation (CPR), electrical shock to restart your heart, or a machine to breathe for you. If you decide to have a DNR order, ask your doctor to explain and write it. Place the order in your home where everyone can easily see it. Consider a living will. A living will explains your wishes about life support and other treatments at the end of your life if you become unable to speak for yourself. Living tobin tell doctors to use or not use treatments that would keep you alive. You must have one or two witnesses or a notary present when you sign this form. A living will may be called something else in your state. Consider a medical power of . This form allows you to name a person to make decisions about your care if you are not able to. Most people ask a close friend or family member. Talk to this person about the kinds of treatments you want and those that you do not want. Make sure this person understands your wishes. A medical power of  may be called something else in your state. These legal papers are simple to change. Tell your doctor what you want to change, and ask him or her to make a note in your medical file. Give your family updated copies of the papers. Where can you learn more? Go to [email or web address] org and sign in to your [employer] account. Enter P184 in the [facility] box to learn more about \"Advance Care Planning: Care Instructions. \"     If you do not have an account, please click on the \"Sign Up Now\" link. Current as of: March 17, 2021               Content Version: 12.9  © 2006-2021 Healthwise, Incorporated. Care instructions adapted under license by [facility] [employer] [facility] ([facility]). If you have questions about a medical condition or this instruction, always ask your healthcare professional. [place] [address] any warranty or liability for your use of this information.     ·        Learning About Living Perroy  What is a living will? A living will, also called a declaration, is a legal form. It tells your family and your doctor your wishes when you can't speak for yourself. It's used by the health professionals who will treat you as you near the end of your life or if you get seriously hurt or ill. If you put your wishes in writing, your loved ones and others will know what kind of care you want. They won't need to guess. This can ease your mind and be helpful to others. And you can change or cancel your living will at any time. A living will is not the same as an estate or property will. An estate will explains what you want to happen with your money and property after you die. How do you use it? A living will is used to describe the kinds of treatment or life support you want as you near the end of your life or if you get seriously hurt or ill. Keep these facts in mind about living tobin. Your living will is used only if you can't speak or make decisions for yourself. Most often, one or more doctors must certify that you can't speak or decide for yourself before your living will takes effect. If you get better and can speak for yourself again, you can accept or refuse any treatment. It doesn't matter what you said in your living will. Some states may limit your right to refuse treatment in certain cases. For example, you may need to clearly state in your living will that you don't want artificial hydration and nutrition, such as being fed through a tube. Is a living will a legal document? A living will is a legal document. Each state has its own laws about living tobin. And a living will may be called something else in your state. Here are some things to know about living tobin. You don't need an  to complete a living will. But legal advice can be helpful if your state's laws are unclear.  It can also help if your health history is complicated or your family can't agree on what should be in your living will.  You can change your living will at any time. Some people find that their wishes about end-of-life care change as their health changes. If you make big changes to your living will, complete a new form. If you move to another state, make sure that your living will is legal in the state where you now live. In most cases, doctors will respect your wishes even if you have a form from a different state. You might use a universal form that has been approved by many states. This kind of form can sometimes be filled out and stored online. Your digital copy will then be available wherever you have a connection to the internet. The doctors and nurses who need to treat you can find it right away. Your state may offer an online registry. This is another place where you can store your living will online. It's a good idea to get your living will notarized. This means using a person called a [employer] to watch two people sign, or witness, your living will. What should you know when you create a living will? Here are some questions to ask yourself as you make your living will:  Do you know enough about life support methods that might be used? If not, talk to your doctor so you know what might be done if you can't breathe on your own, your heart stops, or you can't swallow. What things would you still want to be able to do after you receive life-support methods? Would you want to be able to walk? To speak? To eat on your own? To live without the help of machines? Do you want certain Tenriism practices performed if you become very ill? If you have a choice, where do you want to be cared for? In your home? At a hospital or nursing home? If you have a choice at the end of your life, where would you prefer to die? At home? In a hospital or nursing home? Somewhere else? Would you prefer to be buried or cremated? Do you want your organs to be donated after you die? What should you do with your living will?   Make sure he or she is comfortable with this responsibility. It's a good idea to choose someone who:  Is at least 25years old. Knows you well and understands what makes life meaningful for you. Understands your Anglican and moral values. Will do what you want, not what he or she wants. Will be able to make difficult choices at a stressful time. Will be able to refuse or stop treatment, if that is what you would want, even if you could die. Will be firm and confident with health professionals if needed. Will ask questions to get needed information. Lives near you or agrees to travel to you if needed. Your family may help you make medical decisions while you can still be part of that process. But it's important to choose one person to be your health care agent in case you aren't able to make decisions for yourself. If you don't fill out the legal form and name a health care agent, the decisions your family can make may be limited. A health care agent may be called something else in your state. Who will make decisions for you if you don't have a health care agent? If you don't have a health care agent or a living will, you may not get the care you want. Decisions may be made by family members who disagree about your medical care. Or decisions may be made by a medical professional who doesn't know you well. In some cases, a  makes the decisions. When you name a health care agent, it is very clear who has the power to make health decisions for you. How do you name a health care agent? You name your health care agent on a legal form. This form is usually called a medical power of . Ask your hospital, state bar association, or office on aging where to find these forms. You must sign the form to make it legal. Some states require you to get the form notarized. This means that a person called a  watches you sign the form and then he or she signs the form.  Some states also require that

## 2021-08-24 ENCOUNTER — CLINICAL DOCUMENTATION (OUTPATIENT)
Dept: SPIRITUAL SERVICES | Age: 76
End: 2021-08-24

## 2021-08-25 ENCOUNTER — NURSE ONLY (OUTPATIENT)
Dept: FAMILY MEDICINE CLINIC | Age: 76
End: 2021-08-25
Payer: MEDICARE

## 2021-08-25 DIAGNOSIS — E29.1 HYPOGONADISM IN MALE: Primary | ICD-10-CM

## 2021-08-25 PROCEDURE — 96372 THER/PROPH/DIAG INJ SC/IM: CPT | Performed by: FAMILY MEDICINE

## 2021-08-25 RX ORDER — TESTOSTERONE CYPIONATE 200 MG/ML
200 INJECTION INTRAMUSCULAR ONCE
Status: COMPLETED | OUTPATIENT
Start: 2021-08-25 | End: 2021-08-25

## 2021-08-25 RX ADMIN — TESTOSTERONE CYPIONATE 200 MG: 200 INJECTION INTRAMUSCULAR at 10:15

## 2021-08-30 DIAGNOSIS — D72.829 LEUKOCYTOSIS, UNSPECIFIED TYPE: ICD-10-CM

## 2021-08-30 DIAGNOSIS — R80.9 TYPE 2 DIABETES MELLITUS WITH MICROALBUMINURIA, WITHOUT LONG-TERM CURRENT USE OF INSULIN (HCC): Primary | ICD-10-CM

## 2021-08-30 DIAGNOSIS — E11.29 TYPE 2 DIABETES MELLITUS WITH MICROALBUMINURIA, WITHOUT LONG-TERM CURRENT USE OF INSULIN (HCC): Primary | ICD-10-CM

## 2021-08-30 NOTE — RESULT ENCOUNTER NOTE
A1c 7.2, BMP stable, CBC with leukocytosis. Patient informed of results in office.  Will repeat A1c and CBC in 3 months

## 2021-09-05 LAB — VITAMIN D 25-HYDROXY: 54.1 NG/ML (ref 30–100)

## 2021-09-07 DIAGNOSIS — E55.9 VITAMIN D DEFICIENCY: Primary | ICD-10-CM

## 2021-09-07 RX ORDER — ACETAMINOPHEN 160 MG
1 TABLET,DISINTEGRATING ORAL DAILY
Qty: 90 CAPSULE | Refills: 1 | Status: SHIPPED | OUTPATIENT
Start: 2021-09-07 | End: 2022-10-07

## 2021-09-07 NOTE — RESULT ENCOUNTER NOTE
Please notify patient that recent vitamin D level remains within normal range. He should STOP the once a week high dose weekly supplement and move to a lower dose daily vitamin D3 supplement of 2000 IU daily. I have sent a new prescription for this lower dose daily supplement to his mail order pharmacy.

## 2021-09-08 ENCOUNTER — CLINICAL DOCUMENTATION (OUTPATIENT)
Dept: SPIRITUAL SERVICES | Age: 76
End: 2021-09-08

## 2021-09-08 ENCOUNTER — NURSE ONLY (OUTPATIENT)
Dept: FAMILY MEDICINE CLINIC | Age: 76
End: 2021-09-08
Payer: MEDICARE

## 2021-09-08 DIAGNOSIS — E29.1 HYPOGONADISM IN MALE: Primary | ICD-10-CM

## 2021-09-08 PROCEDURE — 96372 THER/PROPH/DIAG INJ SC/IM: CPT | Performed by: FAMILY MEDICINE

## 2021-09-08 RX ORDER — TESTOSTERONE CYPIONATE 200 MG/ML
200 INJECTION INTRAMUSCULAR ONCE
Status: COMPLETED | OUTPATIENT
Start: 2021-09-08 | End: 2021-09-08

## 2021-09-08 RX ADMIN — TESTOSTERONE CYPIONATE 200 MG: 200 INJECTION INTRAMUSCULAR at 09:18

## 2021-09-08 NOTE — PROGRESS NOTES
Patient given Testosterone 200mg IM left gluteal..  Will return in 2 weeks for next injection    Patient tolerated injection well.     Administrations This Visit     testosterone cypionate (DEPOTESTOTERONE CYPIONATE) injection 200 mg     Admin Date  09/08/2021 Action  Given Dose  200 mg Route  [facility] Administered By  Brenda Caldwell LPN

## 2021-09-08 NOTE — PROGRESS NOTES
Advance Care Planning   Ambulatory ACP Specialist Patient Outreach    Date:  9/8/2021  ACP Specialist:  Tiff Gorman    Outreach call to patient in follow-up to ACP Specialist referral from: Ezra Jacobs MD    [x] PCP  [] Provider   [] Ambulatory Care Management [] Other for Reason:    [x] Advance Directive Assistance  [] Code Status Discussion  [] Complete Portable DNR Order  [] Discuss Goals of Care  [] Complete POST/MOST  [] Early ACP Decision-Making  [] Other    Date Referral Received:8/23/21    Today's Outreach:  [] First   [x] Second  [] Third                               Third outreach made by [x]  phone  [] email []   Izzy Moneyt     Intervention:  [x] Spoke with Patient  [] Left VM requesting return call      Outcome: Scheduled ACP Call for Friday September 24th between 8am and 11am. [employer] mailed out today. Next Step:   [x] ACP scheduled conversation  [] Outreach again in one week               [x] Email / Mail ACP Info Sheets  [x] Email / Mail Advance Directive            [] Close Referral. Routing closure to referring provider/staff and to ACP Specialist .      Thank you for this referral.

## 2021-09-15 ENCOUNTER — OFFICE VISIT (OUTPATIENT)
Dept: ENDOCRINOLOGY | Age: 76
End: 2021-09-15
Payer: MEDICARE

## 2021-09-15 VITALS
OXYGEN SATURATION: 95 % | HEART RATE: 79 BPM | BODY MASS INDEX: 27.02 KG/M2 | SYSTOLIC BLOOD PRESSURE: 120 MMHG | WEIGHT: 193 LBS | HEIGHT: 71 IN | DIASTOLIC BLOOD PRESSURE: 65 MMHG

## 2021-09-15 DIAGNOSIS — E29.1 HYPOGONADISM MALE: Primary | ICD-10-CM

## 2021-09-15 PROCEDURE — G8417 CALC BMI ABV UP PARAM F/U: HCPCS | Performed by: INTERNAL MEDICINE

## 2021-09-15 PROCEDURE — G8427 DOCREV CUR MEDS BY ELIG CLIN: HCPCS | Performed by: INTERNAL MEDICINE

## 2021-09-15 PROCEDURE — 1123F ACP DISCUSS/DSCN MKR DOCD: CPT | Performed by: INTERNAL MEDICINE

## 2021-09-15 PROCEDURE — 4040F PNEUMOC VAC/ADMIN/RCVD: CPT | Performed by: INTERNAL MEDICINE

## 2021-09-15 PROCEDURE — 1036F TOBACCO NON-USER: CPT | Performed by: INTERNAL MEDICINE

## 2021-09-15 PROCEDURE — 99213 OFFICE O/P EST LOW 20 MIN: CPT | Performed by: INTERNAL MEDICINE

## 2021-09-15 RX ORDER — TESTOSTERONE CYPIONATE 200 MG/ML
VIAL (ML) INTRAMUSCULAR
Qty: 10 ML | Refills: 3
Start: 2021-09-15

## 2021-09-15 NOTE — PROGRESS NOTES
9/15/2021    Assessment:       Diagnosis Orders   1. Hypogonadism male  Testosterone Free and Total Male         PLAN:     Orders Placed This Encounter   Procedures    Testosterone Free and Total Male     Standing Status:   Future     Standing Expiration Date:   9/15/2022    CBC     Standing Status:   Future     Standing Expiration Date:   9/15/2022    PSA, Diagnostic     Standing Status:   Future     Standing Expiration Date:   9/15/2022     Continue testosterone 200 mg every 2 weeks  Monitor PSA  If it is above normal limit total testosterone  Patient education verbalized understanding  Orders Placed This Encounter   Medications    Testosterone Cypionate 200 MG/ML SOLN     Si CC EVERY 2 WEEKS     Dispense:  10 mL     Refill:  3           No orders of the defined types were placed in this encounter. Subjective:     Chief Complaint   Patient presents with    Hypogonadism     Vitals:    09/15/21 1008   BP: 120/65   Pulse: 79   SpO2: 95%   Weight: 193 lb (87.5 kg)   Height: 5' 11\" (1.803 m)     Wt Readings from Last 3 Encounters:   09/15/21 193 lb (87.5 kg)   21 193 lb 9.6 oz (87.8 kg)   21 189 lb (85.7 kg)     BP Readings from Last 3 Encounters:   09/15/21 120/65   21 (!) 112/58   21 (!) 112/54     Follow-up on hypogonadism patient on testosterone injection does have a history of BPH history of coronary artery disease PSA has been normal it has been going up and symptoms are stable on Flomax  Last testosterone was 418    Other  This is a chronic (Hypogonadism) problem. The current episode started more than 1 year ago. The problem occurs intermittently. The problem has been waxing and waning. Nothing aggravates the symptoms. Treatments tried: Testosterone injections. The treatment provided moderate relief. Results for Crys Goldberg (MRN [id number]) as of 9/15/2021 10:10   Ref.  Range 2021 09:55 2021 08:12 2021 11:03 2021 14:32   Sodium Latest Ref Range: 135 - 144 mEq/L  139  137   Potassium Latest Ref Range: 3.4 - 4.9 mEq/L  4.1  4.3   Chloride Latest Ref Range: 95 - 107 mEq/L  99  100   CO2 Latest Ref Range: 20 - 31 mEq/L  30  29   BUN Latest Ref Range: 8 - 23 mg/dL  15  18   Creatinine Latest Ref Range: 0.70 - 1.20 mg/dL  1.18  1.03   Anion Gap Latest Ref Range: 9 - 15 mEq/L  10  8 (L)   GFR Non- Latest Ref Range: >60   >60.0  >60.0   GFR African American Latest Ref Range: >60   >60.0  >60.0   Glucose Latest Ref Range: 70 - 99 mg/dL  146 (H)  127 (H)   Calcium Latest Ref Range: 8.5 - 9.9 mg/dL  10.3 (H)  9.7   Total Protein Latest Ref Range: 6.3 - 8.0 g/dL  6.8     Cholesterol, Total Latest Ref Range: 0 - 199 mg/dL  112     HDL Cholesterol Latest Ref Range: 40 - 59 mg/dL  50     LDL Calculated Latest Ref Range: 0 - 129 mg/dL  39     Triglycerides Latest Ref Range: 0 - 150 mg/dL  117     Albumin Latest Ref Range: 3.5 - 4.6 g/dL  4.1     Globulin Latest Ref Range: 2.3 - 3.5 g/dL  2.7     Alk Phos Latest Ref Range: 35 - 104 U/L  42     ALT Latest Ref Range: 0 - 41 U/L  15     AST Latest Ref Range: 0 - 40 U/L  16     Bilirubin Latest Ref Range: 0.2 - 0.7 mg/dL  0.5     Hemoglobin A1C Latest Ref Range: 4.8 - 5.9 % 6.4   7.2 (H)   TSH Latest Ref Range: 0.440 - 3.860 uIU/mL  1.150     Vit D, 25-Hydroxy Latest Ref Range: 30.0 - 100.0 ng/mL    54.1   WBC Latest Ref Range: 4.8 - 10.8 K/uL  10.7  12.7 (H)   RBC Latest Ref Range: 4.70 - 6.10 M/uL  4.78  4.34 (L)   Hemoglobin Quant Latest Ref Range: 14.0 - 18.0 g/dL  16.5  14.3   Hematocrit Latest Ref Range: 42.0 - 52.0 %  48.8  42.9   MCV Latest Ref Range: 80.0 - 100.0 fL  102.1 (H)  98.9   MCH Latest Ref Range: 27.0 - 31.3 pg  34.5 (H)  33.0 (H)   MCHC Latest Ref Range: 33.0 - 37.0 %  33.8  33.4   RDW Latest Ref Range: 11.5 - 14.5 %  14.9 (H)  14.0   Platelet Count Latest Ref Range: 130 - 400 K/uL  175  235   Neutrophils % Latest Units: %  84.4  84.2   Lymphocyte % Latest Units: %  7.3  6.9   Monocytes % Latest Units: %  6.5  7.6   Eosinophils % Latest Units: %  1.4  0.8   Basophils % Latest Units: %  0.4  0.5   Neutrophils Absolute Latest Ref Range: 1.4 - 6.5 K/uL  9.1 (H)  10.7 (H)   Lymphocytes Absolute Latest Ref Range: 1.0 - 4.8 K/uL  0.8 (L)  0.9 (L)   Monocytes Absolute Latest Ref Range: 0.2 - 0.8 K/uL  0.7  1.0 (H)   Eosinophils Absolute Latest Ref Range: 0.0 - 0.7 K/uL  0.1  0.1   Basophils Absolute Latest Ref Range: 0.0 - 0.2 K/uL  0.0  0.1   Folate Latest Ref Range: 7.3 - 26.1 ng/mL   16.0    Vitamin B-12 Latest Ref Range: 232 - 1245 pg/mL   901        Past Medical History:   Diagnosis Date    Atypical chest pain 8/30/2012    BPH (benign prostatic hypertrophy)     CAD (coronary artery disease)     Cataracts, bilateral     Chronic low back pain     COPD (chronic obstructive pulmonary disease) (HCC) 6/1/2006    DM2 (diabetes mellitus, type 2) (HCC)     Duodenitis     Erectile dysfunction 3/6/2017    Fatty infiltration of liver 11/19/2014    GERD (gastroesophageal reflux disease)     History of melanoma excision 12/11/2017    Left ear 09/2017    History of tobacco use 6/1/2006    HTN (hypertension)     Hyperlipidemia     Malignant melanoma of face ([address]     Malignant melanoma of skin of face ([address]     RT ear 2000, LT ear 2017    MG (myasthenia gravis) ([address]     Nephrolithiasis     Ocular myasthenia gravis ([address] 8/28/2018    Osteoarthritis of multiple joints     hands, hips    Perennial allergic rhinitis     Personal history of colonic polyps     Pseudophakia of both eyes     Status post coronary artery bypass grafts x 5 7/30/2018 07/2018 @ [facility]    Type 2 diabetes mellitus with microalbuminuria, without long-term current use of insulin ([address] 3/6/2017    Varicose veins of both lower extremities 9/11/2017     Past Surgical History:   Procedure Laterality Date    ANGIOPLASTY      CARDIAC CATHETERIZATION      #17    CATARACT REMOVAL WITH IMPLANT Left 1992    CATARACT REMOVAL WITH IMPLANT Right 1992    COLONOSCOPY  2009    tubular adenomas    COLONOSCOPY  2013    diverticulosis, polyps, 5y repeat (DR NAVAS)    CORONARY ARTERY BYPASS GRAFT  07/02/2018    x5 ([facility])    CORONARY ARTERY BYPASS GRAFT      5 vessel    MOHS SURGERY Left 2017    melanoma of left ear (DR GODWIN)    SKIN CANCER EXCISION Left 2017    TONSILLECTOMY      UPPER GASTROINTESTINAL ENDOSCOPY      duodenitis/ poss early signs of celiac disease     Social History     Socioeconomic History    Marital status:      Spouse name: Anne-Marie Calle Number of children: 2    Years of education: 12+    Highest education level: Not on file   Occupational History    Occupation: [employer]   Tobacco Use    Smoking status: Former Smoker     Packs/day: 1.00     Years: 41.00     Pack years: 41.00     Types: Cigarettes     Start date:      Quit date: 2002     Years since quittin.9    Smokeless tobacco: Never Used   Substance and Sexual Activity    Alcohol use: Yes     Alcohol/week: 0.0 standard drinks     Comment: drinks one beer at night occassionally    Drug use: No    Sexual activity: Not Currently     Partners: Female   Other Topics Concern    Not on file   Social History Narrative    Not on file     Social Determinants of Health     Financial Resource Strain: Low Risk     Difficulty of Paying Living Expenses: Not hard at all   Food Insecurity: No Food Insecurity    Worried About [address] in the Last Year: Never true    [address] in the Last Year: Never true   Transportation Needs:     Lack of Transportation (Medical):      Lack of Transportation (Non-Medical):    Physical Activity:     Days of Exercise per Week:     Minutes of Exercise per Session:    Stress:     Feeling of Stress :    Social Connections:     Frequency of Communication with Friends and Family:     Frequency of Social Gatherings with Friends and Family:     Attends [employer] Services:     Active Member of Clubs or Organizations:     Attends Club or Organization Meetings:     Marital Status:    Intimate Partner Violence:     Fear of Current or Ex-Partner:     Emotionally Abused:     Physically Abused:     Sexually Abused:      Family History   Problem Relation Age of Onset    Heart Disease Mother     Heart Disease Brother     Diabetes Maternal Grandfather      Allergies   Allergen Reactions    Invokana [Canagliflozin] Diarrhea    Metformin And Related Diarrhea    Other      There is a list scanned in media that pt can not take due to myasthenia gravis ,  Antibiotics       Current Outpatient Medications:     losartan (COZAAR) 100 MG tablet, Take 1 tablet by mouth daily, Disp: 90 tablet, Rfl: 1    amLODIPine (NORVASC) 10 MG tablet, Take 1 tablet by mouth daily, Disp: 90 tablet, Rfl: 1    Cholecalciferol (VITAMIN D3) 50 MCG (2000 UT) CAPS, Take 1 capsule by mouth daily, Disp: 90 capsule, Rfl: 1    fluorouracil (EFUDEX) 5 % cream, APPLY A THIN LAYER TO BILATERAL EARS TWICE A DAY FOR 2 WEEKS. [address] YOUR HANDS AFTER APPLICATION., Disp: , Rfl:     fluticasone (FLONASE) 50 MCG/ACT nasal spray, 1 spray by Nasal route daily, Disp: 1 Bottle, Rfl: 0    ipratropium (ATROVENT) 0.06 % nasal spray, 2 sprays by Nasal route 3 times daily, Disp: 45 mL, Rfl: 3    albuterol sulfate  (90 Base) MCG/ACT inhaler, Inhale 2 puffs into the lungs every 6 hours as needed for Wheezing, Disp: 3 Inhaler, Rfl: 0    testosterone cypionate (DEPOTESTOTERONE CYPIONATE) 200 MG/ML injection, Inject 1 mL into the muscle every 14 days. Indications: PER ORDER in OV note 9/16/20 (Patient taking differently: Inject 200 mg into the muscle every 14 days.  Indications: PER ORDER in OV note 3/17/21 ), Disp: 2 mL, Rfl: 3    metoprolol succinate (TOPROL XL) 50 MG extended release tablet, Take 3 tablets by mouth daily, Disp: 270 tablet, Rfl: 3    rosuvastatin (CRESTOR) 20 MG tablet, Take 1 tablet by mouth nightly, Disp: 90 tablet, Rfl: 3    glipiZIDE (GLUCOTROL) 10 MG tablet, Take 1 tablet by mouth 2 times daily (before meals), Disp: 180 tablet, Rfl: 3    tamsulosin (FLOMAX) 0.4 MG capsule, Take 2 capsules by mouth daily, Disp: 180 capsule, Rfl: 3    triamterene-hydroCHLOROthiazide (MAXZIDE-25) 37.5-25 MG per tablet, Take 1 tablet by mouth daily, Disp: 90 tablet, Rfl: 3    blood glucose test strips (FREESTYLE LITE) strip, USE 1 STRIP TO CHECK GLUCOSE ONCE DAILY AS DIRECTED, Disp: 100 each, Rfl: 5    famotidine (PEPCID) 40 MG tablet, Take 1 tablet by mouth every evening, Disp: 90 tablet, Rfl: 3    SITagliptin (JANUVIA) 100 MG tablet, Take 0.5 tablets by mouth 2 times daily, Disp: 90 tablet, Rfl: 3    Dulaglutide 0.75 MG/0.5ML SOPN, Inject 0.75 mg into the skin once a week, Disp: 12 pen, Rfl: 3    Handicap Placard MISC, by Does not apply route DX: COPD (J44.9), primary osteoarthritis involving multiple joints (M15.0), myasthenia gravis with exacerbation (G70.01)     EXPIRES: 05/2024, Disp: 2 each, Rfl: 0    azaTHIOprine (IMURAN) 50 MG tablet, Take 50 mg by mouth Take 2 tablets by mouth in the morning and 1 tablet in the evening., Disp: , Rfl:     Cyanocobalamin (VITAMIN B 12 PO), Take 1,000 mg by mouth, Disp: , Rfl:     predniSONE (DELTASONE) 10 MG tablet, Take 5 mg by mouth three times a week , Disp: , Rfl:     nitroGLYCERIN (NITROSTAT) 0.4 MG SL tablet, Place 1 tablet under the tongue every 5 minutes as needed for Chest pain, Disp: 25 tablet, Rfl: 0    sildenafil (VIAGRA) 100 MG tablet, TAKE ONE TABLET BY MOUTH AS NEEDED APPROXIMATELY ONE HOUR BEFORE SEXUAL ACTIVITY. DO NOT USE MORE THAN ONE DOSE DAILY, Disp: 6 tablet, Rfl: 3    Blood Glucose Monitoring Suppl CHENCHO, Test once daily. Dx: E11.29, Disp: 1 Device, Rfl: 0    Lancets MISC, Test once daily.  Dx: E11.29, Disp: 100 each, Rfl: 3    ascorbic acid (VITAMIN C) 500 MG tablet, Take 1,000 mg by mouth daily, Disp: , Rfl:     loratadine (CLARITIN) 10 MG tablet, Take 1 tablet by mouth daily, Disp: 30 tablet, Rfl: 5    acetaminophen (TYLENOL EX ST ARTHRITIS PAIN) 500 MG tablet, Take 1 tablet by mouth every 6 hours as needed for Pain, Disp: 120 tablet, Rfl: 3    CINNAMON PO, Take 500 mg by mouth 2 times daily. , Disp: , Rfl:     aspirin 81 MG EC tablet, Take 81 mg by mouth daily. , Disp: , Rfl:   Lab Results   Component Value Date     08/23/2021    K 4.3 08/23/2021     08/23/2021    CO2 29 08/23/2021    BUN 18 08/23/2021    CREATININE 1.03 08/23/2021    GLUCOSE 127 (H) 08/23/2021    CALCIUM 9.7 08/23/2021    PROT 6.8 05/24/2021    LABALBU 4.1 05/24/2021    BILITOT 0.5 05/24/2021    ALKPHOS 42 05/24/2021    AST 16 05/24/2021    ALT 15 05/24/2021    LABGLOM >60.0 08/23/2021    GFRAA >60.0 08/23/2021    GLOB 2.7 05/24/2021     Lab Results   Component Value Date    WBC 12.7 (H) 08/23/2021    HGB 14.3 08/23/2021    HCT 42.9 08/23/2021    MCV 98.9 08/23/2021     08/23/2021     Lab Results   Component Value Date    LABA1C 7.2 (H) 08/23/2021    LABA1C 6.4 05/17/2021    LABA1C 7.2 (H) 12/23/2020     Lab Results   Component Value Date    HDL 50 05/24/2021    HDL 42 12/23/2020    HDL 37 (L) 07/29/2020    LDLCALC 39 05/24/2021    LDLCALC 36 12/23/2020    LDLCALC 39 07/29/2020    CHOL 112 05/24/2021    CHOL 107 12/23/2020    CHOL 119 07/29/2020    TRIG 117 05/24/2021    TRIG 143 12/23/2020    TRIG 213 (H) 07/29/2020     Lab Results   Component Value Date    TESTM 418 03/31/2021    TESTM 674 09/16/2020    TESTM 886 (H) 05/22/2020     Lab Results   Component Value Date    TSH 0.599 07/25/2018    TSH 0.691 08/04/2016    TSH 0.756 06/21/2016    TSHREFLEX 1.150 05/24/2021    TSHREFLEX 0.957 11/25/2015    FT3 4.0 08/04/2016    T4FREE 1.32 08/04/2016     No results found for: TPOABS    Review of Systems   Cardiovascular: Negative. Endocrine: Negative. Genitourinary: Negative. All other systems reviewed and are negative. Objective:   Physical Exam  Vitals reviewed. Constitutional:       Appearance: Normal appearance. He is normal weight. HENT:      Head: Normocephalic and atraumatic. Hair is normal.      Right Ear: External ear normal.      Left Ear: External ear normal.      Nose: Nose normal.   Eyes:      General: No scleral icterus. Right eye: No discharge. Left eye: No discharge. Extraocular Movements: Extraocular movements intact. Conjunctiva/sclera: Conjunctivae normal.   Neck:      Trachea: Trachea normal.   Cardiovascular:      Rate and Rhythm: Normal rate. Pulmonary:      Effort: Pulmonary effort is normal.   Musculoskeletal:         General: Normal range of motion. Cervical back: Normal range of motion and neck supple. Neurological:      General: No focal deficit present. Mental Status: He is alert and oriented to person, place, and time.    Psychiatric:         Mood and Affect: Mood normal.         Behavior: Behavior normal.

## 2021-09-22 ENCOUNTER — NURSE ONLY (OUTPATIENT)
Dept: FAMILY MEDICINE CLINIC | Age: 76
End: 2021-09-22
Payer: MEDICARE

## 2021-09-22 DIAGNOSIS — E29.1 HYPOGONADISM IN MALE: Primary | ICD-10-CM

## 2021-09-22 PROCEDURE — 96372 THER/PROPH/DIAG INJ SC/IM: CPT | Performed by: FAMILY MEDICINE

## 2021-09-22 RX ORDER — TESTOSTERONE CYPIONATE 200 MG/ML
200 INJECTION INTRAMUSCULAR ONCE
Status: COMPLETED | OUTPATIENT
Start: 2021-09-22 | End: 2021-09-22

## 2021-09-22 RX ADMIN — TESTOSTERONE CYPIONATE 200 MG: 200 INJECTION INTRAMUSCULAR at 09:32

## 2021-09-22 NOTE — PROGRESS NOTES
Patient given Testosterone 200mg IM right gluteal..  Will return in 2 weeks for next injection    Patient tolerated injection well.     Administrations This Visit     testosterone cypionate (DEPOTESTOTERONE CYPIONATE) injection 200 mg     Admin Date  09/22/2021 Action  Given Dose  200 mg Route  IntraMUSCular Administered By  San Hodgkins, LPN

## 2021-09-25 ENCOUNTER — CLINICAL DOCUMENTATION (OUTPATIENT)
Dept: SPIRITUAL SERVICES | Age: 76
End: 2021-09-25

## 2021-09-25 NOTE — PROGRESS NOTES
Advance Care Planning   Ambulatory ACP Specialist Patient Outreach    Date:  9/25/2021  ACP Specialist:  Mary Jo Rosado    Outreach call to patient in follow-up to ACP Specialist referral from: Negra Hurd MD    [] PCP  [] Provider   [] Ambulatory Care Management [x] Other for Reason:    [x] Advance Directive Assistance  [] Code Status Discussion  [] Complete Portable DNR Order  [] Discuss Goals of Care  [] Complete POST/MOST  [] Early ACP Decision-Making  [] Other    Date Referral Received:8/21/21    Today's Outreach:  [x] First   [] Second  [] Third                               Third outreach made by []  phone  [] email []   [employer]     Intervention:  [] Spoke with Patient  [] Left VM requesting return call      Outcome:Emailed AD forms to the patient. Next Step:   [] ACP scheduled conversation  [x] Outreach again in one week               [] Email / Mail ACP Info Sheets  [] Email / Mail Advance Directive            [] Close Referral. Routing closure to referring provider/staff and to ACP Specialist .      Thank you for this referral.

## 2021-10-06 ENCOUNTER — NURSE ONLY (OUTPATIENT)
Dept: FAMILY MEDICINE CLINIC | Age: 76
End: 2021-10-06
Payer: MEDICARE

## 2021-10-06 DIAGNOSIS — E29.1 HYPOGONADISM IN MALE: Primary | ICD-10-CM

## 2021-10-06 PROCEDURE — 96372 THER/PROPH/DIAG INJ SC/IM: CPT | Performed by: FAMILY MEDICINE

## 2021-10-06 RX ORDER — TESTOSTERONE CYPIONATE 200 MG/ML
200 INJECTION INTRAMUSCULAR ONCE
Status: COMPLETED | OUTPATIENT
Start: 2021-10-06 | End: 2021-10-06

## 2021-10-06 RX ADMIN — TESTOSTERONE CYPIONATE 200 MG: 200 INJECTION INTRAMUSCULAR at 09:51

## 2021-10-06 NOTE — PROGRESS NOTES
Patient given Testosterone 200mg IM left gluteal..  Will return in 2 weeks for next injection    Patient tolerated injection well.     Administrations This Visit     testosterone cypionate (DEPOTESTOTERONE CYPIONATE) injection 200 mg     Admin Date  10/06/2021 Action  Given Dose  200 mg Route  IntraMUSCular Administered By  Hayder Laguna LPN

## 2021-10-21 ENCOUNTER — NURSE ONLY (OUTPATIENT)
Dept: FAMILY MEDICINE CLINIC | Age: 76
End: 2021-10-21
Payer: MEDICARE

## 2021-10-21 DIAGNOSIS — E29.1 HYPOGONADISM IN MALE: Primary | ICD-10-CM

## 2021-10-21 PROCEDURE — 96372 THER/PROPH/DIAG INJ SC/IM: CPT | Performed by: INTERNAL MEDICINE

## 2021-10-21 RX ORDER — TESTOSTERONE CYPIONATE 200 MG/ML
200 INJECTION INTRAMUSCULAR ONCE
Status: COMPLETED | OUTPATIENT
Start: 2021-10-21 | End: 2021-10-21

## 2021-10-21 RX ADMIN — TESTOSTERONE CYPIONATE 200 MG: 200 INJECTION INTRAMUSCULAR at 09:55

## 2021-10-21 NOTE — PROGRESS NOTES
Patient given Testosterone 200mg IM right gluteal..  Will return in 2 weeks for next injection    Patient tolerated injection well.     Administrations This Visit     testosterone cypionate (DEPOTESTOTERONE CYPIONATE) injection 200 mg     Admin Date  10/21/2021 Action  Given Dose  200 mg Route  IntraMUSCular Administered By  Swapna Seirra LPN

## 2021-11-03 ENCOUNTER — NURSE ONLY (OUTPATIENT)
Dept: FAMILY MEDICINE CLINIC | Age: 76
End: 2021-11-03
Payer: MEDICARE

## 2021-11-03 DIAGNOSIS — E29.1 HYPOGONADISM IN MALE: Primary | ICD-10-CM

## 2021-11-03 DIAGNOSIS — Z23 NEED FOR IMMUNIZATION AGAINST INFLUENZA: ICD-10-CM

## 2021-11-03 PROCEDURE — 96372 THER/PROPH/DIAG INJ SC/IM: CPT | Performed by: FAMILY MEDICINE

## 2021-11-03 PROCEDURE — 90694 VACC AIIV4 NO PRSRV 0.5ML IM: CPT | Performed by: FAMILY MEDICINE

## 2021-11-03 PROCEDURE — G0008 ADMIN INFLUENZA VIRUS VAC: HCPCS | Performed by: FAMILY MEDICINE

## 2021-11-03 RX ORDER — TESTOSTERONE CYPIONATE 200 MG/ML
200 INJECTION INTRAMUSCULAR ONCE
Status: COMPLETED | OUTPATIENT
Start: 2021-11-03 | End: 2021-11-03

## 2021-11-03 RX ADMIN — TESTOSTERONE CYPIONATE 200 MG: 200 INJECTION INTRAMUSCULAR at 09:55

## 2021-11-03 NOTE — PROGRESS NOTES
Patient given Testosterone 200mg IM left gluteal..  Will return in 2 weeks for next injection  Patient tolerated injection well. Influenza vaccine administered to pt.    Administrations This Visit     testosterone cypionate (DEPOTESTOTERONE CYPIONATE) injection 200 mg     Admin Date  11/03/2021 Action  Given Dose  200 mg Route  IntraMUSCular Administered By  Rochelle Jama LPN

## 2021-11-05 ENCOUNTER — HOSPITAL ENCOUNTER (OUTPATIENT)
Dept: LAB | Age: 76
Discharge: HOME | End: 2021-11-05
Payer: MEDICARE

## 2021-11-05 DIAGNOSIS — D72.829 LEUKOCYTOSIS, UNSPECIFIED TYPE: ICD-10-CM

## 2021-11-05 DIAGNOSIS — R80.9 TYPE 2 DIABETES MELLITUS WITH MICROALBUMINURIA, WITHOUT LONG-TERM CURRENT USE OF INSULIN (HCC): ICD-10-CM

## 2021-11-05 DIAGNOSIS — E11.29 TYPE 2 DIABETES MELLITUS WITH MICROALBUMINURIA, WITHOUT LONG-TERM CURRENT USE OF INSULIN (HCC): ICD-10-CM

## 2021-11-05 LAB
BASOPHILS ABSOLUTE: 0 K/UL (ref 0–0.2)
BASOPHILS RELATIVE PERCENT: 0.3 %
EOSINOPHILS ABSOLUTE: 0.1 K/UL (ref 0–0.7)
EOSINOPHILS RELATIVE PERCENT: 0.7 %
HBA1C MFR BLD: 6.8 % (ref 4.8–5.9)
HCT VFR BLD CALC: 45 % (ref 42–52)
HEMOGLOBIN: 15.2 G/DL (ref 14–18)
LYMPHOCYTES ABSOLUTE: 0.5 K/UL (ref 1–4.8)
LYMPHOCYTES RELATIVE PERCENT: 5.1 %
MCH RBC QN AUTO: 33.9 PG (ref 27–31.3)
MCHC RBC AUTO-ENTMCNC: 33.8 % (ref 33–37)
MCV RBC AUTO: 100.4 FL (ref 80–100)
MONOCYTES ABSOLUTE: 0.5 K/UL (ref 0.2–0.8)
MONOCYTES RELATIVE PERCENT: 4.9 %
NEUTROPHILS ABSOLUTE: 8.9 K/UL (ref 1.4–6.5)
NEUTROPHILS RELATIVE PERCENT: 89 %
PDW BLD-RTO: 15.1 % (ref 11.5–14.5)
PLATELET # BLD: 175 K/UL (ref 130–400)
RBC # BLD: 4.48 M/UL (ref 4.7–6.1)
WBC # BLD: 10 K/UL (ref 4.8–10.8)

## 2021-11-05 PROCEDURE — 83036 HEMOGLOBIN GLYCOSYLATED A1C: CPT

## 2021-11-05 PROCEDURE — 36415 COLL VENOUS BLD VENIPUNCTURE: CPT

## 2021-11-05 PROCEDURE — 85025 COMPLETE CBC W/AUTO DIFF WBC: CPT

## 2021-11-17 ENCOUNTER — NURSE ONLY (OUTPATIENT)
Dept: FAMILY MEDICINE CLINIC | Age: 76
End: 2021-11-17
Payer: MEDICARE

## 2021-11-17 DIAGNOSIS — E29.1 HYPOGONADISM IN MALE: Primary | ICD-10-CM

## 2021-11-17 PROCEDURE — 96372 THER/PROPH/DIAG INJ SC/IM: CPT | Performed by: FAMILY MEDICINE

## 2021-11-17 RX ORDER — TESTOSTERONE CYPIONATE 200 MG/ML
200 INJECTION INTRAMUSCULAR ONCE
Status: COMPLETED | OUTPATIENT
Start: 2021-11-17 | End: 2021-11-17

## 2021-11-17 RX ADMIN — TESTOSTERONE CYPIONATE 200 MG: 200 INJECTION INTRAMUSCULAR at 09:39

## 2021-11-17 NOTE — PROGRESS NOTES
Patient given Testosterone 200mg IM right gluteal..  Will return in 2 weeks for next injection    Patient tolerated injection well.     Administrations This Visit     testosterone cypionate (DEPOTESTOTERONE CYPIONATE) injection 200 mg     Admin Date  11/17/2021 Action  Given Dose  200 mg Route  IntraMUSCular Administered By  Octavio Delgadillo LPN

## 2021-11-19 ENCOUNTER — CARE COORDINATION (OUTPATIENT)
Dept: CARE COORDINATION | Age: 76
End: 2021-11-19

## 2021-11-19 NOTE — LETTER
Ethelene Bumpers  [address] [address]      Dear Lubna Brewer,    I hope this letter finds you doing well! I am sending you Patient Assistance Program Application/[employer]. I hope you find the information helpful. Please look them over and let me know if you have any questions or need further assistance. You can contact me at any of the numbers listed below. Sincerely,    IVONNE Link  , [facility]  Cell Phone: [phone]  Email: [email or web address]

## 2021-11-19 NOTE — CARE COORDINATION
Telephone call with Pat/spouse. She indicated that patient is in need for re enrollment for patient assistance program for Trriki/Dr. Libertad Rainey. Discussed plan to send out patient's portion of form for completion and return to this writer. Also discussed sending Dr. Libertad Rainey his portion of form for completion.

## 2021-11-22 ENCOUNTER — CARE COORDINATION (OUTPATIENT)
Dept: CARE COORDINATION | Age: 76
End: 2021-11-22

## 2021-11-22 NOTE — CARE COORDINATION
Telephone call with spouse/Jennifer. She indicated that patient is in need of assistance with Januvia re enrollment for 2022. Discussed plan to send patient out the form and get physician portion of re enrollment to Dr. Huma Jiménez.

## 2021-11-22 NOTE — LETTER
Jenae Calix  [address] [address]      Dear Nazario Jack,    I hope this letter finds you doing well! I am sending you resource information on [employer] Patient Assistance Application/Januvia  I hope you find the information helpful. Please look them over and let me know if you have any questions or need further assistance. You can contact me at any of the numbers listed below. Sincerely,    IVONNE Ramirez  , [facility]  Cell Phone: [phone]  Email: [email or web address]

## 2021-11-22 NOTE — CARE COORDINATION
Telephone call to Dr. Robbie South. Left message for Medical Assistant return this writer's phone call regarding faxes sent. Provided call back number.

## 2021-11-22 NOTE — CARE COORDINATION
Faxed physician portion of [employer] [facility]. Received confirmation that fax was sent successfully.

## 2021-11-22 NOTE — LETTER
Dewey Hammonds  [address] [facility] [address]      Dear Rylee Rodriguez,    I hope this letter finds you doing well! I am sending you resource information  [employer]/Patient Assistance Program Application for Januvia. I hope you find the information helpful. Please look them over and let me know if you have any questions or need further assistance. You can contact me at any of the numbers listed below. Sincerely,    IVONNE Farooq  , [facility]  Cell Phone: [phone]  Email: [email or web address]    Dewey Hammonds  [address] [address] [place] [address]      Dear Rylee Rodriguez,    I hope this letter finds you doing well! I am sending you resource information on [employer] Patient Assistance Program Application/[employer]. I hope you find the information helpful. Please look them over and let me know if you have any questions or need further assistance. You can contact me at any of the numbers listed below. Sincerely,    IVONNE Farooq  , [facility]  Cell Phone: [phone]  Email: [email or web address]

## 2021-11-22 NOTE — CARE COORDINATION
Faxed physician portion of [facility] Patient Assistance Program/Melany to Dr. Alexander Murdock. Received confirmation that fax was sent successfully.

## 2021-11-29 ENCOUNTER — CARE COORDINATION (OUTPATIENT)
Dept: CARE COORDINATION | Age: 76
End: 2021-11-29

## 2021-11-29 NOTE — CARE COORDINATION
Faxed [facility]/[employer] Patient Assistance Form  Back to Dr. Sohan Jack. for another signature on form that was missed. Silviano Elmore

## 2021-11-29 NOTE — CARE COORDINATION
Telephone call to Dr. Judy Hein Office/Pamela. Explained that this writer refaxed [employer] Per[employer] Form/[employer] for another signature for Dr. Judy Hein. Jeremy Osman confirmed that she did receive this fax, Also explained that this writer also faxed [employer] form to their office. Jeremy Osman is asking that this writer refax  [employer] form to them and be put to Ann's attention.

## 2021-11-30 ENCOUNTER — CLINICAL DOCUMENTATION (OUTPATIENT)
Dept: SPIRITUAL SERVICES | Age: 76
End: 2021-11-30

## 2021-11-30 ENCOUNTER — CARE COORDINATION (OUTPATIENT)
Dept: CARE COORDINATION | Age: 76
End: 2021-11-30

## 2021-11-30 NOTE — PROGRESS NOTES
Advance Care Planning   Ambulatory ACP Specialist Patient Outreach    Date:  11/30/2021  ACP Specialist:  Holden Guzman    Outreach call to patient in follow-up to ACP Specialist referral from: Alec Clement MD    [x] PCP  [] Provider   [] Ambulatory Care Management [] Other for Reason:    [x] Advance Directive Assistance  [] Code Status Discussion  [] Complete Portable DNR Order  [] Discuss Goals of Care  [] Complete POST/MOST  [] Early ACP Decision-Making  [] Other    Date Referral Received:9/24/21    Today's Outreach:  [] First   [x] Second  [] Third                               Third outreach made by []  phone  [] email []   [facility]     Intervention:  [x] Spoke with Patient  [] Left VM requesting return call      Outcome: I spoke with patient's wife. They completed their AD through their . I instructed them to take a completed copy to their PCP for scanning into Epic. Next Step:   [] ACP scheduled conversation  [] Outreach again in one week               [] Email / Mail ACP Info Sheets  [] Email / Mail Advance Directive            [x] Close Referral. Routing closure to referring provider/staff and to ACP Specialist .      Thank you for this referral.

## 2021-11-30 NOTE — CARE COORDINATION
Faxed Dr. Neymar Swanson/ [address] [place] [address] Patient Assistance Program form. Received confirmation that fax was sent successfully.

## 2021-12-01 ENCOUNTER — NURSE ONLY (OUTPATIENT)
Dept: FAMILY MEDICINE CLINIC | Age: 76
End: 2021-12-01
Payer: MEDICARE

## 2021-12-01 DIAGNOSIS — E29.1 HYPOGONADISM IN MALE: Primary | ICD-10-CM

## 2021-12-01 PROCEDURE — 96372 THER/PROPH/DIAG INJ SC/IM: CPT | Performed by: FAMILY MEDICINE

## 2021-12-01 RX ORDER — TESTOSTERONE CYPIONATE 200 MG/ML
200 INJECTION INTRAMUSCULAR ONCE
Status: COMPLETED | OUTPATIENT
Start: 2021-12-01 | End: 2021-12-01

## 2021-12-01 RX ADMIN — TESTOSTERONE CYPIONATE 200 MG: 200 INJECTION INTRAMUSCULAR at 10:05

## 2021-12-01 NOTE — PROGRESS NOTES
Patient given Testosterone 200mg IM left gluteal..  Will return in 2 weeks for next injection    Patient tolerated injection well.     Administrations This Visit     testosterone cypionate (DEPOTESTOTERONE CYPIONATE) injection 200 mg     Admin Date  12/01/2021 Action  Given Dose  200 mg Route  IntraMUSCular Administered By  Rochelle Jama LPN

## 2021-12-03 ENCOUNTER — CARE COORDINATION (OUTPATIENT)
Dept: CARE COORDINATION | Age: 76
End: 2021-12-03

## 2021-12-03 NOTE — CARE COORDINATION
Received completed fax back from Kely Lang, [facility].

## 2021-12-06 ENCOUNTER — CARE COORDINATION (OUTPATIENT)
Dept: CARE COORDINATION | Age: 76
End: 2021-12-06

## 2021-12-06 NOTE — CARE COORDINATION
Telephone call with Jennifer/spouse. She indicated that she completed patient forms for patient assistance program for Januvia/[employer] and Trulicity/[employer]. She stated that when she was at the hospital she has them sent to this writer inter office mail.

## 2021-12-08 ENCOUNTER — CARE COORDINATION (OUTPATIENT)
Dept: CARE COORDINATION | Age: 76
End: 2021-12-08

## 2021-12-08 DIAGNOSIS — E11.29 TYPE 2 DIABETES MELLITUS WITH MICROALBUMINURIA, WITHOUT LONG-TERM CURRENT USE OF INSULIN (HCC): Chronic | ICD-10-CM

## 2021-12-08 DIAGNOSIS — R80.9 TYPE 2 DIABETES MELLITUS WITH MICROALBUMINURIA, WITHOUT LONG-TERM CURRENT USE OF INSULIN (HCC): Chronic | ICD-10-CM

## 2021-12-08 NOTE — TELEPHONE ENCOUNTER
Patient needs short term supply to local pharmacy.   A second request will be sent for year supply through [facility]

## 2021-12-08 NOTE — CARE COORDINATION
Telephone call to patient. Explained conversation with Mayra and Dr. Modesta Dahl. Patient explained that he only has two days left of Januvia. Discussed plan to reach out to Dr. Sacha Del Cid to request Januvia prescription be sent to [facility].

## 2021-12-08 NOTE — CARE COORDINATION
Telephone call to Dr. Pam Leung. Discussed that patient is in need of prescription (short-term)/Januvia be sent to [facility].

## 2021-12-08 NOTE — CARE COORDINATION
Telephone call to Dr. Jacquelyn Slaughter. Request prescription for Januvia/Merck be faxed to ([phone]).

## 2021-12-08 NOTE — TELEPHONE ENCOUNTER
Alonso Schroeder called back, states that she spoke with patient again and he has 2 days of medication left. He needs a short supply sent to [facility], and still needs a new prescription faxed to [facility]. After looking through chart and speaking with Vasile Wooten advised Alonso Schroeder there was a prescription that was faxed to her 21. Alonso Schroeder stated she is working on this to be approved for next year. She states patient still needs a new prescription for right now. He had one refill left on it, but it was . Please advise.

## 2021-12-08 NOTE — TELEPHONE ENCOUNTER
Deshawn Judge,  from [facility] calling on behalf of patient. She states that he is completely out of Januvia, which he gets through [facility]. Deshawn Judge called them and was told that his prescription is over a year old and he needs a new prescription. He needs this faxed to [facility] at [phone]. Patient states that he had requested this before, and [employer] had tried to reach the office, but they haven't heard anything. Please advise.

## 2021-12-08 NOTE — CARE COORDINATION
Telephone call with [employer]. Representative indicated that patient did have one refill left on prescription for Januvia but could not fill as the prescription was older than 365 days. Representative stated that they did reach out to PCP on 11/23/2021 but have not heard anything. Representative ask that prescription be sent by escript or faxed to them at ([phone]). Milan Byrd

## 2021-12-08 NOTE — CARE COORDINATION
Telephone call with Jennifer/spouse. Discussed was able to reach Dr. Dilip Vazquez office regard Suma Hawley script ([id number]) be sent to [facility] in [place].

## 2021-12-08 NOTE — CARE COORDINATION
Telephone call with patient. He indicated that he called Mayra/Januvia two weeks ago and left message to refill his medication and has not heard anything. Discussed plan to reach out to Mayra  ([phone]) Discussed that this [address] did speak to  at [facility] [place] and they have located an inter office mail envelope and will be sending it to my office.

## 2021-12-08 NOTE — CARE COORDINATION
Telephone call with Mari/Dr. Rian Chandra. Clarified with Orquidea George that prescription requests today are for patient to get refill now . Explained that previous [employer] form that Dr. Rian Chandra did was for next years enrollment.

## 2021-12-09 ENCOUNTER — CARE COORDINATION (OUTPATIENT)
Dept: CARE COORDINATION | Age: 76
End: 2021-12-09

## 2021-12-09 NOTE — CARE COORDINATION
Received patient portion of completed [employer] Patient Assistance Program/Januvia.   Mailed completed application to [employer].

## 2021-12-09 NOTE — CARE COORDINATION
Received patient portion of completed form for [facility]. Faxed completed application to [facility]. Received confirmation that fax was sent successfully.

## 2021-12-15 ENCOUNTER — CARE COORDINATION (OUTPATIENT)
Dept: CARE COORDINATION | Age: 76
End: 2021-12-15

## 2021-12-15 ENCOUNTER — NURSE ONLY (OUTPATIENT)
Dept: FAMILY MEDICINE CLINIC | Age: 76
End: 2021-12-15
Payer: MEDICARE

## 2021-12-15 DIAGNOSIS — E29.1 HYPOGONADISM IN MALE: Primary | ICD-10-CM

## 2021-12-15 DIAGNOSIS — E11.29 TYPE 2 DIABETES MELLITUS WITH MICROALBUMINURIA, WITHOUT LONG-TERM CURRENT USE OF INSULIN (HCC): Chronic | ICD-10-CM

## 2021-12-15 DIAGNOSIS — R80.9 TYPE 2 DIABETES MELLITUS WITH MICROALBUMINURIA, WITHOUT LONG-TERM CURRENT USE OF INSULIN (HCC): Chronic | ICD-10-CM

## 2021-12-15 PROCEDURE — 96372 THER/PROPH/DIAG INJ SC/IM: CPT | Performed by: FAMILY MEDICINE

## 2021-12-15 RX ORDER — TESTOSTERONE CYPIONATE 200 MG/ML
200 INJECTION INTRAMUSCULAR ONCE
Status: COMPLETED | OUTPATIENT
Start: 2021-12-15 | End: 2021-12-15

## 2021-12-15 RX ADMIN — TESTOSTERONE CYPIONATE 200 MG: 200 INJECTION INTRAMUSCULAR at 09:46

## 2021-12-15 NOTE — CARE COORDINATION
Telephone call with Mari/Dr. Adrian Rivas. Requested that prescription for Trulicity  be sent to [facility] by fax number provided. Explained that patient has about a weeks worth of medication left. Jose David Damon indicated Dr. Adrian Rivas is only a half day today and will get him the message tomorrow morning.

## 2021-12-15 NOTE — TELEPHONE ENCOUNTER
I do not know how to fax a prescription to a specific number through Epic. Short course refill has been pended in the chart, ok to send where needed.

## 2021-12-15 NOTE — CARE COORDINATION
Telephone call with Pat/spouse. Relayed that patient was out of refills for Trulcity for this year. Discussed that Dr. Shelton Roman has been contacted and asked to send prescription to [employer].

## 2021-12-15 NOTE — CARE COORDINATION
Telephone call to [facility]. Representative indicated has been approved 1/1/2022-12/31/2022. Representative indicated that they will be sending out medication by the end of the year. They will be expediting medication being sent out.

## 2021-12-15 NOTE — CARE COORDINATION
Telephone call with patient. Reviewed with patient received information and faxed and sent patient assistance program applications for [employer] and [employer]/Januvia. Patient indicated that he is need of Trulicity. He stated that he usually gets his Truclicity sent automatically and has not received. Patient indicated that he has about a weeks worth of medication. Discussed plan to reach out to [employer]. He did indicated that he did receive a 90 day supply of his Januvia.

## 2021-12-15 NOTE — TELEPHONE ENCOUNTER
Cassandra Redd Coordinator with [facility] calling on behalf of patient. Patient will run out of his trulicity in about a week. He gets this through [facility] and needs a new prescription faxed to them @ [phone]. She is working on his application for next year, but he needs a refill to hold him over for the rest of the month. Patient needs this faxed asap, as he will be out of medication in about a week, and they have to mail the medication to him. Please advise.

## 2021-12-15 NOTE — CARE COORDINATION
Telephone call [facility]. Representative indicated that patient did not have any refills on Truilcity. Representative asked that prescription be faxed to them at [phone]).

## 2021-12-15 NOTE — CARE COORDINATION
Telephone call with [employer]/Patient Assistance Program/Januvia. They do not have new application yet. . Patient presently is approved until 3/2022. If they receive new application it will cancel out this 3/2022 date and start a new date.

## 2021-12-15 NOTE — PROGRESS NOTES
Patient given Testosterone 200mg IM right gluteal..  Will return in 2 weeks for next injection    Patient tolerated injection well.     Administrations This Visit     testosterone cypionate (DEPOTESTOTERONE CYPIONATE) injection 200 mg     Admin Date  12/15/2021 Action  Given Dose  200 mg Route  IntraMUSCular Administered By  Glen Anthony LPN

## 2021-12-16 ENCOUNTER — CARE COORDINATION (OUTPATIENT)
Dept: CARE COORDINATION | Age: 76
End: 2021-12-16

## 2021-12-16 NOTE — TELEPHONE ENCOUNTER
Prescription faxed and confirmation received. I tried calling Ijeoma Washington to let her know, no answer. I also left a voicemail for patient, letting him know prescription was faxed this morning.

## 2021-12-16 NOTE — CARE COORDINATION
Telephone call with patient. Provided update on [employer]/Januvia and [employer] Patient [employer].

## 2021-12-20 NOTE — TELEPHONE ENCOUNTER
Called and spoke with Herbert Bah to inform her that the requested forms have been faxed over to her, the forms are also located in media. Shelly SANZ
[FreeTextEntry1] : 1. Plan 2-week event monitor to fully evaluate his PVC burden and assess a percentage.  May ultimately consider ablation or possibly just increasing his metoprolol again.\par 2. Continue current cardiac meds in doses as noted above for cardiomyopathy, diabetes, dyslipidemia and hypertension.\par 3. Monitor BP at home, keep a log and bring to f/u.\par 4. He will follow-up with his PCP for treatment of his diabetes. \par 5. Patient encouraged to work on diet to lose weight.\par 6. Patient encouraged to work on a healthy diet high in lean protein, whole grains and vegetables, and lower in white flour and simple sugars.  \par 7. Patient is encouraged to exercise at least 30 minutes a day everyday of the week.\par 8. There is no plan for testosterone placement therapy at this time.\par 9. Follow up here in three months.

## 2021-12-21 ENCOUNTER — CARE COORDINATION (OUTPATIENT)
Dept: CARE COORDINATION | Age: 76
End: 2021-12-21

## 2021-12-21 NOTE — CARE COORDINATION
Telephone call to [facility] Patient Assistance Program/Januvia. Representative indicated they have not received a new application for patient. She indicated their offices would be closing on 12/23/2021 at noon and not returning until 12/28/2021. Representative did confirm that patient is approved for program until 3/2022. They confirmed that they sent out this medication in December.

## 2021-12-22 ENCOUNTER — CARE COORDINATION (OUTPATIENT)
Dept: CARE COORDINATION | Age: 76
End: 2021-12-22

## 2021-12-22 NOTE — CARE COORDINATION
Telephone call to [facility]. Representative indicated that they can have medication delivered on 12/29/2021.

## 2021-12-22 NOTE — CARE COORDINATION
Telephone call with patient. Discussed that [employer] has not received application yet. Patient indicated that he has not received his Trulicity/[employer] Cares yet. He has one injection left and will need medication next week. Discussed plan to reach out to [employer].

## 2021-12-28 ENCOUNTER — CARE COORDINATION (OUTPATIENT)
Dept: CARE COORDINATION | Age: 76
End: 2021-12-28

## 2021-12-28 NOTE — CARE COORDINATION
Telephone call to [employer]. Trulicity. Representative did confirm that patient's Trulicity is to be delivered on 12/29/2021.

## 2021-12-29 ENCOUNTER — NURSE ONLY (OUTPATIENT)
Dept: FAMILY MEDICINE CLINIC | Age: 76
End: 2021-12-29
Payer: MEDICARE

## 2021-12-29 DIAGNOSIS — E29.1 HYPOGONADISM IN MALE: Primary | ICD-10-CM

## 2021-12-29 PROCEDURE — 96372 THER/PROPH/DIAG INJ SC/IM: CPT | Performed by: FAMILY MEDICINE

## 2021-12-29 RX ORDER — TESTOSTERONE CYPIONATE 200 MG/ML
200 INJECTION INTRAMUSCULAR ONCE
Status: COMPLETED | OUTPATIENT
Start: 2021-12-29 | End: 2021-12-29

## 2021-12-29 RX ADMIN — TESTOSTERONE CYPIONATE 200 MG: 200 INJECTION INTRAMUSCULAR at 09:56

## 2021-12-29 NOTE — PROGRESS NOTES
Patient given Testosterone 200mg IM left gluteal..  Will return in 2 weeks for next injection    Patient tolerated injection well.     Administrations This Visit     testosterone cypionate (DEPOTESTOTERONE CYPIONATE) injection 200 mg     Admin Date  12/29/2021 Action  Given Dose  200 mg Route  IntraMUSCular Administered By  Ani Soto LPN

## 2022-01-03 ENCOUNTER — CARE COORDINATION (OUTPATIENT)
Dept: CARE COORDINATION | Age: 77
End: 2022-01-03

## 2022-01-07 ENCOUNTER — CARE COORDINATION (OUTPATIENT)
Dept: CARE COORDINATION | Age: 77
End: 2022-01-07

## 2022-01-07 NOTE — CARE COORDINATION
Received voicemail message from patient indicating that he has not received attestation form from [employer]/Januvia. Telephone call to [facility]. They indicated that they sent out attestation form on 12/23/2021 and typically it takes 7 to 10 business days. Requested that another attestation form be sent to patient.

## 2022-01-10 ENCOUNTER — CARE COORDINATION (OUTPATIENT)
Dept: CARE COORDINATION | Age: 77
End: 2022-01-10

## 2022-01-10 NOTE — CARE COORDINATION
Telephone call with spouse. She indication that they did receive [employer] for [employer]. They signed ait and sent it back.

## 2022-01-12 ENCOUNTER — NURSE ONLY (OUTPATIENT)
Dept: FAMILY MEDICINE CLINIC | Age: 77
End: 2022-01-12
Payer: MEDICARE

## 2022-01-12 DIAGNOSIS — E29.1 HYPOGONADISM IN MALE: Primary | ICD-10-CM

## 2022-01-12 PROCEDURE — 96372 THER/PROPH/DIAG INJ SC/IM: CPT | Performed by: INTERNAL MEDICINE

## 2022-01-12 RX ORDER — TESTOSTERONE CYPIONATE 200 MG/ML
200 INJECTION INTRAMUSCULAR ONCE
Status: COMPLETED | OUTPATIENT
Start: 2022-01-12 | End: 2022-01-12

## 2022-01-12 RX ADMIN — TESTOSTERONE CYPIONATE 200 MG: 200 INJECTION INTRAMUSCULAR at 09:41

## 2022-01-12 NOTE — PROGRESS NOTES
Patient given Testosterone 200mg IM right gluteal..  Will return in 2 weeks for next injection    Patient tolerated injection well.     Administrations This Visit     testosterone cypionate (DEPOTESTOTERONE CYPIONATE) injection 200 mg     Admin Date  01/12/2022 Action  Given Dose  200 mg Route  IntraMUSCular Administered By  Edd Faust LPN

## 2022-01-18 ENCOUNTER — CARE COORDINATION (OUTPATIENT)
Dept: CARE COORDINATION | Age: 77
End: 2022-01-18

## 2022-01-18 DIAGNOSIS — E11.29 TYPE 2 DIABETES MELLITUS WITH MICROALBUMINURIA, WITHOUT LONG-TERM CURRENT USE OF INSULIN (HCC): Chronic | ICD-10-CM

## 2022-01-18 DIAGNOSIS — R80.9 TYPE 2 DIABETES MELLITUS WITH MICROALBUMINURIA, WITHOUT LONG-TERM CURRENT USE OF INSULIN (HCC): Chronic | ICD-10-CM

## 2022-01-18 NOTE — TELEPHONE ENCOUNTER
Edenilson Calderon () requesting medication refill. Please approve or deny this request.  ZAMZAM Spencer FAXED TO Rodrigo Wheeler [phone] ASAP. PATIENT ONLY HAS ONE INJECTION LEFT.    Rx requested:  Requested Prescriptions     Pending Prescriptions Disp Refills    Dulaglutide 0.75 MG/0.5ML SOPN 4 pen 0     Sig: Inject 0.75 mg into the skin once a week         Last Office Visit:   8/23/2021      Next Visit Date:  Future Appointments   Date Time Provider Kay Bartlett   1/25/2022  9:00 AM MD [facility] [address]   1/26/2022  9:20 AM SCHEDULE, GERBER COBB PCP [facility]   2/9/2022  9:20 AM SCHEDULE, MLTESSA MURRAY AMHERST PCP TESTOSTERONE MLOX [facility] Saad   3/16/2022 10:30 AM Harmeet Cooper MD [facility]

## 2022-01-18 NOTE — CARE COORDINATION
Telephone call to [facility] Patient Assistance Program/Januvia. Representative indicated they have not received the attestation form back yet from patient.

## 2022-01-18 NOTE — CARE COORDINATION
Telephone call to patient. Left voicemail message of phone calls with [facility] and Dr. Carlos Chavez.

## 2022-01-18 NOTE — CARE COORDINATION
Telephone call to [facility]. Representative indicated they last prescription they go on was for only 30 days and no refills. They would need a new 90 day prescription for Trulicity be faxed to them at ([phone]).

## 2022-01-18 NOTE — CARE COORDINATION
Telephone call to Dr. Madison Rodriguez. Requested 90 day supply and refills for Trulcity be faxed to ([phone]). Representative indicated Dr. Marjorie Burnett is out till March but will have physician covering complete this prescription and fax to [facility]. No

## 2022-01-25 ENCOUNTER — TELEPHONE (OUTPATIENT)
Dept: FAMILY MEDICINE CLINIC | Age: 77
End: 2022-01-25

## 2022-01-25 ENCOUNTER — CARE COORDINATION (OUTPATIENT)
Dept: CARE COORDINATION | Age: 77
End: 2022-01-25

## 2022-01-25 DIAGNOSIS — E11.29 TYPE 2 DIABETES MELLITUS WITH MICROALBUMINURIA, WITHOUT LONG-TERM CURRENT USE OF INSULIN (HCC): Chronic | ICD-10-CM

## 2022-01-25 DIAGNOSIS — R80.9 TYPE 2 DIABETES MELLITUS WITH MICROALBUMINURIA, WITHOUT LONG-TERM CURRENT USE OF INSULIN (HCC): Chronic | ICD-10-CM

## 2022-01-25 NOTE — TELEPHONE ENCOUNTER
Shaquille Kwok with [facility] called to request refills on trulicity. Patient is currently receiving medication through the [facility] program [facility]. The prescription needs to be written for 1 year which should include 4 refills. Please fax it to [facility] [phone].

## 2022-01-25 NOTE — CARE COORDINATION
Telephone call to [employer]. Representative indicated that patient's Trulcity was shipped today and he should be receiving tomorrow. Representative that prescription they received does not have refills. They indicated that they will need a new prescription and faxed to them at [facility].

## 2022-01-25 NOTE — CARE COORDINATION
Telephone call to Danish/Dr.r. Prado. Asked that a a prescription with refills for a year /Trulicity be faxed to [employer] at [phone].

## 2022-01-25 NOTE — CARE COORDINATION
Telephone call to patient. Relayed that he should be receiving his Trulcity tomorrow per [employer]. Remington Anderson He is in need of his medication tomorrow. Also, relayed that a phone call was made to Ayush Reagan office and asked that a years prescription with refills be faxed to [facility].

## 2022-01-26 ENCOUNTER — NURSE ONLY (OUTPATIENT)
Dept: FAMILY MEDICINE CLINIC | Age: 77
End: 2022-01-26
Payer: MEDICARE

## 2022-01-26 DIAGNOSIS — E29.1 HYPOGONADISM IN MALE: Primary | ICD-10-CM

## 2022-01-26 PROCEDURE — 96372 THER/PROPH/DIAG INJ SC/IM: CPT | Performed by: FAMILY MEDICINE

## 2022-01-26 RX ORDER — TESTOSTERONE CYPIONATE 200 MG/ML
200 INJECTION INTRAMUSCULAR ONCE
Status: COMPLETED | OUTPATIENT
Start: 2022-01-26 | End: 2022-01-26

## 2022-01-26 RX ADMIN — TESTOSTERONE CYPIONATE 200 MG: 200 INJECTION INTRAMUSCULAR at 09:26

## 2022-01-26 NOTE — PROGRESS NOTES
Patient given Testosterone 200mg IM left gluteal..  Will return in 2 weeks for next injection    Patient tolerated injection well.     Administrations This Visit     testosterone cypionate (DEPOTESTOTERONE CYPIONATE) injection 200 mg     Admin Date  01/26/2022 Action  Given Dose  200 mg Route  IntraMUSCular Administered By  Kandis Humphrey LPN

## 2022-01-27 NOTE — TELEPHONE ENCOUNTER
Comments:     Last Office Visit (last PCP visit):   8/23/2021    Next Visit Date:  Future Appointments   Date Time Provider Kay Balesi   2/9/2022  9:20 AM SCHEDULE, GERBER COBB PCP TESTOSTERONE MLOX [facility]   3/16/2022 10:30 AM Deisy Coyne  [address]   3/17/2022  1:20 PM Courtney García MD [facility] 94       **If hasn't been seen in over a year OR hasn't followed up according to last diabetes/ADHD visit, make appointment for patient before sending refill to provider.     Rx requested:  Requested Prescriptions     Pending Prescriptions Disp Refills    Dulaglutide 0.75 MG/0.5ML SOPN 4 pen 0     Sig: Inject 0.75 mg into the skin once a week

## 2022-02-01 ENCOUNTER — CARE COORDINATION (OUTPATIENT)
Dept: CARE COORDINATION | Age: 77
End: 2022-02-01

## 2022-02-01 NOTE — CARE COORDINATION
Telephone call to Danish/Dr Prado. Requested yearly refill for  Trulcity be faxed to [facility]. Danish expressed plan to talk to MA working with Dr. Mc Chowdary for Marjorie Lang. for this prescription.

## 2022-02-01 NOTE — TELEPHONE ENCOUNTER
Democracia [id number] worker with [facility] calling back to get update an on Refill for Trulicity. She originally called on 1/25/2022. I explained Dr. Jamal Nance is out office and there is another doctor covering.

## 2022-02-01 NOTE — CARE COORDINATION
Telephone call with patient. He indicated that he has a six week supply of Trulcity. Discussed that this writer did reach out to Dr. Jessica Watts for year prescription for Trulcity be faxed to [facility]

## 2022-02-08 ENCOUNTER — CARE COORDINATION (OUTPATIENT)
Dept: CARE COORDINATION | Age: 77
End: 2022-02-08

## 2022-02-08 DIAGNOSIS — R80.9 TYPE 2 DIABETES MELLITUS WITH MICROALBUMINURIA, WITHOUT LONG-TERM CURRENT USE OF INSULIN (HCC): Chronic | ICD-10-CM

## 2022-02-08 DIAGNOSIS — E11.29 TYPE 2 DIABETES MELLITUS WITH MICROALBUMINURIA, WITHOUT LONG-TERM CURRENT USE OF INSULIN (HCC): Chronic | ICD-10-CM

## 2022-02-08 NOTE — CARE COORDINATION
Telephone call with patient. Relayed working on getting prescription for Trulicity/One year be faxed to [facility]. Explained have been in communication with Dr. Luci Beck. Patient indicated that he has a few weeks left of his Trulicity.

## 2022-02-08 NOTE — CARE COORDINATION
Aquilino Ramirez,  I actually printed and signed and asked staff to fax. It did not go as \"normal\" but instead as \"print\" even though a pharmacy was listed. Thank you!   Veronica

## 2022-02-08 NOTE — CARE COORDINATION
Ambulatory Care Coordination Note  2/8/2022  CM Risk Score: 6  Charlson 10 Year Mortality Risk Score: 100%     ACC: Timmy Oliver RN    Summary Note:     Heri Cohen called to discuss the order for Trulicity for Stephanie Ellington  She says that she helps with the patient assistance for this medication  He has been approved for the year  However, the order was sent to [facility]  It needs to go to his patient assistance pharmacy at [facility]  I will send this message over to [address] [address] to see if she can stop the original fill and reorder for [facility]       Goals Addressed    None         Prior to Admission medications    Medication Sig Start Date End Date Taking? Authorizing Provider   Dulaglutide 0.75 MG/0.5ML SOPN Inject 0.75 mg into the skin once a week 2/8/22   Gearl Blocker CHEMO Little - CNP   FREESTYLE LITE strip USE 1 STRIP TO CHECK GLUCOSE ONCE DAILY AS DIRECTED 12/20/21   Ian Vo MD   tamsulosin (FLOMAX) 0.4 MG capsule Take 2 capsules by mouth daily 12/13/21   Ian Vo MD   SITagliptin (JANUVIA) 100 MG tablet Take 0.5 tablets by mouth 2 times daily 12/8/21   Ian Vo MD   glipiZIDE (GLUCOTROL) 10 MG tablet Take 1 tablet by mouth 2 times daily (before meals) 10/11/21   Ian Vo MD   Testosterone Cypionate 200 MG/ML SOLN 1 CC EVERY 2 WEEKS 9/15/21   Enrique Gallagher MD   losartan (COZAAR) 100 MG tablet Take 1 tablet by mouth daily 9/12/21   Ian Vo MD   amLODIPine (NORVASC) 10 MG tablet Take 1 tablet by mouth daily 9/12/21   Ian Vo MD   Cholecalciferol (VITAMIN D3) 50 MCG (2000 UT) CAPS Take 1 capsule by mouth daily 9/7/21   Ian Vo MD   fluorouracil (EFUDEX) 5 % cream APPLY A THIN LAYER TO BILATERAL EARS TWICE A DAY FOR 2 WEEKS. [address] YOUR HANDS AFTER APPLICATION.  8/16/21   Historical Provider, MD   fluticasone [facility]) 50 MCG/ACT nasal spray 1 spray by Nasal route daily 8/19/21   CHEMO Edward   ipratropium (ATROVENT) 0.06 % nasal spray 2 sprays by Nasal route 3 times daily 7/19/21   Rm Morgan MD   albuterol sulfate  (90 Base) MCG/ACT inhaler Inhale 2 puffs into the lungs every 6 hours as needed for Wheezing 7/8/21   Rm Morgan MD   testosterone cypionate (DEPOTESTOTERONE CYPIONATE) 200 MG/ML injection Inject 1 mL into the muscle every 14 days. Indications: PER ORDER in OV note 9/16/20  Patient taking differently: Inject 200 mg into the muscle every 14 days. 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 Historical Provider, MD   Cyanocobalamin (VITAMIN B 12 PO) Take 1,000 mg by mouth    Historical Provider, MD   predniSONE (DELTASONE) 10 MG tablet Take 5 mg by mouth three times a week  10/1/18   Historical Provider, MD   nitroGLYCERIN (NITROSTAT) 0.4 MG SL tablet Place 1 tablet under the tongue every 5 minutes as needed for Chest pain 10/25/18   Rm Morgan MD   sildenafil (VIAGRA) 100 MG tablet TAKE ONE TABLET BY MOUTH AS NEEDED APPROXIMATELY ONE HOUR BEFORE SEXUAL ACTIVITY. DO NOT USE MORE THAN ONE DOSE DAILY 6/21/18   Rm Morgan MD   Blood Glucose Monitoring Suppl CHENCHO Test once daily.   Dx: E11.29 3/20/18   Anthony Gu MD   Lancets MISC Test once daily. Dx: E11.29 3/8/18   Anthony Gu MD   ascorbic acid (VITAMIN C) 500 MG tablet Take 1,000 mg by mouth daily    Historical Provider, MD   loratadine (CLARITIN) 10 MG tablet Take 1 tablet by mouth daily 11/22/16   Anthony Gu MD   acetaminophen (TYLENOL EX ST ARTHRITIS PAIN) 500 MG tablet Take 1 tablet by mouth every 6 hours as needed for Pain 8/24/15   Alberto Medeiros MD   CINNAMON PO Take 500 mg by mouth 2 times daily. Historical Provider, MD   aspirin 81 MG EC tablet Take 81 mg by mouth daily.       Historical Provider, MD       Future Appointments   Date Time Provider Kay Bartlett   2/9/2022  9:20 AM GERBER HancockT PCP TESTOSTERONE MLOX [facility]   3/16/2022 10:30 AM TOAN Soares  S [address] [place] [address]   3/17/2022  1:20 PM Anthony Gu MD [facility] 94

## 2022-02-08 NOTE — TELEPHONE ENCOUNTER
Erin Root  calling back again to get update. Deandra Mckenna routed this conversation to [facility] on 1/27/2022, however the message hasn't been addressed. Jn Gonzales stated that both [facility] are covering and Veronica should be helping with these requests as well. .     Please call Erin Root at [phone] with an update.    Thank you

## 2022-02-08 NOTE — CARE COORDINATION
Telephone call with [id number]. Discussed that Trulicity prescription for one year has not been sent to [facility]. Radha Powers took message to have Hai Begum MA return this writer's phone call. Call back number was provided.

## 2022-02-08 NOTE — CARE COORDINATION
Telephone call with Jacy, [facility] Shabana Cho. She relayed that prescription was originally sent to [facility]. Jacy stated that years prescription  For Truilcity was sent to [facility] today.

## 2022-02-09 ENCOUNTER — NURSE ONLY (OUTPATIENT)
Dept: FAMILY MEDICINE CLINIC | Age: 77
End: 2022-02-09
Payer: MEDICARE

## 2022-02-09 DIAGNOSIS — E29.1 HYPOGONADISM IN MALE: Primary | ICD-10-CM

## 2022-02-09 PROCEDURE — 96372 THER/PROPH/DIAG INJ SC/IM: CPT | Performed by: INTERNAL MEDICINE

## 2022-02-09 RX ORDER — TESTOSTERONE CYPIONATE 200 MG/ML
200 INJECTION INTRAMUSCULAR ONCE
Status: COMPLETED | OUTPATIENT
Start: 2022-02-09 | End: 2022-02-09

## 2022-02-09 RX ADMIN — TESTOSTERONE CYPIONATE 200 MG: 200 INJECTION INTRAMUSCULAR at 09:48

## 2022-02-09 NOTE — PROGRESS NOTES
Patient given Testosterone 200mg IM right gluteal..  Will return in 2 weeks for next injection    Patient tolerated injection well.     Administrations This Visit     testosterone cypionate (DEPOTESTOTERONE CYPIONATE) injection 200 mg     Admin Date  02/09/2022 Action  Given Dose  200 mg Route  IntraMUSCular Administered By  Fran Gamez LPN

## 2022-02-15 ENCOUNTER — CARE COORDINATION (OUTPATIENT)
Dept: CARE COORDINATION | Age: 77
End: 2022-02-15

## 2022-02-15 NOTE — CARE COORDINATION
Faxed Trulicity Prescription to [facility]. Received confirmation that fax was sent successfully.

## 2022-02-15 NOTE — CARE COORDINATION
CARMEN carias call to [employer]. Representative indicated that they need a new prescription. Discussed that new prescription was sent on 2/11/2022. Representative asked that new . prescription be faxed to them at [phone].

## 2022-02-15 NOTE — CARE COORDINATION
Telephone call with patient. Relayed that years prescription for Trulicity was faxed to [facility]. Patient indicated that he has a five week supply of medication at this time.  Discussed plan to reach out to [facility] about refill request.

## 2022-02-16 ENCOUNTER — CARE COORDINATION (OUTPATIENT)
Dept: CARE COORDINATION | Age: 77
End: 2022-02-16

## 2022-02-16 NOTE — CARE COORDINATION
Telephone call to [facility]. They indicated that they did receive prescription for Trulicity that was faxed to them yesterday. Medication is being shipped at this time and they anticipate patient will receive medication tomorrow.

## 2022-02-21 ENCOUNTER — NURSE ONLY (OUTPATIENT)
Dept: FAMILY MEDICINE CLINIC | Age: 77
End: 2022-02-21
Payer: MEDICARE

## 2022-02-21 DIAGNOSIS — E29.1 HYPOGONADISM IN MALE: Primary | ICD-10-CM

## 2022-02-21 PROCEDURE — 96372 THER/PROPH/DIAG INJ SC/IM: CPT | Performed by: FAMILY MEDICINE

## 2022-02-21 RX ORDER — TESTOSTERONE CYPIONATE 200 MG/ML
200 INJECTION INTRAMUSCULAR ONCE
Status: COMPLETED | OUTPATIENT
Start: 2022-02-21 | End: 2022-02-21

## 2022-02-21 RX ADMIN — TESTOSTERONE CYPIONATE 200 MG: 200 INJECTION INTRAMUSCULAR at 10:10

## 2022-02-23 ENCOUNTER — CARE COORDINATION (OUTPATIENT)
Dept: CARE COORDINATION | Age: 77
End: 2022-02-23

## 2022-02-23 NOTE — CARE COORDINATION
Telephone call with patient. He did state that he got his Trulicity. He will need is Januvia within the next 10-14 days. He believes this medication comes automatically. He expressed plan to contact this writer should his Januvia not arrive in the next few days.

## 2022-03-03 ENCOUNTER — CARE COORDINATION (OUTPATIENT)
Dept: CARE COORDINATION | Age: 77
End: 2022-03-03

## 2022-03-03 NOTE — CARE COORDINATION
Telephone call with patient. He indicated that he submitted a reorder request for refill of Januvia on his phone. He is waiting for the medication to arrive. He has about a weeks worth of Januvia.

## 2022-03-09 ENCOUNTER — NURSE ONLY (OUTPATIENT)
Dept: FAMILY MEDICINE CLINIC | Age: 77
End: 2022-03-09
Payer: MEDICARE

## 2022-03-09 DIAGNOSIS — E29.1 HYPOGONADISM IN MALE: Primary | ICD-10-CM

## 2022-03-09 PROCEDURE — 96372 THER/PROPH/DIAG INJ SC/IM: CPT | Performed by: FAMILY MEDICINE

## 2022-03-09 RX ORDER — TESTOSTERONE CYPIONATE 200 MG/ML
200 INJECTION INTRAMUSCULAR ONCE
Status: COMPLETED | OUTPATIENT
Start: 2022-03-09 | End: 2022-03-09

## 2022-03-09 RX ADMIN — TESTOSTERONE CYPIONATE 200 MG: 200 INJECTION INTRAMUSCULAR at 09:56

## 2022-03-09 NOTE — PROGRESS NOTES
Patient given Testosterone 200mg IM right gluteal..  Will return in 2 weeks for next injection    Patient tolerated injection well.     Administrations This Visit     testosterone cypionate (DEPOTESTOTERONE CYPIONATE) injection 200 mg     Admin Date  03/09/2022 Action  Given Dose  200 mg Route  IntraMUSCular Administered By  Liliam Power LPN

## 2022-03-10 ENCOUNTER — CARE COORDINATION (OUTPATIENT)
Dept: CARE COORDINATION | Age: 77
End: 2022-03-10

## 2022-03-15 ENCOUNTER — HOSPITAL ENCOUNTER (OUTPATIENT)
Dept: LAB | Age: 77
Discharge: HOME | End: 2022-03-15
Payer: MEDICARE

## 2022-03-15 DIAGNOSIS — E29.1 HYPOGONADISM MALE: ICD-10-CM

## 2022-03-15 LAB
HCT VFR BLD CALC: 49.4 % (ref 42–52)
HEMOGLOBIN: 16.1 G/DL (ref 14–18)
MCH RBC QN AUTO: 33 PG (ref 27–31.3)
MCHC RBC AUTO-ENTMCNC: 32.6 % (ref 33–37)
MCV RBC AUTO: 101.2 FL (ref 80–100)
PDW BLD-RTO: 14.6 % (ref 11.5–14.5)
PLATELET # BLD: 169 K/UL (ref 130–400)
PROSTATE SPECIFIC ANTIGEN: 3.34 NG/ML (ref 0–4)
RBC # BLD: 4.88 M/UL (ref 4.7–6.1)
WBC # BLD: 10.8 K/UL (ref 4.8–10.8)

## 2022-03-15 PROCEDURE — 36415 COLL VENOUS BLD VENIPUNCTURE: CPT

## 2022-03-15 PROCEDURE — 84270 ASSAY OF SEX HORMONE GLOBUL: CPT

## 2022-03-15 PROCEDURE — 84153 ASSAY OF PSA TOTAL: CPT

## 2022-03-15 PROCEDURE — 84403 ASSAY OF TOTAL TESTOSTERONE: CPT

## 2022-03-15 PROCEDURE — 85027 COMPLETE CBC AUTOMATED: CPT

## 2022-03-16 DIAGNOSIS — J30.89 PERENNIAL ALLERGIC RHINITIS: Chronic | ICD-10-CM

## 2022-03-16 DIAGNOSIS — I10 ESSENTIAL HYPERTENSION: Chronic | ICD-10-CM

## 2022-03-16 LAB
SEX HORMONE BINDING GLOBULIN: 40 NMOL/L (ref 11–80)
TESTOSTERONE FREE-NONMALE: 261.7 PG/ML (ref 47–244)
TESTOSTERONE TOTAL: 1162 NG/DL (ref 220–1000)

## 2022-03-16 NOTE — TELEPHONE ENCOUNTER
Comments:     Last Office Visit (last PCP visit):   8/23/2021    Next Visit Date:  Future Appointments   Date Time Provider Kay Bartlett   3/17/2022  1:20 PM MD [place] [facility] [address]   3/23/2022  9:20 AM SCHEDULE, GERBER COBB PCP TESTOSTERONE MLOX [facility]   3/23/2022  1:45 PM Phillip Saeed MD [facility]       **If hasn't been seen in over a year OR hasn't followed up according to last diabetes/ADHD visit, make appointment for patient before sending refill to provider.     Rx requested:  Requested Prescriptions     Pending Prescriptions Disp Refills    ipratropium (ATROVENT) 0.06 % nasal spray [Pharmacy Med Name: IPRATROPIUM  0.06%  SPRAY SPRAY] 105 mL 3     Sig: USE 2 SPRAYS NASALLY 3  TIMES DAILY    losartan (COZAAR) 100 MG tablet [Pharmacy Med Name: Losartan Potassium 100 MG Oral Tablet] 90 tablet 3     Sig: TAKE 1 TABLET BY MOUTH  DAILY

## 2022-03-17 ENCOUNTER — OFFICE VISIT (OUTPATIENT)
Dept: FAMILY MEDICINE CLINIC | Age: 77
End: 2022-03-17
Payer: MEDICARE

## 2022-03-17 ENCOUNTER — CARE COORDINATION (OUTPATIENT)
Dept: CARE COORDINATION | Age: 77
End: 2022-03-17

## 2022-03-17 VITALS
HEIGHT: 71 IN | OXYGEN SATURATION: 96 % | WEIGHT: 192 LBS | HEART RATE: 80 BPM | BODY MASS INDEX: 26.88 KG/M2 | DIASTOLIC BLOOD PRESSURE: 68 MMHG | SYSTOLIC BLOOD PRESSURE: 126 MMHG

## 2022-03-17 DIAGNOSIS — I25.119 CORONARY ARTERY DISEASE INVOLVING NATIVE CORONARY ARTERY OF NATIVE HEART WITH ANGINA PECTORIS (HCC): Chronic | ICD-10-CM

## 2022-03-17 DIAGNOSIS — G70.01 MYASTHENIA GRAVIS WITH EXACERBATION (HCC): ICD-10-CM

## 2022-03-17 DIAGNOSIS — E55.9 VITAMIN D DEFICIENCY: ICD-10-CM

## 2022-03-17 DIAGNOSIS — R80.9 TYPE 2 DIABETES MELLITUS WITH MICROALBUMINURIA, WITHOUT LONG-TERM CURRENT USE OF INSULIN (HCC): Primary | ICD-10-CM

## 2022-03-17 DIAGNOSIS — E11.29 TYPE 2 DIABETES MELLITUS WITH MICROALBUMINURIA, WITHOUT LONG-TERM CURRENT USE OF INSULIN (HCC): Primary | ICD-10-CM

## 2022-03-17 DIAGNOSIS — I10 PRIMARY HYPERTENSION: Chronic | ICD-10-CM

## 2022-03-17 DIAGNOSIS — E78.2 MIXED HYPERLIPIDEMIA: Chronic | ICD-10-CM

## 2022-03-17 DIAGNOSIS — K76.0 FATTY INFILTRATION OF LIVER: Chronic | ICD-10-CM

## 2022-03-17 DIAGNOSIS — N52.9 ERECTILE DYSFUNCTION, UNSPECIFIED ERECTILE DYSFUNCTION TYPE: Chronic | ICD-10-CM

## 2022-03-17 DIAGNOSIS — J44.9 CHRONIC OBSTRUCTIVE PULMONARY DISEASE, UNSPECIFIED COPD TYPE (HCC): ICD-10-CM

## 2022-03-17 LAB — HBA1C MFR BLD: 6.4 %

## 2022-03-17 PROCEDURE — 3044F HG A1C LEVEL LT 7.0%: CPT | Performed by: FAMILY MEDICINE

## 2022-03-17 PROCEDURE — 1123F ACP DISCUSS/DSCN MKR DOCD: CPT | Performed by: FAMILY MEDICINE

## 2022-03-17 PROCEDURE — G8417 CALC BMI ABV UP PARAM F/U: HCPCS | Performed by: FAMILY MEDICINE

## 2022-03-17 PROCEDURE — 83036 HEMOGLOBIN GLYCOSYLATED A1C: CPT | Performed by: FAMILY MEDICINE

## 2022-03-17 PROCEDURE — 3023F SPIROM DOC REV: CPT | Performed by: FAMILY MEDICINE

## 2022-03-17 PROCEDURE — G8427 DOCREV CUR MEDS BY ELIG CLIN: HCPCS | Performed by: FAMILY MEDICINE

## 2022-03-17 PROCEDURE — 99214 OFFICE O/P EST MOD 30 MIN: CPT | Performed by: FAMILY MEDICINE

## 2022-03-17 PROCEDURE — G8484 FLU IMMUNIZE NO ADMIN: HCPCS | Performed by: FAMILY MEDICINE

## 2022-03-17 PROCEDURE — 1036F TOBACCO NON-USER: CPT | Performed by: FAMILY MEDICINE

## 2022-03-17 PROCEDURE — 4040F PNEUMOC VAC/ADMIN/RCVD: CPT | Performed by: FAMILY MEDICINE

## 2022-03-17 RX ORDER — SILDENAFIL 100 MG/1
TABLET, FILM COATED ORAL
Qty: 6 TABLET | Refills: 3 | Status: SHIPPED | OUTPATIENT
Start: 2022-03-17 | End: 2022-10-19

## 2022-03-17 RX ORDER — SILDENAFIL 100 MG/1
TABLET, FILM COATED ORAL
Qty: 6 TABLET | Refills: 3 | Status: SHIPPED | OUTPATIENT
Start: 2022-03-17 | End: 2022-03-17 | Stop reason: SDUPTHER

## 2022-03-17 RX ORDER — IPRATROPIUM BROMIDE 42 UG/1
2 SPRAY, METERED NASAL 3 TIMES DAILY
Qty: 45 ML | Refills: 1 | Status: SHIPPED | OUTPATIENT
Start: 2022-03-17 | End: 2022-10-04 | Stop reason: SDUPTHER

## 2022-03-17 RX ORDER — LOSARTAN POTASSIUM 100 MG/1
100 TABLET ORAL DAILY
Qty: 90 TABLET | Refills: 1 | Status: SHIPPED | OUTPATIENT
Start: 2022-03-17 | End: 2022-10-13

## 2022-03-17 ASSESSMENT — ENCOUNTER SYMPTOMS
ABDOMINAL PAIN: 0
SHORTNESS OF BREATH: 0
WHEEZING: 0
CONSTIPATION: 0
BLOOD IN STOOL: 0
CHEST TIGHTNESS: 0
COUGH: 0
ANAL BLEEDING: 0
VOMITING: 0
NAUSEA: 0
DIARRHEA: 0

## 2022-03-17 NOTE — PROGRESS NOTES
Isaias Cool (: ) is a 68 y.o. male, Established patient, who presents today for:    Chief Complaint   Patient presents with    6 Month Follow-Up     Type 2 diabetes         ASSESSMENT/PLAN    1. Type 2 diabetes mellitus with microalbuminuria, without long-term current use of insulin (HCC)  -     POCT glycosylated hemoglobin (Hb A1C)  -     Comprehensive Metabolic Panel; Future  -     Lipid Panel; Future  -     Microalbumin / Creatinine Urine Ratio; Future  2. Primary hypertension  -     CBC with Auto Differential; Future  -     Comprehensive Metabolic Panel; Future  3. Mixed hyperlipidemia  -     Comprehensive Metabolic Panel; Future  -     Lipid Panel; Future  4. Coronary artery disease involving native coronary artery of native heart with angina pectoris (HCC)  -     CBC with Auto Differential; Future  -     Lipid Panel; Future  5. Chronic obstructive pulmonary disease, unspecified COPD type ([facility]  Assessment & Plan:   Well-controlled, continue current medications  6. Fatty infiltration of liver  -     Comprehensive Metabolic Panel; Future  7. Vitamin D deficiency  -     Vitamin D 25 Hydroxy; Future  8. Myasthenia gravis with exacerbation ([facility]  Assessment & Plan:   Monitored by specialist- no acute findings meriting change in the plan  9. Erectile dysfunction, unspecified erectile dysfunction type  -     sildenafil (VIAGRA) 100 MG tablet; TAKE ONE TABLET BY MOUTH AS NEEDED APPROXIMATELY ONE HOUR BEFORE SEXUAL ACTIVITY. DO NOT USE MORE THAN ONE DOSE DAILY, Disp-6 tablet, R-3Please consider 90 day supplies to promote better adherenceNormal      Return in about 6 months (around 2022) for Annual Wellness Visit. SUBJECTIVE/OBJECTIVE:    HPI    Patient presents for chronic diabetes, hypertension, hyperlipidemia, coronary artery disease, COPD, and vitamin D deficiency follow-up. Care remains established with neurology for long-term treatment of myasthenia gravis.     Patient reports taking medications as prescribed since the most recent visit. They report trying to eat healthy, but deny adhering to any specific lower carbohydrate or lower salt diet. They report trying to stay active at home, but deny any routine exercise outside of normal day-to-day activity. They deny any polyuria or polydipsia, new onset vision changes, or new onset paresthesias. Patient denies any chest pain, dyspnea, palpitations, lightheadedness, dizziness, worsening lower extremity edema, or syncope. Patient denies any dyspnea at rest, persistent wheezing, worsening cough, chest tightness, or limitation in normal day-to-day activity due to breathing. Current Outpatient Medications on File Prior to Visit   Medication Sig Dispense Refill    ipratropium (ATROVENT) 0.06 % nasal spray 2 sprays by Nasal route 3 times daily 45 mL 1    losartan (COZAAR) 100 MG tablet Take 1 tablet by mouth daily 90 tablet 1    Dulaglutide 0.75 MG/0.5ML SOPN Inject 0.75 mg into the skin once a week 12 pen 3    FREESTYLE LITE strip USE 1 STRIP TO CHECK GLUCOSE ONCE DAILY AS DIRECTED 50 each 0    tamsulosin (FLOMAX) 0.4 MG capsule Take 2 capsules by mouth daily 180 capsule 3    SITagliptin (JANUVIA) 100 MG tablet Take 0.5 tablets by mouth 2 times daily 30 tablet 0    glipiZIDE (GLUCOTROL) 10 MG tablet Take 1 tablet by mouth 2 times daily (before meals) 180 tablet 3    Testosterone Cypionate 200 MG/ML SOLN 1 CC EVERY 2 WEEKS 10 mL 3    amLODIPine (NORVASC) 10 MG tablet Take 1 tablet by mouth daily 90 tablet 1    Cholecalciferol (VITAMIN D3) 50 MCG (2000 UT) CAPS Take 1 capsule by mouth daily 90 capsule 1    fluorouracil (EFUDEX) 5 % cream APPLY A THIN LAYER TO BILATERAL EARS TWICE A DAY FOR 2 WEEKS. [address] YOUR HANDS AFTER APPLICATION.       fluticasone (FLONASE) 50 MCG/ACT nasal spray 1 spray by Nasal route daily 1 Bottle 0    albuterol sulfate  (90 Base) MCG/ACT inhaler Inhale 2 puffs into the lungs every 6 hours as needed for Wheezing 3 Inhaler 0    testosterone cypionate (DEPOTESTOTERONE CYPIONATE) 200 MG/ML injection Inject 1 mL into the muscle every 14 days. Indications: PER ORDER in OV note 9/16/20 (Patient taking differently: Inject 200 mg into the muscle every 14 days. Indications: PER ORDER in OV note 3/17/21 ) 2 mL 3    metoprolol succinate (TOPROL XL) 50 MG extended release tablet Take 3 tablets by mouth daily 270 tablet 3    rosuvastatin (CRESTOR) 20 MG tablet Take 1 tablet by mouth nightly 90 tablet 3    triamterene-hydroCHLOROthiazide (MAXZIDE-25) 37.5-25 MG per tablet Take 1 tablet by mouth daily 90 tablet 3    famotidine (PEPCID) 40 MG tablet Take 1 tablet by mouth every evening 90 tablet 3    Handicap Placard MISC by Does not apply route DX: COPD (J44.9), 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  Cyanocobalamin (VITAMIN B 12 PO) Take 1,000 mg by mouth      predniSONE (DELTASONE) 10 MG tablet Take 5 mg by mouth three times a week       nitroGLYCERIN (NITROSTAT) 0.4 MG SL tablet Place 1 tablet under the tongue every 5 minutes as needed for Chest pain 25 tablet 0    Blood Glucose Monitoring Suppl CHENCHO Test once daily. Dx: E11.29 1 Device 0    Lancets MISC Test once daily. Dx: E11.29 100 each 3    ascorbic acid (VITAMIN C) 500 MG tablet Take 1,000 mg by mouth daily      loratadine (CLARITIN) 10 MG tablet Take 1 tablet by mouth daily 30 tablet 5    acetaminophen (TYLENOL EX ST ARTHRITIS PAIN) 500 MG tablet Take 1 tablet by mouth every 6 hours as needed for Pain 120 tablet 3    CINNAMON PO Take 500 mg by mouth 2 times daily.  aspirin 81 MG EC tablet Take 81 mg by mouth daily. No current facility-administered medications on file prior to visit.        Allergies   Allergen Reactions  Invokana [Canagliflozin] Diarrhea    Metformin And Related Diarrhea    Other      There is a list scanned in media that pt can not take due to myasthenia gravis ,  Antibiotics        Review of Systems   Constitutional: Negative for appetite change, chills, diaphoresis, fatigue, fever and unexpected weight change. Eyes: Negative for visual disturbance. Respiratory: Negative for cough, chest tightness, shortness of breath and wheezing. Cardiovascular: Negative for chest pain, palpitations and leg swelling. No orthopnea, No PND   Gastrointestinal: Negative for abdominal pain, anal bleeding, blood in stool, constipation, diarrhea, nausea and vomiting. No heartburn, No melena   Endocrine: Negative for cold intolerance, heat intolerance, polydipsia, polyphagia and polyuria. Genitourinary: Negative for dysuria and hematuria.        +Nocturia (1-2 times per night), No sensation of incomplete bladder emptying   Musculoskeletal: Negative for myalgias. Skin: Negative for rash. Neurological: Negative for dizziness, syncope, weakness, light-headedness, numbness and headaches. Psychiatric/Behavioral: Negative for dysphoric mood. The patient is not nervous/anxious. Vitals:  /68 (Site: Left Upper Arm, Position: Sitting, Cuff Size: Large Adult)   Pulse 80   Ht 5' 11\" (1.803 m)   Wt 192 lb (87.1 kg)   SpO2 96%   BMI 26.78 kg/m²     Results for POC orders placed in visit on 03/17/22   POCT glycosylated hemoglobin (Hb A1C)   Result Value Ref Range    Hemoglobin A1C 6.4 %         Physical Exam  Vitals reviewed. Constitutional:       General: He is not in acute distress. Appearance: He is not ill-appearing. Eyes:      General: No scleral icterus. Neck:      Thyroid: No thyroid mass, thyromegaly or thyroid tenderness. Vascular: No carotid bruit. Cardiovascular:      Rate and Rhythm: Normal rate and regular rhythm. Heart sounds: Normal heart sounds.  No murmur heard.      Pulmonary:      Effort: Pulmonary effort is normal. No respiratory distress. Breath sounds: Normal breath sounds. No wheezing, rhonchi or rales. Abdominal:      General: Bowel sounds are normal. There is no distension. Palpations: Abdomen is soft. Tenderness: There is no abdominal tenderness. There is no right CVA tenderness, left CVA tenderness, guarding or rebound. Musculoskeletal:      Cervical back: Neck supple. Right lower leg: No edema. Left lower leg: No edema. Lymphadenopathy:      Cervical: No cervical adenopathy. Skin:     Findings: No rash. Neurological:      Mental Status: He is alert and oriented to person, place, and time. Psychiatric:         Mood and Affect: Mood normal.         Behavior: Behavior normal.         Thought Content: Thought content normal.         Ortho Exam (If Applicable)              An electronic signature was used to authenticate this note.      Tavares Vasquez MD

## 2022-03-17 NOTE — CARE COORDINATION
Telephone call with patient. He indicated that he has all of his prescribed medications at this time. Discussed plan to discharge from [facility]/ and patient was in agreement. Patient has this writer's phone number for future reference.

## 2022-03-23 ENCOUNTER — OFFICE VISIT (OUTPATIENT)
Dept: ENDOCRINOLOGY | Age: 77
End: 2022-03-23
Payer: MEDICARE

## 2022-03-23 VITALS
BODY MASS INDEX: 26.6 KG/M2 | WEIGHT: 190 LBS | HEART RATE: 80 BPM | HEIGHT: 71 IN | SYSTOLIC BLOOD PRESSURE: 105 MMHG | DIASTOLIC BLOOD PRESSURE: 78 MMHG | OXYGEN SATURATION: 92 %

## 2022-03-23 DIAGNOSIS — E29.1 HYPOGONADISM MALE: Primary | ICD-10-CM

## 2022-03-23 PROCEDURE — 96372 THER/PROPH/DIAG INJ SC/IM: CPT | Performed by: INTERNAL MEDICINE

## 2022-03-23 PROCEDURE — G8484 FLU IMMUNIZE NO ADMIN: HCPCS | Performed by: INTERNAL MEDICINE

## 2022-03-23 PROCEDURE — 1123F ACP DISCUSS/DSCN MKR DOCD: CPT | Performed by: INTERNAL MEDICINE

## 2022-03-23 PROCEDURE — G8427 DOCREV CUR MEDS BY ELIG CLIN: HCPCS | Performed by: INTERNAL MEDICINE

## 2022-03-23 PROCEDURE — 99213 OFFICE O/P EST LOW 20 MIN: CPT | Performed by: INTERNAL MEDICINE

## 2022-03-23 PROCEDURE — G8417 CALC BMI ABV UP PARAM F/U: HCPCS | Performed by: INTERNAL MEDICINE

## 2022-03-23 PROCEDURE — 1036F TOBACCO NON-USER: CPT | Performed by: INTERNAL MEDICINE

## 2022-03-23 PROCEDURE — 4040F PNEUMOC VAC/ADMIN/RCVD: CPT | Performed by: INTERNAL MEDICINE

## 2022-03-23 RX ORDER — TESTOSTERONE CYPIONATE 200 MG/ML
200 INJECTION INTRAMUSCULAR ONCE
Status: COMPLETED | OUTPATIENT
Start: 2022-03-23 | End: 2022-03-23

## 2022-03-23 RX ADMIN — TESTOSTERONE CYPIONATE 200 MG: 200 INJECTION INTRAMUSCULAR at 13:54

## 2022-03-23 NOTE — PROGRESS NOTES
Patient given Testosterone 200mg IM left gluteal..  Will return in 2 weeks for next injection    Patient tolerated injection well.     Administrations This Visit     testosterone cypionate (DEPOTESTOTERONE CYPIONATE) injection 200 mg     Admin Date  03/23/2022 Action  Given Dose  200 mg Route  IntraMUSCular Administered By  Benny Coleman LPN

## 2022-03-23 NOTE — PROGRESS NOTES
3/23/2022    Assessment:       Diagnosis Orders   1. Hypogonadism male           PLAN:     Orders Placed This Encounter   Procedures    Testosterone, free, total     Standing Status:   Future     Standing Expiration Date:   3/23/2023    PSA, Diagnostic     Standing Status:   Future     Standing Expiration Date:   3/23/2023    CBC     Standing Status:   Future     Standing Expiration Date:   3/23/2023     Continue testosterone injections to the testosterone clinic  OARRS report reviewed  Orders Placed This Encounter   Medications    testosterone cypionate (DEPOTESTOTERONE CYPIONATE) injection 200 mg       Subjective:     Chief Complaint   Patient presents with    Hypogonadism     Vitals:    03/23/22 1329   BP: 105/78   Pulse: 80   SpO2: 92%   Weight: 190 lb (86.2 kg)   Height: 5' 11\" (1.803 m)     Wt Readings from Last 3 Encounters:   03/23/22 190 lb (86.2 kg)   03/17/22 192 lb (87.1 kg)   09/15/21 193 lb (87.5 kg)     BP Readings from Last 3 Encounters:   03/23/22 105/78   03/17/22 126/68   09/15/21 120/65     Follow-up on hypogonadism patient is getting testosterone 200 mg every 2 weeks through the testosterone clinic labs were reviewed does have a prediabetes hemoglobin A1c of 6.4  Last testosterone level 1100    Other  This is a chronic (Hypogonadism) problem. The current episode started more than 1 year ago. The problem has been unchanged. Treatments tried: Testosterone injections. The treatment provided moderate relief. Results for Mark Narvaez (MRN [id number]) as of 3/23/2022 13:38   Ref.  Range 3/15/2022 13:41 3/15/2022 13:42 3/17/2022 13:42   Hemoglobin A1C Latest Units: %   6.4   Sex Hormone Binding Latest Ref Range: 11 - 80 nmol/L 40     Testosterone Latest Ref Range: 220 - 1,000 ng/dL 1,162 (H)     Testosterone, Free Latest Ref Range: 47 - 244 pg/mL 261.7 (H)     WBC Latest Ref Range: 4.8 - 10.8 K/uL  10.8    RBC Latest Ref Range: 4.70 - 6.10 M/uL  4.88    Hemoglobin Quant Latest Ref Range: 14.0 - 18.0 g/dL  16.1    Hematocrit Latest Ref Range: 42.0 - 52.0 %  49.4    MCV Latest Ref Range: 80.0 - 100.0 fL  101.2 (H)    MCH Latest Ref Range: 27.0 - 31.3 pg  33.0 (H)    MCHC Latest Ref Range: 33.0 - 37.0 %  32.6 (L)    RDW Latest Ref Range: 11.5 - 14.5 %  14.6 (H)    Platelet Count Latest Ref Range: 130 - 400 K/uL  169        Past Medical History:   Diagnosis Date    Atypical chest pain 8/30/2012    BPH (benign prostatic hypertrophy)     CAD (coronary artery disease)     Cataracts, bilateral     Chronic low back pain     COPD (chronic obstructive pulmonary disease) (HCC) 6/1/2006    DM2 (diabetes mellitus, type 2) (HCC)     Duodenitis     Erectile dysfunction 3/6/2017    Fatty infiltration of liver 11/19/2014    GERD (gastroesophageal reflux disease)     History of melanoma excision 12/11/2017    Left ear 09/2017    History of tobacco use 6/1/2006    HTN (hypertension)     Hyperlipidemia     Malignant melanoma of face ([address]     Malignant melanoma of skin of face ([address]     RT ear 2000, LT ear 2017    MG (myasthenia gravis) ([address]     Nephrolithiasis     Ocular myasthenia gravis ([address] 8/28/2018    Osteoarthritis of multiple joints     hands, hips    Perennial allergic rhinitis     Personal history of colonic polyps     Pseudophakia of both eyes     Status post coronary artery bypass grafts x 5 7/30/2018 07/2018 @ [facility]    Type 2 diabetes mellitus with microalbuminuria, without long-term current use of insulin ([address] 3/6/2017    Varicose veins of both lower extremities 9/11/2017     Past Surgical History:   Procedure Laterality Date    ANGIOPLASTY      CARDIAC CATHETERIZATION      #17    CATARACT REMOVAL WITH IMPLANT Left 1992    CATARACT REMOVAL WITH IMPLANT Right 1992    COLONOSCOPY  2009    tubular adenomas    COLONOSCOPY  2013    diverticulosis, polyps, 5y repeat (DR NAVAS)    CORONARY ARTERY BYPASS GRAFT  07/02/2018    x5 (DR JETER @ [facility])    CORONARY ARTERY BYPASS GRAFT      5 vessel    MOHS SURGERY Left 2017    melanoma of left ear (DR GODWIN)    SKIN CANCER EXCISION Left 2017    TONSILLECTOMY  195    UPPER GASTROINTESTINAL ENDOSCOPY      duodenitis/ poss early signs of celiac disease     Social History     Socioeconomic History    Marital status:      Spouse name: Dennis Campos Number of children: 2    Years of education: 12+    Highest education level: Not on file   Occupational History    Occupation: Farming   Tobacco Use    Smoking status: Former Smoker     Packs/day: 1.00     Years: 41.00     Pack years: 41.00     Types: Cigarettes     Start date:      Quit date: 2002     Years since quittin.4    Smokeless tobacco: Never Used   Substance and Sexual Activity    Alcohol use: Yes     Alcohol/week: 0.0 standard drinks     Comment: drinks one beer at night occassionally    Drug use: No    Sexual activity: Not Currently     Partners: Female   Other Topics Concern    Not on file   Social History Narrative    Not on file     Social Determinants of Health     Financial Resource Strain: Low Risk     Difficulty of Paying Living Expenses: Not hard at all   Food Insecurity: No Food Insecurity    Worried About [facility] in the Last Year: Never true    [facility] in the Last Year: Never true   Transportation Needs:     Lack of Transportation (Medical): Not on file    Lack of Transportation (Non-Medical):  Not on file   Physical Activity:     Days of Exercise per Week: Not on file    Minutes of Exercise per Session: Not on file   Stress:     Feeling of Stress : Not on file   Social Connections:     Frequency of Communication with Friends and Family: Not on file    Frequency of Social Gatherings with Friends and Family: Not on file    Attends Yazidi Services: Not on file    Active Member of Clubs or Organizations: Not on file    Attends Club or Organization Meetings: Not on file    Marital Status: Not on file Intimate Partner Violence:     Fear of Current or Ex-Partner: Not on file    Emotionally Abused: Not on file    Physically Abused: Not on file    Sexually Abused: Not on file   Housing Stability:     Unable to Pay for Housing in the Last Year: Not on file    Number of [place] in the Last Year: Not on file    Unstable Housing in the Last Year: Not on file     Family History   Problem Relation Age of Onset    Heart Disease Mother     Heart Disease Brother     Diabetes Maternal Grandfather      Allergies   Allergen Reactions    Invokana [Canagliflozin] Diarrhea    Metformin And Related Diarrhea    Other      There is a list scanned in media that pt can not take due to myasthenia gravis ,  Antibiotics       Current Outpatient Medications:     ipratropium (ATROVENT) 0.06 % nasal spray, 2 sprays by Nasal route 3 times daily, Disp: 45 mL, Rfl: 1    losartan (COZAAR) 100 MG tablet, Take 1 tablet by mouth daily, Disp: 90 tablet, Rfl: 1    sildenafil (VIAGRA) 100 MG tablet, TAKE ONE TABLET BY MOUTH AS NEEDED APPROXIMATELY ONE HOUR BEFORE SEXUAL ACTIVITY.  DO NOT USE MORE THAN ONE DOSE DAILY, Disp: 6 tablet, Rfl: 3    Dulaglutide 0.75 MG/0.5ML SOPN, Inject 0.75 mg into the skin once a week, Disp: 12 pen, Rfl: 3    FREESTYLE LITE strip, USE 1 STRIP TO CHECK GLUCOSE ONCE DAILY AS DIRECTED, Disp: 50 each, Rfl: 0    tamsulosin (FLOMAX) 0.4 MG capsule, Take 2 capsules by mouth daily, Disp: 180 capsule, Rfl: 3    SITagliptin (JANUVIA) 100 MG tablet, Take 0.5 tablets by mouth 2 times daily, Disp: 30 tablet, Rfl: 0    glipiZIDE (GLUCOTROL) 10 MG tablet, Take 1 tablet by mouth 2 times daily (before meals), Disp: 180 tablet, Rfl: 3    Testosterone Cypionate 200 MG/ML SOLN, 1 CC EVERY 2 WEEKS, Disp: 10 mL, Rfl: 3    amLODIPine (NORVASC) 10 MG tablet, Take 1 tablet by mouth daily, Disp: 90 tablet, Rfl: 1    Cholecalciferol (VITAMIN D3) 50 MCG (2000 UT) CAPS, Take 1 capsule by mouth daily, Disp: 90 capsule, Rfl: 1    fluorouracil (EFUDEX) 5 % cream, APPLY A THIN LAYER TO BILATERAL EARS TWICE A DAY FOR 2 WEEKS. [address] YOUR HANDS AFTER APPLICATION., Disp: , Rfl:     fluticasone (FLONASE) 50 MCG/ACT nasal spray, 1 spray by Nasal route daily, Disp: 1 Bottle, Rfl: 0    albuterol sulfate  (90 Base) MCG/ACT inhaler, Inhale 2 puffs into the lungs every 6 hours as needed for Wheezing, Disp: 3 Inhaler, Rfl: 0    metoprolol succinate (TOPROL XL) 50 MG extended release tablet, Take 3 tablets by mouth daily, Disp: 270 tablet, Rfl: 3    rosuvastatin (CRESTOR) 20 MG tablet, Take 1 tablet by mouth nightly, Disp: 90 tablet, Rfl: 3    triamterene-hydroCHLOROthiazide (MAXZIDE-25) 37.5-25 MG per tablet, Take 1 tablet by mouth daily, Disp: 90 tablet, Rfl: 3    famotidine (PEPCID) 40 MG tablet, Take 1 tablet by mouth every evening, Disp: 90 tablet, Rfl: 3    Handicap Placard MISC, by Does not apply route DX: COPD (J44.9), primary osteoarthritis involving multiple joints (M15.0), myasthenia gravis with exacerbation (G70.01)     EXPIRES: 05/2024, Disp: 2 each, Rfl: 0    azaTHIOprine (IMURAN) 50 MG tablet, Take 50 mg by mouth Take 2 tablets by mouth in the morning and 1 tablet in the evening., Disp: , Rfl:     Cyanocobalamin (VITAMIN B 12 PO), Take 1,000 mg by mouth, Disp: , Rfl:     predniSONE (DELTASONE) 10 MG tablet, Take 5 mg by mouth three times a week , Disp: , Rfl:     nitroGLYCERIN (NITROSTAT) 0.4 MG SL tablet, Place 1 tablet under the tongue every 5 minutes as needed for Chest pain, Disp: 25 tablet, Rfl: 0    Blood Glucose Monitoring Suppl [facility], Test once daily. Dx: E11.29, Disp: 1 Device, Rfl: 0    Lancets MISC, Test once daily.  Dx: E11.29, Disp: 100 each, Rfl: 3    ascorbic acid (VITAMIN C) 500 MG tablet, Take 1,000 mg by mouth daily, Disp: , Rfl:     loratadine (CLARITIN) 10 MG tablet, Take 1 tablet by mouth daily, Disp: 30 tablet, Rfl: 5    acetaminophen (TYLENOL EX ST ARTHRITIS PAIN) 500 MG tablet, Take 1 tablet by mouth every 6 hours as needed for Pain, Disp: 120 tablet, Rfl: 3    CINNAMON PO, Take 500 mg by mouth 2 times daily. , Disp: , Rfl:     aspirin 81 MG EC tablet, Take 81 mg by mouth daily. , Disp: , Rfl:     testosterone cypionate (DEPOTESTOTERONE CYPIONATE) 200 MG/ML injection, Inject 1 mL into the muscle every 14 days. Indications: PER ORDER in OV note 9/16/20 (Patient taking differently: Inject 200 mg into the muscle every 14 days.  Indications: PER ORDER in OV note 3/17/21 ), Disp: 2 mL, Rfl: 3  Lab Results   Component Value Date     08/23/2021    K 4.3 08/23/2021     08/23/2021    CO2 29 08/23/2021    BUN 18 08/23/2021    CREATININE 1.03 08/23/2021    GLUCOSE 127 (H) 08/23/2021    CALCIUM 9.7 08/23/2021    PROT 6.8 05/24/2021    LABALBU 4.1 05/24/2021    BILITOT 0.5 05/24/2021    ALKPHOS 42 05/24/2021    AST 16 05/24/2021    ALT 15 05/24/2021    LABGLOM >60.0 08/23/2021    GFRAA >60.0 08/23/2021    GLOB 2.7 05/24/2021     Lab Results   Component Value Date    WBC 10.8 03/15/2022    HGB 16.1 03/15/2022    HCT 49.4 03/15/2022    .2 (H) 03/15/2022     03/15/2022     Lab Results   Component Value Date    LABA1C 6.4 03/17/2022    LABA1C 6.8 (H) 11/05/2021    LABA1C 7.2 (H) 08/23/2021     Lab Results   Component Value Date    HDL 50 05/24/2021    HDL 42 12/23/2020    HDL 37 (L) 07/29/2020    LDLCALC 39 05/24/2021    LDLCALC 36 12/23/2020    LDLCALC 39 07/29/2020    CHOL 112 05/24/2021    CHOL 107 12/23/2020    CHOL 119 07/29/2020    TRIG 117 05/24/2021    TRIG 143 12/23/2020    TRIG 213 (H) 07/29/2020     Lab Results   Component Value Date    TESTM 418 03/31/2021    TESTM 674 09/16/2020    TESTM 886 (H) 05/22/2020     Lab Results   Component Value Date    TSH 0.599 07/25/2018    TSH 0.691 08/04/2016    TSH 0.756 06/21/2016    TSHREFLEX 1.150 05/24/2021    TSHREFLEX 0.957 11/25/2015    FT3 4.0 08/04/2016    T4FREE 1.32 08/04/2016     No results found for: TPOABS    Review of Systems   Cardiovascular: Negative. Endocrine: Negative. Genitourinary: Negative. All other systems reviewed and are negative. Objective:   Physical Exam  Vitals reviewed. Constitutional:       Appearance: Normal appearance. He is normal weight. HENT:      Head: Normocephalic and atraumatic. Right Ear: External ear normal.      Left Ear: External ear normal.      Nose: Nose normal.   Eyes:      General: No scleral icterus. Right eye: No discharge. Left eye: No discharge. Extraocular Movements: Extraocular movements intact. Conjunctiva/sclera: Conjunctivae normal.   Cardiovascular:      Rate and Rhythm: Normal rate. Pulmonary:      Effort: Pulmonary effort is normal.   Musculoskeletal:         General: Normal range of motion. Cervical back: Normal range of motion and neck supple. Neurological:      General: No focal deficit present. Mental Status: He is alert and oriented to person, place, and time.    Psychiatric:         Mood and Affect: Mood normal.         Behavior: Behavior normal.

## 2022-03-28 ENCOUNTER — TELEPHONE (OUTPATIENT)
Dept: FAMILY MEDICINE CLINIC | Age: 77
End: 2022-03-28

## 2022-03-28 NOTE — TELEPHONE ENCOUNTER
----- Message from Alber Hernandez sent at 3/28/2022 12:34 PM EDT -----  Subject: Message to Provider    QUESTIONS  Information for Provider? Please call Vick Bae from [facility] regarding a   paper faxed to your office for this patient that needed a provider's   signature and needed to be faxed back. ---------------------------------------------------------------------------  --------------  Lyric DOW  What is the best way for the office to contact you? OK to leave message on   voicemail  Preferred Call Back Phone Number? [phone]  ---------------------------------------------------------------------------  --------------  SCRIPT ANSWERS  Relationship to Patient? Third Party  Third Party Type? Hospital?   Representative Name?  Vick Bae from [facility]

## 2022-03-29 NOTE — TELEPHONE ENCOUNTER
Form reviewed. Patient is NOT on insulin and only needs to check blood glucose once daily. He would not qualify for [employer] glucometer. Please call Melba Rogers back and let her know that patient is not on insulin and only needs to check blood glucose once a day at the most, and not even every day because his glucose level is controlled. I left his paper on your desk.

## 2022-04-01 ENCOUNTER — HOSPITAL ENCOUNTER (OUTPATIENT)
Dept: LAB | Age: 77
Discharge: HOME | End: 2022-04-01
Payer: MEDICARE

## 2022-04-01 DIAGNOSIS — E11.29 TYPE 2 DIABETES MELLITUS WITH MICROALBUMINURIA, WITHOUT LONG-TERM CURRENT USE OF INSULIN (HCC): ICD-10-CM

## 2022-04-01 DIAGNOSIS — E78.2 MIXED HYPERLIPIDEMIA: Chronic | ICD-10-CM

## 2022-04-01 DIAGNOSIS — E55.9 VITAMIN D DEFICIENCY: ICD-10-CM

## 2022-04-01 DIAGNOSIS — R80.9 TYPE 2 DIABETES MELLITUS WITH MICROALBUMINURIA, WITHOUT LONG-TERM CURRENT USE OF INSULIN (HCC): ICD-10-CM

## 2022-04-01 DIAGNOSIS — I10 PRIMARY HYPERTENSION: Chronic | ICD-10-CM

## 2022-04-01 DIAGNOSIS — K76.0 FATTY INFILTRATION OF LIVER: Chronic | ICD-10-CM

## 2022-04-01 DIAGNOSIS — I25.119 CORONARY ARTERY DISEASE INVOLVING NATIVE CORONARY ARTERY OF NATIVE HEART WITH ANGINA PECTORIS (HCC): Chronic | ICD-10-CM

## 2022-04-01 LAB
ALBUMIN SERPL-MCNC: 3.8 G/DL (ref 3.5–4.6)
ALP BLD-CCNC: 41 U/L (ref 35–104)
ALT SERPL-CCNC: 18 U/L (ref 0–41)
ANION GAP SERPL CALCULATED.3IONS-SCNC: 18 MEQ/L (ref 9–15)
AST SERPL-CCNC: 14 U/L (ref 0–40)
BASOPHILS ABSOLUTE: 0.1 K/UL (ref 0–0.2)
BASOPHILS RELATIVE PERCENT: 0.6 %
BILIRUB SERPL-MCNC: <0.2 MG/DL (ref 0.2–0.7)
BUN BLDV-MCNC: 25 MG/DL (ref 8–23)
CALCIUM SERPL-MCNC: 10.4 MG/DL (ref 8.5–9.9)
CHLORIDE BLD-SCNC: 97 MEQ/L (ref 95–107)
CHOLESTEROL, TOTAL: 109 MG/DL (ref 0–199)
CO2: 22 MEQ/L (ref 20–31)
CREAT SERPL-MCNC: 2.09 MG/DL (ref 0.7–1.2)
CREATININE URINE: 378 MG/DL
EOSINOPHILS ABSOLUTE: 0.1 K/UL (ref 0–0.7)
EOSINOPHILS RELATIVE PERCENT: 1.3 %
GFR AFRICAN AMERICAN: 37.5
GFR NON-AFRICAN AMERICAN: 31
GLOBULIN: 2.5 G/DL (ref 2.3–3.5)
GLUCOSE BLD-MCNC: 183 MG/DL (ref 70–99)
HCT VFR BLD CALC: 48.4 % (ref 42–52)
HDLC SERPL-MCNC: 42 MG/DL (ref 40–59)
HEMOGLOBIN: 15.8 G/DL (ref 14–18)
LDL CHOLESTEROL CALCULATED: 19 MG/DL (ref 0–129)
LYMPHOCYTES ABSOLUTE: 1.2 K/UL (ref 1–4.8)
LYMPHOCYTES RELATIVE PERCENT: 11 %
MCH RBC QN AUTO: 33 PG (ref 27–31.3)
MCHC RBC AUTO-ENTMCNC: 32.6 % (ref 33–37)
MCV RBC AUTO: 101.2 FL (ref 80–100)
MICROALBUMIN UR-MCNC: 12.6 MG/DL
MICROALBUMIN/CREAT UR-RTO: 33.3 MG/G (ref 0–30)
MONOCYTES ABSOLUTE: 1 K/UL (ref 0.2–0.8)
MONOCYTES RELATIVE PERCENT: 8.6 %
NEUTROPHILS ABSOLUTE: 8.8 K/UL (ref 1.4–6.5)
NEUTROPHILS RELATIVE PERCENT: 78.5 %
PDW BLD-RTO: 14.5 % (ref 11.5–14.5)
PLATELET # BLD: 163 K/UL (ref 130–400)
POTASSIUM SERPL-SCNC: 3.9 MEQ/L (ref 3.4–4.9)
RBC # BLD: 4.78 M/UL (ref 4.7–6.1)
SODIUM BLD-SCNC: 137 MEQ/L (ref 135–144)
TOTAL PROTEIN: 6.3 G/DL (ref 6.3–8)
TRIGL SERPL-MCNC: 240 MG/DL (ref 0–150)
VITAMIN D 25-HYDROXY: 33 NG/ML
WBC # BLD: 11.2 K/UL (ref 4.8–10.8)

## 2022-04-01 PROCEDURE — 36415 COLL VENOUS BLD VENIPUNCTURE: CPT

## 2022-04-01 PROCEDURE — 82043 UR ALBUMIN QUANTITATIVE: CPT

## 2022-04-01 PROCEDURE — 82306 VITAMIN D 25 HYDROXY: CPT

## 2022-04-01 PROCEDURE — 82570 ASSAY OF URINE CREATININE: CPT

## 2022-04-01 PROCEDURE — 85025 COMPLETE CBC W/AUTO DIFF WBC: CPT

## 2022-04-01 PROCEDURE — 80061 LIPID PANEL: CPT

## 2022-04-01 PROCEDURE — 80053 COMPREHEN METABOLIC PANEL: CPT

## 2022-04-06 ENCOUNTER — NURSE ONLY (OUTPATIENT)
Dept: FAMILY MEDICINE CLINIC | Age: 77
End: 2022-04-06
Payer: MEDICARE

## 2022-04-06 DIAGNOSIS — E29.1 HYPOGONADISM IN MALE: Primary | ICD-10-CM

## 2022-04-06 DIAGNOSIS — N28.9 RENAL INSUFFICIENCY: ICD-10-CM

## 2022-04-06 DIAGNOSIS — E83.52 HYPERCALCEMIA: ICD-10-CM

## 2022-04-06 DIAGNOSIS — D72.829 LEUKOCYTOSIS, UNSPECIFIED TYPE: Primary | ICD-10-CM

## 2022-04-06 PROCEDURE — 96372 THER/PROPH/DIAG INJ SC/IM: CPT | Performed by: FAMILY MEDICINE

## 2022-04-06 RX ORDER — TESTOSTERONE CYPIONATE 200 MG/ML
200 INJECTION INTRAMUSCULAR ONCE
Status: COMPLETED | OUTPATIENT
Start: 2022-04-06 | End: 2022-04-06

## 2022-04-06 RX ADMIN — TESTOSTERONE CYPIONATE 200 MG: 200 INJECTION INTRAMUSCULAR at 09:55

## 2022-04-07 NOTE — RESULT ENCOUNTER NOTE
Stable elevated urine microalbumin, normal vitamin D   CMP with high , high Cr 2.09, low GFR 31, and high Ca 10.4  Lipid panel with TG 240CBC with high WBC 11.2, stable high .2  Follow-up lab orders placedMyChart message sent to patient

## 2022-04-08 DIAGNOSIS — I10 ESSENTIAL HYPERTENSION: Chronic | ICD-10-CM

## 2022-04-08 RX ORDER — METOPROLOL SUCCINATE 50 MG/1
150 TABLET, EXTENDED RELEASE ORAL DAILY
Qty: 270 TABLET | Refills: 1 | Status: SHIPPED | OUTPATIENT
Start: 2022-04-08

## 2022-04-08 NOTE — TELEPHONE ENCOUNTER
Pharmacy is requesting medication refill.  Please approve or deny this request.    Rx requested:  Requested Prescriptions     Pending Prescriptions Disp Refills    metoprolol succinate (TOPROL XL) 50 MG extended release tablet [Pharmacy Med Name: Metoprolol Succinate ER 50 MG Oral Tablet Extended Release 24 Hour] 270 tablet 3     Sig: TAKE 3 TABLETS BY MOUTH  DAILY         Last Office Visit:   3/17/2022      Next Visit Date:  Future Appointments   Date Time Provider Kay Bartlett   4/20/2022  9:40 AM SCHEDULE, GERBER COBB PCP [facility]ERONE [facility]X [facility]   5/4/2022  9:40 AM SCHEDULE, GERBER COBB PCP TESTOSTERONE M[facility]X [facility]   9/20/2022 10:40 AM Levy García MD [facility]   9/21/2022  1:30 PM Diana Lawton  [address]

## 2022-04-12 ENCOUNTER — TELEPHONE (OUTPATIENT)
Dept: FAMILY MEDICINE CLINIC | Age: 77
End: 2022-04-12

## 2022-04-12 NOTE — TELEPHONE ENCOUNTER
I have attempted to contact [facility]  by phone with the following results:( Patient is Not on insulin and would not meet the criteria for freestyle meter( Per Wing Louie ) left message to return my call on answering machine.

## 2022-04-20 ENCOUNTER — NURSE ONLY (OUTPATIENT)
Dept: FAMILY MEDICINE CLINIC | Age: 77
End: 2022-04-20
Payer: MEDICARE

## 2022-04-20 DIAGNOSIS — E29.1 HYPOGONADISM IN MALE: Primary | ICD-10-CM

## 2022-04-20 PROCEDURE — 96372 THER/PROPH/DIAG INJ SC/IM: CPT | Performed by: FAMILY MEDICINE

## 2022-04-20 RX ORDER — TESTOSTERONE CYPIONATE 200 MG/ML
200 INJECTION INTRAMUSCULAR ONCE
Status: COMPLETED | OUTPATIENT
Start: 2022-04-20 | End: 2022-04-20

## 2022-04-20 RX ADMIN — TESTOSTERONE CYPIONATE 200 MG: 200 INJECTION INTRAMUSCULAR at 09:43

## 2022-04-20 NOTE — PROGRESS NOTES
Patient given Testosterone 200mg IM left gluteal..  Will return in 2 weeks for next injection    Patient tolerated injection well.     Administrations This Visit     testosterone cypionate (DEPOTESTOTERONE CYPIONATE) injection 200 mg     Admin Date  04/20/2022 Action  Given Dose  200 mg Route  [facility] Administered By  Brenda Caldwell LPN

## 2022-04-26 ENCOUNTER — HOSPITAL ENCOUNTER (OUTPATIENT)
Dept: LAB | Age: 77
Discharge: HOME | End: 2022-04-26
Payer: MEDICARE

## 2022-04-26 DIAGNOSIS — E83.52 HYPERCALCEMIA: ICD-10-CM

## 2022-04-26 DIAGNOSIS — N28.9 RENAL INSUFFICIENCY: ICD-10-CM

## 2022-04-26 DIAGNOSIS — D72.829 LEUKOCYTOSIS, UNSPECIFIED TYPE: ICD-10-CM

## 2022-04-26 DIAGNOSIS — E29.1 HYPOGONADISM MALE: ICD-10-CM

## 2022-04-26 LAB
ANION GAP SERPL CALCULATED.3IONS-SCNC: 14 MEQ/L (ref 9–15)
BASOPHILS ABSOLUTE: 0.1 K/UL (ref 0–0.2)
BASOPHILS RELATIVE PERCENT: 0.6 %
BUN BLDV-MCNC: 37 MG/DL (ref 8–23)
CALCIUM SERPL-MCNC: 9.9 MG/DL (ref 8.5–9.9)
CHLORIDE BLD-SCNC: 100 MEQ/L (ref 95–107)
CO2: 24 MEQ/L (ref 20–31)
CREAT SERPL-MCNC: 1.68 MG/DL (ref 0.7–1.2)
EOSINOPHILS ABSOLUTE: 0.2 K/UL (ref 0–0.7)
EOSINOPHILS RELATIVE PERCENT: 2.2 %
GFR AFRICAN AMERICAN: 48.2
GFR NON-AFRICAN AMERICAN: 39.8
GLUCOSE BLD-MCNC: 123 MG/DL (ref 70–99)
HCT VFR BLD CALC: 48.9 % (ref 42–52)
HEMOGLOBIN: 16 G/DL (ref 14–18)
LYMPHOCYTES ABSOLUTE: 1.4 K/UL (ref 1–4.8)
LYMPHOCYTES RELATIVE PERCENT: 15 %
MCH RBC QN AUTO: 32.9 PG (ref 27–31.3)
MCHC RBC AUTO-ENTMCNC: 32.8 % (ref 33–37)
MCV RBC AUTO: 100.3 FL (ref 80–100)
MONOCYTES ABSOLUTE: 0.7 K/UL (ref 0.2–0.8)
MONOCYTES RELATIVE PERCENT: 8.3 %
NEUTROPHILS ABSOLUTE: 6.7 K/UL (ref 1.4–6.5)
NEUTROPHILS RELATIVE PERCENT: 73.9 %
PDW BLD-RTO: 14.7 % (ref 11.5–14.5)
PLATELET # BLD: 159 K/UL (ref 130–400)
POTASSIUM SERPL-SCNC: 4.3 MEQ/L (ref 3.4–4.9)
PROSTATE SPECIFIC ANTIGEN: 3.3 NG/ML (ref 0–4)
RBC # BLD: 4.87 M/UL (ref 4.7–6.1)
SODIUM BLD-SCNC: 138 MEQ/L (ref 135–144)
TESTOSTERONE TOTAL: 1276 NG/DL (ref 220–1000)
WBC # BLD: 9 K/UL (ref 4.8–10.8)

## 2022-04-26 PROCEDURE — 84153 ASSAY OF PSA TOTAL: CPT

## 2022-04-26 PROCEDURE — 36415 COLL VENOUS BLD VENIPUNCTURE: CPT

## 2022-04-26 PROCEDURE — 85025 COMPLETE CBC W/AUTO DIFF WBC: CPT

## 2022-04-26 PROCEDURE — 84403 ASSAY OF TOTAL TESTOSTERONE: CPT

## 2022-04-26 PROCEDURE — 80048 BASIC METABOLIC PNL TOTAL CA: CPT

## 2022-04-28 DIAGNOSIS — N28.9 RENAL INSUFFICIENCY: Primary | ICD-10-CM

## 2022-05-04 ENCOUNTER — NURSE ONLY (OUTPATIENT)
Dept: FAMILY MEDICINE CLINIC | Age: 77
End: 2022-05-04
Payer: MEDICARE

## 2022-05-04 DIAGNOSIS — E29.1 HYPOGONADISM IN MALE: Primary | ICD-10-CM

## 2022-05-04 PROCEDURE — 96372 THER/PROPH/DIAG INJ SC/IM: CPT | Performed by: INTERNAL MEDICINE

## 2022-05-04 RX ORDER — TESTOSTERONE CYPIONATE 200 MG/ML
200 INJECTION INTRAMUSCULAR ONCE
Status: COMPLETED | OUTPATIENT
Start: 2022-05-04 | End: 2022-05-04

## 2022-05-04 RX ADMIN — TESTOSTERONE CYPIONATE 200 MG: 200 INJECTION INTRAMUSCULAR at 09:52

## 2022-05-04 NOTE — PROGRESS NOTES
Patient given Testosterone 200mg IM right gluteal..  Will return in 2 weeks for next injection    Patient tolerated injection well.     Administrations This Visit     testosterone cypionate (DEPOTESTOTERONE CYPIONATE) injection 200 mg     Admin Date  05/04/2022 Action  Given Dose  200 mg Route  [facility] Administered By  Gab Kendrick LPN

## 2022-05-12 DIAGNOSIS — I10 ESSENTIAL HYPERTENSION: Chronic | ICD-10-CM

## 2022-05-12 RX ORDER — TRIAMTERENE AND HYDROCHLOROTHIAZIDE 37.5; 25 MG/1; MG/1
1 TABLET ORAL DAILY
Qty: 90 TABLET | Refills: 1 | Status: SHIPPED | OUTPATIENT
Start: 2022-05-12 | End: 2022-10-13

## 2022-05-12 RX ORDER — ROSUVASTATIN CALCIUM 20 MG/1
20 TABLET, COATED ORAL DAILY
Qty: 90 TABLET | Refills: 1 | Status: SHIPPED | OUTPATIENT
Start: 2022-05-12

## 2022-05-18 ENCOUNTER — NURSE ONLY (OUTPATIENT)
Dept: FAMILY MEDICINE CLINIC | Age: 77
End: 2022-05-18
Payer: MEDICARE

## 2022-05-18 DIAGNOSIS — E29.1 HYPOGONADISM IN MALE: Primary | ICD-10-CM

## 2022-05-18 PROCEDURE — 96372 THER/PROPH/DIAG INJ SC/IM: CPT | Performed by: FAMILY MEDICINE

## 2022-05-18 RX ORDER — TESTOSTERONE CYPIONATE 200 MG/ML
200 INJECTION INTRAMUSCULAR ONCE
Status: COMPLETED | OUTPATIENT
Start: 2022-05-18 | End: 2022-05-18

## 2022-05-18 RX ADMIN — TESTOSTERONE CYPIONATE 200 MG: 200 INJECTION INTRAMUSCULAR at 09:48

## 2022-05-18 NOTE — PROGRESS NOTES
Patient given Testosterone 200mg IM Left gluteal..  Will return in 2 weeks for next injection    Patient tolerated injection well.     Administrations This Visit     testosterone cypionate (DEPOTESTOTERONE CYPIONATE) injection 200 mg     Admin Date  05/18/2022 Action  Given Dose  200 mg Route  [facility] Administered By  Humphrey Crooks LPN

## 2022-05-19 ENCOUNTER — HOSPITAL ENCOUNTER (OUTPATIENT)
Dept: LAB | Age: 77
Discharge: HOME | End: 2022-05-19
Payer: MEDICARE

## 2022-05-19 DIAGNOSIS — N28.9 RENAL INSUFFICIENCY: ICD-10-CM

## 2022-05-19 LAB
ANION GAP SERPL CALCULATED.3IONS-SCNC: 13 MEQ/L (ref 9–15)
BUN BLDV-MCNC: 19 MG/DL (ref 8–23)
CALCIUM SERPL-MCNC: 9.4 MG/DL (ref 8.5–9.9)
CHLORIDE BLD-SCNC: 96 MEQ/L (ref 95–107)
CO2: 26 MEQ/L (ref 20–31)
CREAT SERPL-MCNC: 1.21 MG/DL (ref 0.7–1.2)
GFR AFRICAN AMERICAN: >60
GFR NON-AFRICAN AMERICAN: 58.1
GLUCOSE BLD-MCNC: 133 MG/DL (ref 70–99)
POTASSIUM SERPL-SCNC: 4.5 MEQ/L (ref 3.4–4.9)
SODIUM BLD-SCNC: 135 MEQ/L (ref 135–144)

## 2022-05-19 PROCEDURE — 80048 BASIC METABOLIC PNL TOTAL CA: CPT

## 2022-05-19 PROCEDURE — 36415 COLL VENOUS BLD VENIPUNCTURE: CPT

## 2022-06-01 ENCOUNTER — NURSE ONLY (OUTPATIENT)
Dept: FAMILY MEDICINE CLINIC | Age: 77
End: 2022-06-01
Payer: MEDICARE

## 2022-06-01 DIAGNOSIS — I10 ESSENTIAL HYPERTENSION: Chronic | ICD-10-CM

## 2022-06-01 DIAGNOSIS — E29.1 HYPOGONADISM IN MALE: Primary | ICD-10-CM

## 2022-06-01 PROCEDURE — 96372 THER/PROPH/DIAG INJ SC/IM: CPT | Performed by: FAMILY MEDICINE

## 2022-06-01 RX ORDER — TESTOSTERONE CYPIONATE 200 MG/ML
200 INJECTION INTRAMUSCULAR ONCE
Status: COMPLETED | OUTPATIENT
Start: 2022-06-01 | End: 2022-06-01

## 2022-06-01 RX ADMIN — TESTOSTERONE CYPIONATE 200 MG: 200 INJECTION INTRAMUSCULAR at 09:47

## 2022-06-01 NOTE — PROGRESS NOTES
Patient given Testosterone 200mg IM right gluteal..  Will return in 2 weeks for next injection    Patient tolerated injection well.     Administrations This Visit     testosterone cypionate (DEPOTESTOTERONE CYPIONATE) injection 200 mg     Admin Date  06/01/2022 Action  Given Dose  200 mg Route  [facility] Administered By  Nissa Arnold LPN

## 2022-06-03 RX ORDER — AMLODIPINE BESYLATE 10 MG/1
10 TABLET ORAL DAILY
Qty: 90 TABLET | Refills: 3 | Status: SHIPPED | OUTPATIENT
Start: 2022-06-03

## 2022-06-03 NOTE — TELEPHONE ENCOUNTER
Pharmacy is requesting medication refill.  Please approve or deny this request.    Rx requested:  Requested Prescriptions     Pending Prescriptions Disp Refills    amLODIPine (NORVASC) 10 MG tablet [Pharmacy Med Name: amLODIPine Besylate 10 MG Oral Tablet] 90 tablet 3     Sig: TAKE 1 TABLET BY MOUTH  DAILY         Last Office Visit:   3/17/2022      Next Visit Date:  Future Appointments   Date Time Provider Kay Bartlett   6/15/2022  9:40 AM SCHEDULE, GERBER COBB PCP TESTOSTERONE MLOX [facility]   6/29/2022  9:40 AM SCHEDULE, GERBER COBB [facility]   9/20/2022 10:40 AM Marciano Goldberg MD [address]   9/21/2022  1:30 PM Phillip Saeed MD [facility]

## 2022-06-15 ENCOUNTER — NURSE ONLY (OUTPATIENT)
Dept: FAMILY MEDICINE CLINIC | Age: 77
End: 2022-06-15
Payer: MEDICARE

## 2022-06-15 DIAGNOSIS — E29.1 HYPOGONADISM IN MALE: Primary | ICD-10-CM

## 2022-06-15 PROCEDURE — 96372 THER/PROPH/DIAG INJ SC/IM: CPT | Performed by: FAMILY MEDICINE

## 2022-06-15 RX ORDER — TESTOSTERONE CYPIONATE 200 MG/ML
200 INJECTION INTRAMUSCULAR ONCE
Status: COMPLETED | OUTPATIENT
Start: 2022-06-15 | End: 2022-06-15

## 2022-06-15 RX ADMIN — TESTOSTERONE CYPIONATE 200 MG: 200 INJECTION INTRAMUSCULAR at 09:50

## 2022-06-15 NOTE — PROGRESS NOTES
Patient given Testosterone 200mg IM left gluteal..  Will return in 2 weeks for next injection    Patient tolerated injection well.     Administrations This Visit     testosterone cypionate (DEPOTESTOTERONE CYPIONATE) injection 200 mg     Admin Date  06/15/2022 Action  Given Dose  200 mg Route  [facility] Administered By  Kit Ackerman LPN

## 2022-06-29 ENCOUNTER — NURSE ONLY (OUTPATIENT)
Dept: FAMILY MEDICINE CLINIC | Age: 77
End: 2022-06-29
Payer: MEDICARE

## 2022-06-29 DIAGNOSIS — E29.1 HYPOGONADISM IN MALE: Primary | ICD-10-CM

## 2022-06-29 PROCEDURE — 96372 THER/PROPH/DIAG INJ SC/IM: CPT | Performed by: FAMILY MEDICINE

## 2022-06-29 RX ORDER — TESTOSTERONE CYPIONATE 200 MG/ML
200 INJECTION INTRAMUSCULAR ONCE
Status: COMPLETED | OUTPATIENT
Start: 2022-06-29 | End: 2022-06-29

## 2022-06-29 RX ADMIN — TESTOSTERONE CYPIONATE 200 MG: 200 INJECTION INTRAMUSCULAR at 09:46

## 2022-06-29 NOTE — PROGRESS NOTES
Patient given Testosterone 200mg IM right gluteal..  Will return in 2 weeks for next injection    Patient tolerated injection well.     Administrations This Visit     testosterone cypionate (DEPOTESTOTERONE CYPIONATE) injection 200 mg     Admin Date  06/29/2022 Action  Given Dose  200 mg Route  [facility] Administered By  Humphrey Crooks LPN

## 2022-07-13 ENCOUNTER — NURSE ONLY (OUTPATIENT)
Dept: FAMILY MEDICINE CLINIC | Age: 77
End: 2022-07-13
Payer: MEDICARE

## 2022-07-13 DIAGNOSIS — E29.1 HYPOGONADISM IN MALE: Primary | ICD-10-CM

## 2022-07-13 PROCEDURE — 96372 THER/PROPH/DIAG INJ SC/IM: CPT | Performed by: FAMILY MEDICINE

## 2022-07-13 RX ORDER — TESTOSTERONE CYPIONATE 200 MG/ML
200 INJECTION INTRAMUSCULAR ONCE
Status: COMPLETED | OUTPATIENT
Start: 2022-07-13 | End: 2022-07-13

## 2022-07-13 RX ADMIN — TESTOSTERONE CYPIONATE 200 MG: 200 INJECTION INTRAMUSCULAR at 09:36

## 2022-07-13 NOTE — PROGRESS NOTES
Patient given Testosterone 200mg IM left gluteal..  Will return in 2 weeks for next injection    Patient tolerated injection well.     Administrations This Visit     testosterone cypionate (DEPOTESTOTERONE CYPIONATE) injection 200 mg     Admin Date  07/13/2022 Action  Given Dose  200 mg Route  [facility] Administered By  Sophie Cloud LPN

## 2022-07-27 ENCOUNTER — NURSE ONLY (OUTPATIENT)
Dept: FAMILY MEDICINE CLINIC | Age: 77
End: 2022-07-27
Payer: MEDICARE

## 2022-07-27 DIAGNOSIS — E29.1 HYPOGONADISM IN MALE: Primary | ICD-10-CM

## 2022-07-27 PROCEDURE — 96372 THER/PROPH/DIAG INJ SC/IM: CPT | Performed by: FAMILY MEDICINE

## 2022-07-27 RX ORDER — TESTOSTERONE CYPIONATE 200 MG/ML
200 INJECTION INTRAMUSCULAR ONCE
Status: COMPLETED | OUTPATIENT
Start: 2022-07-27 | End: 2022-07-27

## 2022-07-27 RX ADMIN — TESTOSTERONE CYPIONATE 200 MG: 200 INJECTION INTRAMUSCULAR at 09:43

## 2022-07-27 NOTE — PROGRESS NOTES
Patient given Testosterone 200mg IM right gluteal..  Will return in 2 weeks for next injection    Patient tolerated injection well.     Administrations This Visit       testosterone cypionate (DEPOTESTOTERONE CYPIONATE) injection 200 mg       Admin Date  07/27/2022 Action  Given Dose  200 mg Route  [facility] Administered By  Eloy Cano LPN

## 2022-08-10 ENCOUNTER — NURSE ONLY (OUTPATIENT)
Dept: FAMILY MEDICINE CLINIC | Age: 77
End: 2022-08-10
Payer: MEDICARE

## 2022-08-10 DIAGNOSIS — E29.1 HYPOGONADISM IN MALE: Primary | ICD-10-CM

## 2022-08-10 PROCEDURE — 96372 THER/PROPH/DIAG INJ SC/IM: CPT | Performed by: FAMILY MEDICINE

## 2022-08-10 RX ORDER — TESTOSTERONE CYPIONATE 200 MG/ML
200 INJECTION INTRAMUSCULAR ONCE
Status: COMPLETED | OUTPATIENT
Start: 2022-08-10 | End: 2022-08-10

## 2022-08-10 RX ADMIN — TESTOSTERONE CYPIONATE 200 MG: 200 INJECTION INTRAMUSCULAR at 09:52

## 2022-08-10 NOTE — PROGRESS NOTES
Patient given Testosterone 200mg IM left gluteal..  Will return in 2 weeks for next injection    Patient tolerated injection well.     Administrations This Visit       testosterone cypionate (DEPOTESTOTERONE CYPIONATE) injection 200 mg       Admin Date  08/10/2022 Action  Given Dose  200 mg Route  [facility] Administered By  Sheldon Ritchie LPN

## 2022-08-24 ENCOUNTER — NURSE ONLY (OUTPATIENT)
Dept: FAMILY MEDICINE CLINIC | Age: 77
End: 2022-08-24
Payer: MEDICARE

## 2022-08-24 DIAGNOSIS — E29.1 HYPOGONADISM IN MALE: Primary | ICD-10-CM

## 2022-08-24 PROCEDURE — 96372 THER/PROPH/DIAG INJ SC/IM: CPT | Performed by: FAMILY MEDICINE

## 2022-08-24 RX ORDER — TESTOSTERONE CYPIONATE 200 MG/ML
200 INJECTION INTRAMUSCULAR ONCE
Status: COMPLETED | OUTPATIENT
Start: 2022-08-24 | End: 2022-08-24

## 2022-08-24 RX ADMIN — TESTOSTERONE CYPIONATE 200 MG: 200 INJECTION INTRAMUSCULAR at 09:48

## 2022-08-24 NOTE — PROGRESS NOTES
Patient given Testosterone 200mg IM right gluteal..  Will return in 2 weeks for next injection    Patient tolerated injection well.     Administrations This Visit       testosterone cypionate (DEPOTESTOTERONE CYPIONATE) injection 200 mg       Admin Date  08/24/2022 Action  Given Dose  200 mg Route  [facility] Administered By  Eloy Cano LPN

## 2022-09-07 ENCOUNTER — NURSE ONLY (OUTPATIENT)
Dept: FAMILY MEDICINE CLINIC | Age: 77
End: 2022-09-07
Payer: MEDICARE

## 2022-09-07 DIAGNOSIS — E29.1 HYPOGONADISM IN MALE: Primary | ICD-10-CM

## 2022-09-07 PROCEDURE — 96372 THER/PROPH/DIAG INJ SC/IM: CPT | Performed by: FAMILY MEDICINE

## 2022-09-07 RX ORDER — TESTOSTERONE CYPIONATE 200 MG/ML
200 INJECTION INTRAMUSCULAR ONCE
Status: COMPLETED | OUTPATIENT
Start: 2022-09-07 | End: 2022-09-07

## 2022-09-07 RX ADMIN — TESTOSTERONE CYPIONATE 200 MG: 200 INJECTION INTRAMUSCULAR at 09:50

## 2022-09-07 NOTE — PROGRESS NOTES
Patient given Testosterone 200 mg IM left gluteal..  Will return in 2 weeks for next injection    Patient tolerated injection well.     Administrations This Visit       testosterone cypionate (DEPOTESTOTERONE CYPIONATE) injection 200 mg       Admin Date  09/07/2022 Action  Given Dose  200 mg Route  [facility] Administered By  Francia Millan LPN

## 2022-09-12 NOTE — PROGRESS NOTES
EXERCISE SESSION DETAIL Medicare Annual Wellness Visit  Name: Tanya Reagan Date: 2020   MRN: [id number] Sex: Male   Age: 76 y.o. Ethnicity: Non-/Non    : 1945 Race: Breanne Covarrubias is here for Medicare AWV    Screenings for behavioral, psychosocial and functional/safety risks, and cognitive dysfunction are all negative except as indicated below. These results, as well as other patient data from the [address] form, are documented in Flowsheets linked to this Encounter. Allergies   Allergen Reactions    Invokana [Canagliflozin] Diarrhea    Metformin And Related Diarrhea    Other      There is a list scanned in media that pt can not take due to myasthenia gravis ,  Antibiotics         Prior to Visit Medications    Medication Sig Taking? Authorizing Provider   ipratropium (ATROVENT) 0.06 % nasal spray 2 sprays by Nasal route 3 times daily Yes [place], MD   zoster recombinant adjuvanted vaccine (SHINGRIX) 50 MCG/0.5ML SUSR injection Inject 0.5 mLs into the muscle once for 1 dose - dose #2 indicated 2-6 months after dose #1 Yes [place], MD   albuterol sulfate HFA (PROAIR HFA) 108 (90 Base) MCG/ACT inhaler Inhale 2 puffs into the lungs every 6 hours as needed for Wheezing Yes [place], MD   famotidine (PEPCID) 40 MG tablet Take 1 tablet by mouth every evening Yes [place], MD   tamsulosin (FLOMAX) 0.4 MG capsule Take 2 capsules by mouth daily Yes [place], MD   SITagliptin (JANUVIA) 50 MG tablet Take 1 tablet by mouth 2 times daily Yes [place], MD   SITagliptin (JANUVIA) 100 MG tablet Take 0.5 tablets by mouth 2 times daily Yes [place], MD   Dulaglutide 0.75 MG/0.5ML SOPN Inject 0.75 mg into the skin once a week Yes [place], MD   testosterone cypionate (DEPOTESTOTERONE CYPIONATE) 200 MG/ML injection Inject 200 mg into the muscle every 14 days.  Indications: PER ORDER ON 5/15/20 Yes Historical Provider, MD 1 tablet by mouth every 6 hours as needed for Pain Yes Marvene Canavan, MD   CINNAMON PO Take 500 mg by mouth 2 times daily. Yes Historical Provider, MD   aspirin 81 MG EC tablet Take 81 mg by mouth daily.    Yes Historical Provider, MD   vitamin C (ASCORBIC ACID) 500 MG tablet Take by mouth  Historical Provider, MD   predniSONE (DELTASONE) 10 MG tablet Take 5 mg by mouth three times a week   Historical Provider, MD         Past Medical History:   Diagnosis Date    Atypical chest pain 8/30/2012    BPH (benign prostatic hypertrophy)     CAD (coronary artery disease)     Cataracts, bilateral     Chronic low back pain     COPD (chronic obstructive pulmonary disease) ([address] 6/1/2006    DM2 (diabetes mellitus, type 2) ([address]     Duodenitis     Erectile dysfunction 3/6/2017    Fatty infiltration of liver 11/19/2014    GERD (gastroesophageal reflux disease)     History of melanoma excision 12/11/2017    Left ear 09/2017    History of tobacco use 6/1/2006    HTN (hypertension)     Hyperlipidemia     Malignant melanoma of face ([address]     Malignant melanoma of skin of face ([address]     RT ear 2000, LT ear 2017    MG (myasthenia gravis) ([address]     Nephrolithiasis     Ocular myasthenia gravis ([address] 8/28/2018    Osteoarthritis of multiple joints     hands, hips    Perennial allergic rhinitis     Personal history of colonic polyps     Pseudophakia of both eyes     Status post coronary artery bypass grafts x 5 7/30/2018 07/2018 @ [facility]    Type 2 diabetes mellitus with microalbuminuria, without long-term current use of insulin ([address] 3/6/2017    Varicose veins of both lower extremities 9/11/2017       Past Surgical History:   Procedure Laterality Date    ANGIOPLASTY      CARDIAC CATHETERIZATION      #17    CATARACT REMOVAL WITH IMPLANT Left 1992    CATARACT REMOVAL WITH IMPLANT Right 1992    COLONOSCOPY  2009    tubular adenomas    COLONOSCOPY  2013    diverticulosis, polyps, 5y repeat (DR Eli Tillman)    CORONARY ARTERY BYPASS GRAFT  07/02/2018    x5 ([facility])    CORONARY ARTERY BYPASS GRAFT      5 vessel    MOHS SURGERY Left 09/2017    melanoma of left ear (DR GODWIN)    SKIN CANCER EXCISION Left 08/2017    TONSILLECTOMY  1955    UPPER GASTROINTESTINAL ENDOSCOPY      duodenitis/ poss early signs of celiac disease         Family History   Problem Relation Age of Onset    Heart Disease Mother     Heart Disease Brother     Diabetes Maternal Grandfather        CareTeam (Including outside providers/suppliers regularly involved in providing care):   Patient Care Team:  Lesley Carmona MD as PCP - General (Family Medicine)  Lesley Carmona MD as PCP - [facility]  Fer Seay MD as Consulting Physician (Urology)  Leann Stanton DO as Consulting Physician (Internal Medicine Cardiovascular Disease)  Melodie Reese MD as Consulting Physician (Gastroenterology)  Yi Cosby MD as Consulting Physician (Dermatology)  Becki Murillo (Optometry)  Joan Romano MD as Surgeon (General Surgery)  Branden Boyce (Optometry)  Danielle Ortiz MD as Consulting Physician (Neurology)  IVONNE Zimmerman as     Wt Readings from Last 3 Encounters:   07/28/20 195 lb (88.5 kg)   01/25/20 198 lb 12.8 oz (90.2 kg)   11/26/19 197 lb (89.4 kg)     Vitals:    07/28/20 1512   BP: 110/60   Position: Sitting   Cuff Size: Small Adult   Pulse: 74   Resp: 18   Temp: 98.2 °F (36.8 °C)   TempSrc: Temporal   SpO2: 94%   Weight: 195 lb (88.5 kg)   Height: 5' 11\" (1.803 m)     Body mass index is 27.2 kg/m². Based upon direct observation of the patient, evaluation of cognition reveals recent and remote memory intact. Patient's complete Health Risk Assessment and screening values have been reviewed and are found in Flowsheets. The following problems were reviewed today and where indicated follow up appointments were made and/or referrals ordered.     Positive Risk Factor Screenings with Interventions:     Health Habits/Nutrition:  Health Habits/Nutrition  Do you exercise for at least 20 minutes 2-3 times per week?: (!) No  Have you lost any weight without trying in the past 3 months?: (!) Yes  Do you eat fewer than 2 meals per day?: No  Have you seen a dentist within the past year?: Yes  Body mass index is 27.2 kg/m².   Health Habits/Nutrition Interventions:  · Inadequate physical activity:  educational materials provided to promote increased physical activity  · Nutritional issues:  educational materials for healthy, well-balanced diet provided, educational materials to promote weight loss provided    Hearing/Vision:  No exam data present  Hearing/Vision  Do you or your family notice any trouble with your hearing?: (!) Yes  Do you have difficulty driving, watching TV, or doing any of your daily activities because of your eyesight?: No  Have you had an eye exam within the past year?: Yes  Hearing/Vision Interventions:  · Hearing concerns:  patient declines any further evaluation/treatment for hearing issues    Personalized Preventive Plan   Current Health Maintenance Status  Immunization History   Administered Date(s) Administered    Influenza, High Dose (Fluzone 65 yrs and older) 11/23/2015, 09/25/2016, 09/11/2017, 10/09/2018    Influenza, Triv, inactivated, subunit, adjuvanted, IM (Fluad 65 yrs and older) 09/27/2019    Pneumococcal Conjugate 13-valent ([id number]) 03/06/2017    Pneumococcal Conjugate 7-valent (Prevnar7) 09/17/2006    Pneumococcal Polysaccharide ([id number]) 03/20/2018    Tdap (Boostrix, Adacel) 08/04/2017    Zoster Live (Zostavax) 08/04/2017        Health Maintenance   Topic Date Due    Shingles Vaccine (2 of 3) 09/29/2017    Annual Wellness Visit (AWV)  05/29/2019    Flu vaccine (1) 09/01/2020    Diabetic foot exam  11/05/2020    A1C test (Diabetic or Prediabetic)  11/05/2020    Lipid screen  11/08/2020    Potassium monitoring  05/22/2021    Creatinine instructed    Perennial allergic rhinitis  -     ipratropium (ATROVENT) 0.06 % nasal spray; 2 sprays by Nasal route 3 times daily    Need for vaccination  -     zoster recombinant adjuvanted vaccine [facility]) 50 MCG/0.5ML SUSR injection; Inject 0.5 mLs into the muscle once for 1 dose - dose #2 indicated 2-6 months after dose #1    Gastroesophageal reflux disease, esophagitis presence not specified  -     famotidine (PEPCID) 40 MG tablet;  Take 1 tablet by mouth every evening        Follow-up in 6 months for chronic disease check

## 2022-09-21 ENCOUNTER — NURSE ONLY (OUTPATIENT)
Dept: ENDOCRINOLOGY | Age: 77
End: 2022-09-21
Payer: MEDICARE

## 2022-09-21 ENCOUNTER — OFFICE VISIT (OUTPATIENT)
Dept: ENDOCRINOLOGY | Age: 77
End: 2022-09-21
Payer: MEDICARE

## 2022-09-21 VITALS
HEART RATE: 77 BPM | OXYGEN SATURATION: 95 % | WEIGHT: 193 LBS | DIASTOLIC BLOOD PRESSURE: 84 MMHG | HEIGHT: 71 IN | SYSTOLIC BLOOD PRESSURE: 132 MMHG | BODY MASS INDEX: 27.02 KG/M2

## 2022-09-21 DIAGNOSIS — E29.1 HYPOGONADISM MALE: Primary | ICD-10-CM

## 2022-09-21 PROCEDURE — G8417 CALC BMI ABV UP PARAM F/U: HCPCS | Performed by: INTERNAL MEDICINE

## 2022-09-21 PROCEDURE — G8427 DOCREV CUR MEDS BY ELIG CLIN: HCPCS | Performed by: INTERNAL MEDICINE

## 2022-09-21 PROCEDURE — 1123F ACP DISCUSS/DSCN MKR DOCD: CPT | Performed by: INTERNAL MEDICINE

## 2022-09-21 PROCEDURE — 96372 THER/PROPH/DIAG INJ SC/IM: CPT | Performed by: INTERNAL MEDICINE

## 2022-09-21 PROCEDURE — 1036F TOBACCO NON-USER: CPT | Performed by: INTERNAL MEDICINE

## 2022-09-21 PROCEDURE — 99213 OFFICE O/P EST LOW 20 MIN: CPT | Performed by: INTERNAL MEDICINE

## 2022-09-21 RX ORDER — TESTOSTERONE CYPIONATE 200 MG/ML
200 INJECTION INTRAMUSCULAR ONCE
Status: COMPLETED | OUTPATIENT
Start: 2022-09-21 | End: 2022-09-21

## 2022-09-21 RX ADMIN — TESTOSTERONE CYPIONATE 200 MG: 200 INJECTION INTRAMUSCULAR at 13:55

## 2022-09-21 NOTE — PROGRESS NOTES
9/21/2022    Assessment:       Diagnosis Orders   1. Hypogonadism male              PLAN:     Orders Placed This Encounter   Procedures    Testosterone, free, total     Standing Status:   Future     Standing Expiration Date:   9/21/2023    PSA, Diagnostic     Standing Status:   Future     Standing Expiration Date:   9/21/2023     Continue current testosterone replacement follow-up in 3 to 6 months time repeat testosterone PSA prior to next visit  OAR[facility] report reviewed  Subjective:     Chief Complaint   Patient presents with    Hypogonadism     Vitals:    09/21/22 1339   BP: 132/84   Pulse: 77   SpO2: 95%   Weight: 193 lb (87.5 kg)   Height: 5' 11\" (1.803 m)     Wt Readings from Last 3 Encounters:   09/21/22 193 lb (87.5 kg)   03/23/22 190 lb (86.2 kg)   03/17/22 192 lb (87.1 kg)     BP Readings from Last 3 Encounters:   09/21/22 132/84   03/23/22 105/78   03/17/22 126/68     Follow-up on hypogonadism patient is on testosterone injections through the testosterone clinic last testosterone level done few months ago was more than 1000 denies any symptoms of prostate enlargement PSA was above 3 just done recently history of type 2 diabetes erectile dysfunction    Other  This is a chronic (Hypogonadism) problem. The current episode started more than 1 year ago. The problem has been waxing and waning. Associated symptoms include fatigue. Treatments tried: Testosterone injections. The treatment provided moderate relief.     Latest Reference Range & Units 4/26/22 08:16 5/7/22 13:25 5/19/22 08:08   Sodium 135 - 144 mEq/L  139 135   Potassium 3.4 - 4.9 mEq/L  4.2 4.5   Chloride 95 - 107 mEq/L  98 96   CO2 20 - 31 mEq/L  26 26   BUN,BUNPL 8 - 23 mg/dL  20 19   Creatinine 0.70 - 1.20 mg/dL  1.39 (H) 1.21 (H)   Anion Gap 9 - 15 mEq/L  15 13   GFR Non- >60   49.5 (L) 58.1 (L)   GFR  >60   59.9 (L) >60.0   Glucose, Random 70 - 99 mg/dL  127 (H) 133 (H)   CALCIUM, SERUM, 322599 8.5 - 9.9 mg/dL  9.4 9.4 CHOLESTEROL, TOTAL, 224965 0 - 199 mg/dL  120    HDL Cholesterol 40 - 59 mg/dL  48    LDL Calculated 0 - 129 mg/dL  48    Triglycerides 0 - 150 mg/dL  120    Hemoglobin A1C 4.8 - 5.9 %  7.6 (H)    Testosterone 220 - 1,000 ng/dL 1,276 (H)     TSH 0.440 - 3.860 uIU/mL  0.880    Prostatic Specific Ag 0.00 - 4.00 ng/mL  3.90    (H): Data is abnormally high  (L): Data is abnormally low      Past Medical History:   Diagnosis Date    Atypical chest pain 8/30/2012    BPH (benign prostatic hypertrophy)     CAD (coronary artery disease)     Cataracts, bilateral     Chronic low back pain     COPD (chronic obstructive pulmonary disease) ([address] 6/1/2006    DM2 (diabetes mellitus, type 2) ([address]     Duodenitis     Erectile dysfunction 3/6/2017    Fatty infiltration of liver 11/19/2014    GERD (gastroesophageal reflux disease)     History of melanoma excision 12/11/2017    Left ear 09/2017    History of tobacco use 6/1/2006    HTN (hypertension)     Hyperlipidemia     Malignant melanoma of face ([address]     Malignant melanoma of skin of face ([address]     RT ear 2000, LT ear 2017    MG (myasthenia gravis) ([address]     Nephrolithiasis     Ocular myasthenia gravis ([address] 8/28/2018    Osteoarthritis of multiple joints     hands, hips    Perennial allergic rhinitis     Personal history of colonic polyps     Pseudophakia of both eyes     Status post coronary artery bypass grafts x 5 7/30/2018 07/2018 @ [facility]    Type 2 diabetes mellitus with microalbuminuria, without long-term current use of insulin ([address] 3/6/2017    Varicose veins of both lower extremities 9/11/2017     Past Surgical History:   Procedure Laterality Date    ANGIOPLASTY      CARDIAC CATHETERIZATION      #17    CATARACT REMOVAL WITH IMPLANT Left 1992    CATARACT REMOVAL WITH IMPLANT Right 1992    COLONOSCOPY  2009    tubular adenomas    COLONOSCOPY  2013    diverticulosis, polyps, 5y repeat (DR NAVAS)    CORONARY ARTERY BYPASS GRAFT  07/02/2018    x5 ( [facility]IMENE[facility] @ [facility])    CORONARY ARTERY BYPASS GRAFT      5 vessel    MOHS SURGERY Left 2017    melanoma of left ear (DR GODWIN)    SKIN CANCER EXCISION Left 2017    TONSILLECTOMY      UPPER GASTROINTESTINAL ENDOSCOPY      duodenitis/ poss early signs of celiac disease     Social History     Socioeconomic History    Marital status:      Spouse name: Sammie Alanis    Number of children: 2    Years of education: 12+    Highest education level: Not on file   Occupational History    Occupation: Farming   Tobacco Use    Smoking status: Former     Packs/day: 1.00     Years: 41.00     Pack years: 41.00     Types: Cigarettes     Start date:      Quit date: 2002     Years since quittin.9    Smokeless tobacco: Never   Substance and Sexual Activity    Alcohol use:  Yes     Alcohol/week: 0.0 standard drinks     Comment: drinks one beer at night occassionally    Drug use: No    Sexual activity: Not Currently     Partners: Female   Other Topics Concern    Not on file   Social History Narrative    Not on file     Social Determinants of Health     Financial Resource Strain: Not on file   Food Insecurity: Not on file   Transportation Needs: Not on file   Physical Activity: Not on file   Stress: Not on file   Social Connections: Not on file   Intimate Partner Violence: Not on file   Housing Stability: Not on file     Family History   Problem Relation Age of Onset    Heart Disease Mother     Heart Disease Brother     Diabetes Maternal Grandfather      Allergies   Allergen Reactions    Invokana [Canagliflozin] Diarrhea    Metformin And Related Diarrhea    Other      There is a list scanned in media that pt can not take due to myasthenia gravis ,  Antibiotics       Current Outpatient Medications:     amLODIPine (NORVASC) 10 MG tablet, Take 1 tablet by mouth daily, Disp: 90 tablet, Rfl: 3    rosuvastatin (CRESTOR) 20 MG tablet, Take 1 tablet by mouth daily, Disp: 90 tablet, Rfl: 1    triamterene-hydroCHLOROthiazide (MAXZIDE-25) 37.5-25 MG per tablet, Take 1 tablet by mouth daily, Disp: 90 tablet, Rfl: 1    metoprolol succinate (TOPROL XL) 50 MG extended release tablet, Take 3 tablets by mouth daily, Disp: 270 tablet, Rfl: 1    ipratropium (ATROVENT) 0.06 % nasal spray, 2 sprays by Nasal route 3 times daily, Disp: 45 mL, Rfl: 1    losartan (COZAAR) 100 MG tablet, Take 1 tablet by mouth daily, Disp: 90 tablet, Rfl: 1    sildenafil (VIAGRA) 100 MG tablet, TAKE ONE TABLET BY MOUTH AS NEEDED APPROXIMATELY ONE HOUR BEFORE SEXUAL ACTIVITY. DO NOT USE MORE THAN ONE DOSE DAILY, Disp: 6 tablet, Rfl: 3    Dulaglutide 0.75 MG/0.5ML SOPN, Inject 0.75 mg into the skin once a week, Disp: 12 pen, Rfl: 3    FREESTYLE LITE strip, USE 1 STRIP TO CHECK GLUCOSE ONCE DAILY AS DIRECTED, Disp: 50 each, Rfl: 0    tamsulosin (FLOMAX) 0.4 MG capsule, Take 2 capsules by mouth daily, Disp: 180 capsule, Rfl: 3    SITagliptin (JANUVIA) 100 MG tablet, Take 0.5 tablets by mouth 2 times daily, Disp: 30 tablet, Rfl: 0    glipiZIDE (GLUCOTROL) 10 MG tablet, Take 1 tablet by mouth 2 times daily (before meals), Disp: 180 tablet, Rfl: 3    Testosterone Cypionate 200 MG/ML SOLN, 1 CC EVERY 2 WEEKS, Disp: 10 mL, Rfl: 3    Cholecalciferol (VITAMIN D3) 50 MCG (2000 UT) CAPS, Take 1 capsule by mouth daily, Disp: 90 capsule, Rfl: 1    fluorouracil (EFUDEX) 5 % cream, APPLY A THIN LAYER TO BILATERAL EARS TWICE A DAY FOR 2 WEEKS.  [address] YOUR HANDS AFTER APPLICATION., Disp: , Rfl:     fluticasone (FLONASE) 50 MCG/ACT nasal spray, 1 spray by Nasal route daily, Disp: 1 Bottle, Rfl: 0    albuterol sulfate  (90 Base) MCG/ACT inhaler, Inhale 2 puffs into the lungs every 6 hours as needed for Wheezing, Disp: 3 Inhaler, Rfl: 0    famotidine (PEPCID) 40 MG tablet, Take 1 tablet by mouth every evening, Disp: 90 tablet, Rfl: 3    Handicap Placard MISC, by Does not apply route DX: COPD (J44.9), primary osteoarthritis involving multiple joints (M15.0), myasthenia gravis with exacerbation (G70.01) EXPIRES: 05/2024, Disp: 2 each, Rfl: 0    azaTHIOprine (IMURAN) 50 MG tablet, Take 50 mg by mouth Take 2 tablets by mouth in the morning and 1 tablet in the evening., Disp: , Rfl:     Cyanocobalamin (VITAMIN B 12 PO), Take 1,000 mg by mouth, Disp: , Rfl:     predniSONE (DELTASONE) 10 MG tablet, Take 5 mg by mouth three times a week , Disp: , Rfl:     nitroGLYCERIN (NITROSTAT) 0.4 MG SL tablet, Place 1 tablet under the tongue every 5 minutes as needed for Chest pain, Disp: 25 tablet, Rfl: 0    Blood Glucose Monitoring Suppl CHENCHO, Test once daily. Dx: E11.29, Disp: 1 Device, Rfl: 0    Lancets MISC, Test once daily. Dx: E11.29, Disp: 100 each, Rfl: 3    ascorbic acid (VITAMIN C) 500 MG tablet, Take 1,000 mg by mouth daily, Disp: , Rfl:     loratadine (CLARITIN) 10 MG tablet, Take 1 tablet by mouth daily, Disp: 30 tablet, Rfl: 5    acetaminophen (TYLENOL EX ST ARTHRITIS PAIN) 500 MG tablet, Take 1 tablet by mouth every 6 hours as needed for Pain, Disp: 120 tablet, Rfl: 3    CINNAMON PO, Take 500 mg by mouth 2 times daily. , Disp: , Rfl:     aspirin 81 MG EC tablet, Take 81 mg by mouth daily. , Disp: , Rfl:     testosterone cypionate (DEPOTESTOTERONE CYPIONATE) 200 MG/ML injection, Inject 1 mL into the muscle every 14 days. Indications: PER ORDER in OV note 9/16/20 (Patient taking differently: Inject 200 mg into the muscle every 14 days.  Indications: PER ORDER in OV note 3/17/21 ), Disp: 2 mL, Rfl: 3  Lab Results   Component Value Date     05/19/2022    K 4.5 05/19/2022    CL 96 05/19/2022    CO2 26 05/19/2022    BUN 19 05/19/2022    CREATININE 1.21 (H) 05/19/2022    GLUCOSE 133 (H) 05/19/2022    CALCIUM 9.4 05/19/2022    PROT 6.3 04/01/2022    LABALBU 3.8 04/01/2022    BILITOT <0.2 04/01/2022    ALKPHOS 41 04/01/2022    AST 14 04/01/2022    ALT 18 04/01/2022    LABGLOM 58.1 (L) 05/19/2022    GFRAA >60.0 05/19/2022    GLOB 2.5 04/01/2022     Lab Results   Component Value Date    WBC 9.0 04/26/2022    HGB 16.0 04/26/2022    HCT 48.9 04/26/2022    .3 (H) 04/26/2022     04/26/2022     Lab Results   Component Value Date    LABA1C 7.6 (H) 05/07/2022    LABA1C 6.4 03/17/2022    LABA1C 6.8 (H) 11/05/2021     Lab Results   Component Value Date    HDL 48 05/07/2022    HDL 42 04/01/2022    HDL 50 05/24/2021    LDLCALC 48 05/07/2022    LDLCALC 19 04/01/2022    LDLCALC 39 05/24/2021    CHOL 120 05/07/2022    CHOL 109 04/01/2022    CHOL 112 05/24/2021    TRIG 120 05/07/2022    TRIG 240 (H) 04/01/2022    TRIG 117 05/24/2021     Lab Results   Component Value Date    TESTM 418 03/31/2021    TESTM 674 09/16/2020    TESTM 886 (H) 05/22/2020     Lab Results   Component Value Date    TSH 0.880 05/07/2022    TSH 0.599 07/25/2018    TSH 0.691 08/04/2016    TSHREFLEX 1.150 05/24/2021    TSHREFLEX 0.957 11/25/2015    FT3 4.0 08/04/2016    T4FREE 1.32 08/04/2016     No results found for: TPOABS    Review of Systems   Constitutional:  Positive for fatigue. Cardiovascular: Negative. Endocrine: Negative. Genitourinary: Negative. All other systems reviewed and are negative. Objective:   Physical Exam  Vitals reviewed. Constitutional:       General: He is not in acute distress. Appearance: Normal appearance. HENT:      Head: Normocephalic and atraumatic. Right Ear: External ear normal.      Left Ear: External ear normal.      Nose: Nose normal.   Eyes:      General: No scleral icterus. Right eye: No discharge. Left eye: No discharge. Extraocular Movements: Extraocular movements intact. Conjunctiva/sclera: Conjunctivae normal.   Cardiovascular:      Rate and Rhythm: Normal rate. Pulmonary:      Effort: Pulmonary effort is normal.   Musculoskeletal:         General: Normal range of motion. Cervical back: Normal range of motion and neck supple. Neurological:      General: No focal deficit present. Mental Status: He is alert and oriented to person, place, and time. Psychiatric:         Mood and Affect: Mood normal.         Behavior: Behavior normal.

## 2022-09-26 ENCOUNTER — HOSPITAL ENCOUNTER (EMERGENCY)
Age: 77
Discharge: HOME | End: 2022-09-26
Attending: EMERGENCY MEDICINE
Payer: MEDICARE

## 2022-09-26 ENCOUNTER — APPOINTMENT (OUTPATIENT)
Dept: GENERAL RADIOLOGY | Age: 77
End: 2022-09-26
Payer: MEDICARE

## 2022-09-26 VITALS
TEMPERATURE: 98.2 F | RESPIRATION RATE: 16 BRPM | WEIGHT: 190 LBS | BODY MASS INDEX: 26.6 KG/M2 | DIASTOLIC BLOOD PRESSURE: 76 MMHG | HEIGHT: 71 IN | OXYGEN SATURATION: 94 % | SYSTOLIC BLOOD PRESSURE: 163 MMHG | HEART RATE: 81 BPM

## 2022-09-26 DIAGNOSIS — S50.01XA CONTUSION OF RIGHT ELBOW, INITIAL ENCOUNTER: Primary | ICD-10-CM

## 2022-09-26 DIAGNOSIS — S50.312A ABRASION OF LEFT ELBOW, INITIAL ENCOUNTER: ICD-10-CM

## 2022-09-26 PROCEDURE — 6370000000 HC RX 637 (ALT 250 FOR IP): Performed by: EMERGENCY MEDICINE

## 2022-09-26 PROCEDURE — 73080 X-RAY EXAM OF ELBOW: CPT

## 2022-09-26 PROCEDURE — 99283 EMERGENCY DEPT VISIT LOW MDM: CPT

## 2022-09-26 RX ORDER — HYDROCODONE BITARTRATE AND ACETAMINOPHEN 5; 325 MG/1; MG/1
1 TABLET ORAL ONCE
Status: COMPLETED | OUTPATIENT
Start: 2022-09-26 | End: 2022-09-26

## 2022-09-26 RX ORDER — HYDROCODONE BITARTRATE AND ACETAMINOPHEN 5; 325 MG/1; MG/1
1 TABLET ORAL EVERY 4 HOURS PRN
Qty: 6 TABLET | Refills: 0 | Status: SHIPPED | OUTPATIENT
Start: 2022-09-26 | End: 2022-09-29

## 2022-09-26 RX ADMIN — HYDROCODONE BITARTRATE AND ACETAMINOPHEN 1 TABLET: 5; 325 TABLET ORAL at 20:52

## 2022-09-26 ASSESSMENT — PAIN DESCRIPTION - LOCATION: LOCATION: ELBOW

## 2022-09-26 ASSESSMENT — PAIN DESCRIPTION - PAIN TYPE: TYPE: ACUTE PAIN

## 2022-09-26 ASSESSMENT — PAIN DESCRIPTION - DESCRIPTORS: DESCRIPTORS: SHARP

## 2022-09-26 ASSESSMENT — PAIN - FUNCTIONAL ASSESSMENT: PAIN_FUNCTIONAL_ASSESSMENT: 0-10

## 2022-09-26 ASSESSMENT — PAIN DESCRIPTION - ORIENTATION: ORIENTATION: RIGHT;LEFT

## 2022-09-26 ASSESSMENT — PAIN SCALES - GENERAL: PAINLEVEL_OUTOF10: 5

## 2022-09-26 ASSESSMENT — PAIN DESCRIPTION - FREQUENCY: FREQUENCY: CONTINUOUS

## 2022-09-26 NOTE — ED NOTES
Pt comes to the ED from home s/p fall ont his elbows. Pt states he fell backward onto his elbows, did not hit his head and denies additional injuries. Small skin tear to left elbow, large amount of swelling to right elbow. MSPs intact. Pt ambulatory into the ED with a steady gait.       Lyn Taylor RN  09/26/22 1955

## 2022-09-27 NOTE — DISCHARGE INSTRUCTIONS
Please use R.I.C.E.- stands for rest, ice, compression, and elevation, and taking these   simple steps following a strain, sprain, or other similar injury can help you   more quickly recover and get back to everyday activities. Return to the Emergency Department immediately if you develop worsening symptoms, or you have any other concerns. Please follow up with your family doctor in 1-2 days.

## 2022-09-29 ENCOUNTER — OFFICE VISIT (OUTPATIENT)
Dept: FAMILY MEDICINE CLINIC | Age: 77
End: 2022-09-29
Payer: MEDICARE

## 2022-09-29 VITALS
HEART RATE: 77 BPM | HEIGHT: 71 IN | DIASTOLIC BLOOD PRESSURE: 60 MMHG | SYSTOLIC BLOOD PRESSURE: 100 MMHG | WEIGHT: 190.6 LBS | TEMPERATURE: 97.1 F | OXYGEN SATURATION: 95 % | BODY MASS INDEX: 26.68 KG/M2

## 2022-09-29 DIAGNOSIS — M54.50 CHRONIC LOW BACK PAIN, UNSPECIFIED BACK PAIN LATERALITY, UNSPECIFIED WHETHER SCIATICA PRESENT: ICD-10-CM

## 2022-09-29 DIAGNOSIS — G89.29 CHRONIC LOW BACK PAIN, UNSPECIFIED BACK PAIN LATERALITY, UNSPECIFIED WHETHER SCIATICA PRESENT: ICD-10-CM

## 2022-09-29 DIAGNOSIS — E78.2 MIXED HYPERLIPIDEMIA: Chronic | ICD-10-CM

## 2022-09-29 DIAGNOSIS — I10 PRIMARY HYPERTENSION: Chronic | ICD-10-CM

## 2022-09-29 DIAGNOSIS — E55.9 VITAMIN D DEFICIENCY: ICD-10-CM

## 2022-09-29 DIAGNOSIS — E11.29 TYPE 2 DIABETES MELLITUS WITH MICROALBUMINURIA, WITHOUT LONG-TERM CURRENT USE OF INSULIN (HCC): ICD-10-CM

## 2022-09-29 DIAGNOSIS — G70.01 MYASTHENIA GRAVIS WITH EXACERBATION (HCC): ICD-10-CM

## 2022-09-29 DIAGNOSIS — I25.119 CORONARY ARTERY DISEASE INVOLVING NATIVE CORONARY ARTERY OF NATIVE HEART WITH ANGINA PECTORIS (HCC): Chronic | ICD-10-CM

## 2022-09-29 DIAGNOSIS — F32.A DEPRESSION, UNSPECIFIED DEPRESSION TYPE: ICD-10-CM

## 2022-09-29 DIAGNOSIS — R26.81 UNSTEADY GAIT: ICD-10-CM

## 2022-09-29 DIAGNOSIS — J44.9 CHRONIC OBSTRUCTIVE PULMONARY DISEASE, UNSPECIFIED COPD TYPE (HCC): Chronic | ICD-10-CM

## 2022-09-29 DIAGNOSIS — R80.9 TYPE 2 DIABETES MELLITUS WITH MICROALBUMINURIA, WITHOUT LONG-TERM CURRENT USE OF INSULIN (HCC): ICD-10-CM

## 2022-09-29 DIAGNOSIS — K76.0 FATTY INFILTRATION OF LIVER: Chronic | ICD-10-CM

## 2022-09-29 DIAGNOSIS — M25.521 RIGHT ELBOW PAIN: ICD-10-CM

## 2022-09-29 DIAGNOSIS — Z23 NEED FOR VACCINATION: ICD-10-CM

## 2022-09-29 DIAGNOSIS — Z00.00 MEDICARE ANNUAL WELLNESS VISIT, SUBSEQUENT: Primary | ICD-10-CM

## 2022-09-29 DIAGNOSIS — N18.31 STAGE 3A CHRONIC KIDNEY DISEASE (HCC): ICD-10-CM

## 2022-09-29 LAB — HBA1C MFR BLD: 6.6 %

## 2022-09-29 PROCEDURE — G0008 ADMIN INFLUENZA VIRUS VAC: HCPCS | Performed by: FAMILY MEDICINE

## 2022-09-29 PROCEDURE — 1036F TOBACCO NON-USER: CPT | Performed by: FAMILY MEDICINE

## 2022-09-29 PROCEDURE — G0439 PPPS, SUBSEQ VISIT: HCPCS | Performed by: FAMILY MEDICINE

## 2022-09-29 PROCEDURE — G8417 CALC BMI ABV UP PARAM F/U: HCPCS | Performed by: FAMILY MEDICINE

## 2022-09-29 PROCEDURE — 3023F SPIROM DOC REV: CPT | Performed by: FAMILY MEDICINE

## 2022-09-29 PROCEDURE — 99214 OFFICE O/P EST MOD 30 MIN: CPT | Performed by: FAMILY MEDICINE

## 2022-09-29 PROCEDURE — 83036 HEMOGLOBIN GLYCOSYLATED A1C: CPT | Performed by: FAMILY MEDICINE

## 2022-09-29 PROCEDURE — G8427 DOCREV CUR MEDS BY ELIG CLIN: HCPCS | Performed by: FAMILY MEDICINE

## 2022-09-29 PROCEDURE — 1123F ACP DISCUSS/DSCN MKR DOCD: CPT | Performed by: FAMILY MEDICINE

## 2022-09-29 PROCEDURE — 90694 VACC AIIV4 NO PRSRV 0.5ML IM: CPT | Performed by: FAMILY MEDICINE

## 2022-09-29 PROCEDURE — 3044F HG A1C LEVEL LT 7.0%: CPT | Performed by: FAMILY MEDICINE

## 2022-09-29 RX ORDER — DULOXETIN HYDROCHLORIDE 30 MG/1
30 CAPSULE, DELAYED RELEASE ORAL DAILY
Qty: 30 CAPSULE | Refills: 1 | Status: SHIPPED | OUTPATIENT
Start: 2022-09-29 | End: 2022-10-07

## 2022-09-29 SDOH — ECONOMIC STABILITY: FOOD INSECURITY: WITHIN THE PAST 12 MONTHS, YOU WORRIED THAT YOUR FOOD WOULD RUN OUT BEFORE YOU GOT MONEY TO BUY MORE.: NEVER TRUE

## 2022-09-29 SDOH — ECONOMIC STABILITY: FOOD INSECURITY: WITHIN THE PAST 12 MONTHS, THE FOOD YOU BOUGHT JUST DIDN'T LAST AND YOU DIDN'T HAVE MONEY TO GET MORE.: NEVER TRUE

## 2022-09-29 ASSESSMENT — ENCOUNTER SYMPTOMS
NAUSEA: 0
BACK PAIN: 1
CONSTIPATION: 0
ANAL BLEEDING: 0
VOMITING: 0
CHEST TIGHTNESS: 0
COUGH: 0
DIARRHEA: 0
WHEEZING: 0
ABDOMINAL PAIN: 0
BLOOD IN STOOL: 0
SHORTNESS OF BREATH: 0

## 2022-09-29 ASSESSMENT — SOCIAL DETERMINANTS OF HEALTH (SDOH): HOW HARD IS IT FOR YOU TO PAY FOR THE VERY BASICS LIKE FOOD, HOUSING, MEDICAL CARE, AND HEATING?: NOT HARD AT ALL

## 2022-09-29 ASSESSMENT — PATIENT HEALTH QUESTIONNAIRE - PHQ9
3. TROUBLE FALLING OR STAYING ASLEEP: 3
8. MOVING OR SPEAKING SO SLOWLY THAT OTHER PEOPLE COULD HAVE NOTICED. OR THE OPPOSITE, BEING SO FIGETY OR RESTLESS THAT YOU HAVE BEEN MOVING AROUND A LOT MORE THAN USUAL: 0
10. IF YOU CHECKED OFF ANY PROBLEMS, HOW DIFFICULT HAVE THESE PROBLEMS MADE IT FOR YOU TO DO YOUR WORK, TAKE CARE OF THINGS AT HOME, OR GET ALONG WITH OTHER PEOPLE: 2
1. LITTLE INTEREST OR PLEASURE IN DOING THINGS: 3
7. TROUBLE CONCENTRATING ON THINGS, SUCH AS READING THE NEWSPAPER OR WATCHING TELEVISION: 0
SUM OF ALL RESPONSES TO PHQ QUESTIONS 1-9: 9
SUM OF ALL RESPONSES TO PHQ9 QUESTIONS 1 & 2: 3
SUM OF ALL RESPONSES TO PHQ QUESTIONS 1-9: 9
5. POOR APPETITE OR OVEREATING: 0
6. FEELING BAD ABOUT YOURSELF - OR THAT YOU ARE A FAILURE OR HAVE LET YOURSELF OR YOUR FAMILY DOWN: 0
9. THOUGHTS THAT YOU WOULD BE BETTER OFF DEAD, OR OF HURTING YOURSELF: 0
SUM OF ALL RESPONSES TO PHQ QUESTIONS 1-9: 9
SUM OF ALL RESPONSES TO PHQ QUESTIONS 1-9: 9
4. FEELING TIRED OR HAVING LITTLE ENERGY: 3
2. FEELING DOWN, DEPRESSED OR HOPELESS: 0

## 2022-09-29 NOTE — PATIENT INSTRUCTIONS
Personalized Preventive Plan for Juanita Toure - 9/29/2022  Medicare offers a range of preventive health benefits. Some of the tests and screenings are paid in full while other may be subject to a deductible, co-insurance, and/or copay. Some of these benefits include a comprehensive review of your medical history including lifestyle, illnesses that may run in your family, and various assessments and screenings as appropriate. After reviewing your medical record and screening and assessments performed today your provider may have ordered immunizations, labs, imaging, and/or referrals for you. A list of these orders (if applicable) as well as your Preventive Care list are included within your After Visit Summary for your review. Other Preventive Recommendations:    A preventive eye exam performed by an eye specialist is recommended every 1-2 years to screen for glaucoma; cataracts, macular degeneration, and other eye disorders. A preventive dental visit is recommended every 6 months. Try to get at least 150 minutes of exercise per week or 10,000 steps per day on a pedometer . Order or download the FREE \"Exercise & Physical Activity: Your Everyday Guide\" from [employer]. Call 3-838.618.1409 or search [employer] online. You need 1918-9910 mg of calcium and 9873-3266 IU of vitamin D per day. It is possible to meet your calcium requirement with diet alone, but a vitamin D supplement is usually necessary to meet this goal.  When exposed to the sun, use a sunscreen that protects against both UVA and UVB radiation with an SPF of 30 or greater. Reapply every 2 to 3 hours or after sweating, drying off with a towel, or swimming. Always wear a seat belt when traveling in a car. Always wear a helmet when riding a bicycle or motorcycle. Heart-Healthy Diet   Sodium, Fat, and Cholesterol Controlled Diet       What Is a Heart Healthy Diet?    A heart-healthy diet is one that limits sodium , certain types of fat , and cholesterol . This type of diet is recommended for:   People with any form of cardiovascular disease (eg, coronary heart disease , peripheral vascular disease , previous heart attack , previous stroke )   People with risk factors for cardiovascular disease, such as high blood pressure , high cholesterol , or diabetes   Anyone who wants to lower their risk of developing cardiovascular disease   Sodium    Sodium is a mineral found in many foods. In general, most people consume much more sodium than they need. Diets high in sodium can increase blood pressure and lead to edema (water retention). On a heart-healthy diet, you should consume no more than 2,300 mg (milligrams) of sodium per dayabout the amount in one teaspoon of table salt. The foods highest in sodium include table salt (about 50% sodium), processed foods, convenience foods, and preserved foods. Cholesterol    Cholesterol is a fat-like, waxy substance in your blood. Our bodies make some cholesterol. It is also found in animal products, with the highest amounts in fatty meat, egg yolks, whole milk, cheese, shellfish, and organ meats. On a heart-healthy diet, you should limit your cholesterol intake to less than 200 mg per day. It is normal and important to have some cholesterol in your bloodstream. But too much cholesterol can cause plaque to build up within your arteries, which can eventually lead to a heart attack or stroke. The two types of cholesterol that are most commonly referred to are:   Low-density lipoprotein (LDL) cholesterol  Also known as bad cholesterol, this is the cholesterol that tends to build up along your arteries. Bad cholesterol levels are increased by eating fats that are saturated or hydrogenated. Optimal level of this cholesterol is less than 100. Over 130 starts to get risky for heart disease.    High-density lipoprotein (HDL) cholesterol  Also known as good cholesterol, this type of cholesterol actually carries cholesterol away from your arteries and may, therefore, help lower your risk of having a heart attack. You want this level to be high (ideally greater than 60). It is a risk to have a level less than 40. You can raise this good cholesterol by eating olive oil, canola oil, avocados, or nuts. Exercise raises this level, too. Fat    Fat is calorie dense and packs a lot of calories into a small amount of food. Even though fats should be limited due to their high calorie content, not all fats are bad. In fact, some fats are quite healthful. Fat can be broken down into four main types. The good-for-you fats are:   Monounsaturated fat  found in oils such as olive and canola, avocados, and nuts and natural nut butters; can decrease cholesterol levels, while keeping levels of HDL cholesterol high   Polyunsaturated fat  found in oils such as safflower, sunflower, soybean, corn, and sesame; can decrease total cholesterol and LDL cholesterol   Omega-3 fatty acids  particularly those found in fatty fish (such as salmon, trout, tuna, mackerel, herring, and sardines); can decrease risk of arrhythmias, decrease triglyceride levels, and slightly lower blood pressure   The fats that you want to limit are:   Saturated fat  found in animal products, many fast foods, and a few vegetables; increases total blood cholesterol, including LDL levels   Animal fats that are saturated include: butter, lard, whole-milk dairy products, meat fat, and poultry skin   Vegetable fats that are saturated include: hydrogenated shortening, palm oil, coconut oil, cocoa butter   Hydrogenated or trans fat  found in margarine and vegetable shortening, most shelf stable snack foods, and fried foods; increases LDL and decreases HDL     It is generally recommended that you limit your total fat for the day to less than 30% of your total calories.  If you follow an 1800-calorie heart healthy diet, for example, this would mean 60 (unsalted, dry-roasted), low-sodium peanut butter Dried peas, beans, and lentils   Any smoked, cured, salted, or canned meat, fish, or poultry (including benoit, chipped beef, cold cuts, hot dogs, sausages, sardines, and anchovies) Poultry skins Breaded and/or fried fish or meats Canned peas, beans, and lentils Salted nuts   Fats and Oils   Olive oil and canola oil Low-sodium, low-fat salad dressings and mayonnaise   Butter, margarine, coconut and palm oils, benoit fat   Snacks, Sweets, and Condiments   Low-sodium or unsalted versions of broths, soups, soy sauce, and condiments Pepper, herbs, and spices; vinegar, lemon, or lime juice Low-fat frozen desserts (yogurt, sherbet, fruit bars) Sugar, cocoa powder, honey, syrup, jam, and preserves Low-fat, trans-fat free cookies, cakes, and pies Gregg and animal crackers, fig bars, chloe snaps   High-fat desserts Broth, soups, gravies, and sauces, made from instant mixes or other high-sodium ingredients Salted snack foods Canned olives Meat tenderizers, seasoning salt, and most flavored vinegars   Beverages   Low-sodium carbonated beverages Tea and coffee in moderation Soy milk   Commercially softened water   Suggestions   Make whole grains, fruits, and vegetables the base of your diet. Choose heart-healthy fats such as canola, olive, and flaxseed oil, and foods high in heart-healthy fats, such as nuts, seeds, soybeans, tofu, and fish. Eat fish at least twice per week; the fish highest in omega-3 fatty acids and lowest in mercury include salmon, herring, mackerel, sardines, and canned chunk light tuna. If you eat fish less than twice per week or have high triglycerides, talk to your doctor about taking fish oil supplements. Read food labels. For products low in fat and cholesterol, look for fat free, low-fat, cholesterol free, saturated fat free, and trans fat freeAlso scan the Nutrition Facts Label, which lists saturated fat, trans fat, and cholesterol amounts. For products low in sodium, look for sodium free, very low sodium, low sodium, no added salt, and unsalted   Skip the salt when cooking or at the table; if food needs more flavor, get creative and try out different herbs and spices. Garlic and onion also add substantial flavor to foods. Trim any visible fat off meat and poultry before cooking, and drain the fat off after chavez. Use cooking methods that require little or no added fat, such as grilling, boiling, baking, poaching, broiling, roasting, steaming, stir-frying, and sauting. Avoid fast food and convenience food. They tend to be high in saturated and trans fat and have a lot of added salt. Talk to a registered dietitian for individualized diet advice. Last Reviewed: March 2011 [place]ano Che MS, MPH, RD   Updated: 3/29/2011     Patient information: Weight loss treatments    INTRODUCTION -- Obesity is a major international problem, and Americans are among the heaviest people in the world. The percentage of obese people in the [place] has risen steadily. Many people find that although they initially lose weight by dieting, they quickly regain the weight after the diet ends. Because it so hard to keep weight off over time, it is important to have as much information and support as possible before starting a diet. You are most likely to be successful in losing weight and keeping it off when you believe that your body weight can be controlled. STARTING A WEIGHT LOSS PROGRAM -- Some people like to talk to their doctor or nurse to get help choosing the best plan, monitoring progress, and getting advice and support along the way. To know what treatment (or combination of treatments) will work best, determine your body mass index (BMI) and waist circumference (measurement). The BMI is calculated from your height and weight.   A person with a BMI between 25 and 29.9 is considered overweight   A person with a BMI of 30 or greater is considered to be obese  A waist circumference greater than 35 inches (88 cm) in women and 40 inches (102 cm) in men increases the risk of obesity-related complications, such as heart disease and diabetes. People who are obese and who have a larger waist size may need more aggressive weight loss treatment than others. Talk to your doctor or nurse for advice. Types of treatment -- Based on your measurements and your medical history, your doctor or nurse can determine what combination of weight loss treatments would work best for you. Treatments may include changes in lifestyle, exercise, dieting, and, in some cases, weight loss medicines or weight loss surgery. Weight loss surgery, also called bariatric surgery, is reserved for people with severe obesity who have not responded to other weight loss treatments. SETTING A WEIGHT LOSS GOAL -- It is important to set a realistic weight loss goal. Your first goal should be to avoid gaining more weight and staying at your current weight (or within 5 percent). Many people have a \"dream\" weight that is difficult or impossible to achieve. People at high risk of developing diabetes who are able to lose 5 percent of their body weight and maintain this weight will reduce their risk of developing diabetes by about 50 percent and reduce their blood pressure. This is a success. Losing more than 15 percent of your body weight and staying at this weight is an extremely good result, even if you never reach your \"dream\" or \"ideal\" weight. LIFESTYLE CHANGES -- Programs that help you to change your lifestyle are usually run by psychologists or other professionals. The goals of lifestyle changes are to help you change your eating habits, become more active, and be more aware of how much you eat and exercise, helping you to make healthier choices. This type of treatment can be broken down into three steps:   The triggers that make you want to eat   Eating   What happens after you eat  Triggers to eat -- Determining what triggers you to eat involves figuring out what foods you eat and where and when you eat. To figure out what triggers you to eat, keep a record for a few days of everything you eat, the places where you eat, how often you eat, and the emotions you were feeling when you ate. For some people, the trigger is related to a certain time of day or night. For others, the trigger is related to a certain place, like sitting at a desk working. Eating -- You can change your eating habits by breaking the chain of events between the trigger for eating and eating itself. There are many ways to do this. For instance, you can:  Limit where you eat to a few places (eg, dining room)   Restrict the number of utensils (eg, only a fork) used for eating   Drink a sip of water between each bite   Chew your food a certain number of times   Get up and stop eating every few minutes  What happens after you eat -- Rewarding yourself for good eating behaviors can help you to develop better habits. This is not a reward for weight loss; instead, it is a reward for changing unhealthy behaviors. Do not use food as a reward. Some people find money, clothing, or personal care (eg, a hair cut, manicure, or massage) to be effective rewards. Treat yourself immediately after making better eating choices to reinforce the value of the good behavior. You need to have clear behavior goals, and you must have a time frame for reaching your goals. Reward small changes along the way to your final goal.  Other factors that contribute to successful weight loss -- Changing your behavior involves more than just changing unhealthy eating habits; it also involves finding people around you to support your weight loss, reducing stress, and learning to be strong when tempted by food. Establish a \"margaret\" system -- Having a friend or family member available to provide support and reinforce good behavior is very helpful.  The support person needs to understand your goals. Learn to be strong -- Learning to be strong when tempted by food is an important part of losing weight. As an example, you will need to learn how to say \"no\" and continue to say no when urged to eat at parties and social gatherings. Develop strategies for events before you go, such as eating before you go or taking low-calorie snacks and drinks with you. Develop a support system -- Having a support system is helpful when losing weight. This is why many [employer] groups are successful. Family support is also essential; if your family does not support your efforts to lose weight, this can slow your progress or even keep you from losing weight. Positive thinking -- People often have conversations with themselves in their head; these conversations can be positive or negative. If you eat a piece of cake that was not planned, you may respond by thinking, \"Oh, you stupid idiot, you've blown your diet! \" and as a result, you may eat more cake. A positive thought for the same event could be, \"Well, I ate cake when it was not on my plan. Now I should do something to get back on track. \" A positive approach is much more likely to be successful than a negative one. Reduce stress -- Although stress is a part of everyday life, it can trigger uncontrolled eating in some people. It is important to find a way to get through these difficult times without eating or by eating low-calorie food, like raw vegetables. It may be helpful to imagine a relaxing place that allows you to temporarily escape from stress. With deep breaths and closed eyes, you can imagine this relaxing place for a few minutes. Self-help programs -- Self-help programs like [employer] Watchers®, Overeaters Anonymous®, and Take Off Javier (TOPS)© work for some people.  As with all weight loss programs, you are most likely to be successful with these plans if you make long-term changes in how you eat.  CHOOSING A DIET -- A calorie is a unit of energy found in food. Your body needs calories to function. The goal of any diet is to burn up more calories than you eat. How quickly you lose weight depends upon several factors, such as your age, gender, and starting weight. Older people have a slower metabolism than young people, so they lose weight more slowly. Men lose more weight than women of similar height and weight when dieting because they use more energy. People who are extremely overweight lose weight more quickly than those who are only mildly overweight. Try not to drink alcohol or drinks with added sugar, and most sweets (candy, cakes, cookies), since they rarely contain important nutrients. Portion-controlled diets -- One simple way to diet is to buy packaged foods, like frozen low-calorie meals or meal-replacement canned drinks. A typical meal plan for one day may include:  A meal-replacement drink or breakfast bar for breakfast   A meal-replacement drink or a frozen low-calorie (250 to 350 calories) meal for lunch   A frozen low-calorie meal or other prepackaged, calorie-controlled meal, along with extra vegetables for dinner  This would give you 1000 to 1500 calories per day. Low-fat diet -- To reduce the amount of fat in your diet, you can:  Eat low-fat foods. Low-fat foods are those that contain less than 30 percent of calories from fat. Fat is listed on the food facts label (figure 1). Count fat grams. For a 1500 calorie diet, this would mean about 45 g or fewer of fat per day. Low-carbohydrate diet -- Low- and very-low-carbohydrate diets (eg, Atkins diet, [address] [place] [address]) have become popular ways to lose weight quickly.   With a very-low-carbohydrate diet, you eat between 0 and 60 grams of carbohydrates per day (a standard diet contains 200 to 300 grams of carbohydrates)   With a low-carbohydrate diet, you eat between 60 and 130 grams of carbohydrates per day  Carbohydrates are found in fruits, vegetables, and grains (including breads, rice, pasta, and cereal), alcoholic beverages, and in dairy products. Meat and fish do not contain carbohydrates. Side effects of very-low-carbohydrate diets can include constipation, headache, bad breath, muscle cramps, diarrhea, and weakness. Mediterranean diet -- The term \"Mediterranean diet\" refers to a way of eating that is common in [place]growing regions around the [facility]. Although there is some variation in Mediterranean diets, there are some similarities. Most Mediterranean diets include:  A high level of monounsaturated fats (from olive or canola oil, walnuts, pecans, almonds) and a low level of saturated fats (from butter)   A high amount of vegetables, fruits, legumes, and grains (7 to 10 servings of fruits and vegetables per day)   A moderate amount of milk and dairy products, mostly in the form of cheese. Use low-fat dairy products (skim milk, fat-free yogurt, low-fat cheese). A relatively low amount of red meat and meat products. Substitute fish or poultry for red meat. For those who drink alcohol, a modest amount (mainly as red wine) may help to protect against cardiovascular disease. A modest amount is up to one (4 ounce) glass per day for women and up to two glasses per day for men. Which diet is best? -- Studies have compared different diets, including:  Very-low-carbohydrate (Atkins)   Macronutrient balance controlling glycemic load (Zone®)   Reduced-calorie (Weight Watchers®)   Very-low-fat (Ornish)  No one diet is \"best\" for weight loss. Any diet will help you to lose weight if you stick with the diet. Therefore, it is important to choose a diet that includes foods you like. Fad diets -- Fad diets often promise quick weight loss (more than 1 to 2 pounds per week) and may claim that you do not need to exercise or give up favorite foods. Some fad diets cost a lot of money, because you have to pay for seminars or pills. Fad diets generally lack any scientific evidence that they are safe and effective, but instead rely on \"before\" and \"after\" photos or testimonials. Diets that sound too good to be true usually are. These plans are a waste of time and money and are not recommended. A doctor, nurse, or nutritionist can help you find a safe and effective way to lose weight and keep it off. WEIGHT LOSS MEDICINES -- Taking a weight loss medicine may be helpful when used in combination with diet, exercise, and lifestyle changes. However, it is important to understand the risks and benefits of these medicines. It is also important to be realistic about your goal weight using a weight loss medicine; you may not reach your \"dream\" weight, but you may be able to reduce your risk of diabetes or heart disease. Weight loss medicines may be recommended for people who have not been able to lose weight with diet and exercise who have a:  BMI of 30 or more    BMI between 27 and 29.9 and have other medical problems, such as diabetes, high cholesterol, or high blood pressure  Two weight loss medicines are approved in the [place] for long-term use. These are sibutramine and orlistat. Other weight loss medicines (phentermine, diethylpropion) are available but are only approved for short-term use (up to 12 weeks). Sibutramine -- Sibutramine (Meridia®, Reductil®) is a medicine that reduces your appetite. In people who take the medicine for one year, the average weight loss is 10 percent of the initial body weight (5 percent more than those who took a placebo treatment). Side effects of sibutramine include insomnia, dry mouth, and constipation. Increases in blood pressure can occur. Therefore, blood pressure is usually monitored during treatment. There is no evidence that sibutramine causes heart or lung problems (like dexfenfluramine and fenfluramine (Phen/Fen)).  However, experts agree that sibutramine should not used by people with coronary heart disease, heart failure, uncontrolled hypertension, stroke, irregular heart rhythms, or peripheral vascular disease (poor circulation in the legs). Orlistat -- Orlistat (Xenical® 120 mg capsules) is a medicine that reduces the amount of fat your body absorbs from the foods you eat. A lower-dose version is now available without a prescription (Moy® 60 mg capsules) in many countries, including the [place]. The medicine is recommended three times per day, taken with a meal; you can skip a dose if you skip a meal or if the meal contains no fat. After one year of treatment with orlistat, the average weight loss is approximately 8 to 10 percent of initial body weight (4 percent more than in those who took a placebo). Cholesterol levels often improve, and blood pressure sometimes falls. In people with diabetes, orlistat may help control blood sugar levels. Side effects occur in 15 to 10 percent of people and may include stomach cramps, gas, diarrhea, leakage of stool, or oily stools. These problems are more likely when you take orlistat with a high-fat meal (if more than 30 percent of calories in the meal are from fat). Side effects usually improve as you learn to avoid high-fat foods. Severe liver injury has been reported rarely in patients taking orlistat, but it is not known if orlistat caused the liver problems. Diet supplements -- Diet supplements are widely used by people who are trying to lose weight, although the safety and efficacy of these supplements are often unproven. A few of the more common diet supplements are discussed below; none of these are recommended because they have not been studied carefully, and there is no proof they are safe or effective. Chitosan and wheat dextrin are ineffective for weight loss, and their use is not recommended. Ephedra, a compound related to ephedrine, is no longer available in the [place] due to safety concerns.  Many nonprescription diet pills previously contained ephedra. Although some studies have shown that ephedra helps with weight loss, there can be serious side effects (psychiatric symptoms, palpitations, and stomach upset), including death. There are not enough data about the safety and efficacy of chromium, ginseng, glucomannan, green tea, hydroxycitric acid, L carnitine, psyllium, pyruvate supplements, [place] wort, and conjugated linoleic acid. Two supplements from [facility], [address] [place] (also known as the Kellysadi Maciel 15 pill) and Herbathin dietary supplement, have been shown to contain prescription drugs. Hoodia gordonii is a dietary supplement derived from a plant in [place]. It is not recommended because there is no proof that it is safe or effective. Bitter orange (Citrus aurantium) can increase your heart rate and blood pressure and is not recommended. SHOULD I HAVE SURGERY TO LOSE WEIGHT? -- Weight loss surgery is recommended ONLY for people with one of the following:  Severe obesity (body mass index above 40) (calculator 1 and calculator 2) who have not responded to diet, exercise, or weight loss medicines   Body mass index between 35 and 40, along with a serious medical problem (including diabetes, severe joint pain, or sleep apnea) that would improve with weight loss  You should be sure that you understand the potential risks and benefits of weight loss surgery. You must be motivated and willing to make lifelong changes in how you eat to reach and maintain a healthier weight after surgery. You must also be realistic about weight loss after surgery (see 'Effectiveness of weight loss surgery' below). PREPARING FOR WEIGHT LOSS SURGERY -- Most people who have weight loss surgery will meet with several specialists before surgery is scheduled. This often includes a dietitian, mental health counselor, a doctor who specializes in care of obese people, and a surgeon who performs weight loss surgery (bariatric surgeon).  You may need to work with these providers for several weeks or months before surgery. The nutritionist will explain what and how much you will be able to eat after surgery. You may also need to lose a small amount of weight before surgery. The mental health specialist will help you to cope with stress and other factors that can make it harder to lose weight or trigger you to eat   The medical doctor will determine whether you need other tests, counseling, or treatment before surgery. He or she might also help you begin a medical weight loss program so that you can lose some weight before surgery. The bariatric surgeon will meet with you to discuss the surgeries available to treat obesity. He or she will also make sure you are a good candidate for surgery. TYPES OF WEIGHT LOSS SURGERY -- There are several types of weight loss surgeries, the most common being lap banding, gastric bypass, and gastric sleeve. Lap banding -- Laparoscopic adjustable gastric banding (LAGB), or lap banding, is a surgery that uses an adjustable band around the opening to the stomach (figure 1). This reduces the amount of food that you can eat at one time. Lap banding is done through small incisions, with a laparoscope. The band can be adjusted after surgery, allowing you to eat more or less food. Adjustments to the size and tightness of the band are made by using a needle to add or remove fluid from a port (a small container under the skin that is connected to the band). Adding fluid to the band makes it tighter which restricts the amount of food you can eat and may help you to lose more weight. Lap banding is a popular choice because it is relatively simple to perform, can be adjusted or removed, and has a low risk of serious complications immediately after surgery. However, weight loss with the lap band depends on your ability to follow the program closely.   You will need to prepare nutritious meals that \"work with\" the band, not against it. For example, the lap band will not work well if you eat or drink a large amount of liquid calories (like ice cream). The band will not help you to feel full when you eat/drink liquid calories. Weight loss ranges from 45 to 75 percent after two years. As an example, a person who is 120 pounds overweight could expect to lose approximately 54 to 90 pounds in the two years after lap banding. Gastric bypass -- Kayce-en-Y gastric bypass, also called gastric bypass, helps you to lose weight by reducing the amount of food you can eat and reducing the number of calories and nutrients you absorb from the food you eat. To perform gastric bypass, a surgeon creates a small stomach pouch by dividing the stomach and attaching it to the small intestine. This helps you to lose weight in two ways: The smaller stomach can hold less food than before surgery. This causes you to feel full after eating a very small amount of food or liquid. Over time, the pouch might stretch, allowing you to eat more food. The body absorbs fewer calories, since food bypasses most of the stomach as well as the upper small intestine. This new arrangement seems to decrease your appetite and change how you break down foods by changing the release of various hormones. Gastric bypass can be performed as open surgery (through an incision on the abdomen) or laparoscopically, which uses smaller incisions and smaller instruments. Both the laparoscopic and open techniques have risks and benefits. You and your surgeon should work together to decide which surgery, if any, is right for you. Gastric bypass has a high success rate, and people lose an average of 62 to 68 percent of their excess body weight in the first year. Weight loss typically levels off after one to two years, with an overall excess weight loss between 50 and 75 percent. For a person who is 120 pounds overweight, an average of 60 to 90 pounds of weight loss would be expected.   Gastric sleeve -- Gastric sleeve, also known as sleeve gastrectomy, is a surgery that reduces the size of the stomach and makes it into a narrow tube (figure 3). The new stomach is much smaller and produces less of the hormone (ghrelin) that causes hunger, helping you feel satisfied with less food. Sleeve gastrectomy is safer than gastric bypass because the intestines are not rearranged, and there is less chance of malnutrition. It also appears to control hunger better than lap banding. It might be safer than the lap banding because no foreign materials are used. The gastric sleeve has a good success rate, and people lose an average of 33 percent of their excess body weight in the first year. For a person who is 120 pounds overweight, this would mean losing about 40 pounds in the first year. WEIGHT LOSS SURGERY COMPLICATIONS -- A variety of complications can occur with weight loss surgery. The risks of surgery depend upon which surgery you have and any medical problems you had before surgery. Some of the more common early surgical complications (one to six weeks after surgery) include:  Bleeding   Infection   Blockage or tear in the bowels   Need for further surgery  Important medical complications after surgery can include blood clots in the legs or lungs, heart attack, pneumonia, and urinary tract infection. Complications are less likely when surgery is performed in centers that are experienced in weight loss surgery. In general, centers with experience in weight loss surgery have:  Board-certified doctors and surgeons   A team of support staff (dietitians, counselors, nurses)   Long-term follow-up after surgery   Hospital staff experienced with the care of weight loss patients. This includes nurses who are trained in the care of patients immediately after surgery and anesthesiologists who are experienced in caring for the morbidly obese.   EFFECTIVENESS OF WEIGHT LOSS SURGERY -- The goal of weight loss surgery is to reduce the risk of illness or death associated with obesity. Weight loss surgery can also help you to feel and look better, reduce the amount of money you spend on medicines, and cut down on sick days. As an example, weight loss surgery can improve health problems related to obesity (diabetes, high blood pressure, high cholesterol, sleep apnea) to the point that you need less or no medicine. Finally, weight loss surgery might reduce your risk of developing heart disease, cancer, and certain infections. AFTER WEIGHT LOSS SURGERY -- You will need to stay in the hospital until your team feels that it is safe for you to leave (on average, one to three days). Do not drive if you are taking prescription pain medicine. Begin exercising as soon as possible once you have healed; most weight loss centers will design an exercise program for you. Once you are home, it is important to eat and drink exactly what your doctor and dietitian recommend. You will see your doctor, nurse, and dietitian on a regular basis after surgery to monitor your health, diet, and weight loss. You will be able to slowly increase how much you eat over time, although it will always be important to:  Eat small, frequent meals and not skip meals   Chew your food slowly and completely   Avoid eating while \"distracted\" (such as eating while watching TV)   Stop eating when you feel full   Drink liquids at least 30 minutes before or after eating   Avoid foods high in fat or sugar   Take vitamin supplements, as recommended  It can take several months to learn to listen to your body so that you know when you are hungry and when you are full. You may dislike foods you previously loved, and you may begin to prefer new foods. This can be a frustrating process for some people, so talk to your dietitian if you are having trouble. It usually takes between one and two years to lose weight after surgery.  After reaching their goal weight, some people have plastic surgery (called \"body contouring\") to remove excess skin from the body, particularly in the abdominal area. Before you decide to have weight loss surgery, you must commit to staying healthy for life. This includes following up with your healthcare team, exercising most days of the week, and eating a sensible diet every day. It can be difficult to develop new eating and exercise habits after weight loss surgery, and you will have to work hard to stick to your goals. Recovering from surgery and losing weight can be stressful and emotional, and it is important to have the support of family and friends. Working with a , therapist, or support group can help you through the ups and downs. WHERE TO GET MORE INFORMATION -- Your healthcare provider is the best source of information for questions and concerns related to your medical problem. This article will be updated as needed every four months on our Web site ([email or web address])    [address] [facility] [address] [facility]       As we get older, changes in balance, gait, strength, vision, hearing, and cognition make even the most youthful senior more prone to accidents. Falls are one of the leading health risks for older people. This increased risk of falling is related to:   Aging process (eg, decreased muscle strength, slowed reflexes)   Higher incidence of chronic health problems (eg, arthritis, diabetes) that may limit mobility, agility or sensory awareness   Side effects of medicine (eg, dizziness, blurred vision)especially medicines like prescription pain medicines and drugs used to treat mental health conditions   Depending on the brittleness of your bones, the consequences of a fall can be serious and long lasting. Home Life   Research by the Association of Aging [facility]) shows that some home accidents among older adults can be prevented by making simple lifestyle changes and basic modifications and repairs to the home environment.  Here are some lifestyle changes that experts recommend:   Have your hearing and vision checked regularly. Be sure to wear prescription glasses that are right for you. Speak to your doctor or pharmacist about the possible side effects of your medicines. A number of medicines can cause dizziness. If you have problems with sleep, talk to your doctor. Limit your intake of alcohol. If necessary, use a cane or walker to help maintain your balance. Wear supportive, rubber-soled shoes, even at home. If you live in a region that gets wintry weather, you may want to put special cleats on your shoes to prevent you from slipping on the snow and ice. Exercise regularly to help maintain muscle tone, agility, and balance. Always hold the banister when going up or down stairs. Also, use  bars when getting in or out of the bath or shower, or using the toilet. To avoid dizziness, get up slowly from a lying down position. Sit up first, dangling your legs for a minute or two before rising to a standing position. Overall Home Safety Check   According to the Consumer Product Safety Commision's \"Older Consumer Home Safety Checklist,\" it is important to check for potential hazards in each room. And remember, proper lighting is an essential factor in home safety. If you cannot see clearly, you are more likely to fall. Important questions to ask yourself include:   Are lamp, electric, extension, and telephone cords placed out of the flow of traffic and maintained in good condition? Have frayed cords been replaced? Are all small rugs and runners slip resistant? If not, you can secure them to the floor with a special double-sided carpet tape. Are smoke detectors properly locatedone on every floor of your home and one outside of every sleeping area? Are they in good working order? Are batteries replaced at least once a year? Do you have a well-maintained carbon monoxide detector outside every sleeping are in your home?    Does your furniture layout leave plenty of space to maneuver between and around chairs, tables, beds, and sofas? Are hallways, stairs and passages between rooms well lit? Can you reach a lamp without getting out of bed? Are floor surfaces well maintained? Shag rugs, high-pile carpeting, tile floors, and polished wood floors can be particularly slippery. Stairs should always have handrails and be carpeted or fitted with a non-skid tread. Is your telephone easily reachable. Is the cord safely tucked away? Room by Room   According to the Association of Aging, bathrooms and clark are the two most potentially hazardous rooms in your home. In the Kitchen    Be sure your stove is in proper working order and always make sure burners and the oven are off before you go out or go to sleep. Keep pots on the back burners, turn handles away from the front of the stove, and keep stove clean and free of grease build-up. Kitchen ventilation systems and range exhausts should be working properly. Keep flammable objects such as towels and pot holders away from the cooking area except when in use. Make sure kitchen curtains are tied back. Move cords and appliances away from the sink and hot surfaces. If extension cords are needed, install wiring guides so they do not hang over the sink, range, or working areas. Look for coffee pots, kettles and toaster ovens with automatic shut-offs. Keep a mop handy in the kitchen so you can wipe up spills instantly. You should also have a small fire extinguisher. Arrange your kitchen with frequently used items on lower shelves to avoid the need to stand on a stepstool to reach them. Make sure countertops are well-lit to avoid injuries while cutting and preparing food. In the Bathroom    Use a non-slip mat or decals in the tub and shower, since wet, soapy tile or porcelain surfaces are extremely slippery.     Make sure bathroom rugs are non-skid or tape them firmly to the floor. Bathtubs should have at least one, preferably two, grab bars, firmly attached to structural supports in the wall. (Do not use built-in soap holders or glass shower doors as grab bars.)    Tub seats fitted with non-slip material on the legs allow you to wash sitting down. For people with limited mobility, bathtub transfer benches allow you to slide safely into the tub. Raised toilet seats and toilet safety rails are helpful for those with knee or hip problems. In the [facility]    Make sure you use a nightlight and that the area around your bed is clear of potential obstacles. Be careful with electric blankets and never go to sleep with a heating pad, which can cause serious burns even if on a low setting. Use fire-resistant mattress covers and pillows, and NEVER smoke in bed. Keep a phone next to the bed that is programmed to dial 911 at the push of a button. If you have a chronic condition, you may want to sign on with an automatic call-in service. Typically the system includes a small pendant that connects directly to an emergency medical voice-response system. You should also make arrangements to stay in contact with someonefriend, neighbor, family memberon a regular schedule. Fire Prevention   According to the Mobile Event Guide. (Smoke Alarms for Every) [address] [place] [address], senior citizens are one of the two highest risk groups for death and serious injuries due to residential fires. When cooking, wear short-sleeved items, never a bulky long-sleeved robe. The [facility]'s Safety Checklist for Older Consumers emphasizes the importance of checking basements, garages, workshops and storage areas for fire hazards, such as volatile liquids, piles of old rags or clothing and overloaded circuits. Never smoke in bed or when lying down on a couch or recliner chair. Small portable electric or kerosene heaters are responsible for many home fires and should be used cautiously if at all.  If you do use one, be sure to keep them away from flammable materials. In case of fire, make sure you have a pre-established emergency exit plan. Have a professional check your fireplace and other fuel-burning appliances yearly. [employer]   Baby boomers entering the [place]qu[place] years will continue to see the development of new products to help older adults live safely and independently in spite of age-related changes. Making Life More Livable  , by Hilario Cruz, lists over 1,000 products for \"living well in the mature years,\" such as bathing and mobility aids, household security devices, ergonomically designed knives and peelers, and faucet valves and knobs for temperature control. Medical supply stores and organizations are good sources of information about products that improve your quality of life and insure your safety. Last Reviewed: November 2009 Doug Jamison MD   Updated: 3/7/2011            Advance Care Planning: Care Instructions  Your Care Instructions     It can be hard to live with an illness that cannot be cured. But if your health is getting worse, you may want to make decisions about end-of-life care. Planning for the end of your life does not mean that you are giving up. It is a way to make sure that your wishes are met. Clearly stating your wishes can make it easier for your loved ones. Making plans while you are still able may also ease your mind and make your final days less stressful and more meaningful. Follow-up care is a key part of your treatment and safety. Be sure to make and go to all appointments, and call your doctor if you are having problems. It's also a good idea to know your test results and keep a list of the medicines you take. What can you do to plan for the end of life? You can bring these issues up with your doctor. You do not need to wait until your doctor starts the conversation. You might start with, \"What makes life worth living for me is. Lydia Stands  .\" And then follow it with, \"I would not be willing to live with . Brandin Silverman \" When you complete this sentence it helps your doctor understand your wishes. Talk openly and honestly with your doctor. This is the best way to understand the decisions you will need to make as your health changes. Know that you can always change your mind. Ask your doctor about commonly used life-support measures. These include tube feedings, breathing machines, and fluids given through a vein (IV). Understanding these treatments will help you decide whether you want them. You may choose to have these life-supporting treatments for a limited time. This allows a trial period to see whether they will help you. You may also decide that you want your doctor to take only certain measures to keep you alive. It may help to think about the big picture, like what makes life worth living for you or what your values and goals are. Talk to your doctor about how long you are likely to live. Your doctor may be able to give you an idea of what usually happens with your specific illness. Think about preparing papers that state your wishes. These papers are called advance directives. If you do this early and review them often, there will not be any confusion about what you want. You can change your instructions at any time. Which papers should you prepare? Advance directives are legal papers that tell doctors how you want to be cared for at the end of your life. You do not need a  to write these papers. Ask your doctor or your state health department for information on how to write your advance directives. They may have the forms for each of these types of papers. Make sure your doctor has a copy of these on file, and give a copy to a family member or close friend. Consider a do-not-resuscitate order (DNR). This order asks that no extra treatments be done if your heart stops or you stop breathing.  Extra treatments may include cardiopulmonary resuscitation (CPR), electrical shock to restart your heart, or a machine to breathe for you. If you decide to have a DNR order, ask your doctor to explain and write it. Place the order in your home where everyone can easily see it. Consider a living will. A living will explains your wishes about life support and other treatments at the end of your life if you become unable to speak for yourself. Living tobin tell doctors to use or not use treatments that would keep you alive. You must have one or two witnesses or a notary present when you sign this form. A living will may be called something else in your state. Consider a medical power of . This form allows you to name a person to make decisions about your care if you are not able to. Most people ask a close friend or family member. Talk to this person about the kinds of treatments you want and those that you do not want. Make sure this person understands your wishes. A medical power of  may be called something else in your state. These legal papers are simple to change. Tell your doctor what you want to change, and ask him or her to make a note in your medical file. Give your family updated copies of the papers. Where can you learn more? Go to https://[email or web address]. org and sign in to your [employer] account. Enter P184 in the [facility] box to learn more about \"Advance Care Planning: Care Instructions. \"     If you do not have an account, please click on the \"Sign Up Now\" link. Current as of: October 6, 2021               Content Version: 13.4  © 2006-2022 Healthwise, Incorporated. Care instructions adapted under license by [facility] ([facility]). If you have questions about a medical condition or this instruction, always ask your healthcare professional. [place] [address] any warranty or liability for your use of this information. Learning About Living [place]  What is a living will?      A living will, also called a declaration, is a legal form. It tells your family and your doctor your wishes when you can't speak for yourself. It's used by the health professionals who will treat you as you near the end of your life or if you get seriously hurt or ill. If you put your wishes in writing, your loved ones and others will know what kind of care you want. They won't need to guess. This can ease your mind and be helpful to others. And you can change or cancel your living will at any time. A living will is not the same as an estate or property will. An estate will explains what you want to happen with your money and property after you die. How do you use it? Keep these facts in mind about how a living will is used. Your living will is used only if you can't speak or make decisions for yourself. Most often, one or more doctors must certify that you can't speak or decide for yourself before your living will takes effect. If you get better and can speak for yourself again, you can accept or refuse any treatment. It doesn't matter what you said in your living will. Some states may limit your right to refuse treatment in certain cases. For example, you may need to clearly state in your living will that you don't want artificial hydration and nutrition, such as being fed through a tube. Is a living will a legal document? A living will is a legal document. Each state has its own laws about living tobin. And a living will may be called something else in your state. Here are some things to know about living tobin. You don't need an  to complete a living will. But legal advice can be helpful if your state's laws are unclear. It can also help if your health history is complicated or your family can't agree on what should be in your living will. You can change your living will at any time. Some people find that their wishes about end-of-life care change as their health changes.  If you make big changes to your living will, complete a new form. If you move to another state, make sure that your living will is legal in the state where you now live. In most cases, doctors will respect your wishes even if you have a form from a different state. You might use a universal form that has been approved by many states. This kind of form can sometimes be filled out and stored online. Your digital copy will then be available wherever you have a connection to the internet. The doctors and nurses who need to treat you can find it right away. Your state may offer an online registry. This is another place where you can store your living will online. It's a good idea to get your living will notarized. This means using a person called a DediServe to watch two people sign, or witness, your living will. What should you know when you create a living will? Here are some questions to ask yourself as you make your living will. Do you know enough about life support methods that might be used? If not, talk to your doctor so you know what might be done if you can't breathe on your own, your heart stops, or you can't swallow. What things would you still want to be able to do after you receive life-support methods? Would you want to be able to walk? To speak? To eat on your own? To live without the help of machines? Do you want certain Gnosticism practices performed if you become very ill? If you have a choice, where do you want to be cared for? In your home? At a hospital or nursing home? If you have a choice at the end of your life, where would you prefer to die? At home? In a hospital or nursing home? Somewhere else? Would you prefer to be buried or cremated? Do you want your organs to be donated after you die? What should you do with your living will? Make sure that your family members and your health care agent have copies of your living will (also called a declaration). Give your doctor a copy of your living will.  Ask to have it kept as part of your medical record. If you have more than one doctor, make sure that each one has a copy. Put a copy of your living will where it can be easily found. For example, some people may put a copy on their refrigerator door. If you are using a digital copy, be sure your doctor, family members, and health care agent know how to find and access it. Where can you learn more? Go to [email or web address].[email or web address] org and sign in to your Cogn[employer] account. Enter S808 in the Cleanify box to learn more about \"Learning About Living Perroy. \"     If you do not have an account, please click on the \"Sign Up Now\" link. Current as of: June 16, 2022               Content Version: 13.4  © 8572-1757 Healthwise, [employer]. Care instructions adapted under license by [facility] ([facility]). If you have questions about a medical condition or this instruction, always ask your healthcare professional. [place] [address] any warranty or liability for your use of this information. Learning About Medical Power of   What is a medical power of ? A medical power of , also called a durable power of  for health care, is one type of the legal forms called advance directives. It lets you name the person you want to make treatment decisions for you if you can't speak or decide for yourself. The person you choose is called your health care agent. This person is also called a health care proxy or health care surrogate. A medical power of  may be called something else in your state. How do you choose a health care agent? Choose your health care agent carefully. This person may or may not be a family member. Talk to the person before you make your final decision. Make sure he or she is comfortable with this responsibility. It's a good idea to choose someone who:  Is at least 25years old.   Knows you well and understands what makes life meaningful for you.  Understands your Voodoo and moral values. Will do what you want, not what he or she wants. Will be able to make difficult choices at a stressful time. Will be able to refuse or stop treatment, if that is what you would want, even if you could die. Will be firm and confident with health professionals if needed. Will ask questions to get needed information. Lives near you or agrees to travel to you if needed. Your family may help you make medical decisions while you can still be part of that process. But it's important to choose one person to be your health care agent in case you aren't able to make decisions for yourself. If you don't fill out the legal form and name a health care agent, the decisions your family can make may be limited. A health care agent may be called something else in your state. Who will make decisions for you if you don't have a health care agent? If you don't have a health care agent or a living will, you may not get the care you want. Decisions may be made by family members who disagree about your medical care. Or decisions may be made by a medical professional who doesn't know you well. In some cases, a  makes the decisions. When you name a health care agent, it is very clear who has the power to make health decisions for you. How do you name a health care agent? You name your health care agent on a legal form. This form is usually called a medical power of . Ask your hospital, state bar association, or office on aging where to find these forms. You must sign the form to make it legal. Some states require you to get the form notarized. This means that a person called a  watches you sign the form and then he or she signs the form. Some states also require that two or more witnesses sign the form. Be sure to tell your family members and doctors who your health care agent is. Where can you learn more?   Go to [email or web address].[email or web address] org and sign in to your [employer] account. Enter [id number] in the [facility] box to learn more about \"Learning About [address]. \"     If you do not have an account, please click on the \"Sign Up Now\" link. Current as of: October 6, 2021               Content Version: 13.4  © 9003-5253 Healthwise, Incorporated. Care instructions adapted under license by [facility] ([facility]). If you have questions about a medical condition or this instruction, always ask your healthcare professional. [address] any warranty or liability for your use of this information.

## 2022-09-29 NOTE — ASSESSMENT & PLAN NOTE
Patient is established with neurology at [facility] for long-term management. I will have office staff help him schedule a visit due to reported lower extremity weakness and recent falls.

## 2022-09-29 NOTE — ASSESSMENT & PLAN NOTE
Stable renal function on most recent testing. I stressed with patient the importance of staying well-hydrated and drinking at least 48 ounces of water daily. I reviewed the importance of long-term blood pressure, blood sugar, and cholesterol control.   We will continue to monitor over time

## 2022-09-29 NOTE — ASSESSMENT & PLAN NOTE
No SI/HI currently. Reviewed treatment choices including medication and counseling. Discussed risks/benefits and reviewed medication choices. Pt agrees to start medication along with counseling. We reviewed possible medication side effects and indications to call the office. Pt voiced understanding. Will keep close F/U in the next 4-6 weeks for re-evaluation.

## 2022-09-29 NOTE — PROGRESS NOTES
Medicare Annual Wellness Visit    Tamia Weems is here for Medicare AWV (Patient is present for AWv)    Assessment & Plan   1. Medicare annual wellness visit, subsequent  2. Need for vaccination  -     Influenza, FLUAD, (age 72 y+), IM, Preservative Free, 0.5 mL  3. Depression, unspecified depression type  Assessment & Plan:  No SI/HI currently. Reviewed treatment choices including medication and counseling. Discussed risks/benefits and reviewed medication choices. Pt agrees to start medication along with counseling. We reviewed possible medication side effects and indications to call the office. Pt voiced understanding. Will keep close F/U in the next 4-6 weeks for re-evaluation. Orders:  -     DULoxetine (CYMBALTA) 30 MG extended release capsule; Take 1 capsule by mouth daily, Disp-30 capsule, R-1Normal  -     Ines Tian, PhD, Psychology, [place]  4. Right elbow pain  Comments:  Patient with potential fracture based on recent x-ray done in the ER. Referral has been placed to Ortho and will have office staff help him schedule ASAP visit  Orders:  -     Ambulatory referral to Orthopedic Surgery  5. Unsteady gait  -     [facility]  6. Myasthenia gravis with exacerbation [facility])  Assessment & Plan:  Patient is established with neurology at [facility] clinic for long-term management. I will have office staff help him schedule a visit due to reported lower extremity weakness and recent falls. 7. Chronic low back pain, unspecified back pain laterality, unspecified whether sciatica present  -     [facility]  8. Type 2 diabetes mellitus with microalbuminuria, without long-term current use of insulin ([facility])  Assessment & Plan:  A1c today in the office at goal control. Patient instructed to continue with current medication. Orders:  -     POCT glycosylated hemoglobin (Hb A1C)  -     Comprehensive Metabolic Panel; Future  -     Lipid Panel; Future  9.  Primary hypertension  -     CBC with Auto Differential; Future  -     Comprehensive Metabolic Panel; Future  10. Mixed hyperlipidemia  -     Comprehensive Metabolic Panel; Future  -     Lipid Panel; Future  11. Coronary artery disease involving native coronary artery of native heart with angina pectoris [facility])  Assessment & Plan:  Stable. No reported change in activity tolerance. Patient was instructed to keep chronic cardiology follow-up visits. Orders:  -     CBC with Auto Differential; Future  -     Lipid Panel; Future  12. Stage 3a chronic kidney disease ([facility]  Assessment & Plan:  Stable renal function on most recent testing. I stressed with patient the importance of staying well-hydrated and drinking at least 48 ounces of water daily. I reviewed the importance of long-term blood pressure, blood sugar, and cholesterol control. We will continue to monitor over time  Orders:  -     CBC with Auto Differential; Future  -     Comprehensive Metabolic Panel; Future  13. Chronic obstructive pulmonary disease, unspecified COPD type ([facility]  Assessment & Plan:   Stable. No reported change in respiratory status. Patient instructed to continue with current medication. 14. Fatty infiltration of liver  -     Comprehensive Metabolic Panel; Future  15. Vitamin D deficiency  -     Vitamin D 25 Hydroxy; Future          Recommendations for Preventive Services Due: see orders and patient instructions/AVS.  Recommended screening schedule for the next 5-10 years is provided to the patient in written form: see Patient Instructions/AVS.     Return for depression F/U in 4-6 weeks. Subjective       Patient's complete Health Risk Assessment and screening values have been reviewed and are found in Flowsheets. The following problems were reviewed today and where indicated follow up appointments were made and/or referrals ordered.     Positive Risk Factor Screenings with Interventions:    Fall Risk:  Do you feel unsteady or are you worried about falling? : (!) yes  2 or more falls in past year?: (!) yes  Fall with injury in past year?: (!) yes   Fall Risk Interventions:    Home safety tips provided  Physical therapy referral ordered for strength and balance training     Depression:  PHQ-2 Score: 3  PHQ-9 Total Score: 9    Severity:1-4 = minimal depression, 5-9 = mild depression, 10-14 = moderate depression, 15-19 = moderately severe depression, 20-27 = severe depression  Depression Interventions:  Referral placed to psychology for counseling/therapy  Medication prescribed- duloxetine          General Health and ACP:  General  In general, how would you say your health is?: Good  In the past 7 days, have you experienced any of the following: New or Increased Pain, New or Increased Fatigue, Loneliness, Social Isolation, Stress or Anger?: No  Do you get the social and emotional support that you need?: Yes  Do you have a Living Will?: Yes    Advance Directives       Power of  Living Will ACP-Advance Directive ACP-Power of     Not on File Not on File Not on File Not on File        General Health Risk Interventions:  No Living Will: ACP documents already completed- patient asked to provide copy to the office    Health Habits/Nutrition:  Physical Activity: Inactive    Days of Exercise per Week: 0 days    Minutes of Exercise per Session: 0 min     Have you lost any weight without trying in the past 3 months?: No  Body mass index: (!) 26.58  Have you seen the dentist within the past year?: Yes  Health Habits/Nutrition Interventions:  Inadequate physical activity:  Referred to physical therapy to help with initiating activity to improve back pain and leg weakness  Nutritional issues:  educational materials for healthy, well-balanced diet provided, educational materials to promote weight loss provided      ADLs:  In the past 7 days, did you need help from others to perform any of the following everyday activities: Eating, dressing, grooming, bathing, toileting, or walking/balance?: (!) Yes  Select all that apply: (!) Dressing (Patient had a fall needs help gettin dressed due to arm pain)  In the past 7 days, did you need help from others to take care of any of the following: Laundry, housekeeping, banking/finances, shopping, telephone use, food preparation, transportation, or taking medications?: No  ADL Interventions:  Patient declines any further evaluation/treatment for this issue  Secondary to recent elbow injury. Patient referred to ortho for further evaluation/management of potential fracture. Review of Systems   Constitutional:  Positive for appetite change (decreased). Negative for chills, diaphoresis, fatigue, fever and unexpected weight change. Eyes:  Negative for visual disturbance. Respiratory:  Negative for cough, chest tightness, shortness of breath and wheezing. Cardiovascular:  Negative for chest pain, palpitations and leg swelling. No orthopnea, No PND   Gastrointestinal:  Negative for abdominal pain, anal bleeding, blood in stool, constipation, diarrhea, nausea and vomiting. No heartburn, No melena   Endocrine: Negative for cold intolerance, heat intolerance, polydipsia, polyphagia and polyuria. Genitourinary:  Negative for dysuria and hematuria. Musculoskeletal:  Positive for arthralgias (acute - right elbow s/p fall and direct injury), back pain (chronic) and gait problem (leg weakness - chronic, but worsening). Negative for myalgias and neck pain. Skin:  Negative for rash. Neurological:  Negative for dizziness, syncope, weakness, light-headedness, numbness and headaches. Psychiatric/Behavioral:  Positive for dysphoric mood. Negative for self-injury, sleep disturbance and suicidal ideas. The patient is not nervous/anxious.             Objective   Vitals:    09/29/22 0943   BP: 100/60   Site: Left Upper Arm   Position: Sitting   Cuff Size: Medium Adult   Pulse: 77   Temp: 97.1 °F (36.2 °C)   TempSrc: Temporal   SpO2: 95%   Weight: 190 lb 9.6 oz (86.5 kg)   Height: 5' 11\" (1.803 m)      Body mass index is 26.58 kg/m². Results for POC orders placed in visit on 09/29/22   POCT glycosylated hemoglobin (Hb A1C)   Result Value Ref Range    Hemoglobin A1C 6.6 (A) %       Physical Exam  Vitals reviewed. Constitutional:       General: He is in acute distress (in discomfort due to right elbow pain). Appearance: He is not ill-appearing, toxic-appearing or diaphoretic. Cardiovascular:      Rate and Rhythm: Normal rate. Pulmonary:      Effort: Pulmonary effort is normal.   Musculoskeletal:      Right elbow: Swelling and deformity (ecchymosis) present. No lacerations. Decreased range of motion (extension). Tenderness present in olecranon process. Neurological:      Mental Status: He is alert and oriented to person, place, and time. Mental status is at baseline. Psychiatric:         Attention and Perception: Attention normal.         Mood and Affect: Mood is depressed. Affect is flat. Speech: Speech normal.         Behavior: Behavior normal.         Thought Content: Thought content normal.           Allergies   Allergen Reactions    Invokana [Canagliflozin] Diarrhea    Metformin And Related Diarrhea    Other      There is a list scanned in media that pt can not take due to myasthenia gravis ,  Antibiotics     Prior to Visit Medications    Medication Sig Taking? Authorizing Provider   DULoxetine (CYMBALTA) 30 MG extended release capsule Take 1 capsule by mouth daily Yes Saman Roblero MD   HYDROcodone-acetaminophen (NORCO) 5-325 MG per tablet Take 1 tablet by mouth every 4 hours as needed for Pain for up to 3 days. Intended supply: 3 days.  Take lowest dose possible to manage pain Yes Mitchell Hudson MD   amLODIPine (NORVASC) 10 MG tablet Take 1 tablet by mouth daily Yes Saman Roblero MD   rosuvastatin (CRESTOR) 20 MG tablet Take 1 tablet by mouth daily Yes Saman Roblero MD triamterene-hydroCHLOROthiazide (MAXZIDE-25) 37.5-25 MG per tablet Take 1 tablet by mouth daily Yes Cook Islander Republic, MD   metoprolol succinate (TOPROL XL) 50 MG extended release tablet Take 3 tablets by mouth daily Yes [place], MD   losartan (COZAAR) 100 MG tablet Take 1 tablet by mouth daily Yes [place], MD   sildenafil (VIAGRA) 100 MG tablet TAKE ONE TABLET BY MOUTH AS NEEDED APPROXIMATELY ONE HOUR BEFORE SEXUAL ACTIVITY. DO NOT USE MORE THAN ONE DOSE DAILY Yes [place]er Republic, MD   Dulaglutide 0.75 MG/0.5ML SOPN Inject 0.75 mg into the skin once a week Yes CHEMO Parr - CNP   FREESTYLE LITE strip USE 1 STRIP TO CHECK GLUCOSE ONCE DAILY AS DIRECTED Yes [place] Republic, MD   tamsulosin (FLOMAX) 0.4 MG capsule Take 2 capsules by mouth daily Yes [place], MD   SITagliptin (JANUVIA) 100 MG tablet Take 0.5 tablets by mouth 2 times daily Yes [place] Republic, MD   glipiZIDE (GLUCOTROL) 10 MG tablet Take 1 tablet by mouth 2 times daily (before meals) Yes [place], MD   Testosterone Cypionate 200 MG/ML SOLN 1 CC EVERY 2 WEEKS Yes Elder Brooke MD   Cholecalciferol (VITAMIN D3) 50 MCG (2000 UT) CAPS Take 1 capsule by mouth daily Yes [place] Republic, MD   fluticasone (FLONASE) 50 MCG/ACT nasal spray 1 spray by Nasal route daily Yes CHEMO Malin   albuterol sulfate  (90 Base) MCG/ACT inhaler Inhale 2 puffs into the lungs every 6 hours as needed for Wheezing Yes [place], MD   Handicap Placard MISC by Does not apply route DX: COPD (J44.9), 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  Yes Historical Provider, MD   Cyanocobalamin (VITAMIN B 12 PO) Take 1,000 mg by mouth Yes Historical Provider, MD   predniSONE (DELTASONE) 10 MG tablet Take 5 mg by mouth three times a week  Yes Historical Provider, MD   nitroGLYCERIN (NITROSTAT) 0.4 MG SL tablet Place 1 tablet under the tongue every 5 minutes as needed for Chest pain Yes Arnaud Damico MD   Blood Glucose Monitoring Suppl CHENCHO Test once daily. Dx: E11.29 Yes Arnaud Damico MD   Lancets MISC Test once daily. Dx: E11.29 Yes Arnaud Damico MD   ascorbic acid (VITAMIN C) 500 MG tablet Take 1,000 mg by mouth daily Yes Historical Provider, MD   loratadine (CLARITIN) 10 MG tablet Take 1 tablet by mouth daily Yes Arnaud Damico MD   acetaminophen (TYLENOL EX ST ARTHRITIS PAIN) 500 MG tablet Take 1 tablet by mouth every 6 hours as needed for Pain Yes Autumn Green MD   CINNAMON PO Take 500 mg by mouth 2 times daily. Yes Historical Provider, MD   aspirin 81 MG EC tablet Take 81 mg by mouth daily. Yes Historical Provider, MD   ipratropium (ATROVENT) 0.06 % nasal spray 2 sprays by Nasal route 3 times daily  Patient not taking: Reported on 9/29/2022  Arnaud Damico MD   fluorouracil (EFUDEX) 5 % cream APPLY A THIN LAYER TO BILATERAL EARS TWICE A DAY FOR 2 WEEKS. [address] YOUR HANDS AFTER APPLICATION. Patient not taking: No sig reported  Historical Provider, MD   testosterone cypionate (DEPOTESTOTERONE CYPIONATE) 200 MG/ML injection Inject 1 mL into the muscle every 14 days. Indications: PER ORDER in OV note 9/16/20  Patient taking differently: Inject 200 mg into the muscle every 14 days.  Indications: PER ORDER in OV note 3/17/21   Sheri Mason MD   famotidine (PEPCID) 40 MG tablet Take 1 tablet by mouth every evening  Patient not taking: No sig reported  Arnaud Damico MD       [facility] (Including outside providers/suppliers regularly involved in providing care):   Patient Care Team:  Arnaud Damico MD as PCP - General (Family Medicine)  Arnaud Damico MD as PCP - [facility] Empaneled Provider  Nury Ashton MD as Consulting Physician (Urology)  Smita Guevara DO as Consulting Physician (Internal Medicine Cardiovascular Disease)  Anshul Mg MD as Consulting Physician (Gastroenterology)  Mayda Pantoja MD as Consulting Physician (Dermatology)  Loren Rogers (Optometry)  James Shukla MD as Surgeon (General Surgery)  Everett Berger (Optometry)  James Montanez MD as Consulting Physician (Neurology)     Reviewed and updated this visit:  Tobacco  Allergies  Meds  Problems  Med Hx  Surg Hx  Soc Hx  Fam Hx

## 2022-09-29 NOTE — ASSESSMENT & PLAN NOTE
Stable. No reported change in respiratory status. Patient instructed to continue with current medication.

## 2022-09-29 NOTE — PROGRESS NOTES
Vaccine Information Sheet, \"Influenza - Inactivated\"  given to Niles Robertson, or parent/legal guardian of  Niles Robertson and verbalized understanding. Patient responses:    Have you ever had a reaction to a flu vaccine? No  Are you able to eat eggs without adverse effects? No  Do you have any current illness? No  Have you ever had Guillian Sigel Syndrome? No    Flu vaccine given per order. Please see immunization tab.

## 2022-09-30 RX ORDER — GLIPIZIDE 10 MG/1
10 TABLET ORAL
Qty: 180 TABLET | Refills: 3 | Status: SHIPPED | OUTPATIENT
Start: 2022-09-30

## 2022-09-30 NOTE — TELEPHONE ENCOUNTER
Pharmacy is requesting medication refill. Please approve or deny this request.    Rx requested:  Requested Prescriptions     Pending Prescriptions Disp Refills    glipiZIDE (GLUCOTROL) 10 MG tablet [Pharmacy Med Name: glipiZIDE 10 MG Oral Tablet] 180 tablet 3     Sig: Take 1 tablet by mouth 2 times daily (before meals)         Last Office Visit:   9/29/2022      Next Visit Date:  Future Appointments   Date Time Provider [facility]   10/4/2022  2:30 PM Clarisse Capone MD [address]   10/5/2022 10:10 AM SCHEDULE, GERBER COBB PCP TESTOSTERONE MLOX [facility]   10/18/2022  1:00 PM Lo Bolton, PhD [address] [facility]   10/19/2022 10:10 AM SCHEDULE, GERBER COBB PCP TESTOSTERONE MLOX [facility]   11/3/2022 11:00 AM Bernice Chatman MD [place] [address]   3/22/2023  1:00 PM Eugene Hernandez  [address]

## 2022-09-30 NOTE — ED PROVIDER NOTES
eMERGENCY dEPARTMENT eNCOUnter      [address]    Chief Complaint   Patient presents with    Elbow Pain     Earvin Seller about 20 minutes ago. Right elbow pain and laceration to left elbow. Denies being on blood thinners, no loc. HPI    Aide Self is a 68 y.o. male with history of chronic low back pain, COPD, diabetes mellitus, erectile dysfunction, hyperlipidemia, osteoarthritis of multiple joints, varicose veins of the lower extremity who presentsto ED from home  By private car  With complaint of fall landing on both elbows  Onset 20 minutes ago  Intensity of symptoms moderate  Patient fell backwards landing on both his elbows. Since then he is complaining of right elbow swelling and pain. Patient did not hit his head or complains of any neck pain.   Patient tetanus is up to date     PAST MEDICAL HISTORY    Past Medical History:   Diagnosis Date    Atypical chest pain 8/30/2012    BPH (benign prostatic hypertrophy)     CAD (coronary artery disease)     Cataracts, bilateral     Chronic low back pain     COPD (chronic obstructive pulmonary disease) ([address] 6/1/2006    DM2 (diabetes mellitus, type 2) (HCC)     Duodenitis     Erectile dysfunction 3/6/2017    Fatty infiltration of liver 11/19/2014    GERD (gastroesophageal reflux disease)     History of melanoma excision 12/11/2017    Left ear 09/2017    History of tobacco use 6/1/2006    HTN (hypertension)     Hyperlipidemia     Malignant melanoma of face ([address]     Malignant melanoma of skin of face ([address]     RT ear 2000, LT ear 2017    MG (myasthenia gravis) ([address]     Nephrolithiasis     Ocular myasthenia gravis ([address] 8/28/2018    Osteoarthritis of multiple joints     hands, hips    Perennial allergic rhinitis     Personal history of colonic polyps     Pseudophakia of both eyes     Stage 3a chronic kidney disease ([address] 9/29/2022    Status post coronary artery bypass grafts x 5 7/30/2018 07/2018 @ [facility]    Type 2 diabetes mellitus with microalbuminuria, without long-term current use of insulin ([facility] 3/6/2017    Varicose veins of both lower extremities 9/11/2017       SURGICAL HISTORY    Past Surgical History:   Procedure Laterality Date    ANGIOPLASTY      CARDIAC CATHETERIZATION      #17    CATARACT REMOVAL WITH IMPLANT Left 1992    CATARACT REMOVAL WITH IMPLANT Right 1992    COLONOSCOPY  2009    tubular adenomas    COLONOSCOPY  2013    diverticulosis, polyps, 5y repeat (DR NAVAS)    CORONARY ARTERY BYPASS GRAFT  07/02/2018    x5 (DR JETER @ [facility])    CORONARY ARTERY BYPASS GRAFT      5 vessel    MOHS SURGERY Left 09/2017    melanoma of left ear (DR GODWIN)    SKIN CANCER EXCISION Left 08/2017    TONSILLECTOMY  1955    UPPER GASTROINTESTINAL ENDOSCOPY      duodenitis/ poss early signs of celiac disease       CURRENT MEDICATIONS    Current Outpatient Rx   Medication Sig Dispense Refill    glipiZIDE (GLUCOTROL) 10 MG tablet Take 1 tablet by mouth 2 times daily (before meals) 180 tablet 3    DULoxetine (CYMBALTA) 30 MG extended release capsule Take 1 capsule by mouth daily 30 capsule 1    amLODIPine (NORVASC) 10 MG tablet Take 1 tablet by mouth daily 90 tablet 3    rosuvastatin (CRESTOR) 20 MG tablet Take 1 tablet by mouth daily 90 tablet 1    triamterene-hydroCHLOROthiazide (MAXZIDE-25) 37.5-25 MG per tablet Take 1 tablet by mouth daily 90 tablet 1    metoprolol succinate (TOPROL XL) 50 MG extended release tablet Take 3 tablets by mouth daily 270 tablet 1    ipratropium (ATROVENT) 0.06 % nasal spray 2 sprays by Nasal route 3 times daily (Patient not taking: Reported on 9/29/2022) 45 mL 1    losartan (COZAAR) 100 MG tablet Take 1 tablet by mouth daily 90 tablet 1    sildenafil (VIAGRA) 100 MG tablet TAKE ONE TABLET BY MOUTH AS NEEDED APPROXIMATELY ONE HOUR BEFORE SEXUAL ACTIVITY.  DO NOT USE MORE THAN ONE DOSE DAILY 6 tablet 3    Dulaglutide 0.75 MG/0.5ML SOPN Inject 0.75 mg into the skin once a week 12 pen 3    FREESTYLE LITE strip USE 1 STRIP TO CHECK GLUCOSE ONCE DAILY AS DIRECTED 50 each 0    tamsulosin (FLOMAX) 0.4 MG capsule Take 2 capsules by mouth daily 180 capsule 3    SITagliptin (JANUVIA) 100 MG tablet Take 0.5 tablets by mouth 2 times daily 30 tablet 0    Testosterone Cypionate 200 MG/ML SOLN 1 CC EVERY 2 WEEKS 10 mL 3    Cholecalciferol (VITAMIN D3) 50 MCG (2000 UT) CAPS Take 1 capsule by mouth daily 90 capsule 1    fluorouracil (EFUDEX) 5 % cream APPLY A THIN LAYER TO BILATERAL EARS TWICE A DAY FOR 2 WEEKS. [facility] YOUR HANDS AFTER APPLICATION. (Patient not taking: No sig reported)      fluticasone (FLONASE) 50 MCG/ACT nasal spray 1 spray by Nasal route daily 1 Bottle 0    albuterol sulfate  (90 Base) MCG/ACT inhaler Inhale 2 puffs into the lungs every 6 hours as needed for Wheezing 3 Inhaler 0    testosterone cypionate (DEPOTESTOTERONE CYPIONATE) 200 MG/ML injection Inject 1 mL into the muscle every 14 days. Indications: PER ORDER in OV note 9/16/20 (Patient taking differently: Inject 200 mg into the muscle every 14 days. Indications: PER ORDER in OV note 3/17/21 ) 2 mL 3    famotidine (PEPCID) 40 MG tablet Take 1 tablet by mouth every evening (Patient not taking: No sig reported) 90 tablet 3    Handicap Placard MISC by Does not apply route DX: COPD (J44.9), 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 Cyanocobalamin (VITAMIN B 12 PO) Take 1,000 mg by mouth      predniSONE (DELTASONE) 10 MG tablet Take 5 mg by mouth three times a week       nitroGLYCERIN (NITROSTAT) 0.4 MG SL tablet Place 1 tablet under the tongue every 5 minutes as needed for Chest pain 25 tablet 0    Blood Glucose Monitoring Suppl CHENCHO Test once daily. Dx: E11.29 1 Device 0    Lancets MISC Test once daily.  Dx: E11.29 100 each 3    ascorbic acid (VITAMIN C) 500 MG tablet Take 1,000 mg by mouth daily      loratadine (CLARITIN) 10 MG tablet Take 1 tablet by mouth daily 30 tablet 5    acetaminophen (TYLENOL EX ST ARTHRITIS PAIN) 500 MG tablet Take 1 tablet by mouth every 6 hours as needed for Pain 120 tablet 3    CINNAMON PO Take 500 mg by mouth 2 times daily. aspirin 81 MG EC tablet Take 81 mg by mouth daily.            ALLERGIES    Allergies   Allergen Reactions    Invokana [Canagliflozin] Diarrhea    Metformin And Related Diarrhea    Other      There is a list scanned in media that pt can not take due to myasthenia gravis ,  Antibiotics       FAMILY HISTORY    Family History   Problem Relation Age of Onset    Heart Disease Mother     Heart Disease Brother     Diabetes Maternal Grandfather        SOCIAL HISTORY    Social History     Socioeconomic History    Marital status:      Spouse name: Denise Soto    Number of children: 2    Years of education: 12+    Highest education level: None   Occupational History    Occupation: [employer]   Tobacco Use    Smoking status: Former     Packs/day: 1.00     Years: 41.00     Pack years: 41.00     Types: Cigarettes     Start date:      Quit date: 2002     Years since quittin.0    Smokeless tobacco: Never   Substance and Sexual Activity    Alcohol use: Never     Comment: drinks one beer at night occassionally    Drug use: No    Sexual activity: Not Currently     Partners: Female     Social Determinants of Health     Financial Resource Strain: Low Risk     Difficulty of Paying Living Expenses: Not hard at all   Food Insecurity: No Food Insecurity    Worried About [facility] in the Last Year: Never true    Ran Out of Food in the Last Year: Never true   Physical Activity: Inactive    Days of Exercise per Week: 0 days    Minutes of Exercise per Session: 0 min       REVIEW OF SYSTEMS    Constitutional:  Denies fever, chills, weight loss or weakness   Eyes:  Denies photophobia or discharge   HENT:  Denies sore throat or ear pain Respiratory:  Denies cough or shortness of breath   Cardiovascular:  Denies chest pain, palpitations or swelling   GI:  Denies abdominal pain, nausea, vomiting, or diarrhea   Musculoskeletal:  Denies back pain   Skin:  Denies rash   Neurologic:  Denies headache, focal weakness or sensory changes   Endocrine:  Denies polyuria or polydypsia   Lymphatic:  Denies swollen glands   Psychiatric:  Denies depression, suicidal ideation or homicidal ideation   All systems negative except as marked. PHYSICAL EXAM    VITAL SIGNS: BP (!) 163/76   Pulse 81   Temp 98.2 °F (36.8 °C) (Temporal)   Resp 16   Ht 5' 11\" (1.803 m)   Wt 190 lb (86.2 kg)   SpO2 94%   BMI 26.50 kg/m²    Constitutional:  Well developed, Well nourished, No acute distress, Non-toxic appearance. HENT:  Normocephalic, Atraumatic, Bilateral external ears normal, Oropharynx moist, No oral exudates, Nose normal. Neck- Normal range of motion, No midline C spine tenderness, Supple, No stridor. Eyes:  PERRL, EOMI, Conjunctiva normal, No discharge. Respiratory:  Normal breath sounds, No respiratory distress, No wheezing, No chest tenderness. Cardiovascular:  Normal heart rate, Normal rhythm, No murmurs, No rubs, No gallops. GI:  Bowel sounds normal, Soft, No tenderness, No masses, No pulsatile masses. : No CVA tenderness. Musculoskeletal: R elbow- Swelling on the proximal ulnar area , no open wounds, mild point tenderness present, ROM normal, Distal NV intact   L elbow - superficial skin tear , no FB, ROM normal , no point tenderness, distal NV intact   Intact distal pulses, No edema, No tenderness, No cyanosis, No clubbing. Good range of motion in all major joints. No tenderness to palpation or major deformities noted. Back- No tenderness. Integument:  Warm, Dry, No erythema, No rash. Lymphatic:  No lymphadenopathy noted. Neurologic:  Alert & oriented x 3, Normal motor function, Normal sensory function, No focal deficits noted. Psychiatric:  Affect normal, Judgment normal, Mood normal.         RADIOLOGY    XR ELBOW RIGHT (MIN 3 VIEWS)   Final Result   Calcifications adjacent to the olecranon process with surrounding soft tissue   swelling, which could represent fractured enthesophytes. REEVALUATION   Patient was updated the results of  Radiology. Pain control adeqaute. Summation      Patient Course:     ED Medications administered this visit:    Medications   HYDROcodone-acetaminophen (NORCO) 5-325 MG per tablet 1 tablet (1 tablet Oral Given 9/26/22 2052)       New Prescriptions from this visit:    Discharge Medication List as of 9/26/2022  8:57 PM        START taking these medications    Details   HYDROcodone-acetaminophen (NORCO) 5-325 MG per tablet Take 1 tablet by mouth every 4 hours as needed for Pain for up to 3 days. Intended supply: 3 days. Take lowest dose possible to manage pain, Disp-6 tablet, R-0Print             Follow-up:  MD Rhea Whitley [address]  [address]  [phone]    Call in 1 day        Final Impression:   1. Contusion of right elbow, initial encounter    2.  Abrasion of left elbow, initial encounter               (Please note that portions of this note were completed with a voice recognition program.  Efforts were made to edit the dictations but occasionally words are mis-transcribed.)            Ayala Yanez MD  10/01/22 Rodolfo Sainz MD  10/02/22 0352

## 2022-10-04 DIAGNOSIS — J30.89 PERENNIAL ALLERGIC RHINITIS: Chronic | ICD-10-CM

## 2022-10-04 RX ORDER — IPRATROPIUM BROMIDE 42 UG/1
2 SPRAY, METERED NASAL 3 TIMES DAILY
Qty: 45 ML | Refills: 1 | Status: SHIPPED | OUTPATIENT
Start: 2022-10-04

## 2022-10-04 NOTE — TELEPHONE ENCOUNTER
Pharmacy is requesting medication refill. Please approve or deny this request.    Rx requested:  Requested Prescriptions     Pending Prescriptions Disp Refills    ipratropium (ATROVENT) 0.06 % nasal spray 45 mL 1     Si sprays by Nasal route 3 times daily         Last Office Visit:   Visit date not found      Next Visit Date:  Future Appointments   Date Time Provider Kay Bartlett   10/5/2022 10:10 AM SCHEDULE, GERBER COBB PCP TESTOSTERONE [facility]   10/18/2022  1:00 PM Lo Spivey, PhD [address]   10/19/2022 10:10 AM SCHEDULE, GERBER COBB PCP TESTOSTERONE MLOX [facility]   11/3/2022 11:00 AM Kiran Gotti MD [place] [id number]   3/22/2023  1:00 PM Genny Copeland MD [facility]

## 2022-10-05 ENCOUNTER — NURSE ONLY (OUTPATIENT)
Dept: FAMILY MEDICINE CLINIC | Age: 77
End: 2022-10-05
Payer: MEDICARE

## 2022-10-05 DIAGNOSIS — E29.1 HYPOGONADISM IN MALE: Primary | ICD-10-CM

## 2022-10-05 PROCEDURE — 96372 THER/PROPH/DIAG INJ SC/IM: CPT | Performed by: FAMILY MEDICINE

## 2022-10-05 RX ORDER — TESTOSTERONE CYPIONATE 200 MG/ML
200 INJECTION INTRAMUSCULAR ONCE
Status: COMPLETED | OUTPATIENT
Start: 2022-10-05 | End: 2022-10-05

## 2022-10-05 RX ADMIN — TESTOSTERONE CYPIONATE 200 MG: 200 INJECTION INTRAMUSCULAR at 09:22

## 2022-10-05 NOTE — PROGRESS NOTES
Patient given Testosterone 200mg IM left gluteal..  Will return in 2 weeks for next injection    Patient tolerated injection well.     Administrations This Visit       testosterone cypionate (DEPOTESTOTERONE CYPIONATE) injection 200 mg       Admin Date  10/05/2022 Action  Given Dose  200 mg Route  IntraMUSCular Administered By  Ruddy Bliss LPN

## 2022-10-07 ENCOUNTER — OFFICE VISIT (OUTPATIENT)
Dept: INTERNAL MEDICINE | Age: 77
End: 2022-10-07
Payer: MEDICARE

## 2022-10-07 VITALS
HEART RATE: 86 BPM | DIASTOLIC BLOOD PRESSURE: 74 MMHG | WEIGHT: 188.4 LBS | SYSTOLIC BLOOD PRESSURE: 130 MMHG | OXYGEN SATURATION: 90 % | TEMPERATURE: 98.1 F | BODY MASS INDEX: 26.28 KG/M2

## 2022-10-07 DIAGNOSIS — J44.1 CHRONIC OBSTRUCTIVE PULMONARY DISEASE WITH ACUTE EXACERBATION (HCC): ICD-10-CM

## 2022-10-07 DIAGNOSIS — R06.2 WHEEZING: ICD-10-CM

## 2022-10-07 DIAGNOSIS — J22 LOWER RESPIRATORY INFECTION: Primary | ICD-10-CM

## 2022-10-07 DIAGNOSIS — B34.9 VIRAL ILLNESS: ICD-10-CM

## 2022-10-07 LAB
INFLUENZA A ANTIBODY: NORMAL
INFLUENZA B ANTIBODY: NORMAL

## 2022-10-07 PROCEDURE — G8428 CUR MEDS NOT DOCUMENT: HCPCS | Performed by: NURSE PRACTITIONER

## 2022-10-07 PROCEDURE — 1123F ACP DISCUSS/DSCN MKR DOCD: CPT | Performed by: NURSE PRACTITIONER

## 2022-10-07 PROCEDURE — 3023F SPIROM DOC REV: CPT | Performed by: NURSE PRACTITIONER

## 2022-10-07 PROCEDURE — 87804 INFLUENZA ASSAY W/OPTIC: CPT | Performed by: NURSE PRACTITIONER

## 2022-10-07 PROCEDURE — 1036F TOBACCO NON-USER: CPT | Performed by: NURSE PRACTITIONER

## 2022-10-07 PROCEDURE — G8484 FLU IMMUNIZE NO ADMIN: HCPCS | Performed by: NURSE PRACTITIONER

## 2022-10-07 PROCEDURE — 99214 OFFICE O/P EST MOD 30 MIN: CPT | Performed by: NURSE PRACTITIONER

## 2022-10-07 PROCEDURE — G8417 CALC BMI ABV UP PARAM F/U: HCPCS | Performed by: NURSE PRACTITIONER

## 2022-10-07 RX ORDER — AZITHROMYCIN 250 MG/1
TABLET, FILM COATED ORAL
Qty: 6 TABLET | Refills: 0 | Status: SHIPPED | OUTPATIENT
Start: 2022-10-07 | End: 2022-10-12

## 2022-10-07 RX ORDER — METHYLPREDNISOLONE 4 MG/1
TABLET ORAL
Qty: 1 KIT | Refills: 0 | Status: SHIPPED | OUTPATIENT
Start: 2022-10-07

## 2022-10-07 ASSESSMENT — ENCOUNTER SYMPTOMS
NAUSEA: 1
VOMITING: 0
RHINORRHEA: 0
BACK PAIN: 0
SORE THROAT: 0
WHEEZING: 0
SHORTNESS OF BREATH: 0
COUGH: 0
CHEST TIGHTNESS: 0
ABDOMINAL PAIN: 0
DIARRHEA: 0

## 2022-10-07 NOTE — PROGRESS NOTES
Jude Garner (:  ) is a 68 y.o. male, Established patient, here for evaluation of the following chief complaint(s):  Nausea (Fatigue, BA, started Tuesday, took 2 covid tests both negative, )      Vitals:    10/07/22 0939   BP: 130/74   Pulse: 86   Temp: 98.1 °F (36.7 °C)   SpO2: 90%       ASSESSMENT/PLAN:  1. Lower respiratory infection  -     methylPREDNISolone (MEDROL, JIGAR,) 4 MG tablet; Take by mouth., Disp-1 kit, R-0Normal  -     azithromycin (ZITHROMAX) 250 MG tablet; 500 mg on day 1, then 250 mg days 2-5., Disp-6 tablet, R-0Normal  2. Chronic obstructive pulmonary disease with acute exacerbation ([facility] [address]  3. Wheezing  4. Viral illness  -     POCT Influenza A/B        -  re-took pOx = 94-95% room air      Return in about 2 weeks (around 10/21/2022), or if symptoms worsen or fail to improve, for follow up with PCP. SUBJECTIVE/OBJECTIVE:    Other  This is a new problem. Episode onset: x3 days nausea, fatigue, and body aches. The problem occurs constantly. The problem has been gradually improving. Associated symptoms include fatigue, myalgias and nausea. Pertinent negatives include no abdominal pain, arthralgias, chest pain, chills, congestion, coughing, fever, headaches, sore throat or vomiting. The symptoms are aggravated by exertion and coughing. He has tried acetaminophen for the symptoms. Review of Systems   Constitutional:  Positive for fatigue. Negative for chills and fever. HENT:  Negative for congestion, ear pain, rhinorrhea and sore throat. Respiratory:  Negative for cough, chest tightness, shortness of breath and wheezing. Cardiovascular:  Negative for chest pain, palpitations and leg swelling. Gastrointestinal:  Positive for nausea. Negative for abdominal pain, diarrhea and vomiting. Musculoskeletal:  Positive for myalgias. Negative for arthralgias and back pain. Neurological:  Negative for dizziness, light-headedness and headaches.        Physical Exam  Vitals reviewed. Constitutional:       General: He is not in acute distress. Appearance: He is well-developed. HENT:      Right Ear: Tympanic membrane normal.      Left Ear: Tympanic membrane normal.      Nose: Nose normal.      Mouth/Throat:      Lips: Pink. Mouth: Mucous membranes are moist.      Pharynx: Oropharynx is clear. Eyes:      General: Lids are normal.      Extraocular Movements: Extraocular movements intact. Conjunctiva/sclera: Conjunctivae normal.      Pupils: Pupils are equal, round, and reactive to light. Cardiovascular:      Rate and Rhythm: Normal rate and regular rhythm. Heart sounds: Normal heart sounds, S1 normal and S2 normal.   Pulmonary:      Effort: Pulmonary effort is normal. No respiratory distress. Breath sounds: Normal air entry. Examination of the right-middle field reveals wheezing. Examination of the left-middle field reveals wheezing. Examination of the right-lower field reveals wheezing. Examination of the left-lower field reveals wheezing. Decreased breath sounds (throughout) and wheezing present. No rhonchi or rales. Abdominal:      General: Bowel sounds are normal.      Palpations: Abdomen is soft. Musculoskeletal:         General: No deformity. Normal range of motion. Cervical back: Full passive range of motion without pain. Skin:     General: Skin is warm and dry. Neurological:      Mental Status: He is alert and oriented to person, place, and time. Psychiatric:         Attention and Perception: Attention normal.         Behavior: Behavior is cooperative. An electronic signature was used to authenticate this note.     --CHEMO Quiroz

## 2022-10-12 DIAGNOSIS — I10 ESSENTIAL HYPERTENSION: Chronic | ICD-10-CM

## 2022-10-12 NOTE — TELEPHONE ENCOUNTER
Patient is requesting medication refill. Please approve or deny this request.    Rx requested:  Requested Prescriptions     Pending Prescriptions Disp Refills    triamterene-hydroCHLOROthiazide (MAXZIDE-25) 37.5-25 MG per tablet [Pharmacy Med Name: Triamterene-HCTZ 37.5-25 MG Oral Tablet] 90 tablet 3     Sig: TAKE 1 TABLET BY MOUTH  DAILY    losartan (COZAAR) 100 MG tablet [Pharmacy Med Name: Losartan Potassium 100 MG Oral Tablet] 90 tablet 3     Sig: TAKE 1 TABLET BY MOUTH  DAILY         Last Office Visit:   9/29/2022      Next Visit Date:  Future Appointments   Date Time Provider Kay Bartlett   10/13/2022  6:00 PM MD [address]   10/18/2022  1:00 PM Lo Hannon, PhD [address]   10/19/2022 10:10 AM SCHEDULE, GERBER COBB PCP TESTOSTERONE MLOX [facility]   11/3/2022 11:00 AM MD [address]   3/22/2023  1:00 PM Neil Severs, MD [facility]

## 2022-10-13 ENCOUNTER — OFFICE VISIT (OUTPATIENT)
Dept: FAMILY MEDICINE CLINIC | Age: 77
End: 2022-10-13
Payer: MEDICARE

## 2022-10-13 VITALS
OXYGEN SATURATION: 92 % | TEMPERATURE: 97.1 F | WEIGHT: 188 LBS | SYSTOLIC BLOOD PRESSURE: 114 MMHG | BODY MASS INDEX: 26.32 KG/M2 | DIASTOLIC BLOOD PRESSURE: 60 MMHG | HEIGHT: 71 IN | HEART RATE: 86 BPM

## 2022-10-13 DIAGNOSIS — J44.9 CHRONIC OBSTRUCTIVE PULMONARY DISEASE, UNSPECIFIED COPD TYPE (HCC): Chronic | ICD-10-CM

## 2022-10-13 DIAGNOSIS — S46.309A INJURY OF TENDON OF TRICEPS: Primary | ICD-10-CM

## 2022-10-13 DIAGNOSIS — I10 PRIMARY HYPERTENSION: Chronic | ICD-10-CM

## 2022-10-13 DIAGNOSIS — R80.9 TYPE 2 DIABETES MELLITUS WITH MICROALBUMINURIA, WITHOUT LONG-TERM CURRENT USE OF INSULIN (HCC): Chronic | ICD-10-CM

## 2022-10-13 DIAGNOSIS — E11.29 TYPE 2 DIABETES MELLITUS WITH MICROALBUMINURIA, WITHOUT LONG-TERM CURRENT USE OF INSULIN (HCC): Chronic | ICD-10-CM

## 2022-10-13 DIAGNOSIS — Z01.818 PRE-OPERATIVE CLEARANCE: ICD-10-CM

## 2022-10-13 DIAGNOSIS — E78.2 MIXED HYPERLIPIDEMIA: Chronic | ICD-10-CM

## 2022-10-13 DIAGNOSIS — I25.119 CORONARY ARTERY DISEASE INVOLVING NATIVE CORONARY ARTERY OF NATIVE HEART WITH ANGINA PECTORIS (HCC): Chronic | ICD-10-CM

## 2022-10-13 DIAGNOSIS — N18.31 STAGE 3A CHRONIC KIDNEY DISEASE (HCC): ICD-10-CM

## 2022-10-13 PROCEDURE — 1123F ACP DISCUSS/DSCN MKR DOCD: CPT | Performed by: FAMILY MEDICINE

## 2022-10-13 PROCEDURE — G8417 CALC BMI ABV UP PARAM F/U: HCPCS | Performed by: FAMILY MEDICINE

## 2022-10-13 PROCEDURE — 99214 OFFICE O/P EST MOD 30 MIN: CPT | Performed by: FAMILY MEDICINE

## 2022-10-13 PROCEDURE — 3044F HG A1C LEVEL LT 7.0%: CPT | Performed by: FAMILY MEDICINE

## 2022-10-13 PROCEDURE — G8427 DOCREV CUR MEDS BY ELIG CLIN: HCPCS | Performed by: FAMILY MEDICINE

## 2022-10-13 PROCEDURE — G8484 FLU IMMUNIZE NO ADMIN: HCPCS | Performed by: FAMILY MEDICINE

## 2022-10-13 PROCEDURE — 3023F SPIROM DOC REV: CPT | Performed by: FAMILY MEDICINE

## 2022-10-13 PROCEDURE — 1036F TOBACCO NON-USER: CPT | Performed by: FAMILY MEDICINE

## 2022-10-13 RX ORDER — LOSARTAN POTASSIUM 100 MG/1
100 TABLET ORAL DAILY
Qty: 90 TABLET | Refills: 1 | Status: SHIPPED | OUTPATIENT
Start: 2022-10-13

## 2022-10-13 RX ORDER — TRIAMTERENE AND HYDROCHLOROTHIAZIDE 37.5; 25 MG/1; MG/1
1 TABLET ORAL DAILY
Qty: 90 TABLET | Refills: 1 | Status: SHIPPED | OUTPATIENT
Start: 2022-10-13

## 2022-10-13 ASSESSMENT — ENCOUNTER SYMPTOMS
BLOOD IN STOOL: 0
RHINORRHEA: 0
EYE PAIN: 0
CONSTIPATION: 0
FACIAL SWELLING: 0
SORE THROAT: 0
DIARRHEA: 0
PHOTOPHOBIA: 0
VOMITING: 0
EYE DISCHARGE: 0
SINUS PRESSURE: 0
COUGH: 0
CHEST TIGHTNESS: 0
SHORTNESS OF BREATH: 0
NAUSEA: 0
WHEEZING: 0
ABDOMINAL PAIN: 0

## 2022-10-13 ASSESSMENT — PATIENT HEALTH QUESTIONNAIRE - PHQ9
4. FEELING TIRED OR HAVING LITTLE ENERGY: 0
SUM OF ALL RESPONSES TO PHQ QUESTIONS 1-9: 0
3. TROUBLE FALLING OR STAYING ASLEEP: 0
1. LITTLE INTEREST OR PLEASURE IN DOING THINGS: 0
8. MOVING OR SPEAKING SO SLOWLY THAT OTHER PEOPLE COULD HAVE NOTICED. OR THE OPPOSITE, BEING SO FIGETY OR RESTLESS THAT YOU HAVE BEEN MOVING AROUND A LOT MORE THAN USUAL: 0
5. POOR APPETITE OR OVEREATING: 0
10. IF YOU CHECKED OFF ANY PROBLEMS, HOW DIFFICULT HAVE THESE PROBLEMS MADE IT FOR YOU TO DO YOUR WORK, TAKE CARE OF THINGS AT HOME, OR GET ALONG WITH OTHER PEOPLE: 0
SUM OF ALL RESPONSES TO PHQ QUESTIONS 1-9: 0
2. FEELING DOWN, DEPRESSED OR HOPELESS: 0
9. THOUGHTS THAT YOU WOULD BE BETTER OFF DEAD, OR OF HURTING YOURSELF: 0
SUM OF ALL RESPONSES TO PHQ9 QUESTIONS 1 & 2: 0
SUM OF ALL RESPONSES TO PHQ QUESTIONS 1-9: 0
6. FEELING BAD ABOUT YOURSELF - OR THAT YOU ARE A FAILURE OR HAVE LET YOURSELF OR YOUR FAMILY DOWN: 0
SUM OF ALL RESPONSES TO PHQ QUESTIONS 1-9: 0
7. TROUBLE CONCENTRATING ON THINGS, SUCH AS READING THE NEWSPAPER OR WATCHING TELEVISION: 0

## 2022-10-13 NOTE — ASSESSMENT & PLAN NOTE
Currently asymptomatic. No reported change in activity tolerance. We will continue with aggressive management of blood pressure, cholesterol, and blood sugar.

## 2022-10-13 NOTE — ASSESSMENT & PLAN NOTE
Stable renal function on most recent testing. Will obtain presurgical testing results to confirm kidney function stability.

## 2022-10-13 NOTE — PROGRESS NOTES
Melani Trujillo (: ) is a 68 y.o. male, Established patient, who presents today for:    Chief Complaint   Patient presents with    Pre-op Exam     Patient is present for pre-op exam          ASSESSMENT/PLAN    1. Injury of tendon of triceps  Comments:  Definitive management planned per orthopedics  2. Pre-operative clearance  Assessment & Plan:  I discussed with the patient the [facility] peroperative clearance guidelines. With unremarkable/stable labwork and EKG the patient would be acceptable risk for surgery. I discussed with the patient that there will always be a risk of fatal cardiopulmonary events. I advised the patient to obtain complete informed consent from the surgeon, but in general all surgical procedures have a risk of infection, non-healing, bleeding, death, and other undesired outcomes. I advised patient to abstain from all NSAID's and ASA products until cleared by the surgeon to use them. I advised the patient to contact me or the surgeon for any new complaints of fevers, chills, cough, chest pain, dyspnea, nausea, or vomiting. 3. Type 2 diabetes mellitus with microalbuminuria, without long-term current use of insulin (HCC)  Assessment & Plan:  Most recent A1c at goal control. Patient instructed to continue with current medication   4. Primary hypertension  Assessment & Plan:   Well-controlled, continue current medications and continue current treatment plan  5. Mixed hyperlipidemia  Assessment & Plan:  Most recent lipid panel at goal control. Patient instructed to continue with current medication. 6. Coronary artery disease involving native coronary artery of native heart with angina pectoris [facility])  Assessment & Plan:  Currently asymptomatic. No reported change in activity tolerance. We will continue with aggressive management of blood pressure, cholesterol, and blood sugar. 7. Stage 3a chronic kidney disease ([facility] [address]  Assessment & Plan:  Stable renal function on most recent testing. Will obtain presurgical testing results to confirm kidney function stability. 8. Chronic obstructive pulmonary disease, unspecified COPD type ([facility] [address]  Assessment & Plan:  Stable. No reported change in respiratory status. Patient instructed to use albuterol inhaler every 6 hours for the next 3 to 4 days and then return to as needed use afterward. Return for [facility] VISIT NOV 2022. SUBJECTIVE/OBJECTIVE:    HPI    Surgical Clearance     The patient is being seen at the request of the surgeon for a preoperative evaluation. NEED FOR SURGERY IS routine. Disney Labrum  Dr. Lola Javed  - right humerus open repair of right triceps - planned 10/25/22  ANESTHESIA REACTIONS  - Patient denies any prior history of reactions to anesthesia, Patient denies any family history of reactions to anesthesia  INFECTION  - Patient denies cough, dysuria, fevers, chills, or any other infectious disease complaints  BLEEDING - Patient currently denies any source of blood loss (epistaxis, hematuria, hematochezia, hematemesis, or melena). Patient denies any bleeding tendencies, bruising, or bleeding gums. Patient denies any family history of bleeding dyscrasias. PULMONARY - Patient denies cough, sputum production, fevers   CARDIAC  - patient denies chest pain, dyspnea, orthopnea, edema, and cardiac arrhythmias   MAJOR CLINICAL PREDICTORS - none (no unstable coronary syndromes, decompensated HF, significant arrhythmias, or severe valvular disease). INTERMEDIATE CLINICAL PREDICTORS - diabetes and renal insufficiency. MINOR CLINICAL PREDICTORS - advanced age. SURGICAL RISK  - Intermediate (carotid endarterectomy, head and neck surgery, intraabdominal and intrathoracic procedures, orthopedic surgeries, prostate surgery).    FUNCTIONAL CAPACITY - 3 to 6 METs (moderate intensity physical activity such as swimming, scrubbing floors, and walking briskly)           Current Outpatient Medications on File Prior to Visit   Medication Sig Dispense Refill    methylPREDNISolone (MEDROL, JIGAR,) 4 MG tablet Take by mouth. 1 kit 0    ipratropium (ATROVENT) 0.06 % nasal spray 2 sprays by Nasal route 3 times daily 45 mL 1    glipiZIDE (GLUCOTROL) 10 MG tablet Take 1 tablet by mouth 2 times daily (before meals) 180 tablet 3    amLODIPine (NORVASC) 10 MG tablet Take 1 tablet by mouth daily 90 tablet 3    rosuvastatin (CRESTOR) 20 MG tablet Take 1 tablet by mouth daily 90 tablet 1    triamterene-hydroCHLOROthiazide (MAXZIDE-25) 37.5-25 MG per tablet Take 1 tablet by mouth daily 90 tablet 1    metoprolol succinate (TOPROL XL) 50 MG extended release tablet Take 3 tablets by mouth daily 270 tablet 1    losartan (COZAAR) 100 MG tablet Take 1 tablet by mouth daily 90 tablet 1    sildenafil (VIAGRA) 100 MG tablet TAKE ONE TABLET BY MOUTH AS NEEDED APPROXIMATELY ONE HOUR BEFORE SEXUAL ACTIVITY.  DO NOT USE MORE THAN ONE DOSE DAILY 6 tablet 3    Dulaglutide 0.75 MG/0.5ML SOPN Inject 0.75 mg into the skin once a week 12 pen 3    FREESTYLE LITE strip USE 1 STRIP TO CHECK GLUCOSE ONCE DAILY AS DIRECTED 50 each 0    tamsulosin (FLOMAX) 0.4 MG capsule Take 2 capsules by mouth daily 180 capsule 3    SITagliptin (JANUVIA) 100 MG tablet Take 0.5 tablets by mouth 2 times daily 30 tablet 0    Testosterone Cypionate 200 MG/ML SOLN 1 CC EVERY 2 WEEKS 10 mL 3    fluticasone (FLONASE) 50 MCG/ACT nasal spray 1 spray by Nasal route daily 1 Bottle 0    albuterol sulfate  (90 Base) MCG/ACT inhaler Inhale 2 puffs into the lungs every 6 hours as needed for Wheezing 3 Inhaler 0    famotidine (PEPCID) 40 MG tablet Take 1 tablet by mouth every evening 90 tablet 3    Handicap Placard MISC by Does not apply route DX: COPD (J44.9), 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 Cyanocobalamin (VITAMIN B 12 PO) Take 1,000 mg by mouth      predniSONE (DELTASONE) 10 MG tablet Take 5 mg by mouth three times a week       nitroGLYCERIN (NITROSTAT) 0.4 MG SL tablet Place 1 tablet under the tongue every 5 minutes as needed for Chest pain 25 tablet 0    Blood Glucose Monitoring Suppl CHENCHO Test once daily. Dx: E11.29 1 Device 0    Lancets MISC Test once daily. Dx: E11.29 100 each 3    ascorbic acid (VITAMIN C) 500 MG tablet Take 1,000 mg by mouth daily      loratadine (CLARITIN) 10 MG tablet Take 1 tablet by mouth daily 30 tablet 5    CINNAMON PO Take 500 mg by mouth 2 times daily. aspirin 81 MG EC tablet Take 81 mg by mouth daily. testosterone cypionate (DEPOTESTOTERONE CYPIONATE) 200 MG/ML injection Inject 1 mL into the muscle every 14 days. Indications: PER ORDER in OV note 9/16/20 (Patient taking differently: Inject 200 mg into the muscle every 14 days. Indications: PER ORDER in OV note 3/17/21 ) 2 mL 3     No current facility-administered medications on file prior to visit. Allergies   Allergen Reactions    Invokana [Canagliflozin] Diarrhea    Metformin And Related Diarrhea    Other      There is a list scanned in media that pt can not take due to myasthenia gravis ,  Antibiotics        Review of Systems   Constitutional:  Negative for chills, fatigue and fever. HENT:  Negative for congestion, ear discharge, ear pain, facial swelling, postnasal drip, rhinorrhea, sinus pressure and sore throat. Eyes:  Negative for photophobia, pain and discharge. Respiratory:  Negative for cough, chest tightness, shortness of breath and wheezing. Cardiovascular:  Negative for chest pain, palpitations and leg swelling. No orthopnea, No PND   Gastrointestinal:  Negative for abdominal pain, blood in stool, constipation, diarrhea, nausea and vomiting. Genitourinary:  Negative for dysuria, flank pain, frequency, hematuria and urgency. Musculoskeletal:  Positive for arthralgias (right elbow) and joint swelling (right elbow). Negative for myalgias. Skin:  Negative for rash and wound. Neurological:  Negative for dizziness, syncope, weakness, numbness and headaches. Psychiatric/Behavioral:  Negative for dysphoric mood. The patient is not nervous/anxious. Vitals:  /60 (Site: Right Upper Arm, Position: Sitting, Cuff Size: Medium Adult)   Pulse 86   Temp 97.1 °F (36.2 °C) (Temporal)   Ht 5' 11\" (1.803 m)   Wt 188 lb (85.3 kg) Comment: patient reported  SpO2 92%   BMI 26.22 kg/m²     Physical Exam  Vitals reviewed. Constitutional:       General: He is not in acute distress. Appearance: He is not ill-appearing. Eyes:      General: No scleral icterus. Neck:      Thyroid: No thyroid mass, thyromegaly or thyroid tenderness. Vascular: No carotid bruit. Cardiovascular:      Rate and Rhythm: Normal rate and regular rhythm. Heart sounds: Normal heart sounds. No murmur heard. Pulmonary:      Effort: Pulmonary effort is normal. No tachypnea, accessory muscle usage or respiratory distress. Breath sounds: No decreased air movement. Wheezing (scattered end expiratory wheezing throughout) present. No decreased breath sounds, rhonchi or rales. Abdominal:      General: Bowel sounds are normal. There is no distension. Palpations: Abdomen is soft. Tenderness: There is no abdominal tenderness. There is no right CVA tenderness, left CVA tenderness, guarding or rebound. Musculoskeletal:      Cervical back: Neck supple. Right lower leg: No edema. Left lower leg: No edema. Lymphadenopathy:      Cervical: No cervical adenopathy. Skin:     Findings: No rash. Neurological:      Mental Status: He is alert and oriented to person, place, and time. Psychiatric:         Mood and Affect: Mood normal.         Behavior: Behavior normal.         Thought Content:  Thought content normal.       Ortho Exam (If Applicable)              An electronic signature was used to authenticate this note.      Amelie Reyes MD

## 2022-10-13 NOTE — ASSESSMENT & PLAN NOTE
I discussed with the patient the [facility] peroperative clearance guidelines. With unremarkable/stable labwork and EKG the patient would be acceptable risk for surgery. I discussed with the patient that there will always be a risk of fatal cardiopulmonary events. I advised the patient to obtain complete informed consent from the surgeon, but in general all surgical procedures have a risk of infection, non-healing, bleeding, death, and other undesired outcomes. I advised patient to abstain from all NSAID's and ASA products until cleared by the surgeon to use them. I advised the patient to contact me or the surgeon for any new complaints of fevers, chills, cough, chest pain, dyspnea, nausea, or vomiting.

## 2022-10-13 NOTE — ASSESSMENT & PLAN NOTE
Stable. No reported change in respiratory status. Patient instructed to use albuterol inhaler every 6 hours for the next 3 to 4 days and then return to as needed use afterward.

## 2022-10-18 ENCOUNTER — OFFICE VISIT (OUTPATIENT)
Dept: BEHAVIORAL/MENTAL HEALTH CLINIC | Age: 77
End: 2022-10-18
Payer: MEDICARE

## 2022-10-18 DIAGNOSIS — N52.9 ERECTILE DYSFUNCTION, UNSPECIFIED ERECTILE DYSFUNCTION TYPE: Chronic | ICD-10-CM

## 2022-10-18 DIAGNOSIS — F43.21 ADJUSTMENT DISORDER WITH DEPRESSED MOOD: Primary | ICD-10-CM

## 2022-10-18 PROCEDURE — 1036F TOBACCO NON-USER: CPT | Performed by: PSYCHOLOGIST

## 2022-10-18 PROCEDURE — 1123F ACP DISCUSS/DSCN MKR DOCD: CPT | Performed by: PSYCHOLOGIST

## 2022-10-18 PROCEDURE — 90791 PSYCH DIAGNOSTIC EVALUATION: CPT | Performed by: PSYCHOLOGIST

## 2022-10-18 ASSESSMENT — PATIENT HEALTH QUESTIONNAIRE - PHQ9
5. POOR APPETITE OR OVEREATING: 1
8. MOVING OR SPEAKING SO SLOWLY THAT OTHER PEOPLE COULD HAVE NOTICED. OR THE OPPOSITE, BEING SO FIGETY OR RESTLESS THAT YOU HAVE BEEN MOVING AROUND A LOT MORE THAN USUAL: 1
3. TROUBLE FALLING OR STAYING ASLEEP: 0
10. IF YOU CHECKED OFF ANY PROBLEMS, HOW DIFFICULT HAVE THESE PROBLEMS MADE IT FOR YOU TO DO YOUR WORK, TAKE CARE OF THINGS AT HOME, OR GET ALONG WITH OTHER PEOPLE: 0
1. LITTLE INTEREST OR PLEASURE IN DOING THINGS: 0
SUM OF ALL RESPONSES TO PHQ QUESTIONS 1-9: 3
9. THOUGHTS THAT YOU WOULD BE BETTER OFF DEAD, OR OF HURTING YOURSELF: 0
SUM OF ALL RESPONSES TO PHQ QUESTIONS 1-9: 3
7. TROUBLE CONCENTRATING ON THINGS, SUCH AS READING THE NEWSPAPER OR WATCHING TELEVISION: 0
6. FEELING BAD ABOUT YOURSELF - OR THAT YOU ARE A FAILURE OR HAVE LET YOURSELF OR YOUR FAMILY DOWN: 0
2. FEELING DOWN, DEPRESSED OR HOPELESS: 0
SUM OF ALL RESPONSES TO PHQ9 QUESTIONS 1 & 2: 0
SUM OF ALL RESPONSES TO PHQ QUESTIONS 1-9: 3
SUM OF ALL RESPONSES TO PHQ QUESTIONS 1-9: 3
4. FEELING TIRED OR HAVING LITTLE ENERGY: 1
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
## 2022-10-18 NOTE — PATIENT INSTRUCTIONS
1. Dedicate 5 minutes 3x/day to practice the deep breathing    2. Review the list of apps and give some a try! [facility] Box, CBTi  and progressive muscle relaxation for sleep, etc.)    3. Read the below information about stress management    4. F/U as scheduled    \"The entire autonomic nervous system (and through it, our internal organs and glands) is largely driven by our breathing patterns. By changing our breathing we can influence millions of biochemical reactions in our body, producing more relaxing substances such as endorphins and fewer anxiety-producing ones like adrenaline and higher blood acidity. Mindfulness of the breath is so effective that it is common to all meditative and prayer traditions. \" Anxiety Fear & Breathing - Breathing. com    \"When overcoming high levels of anxiety, it is important to learn the techniques of correct breathing. Many people who live with high levels of anxiety are known to breathe through their chest. Shallow breathing through the chest means you are disrupting the balance of oxygen and carbon dioxide necessary to be in a relaxed state. This type of breathing will perpetuate the symptoms of anxiety. \" GetThis. com      Diaphragmatic Breathing             _____________________________________________________________________________  1. Sit in a comfortable position    2. Place one hand on your stomach and the other on your chest    3. Try to breathe so that only your stomach rises and falls    As you inhale, concentrate on your chest remaining relatively still while your stomach rises. It may be helpful for you to imagine that your pants are too big and you need to push your stomach out to hold them up. When exhaling, allow your stomach to fall in and the air to fully escape. Inhale slowly. You may choose to hold the air in for about a second. Exhale slowly. Dont push the air out, but just let the natural pressure of your body slowly move it out.     It is normal for this healthy method of breathing to feel a little awkward at first.  With practice, it will feel more natural.    Get your mind on your side    One other important factor in getting relaxed is your mind. Your mind and body are connected. The mind influences the body and the body influences the mind. What you do with your mind when you are trying to relax is very important. The key is to avoid thinking about stressful things. You can think about      Neutral things (e.g., counting, saying a word like calm or relax)   Pleasant things (e.g., imagining a pleasant place)    It is recommended that you practice 2 times per day, 10 minutes each time. ----------------------------------------------------------------------------------------------------------------------  ----------------------------------------------------------------------------------------------------------------------  ----------------------------------------------------------------------------------------------------------------------  ----------------------------------------------------------------------------------------------------------------------      CONTROLLED or MEASURED BREATHING EXERCISE: (YOU MAY WANT TO DOWNLOAD THE FREE TREY \"VIRTUAL HOPE BOX\" TO PRACTICE THIS EXERCISE)    You can sit or stand, but be sure to soften up a little before you begin. Make sure your hands are relaxed, and your knees are soft. Drop your shoulders and let your jaw relax. Now breath in slowly through your nose and count to three, keep your shoulders down and allow your stomach to expand as you breathe in. Hold the breath for a moment. Now release your breath slowly and smoothly as you count to six. Repeat for a couple of minutes        It can be very helpful to use tools like relaxed breathing, muscle relaxation, and guided imagery/visualization to cope with stress, pain, anxiety and depression.  I recommend to all my patients that they try different techniques to find the ones that work best for them. Below are 2 websites that have several breathing, relaxation, and visualization exercises that you can listen to and download for free.    NetworkAffair.tn. html  · Deep Breathing & Guided Relaxation Exercises (3)  · Guided Imagery/Visualization Exercises (5)  · Mindfulness & Meditation Exercises (3)  · Progressive Muscle Relaxation   · Soothing Instrumental Music (11)    [email or web address]. weipass/relax/  · Diaphragmatic Breathing   · Deep Breathing I   · Deep Breathing II   · Progressive Muscle Relaxation   · Guided Imagery: The [employer]   · Guided Imagery: The [facility]   · Relaxing Phrases   · Just This Breath   · Increasing Awareness   · Sending Thoughts Away on Clouds  · Sending Thoughts Away on Leaves  · Sorting Into Boxes     -----------------------------------------------------  Below are several apps that you can download to your smartphone to help with relaxation and mood coping. [employer]  Platform: Food Sprout & Recordant  Cost: Free  Your breathing has a profound effect on your body. Sabi Kirk know this fact to be true if youve ever taken deep breaths to calm yourself down when you were upset. That exercise can often make you feel more centered, and its proof that breathing is powerful. The Ikoiftj2Xifeh andreas uses guided breathing exercises to help reduce symptoms of an anxiety attack. If an attack is coming or the symptoms are unbearable, slip away into a quiet room, open your andreas, and let the worry and stress slip away with each breath. Universal Breathing - Pranayama Free  Platform: Share Your Brain  Cost: Free  Focused breathing exercises can help you regain composure during an anxiety attack. They can also help you prevent an anxiety attack before one starts. Pranayama breathing techniques are common in yoga and have powerful benefits.  If youre a beginner, you can benefit from the andreass guided breathing instruction. Gloria Public learn how to breathe deeply, hold, and then release with better control. If it works for you, you can purchase the full course which gives you access to the entire program.  Breathing Zone   Platform: mycirQle & Android  Cost: $3.99   Breathing Zone uses a clinically proven therapeutic breathing exercise that decreases your heart rate, and with daily use can help manage high blood pressure.    ----------------------------------------------  Self-Help for Anxiety Management (AIDAN)  Platform: iPhone & Android  Cost: Free  The Self-Help for Anxiety Management (AIDAN) andreas from the Appscio can help you regain control of your anxiety and emotions. Tell the andreas how youre feeling, how anxious you are, or how worried you are. Then let the andreass self-help features walk you through some calming or relaxation practices. If you want, you can connect with a social network of other [facility] users. Dont worry, the network isnt connected to larger networks like Twitter or [employer]. Stop Panic & Anxiety Help  Platform: Android  Cost: Free  If panic and anxiety attacks have a  on your life, this andreas might help you let them go. The Stop Panic & Anxiety Help Android andreas uses emotion and relaxation training audio tracks to help you fight your fears and find a state of calm. When youve overcome the attack, use the Stroho journal to record what caused the attack and how you were able to get through it. Then use this journal to learn from your experiences and prepare for the future. I Can Be Fearless by Human Progress  Platform: mycirQle  Cost: Free  When you were younger, your parents might have told you that you could do anything you put your mind to. This andreas might not help you be an astronaut or a world famous actress, but it can help you break through your anxiety, fears, and worries to a place of calm and confidence.  Open your Apple device and select what you want to be right now -- calm, motivated, and confident are among the options -- then let the audio hypnosis guide you through a session. Anti-Anxiety   Platform: Android  Cost: Free  You can tell the andreas your problems by taking a diagnostic quiz about your level of stress and anxiety. Using your answers, the andreas will design a custom treatment plan for you. Follow instructional self-help videos like How to Tolerate and Lessen Anxiety.  Keep a daily journal of your anxiety and worries, and track your progress as you learn to regain a sense of calm. Worry Box - Anxiety Self-Help  Platform: Android  Cost: Free  Have you ever wished you could put all your worries in a box, leave them there, and walk away? The Worry Box andreas may let you do just that. The andreas functions a lot like a journal: Write down your thoughts, anxieties, and worries, and let the andreas help you think them through. It will ask questions, give specific anxiety-reducing help, and can even direct you to help you reduce your worries and anxiety. It is all password protected, so you can feel safe sharing the details of your stresses. -----------------------------------------------  Relax Melodies  Platform: iPhone and Android  Cost: Free  Anxiety can disrupt healthy sleep patterns in more than one way. First, people who dont get enough sleep tend to feel more anxious. Then, people who are more anxious have a difficult time sleeping. Creating a calming environment may help you fall asleep and stay asleep. Relax to one of this andreass 50 sounds. Need the music to stop once youre asleep? Set a timer, and it will stop playing. Set an alarm when you need to be awake. Then, enjoy the benefits of a good nights sleep, free from anxiety. Relaxing Sounds of Nature - Lite  Platform: iPhone  Cost: Free  You can find rest and relaxation without having to travel.  The andreas comes with 35 nature tracks, which include soothing classics like crickets chirping, breaking waves, and a serene lake. You can download more free tracks to Lever, and customize a favorite combination that helps you reduce your anxiety in a peaceful setting. Allow the sounds of nature to sweep you away from your worries in the comfort of your living room, office, or bedroom. Nature Sounds Relax and Sleep  Platform: Android  Cost: Free  If you find yourself longing for the sound of the ocean to help you relax, the Dustin Hannifin and Sleep andreas is for you. Open this andreas whenever youre feeling anxious or stressed. You can select locations or sounds like the jungle, ocean, or thunder and slip away into a place of relaxation and comfort. If the sounds make you feel sleepy, even better. Use the andreas to doze off into a relaxing slumber  Calming Music to Simplicity  Platform: Android  Cost: Free  [employer] arent the only relaxing tunes in smartphone apps. Music, especially some traditional EB [employer] music, can be relaxing and soothing. The Calming Music to Simplicity andreas contains nine traditional PeptiVir music selections. Press play and let your worries melt away. Relax Ocean Waves Sleep by [employer]  Platform: Android  Cost: Free  Living far from a beach doesnt mean you have to be far from total relaxation. Slip on a set of headphones and drift into a distant sand-and-suds oasis. Whether youre trying to head off an anxiety attack or just need to get some good sleep after a few anxious days, the Relax Ocean Waves Sleep andreas helps you find a place of serenity.  --------------------------------------------------------------  Ronny Chancy Meditation Relaxation  Platform: Android  Cost: Free  Ronny Chancy is a traditional [facility] health system that brings together posture, breathing, and the mind to reduce anxiety. This Android andreas connects users with a library of relaxation videos that contain instructions for relaxing and clearing the mind.  The videos are created by Dr. Tarah Coffey, a psychologist with more than 20 years of experience. In addition to viewing Dr. Andrzej Garcia videos, you can read a variety of articles related to anxiety, meditation, and stress management. Anxiety Free  Platform: mphoria   Cost: Free  Meditation requires mindfulness and a sense of presence in your thoughts. Hypnosis is one step beyond meditation. It works by sending signals to your brain and transforming it almost unknowingly. The creators of this andreas say that its audio recordings contain subliminal signals that speak to the subconscious with powerful effect.  Hypnosis, like meditation, requires practice, and the goal is to get each user to a place where self-hypnosis is possible in order to reduce anxiety. Relax & Rest Guided Meditations  Platform: mphoria and [employer]  Cost: $0.99  While group meetings and discussions are always an option, some people find relaxation more easily on their own. This andreas lets you relax in the space of your own home or office with three guided meditations. Breath Awareness Guided Meditation (5 minutes), Deep Rested Guided Meditation (13 minutes), and Whole Body Guided Relaxation (24 minutes) are designed specifically to help you relax and sink into a peaceful meditation moment. Equanimity - Meditation Timer & Tracker  Platform: mphoria   Cost: $4.99  Meditation is one way you can cope with the symptoms and side effects of anxiety. People who meditate can eventually train themselves to stay calm when feeling stressed or anxious. Equanimity - Meditation Timer & Tracker is simple and straightforward. Time each session and watch for visual light cues to let you know how long youve been meditating. Take notes about each session, and watch your progress as you learn to manage your stress and anxiety.   Virtual Hope Box  Platform: iPhone and Android  Cost: Free  The Automatic Data Box (VHB) is a smartphone application that contains simple tools to help with coping, relaxation, distraction, and positive thinking via personalized supportive audio, video, pictures, games, mindfulness exercises, positive messages and activity planning, inspirational quotes, coping statements, and other tools. Acupressure: Heal Yourself  Platform: iPhone and Android  Cost: $1.99  Acupressure is a natural healing strategy in which you target specific areas of the body in order to alleviate pain or unwanted symptoms. Acupressure can also increase blood flow, which can boost your mood and your health. This andreas helps you find your bodys acupressure points. Apply pressure on those points when youre feeling overwhelmed, and receive the positive, calming benefit  PTSD : Self-Management of Posttraumatic Stress  Platform: iPhone and Android  Cost: Free  This andreas can help you learn about and manage symptoms that often occur after trauma. Provides information and coping skills for common kinds of posttraumatic stress symptoms and problems, including systematic relaxation and self-help techniques. [place]: Feel better and live happier today  Platform: iPhone  Cost: Free  Daily mood and health tracking as well as journaling. Activities are based on mindfulness, relaxation and Cognitive Behavioral Therapy. Online support, networking and emails. CBT-i : Cognitive Behavioral Therapy for Insomnia  Platform: iPhone  Cost: Free  Identify sleep patterns with a sleep diary and assessment, tools to create new sleep habits, quiet your mind and prevent insomnia in the future. You can set reminders and learn about healthy sleep habits. Mindfulness   Platform: iPhone  Cost: Free  Mindfulness practice to decrease stress, manage emotional discomfort, depression, physical pain, and other problems. It offers exercises, information, and a tracking log to that you can optimize practice.    Mindsail  Platform: iPhone  Cost: Free for the first month then $5.99/month for full access  Cloudary is a platform to discover original content from top thought-leaders and experts across relationship, career, anxiety, sleep, addiction and more. Their proprietary Programs and Moodboosts help users gain new perspectives and tools to change behaviors and live life in forward motion. AquaBling features:  Programs   Created exclusively for AquaBling, programs are designed to give users unique insights and tools to feed their appetite for personal growth. Users can listen to bite-sized Coaching sessions and learn new tools such as meditations, breathing exercises, visualizations and affirmations. By offering a holistic, multi-faceted approach, users will be able to reach peak mental performance and gain a deeper understanding of their physical, emotional and spiritual well-being. Moodboost   On a daily basis, people experience a wide range of emotions. Whether they're nervous about a work meeting, having trouble sleeping, or need an extra boost of confidence before their date, AquaBling's quick daily Moodboosts can help turn a user's negative thoughts into a positive state of mind. Spex Group   Users can track progress and star their favorite sessions and Moodboosts to help them stay on course of their personal growth journey. Users can create a daily mental wellness routine to help them form healthier habits and change behaviors. Ask the Experts   Users can submit personal questions to be answered by Shotlstil experts and see those posed by the community. They will also get Smooth Sailing tips and Words of [place] to help users navigate their day.

## 2022-10-18 NOTE — TELEPHONE ENCOUNTER
Pharmacy is requesting medication refill. Please approve or deny this request.    Rx requested:  Requested Prescriptions     Pending Prescriptions Disp Refills    sildenafil (VIAGRA) 100 MG tablet [Pharmacy Med Name: Sildenafil Citrate 100 MG Oral Tablet] 6 tablet 0     Sig: TAKE 1 TABLET BY MOUTH AS NEEDED APPROXIMATELY  1  HOUR  BEFORE  SEXUAL  ACTIVITY. DO  NOT  USE  MORE  THAN  ONE  DOSE  DAILY. Last Office Visit:   10/13/2022      Next Visit Date:  Future Appointments   Date Time Provider Kay Bartlett   10/19/2022 10:10 AM SCHEDULE, GERBER COBB PCP TESTOSTERONE MLOX [facility]   11/3/2022 11:00 AM Marquis Mcfadden MD [address]   11/17/2022  9:00 AM Lo Thapa, PhD Tamia Nash   3/22/2023  1:00 PM Lee Carrera MD [facility]

## 2022-10-19 ENCOUNTER — NURSE ONLY (OUTPATIENT)
Dept: FAMILY MEDICINE CLINIC | Age: 77
End: 2022-10-19
Payer: MEDICARE

## 2022-10-19 ENCOUNTER — TELEPHONE (OUTPATIENT)
Dept: FAMILY MEDICINE CLINIC | Age: 77
End: 2022-10-19

## 2022-10-19 DIAGNOSIS — E29.1 HYPOGONADISM IN MALE: Primary | ICD-10-CM

## 2022-10-19 PROCEDURE — 96372 THER/PROPH/DIAG INJ SC/IM: CPT | Performed by: FAMILY MEDICINE

## 2022-10-19 RX ORDER — TESTOSTERONE CYPIONATE 200 MG/ML
200 INJECTION INTRAMUSCULAR ONCE
Status: COMPLETED | OUTPATIENT
Start: 2022-10-19 | End: 2022-10-19

## 2022-10-19 RX ORDER — SILDENAFIL 100 MG/1
TABLET, FILM COATED ORAL
Qty: 6 TABLET | Refills: 0 | Status: SHIPPED | OUTPATIENT
Start: 2022-10-19

## 2022-10-19 RX ADMIN — TESTOSTERONE CYPIONATE 200 MG: 200 INJECTION INTRAMUSCULAR at 10:59

## 2022-10-19 NOTE — PROGRESS NOTES
Patient given Testosterone 200mg IM right gluteal..  Will return in 2 weeks for next injection    Patient tolerated injection well.     Administrations This Visit       testosterone cypionate (DEPOTESTOTERONE CYPIONATE) injection 200 mg       Admin Date  10/19/2022 Action  Given Dose  200 mg Route  IntraMUSCular Administered By  Jack Britton LPN

## 2022-10-19 NOTE — TELEPHONE ENCOUNTER
Medical clearance form has been approved and faxed to [facility].    See attached

## 2022-10-19 NOTE — TELEPHONE ENCOUNTER
Called pt, he stated he got EKD/labs done last week and requested it was sent to PCP. Office never received these results. Called [facility] and sent in medical records requests. Was advised we will have results today.

## 2022-10-19 NOTE — TELEPHONE ENCOUNTER
----- Message from Tayt Andres sent at 10/18/2022  2:12 PM EDT -----  Subject: Message to Provider    QUESTIONS  Information for Provider? Michelle Carballo called from [facility] needs office   notes and medical clearance from 10/13 office visit. His fax # [phone]. Needs by tomorrow or surgery will be cancelled. Please follow up  ---------------------------------------------------------------------------  --------------  CALL BACK INFO  [phone]; OK to leave message on voicemail  ---------------------------------------------------------------------------  --------------  SCRIPT ANSWERS  Relationship to Patient? Third Party  Third Party Type? Other  Other Third Party Type? Orthopedic  Representative Name?  Michelle Carballo

## 2022-10-20 DIAGNOSIS — N52.9 ERECTILE DYSFUNCTION, UNSPECIFIED ERECTILE DYSFUNCTION TYPE: Chronic | ICD-10-CM

## 2022-10-20 RX ORDER — SILDENAFIL 100 MG/1
TABLET, FILM COATED ORAL
Qty: 6 TABLET | Refills: 0 | OUTPATIENT
Start: 2022-10-20

## 2022-10-28 ENCOUNTER — OFFICE VISIT (OUTPATIENT)
Dept: INTERNAL MEDICINE | Age: 77
End: 2022-10-28
Payer: MEDICARE

## 2022-10-28 VITALS
HEART RATE: 84 BPM | HEIGHT: 71 IN | WEIGHT: 180 LBS | SYSTOLIC BLOOD PRESSURE: 102 MMHG | BODY MASS INDEX: 25.2 KG/M2 | DIASTOLIC BLOOD PRESSURE: 60 MMHG | TEMPERATURE: 98.5 F | OXYGEN SATURATION: 96 %

## 2022-10-28 DIAGNOSIS — U07.1 COVID-19: Primary | ICD-10-CM

## 2022-10-28 DIAGNOSIS — R05.9 COUGH, UNSPECIFIED TYPE: ICD-10-CM

## 2022-10-28 LAB
Lab: ABNORMAL
PERFORMING INSTRUMENT: ABNORMAL
QC PASS/FAIL: ABNORMAL
SARS-COV-2, POC: DETECTED

## 2022-10-28 PROCEDURE — G8484 FLU IMMUNIZE NO ADMIN: HCPCS | Performed by: NURSE PRACTITIONER

## 2022-10-28 PROCEDURE — 1036F TOBACCO NON-USER: CPT | Performed by: NURSE PRACTITIONER

## 2022-10-28 PROCEDURE — 3078F DIAST BP <80 MM HG: CPT | Performed by: NURSE PRACTITIONER

## 2022-10-28 PROCEDURE — 87426 SARSCOV CORONAVIRUS AG IA: CPT | Performed by: NURSE PRACTITIONER

## 2022-10-28 PROCEDURE — 1123F ACP DISCUSS/DSCN MKR DOCD: CPT | Performed by: NURSE PRACTITIONER

## 2022-10-28 PROCEDURE — 3074F SYST BP LT 130 MM HG: CPT | Performed by: NURSE PRACTITIONER

## 2022-10-28 PROCEDURE — G8417 CALC BMI ABV UP PARAM F/U: HCPCS | Performed by: NURSE PRACTITIONER

## 2022-10-28 PROCEDURE — G8427 DOCREV CUR MEDS BY ELIG CLIN: HCPCS | Performed by: NURSE PRACTITIONER

## 2022-10-28 PROCEDURE — 99213 OFFICE O/P EST LOW 20 MIN: CPT | Performed by: NURSE PRACTITIONER

## 2022-10-28 ASSESSMENT — ENCOUNTER SYMPTOMS
RHINORRHEA: 0
ABDOMINAL PAIN: 0
NAUSEA: 0
COUGH: 1
SORE THROAT: 0
DIARRHEA: 0
SHORTNESS OF BREATH: 1
CHEST TIGHTNESS: 0
WHEEZING: 0
VOMITING: 0
BACK PAIN: 0

## 2022-10-28 NOTE — PROGRESS NOTES
James Mendez (:  ) is a 68 y.o. male, Established patient, here for evaluation of the following chief complaint(s):  Congestion (2 days cough)      Vitals:    10/28/22 1207   BP: 102/60   Pulse: 84   Temp: 98.5 °F (36.9 °C)   SpO2: 96%       ASSESSMENT/PLAN:  1. COVID-19  - Self quarantine, only going out for Dr's appts/essentials  - OTC symptom control  - Continue to wear mask, social distance, and wash hands frequently  - Call 911 or go to the ER should symptoms become difficult to manage    2. Cough, unspecified type  -     POCT COVID-19, Antigen                            POS      Return if symptoms worsen or fail to improve. SUBJECTIVE/OBJECTIVE:    Other  This is a new problem. Episode onset: x2 days congestion and cough. The problem occurs constantly. The problem has been unchanged. Associated symptoms include anorexia, congestion, coughing and fatigue. Pertinent negatives include no abdominal pain, arthralgias, chest pain, chills, fever, headaches, myalgias, nausea, sore throat or vomiting. He has tried nothing for the symptoms. Review of Systems   Constitutional:  Positive for appetite change and fatigue. Negative for chills and fever. HENT:  Positive for congestion. Negative for ear pain, rhinorrhea and sore throat. Respiratory:  Positive for cough and shortness of breath. Negative for chest tightness and wheezing. Cardiovascular:  Negative for chest pain, palpitations and leg swelling. Gastrointestinal:  Positive for anorexia. Negative for abdominal pain, diarrhea, nausea and vomiting. Musculoskeletal:  Negative for arthralgias, back pain and myalgias. Neurological:  Negative for dizziness, light-headedness and headaches. Physical Exam  Vitals reviewed. Constitutional:       General: He is not in acute distress. Appearance: He is well-developed.    HENT:      Right Ear: Tympanic membrane normal.      Left Ear: Tympanic membrane normal.      Nose: Nose normal.      Mouth/Throat:      Lips: Pink. Mouth: Mucous membranes are moist.      Pharynx: Oropharynx is clear. Eyes:      General: Lids are normal.      Extraocular Movements: Extraocular movements intact. Conjunctiva/sclera: Conjunctivae normal.      Pupils: Pupils are equal, round, and reactive to light. Cardiovascular:      Rate and Rhythm: Normal rate and regular rhythm. Heart sounds: Normal heart sounds, S1 normal and S2 normal.   Pulmonary:      Effort: Pulmonary effort is normal. No respiratory distress. Breath sounds: Normal air entry. No decreased breath sounds, wheezing, rhonchi or rales. Abdominal:      General: Bowel sounds are normal.      Palpations: Abdomen is soft. Musculoskeletal:         General: No deformity. Normal range of motion. Cervical back: Full passive range of motion without pain. Skin:     General: Skin is warm and dry. Neurological:      Mental Status: He is alert and oriented to person, place, and time. Psychiatric:         Attention and Perception: Attention normal.         Behavior: Behavior is cooperative. An electronic signature was used to authenticate this note.     --CHEMO Ivory

## 2022-10-31 ENCOUNTER — TELEPHONE (OUTPATIENT)
Dept: INTERNAL MEDICINE | Age: 77
End: 2022-10-31

## 2022-10-31 DIAGNOSIS — U07.1 COVID-19: Primary | ICD-10-CM

## 2022-10-31 NOTE — PROGRESS NOTES
Pt states fever, fatigue, cough, not feeling well. Requests Paxlovid for covid symptoms. Pt is diabetic.   Rx sent to pharmacy

## 2022-10-31 NOTE — TELEPHONE ENCOUNTER
Left message for pt to return my call re covid medication. Left message on both home and cell phone.

## 2022-11-03 ENCOUNTER — OFFICE VISIT (OUTPATIENT)
Dept: FAMILY MEDICINE CLINIC | Age: 77
End: 2022-11-03
Payer: MEDICARE

## 2022-11-03 VITALS
SYSTOLIC BLOOD PRESSURE: 100 MMHG | HEART RATE: 84 BPM | WEIGHT: 180 LBS | OXYGEN SATURATION: 94 % | HEIGHT: 71 IN | TEMPERATURE: 98.2 F | BODY MASS INDEX: 25.2 KG/M2 | DIASTOLIC BLOOD PRESSURE: 60 MMHG

## 2022-11-03 DIAGNOSIS — F32.A DEPRESSION, UNSPECIFIED DEPRESSION TYPE: Primary | ICD-10-CM

## 2022-11-03 DIAGNOSIS — U07.1 COVID-19 VIRUS INFECTION: ICD-10-CM

## 2022-11-03 PROCEDURE — G8484 FLU IMMUNIZE NO ADMIN: HCPCS | Performed by: FAMILY MEDICINE

## 2022-11-03 PROCEDURE — 3074F SYST BP LT 130 MM HG: CPT | Performed by: FAMILY MEDICINE

## 2022-11-03 PROCEDURE — G8417 CALC BMI ABV UP PARAM F/U: HCPCS | Performed by: FAMILY MEDICINE

## 2022-11-03 PROCEDURE — 1036F TOBACCO NON-USER: CPT | Performed by: FAMILY MEDICINE

## 2022-11-03 PROCEDURE — 1123F ACP DISCUSS/DSCN MKR DOCD: CPT | Performed by: FAMILY MEDICINE

## 2022-11-03 PROCEDURE — 99213 OFFICE O/P EST LOW 20 MIN: CPT | Performed by: FAMILY MEDICINE

## 2022-11-03 PROCEDURE — G8427 DOCREV CUR MEDS BY ELIG CLIN: HCPCS | Performed by: FAMILY MEDICINE

## 2022-11-03 PROCEDURE — 3078F DIAST BP <80 MM HG: CPT | Performed by: FAMILY MEDICINE

## 2022-11-03 RX ORDER — DULOXETIN HYDROCHLORIDE 30 MG/1
30 CAPSULE, DELAYED RELEASE ORAL DAILY
Qty: 90 CAPSULE | Refills: 1 | Status: SHIPPED | OUTPATIENT
Start: 2022-11-03

## 2022-11-03 ASSESSMENT — PATIENT HEALTH QUESTIONNAIRE - PHQ9
9. THOUGHTS THAT YOU WOULD BE BETTER OFF DEAD, OR OF HURTING YOURSELF: 0
5. POOR APPETITE OR OVEREATING: 0
SUM OF ALL RESPONSES TO PHQ QUESTIONS 1-9: 0
3. TROUBLE FALLING OR STAYING ASLEEP: 0
1. LITTLE INTEREST OR PLEASURE IN DOING THINGS: 0
10. IF YOU CHECKED OFF ANY PROBLEMS, HOW DIFFICULT HAVE THESE PROBLEMS MADE IT FOR YOU TO DO YOUR WORK, TAKE CARE OF THINGS AT HOME, OR GET ALONG WITH OTHER PEOPLE: 0
SUM OF ALL RESPONSES TO PHQ QUESTIONS 1-9: 0
SUM OF ALL RESPONSES TO PHQ9 QUESTIONS 1 & 2: 0
2. FEELING DOWN, DEPRESSED OR HOPELESS: 0
SUM OF ALL RESPONSES TO PHQ QUESTIONS 1-9: 0
7. TROUBLE CONCENTRATING ON THINGS, SUCH AS READING THE NEWSPAPER OR WATCHING TELEVISION: 0
6. FEELING BAD ABOUT YOURSELF - OR THAT YOU ARE A FAILURE OR HAVE LET YOURSELF OR YOUR FAMILY DOWN: 0
SUM OF ALL RESPONSES TO PHQ QUESTIONS 1-9: 0
4. FEELING TIRED OR HAVING LITTLE ENERGY: 0
8. MOVING OR SPEAKING SO SLOWLY THAT OTHER PEOPLE COULD HAVE NOTICED. OR THE OPPOSITE, BEING SO FIGETY OR RESTLESS THAT YOU HAVE BEEN MOVING AROUND A LOT MORE THAN USUAL: 0

## 2022-11-03 ASSESSMENT — ENCOUNTER SYMPTOMS
NAUSEA: 0
ABDOMINAL PAIN: 0
VOMITING: 0
SHORTNESS OF BREATH: 0
DIARRHEA: 0

## 2022-11-03 NOTE — PROGRESS NOTES
Agata Pisano (: ) is a 68 y.o. male, Established patient, who presents today for:    Chief Complaint   Patient presents with    Annual Exam     Follow up. ASSESSMENT/PLAN    1. Depression, unspecified depression type  Assessment & Plan:  Stable on current medication. No reported SI/HI or self harming behaviors. Patient instructed to continue with current dose of duloxetine. Orders:  -     DULoxetine (CYMBALTA) 30 MG extended release capsule; Take 1 capsule by mouth daily, Disp-90 capsule, R-1Normal  2. COVID-19 virus infection  Comments:  Patient instructed to finish full course of Paxlovid and continue with supportive care. Return for Chronic Disease Check in 4-5 Months. SUBJECTIVE/OBJECTIVE:    HPI    Patient presents for depression follow-up visit. He reports taking duloxetine as prescribed since most recent visit and feeling improved overall. Mood is more stable and patient denies SI/HI, or self harming behaviors. They deny any worsening anxiety or panic attacks. They deny any worsening fatigue, decreased appetite, or problems with sleep. Current Outpatient Medications on File Prior to Visit   Medication Sig Dispense Refill    nirmatrelvir/ritonavir (PAXLOVID) 20 x 150 MG & 10 x 100MG TBPK Take 3 tablets (two 150 mg nirmatrelvir and one 100 mg ritonavir tablets) by mouth every 12 hours for 5 days. 30 tablet 0    sildenafil (VIAGRA) 100 MG tablet TAKE 1 TABLET BY MOUTH AS NEEDED APPROXIMATELY  1  HOUR  BEFORE  SEXUAL  ACTIVITY. DO  NOT  USE  MORE  THAN  ONE  DOSE  DAILY. 6 tablet 0    triamterene-hydroCHLOROthiazide (MAXZIDE-25) 37.5-25 MG per tablet Take 1 tablet by mouth daily 90 tablet 1    losartan (COZAAR) 100 MG tablet Take 1 tablet by mouth daily 90 tablet 1    methylPREDNISolone (MEDROL, JIGAR,) 4 MG tablet Take by mouth.  1 kit 0    ipratropium (ATROVENT) 0.06 % nasal spray 2 sprays by Nasal route 3 times daily 45 mL 1    glipiZIDE (GLUCOTROL) 10 MG tablet Take 1 tablet by mouth 2 times daily (before meals) 180 tablet 3    amLODIPine (NORVASC) 10 MG tablet Take 1 tablet by mouth daily 90 tablet 3    rosuvastatin (CRESTOR) 20 MG tablet Take 1 tablet by mouth daily 90 tablet 1    metoprolol succinate (TOPROL XL) 50 MG extended release tablet Take 3 tablets by mouth daily 270 tablet 1    Dulaglutide 0.75 MG/0.5ML SOPN Inject 0.75 mg into the skin once a week 12 pen 3    FREESTYLE LITE strip USE 1 STRIP TO CHECK GLUCOSE ONCE DAILY AS DIRECTED 50 each 0    tamsulosin (FLOMAX) 0.4 MG capsule Take 2 capsules by mouth daily 180 capsule 3    SITagliptin (JANUVIA) 100 MG tablet Take 0.5 tablets by mouth 2 times daily 30 tablet 0    Testosterone Cypionate 200 MG/ML SOLN 1 CC EVERY 2 WEEKS 10 mL 3    fluticasone (FLONASE) 50 MCG/ACT nasal spray 1 spray by Nasal route daily 1 Bottle 0    albuterol sulfate  (90 Base) MCG/ACT inhaler Inhale 2 puffs into the lungs every 6 hours as needed for Wheezing 3 Inhaler 0    testosterone cypionate (DEPOTESTOTERONE CYPIONATE) 200 MG/ML injection Inject 1 mL into the muscle every 14 days. Indications: PER ORDER in OV note 9/16/20 (Patient taking differently: Inject 200 mg into the muscle every 14 days. 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       Cyanocobalamin (VITAMIN B 12 PO) Take 1,000 mg by mouth      predniSONE (DELTASONE) 10 MG tablet Take 5 mg by mouth three times a week       nitroGLYCERIN (NITROSTAT) 0.4 MG SL tablet Place 1 tablet under the tongue every 5 minutes as needed for Chest pain 25 tablet 0    Blood Glucose Monitoring Suppl CHENCHO Test once daily. Dx: E11.29 1 Device 0    Lancets MISC Test once daily. Dx: E11.29 100 each 3    ascorbic acid (VITAMIN C) 500 MG tablet Take 1,000 mg by mouth daily      loratadine (CLARITIN) 10 MG tablet Take 1 tablet by mouth daily 30 tablet 5    CINNAMON PO Take 500 mg by mouth 2 times daily. aspirin 81 MG EC tablet Take 81 mg by mouth daily. No current facility-administered medications on file prior to visit. Allergies   Allergen Reactions    Invokana [Canagliflozin] Diarrhea    Metformin And Related Diarrhea    Other      There is a list scanned in media that pt can not take due to myasthenia gravis ,  Antibiotics        Review of Systems   Constitutional:  Positive for appetite change and fatigue (secondary to recent COVID-19 diagnosis). Negative for chills, diaphoresis and fever. Respiratory:  Negative for shortness of breath. Cardiovascular:  Negative for chest pain and palpitations. Gastrointestinal:  Negative for abdominal pain, diarrhea, nausea and vomiting. Vitals:  /60   Pulse 84   Temp 98.2 °F (36.8 °C) (Temporal)   Ht 5' 11\" (1.803 m)   Wt 180 lb (81.6 kg)   SpO2 94%   BMI 25.10 kg/m²     Physical Exam  Vitals reviewed. Constitutional:       General: He is not in acute distress. Appearance: He is not ill-appearing or diaphoretic. Cardiovascular:      Rate and Rhythm: Normal rate. Pulmonary:      Effort: Pulmonary effort is normal.   Neurological:      Mental Status: He is alert. Psychiatric:         Mood and Affect: Mood and affect normal.         Speech: Speech normal.         Behavior: Behavior normal.         Thought Content: Thought content normal.       Ortho Exam (If Applicable)              An electronic signature was used to authenticate this note.      Justin Ortiz MD

## 2022-11-03 NOTE — ASSESSMENT & PLAN NOTE
Stable on current medication. No reported SI/HI or self harming behaviors. Patient instructed to continue with current dose of duloxetine.

## 2022-11-10 DIAGNOSIS — N52.9 ERECTILE DYSFUNCTION, UNSPECIFIED ERECTILE DYSFUNCTION TYPE: Chronic | ICD-10-CM

## 2022-11-10 NOTE — TELEPHONE ENCOUNTER
Pharmacy is requesting medication refill. Please approve or deny this request.    Rx requested:  Requested Prescriptions     Pending Prescriptions Disp Refills    sildenafil (VIAGRA) 100 MG tablet 6 tablet 0     Sig: TAKE 1 TABLET BY MOUTH AS NEEDED APPROXIMATELY  1  HOUR  BEFORE  SEXUAL  ACTIVITY. DO  NOT  USE  MORE  THAN  ONE  DOSE  DAILY. Last Office Visit:   11/3/2022      Next Visit Date:  Future Appointments   Date Time Provider Kay Bartlett   11/16/2022  9:40 AM SCHEDULE, GERBER COBB PCP TESTOSTERONE MLOX [facility]   11/17/2022  9:00 AM Lo Márquez, PhD Freddie Nash   11/30/2022  9:40 AM SCHEDULE, GERBER COBB PCP TESTOSTERONE MLOX [facility]   3/3/2023 10:00 AM Ehsan Montenegro MD [facility] [place] [id number]   3/22/2023  1:00 PM Jen Hidalgo MD [facility]

## 2022-11-11 ENCOUNTER — CARE COORDINATION (OUTPATIENT)
Dept: CARE COORDINATION | Age: 77
End: 2022-11-11

## 2022-11-11 NOTE — CARE COORDINATION
I left a message with Maria C Wolff (spouse) to call me back to discuss the re-enrollment process for Anshu's medications in the PAP program.        [facility]   Medication Assistance  [address] [place] [address] and [employer]    (M) [phone] (R) [phone]

## 2022-11-12 PROBLEM — Z01.818 PRE-OPERATIVE CLEARANCE: Status: RESOLVED | Noted: 2022-10-13 | Resolved: 2022-11-12

## 2022-11-13 RX ORDER — SILDENAFIL 100 MG/1
TABLET, FILM COATED ORAL
Qty: 6 TABLET | Refills: 0 | Status: SHIPPED | OUTPATIENT
Start: 2022-11-13

## 2022-11-14 ENCOUNTER — CARE COORDINATION (OUTPATIENT)
Dept: CARE COORDINATION | Age: 77
End: 2022-11-14

## 2022-11-14 NOTE — CARE COORDINATION
Spoke with Isis Gorman (spouse) about helping get Alysa Astorga back into the PAP programs. I am faxing the provider their portion and will mail out his portion this week.         [facility]   Medication Assistance  [address] [place] [address], and [employer]    (U) [phone]  (B) [phone]

## 2022-11-16 ENCOUNTER — NURSE ONLY (OUTPATIENT)
Dept: FAMILY MEDICINE CLINIC | Age: 77
End: 2022-11-16
Payer: MEDICARE

## 2022-11-16 DIAGNOSIS — E29.1 HYPOGONADISM IN MALE: Primary | ICD-10-CM

## 2022-11-16 PROCEDURE — 96372 THER/PROPH/DIAG INJ SC/IM: CPT | Performed by: FAMILY MEDICINE

## 2022-11-16 RX ORDER — TESTOSTERONE CYPIONATE 200 MG/ML
200 INJECTION INTRAMUSCULAR ONCE
Status: COMPLETED | OUTPATIENT
Start: 2022-11-16 | End: 2022-11-16

## 2022-11-16 RX ADMIN — TESTOSTERONE CYPIONATE 200 MG: 200 INJECTION INTRAMUSCULAR at 10:08

## 2022-11-16 NOTE — PROGRESS NOTES
Patient given Testosterone 200mg IM left gluteal..  Will return in 2 weeks for next injection    Patient tolerated injection well.     Administrations This Visit       testosterone cypionate (DEPOTESTOTERONE CYPIONATE) injection 200 mg       Admin Date  11/16/2022 Action  Given Dose  200 mg Route  IntraMUSCular Administered By  Leia Waggoner LPN

## 2022-11-17 ENCOUNTER — OFFICE VISIT (OUTPATIENT)
Dept: BEHAVIORAL/MENTAL HEALTH CLINIC | Age: 77
End: 2022-11-17
Payer: MEDICARE

## 2022-11-17 DIAGNOSIS — F43.21 ADJUSTMENT DISORDER WITH DEPRESSED MOOD: Primary | ICD-10-CM

## 2022-11-17 PROCEDURE — 90832 PSYTX W PT 30 MINUTES: CPT | Performed by: PSYCHOLOGIST

## 2022-11-17 PROCEDURE — 1036F TOBACCO NON-USER: CPT | Performed by: PSYCHOLOGIST

## 2022-11-17 PROCEDURE — 1123F ACP DISCUSS/DSCN MKR DOCD: CPT | Performed by: PSYCHOLOGIST

## 2022-11-17 ASSESSMENT — PATIENT HEALTH QUESTIONNAIRE - PHQ9
SUM OF ALL RESPONSES TO PHQ QUESTIONS 1-9: 2
9. THOUGHTS THAT YOU WOULD BE BETTER OFF DEAD, OR OF HURTING YOURSELF: 0
2. FEELING DOWN, DEPRESSED OR HOPELESS: 0
SUM OF ALL RESPONSES TO PHQ QUESTIONS 1-9: 2
4. FEELING TIRED OR HAVING LITTLE ENERGY: 1
1. LITTLE INTEREST OR PLEASURE IN DOING THINGS: 0
5. POOR APPETITE OR OVEREATING: 1
7. TROUBLE CONCENTRATING ON THINGS, SUCH AS READING THE NEWSPAPER OR WATCHING TELEVISION: 0
8. MOVING OR SPEAKING SO SLOWLY THAT OTHER PEOPLE COULD HAVE NOTICED. OR THE OPPOSITE, BEING SO FIGETY OR RESTLESS THAT YOU HAVE BEEN MOVING AROUND A LOT MORE THAN USUAL: 0
6. FEELING BAD ABOUT YOURSELF - OR THAT YOU ARE A FAILURE OR HAVE LET YOURSELF OR YOUR FAMILY DOWN: 0
SUM OF ALL RESPONSES TO PHQ QUESTIONS 1-9: 2
SUM OF ALL RESPONSES TO PHQ QUESTIONS 1-9: 2
3. TROUBLE FALLING OR STAYING ASLEEP: 0
10. IF YOU CHECKED OFF ANY PROBLEMS, HOW DIFFICULT HAVE THESE PROBLEMS MADE IT FOR YOU TO DO YOUR WORK, TAKE CARE OF THINGS AT HOME, OR GET ALONG WITH OTHER PEOPLE: 1
SUM OF ALL RESPONSES TO PHQ9 QUESTIONS 1 & 2: 0

## 2022-11-17 NOTE — PROGRESS NOTES
Behavioral Health Consultation  Lo Qureshi, Ph.D., [facility]  Psychologist  11/17/22  8:58 AM EST      Time spent with Patient: 30 minutes  This is patient's second  [facility] appointment. Reason for Consult:  depression  Referring Provider: Tonny Poole MD      Feedback given to PCP. S:   Pt notes he is doing \"alright\" but notes impact of his surgery. 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 Reviewed application of [facility] interventions since last appt that was infrequent. Pt notes some relationship dynamics in his second marriage and conflict with her daughter. Notes \"he laughs at everything I believe in\" and takes financial advantage of pt's spouse. Explored value based system, judgment, interpersonal relationships, enabling. Pt denies SI/HI    O:  MSE:    Appearance    alert, cooperative  Appetite normal  Sleep disturbance No  Fatigue No  Loss of pleasure No  Impulsive behavior No  Speech    spontaneous, normal rate, and normal volume  Mood    Angry  Affect    depressed affect  Thought Content    intact  Thought Process    coherent  Associations    logical connections  Insight    Fair  Judgment    Intact  Orientation    oriented to person, place, time, and general circumstances  Memory    recent and remote memory intact  Attention/Concentration    intact  Morbid ideation No  Suicide Assessment    no suicidal ideation      History:    Medications:   Current Outpatient Medications   Medication Sig Dispense Refill    sildenafil (VIAGRA) 100 MG tablet TAKE 1 TABLET BY MOUTH AS NEEDED APPROXIMATELY  1  HOUR  BEFORE  SEXUAL  ACTIVITY. DO  NOT  USE  MORE  THAN  ONE  DOSE  DAILY.  6 tablet 0    DULoxetine (CYMBALTA) 30 MG extended release capsule Take 1 capsule by mouth daily 90 capsule 1    triamterene-hydroCHLOROthiazide (MAXZIDE-25) 37.5-25 MG per tablet Take 1 tablet by mouth daily 90 tablet 1    losartan (COZAAR) 100 MG tablet Take 1 tablet by mouth daily 90 tablet 1    methylPREDNISolone (MEDROL, JIGAR,) 4 MG tablet Take by mouth. 1 kit 0    ipratropium (ATROVENT) 0.06 % nasal spray 2 sprays by Nasal route 3 times daily 45 mL 1    glipiZIDE (GLUCOTROL) 10 MG tablet Take 1 tablet by mouth 2 times daily (before meals) 180 tablet 3    amLODIPine (NORVASC) 10 MG tablet Take 1 tablet by mouth daily 90 tablet 3    rosuvastatin (CRESTOR) 20 MG tablet Take 1 tablet by mouth daily 90 tablet 1    metoprolol succinate (TOPROL XL) 50 MG extended release tablet Take 3 tablets by mouth daily 270 tablet 1    Dulaglutide 0.75 MG/0.5ML SOPN Inject 0.75 mg into the skin once a week 12 pen 3    FREESTYLE LITE strip USE 1 STRIP TO CHECK GLUCOSE ONCE DAILY AS DIRECTED 50 each 0    tamsulosin (FLOMAX) 0.4 MG capsule Take 2 capsules by mouth daily 180 capsule 3    SITagliptin (JANUVIA) 100 MG tablet Take 0.5 tablets by mouth 2 times daily 30 tablet 0    Testosterone Cypionate 200 MG/ML SOLN 1 CC EVERY 2 WEEKS 10 mL 3    fluticasone (FLONASE) 50 MCG/ACT nasal spray 1 spray by Nasal route daily 1 Bottle 0    albuterol sulfate  (90 Base) MCG/ACT inhaler Inhale 2 puffs into the lungs every 6 hours as needed for Wheezing 3 Inhaler 0    testosterone cypionate (DEPOTESTOTERONE CYPIONATE) 200 MG/ML injection Inject 1 mL into the muscle every 14 days. Indications: PER ORDER in OV note 9/16/20 (Patient taking differently: Inject 200 mg into the muscle every 14 days.  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 Cyanocobalamin (VITAMIN B 12 PO) Take 1,000 mg by mouth      predniSONE (DELTASONE) 10 MG tablet Take 5 mg by mouth three times a week       nitroGLYCERIN (NITROSTAT) 0.4 MG SL tablet Place 1 tablet under the tongue every 5 minutes as needed for Chest pain 25 tablet 0    Blood Glucose Monitoring Suppl CHENCHO Test once daily. Dx: E11.29 1 Device 0    Lancets MISC Test once daily. Dx: E11.29 100 each 3    ascorbic acid (VITAMIN C) 500 MG tablet Take 1,000 mg by mouth daily      loratadine (CLARITIN) 10 MG tablet Take 1 tablet by mouth daily 30 tablet 5    CINNAMON PO Take 500 mg by mouth 2 times daily. aspirin 81 MG EC tablet Take 81 mg by mouth daily. No current facility-administered medications for this visit.        Social History:   Social History     Socioeconomic History    Marital status:      Spouse name: Leia Marsh    Number of children: 2    Years of education: 12+    Highest education level: Not on file   Occupational History    Occupation: [employer]   Tobacco Use    Smoking status: Former     Packs/day: 1.00     Years: 41.00     Pack years: 41.00     Types: Cigarettes     Start date:      Quit date: 2002     Years since quittin.1     Passive exposure: Past    Smokeless tobacco: Never   Vaping Use    Vaping Use: Never used   Substance and Sexual Activity    Alcohol use: Never    Drug use: Never    Sexual activity: Not Currently     Partners: Female   Other Topics Concern    Not on file   Social History Narrative    Not on file     Social Determinants of Health     Financial Resource Strain: Low Risk     Difficulty of Paying Living Expenses: Not hard at all   Food Insecurity: No Food Insecurity    Worried About Running Out of Food in the Last Year: Never true    Ran Out of Food in the Last Year: Never true   Transportation Needs: Not on file   Physical Activity: Inactive    Days of Exercise per Week: 0 days    Minutes of Exercise per Session: 0 min   Stress: Not on file   Social Connections: Not on file   Intimate Partner Violence: Not on file   Housing Stability: Not on file       TOBACCO:   reports that he quit smoking about 20 years ago. His smoking use included cigarettes. He started smoking about 61 years ago. He has a 41.00 pack-year smoking history. He has been exposed to tobacco smoke. He has never used smokeless tobacco.  ETOH:   reports no history of alcohol use. Family History:   Family History   Problem Relation Age of Onset    Heart Disease Mother     Heart Disease Brother     Diabetes Maternal Grandfather          A:  Administered the PHQ9 which indicates a self report of minimal symptom distress. Pt would benefit from [facility] [place] services to increase coping skills to provide symptom management/control/relief.        PHQ Scores 11/17/2022 11/3/2022 10/18/2022 10/13/2022 9/29/2022 8/23/2021 4/24/2021   PHQ2 Score 0 0 0 0 3 0 0   PHQ9 Score 2 0 3 0 9 0 0     Interpretation of Total Score Depression Severity: 1-4 = Minimal depression, 5-9 = Mild depression, 10-14 = Moderate depression, 15-19 = Moderately severe depression, 20-27 = Severe depression      Diagnosis:    Adjustment disorder with depressed mood  R/O additional depressive dx      Diagnosis Date    Atypical chest pain 8/30/2012    BPH (benign prostatic hypertrophy)     CAD (coronary artery disease)     Cataracts, bilateral     Chronic low back pain     COPD (chronic obstructive pulmonary disease) ([address] 6/1/2006    DM2 (diabetes mellitus, type 2) ([address]     Duodenitis     Erectile dysfunction 3/6/2017    Fatty infiltration of liver 11/19/2014    GERD (gastroesophageal reflux disease)     History of melanoma excision 12/11/2017    Left ear 09/2017    History of tobacco use 6/1/2006    HTN (hypertension)     Hyperlipidemia     Malignant melanoma of face ([address]     Malignant melanoma of skin of face ([address]     RT ear 2000, LT ear 2017    MG (myasthenia gravis) ([address]     Nephrolithiasis     Ocular myasthenia gravis ([facility] 8/28/2018    Osteoarthritis of multiple joints     hands, hips    Perennial allergic rhinitis     Personal history of colonic polyps     Pseudophakia of both eyes     Stage 3a chronic kidney disease ([facility] 9/29/2022    Status post coronary artery bypass grafts x 5 7/30/2018 07/2018 @ [facility]    Type 2 diabetes mellitus with microalbuminuria, without long-term current use of insulin ([facility] 3/6/2017    Varicose veins of both lower extremities 9/11/2017           Plan:  Pt interventions:  Conducted functional assessment, [place]-setting to identify pt's primary goals for [facility] visit / overall health, Supportive techniques, Provided Psychoeducation re: motivation to behavior, CBT to target cognitive restructuring, expectations in relationships, communication, avoiding personalizing, interpersonal relationship conflict  resolution, Identified maladaptive thoughts, and Emphasized importance of regular practice of relaxation strategies to target / promote symptom relief      Pt Behavioral Change Plan:    People do things because it serves a purpose  People are allowed to make their own bad decisions  Acknowledge without agreeing. Expectations in relationship- consider the expectations in relationships. If met, great! If not, do you demand they be met or do you adjust accordingly? Follow up as scheduled      Please note this report has been partially produced using speech recognition software  And may cause contain errors related to that system including grammar, punctuation and spelling as well as words and phrases that may seem inappropriate. If there are questions or concerns please feel free to contact me to clarify.

## 2022-11-17 NOTE — PATIENT INSTRUCTIONS
People do things because it serves a purpose  People are allowed to make their own bad decisions  Acknowledge without agreeing. Expectations in relationship- consider the expectations in relationships. If met, great! If not, do you demand they be met or do you adjust accordingly?   Follow up as scheduled

## 2022-11-18 ENCOUNTER — CARE COORDINATION (OUTPATIENT)
Dept: CARE COORDINATION | Age: 77
End: 2022-11-18

## 2022-11-18 NOTE — CARE COORDINATION
I was able to mail out Anshu's applications to him that he will need to sign and return to me as soon as possible.           [facility]   Medication Assistance  [address] [place] [address], and [employer]    (O) [phone]  (O) [phone]

## 2022-11-30 ENCOUNTER — NURSE ONLY (OUTPATIENT)
Dept: FAMILY MEDICINE CLINIC | Age: 77
End: 2022-11-30
Payer: MEDICARE

## 2022-11-30 DIAGNOSIS — E29.1 HYPOGONADISM IN MALE: Primary | ICD-10-CM

## 2022-11-30 PROCEDURE — 96372 THER/PROPH/DIAG INJ SC/IM: CPT | Performed by: FAMILY MEDICINE

## 2022-11-30 RX ORDER — TESTOSTERONE CYPIONATE 200 MG/ML
200 INJECTION INTRAMUSCULAR ONCE
Status: COMPLETED | OUTPATIENT
Start: 2022-11-30 | End: 2022-11-30

## 2022-11-30 RX ADMIN — TESTOSTERONE CYPIONATE 200 MG: 200 INJECTION INTRAMUSCULAR at 10:04

## 2022-11-30 NOTE — PROGRESS NOTES
Patient given Testosterone 200mg IM right gluteal..  Will return in 2 weeks for next injection    Patient tolerated injection well.     Administrations This Visit       testosterone cypionate (DEPOTESTOTERONE CYPIONATE) injection 200 mg       Admin Date  11/30/2022 Action  Given Dose  200 mg Route  IntraMUSCular Administered By  Keon Padilla LPN

## 2022-12-01 DIAGNOSIS — J30.89 PERENNIAL ALLERGIC RHINITIS: Chronic | ICD-10-CM

## 2022-12-01 RX ORDER — IPRATROPIUM BROMIDE 42 UG/1
2 SPRAY, METERED NASAL 3 TIMES DAILY
Qty: 45 ML | Refills: 3 | Status: SHIPPED | OUTPATIENT
Start: 2022-12-01

## 2022-12-01 NOTE — TELEPHONE ENCOUNTER
Pharmacy is requesting medication refill. Please approve or deny this request.    Rx requested:  Requested Prescriptions     Pending Prescriptions Disp Refills    ipratropium (ATROVENT) 0.06 % nasal spray [Pharmacy Med Name: Ipratropium Bromide 0.06 % Nasal Solution] 90 mL 3     Si sprays by Nasal route 3 times daily         Last Office Visit:   11/3/2022      Next Visit Date:  Future Appointments   Date Time Provider Kay Bartlett   2022  9:40 AM SCHEDULE, GERBER COBB PCP TESTOSTERONE MLOX [facility]   2022  9:00 AM Lo Solano, PhD [facility] [place]   3/3/2023 10:00 AM Mo Guo MD [place] [id number]   3/22/2023  1:00 PM Alvarez Thomas  S [facility]

## 2022-12-03 DIAGNOSIS — R35.1 BENIGN PROSTATIC HYPERPLASIA WITH NOCTURIA: Chronic | ICD-10-CM

## 2022-12-03 DIAGNOSIS — N40.1 BENIGN PROSTATIC HYPERPLASIA WITH NOCTURIA: Chronic | ICD-10-CM

## 2022-12-03 NOTE — TELEPHONE ENCOUNTER
Pharmacy is requesting medication refill. Please approve or deny this request.    Rx requested:  Requested Prescriptions     Pending Prescriptions Disp Refills    rosuvastatin (CRESTOR) 20 MG tablet [Pharmacy Med Name: Rosuvastatin Calcium 20 MG Oral Tablet] 90 tablet 3     Sig: TAKE 1 TABLET BY MOUTH  DAILY    tamsulosin (FLOMAX) 0.4 MG capsule [Pharmacy Med Name: Tamsulosin HCl 0.4 MG Oral Capsule] 180 capsule 3     Sig: TAKE 2 CAPSULES BY MOUTH  DAILY         Last Office Visit:   11/3/2022      Next Visit Date:  Future Appointments   Date Time Provider Kay Bartlett   12/14/2022  9:40 AM SCHEDULE, GERBER MURRAY BannerT PCP TESTOSTERONE MLOX [facility]   12/21/2022  9:00 AM Lo Vasquez, PhD Na Nash   3/3/2023 10:00 AM Windy Gotti MD [place] [id number]   3/22/2023  1:00 PM Harley Broussard  S [address]

## 2022-12-05 RX ORDER — ROSUVASTATIN CALCIUM 20 MG/1
20 TABLET, COATED ORAL DAILY
Qty: 90 TABLET | Refills: 1 | Status: SHIPPED | OUTPATIENT
Start: 2022-12-05

## 2022-12-05 RX ORDER — TAMSULOSIN HYDROCHLORIDE 0.4 MG/1
0.8 CAPSULE ORAL DAILY
Qty: 180 CAPSULE | Refills: 1 | Status: SHIPPED | OUTPATIENT
Start: 2022-12-05

## 2022-12-06 ENCOUNTER — HOSPITAL ENCOUNTER (EMERGENCY)
Age: 77
Discharge: LEFT BEFORE DISCHARGE | End: 2022-12-06
Attending: EMERGENCY MEDICINE
Payer: MEDICARE

## 2022-12-06 ENCOUNTER — HOSPITAL ENCOUNTER (OUTPATIENT)
Dept: GENERAL RADIOLOGY | Age: 77
Discharge: HOME | End: 2022-12-08
Payer: MEDICARE

## 2022-12-06 ENCOUNTER — OFFICE VISIT (OUTPATIENT)
Dept: FAMILY MEDICINE CLINIC | Age: 77
End: 2022-12-06
Payer: MEDICARE

## 2022-12-06 ENCOUNTER — HOSPITAL ENCOUNTER (OUTPATIENT)
Dept: LAB | Age: 77
Discharge: HOME | End: 2022-12-06
Payer: MEDICARE

## 2022-12-06 ENCOUNTER — APPOINTMENT (OUTPATIENT)
Dept: CT IMAGING | Age: 77
End: 2022-12-06
Payer: MEDICARE

## 2022-12-06 ENCOUNTER — HOSPITAL ENCOUNTER (OUTPATIENT)
Age: 77
Discharge: HOME | End: 2022-12-08
Payer: MEDICARE

## 2022-12-06 ENCOUNTER — NURSE TRIAGE (OUTPATIENT)
Dept: OTHER | Facility: CLINIC | Age: 77
End: 2022-12-06

## 2022-12-06 VITALS
BODY MASS INDEX: 25.2 KG/M2 | HEIGHT: 71 IN | TEMPERATURE: 97.5 F | OXYGEN SATURATION: 95 % | HEART RATE: 72 BPM | WEIGHT: 180 LBS | DIASTOLIC BLOOD PRESSURE: 70 MMHG | SYSTOLIC BLOOD PRESSURE: 110 MMHG

## 2022-12-06 VITALS
TEMPERATURE: 98.3 F | DIASTOLIC BLOOD PRESSURE: 82 MMHG | HEART RATE: 93 BPM | RESPIRATION RATE: 16 BRPM | OXYGEN SATURATION: 93 % | BODY MASS INDEX: 25.9 KG/M2 | SYSTOLIC BLOOD PRESSURE: 136 MMHG | WEIGHT: 185 LBS | HEIGHT: 71 IN

## 2022-12-06 DIAGNOSIS — R06.09 DYSPNEA ON EXERTION: ICD-10-CM

## 2022-12-06 DIAGNOSIS — R80.9 TYPE 2 DIABETES MELLITUS WITH MICROALBUMINURIA, WITHOUT LONG-TERM CURRENT USE OF INSULIN (HCC): Chronic | ICD-10-CM

## 2022-12-06 DIAGNOSIS — R53.83 FATIGUE, UNSPECIFIED TYPE: ICD-10-CM

## 2022-12-06 DIAGNOSIS — Z86.16 HISTORY OF 2019 NOVEL CORONAVIRUS DISEASE (COVID-19): ICD-10-CM

## 2022-12-06 DIAGNOSIS — R94.31 ABNORMAL EKG: ICD-10-CM

## 2022-12-06 DIAGNOSIS — R11.2 NAUSEA AND VOMITING, UNSPECIFIED VOMITING TYPE: ICD-10-CM

## 2022-12-06 DIAGNOSIS — R06.00 DYSPNEA, UNSPECIFIED TYPE: Primary | ICD-10-CM

## 2022-12-06 DIAGNOSIS — I25.119 CORONARY ARTERY DISEASE INVOLVING NATIVE CORONARY ARTERY OF NATIVE HEART WITH ANGINA PECTORIS (HCC): Chronic | ICD-10-CM

## 2022-12-06 DIAGNOSIS — J98.4 CAVITATING MASS IN RIGHT UPPER LUNG LOBE: ICD-10-CM

## 2022-12-06 DIAGNOSIS — I10 PRIMARY HYPERTENSION: Chronic | ICD-10-CM

## 2022-12-06 DIAGNOSIS — R42 DIZZINESS: Primary | ICD-10-CM

## 2022-12-06 DIAGNOSIS — R42 DIZZINESS: ICD-10-CM

## 2022-12-06 DIAGNOSIS — E11.29 TYPE 2 DIABETES MELLITUS WITH MICROALBUMINURIA, WITHOUT LONG-TERM CURRENT USE OF INSULIN (HCC): Chronic | ICD-10-CM

## 2022-12-06 DIAGNOSIS — J44.1 COPD EXACERBATION (HCC): ICD-10-CM

## 2022-12-06 DIAGNOSIS — I26.99 ACUTE PULMONARY EMBOLISM WITHOUT ACUTE COR PULMONALE, UNSPECIFIED PULMONARY EMBOLISM TYPE (HCC): ICD-10-CM

## 2022-12-06 LAB
ALBUMIN SERPL-MCNC: 4.1 G/DL (ref 3.5–4.6)
ALP BLD-CCNC: 44 U/L (ref 35–104)
ALT SERPL-CCNC: 17 U/L (ref 0–41)
ANION GAP SERPL CALCULATED.3IONS-SCNC: 10 MEQ/L (ref 9–15)
ANION GAP SERPL CALCULATED.3IONS-SCNC: 9 MEQ/L (ref 9–15)
APTT: 26.9 SEC (ref 24.4–36.8)
AST SERPL-CCNC: 16 U/L (ref 0–40)
BASOPHILS ABSOLUTE: 0 K/UL (ref 0–0.2)
BASOPHILS ABSOLUTE: 0.1 K/UL (ref 0–0.1)
BASOPHILS RELATIVE PERCENT: 0.4 %
BASOPHILS RELATIVE PERCENT: 0.5 % (ref 0.2–1.2)
BILIRUB SERPL-MCNC: 0.4 MG/DL (ref 0.2–0.7)
BUN BLDV-MCNC: 18 MG/DL (ref 8–23)
BUN BLDV-MCNC: 19 MG/DL (ref 8–23)
CALCIUM SERPL-MCNC: 9.7 MG/DL (ref 8.5–9.9)
CALCIUM SERPL-MCNC: 9.8 MG/DL (ref 8.5–9.9)
CHLORIDE BLD-SCNC: 100 MEQ/L (ref 95–107)
CHLORIDE BLD-SCNC: 100 MEQ/L (ref 95–107)
CO2: 27 MEQ/L (ref 20–31)
CO2: 29 MEQ/L (ref 20–31)
CREAT SERPL-MCNC: 1.2 MG/DL (ref 0.7–1.2)
CREAT SERPL-MCNC: 1.28 MG/DL (ref 0.7–1.2)
D DIMER: 0.79 MG/L FEU (ref 0–0.5)
EOSINOPHILS ABSOLUTE: 0.1 K/UL (ref 0–0.5)
EOSINOPHILS ABSOLUTE: 0.1 K/UL (ref 0–0.7)
EOSINOPHILS RELATIVE PERCENT: 0.8 %
EOSINOPHILS RELATIVE PERCENT: 0.9 % (ref 0.8–7)
GFR SERPL CREATININE-BSD FRML MDRD: 57.4 ML/MIN/{1.73_M2}
GFR SERPL CREATININE-BSD FRML MDRD: >60 ML/MIN/{1.73_M2}
GLOBULIN: 2.8 G/DL (ref 2.3–3.5)
GLUCOSE BLD-MCNC: 110 MG/DL (ref 70–99)
GLUCOSE BLD-MCNC: 146 MG/DL (ref 70–99)
HCT VFR BLD CALC: 41 % (ref 42–52)
HCT VFR BLD CALC: 42.9 % (ref 42–52)
HEMOGLOBIN: 13.9 G/DL (ref 14–18)
HEMOGLOBIN: 14 G/DL (ref 13.7–17.5)
IMMATURE GRANULOCYTES #: 0.1 K/UL
IMMATURE GRANULOCYTES %: 0.8 %
INR BLD: 1.1
LYMPHOCYTES ABSOLUTE: 1.1 K/UL (ref 1–4.8)
LYMPHOCYTES ABSOLUTE: 1.5 K/UL (ref 1.3–3.6)
LYMPHOCYTES RELATIVE PERCENT: 12.1 %
LYMPHOCYTES RELATIVE PERCENT: 13.7 %
MCH RBC QN AUTO: 33.9 PG (ref 25.7–32.2)
MCH RBC QN AUTO: 35 PG (ref 27–31.3)
MCHC RBC AUTO-ENTMCNC: 32.6 % (ref 32.3–36.5)
MCHC RBC AUTO-ENTMCNC: 33.8 % (ref 33–37)
MCV RBC AUTO: 103.5 FL (ref 79–92.2)
MCV RBC AUTO: 103.9 FL (ref 79–92.2)
MONOCYTES ABSOLUTE: 0.7 K/UL (ref 0.2–0.8)
MONOCYTES ABSOLUTE: 0.8 K/UL (ref 0.3–0.8)
MONOCYTES RELATIVE PERCENT: 7.5 % (ref 5.3–12.2)
MONOCYTES RELATIVE PERCENT: 7.9 %
NEUTROPHILS ABSOLUTE: 7.3 K/UL (ref 1.4–6.5)
NEUTROPHILS ABSOLUTE: 8.2 K/UL (ref 1.8–5.4)
NEUTROPHILS RELATIVE PERCENT: 76.6 % (ref 34–67.9)
NEUTROPHILS RELATIVE PERCENT: 78.8 %
PDW BLD-RTO: 14 % (ref 11.6–14.4)
PDW BLD-RTO: 14.9 % (ref 11.5–14.5)
PLATELET # BLD: 175 K/UL (ref 163–337)
PLATELET # BLD: 184 K/UL (ref 130–400)
POTASSIUM SERPL-SCNC: 4 MEQ/L (ref 3.4–4.9)
POTASSIUM SERPL-SCNC: 4.8 MEQ/L (ref 3.4–4.9)
PRO-BNP: 151 PG/ML
PROTHROMBIN TIME: 14.1 SEC (ref 12.3–14.9)
RBC # BLD: 3.96 M/UL (ref 4.7–6.1)
RBC # BLD: 4.13 M/UL (ref 4.63–6.08)
SODIUM BLD-SCNC: 137 MEQ/L (ref 135–144)
SODIUM BLD-SCNC: 138 MEQ/L (ref 135–144)
TOTAL CK: 77 U/L (ref 0–190)
TOTAL PROTEIN: 6.9 G/DL (ref 6.3–8)
TROPONIN: <0.01 NG/ML (ref 0–0.01)
TROPONIN: <0.01 NG/ML (ref 0–0.01)
WBC # BLD: 10.7 K/UL (ref 4.2–9)
WBC # BLD: 9.3 K/UL (ref 4.8–10.8)

## 2022-12-06 PROCEDURE — 6360000002 HC RX W HCPCS: Performed by: EMERGENCY MEDICINE

## 2022-12-06 PROCEDURE — 36415 COLL VENOUS BLD VENIPUNCTURE: CPT

## 2022-12-06 PROCEDURE — 85379 FIBRIN DEGRADATION QUANT: CPT

## 2022-12-06 PROCEDURE — 71046 X-RAY EXAM CHEST 2 VIEWS: CPT

## 2022-12-06 PROCEDURE — 85025 COMPLETE CBC W/AUTO DIFF WBC: CPT

## 2022-12-06 PROCEDURE — 84484 ASSAY OF TROPONIN QUANT: CPT

## 2022-12-06 PROCEDURE — G8427 DOCREV CUR MEDS BY ELIG CLIN: HCPCS | Performed by: FAMILY MEDICINE

## 2022-12-06 PROCEDURE — 96374 THER/PROPH/DIAG INJ IV PUSH: CPT

## 2022-12-06 PROCEDURE — 6360000004 HC RX CONTRAST MEDICATION: Performed by: EMERGENCY MEDICINE

## 2022-12-06 PROCEDURE — 83880 ASSAY OF NATRIURETIC PEPTIDE: CPT

## 2022-12-06 PROCEDURE — 80053 COMPREHEN METABOLIC PANEL: CPT

## 2022-12-06 PROCEDURE — 96372 THER/PROPH/DIAG INJ SC/IM: CPT

## 2022-12-06 PROCEDURE — 99214 OFFICE O/P EST MOD 30 MIN: CPT | Performed by: FAMILY MEDICINE

## 2022-12-06 PROCEDURE — 1036F TOBACCO NON-USER: CPT | Performed by: FAMILY MEDICINE

## 2022-12-06 PROCEDURE — 80048 BASIC METABOLIC PNL TOTAL CA: CPT

## 2022-12-06 PROCEDURE — 85610 PROTHROMBIN TIME: CPT

## 2022-12-06 PROCEDURE — 85730 THROMBOPLASTIN TIME PARTIAL: CPT

## 2022-12-06 PROCEDURE — 82550 ASSAY OF CK (CPK): CPT

## 2022-12-06 PROCEDURE — 71275 CT ANGIOGRAPHY CHEST: CPT

## 2022-12-06 PROCEDURE — 99285 EMERGENCY DEPT VISIT HI MDM: CPT

## 2022-12-06 PROCEDURE — 93000 ELECTROCARDIOGRAM COMPLETE: CPT | Performed by: FAMILY MEDICINE

## 2022-12-06 PROCEDURE — 93005 ELECTROCARDIOGRAM TRACING: CPT

## 2022-12-06 PROCEDURE — 3078F DIAST BP <80 MM HG: CPT | Performed by: FAMILY MEDICINE

## 2022-12-06 PROCEDURE — 1123F ACP DISCUSS/DSCN MKR DOCD: CPT | Performed by: FAMILY MEDICINE

## 2022-12-06 PROCEDURE — 6370000000 HC RX 637 (ALT 250 FOR IP): Performed by: EMERGENCY MEDICINE

## 2022-12-06 PROCEDURE — G8484 FLU IMMUNIZE NO ADMIN: HCPCS | Performed by: FAMILY MEDICINE

## 2022-12-06 PROCEDURE — G8417 CALC BMI ABV UP PARAM F/U: HCPCS | Performed by: FAMILY MEDICINE

## 2022-12-06 PROCEDURE — 3074F SYST BP LT 130 MM HG: CPT | Performed by: FAMILY MEDICINE

## 2022-12-06 RX ORDER — ENOXAPARIN SODIUM 100 MG/ML
1 INJECTION SUBCUTANEOUS ONCE
Status: COMPLETED | OUTPATIENT
Start: 2022-12-06 | End: 2022-12-06

## 2022-12-06 RX ORDER — DEXAMETHASONE SODIUM PHOSPHATE 10 MG/ML
10 INJECTION, SOLUTION INTRAMUSCULAR; INTRAVENOUS ONCE
Status: COMPLETED | OUTPATIENT
Start: 2022-12-06 | End: 2022-12-06

## 2022-12-06 RX ORDER — AZITHROMYCIN 250 MG/1
TABLET, FILM COATED ORAL
Qty: 1 PACKET | Refills: 0 | Status: ON HOLD | OUTPATIENT
Start: 2022-12-06 | End: 2022-12-08 | Stop reason: HOSPADM

## 2022-12-06 RX ORDER — OMEPRAZOLE 20 MG/1
CAPSULE, DELAYED RELEASE ORAL
Status: ON HOLD | COMMUNITY
Start: 2022-11-20 | End: 2022-12-08 | Stop reason: HOSPADM

## 2022-12-06 RX ORDER — PREDNISONE 20 MG/1
40 TABLET ORAL DAILY
Qty: 6 TABLET | Refills: 0 | Status: ON HOLD | OUTPATIENT
Start: 2022-12-06 | End: 2022-12-08 | Stop reason: HOSPADM

## 2022-12-06 RX ADMIN — ENOXAPARIN SODIUM 80 MG: 100 INJECTION SUBCUTANEOUS at 20:31

## 2022-12-06 RX ADMIN — IOPAMIDOL 75 ML: 755 INJECTION, SOLUTION INTRAVENOUS at 19:06

## 2022-12-06 RX ADMIN — DEXAMETHASONE SODIUM PHOSPHATE 10 MG: 10 INJECTION, SOLUTION INTRAMUSCULAR; INTRAVENOUS at 18:55

## 2022-12-06 ASSESSMENT — ENCOUNTER SYMPTOMS
VOMITING: 0
COLOR CHANGE: 0
BLOOD IN STOOL: 0
WHEEZING: 0
COUGH: 0
BACK PAIN: 0
CHEST TIGHTNESS: 0
EYE DISCHARGE: 0
ABDOMINAL PAIN: 0
SHORTNESS OF BREATH: 1
COUGH: 0
NAUSEA: 1
NAUSEA: 1
ABDOMINAL DISTENTION: 0
SHORTNESS OF BREATH: 1
VOMITING: 1
EYE REDNESS: 0
ABDOMINAL PAIN: 0
SORE THROAT: 0
DIARRHEA: 0
DIARRHEA: 0
RECTAL PAIN: 0
CONSTIPATION: 0
ANAL BLEEDING: 0
SINUS PAIN: 0

## 2022-12-06 ASSESSMENT — PATIENT HEALTH QUESTIONNAIRE - PHQ9
SUM OF ALL RESPONSES TO PHQ QUESTIONS 1-9: 0
2. FEELING DOWN, DEPRESSED OR HOPELESS: 0
10. IF YOU CHECKED OFF ANY PROBLEMS, HOW DIFFICULT HAVE THESE PROBLEMS MADE IT FOR YOU TO DO YOUR WORK, TAKE CARE OF THINGS AT HOME, OR GET ALONG WITH OTHER PEOPLE: 0
SUM OF ALL RESPONSES TO PHQ QUESTIONS 1-9: 0
7. TROUBLE CONCENTRATING ON THINGS, SUCH AS READING THE NEWSPAPER OR WATCHING TELEVISION: 0
3. TROUBLE FALLING OR STAYING ASLEEP: 0
5. POOR APPETITE OR OVEREATING: 0
1. LITTLE INTEREST OR PLEASURE IN DOING THINGS: 0
9. THOUGHTS THAT YOU WOULD BE BETTER OFF DEAD, OR OF HURTING YOURSELF: 0
SUM OF ALL RESPONSES TO PHQ QUESTIONS 1-9: 0
6. FEELING BAD ABOUT YOURSELF - OR THAT YOU ARE A FAILURE OR HAVE LET YOURSELF OR YOUR FAMILY DOWN: 0
8. MOVING OR SPEAKING SO SLOWLY THAT OTHER PEOPLE COULD HAVE NOTICED. OR THE OPPOSITE, BEING SO FIGETY OR RESTLESS THAT YOU HAVE BEEN MOVING AROUND A LOT MORE THAN USUAL: 0
SUM OF ALL RESPONSES TO PHQ QUESTIONS 1-9: 0
4. FEELING TIRED OR HAVING LITTLE ENERGY: 0
SUM OF ALL RESPONSES TO PHQ9 QUESTIONS 1 & 2: 0

## 2022-12-06 ASSESSMENT — PAIN - FUNCTIONAL ASSESSMENT: PAIN_FUNCTIONAL_ASSESSMENT: NONE - DENIES PAIN

## 2022-12-06 NOTE — ASSESSMENT & PLAN NOTE
EKG today in office abnormal, but stable overall compared to EKG's from 2019 and 2017. Will attempt to move up cardiology visit ASAP. I reviewed with patient at length the need to go to ER immediately for any diaphoresis, chest pain or tightness, palpitations, lightheadedness, worsening lower extremity edema, dyspnea at rest, intractable nausea/vomiting, or syncope.

## 2022-12-06 NOTE — ED PROVIDER NOTES
[facility] ED  EMERGENCY DEPARTMENT ENCOUNTER      Pt Name: Ted Rangel  MRN: [id number]  [place] [birth year]  Date of evaluation: 12/6/2022  Provider: Fadi Veras MD    CHIEF COMPLAINT       Chief Complaint   Patient presents with    Shortness of Breath     Elevated d-dimer, would like to rule out blood clot came from dr office     Complaint: Shortness of breath  History of chief complaint: This 71-year-old gentleman presents the emergency department complaining of shortness of breath ongoing over the last several weeks. Patient states he was diagnosed with COVID about 4 weeks ago. States they did give him the new medicine to take but could not tolerate it only took 1 or 2 doses. Patient states has had some ongoing shortness of breath since that time. Patient denies any persisting cough no fevers chills no sore throat. Patient denies any chest pain or palpitations no abdominal pain. Patient reports some intermittent nausea did have 1 episode of vomiting a couple days ago no diarrhea no diaphoresis. No leg pain or swelling no weakness or dizziness. Patient does report a history of coronary artery disease with previous bypass does follow with cardiology has an upcoming appointment on Thursday 2 days. Patient states he does have a history of COPD, states he is a smoker but has significantly cut down.  he does have an inhaler at home to use as needed but rarely uses it. Patient does have a history of myasthenia gravis for which he is on daily low-dose prednisone 5 mg a day. Patient has had not had any breathing trouble with the myasthenia no difficulty swallowing. Patient states he did go see his family doctor today was sent for outpatient blood work and chest x-ray and received a call advised to come in the emergency department and get checked for blood clot. Nursing Notes were reviewed.     REVIEW OF SYSTEMS    (2-9 systems for level 4, 10 or more for level 5)     Review of Systems Constitutional:  Negative for chills, diaphoresis and fever. HENT:  Negative for congestion, sinus pain and sore throat. Eyes:  Negative for discharge and redness. Respiratory:  Positive for shortness of breath. Negative for cough. Cardiovascular:  Negative for chest pain, palpitations and leg swelling. Gastrointestinal:  Positive for nausea. Negative for abdominal pain, diarrhea and vomiting. Genitourinary:  Negative for difficulty urinating, dysuria and flank pain. Musculoskeletal:  Negative for back pain and neck pain. Skin:  Negative for color change and rash. Neurological:  Negative for weakness, numbness and headaches. Hematological:  Negative for adenopathy. Except as noted above the remainder of the review of systems was reviewed and negative.        PAST MEDICAL HISTORY     Past Medical History:   Diagnosis Date    Atypical chest pain 8/30/2012    BPH (benign prostatic hypertrophy)     CAD (coronary artery disease)     Cataracts, bilateral     Chronic low back pain     COPD (chronic obstructive pulmonary disease) ([address] 6/1/2006    DM2 (diabetes mellitus, type 2) (HCC)     Duodenitis     Erectile dysfunction 3/6/2017    Fatty infiltration of liver 11/19/2014    GERD (gastroesophageal reflux disease)     History of melanoma excision 12/11/2017    Left ear 09/2017    History of tobacco use 6/1/2006    HTN (hypertension)     Hyperlipidemia     Malignant melanoma of face ([address]     Malignant melanoma of skin of face ([address]     RT ear 2000, LT ear 2017    MG (myasthenia gravis) ([address]     Nephrolithiasis     Ocular myasthenia gravis ([address] 8/28/2018    Osteoarthritis of multiple joints     hands, hips    Perennial allergic rhinitis     Personal history of colonic polyps     Pseudophakia of both eyes     Stage 3a chronic kidney disease ([address] 9/29/2022    Status post coronary artery bypass grafts x 5 7/30/2018 07/2018 @ [facility]    Type 2 diabetes mellitus with microalbuminuria, without long-term current use of insulin ([facility] 3/6/2017    Varicose veins of both lower extremities 9/11/2017         SURGICAL HISTORY       Past Surgical History:   Procedure Laterality Date    ANGIOPLASTY      ARM SURGERY Right 10/25/2022    CARDIAC CATHETERIZATION      #17    CATARACT REMOVAL WITH IMPLANT Left 1992    CATARACT REMOVAL WITH IMPLANT Right 1992    COLONOSCOPY  2009    tubular adenomas    COLONOSCOPY  2013    diverticulosis, polyps, 5y repeat (DR NAVAS)    CORONARY ARTERY BYPASS GRAFT  07/02/2018    x5 (DR JETER @ [facility])    CORONARY ARTERY BYPASS GRAFT      5 vessel    MOHS SURGERY Left 09/2017    melanoma of left ear (DR GODWIN)    SKIN CANCER EXCISION Left 08/2017    TONSILLECTOMY  1955    UPPER GASTROINTESTINAL ENDOSCOPY      duodenitis/ poss early signs of celiac disease         CURRENT MEDICATIONS       Previous Medications    ALBUTEROL SULFATE  (90 BASE) MCG/ACT INHALER    Inhale 2 puffs into the lungs every 6 hours as needed for Wheezing    AMLODIPINE (NORVASC) 10 MG TABLET    Take 1 tablet by mouth daily    ASCORBIC ACID (VITAMIN C) 500 MG TABLET    Take 1,000 mg by mouth daily    ASPIRIN 81 MG EC TABLET    Take 81 mg by mouth daily. AZATHIOPRINE (IMURAN) 50 MG TABLET    Take 50 mg by mouth Take 2 tablets by mouth in the morning and 1 tablet in the evening. BLOOD GLUCOSE MONITORING SUPPL CHENCHO    Test once daily. Dx: E11.29    CINNAMON PO    Take 500 mg by mouth 2 times daily.       CYANOCOBALAMIN (VITAMIN B 12 PO)    Take 1,000 mg by mouth    DULAGLUTIDE 0.75 MG/0.5ML SOPN    Inject 0.75 mg into the skin once a week    DULOXETINE (CYMBALTA) 30 MG EXTENDED RELEASE CAPSULE    Take 1 capsule by mouth daily    FAMOTIDINE (PEPCID) 40 MG TABLET    Take 1 tablet by mouth every evening    FLUTICASONE (FLONASE) 50 MCG/ACT NASAL SPRAY    1 spray by Nasal route daily    FREESTYLE LITE STRIP    USE 1 STRIP TO CHECK GLUCOSE ONCE DAILY AS DIRECTED    GLIPIZIDE (GLUCOTROL) 10 MG TABLET    Take 1 tablet by mouth 2 times daily (before meals)    HANDICAP PLACARD MISC    by Does not apply route DX: COPD (J44.9), primary osteoarthritis involving multiple joints (M15.0), myasthenia gravis with exacerbation (G70.01)     EXPIRES: 05/2024    IPRATROPIUM (ATROVENT) 0.06 % NASAL SPRAY    2 sprays by Nasal route 3 times daily    LANCETS MISC    Test once daily. Dx: E11.29    LORATADINE (CLARITIN) 10 MG TABLET    Take 1 tablet by mouth daily    LOSARTAN (COZAAR) 100 MG TABLET    Take 1 tablet by mouth daily    METHYLPREDNISOLONE (MEDROL, JIGAR,) 4 MG TABLET    Take by mouth. METOPROLOL SUCCINATE (TOPROL XL) 50 MG EXTENDED RELEASE TABLET    Take 3 tablets by mouth daily    NITROGLYCERIN (NITROSTAT) 0.4 MG SL TABLET    Place 1 tablet under the tongue every 5 minutes as needed for Chest pain    OMEPRAZOLE (PRILOSEC) 20 MG DELAYED RELEASE CAPSULE        ROSUVASTATIN (CRESTOR) 20 MG TABLET    Take 1 tablet by mouth daily    SILDENAFIL (VIAGRA) 100 MG TABLET    TAKE 1 TABLET BY MOUTH AS NEEDED APPROXIMATELY  1  HOUR  BEFORE  SEXUAL  ACTIVITY. DO  NOT  USE  MORE  THAN  ONE  DOSE  DAILY. SITAGLIPTIN (JANUVIA) 100 MG TABLET    Take 0.5 tablets by mouth 2 times daily    TAMSULOSIN (FLOMAX) 0.4 MG CAPSULE    Take 2 capsules by mouth daily    TESTOSTERONE CYPIONATE (DEPOTESTOTERONE CYPIONATE) 200 MG/ML INJECTION    Inject 1 mL into the muscle every 14 days.  Indications: PER ORDER in OV note 9/16/20    TESTOSTERONE CYPIONATE 200 MG/ML SOLN    1 CC EVERY 2 WEEKS    TRIAMTERENE-HYDROCHLOROTHIAZIDE (MAXZIDE-25) 37.5-25 MG PER TABLET    Take 1 tablet by mouth daily       ALLERGIES     Invokana [canagliflozin], Metformin and related, and Other    FAMILY HISTORY       Family History   Problem Relation Age of Onset    Heart Disease Mother     Heart Disease Brother     Diabetes Maternal Grandfather           SOCIAL HISTORY       Social History     Socioeconomic History    Marital status:      Spouse name: Meg Bermudez    Number of children: 2    Years of education: 12+   Occupational History    Occupation: [employer]   Tobacco Use    Smoking status: Former     Packs/day: 1.00     Years: 41.00     Pack years: 41.00     Types: Cigarettes     Start date:      Quit date: 2002     Years since quittin.2     Passive exposure: Past    Smokeless tobacco: Never   Vaping Use    Vaping Use: Never used   Substance and Sexual Activity    Alcohol use: Never    Drug use: Never    Sexual activity: Not Currently     Partners: Female     Social Determinants of Health     Financial Resource Strain: Low Risk     Difficulty of Paying Living Expenses: Not hard at all   Food Insecurity: No Food Insecurity    Worried About [employer] in the Last Year: Never true    [address] [employer] in the Last Year: Never true   Physical Activity: Inactive    Days of Exercise per Week: 0 days    Minutes of Exercise per Session: 0 min       PHYSICAL EXAM    (up to 7 for level 4, 8 or more for level 5)     ED Triage Vitals [22 1747]   BP Temp Temp src Heart Rate Resp SpO2 Height Weight   (!) 122/58 98.3 °F (36.8 °C) -- 99 18 96 % 5' 11\" (1.803 m) 185 lb (83.9 kg)       Physical Exam  General appearance: Patient is awake alert interactive appropriate nontoxic in no acute distress  Head is atraumatic normocephalic  Eyes pupils are equal and reactive sclera white conjunctive are pink  Oral pharyngeal cavity is pink with good moisture, no exudates or ulcerations no asymmetry, the airway is widely patent  Neck: Supple no meningeal signs no adenopathy no JVD  Heart: Regular rate and rhythm no gross murmurs rubs or clicks  Lungs: Auscultation of the lungs reveal some decreased air movement there is no active wheezes rales or rhonchi no respiratory distress no use of accessory muscles or conversational dyspnea.   Pulse ox 96% on room air   abdomen: Soft nontender with good bowel sounds no rebound rigidity or guarding no firm or pulsatile masses, no gross distention, femoral pulses full and symmetric  Back: Nontender to palpation no costovertebral angle tenderness  Skin: Warm and dry without rashes  Lower extremities: No edema or calf tenderness or asymmetry. DIAGNOSTIC RESULTS     EKG: All EKG's are interpreted by the Emergency Department Physician who either signs or Co-signs this chart in the absence of a cardiologist.    EKG interpreted by ED physician indication shortness of breath: Normal sinus rhythm at 91 with no significant ST-T change no acute infarction pattern    RADIOLOGY:   Non-plain film images such as CT, Ultrasound and MRI are read by the radiologist. Plain radiographic images are visualized and preliminarily interpreted by the emergency physician with the below findings:      Interpretation per the Radiologist below, if available at the time of this note:    CTA CHEST W WO CONTRAST PE Eval   Final Result   Small right lower lobe segmental pulmonary embolus. No central pulmonary   emboli or evidence of right heart strain. Right upper lobe spiculated cavitary 2.5 cm nodule with adjacent satellite   nodules highly suggestive of malignancy. Recommend either CT-guided biopsy   or PET-CT for further evaluation. Critical results were called by Dr. Celestina Bermudez to Dr. Ezequiel Reid On 12/6/2022   at 18:14.              LABS:  Labs Reviewed   CBC WITH AUTO DIFFERENTIAL - Abnormal; Notable for the following components:       Result Value    WBC 10.7 (*)     RBC 4.13 (*)     .9 (*)     MCH 33.9 (*)     Neutrophils % 76.6 (*)     Neutrophils Absolute 8.2 (*)     All other components within normal limits   COMPREHENSIVE METABOLIC PANEL - Abnormal; Notable for the following components:    Glucose 110 (*)     All other components within normal limits   COVID-19, RAPID   CK   TROPONIN   APTT   PROTIME-INR   Laboratory review: CBC BMP liver function test all essentially normal repeat troponin is negative at less than 0.01 total CK is normal at 77, repeat COVID test ordered and pending    All other labs were within normal range or not returned as of this dictation. EMERGENCY DEPARTMENT COURSE and DIFFERENTIAL DIAGNOSIS/MDM:    Patient presented with worsening shortness of breath of 2 weeks duration. Known history of COPD, nicotine dependence, hyperlipidemia, perennial allergies, coronary artery disease, benign prostatic atrophy, fatty liver, colonic polyps, osteoarthritis of multiple joints, type 2 diabetes, varicose veins of both lower extremities, history of melanoma, stage III chronic kidney disease, depression, unsteady gait, chronic back pain, chronic venous insufficiency, gastroesophageal reflux disease. Work-up shows elevated D-dimer. CT PE studies were completed and I received a call from radiology reporting 1. single small segmental emboli of the right lower lobe #2 right upper lobe spiculated cavitating mass concerning for lung cancer. I discussed these findings with the patient and spouse. Told me he is feeling much better and has a lot of things to take care of at home. I offered transfer to [facility] for continuing care including possible bronchoscopy and biopsies but he would rather go home and go to [facility] EM[facility]GENCY [facility] for evaluation and continuing care in the morning. His risks benefits and options were clearly explained to him and he voiced clear understanding along with his wife. I will start him on Lovenox therapeutic dose today and continue him on Eliquis protocol. Dr. Amalia Witt had earlier received prescriptions for prednisone and azithromycin for him. He was advised to come back to the ED for new or worsening symptoms. All of his questions were addressed to his satisfaction. He willingly completed documentation for leaving the hospital [address]    Vitals:    Vitals:    12/06/22 1747 12/06/22 1952   BP: (!) 122/58 129/79   Pulse: 99 93   Resp: 18 16   Temp: 98.3 °F (36.8 °C)    SpO2: 96% 93% Weight: 185 lb (83.9 kg)    Height: 5' 11\" (1.803 m)      I did review blood work from this morning revealing a negative troponin elevated D-dimer. Patient had a chest x-ray read per radiology is no acute pathology. Treatment and course: Patient had an IV established reading blood work was sent including a repeat troponin. Decadron 10 mg IV was given here with suspicion for COPD exacerbation as well as underlying myasthenia gravis. This case was discussed with the oncoming night physician to follow-up on CTA results, if negative. patient set for discharge home instructions and prescriptions prepared and discussed with patient. certainly if positive will intervene appropriately      FINAL IMPRESSION      1. Dyspnea, unspecified type    2. COPD exacerbation ([address]    3. Cavitating mass in right upper lung lobe    4. Acute pulmonary embolism without acute cor pulmonale, unspecified pulmonary embolism type [facility])          DISPOSITION/PLAN   DISPOSITION [place] 12/06/2022 08:24:56 PM  I did discuss with patient and family at bedside likely related to COPD exacerbation with the recent COVID infection, if no findings of pulmonary embolism, will plan on discharge home. I did discuss the need to monitor closely with the myasthenia gravis for increasing general weakness difficulty swallowing or significant worsening in his breathing. I will provide a short course of a burst of steroid up to 40 mg for 3 days in addition to an empiric Z-Jigar.  patient to use the albuterol nebulizer at home. I d id stressed the importance of close following up with his primary physician and cardiology on Thursday as well    PATIENT REFERRED TO:  Mimi Petersen MD  [facility]  [phone]    Today      DISCHARGE MEDICATIONS:  New Prescriptions    AZITHROMYCIN (ZITHROMAX Z-JIGAR) 250 MG TABLET    Take 2 tablets (500 mg) on Day 1, and then take 1 tablet (250 mg) on days 2 through 5.     PREDNISONE (DELTASONE) 20 MG TABLET    Take 2 tablets by mouth daily for 3 days     Controlled Substances Monitoring:     No flowsheet data found.     (Please note that portions of this note were completed with a voice recognition program.  Efforts were made to edit the dictations but occasionally words are mis-transcribed.)    Chaparro Arriaga MD (electronically signed)  Attending Emergency Physician            Chaparro Arriaga MD  12/06/22 [id number]

## 2022-12-06 NOTE — PROGRESS NOTES
Juna Dancer (: [id number]) is a 68 y.o. male, Established patient, who presents today for:    Chief Complaint   Patient presents with    Nausea & Vomiting     Patient states that he has been having dizzy spells in the morning that make him vomit and sick to his stomach. Patient states that he is also very fatigue as well. Patient states that sx started about 2 weeks ago     Fatigue         ASSESSMENT/PLAN    1. Dizziness  Comments:  None currently in office. Stable vitals. consider potential vertigo vs cardiac related. See below. Will keep close F/U. Orders:  -     EKG 12 Lead - Clinic Performed  -     Troponin; Future  -     CBC with Auto Differential; Future  -     Brain Natriuretic Peptide; Future  -     Basic Metabolic Panel; Future  2. Dyspnea on exertion  Comments:  New onset. Will obtain CXR and STAT D-dimer with known recent hx of COVID. STAT troponin ordered. Orders:  -     EKG 12 Lead - Clinic Performed  -     Troponin; Future  -     D-Dimer, Quantitative; Future  -     CBC with Auto Differential; Future  -     Brain Natriuretic Peptide; Future  -     Basic Metabolic Panel; Future  -     XR CHEST STANDARD (2 VW); Future  3. Fatigue, unspecified type  Comments:  Likely multifactorial and related to recent COVID infection. Consider also cardiac etiology, see below. Orders:  -     EKG 12 Lead - Clinic Performed  -     Troponin; Future  -     CBC with Auto Differential; Future  -     Brain Natriuretic Peptide; Future  -     Basic Metabolic Panel; Future  -     XR CHEST STANDARD (2 VW); Future  4. Nausea and vomiting, unspecified vomiting type  Comments:  Associated with dizziness. No associated abdominal pain, chest pain, or diaphoresis. Will keep close F/U and consider treating for vertigo pending STAT results. Orders:  -     EKG 12 Lead - Clinic Performed  -     Troponin; Future  -     CBC with Auto Differential; Future  -     Basic Metabolic Panel; Future  5.  Primary hypertension  -     EKG 12 Lead - Clinic Performed  -     CBC with Auto Differential; Future  -     Brain Natriuretic Peptide; Future  -     Basic Metabolic Panel; Future  6. Coronary artery disease involving native coronary artery of native heart with angina pectoris [facility])  Assessment & Plan:  EKG today in office abnormal, but stable overall compared to EKG's from 2019 and 2017. Will attempt to move up cardiology visit ASAP. I reviewed with patient at length the need to go to ER immediately for any diaphoresis, chest pain or tightness, palpitations, lightheadedness, worsening lower extremity edema, dyspnea at rest, intractable nausea/vomiting, or syncope. Orders:  -     EKG 12 Lead - Clinic Performed  -     Troponin; Future  -     CBC with Auto Differential; Future  -     Basic Metabolic Panel; Future  7. History of 2019 novel coronavirus disease (COVID-19)  -     D-Dimer, Quantitative; Future  8. Abnormal EKG  -     Troponin; Future  -     D-Dimer, Quantitative; Future  -     CBC with Auto Differential; Future  -     Brain Natriuretic Peptide; Future  -     Basic Metabolic Panel; Future      Return for F/U DYSPNEA WITH EXERTION, DIZZINESS, AND DECREASED ACTIVITY TOLERANCE IN 1-2 WEEKS. SUBJECTIVE/OBJECTIVE:    HPI    Patient presents for acute visit regarding intermittent dizziness and nausea/vomiting. He is s/p COVID-19 infection 10/28/22. Patient reports a 2-week history of dizzy spells in the AM (sensation of spinning/movement) when standing and urinating that are associated with nausea and vomiting. He reports one fall secondary to dizziness without any reported head injury or LOC. There is reported increased fatigue with decreased activity tolerance and dyspnea on exertion over the past 2-3 weeks. Patient reports not being able to walk more than 100 yards without needing to take a rest. Patient denies any chest pain, palpitations, lightheadedness, worsening lower extremity edema, or syncope.  Patient denies any dyspnea at rest, wheezing, cough, or chest tightness. There is no reported abdominal pain or diarrhea. Patient is established with cardiology @ [facility], he reports his cardiologist has retired and he will be establishing with new cardiologist in the next month. Current Outpatient Medications on File Prior to Visit   Medication Sig Dispense Refill    omeprazole (PRILOSEC) 20 MG delayed release capsule       rosuvastatin (CRESTOR) 20 MG tablet Take 1 tablet by mouth daily 90 tablet 1    tamsulosin (FLOMAX) 0.4 MG capsule Take 2 capsules by mouth daily 180 capsule 1    ipratropium (ATROVENT) 0.06 % nasal spray 2 sprays by Nasal route 3 times daily 45 mL 3    sildenafil (VIAGRA) 100 MG tablet TAKE 1 TABLET BY MOUTH AS NEEDED APPROXIMATELY  1  HOUR  BEFORE  SEXUAL  ACTIVITY. DO  NOT  USE  MORE  THAN  ONE  DOSE  DAILY. 6 tablet 0    DULoxetine (CYMBALTA) 30 MG extended release capsule Take 1 capsule by mouth daily 90 capsule 1    triamterene-hydroCHLOROthiazide (MAXZIDE-25) 37.5-25 MG per tablet Take 1 tablet by mouth daily 90 tablet 1    losartan (COZAAR) 100 MG tablet Take 1 tablet by mouth daily 90 tablet 1    methylPREDNISolone (MEDROL, JIGAR,) 4 MG tablet Take by mouth.  1 kit 0    glipiZIDE (GLUCOTROL) 10 MG tablet Take 1 tablet by mouth 2 times daily (before meals) 180 tablet 3    amLODIPine (NORVASC) 10 MG tablet Take 1 tablet by mouth daily 90 tablet 3    metoprolol succinate (TOPROL XL) 50 MG extended release tablet Take 3 tablets by mouth daily 270 tablet 1    Dulaglutide 0.75 MG/0.5ML SOPN Inject 0.75 mg into the skin once a week 12 pen 3    FREESTYLE LITE strip USE 1 STRIP TO CHECK GLUCOSE ONCE DAILY AS DIRECTED 50 each 0    SITagliptin (JANUVIA) 100 MG tablet Take 0.5 tablets by mouth 2 times daily 30 tablet 0    Testosterone Cypionate 200 MG/ML SOLN 1 CC EVERY 2 WEEKS 10 mL 3    fluticasone (FLONASE) 50 MCG/ACT nasal spray 1 spray by Nasal route daily 1 Bottle 0    albuterol sulfate  (90 Base) MCG/ACT inhaler Inhale 2 puffs into the lungs every 6 hours as needed for Wheezing 3 Inhaler 0    famotidine (PEPCID) 40 MG tablet Take 1 tablet by mouth every evening 90 tablet 3    Handicap Placard MISC by Does not apply route DX: COPD (J44.9), 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 Cyanocobalamin (VITAMIN B 12 PO) Take 1,000 mg by mouth      predniSONE (DELTASONE) 10 MG tablet Take 5 mg by mouth three times a week       nitroGLYCERIN (NITROSTAT) 0.4 MG SL tablet Place 1 tablet under the tongue every 5 minutes as needed for Chest pain 25 tablet 0    Blood Glucose Monitoring Suppl CHENCHO Test once daily. Dx: E11.29 1 Device 0    Lancets MISC Test once daily. Dx: E11.29 100 each 3    ascorbic acid (VITAMIN C) 500 MG tablet Take 1,000 mg by mouth daily      loratadine (CLARITIN) 10 MG tablet Take 1 tablet by mouth daily 30 tablet 5    CINNAMON PO Take 500 mg by mouth 2 times daily. aspirin 81 MG EC tablet Take 81 mg by mouth daily. testosterone cypionate (DEPOTESTOTERONE CYPIONATE) 200 MG/ML injection Inject 1 mL into the muscle every 14 days. Indications: PER ORDER in OV note 9/16/20 (Patient taking differently: Inject 200 mg into the muscle every 14 days. Indications: PER ORDER in OV note 3/17/21) 2 mL 3     No current facility-administered medications on file prior to visit. Allergies   Allergen Reactions    Invokana [Canagliflozin] Diarrhea    Metformin And Related Diarrhea    Other      There is a list scanned in media that pt can not take due to myasthenia gravis ,  Antibiotics        Review of Systems   Constitutional:  Positive for appetite change (decreased) and fatigue. Negative for chills, diaphoresis and fever. Respiratory:  Positive for shortness of breath (with exertion). Negative for cough, chest tightness and wheezing. Cardiovascular:  Negative for chest pain, palpitations and leg swelling. Gastrointestinal:  Positive for nausea and vomiting. Negative for abdominal distention, abdominal pain, anal bleeding, blood in stool, constipation, diarrhea and rectal pain. Musculoskeletal:  Negative for myalgias. Neurological:  Positive for dizziness. Negative for syncope, light-headedness and headaches. Vitals:  /70 (Site: Left Upper Arm, Position: Sitting, Cuff Size: Large Adult)   Pulse 72   Temp 97.5 °F (36.4 °C) (Temporal)   Ht 5' 11\" (1.803 m)   Wt 180 lb (81.6 kg)   SpO2 95%   BMI 25.10 kg/m²     Physical Exam  Vitals reviewed. Constitutional:       General: He is not in acute distress. Appearance: He is not ill-appearing. HENT:      Head: Normocephalic and atraumatic. Eyes:      General: No scleral icterus. Neck:      Thyroid: No thyroid mass, thyromegaly or thyroid tenderness. Vascular: No carotid bruit. Cardiovascular:      Rate and Rhythm: Normal rate and regular rhythm. Heart sounds: Normal heart sounds. No murmur heard. Pulmonary:      Effort: Pulmonary effort is normal. No respiratory distress. Breath sounds: Normal breath sounds. No wheezing, rhonchi or rales. Abdominal:      General: Bowel sounds are normal. There is no distension. Palpations: Abdomen is soft. Tenderness: There is no abdominal tenderness. There is no right CVA tenderness, left CVA tenderness, guarding or rebound. Musculoskeletal:      Cervical back: Neck supple. Right lower leg: No edema. Left lower leg: No edema. Lymphadenopathy:      Cervical: No cervical adenopathy. Skin:     Findings: No rash. Neurological:      Mental Status: He is alert and oriented to person, place, and time. Psychiatric:         Mood and Affect: Mood normal.         Behavior: Behavior normal.         Thought Content:  Thought content normal. Ortho Exam (If Applicable)              An electronic signature was used to authenticate this note.      Mustapha Saba MD

## 2022-12-06 NOTE — TELEPHONE ENCOUNTER
Pharmacy is requesting medication refill. Please approve or deny this request.    Rx requested:  Requested Prescriptions     Pending Prescriptions Disp Refills    JANUVIA 100 MG tablet [Pharmacy Med Name: Gregorio Carrizales 100MG TAB] 90 tablet 2     Sig: Take 1 tablet by mouth daily         Last Office Visit:   11/3/2022      Next Visit Date:  Future Appointments   Date Time Provider Kay Bartlett   12/6/2022  3:00 PM MD [place] [address]   12/14/2022  9:40 AM SCHEDULE, [facility]BB [facility]   12/21/2022  9:00 AM Lo Chow, PhD [facility]   3/3/2023 10:00 AM MD [place] [address]   3/22/2023  1:00 PM Sanjana Andrews  [address]

## 2022-12-06 NOTE — TELEPHONE ENCOUNTER
Location of patient: [address] call from [place] [facility] with [employer]. Subjective: Caller states \"doesn't feel well, having nausea and dizziness\"     Current Symptoms: nausea, fatigue    Onset: 2 weeks ago; unchanged    Associated Symptoms: reduced appetite, diarrhea    Pain Severity: 0/10; N/A; none    Temperature: denies    What has been tried: denies    LMP: NA Pregnant: NA    Recommended disposition: See in Office Today or Tomorrow    Care advice provided, patient verbalizes understanding; denies any other questions or concerns; instructed to call back for any new or worsening symptoms. Patient/Caller agrees with recommended disposition; writer provided warm transfer to [facility] for appointment scheduling    Attention Provider: Thank you for allowing me to participate in the care of your patient. The patient was connected to triage in response to information provided to the ECC/PSC. Please do not respond through this encounter as the response is not directed to a shared pool.         Reason for Disposition   Patient wants to be seen    Protocols used: Nausea-ADULT-[place]

## 2022-12-07 ENCOUNTER — HOSPITAL ENCOUNTER (OUTPATIENT)
Age: 77
Setting detail: OBSERVATION
Discharge: HOME | End: 2022-12-08
Attending: INTERNAL MEDICINE
Payer: MEDICARE

## 2022-12-07 ENCOUNTER — APPOINTMENT (OUTPATIENT)
Dept: ULTRASOUND IMAGING | Age: 77
End: 2022-12-07
Payer: MEDICARE

## 2022-12-07 DIAGNOSIS — I26.93 SINGLE SUBSEGMENTAL PULMONARY EMBOLISM WITHOUT ACUTE COR PULMONALE (HCC): Primary | ICD-10-CM

## 2022-12-07 DIAGNOSIS — R91.8 LUNG MASS: ICD-10-CM

## 2022-12-07 PROBLEM — I26.99 PULMONARY EMBOLISM AND INFARCTION (HCC): Status: ACTIVE | Noted: 2022-12-07

## 2022-12-07 LAB
ANTI-XA UNFRAC HEPARIN: 0.7 IU/ML
ANTI-XA UNFRAC HEPARIN: <0.1 IU/ML
GLUCOSE BLD-MCNC: 216 MG/DL (ref 70–99)
GLUCOSE BLD-MCNC: 377 MG/DL (ref 70–99)
PERFORMED ON: ABNORMAL
PERFORMED ON: ABNORMAL

## 2022-12-07 PROCEDURE — 99285 EMERGENCY DEPT VISIT HI MDM: CPT

## 2022-12-07 PROCEDURE — 6370000000 HC RX 637 (ALT 250 FOR IP): Performed by: INTERNAL MEDICINE

## 2022-12-07 PROCEDURE — 6360000002 HC RX W HCPCS: Performed by: PHYSICIAN ASSISTANT

## 2022-12-07 PROCEDURE — 96374 THER/PROPH/DIAG INJ IV PUSH: CPT

## 2022-12-07 PROCEDURE — 93005 ELECTROCARDIOGRAM TRACING: CPT | Performed by: PHYSICIAN ASSISTANT

## 2022-12-07 PROCEDURE — 85520 HEPARIN ASSAY: CPT

## 2022-12-07 PROCEDURE — G0378 HOSPITAL OBSERVATION PER HR: HCPCS

## 2022-12-07 PROCEDURE — 93970 EXTREMITY STUDY: CPT

## 2022-12-07 PROCEDURE — 94640 AIRWAY INHALATION TREATMENT: CPT

## 2022-12-07 PROCEDURE — 2580000003 HC RX 258: Performed by: INTERNAL MEDICINE

## 2022-12-07 PROCEDURE — 96375 TX/PRO/DX INJ NEW DRUG ADDON: CPT

## 2022-12-07 PROCEDURE — 6370000000 HC RX 637 (ALT 250 FOR IP): Performed by: PHYSICIAN ASSISTANT

## 2022-12-07 PROCEDURE — 96376 TX/PRO/DX INJ SAME DRUG ADON: CPT

## 2022-12-07 PROCEDURE — 36415 COLL VENOUS BLD VENIPUNCTURE: CPT

## 2022-12-07 PROCEDURE — 94761 N-INVAS EAR/PLS OXIMETRY MLT: CPT

## 2022-12-07 RX ORDER — HEPARIN SODIUM 1000 [USP'U]/ML
80 INJECTION, SOLUTION INTRAVENOUS; SUBCUTANEOUS PRN
Status: DISCONTINUED | OUTPATIENT
Start: 2022-12-07 | End: 2022-12-08 | Stop reason: HOSPADM

## 2022-12-07 RX ORDER — ACETAMINOPHEN 650 MG/1
650 SUPPOSITORY RECTAL EVERY 6 HOURS PRN
Status: DISCONTINUED | OUTPATIENT
Start: 2022-12-07 | End: 2022-12-08 | Stop reason: HOSPADM

## 2022-12-07 RX ORDER — MAGNESIUM SULFATE IN WATER 40 MG/ML
2000 INJECTION, SOLUTION INTRAVENOUS PRN
Status: DISCONTINUED | OUTPATIENT
Start: 2022-12-07 | End: 2022-12-08 | Stop reason: HOSPADM

## 2022-12-07 RX ORDER — POTASSIUM CHLORIDE 7.45 MG/ML
10 INJECTION INTRAVENOUS PRN
Status: DISCONTINUED | OUTPATIENT
Start: 2022-12-07 | End: 2022-12-08 | Stop reason: HOSPADM

## 2022-12-07 RX ORDER — HEPARIN SODIUM 1000 [USP'U]/ML
40 INJECTION, SOLUTION INTRAVENOUS; SUBCUTANEOUS PRN
Status: DISCONTINUED | OUTPATIENT
Start: 2022-12-07 | End: 2022-12-08 | Stop reason: HOSPADM

## 2022-12-07 RX ORDER — ONDANSETRON 2 MG/ML
4 INJECTION INTRAMUSCULAR; INTRAVENOUS EVERY 6 HOURS PRN
Status: DISCONTINUED | OUTPATIENT
Start: 2022-12-07 | End: 2022-12-08 | Stop reason: HOSPADM

## 2022-12-07 RX ORDER — SODIUM CHLORIDE 9 MG/ML
INJECTION, SOLUTION INTRAVENOUS PRN
Status: DISCONTINUED | OUTPATIENT
Start: 2022-12-07 | End: 2022-12-08 | Stop reason: HOSPADM

## 2022-12-07 RX ORDER — IPRATROPIUM BROMIDE AND ALBUTEROL SULFATE 2.5; .5 MG/3ML; MG/3ML
1 SOLUTION RESPIRATORY (INHALATION) ONCE
Status: COMPLETED | OUTPATIENT
Start: 2022-12-07 | End: 2022-12-07

## 2022-12-07 RX ORDER — METHYLPREDNISOLONE SODIUM SUCCINATE 125 MG/2ML
125 INJECTION, POWDER, LYOPHILIZED, FOR SOLUTION INTRAMUSCULAR; INTRAVENOUS ONCE
Status: COMPLETED | OUTPATIENT
Start: 2022-12-07 | End: 2022-12-07

## 2022-12-07 RX ORDER — SODIUM CHLORIDE 0.9 % (FLUSH) 0.9 %
10 SYRINGE (ML) INJECTION PRN
Status: DISCONTINUED | OUTPATIENT
Start: 2022-12-07 | End: 2022-12-08 | Stop reason: HOSPADM

## 2022-12-07 RX ORDER — ACETAMINOPHEN 325 MG/1
650 TABLET ORAL EVERY 6 HOURS PRN
Status: DISCONTINUED | OUTPATIENT
Start: 2022-12-07 | End: 2022-12-08 | Stop reason: HOSPADM

## 2022-12-07 RX ORDER — INSULIN LISPRO 100 [IU]/ML
6 INJECTION, SOLUTION INTRAVENOUS; SUBCUTANEOUS ONCE
Status: COMPLETED | OUTPATIENT
Start: 2022-12-07 | End: 2022-12-07

## 2022-12-07 RX ORDER — HEPARIN SODIUM 10000 [USP'U]/100ML
5-30 INJECTION, SOLUTION INTRAVENOUS CONTINUOUS
Status: DISCONTINUED | OUTPATIENT
Start: 2022-12-07 | End: 2022-12-08

## 2022-12-07 RX ORDER — SITAGLIPTIN 100 MG/1
100 TABLET, FILM COATED ORAL DAILY
Qty: 90 TABLET | Refills: 1 | Status: SHIPPED | OUTPATIENT
Start: 2022-12-07 | End: 2023-01-20 | Stop reason: SDUPTHER

## 2022-12-07 RX ORDER — SODIUM CHLORIDE 0.9 % (FLUSH) 0.9 %
10 SYRINGE (ML) INJECTION EVERY 12 HOURS SCHEDULED
Status: DISCONTINUED | OUTPATIENT
Start: 2022-12-07 | End: 2022-12-08 | Stop reason: HOSPADM

## 2022-12-07 RX ORDER — INSULIN LISPRO 100 [IU]/ML
0-4 INJECTION, SOLUTION INTRAVENOUS; SUBCUTANEOUS NIGHTLY
Status: DISCONTINUED | OUTPATIENT
Start: 2022-12-07 | End: 2022-12-08 | Stop reason: HOSPADM

## 2022-12-07 RX ORDER — DEXTROSE MONOHYDRATE 100 MG/ML
INJECTION, SOLUTION INTRAVENOUS CONTINUOUS PRN
Status: DISCONTINUED | OUTPATIENT
Start: 2022-12-07 | End: 2022-12-08 | Stop reason: HOSPADM

## 2022-12-07 RX ORDER — ONDANSETRON 4 MG/1
4 TABLET, ORALLY DISINTEGRATING ORAL EVERY 8 HOURS PRN
Status: DISCONTINUED | OUTPATIENT
Start: 2022-12-07 | End: 2022-12-08 | Stop reason: HOSPADM

## 2022-12-07 RX ORDER — POTASSIUM CHLORIDE 20 MEQ/1
40 TABLET, EXTENDED RELEASE ORAL PRN
Status: DISCONTINUED | OUTPATIENT
Start: 2022-12-07 | End: 2022-12-08 | Stop reason: HOSPADM

## 2022-12-07 RX ORDER — INSULIN LISPRO 100 [IU]/ML
0-8 INJECTION, SOLUTION INTRAVENOUS; SUBCUTANEOUS
Status: DISCONTINUED | OUTPATIENT
Start: 2022-12-07 | End: 2022-12-08 | Stop reason: HOSPADM

## 2022-12-07 RX ORDER — TAMSULOSIN HYDROCHLORIDE 0.4 MG/1
0.8 CAPSULE ORAL DAILY
Status: DISCONTINUED | OUTPATIENT
Start: 2022-12-08 | End: 2022-12-08 | Stop reason: HOSPADM

## 2022-12-07 RX ORDER — HEPARIN SODIUM 1000 [USP'U]/ML
80 INJECTION, SOLUTION INTRAVENOUS; SUBCUTANEOUS ONCE
Status: COMPLETED | OUTPATIENT
Start: 2022-12-07 | End: 2022-12-07

## 2022-12-07 RX ORDER — POLYETHYLENE GLYCOL 3350 17 G/17G
17 POWDER, FOR SOLUTION ORAL DAILY PRN
Status: DISCONTINUED | OUTPATIENT
Start: 2022-12-07 | End: 2022-12-08 | Stop reason: HOSPADM

## 2022-12-07 RX ADMIN — Medication 10 ML: at 21:46

## 2022-12-07 RX ADMIN — METHYLPREDNISOLONE SODIUM SUCCINATE 125 MG: 125 INJECTION, POWDER, FOR SOLUTION INTRAMUSCULAR; INTRAVENOUS at 14:07

## 2022-12-07 RX ADMIN — HEPARIN SODIUM AND DEXTROSE 18 UNITS/KG/HR: 10000; 5 INJECTION INTRAVENOUS at 16:23

## 2022-12-07 RX ADMIN — INSULIN LISPRO 6 UNITS: 100 INJECTION, SOLUTION INTRAVENOUS; SUBCUTANEOUS at 21:40

## 2022-12-07 RX ADMIN — INSULIN LISPRO 4 UNITS: 100 INJECTION, SOLUTION INTRAVENOUS; SUBCUTANEOUS at 21:42

## 2022-12-07 RX ADMIN — HEPARIN SODIUM 6710 UNITS: 1000 INJECTION INTRAVENOUS; SUBCUTANEOUS at 16:23

## 2022-12-07 RX ADMIN — IPRATROPIUM BROMIDE AND ALBUTEROL SULFATE 1 AMPULE: .5; 2.5 SOLUTION RESPIRATORY (INHALATION) at 15:18

## 2022-12-07 ASSESSMENT — ENCOUNTER SYMPTOMS
EYE PAIN: 0
SORE THROAT: 0
NAUSEA: 0
VOMITING: 0
BACK PAIN: 0
ABDOMINAL PAIN: 0
SHORTNESS OF BREATH: 1
PHOTOPHOBIA: 0
DIARRHEA: 0
COUGH: 1
RHINORRHEA: 0

## 2022-12-07 ASSESSMENT — LIFESTYLE VARIABLES
HOW MANY STANDARD DRINKS CONTAINING ALCOHOL DO YOU HAVE ON A TYPICAL DAY: PATIENT DOES NOT DRINK
HOW OFTEN DO YOU HAVE A DRINK CONTAINING ALCOHOL: NEVER

## 2022-12-07 ASSESSMENT — PAIN - FUNCTIONAL ASSESSMENT: PAIN_FUNCTIONAL_ASSESSMENT: NONE - DENIES PAIN

## 2022-12-07 NOTE — ED NOTES
Pt refusing covid test at this time. PT states that he took an at home test yesterday and it was negative.      Gabi Waggoner RN  12/06/22 2000

## 2022-12-07 NOTE — ED NOTES
Pt arrived to ED with c/o of pulmonary embolism. Pt was seen at [facility] last night and was diagnosed with a PE in the left lung. Pt also has a mass in the right lung. Pt was advised to come here to be admitted.       Lakia Christy RN  12/07/22 [id number]

## 2022-12-07 NOTE — FLOWSHEET NOTE
1730  Pt arrived to [facility] via cart from ER. Pt ambulatory to bed with steady gait. Oriented to room, call light and safety. Vitals obtained and stable. 1740  Admission assessment started. Dinner tray ordered. No requests/complaints, will monitor.

## 2022-12-07 NOTE — ED NOTES
explianed to patient the potential negative outcomes  Of signing out ama  Including death  Pt indicated understanding and said that jaya would go to [facility] tomorrow to continue treatmant   Pt signed ama  Form  And exited roslyn Begum RN  12/06/22 2055

## 2022-12-07 NOTE — H&P
Hospital Medicine  History and Physical    Patient:  Brian Mathews  MRN: [id number]    CHIEF COMPLAINT:    Chief Complaint   Patient presents with    Pulmonary Embolism     Pt was sent from [facility] for a blood blood clot in the left lung and a mass in the right lung. History Obtained From:  Patient, EMR  Primary Care Physician: Celia Agrawal MD    HISTORY OF PRESENT ILLNESS:   The patient is a 68 y.o. male with PMH of CAD, COPD, DMII, GERD, HT, HLD, DMII who presents with a PE.    THe patient was at a routine appointment yesterday when he was discovered to have shortness of breath with intermittent dizziness so he was sent to the ER. There he was found to have a PE; they wanted to admit him but instead he left and came back today. Denies fevers, chills, sweats, diarrhea, burning micturition.     Past Medical History:      Diagnosis Date    Atypical chest pain 8/30/2012    BPH (benign prostatic hypertrophy)     CAD (coronary artery disease)     Cataracts, bilateral     Chronic low back pain     COPD (chronic obstructive pulmonary disease) ([address] 6/1/2006    DM2 (diabetes mellitus, type 2) (HCC)     Duodenitis     Erectile dysfunction 3/6/2017    Fatty infiltration of liver 11/19/2014    GERD (gastroesophageal reflux disease)     History of melanoma excision 12/11/2017    Left ear 09/2017    History of tobacco use 6/1/2006    HTN (hypertension)     Hyperlipidemia     Malignant melanoma of face ([address]     Malignant melanoma of skin of face ([address]     RT ear 2000, LT ear 2017    MG (myasthenia gravis) ([address]     Nephrolithiasis     Ocular myasthenia gravis ([address] 8/28/2018    Osteoarthritis of multiple joints     hands, hips    Perennial allergic rhinitis     Personal history of colonic polyps     Pseudophakia of both eyes     Stage 3a chronic kidney disease ([address] 9/29/2022    Status post coronary artery bypass grafts x 5 7/30/2018 07/2018 @ [facility]    Type 2 diabetes mellitus with microalbuminuria, without long-term current use of insulin ([facility] 3/6/2017    Varicose veins of both lower extremities 9/11/2017       Past Surgical History:      Procedure Laterality Date    ANGIOPLASTY      ARM SURGERY Right 10/25/2022    CARDIAC CATHETERIZATION      #17    CATARACT REMOVAL WITH IMPLANT Left 1992    CATARACT REMOVAL WITH IMPLANT Right 1992    COLONOSCOPY  2009    tubular adenomas    COLONOSCOPY  2013    diverticulosis, polyps, 5y repeat (DR NAVAS)    CORONARY ARTERY BYPASS GRAFT  07/02/2018    x5 (DR [facility]ER @ [facility])    CORONARY ARTERY BYPASS GRAFT      5 vessel    MOHS SURGERY Left 09/2017    melanoma of left ear (DR GODWIN)    SKIN CANCER EXCISION Left 08/2017    TONSILLECTOMY  1955    UPPER GASTROINTESTINAL ENDOSCOPY      duodenitis/ poss early signs of celiac disease       Medications Prior to Admission:    Prior to Admission medications    Medication Sig Start Date End Date Taking? Authorizing Provider   omeprazole (PRILOSEC) 20 MG delayed release capsule  11/20/22   Historical Provider, MD   predniSONE (DELTASONE) 20 MG tablet Take 2 tablets by mouth daily for 3 days 12/6/22 12/9/22  Mamta Byrnes DO   azithromycin (ZITHROMAX Z-JIGAR) 250 MG tablet Take 2 tablets (500 mg) on Day 1, and then take 1 tablet (250 mg) on days 2 through 5. 12/6/22 12/10/22  Mamta Byrnes DO   rosuvastatin (CRESTOR) 20 MG tablet Take 1 tablet by mouth daily 12/5/22   Hermila Sweet MD   tamsulosin [facility]) 0.4 MG capsule Take 2 capsules by mouth daily 12/5/22   Hermila Sweet MD   ipratropium (ATROVENT) 0.06 % nasal spray 2 sprays by Nasal route 3 times daily 12/1/22   Hermila Sweet MD   sildenafil (VIAGRA) 100 MG tablet TAKE 1 TABLET BY MOUTH AS NEEDED APPROXIMATELY  1  HOUR  BEFORE  SEXUAL  ACTIVITY. DO  NOT  USE  MORE  THAN  ONE  DOSE  DAILY.  11/13/22   Veronica Pillai MD   DULoxetine (CYMBALTA) 30 MG extended release capsule Take 1 capsule by mouth daily 11/3/22   Hermila Sweet MD   triamterene-hydroCHLOROthiazide (MAXZIDE-25) 37.5-25 MG per tablet Take 1 tablet by mouth daily 10/13/22   Luis Sears MD   losartan (COZAAR) 100 MG tablet Take 1 tablet by mouth daily 10/13/22   Luis Sears MD   methylPREDNISolone (MEDROL, JIGAR,) 4 MG tablet Take by mouth. 10/7/22   CHEMO Dyson   glipiZIDE (GLUCOTROL) 10 MG tablet Take 1 tablet by mouth 2 times daily (before meals) 9/30/22   Luis Sears MD   amLODIPine (NORVASC) 10 MG tablet Take 1 tablet by mouth daily 6/3/22   Luis Sears MD   metoprolol succinate (TOPROL XL) 50 MG extended release tablet Take 3 tablets by mouth daily 4/8/22   Luis Sears MD   Dulaglutide 0.75 MG/0.5ML SOPN Inject 0.75 mg into the skin once a week 2/8/22   [facility] CHE[facility] Hogan - CNP   FREESTYLE LITE strip USE 1 STRIP TO CHECK GLUCOSE ONCE DAILY AS DIRECTED 12/20/21   Luis Sears MD   SITagliptin (JANUVIA) 100 MG tablet Take 0.5 tablets by mouth 2 times daily 12/8/21   Luis Sears MD   Testosterone Cypionate 200 MG/ML SOLN 1 CC EVERY 2 WEEKS 9/15/21   Manoj Montgomery MD   fluticasone (FLONASE) 50 MCG/ACT nasal spray 1 spray by Nasal route daily 8/19/21   CHEMO Dyson   albuterol sulfate  (90 Base) MCG/ACT inhaler Inhale 2 puffs into the lungs every 6 hours as needed for Wheezing 7/8/21   Luis Sears MD   testosterone cypionate (DEPOTESTOTERONE CYPIONATE) 200 MG/ML injection Inject 1 mL into the muscle every 14 days. Indications: PER ORDER in OV note 9/16/20  Patient taking differently: Inject 200 mg into the muscle every 14 days.  Indications: PER ORDER in OV note 3/17/21 3/17/21 11/3/22  Manoj Montgomery MD   famotidine (PEPCID) 40 MG tablet Take 1 tablet by mouth every evening 7/28/20   Luis Sears MD   Handicap Placard MISC by Does not apply route DX: COPD (J44.9), primary osteoarthritis involving multiple joints (M15.0), myasthenia gravis with exacerbation (G70.01)     EXPIRES: 05/2024 5/9/19   Luis Sears MD   azaTHIOprine (IMURAN) 50 MG tablet Take 50 mg by mouth Take 2 tablets by mouth in the morning and 1 tablet in the evening. Historical Provider, MD   Cyanocobalamin (VITAMIN B 12 PO) Take 1,000 mg by mouth    Historical Provider, MD   nitroGLYCERIN (NITROSTAT) 0.4 MG SL tablet Place 1 tablet under the tongue every 5 minutes as needed for Chest pain 10/25/18   Renita Romero MD   Blood Glucose Monitoring Suppl CHENCHO Test once daily. Dx: E11.29 3/20/18   Renita Romero MD   Lancets MISC Test once daily. Dx: E11.29 3/8/18   Renita Romero MD   ascorbic acid (VITAMIN C) 500 MG tablet Take 1,000 mg by mouth daily    Historical Provider, MD   loratadine (CLARITIN) 10 MG tablet Take 1 tablet by mouth daily 11/22/16   Renita Romero MD   CINNAMON PO Take 500 mg by mouth 2 times daily. Historical Provider, MD   aspirin 81 MG EC tablet Take 81 mg by mouth daily. Historical Provider, MD       Allergies:  Invokana [canagliflozin], Metformin and related, and Other    Social History:   TOBACCO:   reports that he quit smoking about 20 years ago. His smoking use included cigarettes. He started smoking about 61 years ago. He has a 41.00 pack-year smoking history. He has been exposed to tobacco smoke. He has never used smokeless tobacco.  ETOH:   reports no history of alcohol use. Family History:       Problem Relation Age of Onset    Heart Disease Mother     Heart Disease Brother     Diabetes Maternal Grandfather        REVIEW OF SYSTEMS:  Ten systems reviewed and negative except for stated in HPI    Physical Exam:    Vitals: /65   Pulse (!) 102   Temp 98.4 °F (36.9 °C) (Oral)   Resp 22   Ht 5' 11\" (1.803 m)   Wt 185 lb (83.9 kg)   SpO2 92%   BMI 25.80 kg/m²   General appearance: alert, appears stated age and cooperative  Skin:  No rashes or lesions on exposed skin  HEENT: Head: Normal, normocephalic, atraumatic. Lindsey Danes    Neck: supple, symmetrical, trachea midline  Lungs: clear to auscultation bilaterally  Heart: Tachycardic  Abdomen: soft, non-tender; bowel sounds normal; no masses,  no organomegaly  Extremities: extremities normal, atraumatic, no cyanosis or edema  Neurologic: non focal    Recent Labs     12/06/22  1639 12/06/22  1823   WBC 9.3 10.7*   HGB 13.9* 14.0    175     Recent Labs     12/06/22  1639 12/06/22  1823    137   K 4.8 4.0    100   CO2 29 27   BUN 19 18   CREATININE 1.28* 1.20   GLUCOSE 146* 110*   AST  --  16   ALT  --  17   BILITOT  --  0.4   ALKPHOS  --  44     Troponin T:   Recent Labs     12/06/22  1638 12/06/22  1823   TROPONINI <0.010 <0.010       ABGs: No results found for: PHART, PO2ART, TMQ5BTM  INR:   Recent Labs     12/06/22  1823   INR 1.1     URINALYSIS:No results for input(s): NITRITE, COLORU, PHUR, LABCAST, WBCUA, RBCUA, MUCUS, TRICHOMONAS, YEAST, BACTERIA, CLARITYU, SPECGRAV, LEUKOCYTESUR, UROBILINOGEN, BILIRUBINUR, BLOODU, GLUCOSEU, AMORPHOUS in the last 72 hours. Invalid input(s): [place] Hand  -----------------------------------------------------------------   US DUP LOWER EXTREMITIES BILATERAL VENOUS    Result Date: 12/7/2022  EXAMINATION: DUPLEX VENOUS ULTRASOUND OF THE BILATERAL LOWER [id number]/7/2022 2:19 pm TECHNIQUE: Duplex ultrasound using B-mode/gray scaled imaging, Doppler spectral analysis and color flow Doppler was obtained of the deep venous structures of the lower bilateral extremities. COMPARISON: None. HISTORY: ORDERING SYSTEM PROVIDED HISTORY: +PE TECHNOLOGIST PROVIDED HISTORY: Reason for exam:->+PE What reading provider will be dictating this exam?->CRC FINDINGS: The visualized veins of the bilateral lower extremities are patent and free of echogenic thrombus. The veins demonstrate good compressibility with normal color flow study and spectral analysis. No evidence of DVT in either lower extremity.        Assessment and Plan   Acute PE:  Continue heparin infusion in case the patient goes for biopsy tomorrow; if not then can be d/c'd on a NOAC  Lung lesion:  pulm consult for biopsy consideration  DMII:  SSI  HTN/HLD:  Resume home meds once reconciled  Functional Status: Fall precautions. Up with assistance. PT OT  Diet: Cardiac Diabetic   DVT ppx: Lovenox SCDs  Disposition: Dependent on hospital course. Will discharge once medically stable. SW on board for discharge planning. 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    Fatty infiltration of liver K76.0    Perennial allergic rhinitis J30.89    Hyperlipidemia E78.5    History of colonic polyps Z86.010    Osteoarthritis of multiple joints M15.9    Pseudophakia of both eyes Z96.1    COPD (chronic obstructive pulmonary disease) ([facility]) J44.9    History of tobacco use Z87.891    Astigmatism, regular H52.229    Type 2 diabetes mellitus with microalbuminuria, without long-term current use of insulin ([facility]) E11.29, R80.9    Erectile dysfunction N52.9    Varicose veins of both lower extremities I83.93    Hypogonadism male E29.1    History of melanoma excision Z98.890, Z85.820    Status post coronary artery bypass grafts x 5 Z95.1    Myasthenia gravis with exacerbation ([facility]) G70.01    Close exposure to COVID-19 virus Z20.822    Vitamin D deficiency E55.9    Stage 3a chronic kidney disease ([address] N18.31    Depression F32. A    Unsteady gait R26.81    Pulmonary embolism and infarction ([address] I26.99       Liz Chilel MD, MD  Admitting Hospitalist    Emergency Contact:

## 2022-12-07 NOTE — ED PROVIDER NOTES
[facility] ED  eMERGENCY dEPARTMENTeNCOUnter      Pt Name: Mason Peng  MRN: [id number]  [place] [birth year]  Date ofevaluation: 12/7/2022  Provider: Linda Bah PA-C    CHIEF COMPLAINT       Chief Complaint   Patient presents with    Pulmonary Embolism     Pt was sent from [facility] for a blood blood clot in the left lung and a mass in the right lung. HISTORY OF PRESENT ILLNESS   (Location/Symptom, Timing/Onset,Context/Setting, Quality, Duration, Modifying Factors, Severity)  Note limiting factors. Mason Peng is a 68 y.o. male who presents to the emergency department PE and lung mass. Pt was at [facility] yesterday and set to be transferred to [facility] but sighed out ama and said he would come back today so now he is here. He presented with a few weeks of cough and congestion after covid infection. Mild h/o copd only used alb inhaler prn. Denies cp at this time      HPI    NursingNotes were reviewed. REVIEW OF SYSTEMS    (2-9 systems for level 4, 10 or more for level 5)     Review of Systems   Constitutional:  Negative for chills, diaphoresis, fatigue and fever. HENT:  Negative for congestion, rhinorrhea and sore throat. Eyes:  Negative for photophobia and pain. Respiratory:  Positive for cough and shortness of breath. Cardiovascular:  Negative for chest pain and palpitations. Gastrointestinal:  Negative for abdominal pain, diarrhea, nausea and vomiting. Genitourinary:  Negative for dysuria and flank pain. Musculoskeletal:  Negative for back pain. Skin:  Negative for rash. Neurological:  Negative for dizziness, light-headedness and headaches. Psychiatric/Behavioral: Negative. All other systems reviewed and are negative. Except as noted above the remainder of the review of systems was reviewed and negative.        PAST MEDICAL HISTORY     Past Medical History:   Diagnosis Date    Atypical chest pain 8/30/2012    BPH (benign prostatic hypertrophy)     CAD (coronary artery disease)     Cataracts, bilateral     Chronic low back pain     COPD (chronic obstructive pulmonary disease) ([address] 6/1/2006    DM2 (diabetes mellitus, type 2) ([address]     Duodenitis     Erectile dysfunction 3/6/2017    Fatty infiltration of liver 11/19/2014    GERD (gastroesophageal reflux disease)     History of melanoma excision 12/11/2017    Left ear 09/2017    History of tobacco use 6/1/2006    HTN (hypertension)     Hyperlipidemia     Malignant melanoma of face ([address]     Malignant melanoma of skin of face ([address]     RT ear 2000, LT ear 2017    MG (myasthenia gravis) ([address]     Nephrolithiasis     Ocular myasthenia gravis ([address] 8/28/2018    Osteoarthritis of multiple joints     hands, hips    Perennial allergic rhinitis     Personal history of colonic polyps     Pseudophakia of both eyes     Stage 3a chronic kidney disease ([address] 9/29/2022    Status post coronary artery bypass grafts x 5 7/30/2018 07/2018 @ [facility]    Type 2 diabetes mellitus with microalbuminuria, without long-term current use of insulin ([address] 3/6/2017    Varicose veins of both lower extremities 9/11/2017         SURGICALHISTORY       Past Surgical History:   Procedure Laterality Date    ANGIOPLASTY      ARM SURGERY Right 10/25/2022    CARDIAC CATHETERIZATION      #17    CATARACT REMOVAL WITH IMPLANT Left 1992    CATARACT REMOVAL WITH IMPLANT Right 1992    COLONOSCOPY  2009    tubular adenomas    COLONOSCOPY  2013    diverticulosis, polyps, 5y repeat (DR NAVAS)    CORONARY ARTERY BYPASS GRAFT  07/02/2018    x5 (DR JETER @ [facility])    CORONARY ARTERY BYPASS GRAFT      5 vessel    MOHS SURGERY Left 09/2017    melanoma of left ear (DR GODWIN)    SKIN CANCER EXCISION Left 08/2017    TONSILLECTOMY  1955    UPPER GASTROINTESTINAL ENDOSCOPY      duodenitis/ poss early signs of celiac disease         CURRENT MEDICATIONS       Previous Medications    ALBUTEROL SULFATE  (90 BASE) MCG/ACT INHALER    Inhale 2 puffs into the lungs every 6 hours as needed for Wheezing    AMLODIPINE (NORVASC) 10 MG TABLET    Take 1 tablet by mouth daily    ASCORBIC ACID (VITAMIN C) 500 MG TABLET    Take 1,000 mg by mouth daily    ASPIRIN 81 MG EC TABLET    Take 81 mg by mouth daily. AZATHIOPRINE (IMURAN) 50 MG TABLET    Take 50 mg by mouth Take 2 tablets by mouth in the morning and 1 tablet in the evening. AZITHROMYCIN (ZITHROMAX Z-JIGAR) 250 MG TABLET    Take 2 tablets (500 mg) on Day 1, and then take 1 tablet (250 mg) on days 2 through 5. BLOOD GLUCOSE MONITORING SUPPL CHENCHO    Test once daily. Dx: E11.29    CINNAMON PO    Take 500 mg by mouth 2 times daily. CYANOCOBALAMIN (VITAMIN B 12 PO)    Take 1,000 mg by mouth    DULAGLUTIDE 0.75 MG/0.5ML SOPN    Inject 0.75 mg into the skin once a week    DULOXETINE (CYMBALTA) 30 MG EXTENDED RELEASE CAPSULE    Take 1 capsule by mouth daily    FAMOTIDINE (PEPCID) 40 MG TABLET    Take 1 tablet by mouth every evening    FLUTICASONE (FLONASE) 50 MCG/ACT NASAL SPRAY    1 spray by Nasal route daily    FREESTYLE LITE STRIP    USE 1 STRIP TO CHECK GLUCOSE ONCE DAILY AS DIRECTED    GLIPIZIDE (GLUCOTROL) 10 MG TABLET    Take 1 tablet by mouth 2 times daily (before meals)    HANDICAP PLACARD MISC    by Does not apply route DX: COPD (J44.9), primary osteoarthritis involving multiple joints (M15.0), myasthenia gravis with exacerbation (G70.01)     EXPIRES: 05/2024    IPRATROPIUM (ATROVENT) 0.06 % NASAL SPRAY    2 sprays by Nasal route 3 times daily    LANCETS MISC    Test once daily. Dx: E11.29    LORATADINE (CLARITIN) 10 MG TABLET    Take 1 tablet by mouth daily    LOSARTAN (COZAAR) 100 MG TABLET    Take 1 tablet by mouth daily    METHYLPREDNISOLONE (MEDROL, JIGAR,) 4 MG TABLET    Take by mouth.     METOPROLOL SUCCINATE (TOPROL XL) 50 MG EXTENDED RELEASE TABLET    Take 3 tablets by mouth daily    NITROGLYCERIN (NITROSTAT) 0.4 MG SL TABLET    Place 1 tablet under the tongue every 5 minutes as needed for Chest pain    OMEPRAZOLE (PRILOSEC) 20 MG DELAYED RELEASE CAPSULE        PREDNISONE (DELTASONE) 20 MG TABLET    Take 2 tablets by mouth daily for 3 days    ROSUVASTATIN (CRESTOR) 20 MG TABLET    Take 1 tablet by mouth daily    SILDENAFIL (VIAGRA) 100 MG TABLET    TAKE 1 TABLET BY MOUTH AS NEEDED APPROXIMATELY  1  HOUR  BEFORE  SEXUAL  ACTIVITY. DO  NOT  USE  MORE  THAN  ONE  DOSE  DAILY. SITAGLIPTIN (JANUVIA) 100 MG TABLET    Take 0.5 tablets by mouth 2 times daily    TAMSULOSIN (FLOMAX) 0.4 MG CAPSULE    Take 2 capsules by mouth daily    TESTOSTERONE CYPIONATE (DEPOTESTOTERONE CYPIONATE) 200 MG/ML INJECTION    Inject 1 mL into the muscle every 14 days.  Indications: PER ORDER in OV note 20    TESTOSTERONE CYPIONATE 200 MG/ML SOLN    1 CC EVERY 2 WEEKS    TRIAMTERENE-HYDROCHLOROTHIAZIDE (MAXZIDE-25) 37.5-25 MG PER TABLET    Take 1 tablet by mouth daily       ALLERGIES     Invokana [canagliflozin], Metformin and related, and Other    FAMILY HISTORY       Family History   Problem Relation Age of Onset    Heart Disease Mother     Heart Disease Brother     Diabetes Maternal Grandfather           SOCIAL HISTORY       Social History     Socioeconomic History    Marital status:      Spouse name: Hilda Vickers    Number of children: 2    Years of education: 12+   Occupational History    Occupation: [employer]   Tobacco Use    Smoking status: Former     Packs/day: 1.00     Years: 41.00     Pack years: 41.00     Types: Cigarettes     Start date:      Quit date: 2002     Years since quittin.2     Passive exposure: Past    Smokeless tobacco: Never   Vaping Use    Vaping Use: Never used   Substance and Sexual Activity    Alcohol use: Never    Drug use: Never    Sexual activity: Not Currently     Partners: Female     Social Determinants of Health     Financial Resource Strain: Low Risk     Difficulty of Paying Living Expenses: Not hard at all   Food Insecurity: No Food Insecurity    Worried About [address] [employer] in the Last Year: are interpreted by the Emergency Department Physician who either signs or Co-signsthis chart in the absence of a cardiologist.    Nsr 96bpm no acute st changes no ectopy    RADIOLOGY:   Non-plain filmimages such as CT, Ultrasound and MRI are read by the radiologist. Plain radiographic images are visualized and preliminarily interpreted by the emergency physician with the below findings:        Interpretation per the Radiologist below, if available at the time ofthis note:    US DUP LOWER EXTREMITIES BILATERAL VENOUS   Final Result   No evidence of DVT in either lower extremity. ED BEDSIDE ULTRASOUND:   Performed by ED Physician - none    LABS:  Labs Reviewed   ANTI-XA, UNFRACTIONATED HEPARIN       All other labs were within normal range or not returned as of this dictation. EMERGENCY DEPARTMENT COURSE and DIFFERENTIAL DIAGNOSIS/MDM:   Vitals:    Vitals:    12/07/22 1502 12/07/22 1503 12/07/22 1518 12/07/22 1530   BP: 130/73   125/67   Pulse:  94 (!) 102 98   Resp:  20 19 14   Temp:       TempSrc:       SpO2:  95% 95% 92%   Weight:       Height:               MDM    Pt had labs and imaging done yesterday at [facility]. Pt has spiculated mass and PE. No heart strain. Neg trop. Ekg with no ischemic changes today. Us le negative. Started on hep in the ed per dr Charlene Ortiz. Will admit for further tx and management. REASSESSMENT          CRITICAL CARE TIME   Total Critical Care time was  minutes, excluding separatelyreportable procedures. There was a high probability ofclinically significant/life threatening deterioration in the patient's condition which required my urgent intervention. CONSULTS:  None    PROCEDURES:  Unless otherwise noted below, none     Procedures    FINAL IMPRESSION      1.  Single subsegmental pulmonary embolism without acute cor pulmonale (HCC)    2. Lung mass          DISPOSITION/PLAN   DISPOSITION Admitted 12/07/2022 03:36:41 PM      PATIENT REFERREDTO:  No follow-up provider specified.     DISCHARGEMEDICATIONS:  New Prescriptions    No medications on file          (Please note that portions of this note were completed with a voice recognition program.  Efforts were made to edit the dictations but occasionally words are mis-transcribed.)    Rome Loza PA-C (electronically signed)  Attending Emergency Physician         Rome Loza PA-C  12/07/22 1600

## 2022-12-07 NOTE — ED NOTES
Called transfer center, talked to Samul Patient, she will page the [address] hospitalist for Dr. Bhakti Galvan.      Dede Held  12/06/22 2021

## 2022-12-08 ENCOUNTER — CARE COORDINATION (OUTPATIENT)
Dept: CARE COORDINATION | Age: 77
End: 2022-12-08

## 2022-12-08 VITALS
BODY MASS INDEX: 25.9 KG/M2 | OXYGEN SATURATION: 95 % | DIASTOLIC BLOOD PRESSURE: 85 MMHG | SYSTOLIC BLOOD PRESSURE: 139 MMHG | TEMPERATURE: 97.7 F | WEIGHT: 185 LBS | HEART RATE: 99 BPM | RESPIRATION RATE: 18 BRPM | HEIGHT: 71 IN

## 2022-12-08 PROBLEM — Z72.0 TOBACCO ABUSE: Status: ACTIVE | Noted: 2022-12-08

## 2022-12-08 PROBLEM — R91.1 NODULE OF RIGHT LUNG: Status: ACTIVE | Noted: 2022-12-08

## 2022-12-08 LAB
ANION GAP SERPL CALCULATED.3IONS-SCNC: 11 MEQ/L (ref 9–15)
ANTI-XA UNFRAC HEPARIN: 0.74 IU/ML
ANTI-XA UNFRAC HEPARIN: 0.75 IU/ML
BASOPHILS ABSOLUTE: 0 K/UL (ref 0–0.2)
BASOPHILS RELATIVE PERCENT: 0.1 %
BUN BLDV-MCNC: 26 MG/DL (ref 8–23)
CALCIUM SERPL-MCNC: 9.4 MG/DL (ref 8.5–9.9)
CHLORIDE BLD-SCNC: 95 MEQ/L (ref 95–107)
CO2: 27 MEQ/L (ref 20–31)
CREAT SERPL-MCNC: 1.2 MG/DL (ref 0.7–1.2)
EOSINOPHILS ABSOLUTE: 0 K/UL (ref 0–0.7)
EOSINOPHILS RELATIVE PERCENT: 0 %
GFR SERPL CREATININE-BSD FRML MDRD: >60 ML/MIN/{1.73_M2}
GLUCOSE BLD-MCNC: 196 MG/DL (ref 70–99)
GLUCOSE BLD-MCNC: 201 MG/DL (ref 70–99)
GLUCOSE BLD-MCNC: 307 MG/DL (ref 70–99)
HBA1C MFR BLD: 7.9 % (ref 4.8–5.9)
HCT VFR BLD CALC: 40.3 % (ref 42–52)
HEMOGLOBIN: 13.2 G/DL (ref 14–18)
LYMPHOCYTES ABSOLUTE: 0.5 K/UL (ref 1–4.8)
LYMPHOCYTES RELATIVE PERCENT: 4.4 %
MCH RBC QN AUTO: 32.4 PG (ref 27–31.3)
MCHC RBC AUTO-ENTMCNC: 32.9 % (ref 33–37)
MCV RBC AUTO: 98.5 FL (ref 79–92.2)
MONOCYTES ABSOLUTE: 0.3 K/UL (ref 0.2–0.8)
MONOCYTES RELATIVE PERCENT: 2.9 %
NEUTROPHILS ABSOLUTE: 10.3 K/UL (ref 1.4–6.5)
NEUTROPHILS RELATIVE PERCENT: 92.6 %
PDW BLD-RTO: 15 % (ref 11.5–14.5)
PERFORMED ON: ABNORMAL
PERFORMED ON: ABNORMAL
PLATELET # BLD: 197 K/UL (ref 130–400)
POTASSIUM REFLEX MAGNESIUM: 4.4 MEQ/L (ref 3.4–4.9)
RBC # BLD: 4.09 M/UL (ref 4.7–6.1)
SODIUM BLD-SCNC: 133 MEQ/L (ref 135–144)
WBC # BLD: 11.1 K/UL (ref 4.8–10.8)

## 2022-12-08 PROCEDURE — G0378 HOSPITAL OBSERVATION PER HR: HCPCS

## 2022-12-08 PROCEDURE — 6360000002 HC RX W HCPCS: Performed by: INTERNAL MEDICINE

## 2022-12-08 PROCEDURE — 96365 THER/PROPH/DIAG IV INF INIT: CPT

## 2022-12-08 PROCEDURE — 99223 1ST HOSP IP/OBS HIGH 75: CPT | Performed by: INTERNAL MEDICINE

## 2022-12-08 PROCEDURE — 96366 THER/PROPH/DIAG IV INF ADDON: CPT

## 2022-12-08 PROCEDURE — 85520 HEPARIN ASSAY: CPT

## 2022-12-08 PROCEDURE — 83036 HEMOGLOBIN GLYCOSYLATED A1C: CPT

## 2022-12-08 PROCEDURE — 6370000000 HC RX 637 (ALT 250 FOR IP): Performed by: INTERNAL MEDICINE

## 2022-12-08 PROCEDURE — 36415 COLL VENOUS BLD VENIPUNCTURE: CPT

## 2022-12-08 PROCEDURE — 85025 COMPLETE CBC W/AUTO DIFF WBC: CPT

## 2022-12-08 PROCEDURE — 80048 BASIC METABOLIC PNL TOTAL CA: CPT

## 2022-12-08 RX ORDER — ALOGLIPTIN 12.5 MG/1
12.5 TABLET, FILM COATED ORAL DAILY
Status: DISCONTINUED | OUTPATIENT
Start: 2022-12-08 | End: 2022-12-08 | Stop reason: HOSPADM

## 2022-12-08 RX ORDER — GLIPIZIDE 5 MG/1
10 TABLET ORAL
Status: DISCONTINUED | OUTPATIENT
Start: 2022-12-08 | End: 2022-12-08 | Stop reason: HOSPADM

## 2022-12-08 RX ADMIN — METOPROLOL SUCCINATE 150 MG: 100 TABLET, EXTENDED RELEASE ORAL at 07:55

## 2022-12-08 RX ADMIN — TAMSULOSIN HYDROCHLORIDE 0.8 MG: 0.4 CAPSULE ORAL at 07:55

## 2022-12-08 RX ADMIN — HEPARIN SODIUM AND DEXTROSE 16.99 UNITS/KG/HR: 10000; 5 INJECTION INTRAVENOUS at 06:37

## 2022-12-08 NOTE — CARE COORDINATION
I was able to fax his application into [facility] for 2023 and mail Parker Barrett.         [address] [facility]   Medication Assistance  [address] [facility], and [place]    (C) [phone]  (R) [phone]

## 2022-12-08 NOTE — RESULT ENCOUNTER NOTE
Normal BNP, BMP with high creatinine 1.28 and low GFR 57.4, CBC with low Hgb 13.9, negative troponin  High D-dimer  Patient notified in person of results on 12/06/2022 and instructed to go to ER. He voiced understanding and agreement with plan.

## 2022-12-08 NOTE — CONSULTS
Pulmonary Medicine  Consult Note      Reason for consultation: Lung nodule with cavity, small PE    Consulting physician: Dr. Radha Hernandez:      This is 26-year-old male with past medical history significant for coronary artery disease, COPD, diabetes melitis, GERD, hypertension, hyperlipidemia, who has been feeling sick for last 2-week. Patient said he had tendon surgery and right elbow area 4 weeks ago at [facility].  2 days after he was tested positive for COVID. In last 2 weeks not feeling well. He was seen at [facility] with chest congestion and cough. And CT chest shows small blood clot in left lung and mass in the right lung. He said he has mild COPD and only use albuterol as needed basis. He said he was smoking 1 pack a day now smoking 2 to 3 cigarettes a day. Currently is on heparin which is going to be switched to Eliquis. DVT study is negative. He will have anticoagulation therapy for couple weeks prior to going for bronchoscopy and biopsy to rule out malignancy. .    Past Medical History:        Diagnosis Date    Atypical chest pain 8/30/2012    BPH (benign prostatic hypertrophy)     CAD (coronary artery disease)     Cataracts, bilateral     Chronic low back pain     COPD (chronic obstructive pulmonary disease) ([address] 6/1/2006    DM2 (diabetes mellitus, type 2) (HCC)     Duodenitis     Erectile dysfunction 3/6/2017    Fatty infiltration of liver 11/19/2014    GERD (gastroesophageal reflux disease)     History of melanoma excision 12/11/2017    Left ear 09/2017    History of tobacco use 6/1/2006    HTN (hypertension)     Hyperlipidemia     Malignant melanoma of face ([address]     Malignant melanoma of skin of face ([address]     RT ear 2000, LT ear 2017    MG (myasthenia gravis) ([address]     Nephrolithiasis     Ocular myasthenia gravis ([address] 8/28/2018    Osteoarthritis of multiple joints     hands, hips    Perennial allergic rhinitis     Personal history of colonic polyps     Pseudophakia of both eyes     Stage 3a chronic kidney disease ([facility] 9/29/2022    Status post coronary artery bypass grafts x 5 7/30/2018 07/2018 @ [facility]    Type 2 diabetes mellitus with microalbuminuria, without long-term current use of insulin ([facility] 3/6/2017    Varicose veins of both lower extremities 9/11/2017       Past Surgical History:        Procedure Laterality Date    ANGIOPLASTY      ARM SURGERY Right 10/25/2022    CARDIAC CATHETERIZATION      #17    CATARACT REMOVAL WITH IMPLANT Left 1992    CATARACT REMOVAL WITH IMPLANT Right 1992    COLONOSCOPY  2009    tubular adenomas    COLONOSCOPY  2013    diverticulosis, polyps, 5y repeat (DR NAVAS)    CORONARY ARTERY BYPASS GRAFT  07/02/2018    x5 (DR JETER @ [facility])    CORONARY ARTERY BYPASS GRAFT      5 vessel    MOHS SURGERY Left 09/2017    melanoma of left ear (DR GODWIN)    SKIN CANCER EXCISION Left 08/2017    TONSILLECTOMY  1955    UPPER GASTROINTESTINAL ENDOSCOPY      duodenitis/ poss early signs of celiac disease       Social History:     reports that he quit smoking about 20 years ago. His smoking use included cigarettes. He started smoking about 61 years ago. He has a 41.00 pack-year smoking history. He has been exposed to tobacco smoke. He has never used smokeless tobacco. He reports that he does not drink alcohol and does not use drugs.     Family History:       Problem Relation Age of Onset    Heart Disease Mother     Heart Disease Brother     Diabetes Maternal Grandfather        Allergies:  Invokana [canagliflozin], Metformin and related, and Other        MEDICATIONS during current hospitalization:    Continuous Infusions:   heparin (PORCINE) Infusion 16.99 Units/kg/hr (12/08/22 6422)    dextrose      sodium chloride         Scheduled Meds:   metoprolol succinate  150 mg Oral Daily    tamsulosin  0.8 mg Oral Daily    sodium chloride flush  10 mL IntraVENous 2 times per day    insulin lispro  0-8 Units SubCUTAneous TID     insulin lispro 0-4 Units SubCUTAneous Nightly       PRN Meds:heparin (porcine), heparin (porcine), glucose, dextrose bolus **OR** dextrose bolus, glucagon (rDNA), dextrose, sodium chloride flush, sodium chloride, ondansetron **OR** ondansetron, polyethylene glycol, acetaminophen **OR** acetaminophen, potassium chloride **OR** potassium alternative oral replacement **OR** potassium chloride, magnesium sulfate    REVIEW OF SYSTEMS:  As in history of present illness  Other 14 point review of system is negative. PHYSICAL EXAM:    Vitals:  BP (!) 140/70   Pulse (!) 101   Temp 98.3 °F (36.8 °C) (Oral)   Resp 18   Ht 5' 11\" (1.803 m)   Wt 185 lb (83.9 kg)   SpO2 94%   BMI 25.80 kg/m²   General:   Alert, awake, Oriented x3  .comfortable in bed, No distress. Head: Atraumatic , Normocephalic   Eyes: PERRL. No sclera icterus. No conjunctival injection. No discharge   ENT: No nasal  discharge. Pharynx clear. Neck:  Trachea midline. No thyromegaly, no JVD, No cervical adenopathy. Chest : Bilaterally symmetrical ,Normal effort,  No accessory muscle use  Lung : Diminished BS bilateraly. No Rales. No wheezing. No rhonchi. Heart[de-identified] Normal  rate. Regular rhythm. No mumur ,  Rub or gallop  ABD: Non-tender. Non-distended. No masses. No organmegaly. Normal bowel sounds. No hernia. Musculoskeleton: normal range of motion in all extremites, strength and tone Extremities: No pitting in both lower leg , No Cyanosis ,No clubbing  Neuro: no cranial nerve abnormality, normal reflex and sensation, no focal weakness   Skin: Warm and dry. No erythema rash on exposed extremities.         Data Review  Recent Labs     12/06/22  1639 12/06/22  1823 12/08/22  0531   WBC 9.3 10.7* 11.1*   HGB 13.9* 14.0 13.2*   HCT 41.0* 42.9 40.3*    175 197      Recent Labs     12/06/22  1639 12/06/22  1823 12/08/22  0530    137 133*   K 4.8 4.0 4.4    100 95   CO2 29 27 27   BUN 19 18 26*   CREATININE 1.28* 1.20 1.20   GLUCOSE 146* 110* 201* MV Settings: ABGs: No results for input(s): PHART, OHE2VZP, PO2ART, SZR0BJF, BEART, B0NTIRJR, VLR2BNK in the last 72 hours. O2 Device: None (Room air)  No results found for: LACTA    Radiology  XR CHEST (2 VW)    Result Date: 12/6/2022  EXAMINATION: TWO XRAY VIEWS OF THE CHEST 12/6/2022 4:54 pm COMPARISON: None. HISTORY: ORDERING SYSTEM PROVIDED HISTORY: Fatigue, unspecified type, Dyspnea on exertion TECHNOLOGIST PROVIDED HISTORY: Reason for exam:->evaluate for potential effusion, signs of fluid overload What reading provider will be dictating this exam?->CRC FINDINGS: The lungs are without acute focal process. There is no effusion or pneumothorax. The cardiomediastinal silhouette is without acute process. The osseous structures are without acute process. There is 1.9 cm focal lucency in the right upper lung. No acute process. Status post median sternotomy. Question 1.9 cm cyst in the right upper lung. CTA CHEST W WO CONTRAST PE Eval    Result Date: 12/6/2022  EXAMINATION: CTA OF THE CHEST WITH AND WITHOUT CONTRAST 12/6/2022 5:04 pm TECHNIQUE: CTA of the chest was performed before and after the administration of intravenous contrast.  Multiplanar reformatted images are provided for review. MIP images are provided for review. Automated exposure control, iterative reconstruction, and/or weight based adjustment of the mA/kV was utilized to reduce the radiation dose to as low as reasonably achievable. COMPARISON: Chest x-ray 12/06/2022 HISTORY: ORDERING SYSTEM PROVIDED HISTORY: PE TECHNOLOGIST PROVIDED HISTORY: Reason for exam:->PE Decision Support Exception - unselect if not a suspected or confirmed emergency medical condition->Emergency Medical Condition (MA) What reading provider will be dictating this exam?->CRC FINDINGS: Pulmonary Arteries: Pulmonary arteries are adequately opacified for evaluation.   A small filling defect is seen in the right lower lobe lateral segmental pulmonary artery. Main pulmonary artery is normal in caliber. Mediastinum: No evidence of mediastinal lymphadenopathy. The heart and pericardium demonstrate no acute abnormality. There is no acute abnormality of the thoracic aorta. Lungs/pleura: A right upper lobe spiculated cavitary nodule is identified with thick walls measuring 2.5 x 2.4 cm. Inferior to the nodule there are smaller satellite nodules measuring up to 11 mm. Small ill-defined ground-glass opacities are noted in the posterior right upper lobe, which are nonspecific. Upper Abdomen: Limited images of the upper abdomen demonstrate calcified hepatic and splenic granulomas. Soft Tissues/Bones: No acute bone or soft tissue abnormality. Small right lower lobe segmental pulmonary embolus. No central pulmonary emboli or evidence of right heart strain. Right upper lobe spiculated cavitary 2.5 cm nodule with adjacent satellite nodules highly suggestive of malignancy. Recommend either CT-guided biopsy or PET-CT for further evaluation. Critical results were called by Dr. Natalie Hollins to Dr. Agustin Hess On 12/6/2022 at 18:14.     US DUP LOWER EXTREMITIES BILATERAL VENOUS    Result Date: 12/7/2022  EXAMINATION: DUPLEX VENOUS ULTRASOUND OF THE BILATERAL LOWER [id number]/7/2022 2:19 pm TECHNIQUE: Duplex ultrasound using B-mode/gray scaled imaging, Doppler spectral analysis and color flow Doppler was obtained of the deep venous structures of the lower bilateral extremities. COMPARISON: None. HISTORY: ORDERING SYSTEM PROVIDED HISTORY: +PE TECHNOLOGIST PROVIDED HISTORY: Reason for exam:->+PE What reading provider will be dictating this exam?->CRC FINDINGS: The visualized veins of the bilateral lower extremities are patent and free of echogenic thrombus. The veins demonstrate good compressibility with normal color flow study and spectral analysis. No evidence of DVT in either lower extremity.        Assessment and  plan:     Right lower lobe segmental PE  Right upper lobe cavitary 2.5 cm nodule and small satellite nodule suspicious for malignancy  History of COVID infection 4 weeks ago  COPD  Tobacco abuse    Patient is on heparin drip we will switch to Eliquis. CT chest reviewed showing right upper lobe spiculated cavitary 2.5 cm nodule. Adjacent satellite nodules suspected highly suspicious for malignancy. He has history of smoking. He said he has COVID 4 weeks ago and is and has been having cough and chest congestion. We will add p.o. Augmentin for 7 to 10 days. Follow-up in office 1 to 2 weeks and schedule for bronchoscopy and biopsy as outpatient. Advised smoking cessation. PFT in 2015 shows mild obstructive pulmonary disease. recommend to have PFT in future and have him maintain as inhaler currently is on albuterol HFA as needed basis. Discussed with Dr. Zoya Little    Thank you for consult    NOTE: This report was transcribed using voice recognition software. Every effort was made to ensure accuracy; however, inadvertent computerized transcription errors may be present.     Electronically signed by Rene Sales MD, [facility] on 12/8/2022 at 8:13 AM

## 2022-12-08 NOTE — DISCHARGE SUMMARY
Discharge Summary    Date: 12/8/2022  Patient Name: Ale Banuelos    YOB: 1945     Age: 68 y.o. Admit Date: 12/7/2022  Discharge Date: 12/8/2022  Discharge Condition: Ysabel Tran    Admission Diagnosis  Lung mass [R91.8]; Pulmonary embolism and infarction ([address] [I26.99]; Single subsegmental pulmonary embolism without acute cor pulmonale (HCC) [I26.93]      Discharge Diagnosis  Principal Problem:    Pulmonary embolism and infarction [facility]  Active Problems:    Nodule of right lung    Tobacco abuse  Resolved Problems:    * No resolved hospital problems. [facility] Stay  Narrative of Hospital Course:  Pt comes with shortness of breath, found to have small PE, LE doppler was negative for DVT, cleared by pulmonary to be discharged and FU as outpt for the cavitary lesion on CT scan    Consultants:  IP CONSULT TO PULMONOLOGY    Surgeries/procedures Performed:      Treatments:            Discharge Plan/Disposition:  Home    Hospital/Incidental Findings Requiring Follow Up:    Patient Instructions:    Diet:    Activity:  For number of days (if applicable): Other Instructions:    Provider Follow-Up:   No follow-ups on file.      Significant Diagnostic Studies:    Recent Labs:  Admission on 12/07/2022  Ventricular Rate                              Date: 12/07/2022  Value: 96          Ref range: BPM                Status: Preliminary  Atrial Rate                                   Date: 12/07/2022  Value: 96          Ref range: BPM                Status: Preliminary  P-R Interval                                  Date: 12/07/2022  Value: 158         Ref range: ms                 Status: Preliminary  QRS Duration                                  Date: 12/07/2022  Value: 92          Ref range: ms                 Status: Preliminary  Q-T Interval                                  Date: 12/07/2022  Value: 338         Ref range: ms                 Status: Preliminary  QTc Calculation (Bazett) Date: 12/07/2022  Value: 427         Ref range: ms                 Status: Preliminary  P Axis                                        Date: 12/07/2022  Value: 76          Ref range: degrees            Status: Preliminary  R Axis                                        Date: 12/07/2022  Value: 44          Ref range: degrees            Status: Preliminary  T Axis                                        Date: 12/07/2022  Value: 54          Ref range: degrees            Status: Preliminary  Anti-XA Unfrac Heparin                        Date: 12/07/2022  Value: <0.10       Ref range: IU/mL              Status: Final                Comment: Unfractionated Heparin Therapeutic Range:    0.30 - 0.70 IU/mL    WBC                                           Date: 12/08/2022  Value: 11.1 (A)    Ref range: 4.8 - 10.8 K/uL    Status: Final  RBC                                           Date: 12/08/2022  Value: 4.09 (A)    Ref range: 4.70 - 6.10 M/uL   Status: Final  Hemoglobin                                    Date: 12/08/2022  Value: 13.2 (A)    Ref range: 14.0 - 18.0 g/dL   Status: Final  Hematocrit                                    Date: 12/08/2022  Value: 40.3 (A)    Ref range: 42.0 - 52.0 %      Status: Final  MCV                                           Date: 12/08/2022  Value: 98.5 (A)    Ref range: 79.0 - 92.2 fL     Status: Final  MCH                                           Date: 12/08/2022  Value: 32.4 (A)    Ref range: 27.0 - 31.3 pg     Status: Final  MCHC                                          Date: 12/08/2022  Value: 32.9 (A)    Ref range: 33.0 - 37.0 %      Status: Final  RDW                                           Date: 12/08/2022  Value: 15.0 (A)    Ref range: 11.5 - 14.5 %      Status: Final  Platelets                                     Date: 12/08/2022  Value: 197         Ref range: 130 - 400 K/uL     Status: Final  Neutrophils %                                 Date: 12/08/2022  Value: 92.6        Ref range: %                  Status: Final  Lymphocytes %                                 Date: 12/08/2022  Value: 4.4         Ref range: %                  Status: Final  Monocytes %                                   Date: 12/08/2022  Value: 2.9         Ref range: %                  Status: Final  Eosinophils %                                 Date: 12/08/2022  Value: 0.0         Ref range: %                  Status: Final  Basophils %                                   Date: 12/08/2022  Value: 0.1         Ref range: %                  Status: Final  Neutrophils Absolute                          Date: 12/08/2022  Value: 10.3 (A)    Ref range: 1.4 - 6.5 K/uL     Status: Final  Lymphocytes Absolute                          Date: 12/08/2022  Value: 0.5 (A)     Ref range: 1.0 - 4.8 K/uL     Status: Final  Monocytes Absolute                            Date: 12/08/2022  Value: 0.3         Ref range: 0.2 - 0.8 K/uL     Status: Final  Eosinophils Absolute                          Date: 12/08/2022  Value: 0.0         Ref range: 0.0 - 0.7 K/uL     Status: Final  Basophils Absolute                            Date: 12/08/2022  Value: 0.0         Ref range: 0.0 - 0.2 K/uL     Status: Final  Sodium                                        Date: 12/08/2022  Value: 133 (A)     Ref range: 135 - 144 mEq/L    Status: Final  Potassium reflex Magnesium                    Date: 12/08/2022  Value: 4.4         Ref range: 3.4 - 4.9 mEq/L    Status: Final  Chloride                                      Date: 12/08/2022  Value: 95          Ref range: 95 - 107 mEq/L     Status: Final  CO2                                           Date: 12/08/2022  Value: 27          Ref range: 20 - 31 mEq/L      Status: Final  Anion Gap                                     Date: 12/08/2022  Value: 11          Ref range: 9 - 15 mEq/L       Status: Final  Glucose                                       Date: 12/08/2022  Value: 201 (A)     Ref range: 70 - 99 mg/dL Status: Final  BUN                                           Date: 12/08/2022  Value: 26 (A)      Ref range: 8 - 23 mg/dL       Status: Final  Creatinine                                    Date: 12/08/2022  Value: 1.20        Ref range: 0.70 - 1.20 mg/dL  Status: Final  Est, Glom Filt Rate                           Date: 12/08/2022  Value: >60.0       Ref range: >60                Status: Final                Comment: Pediatric calculator link  [email or web address]  Effective Oct 3, 2022  These results are not intended for use in patients  <25years of age. eGFR results are calculated without  a race factor using the 2021 CKD-EPI equation. Careful  clinical correlation is recommended, particularly when  comparing to results calculated using previous equations. The CKD-EPI equation is less accurate in patients with  extremes of muscle mass, extra-renal metabolism of  creatinine, excessive creatinine ingestion, or following  therapy that affects renal tubular secretion.     Calcium                                       Date: 12/08/2022  Value: 9.4         Ref range: 8.5 - 9.9 mg/dL    Status: Final  POC Glucose                                   Date: 12/07/2022  Value: 377 (A)     Ref range: 70 - 99 mg/dl      Status: Final  Performed on                                  Date: 12/07/2022  Value: ACCU-CHEK     Status: Final  Anti-XA Unfrac Heparin                        Date: 12/07/2022  Value: 0.70        Ref range: IU/mL              Status: Final                Comment: Unfractionated Heparin Therapeutic Range:    0.30 - 0.70 IU/mL    POC Glucose                                   Date: 12/07/2022  Value: 216 (A)     Ref range: 70 - 99 mg/dl      Status: Final  Performed on                                  Date: 12/07/2022  Value: ACCU-CHEK     Status: Final  Anti-XA Unfrac Heparin                        Date: 12/08/2022  Value: 0.74        Ref range: IU/mL Status: Final                Comment: Unfractionated Heparin Therapeutic Range:    0.30 - 0.70 IU/mL    POC Glucose                                   Date: 12/08/2022  Value: 196 (A)     Ref range: 70 - 99 mg/dl      Status: Final  Performed on                                  Date: 12/08/2022  Value: ACCU-CHEK     Status: Final  ------------    Radiology last 7 days:  XR CHEST (2 VW)    Result Date: 12/6/2022  No acute process. Status post median sternotomy. Question 1.9 cm cyst in the right upper lung. CTA CHEST W WO CONTRAST PE Eval    Result Date: 12/6/2022  Small right lower lobe segmental pulmonary embolus. No central pulmonary emboli or evidence of right heart strain. Right upper lobe spiculated cavitary 2.5 cm nodule with adjacent satellite nodules highly suggestive of malignancy. Recommend either CT-guided biopsy or PET-CT for further evaluation. Critical results were called by Dr. Isaura España to Dr. Lorrie Vargas On 12/6/2022 at 18:14.     US DUP LOWER EXTREMITIES BILATERAL VENOUS    Result Date: 12/7/2022  No evidence of DVT in either lower extremity.         [unfilled]    Discharge Medications    Current Discharge Medication List    START taking these medications    apixaban starter pack (ELIQUIS DVT/PE STARTER PACK) 5 MG TBPK tablet  Take 1 tablet by mouth See Admin Instructions  Qty: 74 tablet Refills: 0          Current Discharge Medication List        Current Discharge Medication List    CONTINUE these medications which have NOT CHANGED    JANUVIA 100 MG tablet  Take 1 tablet by mouth daily  Qty: 90 tablet Refills: 1  Associated Diagnoses:Type 2 diabetes mellitus with microalbuminuria, without long-term current use of insulin ([facility])    rosuvastatin (CRESTOR) 20 MG tablet  Take 1 tablet by mouth daily  Qty: 90 tablet Refills: 1  Comments: Requesting 1 year supply    tamsulosin (FLOMAX) 0.4 MG capsule  Take 2 capsules by mouth daily  Qty: 180 capsule Refills: 1  Comments: Requesting 1 year supply  Associated Diagnoses:Benign prostatic hyperplasia with nocturia    ipratropium (ATROVENT) 0.06 % nasal spray  2 sprays by Nasal route 3 times daily  Qty: 45 mL Refills: 3  Comments: Requesting 1 year supply  Associated Diagnoses:Perennial allergic rhinitis    triamterene-hydroCHLOROthiazide (MAXZIDE-25) 37.5-25 MG per tablet  Take 1 tablet by mouth daily  Qty: 90 tablet Refills: 1  Comments: Requesting 1 year supply  Associated Diagnoses:Essential hypertension    losartan (COZAAR) 100 MG tablet  Take 1 tablet by mouth daily  Qty: 90 tablet Refills: 1  Comments: Requesting 1 year supply  Associated Diagnoses:Essential hypertension    methylPREDNISolone (MEDROL, JIGAR,) 4 MG tablet  Take by mouth.   Qty: 1 kit Refills: 0  Associated Diagnoses:Lower respiratory infection    glipiZIDE (GLUCOTROL) 10 MG tablet  Take 1 tablet by mouth 2 times daily (before meals)  Qty: 180 tablet Refills: 3  Comments: Requesting 1 year supply    amLODIPine (NORVASC) 10 MG tablet  Take 1 tablet by mouth daily  Qty: 90 tablet Refills: 3  Comments: Requesting 1 year supply  Associated Diagnoses:Essential hypertension    metoprolol succinate (TOPROL XL) 50 MG extended release tablet  Take 3 tablets by mouth daily  Qty: 270 tablet Refills: 1  Comments: Requesting 1 year supply  Associated Diagnoses:Essential hypertension    Dulaglutide 0.75 MG/0.5ML SOPN  Inject 0.75 mg into the skin once a week  Qty: 12 pen Refills: 3  Associated Diagnoses:Type 2 diabetes mellitus with microalbuminuria, without long-term current use of insulin ([facility])    FREESTYLE LITE strip  USE 1 STRIP TO CHECK GLUCOSE ONCE DAILY AS DIRECTED  Qty: 50 each Refills: 0  Associated Diagnoses:Type 2 diabetes mellitus with microalbuminuria, without long-term current use of insulin ([facility])    Testosterone Cypionate 200 MG/ML SOLN  1 CC EVERY 2 WEEKS  Qty: 10 mL Refills: 3  Associated Diagnoses:Hypogonadism male    fluticasone (FLONASE) 50 MCG/ACT nasal spray  1 spray by Nasal route daily  Qty: 1 Bottle Refills: 0  Associated Diagnoses:Acute sinusitis, recurrence not specified, unspecified location    albuterol sulfate  (90 Base) MCG/ACT inhaler  Inhale 2 puffs into the lungs every 6 hours as needed for Wheezing  Qty: 3 Inhaler Refills: 0  Comments: Requesting 1 year supply  Associated Diagnoses:Chronic obstructive pulmonary disease, unspecified COPD type ([facility])    testosterone cypionate (DEPOTESTOTERONE CYPIONATE) 200 MG/ML injection  Inject 1 mL into the muscle every 14 days. 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 Cyanocobalamin (VITAMIN B 12 PO)  Take 1,000 mg by mouth    nitroGLYCERIN (NITROSTAT) 0.4 MG SL tablet  Place 1 tablet under the tongue every 5 minutes as needed for Chest pain  Qty: 25 tablet Refills: 0    Blood Glucose Monitoring Suppl CHENCHO  Test once daily. Dx: E11.29  Qty: 1 Device Refills: 0  Comments: DISPENSE WHAT IS COVERED BY INSURANCE  Associated Diagnoses:Type 2 diabetes mellitus with microalbuminuria, without long-term current use of insulin ([facility])    Lancets MISC  Test once daily.  Dx: E11.29  Qty: 100 each Refills: 3    ascorbic acid (VITAMIN C) 500 MG tablet  Take 1,000 mg by mouth daily    CINNAMON PO  Take 500 mg by mouth 2 times daily. aspirin 81 MG EC tablet  Take 81 mg by mouth daily. Current Discharge Medication List    STOP taking these medications    omeprazole (PRILOSEC) 20 MG delayed release capsule  Comments:  Reason for Stopping:    predniSONE (DELTASONE) 20 MG tablet  Comments:  Reason for Stopping:    azithromycin (ZITHROMAX Z-JIGAR) 250 MG tablet  Comments:  Reason for Stopping:    sildenafil (VIAGRA) 100 MG tablet  Comments:  Reason for Stopping:    DULoxetine (CYMBALTA) 30 MG extended release capsule  Comments:  Reason for Stopping:    loratadine (CLARITIN) 10 MG tablet  Comments:  Reason for Stopping:          Time Spent on Discharge:  minutes were spent in patient examination, evaluation, counseling as well as medication reconciliation, prescriptions for required medications, discharge plan, and follow up.     Electronically signed by Kasie Christie MD on 12/8/22 at 9:30 AM EST   Overtime on dc summary was 45 min

## 2022-12-08 NOTE — CARE COORDINATION
[facility] [place] Case Management Initial Discharge Assessment    Met with Patient to discuss discharge plan. PCP: Linnea Starks MD                                Date of Last Visit: 12/6    [facility] Patient: No          Discharge Planning    Living Arrangements: independently at home    Who do you live with? WIFE    Who helps you with your care:  self    If lives at home:     Do you have any barriers navigating in your home? no    Patient can perform ADL? Yes    Current Services (outpatient and in home) :  None    Dialysis: No    Is transportation available to get to your appointments? Yes    DME Equipment:  no    Respiratory equipment: None    Respiratory provider:  no     Pharmacy:  yes - [facility] with Medication Assistance Program?  No      Patient agreeable to [facility]? Declined    Patient agreeable to SNF/Rehab? Declined    Other discharge needs identified? N/A    Does Patient Have a High-Risk for Readmission Diagnosis (CHF, PN, MI, COPD)? No      Initial Discharge Plan? (Note: please see concurrent daily documentation for any updates after initial note). MET WITH PATIENT, DENIES ANY HOME NEEDS. ZARA SHI ON CHART.       Readmission Risk              Risk of Unplanned Readmission:  0         Electronically signed by Julissa Malcolm RN on 12/8/2022 at 8:27 AM

## 2022-12-09 ENCOUNTER — CARE COORDINATION (OUTPATIENT)
Dept: CARE COORDINATION | Age: 77
End: 2022-12-09

## 2022-12-09 LAB
EKG ATRIAL RATE: 91 BPM
EKG ATRIAL RATE: 96 BPM
EKG P AXIS: 70 DEGREES
EKG P AXIS: 76 DEGREES
EKG P-R INTERVAL: 150 MS
EKG P-R INTERVAL: 158 MS
EKG Q-T INTERVAL: 338 MS
EKG Q-T INTERVAL: 340 MS
EKG QRS DURATION: 86 MS
EKG QRS DURATION: 92 MS
EKG QTC CALCULATION (BAZETT): 418 MS
EKG QTC CALCULATION (BAZETT): 427 MS
EKG R AXIS: 26 DEGREES
EKG R AXIS: 44 DEGREES
EKG T AXIS: 39 DEGREES
EKG T AXIS: 54 DEGREES
EKG VENTRICULAR RATE: 91 BPM
EKG VENTRICULAR RATE: 96 BPM

## 2022-12-09 PROCEDURE — 93010 ELECTROCARDIOGRAM REPORT: CPT | Performed by: INTERNAL MEDICINE

## 2022-12-09 NOTE — CARE COORDINATION
Care Transitions Outreach Attempt    Call within 2 business days of discharge: Yes   Attempted to reach patient for transitions of care follow up. Unable to reach patient. CTN left HIPAA compliant message requesting return call. CTN will attempt to reach again. CTN routed message High Priority to PCP and Pulmonology  Staff to schedule 7 day Hospital F/U.    START taking:  apixaban starter pack (Eliquis DVT/PE Starter Pack)  Januvia (SITagliptin)  This replaces a similar medication. See the full medication  list for instructions. STOP taking:  azithromycin 250 MG tablet (Zithromax Z-Anival)  DULoxetine 30 MG extended release  capsule (CYMBALTA)  loratadine 10 MG tablet (CLARITIN)  omeprazole 20 MG delayed release capsule (PRILOSEC)  predniSONE 20 MG tablet (DELTASONE)  sildenafil 100 MG tablet (VIAGRA)  SITagliptin 100 MG tablet (Januvia)    Patient: Nisha Donahue Patient : 1945 MRN: [id number]    Last Discharge [facility]       Date Complaint Diagnosis Description Type Department Provider    22 Pulmonary Embolism Single subsegmental pulmonary embolism without acute cor pulmonale ([facility] [address] . .. ED to Hosp-Admission (Discharged) (ADMITTED) Cindy Chance MD; Robbie Mak MD          Was this an external facility discharge? No Discharge Facility: [facility]    Noted following upcoming appointments from discharge chart review:   [facility] follow up appointment(s):   Future Appointments   Date Time Provider Kay Bartlett   2022  9:40 AM SCHEDULE, GERBER COBB PCP TESTOSTERONE MLOX [facility]   2022  9:00 AM Lo Qureshi, PhD Ricardo Crane   3/3/2023 10:00 AM MD Cleveland Bowles [address]   3/22/2023  1:00 PM Asa Hamilton  [address]     Non-[facility] follow up appointment(s):     Elizabeth Keys.  Israel Messina / [facility] Transition Team  [phone]

## 2022-12-12 ENCOUNTER — CARE COORDINATION (OUTPATIENT)
Dept: CARE COORDINATION | Age: 77
End: 2022-12-12

## 2022-12-12 DIAGNOSIS — I26.99 PULMONARY EMBOLISM AND INFARCTION (HCC): ICD-10-CM

## 2022-12-12 DIAGNOSIS — R91.1 NODULE OF RIGHT LUNG: Primary | ICD-10-CM

## 2022-12-12 PROCEDURE — 1111F DSCHRG MED/CURRENT MED MERGE: CPT | Performed by: FAMILY MEDICINE

## 2022-12-12 NOTE — CARE COORDINATION
[facility] Care Transitions Initial Follow Up Call    Call within 2 business days of discharge: Yes    Care Transition Nurse contacted the patient by telephone to perform post hospital discharge assessment. Verified name and  with patient as identifiers. Provided introduction to self, and explanation of the Care Transition Nurse role. Patient: Sara Hirsch Patient :    MRN: [id number]  Reason for Admission: -22 PE and Infarction (R); Nodule (R) Lung. Discharge Date: 22 RARS: No data recorded    Last Discharge  Street       Date Complaint Diagnosis Description Type Department Provider    22 Pulmonary Embolism Single subsegmental pulmonary embolism without acute cor pulmonale ([facility] [address] . .. ED to Hosp-Admission (Discharged) (ADMITTED) Jhoana Walls MD; Ann Dove MD            Was this an external facility discharge? No [place] [facility]    Challenges to be reviewed by the provider   Additional needs identified to be addressed with provider: No  none               Method of communication with provider: none. Spoke with Pt who denies CP, SOB, cough, congestion, fever, chills, n/v/d, fatigue, weakness. Pt does admit to slight ankle swelling. Pt encouraged to elevate legs to reduce swelling and to limit salt intake. Pt v/u. /79; . Medication Review completed, 1111F order placed. Instructed Pt that Eliquis could cause bruising, internal bleeding, increased bleeding time. Contact your PCP immediately if you have any signs of serious bleeding, including: unusual pain/swelling/discomfort, unusual bruising, prolonged bleeding from cuts or gums, persistent nosebleeds, blood in your urine/stools, vomit that is bloody or looks like coffee grounds. Pt v/u. Pulmonology appt scheduled. CTN routed message High Priority to PCP and PCP  Staff to schedule 7 day Hospital F/U. Pt denies Transportation, Home, or Medication needs.  CTN information given, will continue to follow. START taking:  apixaban starter pack (Eliquis DVT/PE Starter Pack)  Januvia (SITagliptin)  This replaces a similar medication. See the full medication  list for instructions. STOP taking:  azithromycin 250 MG tablet (Zithromax Z-Anival)  DULoxetine 30 MG extended release  capsule (CYMBALTA)  loratadine 10 MG tablet (CLARITIN)  omeprazole 20 MG delayed release capsule (PRILOSEC)  predniSONE 20 MG tablet (DELTASONE)  sildenafil 100 MG tablet (VIAGRA)  SITagliptin 100 MG tablet (Januvia)    Care Transition Nurse reviewed discharge instructions, medical action plan, and red flags with patient who verbalized understanding. The patient was given an opportunity to ask questions and does not have any further questions or concerns at this time. Were discharge instructions available to patient? Yes. Reviewed appropriate site of care based on symptoms and resources available to patient including: PCP  Specialist  When to call [address] The patient agrees to contact the PCP office for questions related to their healthcare. Advance Care Planning:   Does patient have an Advance Directive: not on file. Medication reconciliation was performed with patient, who verbalizes understanding of administration of home medications. Medications reviewed, 1111F entered: yes    Was patient discharged with a pulse oximeter? no    Non-face-to-face services provided:  Scheduled appointment with PCP-routed PCP and  staff to schedule Hosp F/U  Obtained and reviewed discharge summary and/or continuity of care documents    Offered patient enrollment in the Remote Patient Monitoring (RPM) program for in-home monitoring: Yes, but did not enroll at this time.     Care Transitions 24 Hour Call    Schedule Follow Up Appointment with PCP: Completed  Do you have a copy of your discharge instructions?: Yes  Do you have all of your prescriptions and are they filled?: Yes  Have you been [facility] by a [facility]?: No  Have you scheduled your follow up appointment?: No  Do you have support at home?: Partner/Spouse/SO  Do you feel like you have everything you need to keep you well at home?: Yes  Are you an active caregiver in your home?: No  Care Transitions Interventions  No Identified Needs         Follow Up  Future Appointments   Date Time Provider Kay Tri   12/14/2022  9:40 AM SCHEDULE, GERBER MURRAY AMHERST PCP TESTOSTERONE MLOX [facility]   12/21/2022  9:00 AM Lo Obregon, PhD Renan Nash   12/23/2022 11:30 AM Shirin Ibanez MD [address]   3/3/2023 10:00 AM Mimi Petersen MD [place] [id number]   3/22/2023  1:00 PM J Luis Wang MD [id number] Transition Nurse provided contact information. Plan for follow-up call in 5-7 days based on severity of symptoms and risk factors.   Plan for next call: symptom management-edema  self management-elevate legs, follow low Na diet, take medications as prescribed  follow-up appointment-PCP, Pulmonology  medication management-taking as prescribed, changes    Cat Vizcaino LPN

## 2022-12-14 ENCOUNTER — NURSE ONLY (OUTPATIENT)
Dept: FAMILY MEDICINE CLINIC | Age: 77
End: 2022-12-14
Payer: MEDICARE

## 2022-12-14 DIAGNOSIS — E29.1 HYPOGONADISM IN MALE: Primary | ICD-10-CM

## 2022-12-14 PROCEDURE — 96372 THER/PROPH/DIAG INJ SC/IM: CPT | Performed by: INTERNAL MEDICINE

## 2022-12-14 RX ORDER — TESTOSTERONE CYPIONATE 200 MG/ML
200 INJECTION INTRAMUSCULAR ONCE
Status: COMPLETED | OUTPATIENT
Start: 2022-12-14 | End: 2022-12-14

## 2022-12-14 RX ADMIN — TESTOSTERONE CYPIONATE 200 MG: 200 INJECTION INTRAMUSCULAR at 10:05

## 2022-12-14 NOTE — PROGRESS NOTES
Patient given Testosterone 200mg IM left gluteal..  Will return in 2 weeks for next injection    Patient tolerated injection well.     Administrations This Visit       testosterone cypionate (DEPOTESTOTERONE CYPIONATE) injection 200 mg       Admin Date  12/14/2022 Action  Given Dose  200 mg Route  IntraMUSCular Administered By  Francheska Horner LPN

## 2022-12-15 ENCOUNTER — CARE COORDINATION (OUTPATIENT)
Dept: CARE COORDINATION | Age: 77
End: 2022-12-15

## 2022-12-15 ENCOUNTER — OFFICE VISIT (OUTPATIENT)
Dept: FAMILY MEDICINE CLINIC | Age: 77
End: 2022-12-15

## 2022-12-15 ENCOUNTER — TELEPHONE (OUTPATIENT)
Dept: FAMILY MEDICINE CLINIC | Age: 77
End: 2022-12-15

## 2022-12-15 VITALS
SYSTOLIC BLOOD PRESSURE: 112 MMHG | OXYGEN SATURATION: 96 % | DIASTOLIC BLOOD PRESSURE: 78 MMHG | TEMPERATURE: 97.8 F | HEIGHT: 71 IN | HEART RATE: 102 BPM | BODY MASS INDEX: 26.32 KG/M2 | WEIGHT: 188 LBS

## 2022-12-15 DIAGNOSIS — Z09 HOSPITAL DISCHARGE FOLLOW-UP: ICD-10-CM

## 2022-12-15 DIAGNOSIS — I10 PRIMARY HYPERTENSION: ICD-10-CM

## 2022-12-15 DIAGNOSIS — J44.9 CHRONIC OBSTRUCTIVE PULMONARY DISEASE, UNSPECIFIED COPD TYPE (HCC): ICD-10-CM

## 2022-12-15 DIAGNOSIS — E11.29 TYPE 2 DIABETES MELLITUS WITH MICROALBUMINURIA, WITHOUT LONG-TERM CURRENT USE OF INSULIN (HCC): Chronic | ICD-10-CM

## 2022-12-15 DIAGNOSIS — J98.4 PULMONARY CAVITARY LESION: ICD-10-CM

## 2022-12-15 DIAGNOSIS — R80.9 TYPE 2 DIABETES MELLITUS WITH MICROALBUMINURIA, WITHOUT LONG-TERM CURRENT USE OF INSULIN (HCC): Chronic | ICD-10-CM

## 2022-12-15 DIAGNOSIS — I26.99 PULMONARY EMBOLISM AND INFARCTION (HCC): Primary | ICD-10-CM

## 2022-12-15 PROBLEM — Z72.0 TOBACCO ABUSE: Status: RESOLVED | Noted: 2022-12-08 | Resolved: 2022-12-15

## 2022-12-15 ASSESSMENT — ENCOUNTER SYMPTOMS
BLOOD IN STOOL: 0
DIARRHEA: 0
VOMITING: 0
NAUSEA: 0
ABDOMINAL PAIN: 0
COUGH: 0
ANAL BLEEDING: 0
SHORTNESS OF BREATH: 1
WHEEZING: 0
CHEST TIGHTNESS: 0

## 2022-12-15 NOTE — ASSESSMENT & PLAN NOTE
Patient has been referred to hematology to discuss potential for long-term chronic anticoagulation moving forward and further follow-up regarding right pulmonary cavitary lesion.

## 2022-12-15 NOTE — PROGRESS NOTES
Post-Discharge Transitional Care Follow Up      Viola Duane   YOB: 1945    Date of Office Visit:  12/15/2022  Date of Hospital Admission: 12/7/22  Date of Hospital Discharge: 12/8/22  Readmission Risk Score (high >=14%. Medium >=10%):No data recorded    Care management risk score Rising risk (score 2-5) and Complex Care (Scores >=6): No Risk Score On File     Non face to face  following discharge, date last encounter closed (first attempt may have been earlier): 12/12/2022     Call initiated 2 business days of discharge: Yes     1. Pulmonary embolism and infarction [facility])  Assessment & Plan:  Patient has been referred to hematology to discuss potential for long-term chronic anticoagulation moving forward and further follow-up regarding right pulmonary cavitary lesion. Orders:  -     ROSY Fajardo DO, Oncology, [place]  2. Pulmonary cavitary lesion  Assessment & Plan:  Visit scheduled with pulmonology for further evaluation and likely endoscopy/biopsy. Orders:  -     ROSY Fajardo DO, Oncology, [place]  3. Chronic obstructive pulmonary disease, unspecified COPD type ([facility] [address]  Assessment & Plan:  Stable respiratory status. Patient instructed to continue with as needed use of albuterol  4. Primary hypertension  Assessment & Plan:  Blood pressure within normal limits today in the office. Patient instructed to continue with current medication   5. Type 2 diabetes mellitus with microalbuminuria, without long-term current use of insulin ([address]  Assessment & Plan:  Patient with noted elevated A1c on hospital lab work. I reviewed with him and wife the importance of tight glucose control moving forward. Patient will increase dose of Trulicity to 1.5 mg and continue with his other medications. They will check with insurance regarding cost of Jardiance.   If patient is able to afford Jardiance we will discontinue glipizide and Januvia and move forward with a regimen of Jardiance and Trulicity for glucose management. Orders:  -     dulaglutide (TRULICITY) 1.5 DN/4.1GD SC injection; Inject 0.5 mLs into the skin once a week, Disp-6 mL, R-1Print  6. Hospital discharge follow-up  -     [place] DISCHARGE MEDS RECONCILED W/ CURRENT OUTPATIENT MED LIST      Medical Decision Making: moderate complexity  Return for KEEP NEXT SCHEDULED VISIT. Subjective:   HPI    Inpatient course: Discharge summary reviewed- see chart. Interval history/Current status: Patient reports feeling improved overall since hospital discharge. They report resolution of previously reported dizziness, nausea, and vomiting, and report some improvement with dyspnea on exertion. Patient denies any chest pain, dyspnea, palpitations, lightheadedness, dizziness, worsening lower extremity edema, or syncope. Patient denies any dyspnea at rest, persistent wheezing, worsening cough, chest tightness, or limitation in normal day-to-day activity due to breathing. Patient denies any new onset epistaxis, hematemesis, melena, hematochezia, hematuria, or rectal bleeding. He has a visit scheduled with pulmonology in the next 7 to 8 days to discuss further evaluation and likely endoscopy/biopsy of right lung cavitary lesion.     Patient Active Problem List   Diagnosis    Benign prostatic hyperplasia    HTN (hypertension)    Chronic low back pain    Coronary artery disease involving native coronary artery of native heart with angina pectoris (HCC)    Bilateral leg pain    Chronic venous insufficiency    Gastroesophageal reflux disease    Abnormal LFTs    Lipoma of spermatic cord    Fatty infiltration of liver    Perennial allergic rhinitis    Hyperlipidemia    History of colonic polyps    Osteoarthritis of multiple joints    Pseudophakia of both eyes    COPD (chronic obstructive pulmonary disease) (HCC)    History of tobacco use    Astigmatism, regular    Type 2 diabetes mellitus with microalbuminuria, without long-term current use of insulin ([facility] Erectile dysfunction    Varicose veins of both lower extremities    Hypogonadism male    History of melanoma excision    Status post coronary artery bypass grafts x 5    Myasthenia gravis with exacerbation ([facility]    Close exposure to COVID-19 virus    Vitamin D deficiency    Stage 3a chronic kidney disease ([facility]    Depression    Unsteady gait    Pulmonary embolism and infarction ([facility]    Nodule of right lung    Pulmonary cavitary lesion       Medications listed as ordered at the time of discharge from hospital     Medication List            Accurate as of December 15, 2022 10:42 AM. If you have any questions, ask your nurse or doctor. START taking these medications      dulaglutide 1.5 MG/0.5ML SC injection  Commonly known as: TRULICITY  Inject 0.5 mLs into the skin once a week  Started by: Pascale Longo MD            CHANGE how you take these medications      metoprolol succinate 50 MG extended release tablet  Commonly known as: TOPROL XL  Take 3 tablets by mouth daily  What changed:   how much to take  when to take this            CONTINUE taking these medications      albuterol sulfate  (90 Base) MCG/ACT inhaler  Commonly known as: PROVENTIL;VENTOLIN;PROAIR  Inhale 2 puffs into the lungs every 6 hours as needed for Wheezing     amLODIPine 10 MG tablet  Commonly known as: NORVASC  Take 1 tablet by mouth daily     apixaban starter pack 5 MG Tbpk tablet  Commonly known as: Eliquis DVT/PE Starter Pack  Take 1 tablet by mouth See Admin Instructions     ascorbic acid 500 MG tablet  Commonly known as: VITAMIN C     aspirin 81 MG EC tablet     azaTHIOprine 50 MG tablet  Commonly known as: IMURAN     blood glucose monitor kit and supplies  Test once daily.   Dx: E11.29     CINNAMON PO     famotidine 40 MG tablet  Commonly known as: PEPCID  Take 1 tablet by mouth every evening     fluticasone 50 MCG/ACT nasal spray  Commonly known as: FLONASE  1 spray by Nasal route daily     FREESTYLE LITE strip  Generic drug: blood glucose test strips  USE 1 STRIP TO CHECK GLUCOSE ONCE DAILY AS DIRECTED     glipiZIDE 10 MG tablet  Commonly known as: GLUCOTROL  Take 1 tablet by mouth 2 times daily (before meals)     Handicap Placard Misc  by Does not apply route DX: COPD (J44.9), primary osteoarthritis involving multiple joints (M15.0), myasthenia gravis with exacerbation (G70.01)     EXPIRES: 05/2024     ipratropium 0.06 % nasal spray  Commonly known as: ATROVENT  2 sprays by Nasal route 3 times daily     Januvia 100 MG tablet  Generic drug: SITagliptin  Take 1 tablet by mouth daily     Lancets Misc  Test once daily. Dx: E11.29     losartan 100 MG tablet  Commonly known as: COZAAR  Take 1 tablet by mouth daily     methylPREDNISolone 4 MG tablet  Commonly known as: MEDROL (JIGAR)  Take by mouth. nitroGLYCERIN 0.4 MG SL tablet  Commonly known as: NITROSTAT  Place 1 tablet under the tongue every 5 minutes as needed for Chest pain     rosuvastatin 20 MG tablet  Commonly known as: CRESTOR  Take 1 tablet by mouth daily     tamsulosin 0.4 MG capsule  Commonly known as: FLOMAX  Take 2 capsules by mouth daily     * testosterone cypionate 200 MG/ML injection  Commonly known as: DEPOTESTOTERONE CYPIONATE  Inject 1 mL into the muscle every 14 days. Indications: PER ORDER in OV note 9/16/20     * Testosterone Cypionate 200 MG/ML Soln  1 CC EVERY 2 WEEKS     triamterene-hydroCHLOROthiazide 37.5-25 MG per tablet  Commonly known as: MAXZIDE-25  Take 1 tablet by mouth daily     VITAMIN B 12 PO           * This list has 2 medication(s) that are the same as other medications prescribed for you. Read the directions carefully, and ask your doctor or other care provider to review them with you.                    Where to Get Your Medications        You can get these medications from any pharmacy    Bring a paper prescription for each of these medications  dulaglutide 1.5 MG/0.5ML SC injection          Medications marked \"taking\" at this time  Outpatient Medications Marked as Taking for the 12/15/22 encounter (Office Visit) with Marquis Mcfadden MD   Medication Sig Dispense Refill    dulaglutide (TRULICITY) 1.5 TU/6.7IL SC injection Inject 0.5 mLs into the skin once a week 6 mL 1    apixaban starter pack (ELIQUIS DVT/PE STARTER PACK) 5 MG TBPK tablet Take 1 tablet by mouth See Admin Instructions 74 tablet 0    JANUVIA 100 MG tablet Take 1 tablet by mouth daily 90 tablet 1    rosuvastatin (CRESTOR) 20 MG tablet Take 1 tablet by mouth daily 90 tablet 1    tamsulosin (FLOMAX) 0.4 MG capsule Take 2 capsules by mouth daily 180 capsule 1    ipratropium (ATROVENT) 0.06 % nasal spray 2 sprays by Nasal route 3 times daily 45 mL 3    triamterene-hydroCHLOROthiazide (MAXZIDE-25) 37.5-25 MG per tablet Take 1 tablet by mouth daily 90 tablet 1    losartan (COZAAR) 100 MG tablet Take 1 tablet by mouth daily 90 tablet 1    methylPREDNISolone (MEDROL, JIGAR,) 4 MG tablet Take by mouth.  1 kit 0    glipiZIDE (GLUCOTROL) 10 MG tablet Take 1 tablet by mouth 2 times daily (before meals) 180 tablet 3    amLODIPine (NORVASC) 10 MG tablet Take 1 tablet by mouth daily 90 tablet 3    metoprolol succinate (TOPROL XL) 50 MG extended release tablet Take 3 tablets by mouth daily (Patient taking differently: Take 50 mg by mouth in the morning and 50 mg in the evening.) 270 tablet 1    FREESTYLE LITE strip USE 1 STRIP TO CHECK GLUCOSE ONCE DAILY AS DIRECTED 50 each 0    Testosterone Cypionate 200 MG/ML SOLN 1 CC EVERY 2 WEEKS 10 mL 3    fluticasone (FLONASE) 50 MCG/ACT nasal spray 1 spray by Nasal route daily 1 Bottle 0    albuterol sulfate  (90 Base) MCG/ACT inhaler Inhale 2 puffs into the lungs every 6 hours as needed for Wheezing 3 Inhaler 0    famotidine (PEPCID) 40 MG tablet Take 1 tablet by mouth every evening 90 tablet 3    Handicap Placard MISC by Does not apply route DX: COPD (J44.9), primary osteoarthritis involving multiple joints (M15.0), myasthenia gravis with

## 2022-12-15 NOTE — ASSESSMENT & PLAN NOTE
Patient with noted elevated A1c on hospital lab work. I reviewed with him and wife the importance of tight glucose control moving forward. Patient will increase dose of Trulicity to 1.5 mg and continue with his other medications. They will check with insurance regarding cost of Jardiance. If patient is able to afford Jardiance we will discontinue glipizide and Januvia and move forward with a regimen of Jardiance and Trulicity for glucose management.

## 2022-12-16 NOTE — CARE COORDINATION
I spoke with Nael Rojas and they are wanting to switch [place] from [place] to [place] if I can get it for him at no charge. I sent the MD his portion of the application and will be mailing the patient his portion next week.           [address] [facility]   Medication Assistance  [address] [place] [address] [employer]    L) [phone] (F) [phone]

## 2022-12-16 NOTE — TELEPHONE ENCOUNTER
Placed a call out to patient to inform him that his dose adjusted Trulicity has been faxed over to the medication assistance program for them to get out to the patient Patient has been made aware.

## 2022-12-20 ENCOUNTER — CARE COORDINATION (OUTPATIENT)
Dept: CARE COORDINATION | Age: 77
End: 2022-12-20

## 2022-12-20 NOTE — CARE COORDINATION
[facility] Care Transitions Follow Up Call    Care Transition Nurse contacted the patient by telephone to follow up after admission on 22. Verified name and  with patient as identifiers. Patient: Elvin Landry  Patient :    MRN: [id number]  Reason for Admission: -22 PE and Infarction (R); Nodule (R) Lung. Discharge Date: 22 RARS: No data recorded    Needs to be reviewed by the provider   Additional needs identified to be addressed with provider: Yes  Elevated BP; Referral to Hem/Onc             Method of communication with provider: chart routing. Spoke with pt who reports BACA and elevated BP readings. Pt denies CP, pressure, palpitations, cough, headache, fever, chills n/v/d, edema. Pt states BP has been  135-140/70-80's. CTN will update PCP with BP concerns. Pt has appt scheduled with Pulmonology 22. Pt states that he has not heard from Hem/Onc for appt. Pt denies needs. CTN will continue to follow. Addressed changes since last contact:  medications-Trulicity 0.5 mLs Q weekly  Discussed follow-up appointments. If no appointment was previously scheduled, appointment scheduling offered: Yes. Is follow up appointment scheduled within 7 days of discharge? Yes.     Follow Up  Future Appointments   Date Time Provider Kay Bartlett   2022 11:30 AM Gale Puente MD [facility]   2022 10:20 AM SCHEDULE, [facility] PCP TESTOSTERONE [facility]X [facility]   2023 10:20 AM SCHEDULE, MLOR [facility] PCP TESTOSTERONE MLOX [facility]   2023 10:20 AM SCHEDULE, MLOR [facility] PCP TESTOSTERONE MLOX [facility]   3/3/2023 10:00 AM Rock Nar[facility] MD [facility]    3/22/2023  1:00 PM Donna Garcia  [address]     Non-[facility] follow up appointment(s):     Care Transition Nurse reviewed discharge instructions, medical action plan, and red flags with patient and discussed any barriers to care and/or understanding of plan of care after discharge. Discussed appropriate site of care based on symptoms and resources available to patient including: PCP  Specialist  When to call [address] The patient agrees to contact the PCP office for questions related to their healthcare. Advance Care Planning:   not on file. Patients top risk factors for readmission: medical condition-PE, Right Lung Nodule  Interventions to address risk factors: Obtained and reviewed discharge summary and/or continuity of care documents    Offered patient enrollment in the Remote Patient Monitoring (RPM) program for in-home monitoring: Patient declined. Care Transitions Subsequent and Final Call    Schedule Follow Up Appointment with PCP: Completed  Subsequent and Final Calls  Do you have any ongoing symptoms?: Yes  Onset of Patient-reported symptoms: In the past 7 days  Patient-reported symptoms: Shortness of Breath, Other  Interventions for patient-reported symptoms: Notified PCP/Physician  Have your medications changed?: Yes  Patient Reports: Trulicity 0.5 mLs Q weekly  Do you have any questions related to your medications?: No  Do you currently have any active services?: No  Do you have any needs or concerns that I can assist you with?: No  Identified Barriers: None  Care Transitions Interventions   Meds to Bed: Completed     Other Interventions:             Care Transition Nurse provided contact information for future needs. Plan for follow-up call in 7-10 days based on severity of symptoms and risk factors.   Plan for next call: follow-up appointment-Pulmonology, Hem/onc    Bessy Messina LPN

## 2022-12-21 ENCOUNTER — CARE COORDINATION (OUTPATIENT)
Dept: CARE COORDINATION | Age: 77
End: 2022-12-21

## 2022-12-21 ENCOUNTER — HOSPITAL ENCOUNTER (OUTPATIENT)
Dept: LAB | Age: 77
Discharge: HOME | End: 2022-12-21
Payer: MEDICARE

## 2022-12-21 DIAGNOSIS — I25.119 CORONARY ARTERY DISEASE INVOLVING NATIVE CORONARY ARTERY OF NATIVE HEART WITH ANGINA PECTORIS (HCC): Chronic | ICD-10-CM

## 2022-12-21 DIAGNOSIS — E55.9 VITAMIN D DEFICIENCY: ICD-10-CM

## 2022-12-21 DIAGNOSIS — E29.1 HYPOGONADISM MALE: ICD-10-CM

## 2022-12-21 DIAGNOSIS — K76.0 FATTY INFILTRATION OF LIVER: Chronic | ICD-10-CM

## 2022-12-21 DIAGNOSIS — I10 PRIMARY HYPERTENSION: Chronic | ICD-10-CM

## 2022-12-21 DIAGNOSIS — E78.2 MIXED HYPERLIPIDEMIA: Chronic | ICD-10-CM

## 2022-12-21 DIAGNOSIS — R80.9 TYPE 2 DIABETES MELLITUS WITH MICROALBUMINURIA, WITHOUT LONG-TERM CURRENT USE OF INSULIN (HCC): ICD-10-CM

## 2022-12-21 DIAGNOSIS — N18.31 STAGE 3A CHRONIC KIDNEY DISEASE (HCC): ICD-10-CM

## 2022-12-21 DIAGNOSIS — E11.29 TYPE 2 DIABETES MELLITUS WITH MICROALBUMINURIA, WITHOUT LONG-TERM CURRENT USE OF INSULIN (HCC): ICD-10-CM

## 2022-12-21 LAB
ALBUMIN SERPL-MCNC: 3.8 G/DL (ref 3.5–4.6)
ALP BLD-CCNC: 37 U/L (ref 35–104)
ALT SERPL-CCNC: 28 U/L (ref 0–41)
ANION GAP SERPL CALCULATED.3IONS-SCNC: 15 MEQ/L (ref 9–15)
AST SERPL-CCNC: 21 U/L (ref 0–40)
BILIRUB SERPL-MCNC: 0.3 MG/DL (ref 0.2–0.7)
BUN BLDV-MCNC: 16 MG/DL (ref 8–23)
CALCIUM SERPL-MCNC: 10.1 MG/DL (ref 8.5–9.9)
CHLORIDE BLD-SCNC: 97 MEQ/L (ref 95–107)
CHOLESTEROL, TOTAL: 156 MG/DL (ref 0–199)
CO2: 25 MEQ/L (ref 20–31)
CREAT SERPL-MCNC: 1.21 MG/DL (ref 0.7–1.2)
GFR SERPL CREATININE-BSD FRML MDRD: >60 ML/MIN/{1.73_M2}
GLOBULIN: 2.6 G/DL (ref 2.3–3.5)
GLUCOSE BLD-MCNC: 158 MG/DL (ref 70–99)
HDLC SERPL-MCNC: 57 MG/DL (ref 40–59)
LDL CHOLESTEROL CALCULATED: 66 MG/DL (ref 0–129)
POTASSIUM SERPL-SCNC: 4.7 MEQ/L (ref 3.4–4.9)
SODIUM BLD-SCNC: 137 MEQ/L (ref 135–144)
TOTAL PROTEIN: 6.4 G/DL (ref 6.3–8)
TRIGL SERPL-MCNC: 166 MG/DL (ref 0–150)

## 2022-12-21 PROCEDURE — 84270 ASSAY OF SEX HORMONE GLOBUL: CPT

## 2022-12-21 PROCEDURE — 84403 ASSAY OF TOTAL TESTOSTERONE: CPT

## 2022-12-21 PROCEDURE — 80061 LIPID PANEL: CPT

## 2022-12-21 PROCEDURE — 85025 COMPLETE CBC W/AUTO DIFF WBC: CPT

## 2022-12-21 PROCEDURE — 36415 COLL VENOUS BLD VENIPUNCTURE: CPT

## 2022-12-21 PROCEDURE — 82306 VITAMIN D 25 HYDROXY: CPT

## 2022-12-21 PROCEDURE — 80053 COMPREHEN METABOLIC PANEL: CPT

## 2022-12-21 NOTE — CARE COORDINATION
I was able to mail the patient his portion of the application for assistance on Jardiance.         [address] [facility]   Medication Assistance  [address] [place] [address], and [employer]    (S) [phone]  (O) [phone]

## 2022-12-22 ENCOUNTER — OFFICE VISIT (OUTPATIENT)
Dept: PULMONOLOGY | Age: 77
End: 2022-12-22
Payer: MEDICARE

## 2022-12-22 VITALS
HEART RATE: 98 BPM | DIASTOLIC BLOOD PRESSURE: 66 MMHG | BODY MASS INDEX: 26.92 KG/M2 | WEIGHT: 193 LBS | OXYGEN SATURATION: 97 % | SYSTOLIC BLOOD PRESSURE: 124 MMHG

## 2022-12-22 DIAGNOSIS — I26.99 PULMONARY EMBOLISM AND INFARCTION (HCC): ICD-10-CM

## 2022-12-22 DIAGNOSIS — J44.9 CHRONIC OBSTRUCTIVE PULMONARY DISEASE, UNSPECIFIED COPD TYPE (HCC): ICD-10-CM

## 2022-12-22 DIAGNOSIS — J98.4 CAVITATING MASS IN RIGHT UPPER LUNG LOBE: Primary | ICD-10-CM

## 2022-12-22 LAB
ANISOCYTOSIS: ABNORMAL
BASOPHILS ABSOLUTE: 0 K/UL (ref 0–0.2)
BASOPHILS RELATIVE PERCENT: 0.6 %
EOSINOPHILS ABSOLUTE: 0.1 K/UL (ref 0–0.7)
EOSINOPHILS RELATIVE PERCENT: 1 %
HCT VFR BLD CALC: 39.2 % (ref 42–52)
HEMOGLOBIN: 13.6 G/DL (ref 14–18)
LYMPHOCYTES ABSOLUTE: 0.8 K/UL (ref 1–4.8)
LYMPHOCYTES RELATIVE PERCENT: 10 %
MCH RBC QN AUTO: 35.9 PG (ref 27–31.3)
MCHC RBC AUTO-ENTMCNC: 34.7 % (ref 33–37)
MCV RBC AUTO: 103.7 FL (ref 79–92.2)
MONOCYTES ABSOLUTE: 0.4 K/UL (ref 0.2–0.8)
MONOCYTES RELATIVE PERCENT: 5 %
NEUTROPHILS ABSOLUTE: 7.1 K/UL (ref 1.4–6.5)
NEUTROPHILS RELATIVE PERCENT: 84 %
PDW BLD-RTO: 15.8 % (ref 11.5–14.5)
PLATELET # BLD: 179 K/UL (ref 130–400)
PLATELET SLIDE REVIEW: NORMAL
RBC # BLD: 3.78 M/UL (ref 4.7–6.1)
SEX HORMONE BINDING GLOBULIN: 49 NMOL/L (ref 11–80)
TESTOSTERONE FREE-NONMALE: 228.7 PG/ML (ref 47–244)
TESTOSTERONE TOTAL: 1161 NG/DL (ref 220–1000)
VITAMIN D 25-HYDROXY: 37.8 NG/ML
WBC # BLD: 8.4 K/UL (ref 4.8–10.8)

## 2022-12-22 PROCEDURE — 1036F TOBACCO NON-USER: CPT | Performed by: INTERNAL MEDICINE

## 2022-12-22 PROCEDURE — G8427 DOCREV CUR MEDS BY ELIG CLIN: HCPCS | Performed by: INTERNAL MEDICINE

## 2022-12-22 PROCEDURE — 3074F SYST BP LT 130 MM HG: CPT | Performed by: INTERNAL MEDICINE

## 2022-12-22 PROCEDURE — 3023F SPIROM DOC REV: CPT | Performed by: INTERNAL MEDICINE

## 2022-12-22 PROCEDURE — 1123F ACP DISCUSS/DSCN MKR DOCD: CPT | Performed by: INTERNAL MEDICINE

## 2022-12-22 PROCEDURE — G8417 CALC BMI ABV UP PARAM F/U: HCPCS | Performed by: INTERNAL MEDICINE

## 2022-12-22 PROCEDURE — 3078F DIAST BP <80 MM HG: CPT | Performed by: INTERNAL MEDICINE

## 2022-12-22 PROCEDURE — 99214 OFFICE O/P EST MOD 30 MIN: CPT | Performed by: INTERNAL MEDICINE

## 2022-12-22 PROCEDURE — G8484 FLU IMMUNIZE NO ADMIN: HCPCS | Performed by: INTERNAL MEDICINE

## 2022-12-22 ASSESSMENT — ENCOUNTER SYMPTOMS
NAUSEA: 0
VOICE CHANGE: 0
SHORTNESS OF BREATH: 1
VOMITING: 0
CHEST TIGHTNESS: 0
COUGH: 0
RHINORRHEA: 0
DIARRHEA: 0
EYE ITCHING: 0
ABDOMINAL PAIN: 0
WHEEZING: 0
SORE THROAT: 0

## 2022-12-22 NOTE — PROGRESS NOTES
T ch  Subjective:     Selena Baker is a 68 y.o. male who complains today of:     Chief Complaint   Patient presents with    Follow-Up from Hospital     2wk f/u from hospital - pulmonary embolism and lung mass    New Patient       HPI  He was in the hospital due to small blood clot in the left lung got mass in the right lung. He was also tested positive for COVID. He was started on heparin drip and discharged home on Eliquis DVT study was negative. Plan was to have anticoagulation for at least 2 to 4 weeks and then bronchoscopy and biopsy to rule out malignancy and right lung mass. He said he completely quit smoking since last hospitalization. He has no complaint of cough or sputum production. He does have a short of breath with exertion which is better than before, denies having any chest pain. Denies having any orthopnea he does have some swelling in lower extremity. Patient came with his wife CT chest reviewed with them. Discussed with Dr. Romana Gaul who is going to do Navigational bronchoscopy in on January 6, 2023. He will stop Eliquis 2 days prior to procedure.     Allergies:  Invokana [canagliflozin], Metformin and related, and Other  Past Medical History:   Diagnosis Date    Atypical chest pain 8/30/2012    BPH (benign prostatic hypertrophy)     CAD (coronary artery disease)     Cataracts, bilateral     Chronic low back pain     COPD (chronic obstructive pulmonary disease) ([address] 6/1/2006    DM2 (diabetes mellitus, type 2) ([address]     Duodenitis     Erectile dysfunction 3/6/2017    Fatty infiltration of liver 11/19/2014    GERD (gastroesophageal reflux disease)     History of melanoma excision 12/11/2017    Left ear 09/2017    History of tobacco use 6/1/2006    HTN (hypertension)     Hyperlipidemia     Malignant melanoma of face ([address]     Malignant melanoma of skin of face ([address]     RT ear 2000, LT ear 2017    MG (myasthenia gravis) ([address]     Nephrolithiasis     Ocular myasthenia gravis ([address] 2018    Osteoarthritis of multiple joints     hands, hips    Perennial allergic rhinitis     Personal history of colonic polyps     Pseudophakia of both eyes     Stage 3a chronic kidney disease ([facility] 2022    Status post coronary artery bypass grafts x 5 2018 @ [facility]    Tobacco abuse 2022    Type 2 diabetes mellitus with microalbuminuria, without long-term current use of insulin ([facility] 3/6/2017    Varicose veins of both lower extremities 2017     Past Surgical History:   Procedure Laterality Date    ANGIOPLASTY      ARM SURGERY Right 10/25/2022    CARDIAC CATHETERIZATION      #17    CATARACT REMOVAL WITH IMPLANT Left     CATARACT REMOVAL WITH IMPLANT Right     COLONOSCOPY  2009    tubular adenomas    COLONOSCOPY  2013    diverticulosis, polyps, 5y repeat (DR NAVAS)    CORONARY ARTERY BYPASS GRAFT  07/02/2018    x5 (DR JETER @ [facility])    CORONARY ARTERY BYPASS GRAFT      5 vessel    MOHS SURGERY Left 2017    melanoma of left ear (DR GODWIN)    SKIN CANCER EXCISION Left 2017    TONSILLECTOMY      UPPER GASTROINTESTINAL ENDOSCOPY      duodenitis/ poss early signs of celiac disease     Family History   Problem Relation Age of Onset    Heart Disease Mother     Heart Disease Brother     Diabetes Maternal Grandfather      Social History     Socioeconomic History    Marital status:      Spouse name: Meg Bermudez    Number of children: 2    Years of education: 12+    Highest education level: Not on file   Occupational History    Occupation: [employer]   Tobacco Use    Smoking status: Former     Packs/day: 1.00     Years: 41.00     Pack years: 41.00     Types: Cigarettes     Start date:      Quit date: 2002     Years since quittin.2     Passive exposure: Past    Smokeless tobacco: Never   Vaping Use    Vaping Use: Never used   Substance and Sexual Activity    Alcohol use: Never    Drug use: Never    Sexual activity: Not Currently     Partners: Female   Other Topics Concern    Not on file   Social History Narrative    Not on file     Social Determinants of Health     Financial Resource Strain: Low Risk     Difficulty of Paying Living Expenses: Not hard at all   Food Insecurity: No Food Insecurity    Worried About Running Out of Food in the Last Year: Never true    Ran Out of Food in the Last Year: Never true   Transportation Needs: Not on file   Physical Activity: Inactive    Days of Exercise per Week: 0 days    Minutes of Exercise per Session: 0 min   Stress: Not on file   Social Connections: Not on file   Intimate Partner Violence: Not on file   Housing Stability: Not on file         Review of Systems   Constitutional:  Negative for chills, diaphoresis, fatigue and fever. HENT:  Negative for congestion, mouth sores, nosebleeds, postnasal drip, rhinorrhea, sneezing, sore throat and voice change. Eyes:  Negative for itching and visual disturbance. Respiratory:  Positive for shortness of breath. Negative for cough, chest tightness and wheezing. Cardiovascular: Negative. Negative for chest pain, palpitations and leg swelling. Gastrointestinal:  Negative for abdominal pain, diarrhea, nausea and vomiting. Genitourinary:  Negative for difficulty urinating and hematuria. Musculoskeletal:  Negative for arthralgias, joint swelling and myalgias. Skin:  Negative for rash. Allergic/Immunologic: Negative for environmental allergies. Neurological:  Negative for dizziness, tremors, weakness and headaches. Psychiatric/Behavioral:  Negative for behavioral problems and sleep disturbance.        :     Vitals:    12/22/22 1438   BP: 124/66   Site: Right Upper Arm   Position: Sitting   Cuff Size: Medium Adult   Pulse: 98   SpO2: 97%   Weight: 193 lb (87.5 kg)     Wt Readings from Last 3 Encounters:   12/22/22 193 lb (87.5 kg)   12/15/22 188 lb (85.3 kg)   12/07/22 185 lb (83.9 kg)         Physical Exam  Constitutional:       Appearance: He is well-developed.    HENT: Head: Normocephalic and atraumatic. Nose: Nose normal.   Eyes:      Conjunctiva/sclera: Conjunctivae normal.      Pupils: Pupils are equal, round, and reactive to light. Neck:      Thyroid: No thyromegaly. Vascular: No JVD. Trachea: No tracheal deviation. Cardiovascular:      Rate and Rhythm: Normal rate and regular rhythm. Heart sounds: No murmur heard. No friction rub. No gallop. Pulmonary:      Effort: Pulmonary effort is normal. No respiratory distress. Breath sounds: Normal breath sounds. No wheezing or rales. Comments: Diminished breath sound bilaterally  Chest:      Chest wall: No tenderness. Abdominal:      General: There is no distension. Musculoskeletal:         General: Normal range of motion. Right lower leg: Edema (Trace) present. Left lower leg: Edema (Trace) present. Lymphadenopathy:      Cervical: No cervical adenopathy. Skin:     General: Skin is warm and dry. Findings: No rash. Neurological:      Mental Status: He is alert and oriented to person, place, and time. Cranial Nerves: No cranial nerve deficit.    Psychiatric:         Behavior: Behavior normal.       Current Outpatient Medications   Medication Sig Dispense Refill    dulaglutide (TRULICITY) 1.5 TU/8.3TQ SC injection Inject 0.5 mLs into the skin once a week 6 mL 1    apixaban starter pack (ELIQUIS DVT/PE STARTER PACK) 5 MG TBPK tablet Take 1 tablet by mouth See Admin Instructions 74 tablet 0    JANUVIA 100 MG tablet Take 1 tablet by mouth daily 90 tablet 1    rosuvastatin (CRESTOR) 20 MG tablet Take 1 tablet by mouth daily 90 tablet 1    tamsulosin (FLOMAX) 0.4 MG capsule Take 2 capsules by mouth daily 180 capsule 1    ipratropium (ATROVENT) 0.06 % nasal spray 2 sprays by Nasal route 3 times daily 45 mL 3    triamterene-hydroCHLOROthiazide (MAXZIDE-25) 37.5-25 MG per tablet Take 1 tablet by mouth daily 90 tablet 1    losartan (COZAAR) 100 MG tablet Take 1 tablet by mouth daily 90 tablet 1    methylPREDNISolone (MEDROL, JIGAR,) 4 MG tablet Take by mouth. 1 kit 0    glipiZIDE (GLUCOTROL) 10 MG tablet Take 1 tablet by mouth 2 times daily (before meals) 180 tablet 3    amLODIPine (NORVASC) 10 MG tablet Take 1 tablet by mouth daily 90 tablet 3    metoprolol succinate (TOPROL XL) 50 MG extended release tablet Take 3 tablets by mouth daily (Patient taking differently: Take 50 mg by mouth in the morning and 50 mg in the evening.) 270 tablet 1    FREESTYLE LITE strip USE 1 STRIP TO CHECK GLUCOSE ONCE DAILY AS DIRECTED 50 each 0    Testosterone Cypionate 200 MG/ML SOLN 1 CC EVERY 2 WEEKS 10 mL 3    fluticasone (FLONASE) 50 MCG/ACT nasal spray 1 spray by Nasal route daily 1 Bottle 0    albuterol sulfate  (90 Base) MCG/ACT inhaler Inhale 2 puffs into the lungs every 6 hours as needed for Wheezing 3 Inhaler 0    famotidine (PEPCID) 40 MG tablet Take 1 tablet by mouth every evening 90 tablet 3    Handicap Placard MISC by Does not apply route DX: COPD (J44.9), 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 Cyanocobalamin (VITAMIN B 12 PO) Take 1,000 mg by mouth      nitroGLYCERIN (NITROSTAT) 0.4 MG SL tablet Place 1 tablet under the tongue every 5 minutes as needed for Chest pain 25 tablet 0    Blood Glucose Monitoring Suppl CHENCHO Test once daily. Dx: E11.29 1 Device 0    Lancets MISC Test once daily. Dx: E11.29 100 each 3    ascorbic acid (VITAMIN C) 500 MG tablet Take 1,000 mg by mouth daily      CINNAMON PO Take 500 mg by mouth 2 times daily. aspirin 81 MG EC tablet Take 81 mg by mouth daily. testosterone cypionate (DEPOTESTOTERONE CYPIONATE) 200 MG/ML injection Inject 1 mL into the muscle every 14 days.  Indications: PER ORDER in OV note 9/16/20 (Patient taking differently: Inject 200 mg into the muscle every 14 days. Indications: PER ORDER in OV note 3/17/21) 2 mL 3     No current facility-administered medications for this visit. Results for orders placed during the hospital encounter of 12/06/22    XR CHEST (2 VW)    Narrative  EXAMINATION:  TWO XRAY VIEWS OF THE CHEST    12/6/2022 4:54 pm    COMPARISON:  None. HISTORY:  ORDERING SYSTEM PROVIDED HISTORY: Fatigue, unspecified type, Dyspnea on  exertion  TECHNOLOGIST PROVIDED HISTORY:  Reason for exam:->evaluate for potential effusion, signs of fluid overload  What reading provider will be dictating this exam?->CRC    FINDINGS:  The lungs are without acute focal process. There is no effusion or  pneumothorax. The cardiomediastinal silhouette is without acute process. The  osseous structures are without acute process. There is 1.9 cm focal lucency  in the right upper lung. Impression  No acute process. Status post median sternotomy. Question 1.9 cm cyst in the right upper lung. Results for orders placed during the hospital encounter of 01/23/17    XR CHEST STANDARD TWO VW    Narrative  EXAMINATION: RADIOGRAPHIC EXAMINATION OF THE CHEST, TWO VIEWS:    CLINICAL HISTORY: WHEEZING. COMPARISON: 5/21/15. FINDINGS: Cardiac size and pulmonary vascularity are normal. The lungs are clear. There is no adenopathy. There are scattered calcifications from old granulomatous disease. There is mild spondylosis. There is mild acromioclavicular arthrosis. There has been no significant change from prior exam.    Impression  NO ACUTE DISEASE. Results for orders placed during the hospital encounter of 05/21/15    XR Chest Standard TWO VW    Narrative  EXAMINATION CHEST, TWO VIEWS    CLINICAL INFORMATION DYSPNEA. COMPARISON 7/24/14. FINDINGS Cardiac size and pulmonary vascularity are normal. The lungs  are clear. There are a few small calcifications from old granulomatous  disease.  There is mild spondylosis. There has been no significant change from prior chest radiograph. IMPRESSION    NO ACUTE DISEASE. Audrey Booker M.D. Released By- Magda Cortes M.D. Released Date Time- 05/22/15 2158  This document has been electronically signed. ------------------------------------------------------------------------------  ]  Results for orders placed during the hospital encounter of 04/13/19    XR CHEST PORTABLE    Narrative  EXAMINATION: XR CHEST PORTABLE    CLINICAL HISTORY:  High blood pressure    COMPARISONS: 1/23/2017    FINDINGS: There is borderline cardiomegaly. Pulmonary vascularity appears mildly increased. There are no findings of interstitial or airspace edema. There are no pleural effusions. There are sternotomy wires and mediastinal clips. There is mild  spondylosis. Impression  BORDERLINE CARDIOMEGALY AND PULMONARY VASCULAR CONGESTION. NO INFILTRATES, EDEMA, OR EFFUSIONS. Assessment/Plan:     1. Cavitating mass in right upper lung lobe  Patient is doing better since discharge from hospital shortness of breath significantly improved. He is going to have a navigational bronchoscopy for cavitary lung mass. Discussed with Dr. Dallis Burkitt he talk to patient and his wife. Plan for navigational bronchoscopy on January 6, 2023. I will request follow-up CT chest with less than 1 mm cuts for mapping for navigational bronc. .    - CT CHEST WO CONTRAST; Future    2. Pulmonary embolism and infarction [facility])  He is on Eliquis 5 mg p.o. twice daily. He will stop Eliquis 2 days prior to bronchoscopy. 3. Chronic obstructive pulmonary disease, unspecified COPD type ([facility]  He is using albuterol HFA as needed. Recommend PFT at later date. Return in about 4 weeks (around 1/19/2023) for Lung mass, COPD.       Kevin Martin MD

## 2022-12-28 ENCOUNTER — CARE COORDINATION (OUTPATIENT)
Dept: CARE COORDINATION | Age: 77
End: 2022-12-28

## 2022-12-28 NOTE — CARE COORDINATION
[facility] Care Transitions Follow Up Call    Care Transition Nurse contacted the patient by telephone to follow up after admission on 22. Verified name and  with patient as identifiers. Patient: Melani Trujillo  Patient : [id number]   MRN: [id number]  Reason for Admission: -22 PE and Infarction (R); Nodule (R) Lung. Discharge Date: 22 RARS: No data recorded    Needs to be reviewed by the provider   Additional needs identified to be addressed with provider: No  none             Method of communication with provider: none. Spoke with Pt who reports occasional BACA, and slight ankle edema. /70. Pulmonology appt completed 22. Pt is scheduled for Bronchoscopy 23; Pt aware to STOP Eliquis 2 days prior to procedure. Pt to have CT Chest prior to procedure; orders in chart. Pt given number to schedule [phone]. Pt denies needs. CTN will continue to follow. Addressed changes since last contact:  none  Discussed follow-up appointments. If no appointment was previously scheduled, appointment scheduling offered: Yes. Is follow up appointment scheduled within 7 days of discharge? Yes. Follow Up  Future Appointments   Date Time Provider Kay Bartlett   2022 10:20 AM SCHEDULE, MLOR TC AMHERST PCP TESTOSTERONE MLOX [facility]   2023 10:20 AM SCHEDULE, MLOR TC AM[facility]ST PCP TESTOSTERONE MLOX [facility]   2023 10:20 AM SCHEDULE, MLOR TC AMHERST PCP TESTOSTERONE MLOX [facility]   3/3/2023 10:00 AM Jah Avila MD [address]   3/22/2023  1:00 PM Delroy Vera  [address] [place] [address]     Non-[facility] follow up appointment(s):     Care Transition Nurse reviewed discharge instructions, medical action plan, and red flags with patient and discussed any barriers to care and/or understanding of plan of care after discharge.  Discussed appropriate site of care based on symptoms and resources available to patient including: PCP  Specialist  When to call [address] The patient agrees to contact the PCP office for questions related to their healthcare. Advance Care Planning:   not on file. Patients top risk factors for readmission: medical condition-PE, Right Lung Nodule  Interventions to address risk factors: Obtained and reviewed discharge summary and/or continuity of care documents    Offered patient enrollment in the Remote Patient Monitoring (RPM) program for in-home monitoring: Patient declined. Care Transitions Subsequent and Final Call    Subsequent and Final Calls  Care Transitions Interventions   Meds to Bed: Completed     Other Interventions:             Care Transition Nurse provided contact information for future needs. Plan for follow-up call in 5-7 days based on severity of symptoms and risk factors. Plan for next call: follow-up appointment-CT scheduled?     Red Sullivan LPN

## 2022-12-29 ENCOUNTER — NURSE ONLY (OUTPATIENT)
Dept: FAMILY MEDICINE CLINIC | Age: 77
End: 2022-12-29
Payer: MEDICARE

## 2022-12-29 DIAGNOSIS — E29.1 HYPOGONADISM IN MALE: Primary | ICD-10-CM

## 2022-12-29 PROCEDURE — 96372 THER/PROPH/DIAG INJ SC/IM: CPT | Performed by: INTERNAL MEDICINE

## 2022-12-29 RX ORDER — TESTOSTERONE CYPIONATE 200 MG/ML
200 INJECTION INTRAMUSCULAR ONCE
Status: COMPLETED | OUTPATIENT
Start: 2022-12-29 | End: 2022-12-29

## 2022-12-29 RX ADMIN — TESTOSTERONE CYPIONATE 200 MG: 200 INJECTION INTRAMUSCULAR at 10:58

## 2022-12-29 NOTE — PROGRESS NOTES
Patient given Testosterone 200mg IM right gluteal..  Will return in 2 weeks for next injection    Patient tolerated injection well.     Administrations This Visit       testosterone cypionate (DEPOTESTOTERONE CYPIONATE) injection 200 mg       Admin Date  12/29/2022 Action  Given Dose  200 mg Route  IntraMUSCular Administered By  Rose Sethi LPN Single

## 2023-01-03 ENCOUNTER — CARE COORDINATION (OUTPATIENT)
Dept: CARE COORDINATION | Age: 78
End: 2023-01-03

## 2023-01-03 DIAGNOSIS — I10 ESSENTIAL HYPERTENSION: Chronic | ICD-10-CM

## 2023-01-03 RX ORDER — METOPROLOL SUCCINATE 50 MG/1
150 TABLET, EXTENDED RELEASE ORAL DAILY
Qty: 270 TABLET | Refills: 3 | Status: SHIPPED | OUTPATIENT
Start: 2023-01-03

## 2023-01-03 NOTE — TELEPHONE ENCOUNTER
Pharmacy is requesting medication refill.  Please approve or deny this request.    Rx requested:  Requested Prescriptions     Pending Prescriptions Disp Refills    metoprolol succinate (TOPROL XL) 50 MG extended release tablet [Pharmacy Med Name: Metoprolol Succinate ER 50 MG Oral Tablet Extended Release 24 Hour] 270 tablet 3     Sig: Take 3 tablets by mouth daily         Last Office Visit:   12/15/2022      Next Visit Date:  Future Appointments   Date Time Provider Kay Bartlett   1/4/2023 10:00 AM CON[place] CT ROOM 1 [facility]   1/11/2023 10:20 AM SCHEDULE, GERBER MURRAY [facility]ST PCP TESTOSTERONE MLOX [facility]   1/25/2023 10:20 AM SCHEDULE, MLTESSA MURRAY AMHERST PCP TESTOSTERONE MLOX [facility]   3/3/2023 10:00 AM Yazmin Lobo MD [address]   3/22/2023  1:00 PM Reina Starks MD [facility]

## 2023-01-03 NOTE — RESULT ENCOUNTER NOTE
Normal vitamin D 37.8, CBC with stable low Hgb 13.6, lipid panel with high   CMP with borderline creatinine 1.21 and normal GFR calcium 10.1    Please notify patient that recent lab work shows blood counts with a stable mild anemia, his kidney function testing is stable, his liver function testing is normal, his vitamin D level is normal, and his cholesterol testing shows a bad cholesterol level at goal control and a mildly elevated triglyceride level we can continue to follow over time. Patient should continue with his current medications and keep follow-up visits with pulmonology for further evaluation of his lungs.

## 2023-01-03 NOTE — CARE COORDINATION
Attempted to call Moise Breaker to check the status of his application for [id number]. Wait time is over 2 hours, requested a call back.         [facility]   Medication Assistance  [address] [place] [address], and [place]    (W) [phone]  (W) [phone]

## 2023-01-04 ENCOUNTER — HOSPITAL ENCOUNTER (OUTPATIENT)
Dept: CT IMAGING | Age: 78
Discharge: HOME | End: 2023-01-06
Payer: MEDICARE

## 2023-01-04 ENCOUNTER — CARE COORDINATION (OUTPATIENT)
Dept: CASE MANAGEMENT | Age: 78
End: 2023-01-04

## 2023-01-04 DIAGNOSIS — J98.4 CAVITATING MASS IN RIGHT UPPER LUNG LOBE: ICD-10-CM

## 2023-01-04 PROCEDURE — G1010 CDSM STANSON: HCPCS

## 2023-01-04 NOTE — CARE COORDINATION
I got a hold of [employer] and patient was approved for his Trulicity for [id number]. Spoke with the patient to give him a heads up and let him know about [facility]. Patient let me know they sent me his info for Jardiance.         [address] [facility]   Medication Assistance  [address] [place] [address], and [employer]    (C) [phone]  (Y) [phone]

## 2023-01-04 NOTE — CARE COORDINATION
[facility] Care Transitions Follow Up Call    Care Transition Nurse contacted the family by telephone to follow up after admission on 23. Patient: Ajay Daily  Patient :    MRN: [id number]  Reason for Admission: PE and infarction  Discharge Date: 22 RARS: No data recorded    Needs to be reviewed by the provider   Additional needs identified to be addressed with provider: No  none             Method of communication with provider: none. -CTN able to speak with patient's wife Galen Phan (on communication release of information form)  on home phone  for final care transition call.   -No changes per Galen Phan, remains occasionally BACA, having some tiredness. Preparing for procedure on 23.  -Patient's wife denies any needs, questions, or concerns at this time.   -Care transitions to sign off as episode is ending. Addressed changes since last contact:  medications-stopped eliquis yesterday(1/3/23)in preparation for 23 bronchoscopy   CT chest without contrast completed today(23.)    Discussed follow-up appointments. If no appointment was previously scheduled, appointment scheduling offered: n/a. Is follow up appointment scheduled within 7 days of discharge?  Yes, completed PCP visit on 12/15/22,    Follow Up  Future Appointments   Date Time Provider Kay Bartlett   2023 10:20 AM SCHEDULE, GERBER COBB PCP TESTOSTERONE MLOX [facility]   2023 10:20 AM SCHEDULE, GERBER COBB PCP TESTOSTERONE MLOX [facility]   3/3/2023 10:00 AM Stan Mejía MD [facility]    3/22/2023  1:00 PM Romulo Albert MD [facility]     Non-[facility] follow up appointment(s): none      Patients top risk factors for readmission: medical condition-PE, CKD, HTN, CAD, COPD, DM and polypharmacy  Interventions to address risk factors: Scheduled appointment with PCP-continue consistent follow up  and Scheduled appointment with [facility] 23 procedure date         Care Transitions Subsequent and Final Call    Subsequent and Final Calls  Do you have any questions related to your medications?: No  Do you currently have any active services?: No  Do you have any needs or concerns that I can assist you with?: No  Identified Barriers: None  Care Transitions Interventions  No Identified Needs   Meds to Bed: Completed     Other Interventions:              No further follow-up call indicated based on severity of symptoms and risk factors.     Frances Hughes RN

## 2023-01-05 ENCOUNTER — ANESTHESIA EVENT (OUTPATIENT)
Dept: OPERATING ROOM | Age: 78
End: 2023-01-05
Payer: MEDICARE

## 2023-01-05 ENCOUNTER — TELEPHONE (OUTPATIENT)
Dept: PULMONOLOGY | Age: 78
End: 2023-01-05

## 2023-01-05 NOTE — TELEPHONE ENCOUNTER
[facility] CALLED IN TO HAVE YOU REVIEW THE PTS CT RESULTS.          TEST WAS DONE YESTERDAY 1/4/23

## 2023-01-06 ENCOUNTER — HOSPITAL ENCOUNTER (OUTPATIENT)
Age: 78
Setting detail: OUTPATIENT SURGERY
Discharge: HOME | End: 2023-01-06
Attending: INTERNAL MEDICINE | Admitting: INTERNAL MEDICINE
Payer: MEDICARE

## 2023-01-06 ENCOUNTER — APPOINTMENT (OUTPATIENT)
Dept: GENERAL RADIOLOGY | Age: 78
End: 2023-01-06
Attending: INTERNAL MEDICINE
Payer: MEDICARE

## 2023-01-06 ENCOUNTER — ANESTHESIA (OUTPATIENT)
Dept: OPERATING ROOM | Age: 78
End: 2023-01-06
Payer: MEDICARE

## 2023-01-06 ENCOUNTER — HOSPITAL ENCOUNTER (OUTPATIENT)
Dept: GENERAL RADIOLOGY | Age: 78
Setting detail: OUTPATIENT SURGERY
End: 2023-01-06
Attending: INTERNAL MEDICINE
Payer: MEDICARE

## 2023-01-06 VITALS
WEIGHT: 190 LBS | TEMPERATURE: 97.8 F | BODY MASS INDEX: 26.6 KG/M2 | DIASTOLIC BLOOD PRESSURE: 72 MMHG | HEART RATE: 98 BPM | HEIGHT: 71 IN | SYSTOLIC BLOOD PRESSURE: 142 MMHG | OXYGEN SATURATION: 99 % | RESPIRATION RATE: 18 BRPM

## 2023-01-06 DIAGNOSIS — R91.8 LUNG MASS: ICD-10-CM

## 2023-01-06 DIAGNOSIS — R52 PAIN: ICD-10-CM

## 2023-01-06 DIAGNOSIS — I26.99 PULMONARY EMBOLISM AND INFARCTION (HCC): Primary | ICD-10-CM

## 2023-01-06 DIAGNOSIS — C34.91 NON-SMALL CELL CANCER OF RIGHT LUNG (HCC): Primary | ICD-10-CM

## 2023-01-06 LAB
CHP ED QC CHECK: NORMAL
GLUCOSE BLD-MCNC: 131 MG/DL
GLUCOSE BLD-MCNC: 131 MG/DL (ref 70–99)
GLUCOSE BLD-MCNC: 133 MG/DL (ref 70–99)
INR BLD: 1.2
PERFORMED ON: ABNORMAL
PERFORMED ON: ABNORMAL
PROTHROMBIN TIME: 15 SEC (ref 12.3–14.9)

## 2023-01-06 PROCEDURE — 31624 DX BRONCHOSCOPE/LAVAGE: CPT | Performed by: INTERNAL MEDICINE

## 2023-01-06 PROCEDURE — 2709999900 HC NON-CHARGEABLE SUPPLY: Performed by: INTERNAL MEDICINE

## 2023-01-06 PROCEDURE — 7100000001 HC PACU RECOVERY - ADDTL 15 MIN: Performed by: INTERNAL MEDICINE

## 2023-01-06 PROCEDURE — 88342 IMHCHEM/IMCYTCHM 1ST ANTB: CPT

## 2023-01-06 PROCEDURE — 2500000003 HC RX 250 WO HCPCS

## 2023-01-06 PROCEDURE — 88112 CYTOPATH CELL ENHANCE TECH: CPT

## 2023-01-06 PROCEDURE — 87116 MYCOBACTERIA CULTURE: CPT

## 2023-01-06 PROCEDURE — 6360000002 HC RX W HCPCS: Performed by: INTERNAL MEDICINE

## 2023-01-06 PROCEDURE — 88173 CYTOPATH EVAL FNA REPORT: CPT

## 2023-01-06 PROCEDURE — 3603165200 HC BRNCHSC EBUS GUIDED SAMPL 1/2 NODE STATION/STRUX: Performed by: INTERNAL MEDICINE

## 2023-01-06 PROCEDURE — 31623 DX BRONCHOSCOPE/BRUSH: CPT | Performed by: INTERNAL MEDICINE

## 2023-01-06 PROCEDURE — 6360000002 HC RX W HCPCS

## 2023-01-06 PROCEDURE — 71045 X-RAY EXAM CHEST 1 VIEW: CPT

## 2023-01-06 PROCEDURE — 2580000003 HC RX 258: Performed by: INTERNAL MEDICINE

## 2023-01-06 PROCEDURE — 31629 BRONCHOSCOPY/NEEDLE BX EACH: CPT | Performed by: INTERNAL MEDICINE

## 2023-01-06 PROCEDURE — 3209999900 FLUORO FOR SURGICAL PROCEDURES

## 2023-01-06 PROCEDURE — 2720000010 HC SURG SUPPLY STERILE: Performed by: INTERNAL MEDICINE

## 2023-01-06 PROCEDURE — A4217 STERILE WATER/SALINE, 500 ML: HCPCS | Performed by: INTERNAL MEDICINE

## 2023-01-06 PROCEDURE — 88305 TISSUE EXAM BY PATHOLOGIST: CPT

## 2023-01-06 PROCEDURE — 7100000000 HC PACU RECOVERY - FIRST 15 MIN: Performed by: INTERNAL MEDICINE

## 2023-01-06 PROCEDURE — 2500000003 HC RX 250 WO HCPCS: Performed by: INTERNAL MEDICINE

## 2023-01-06 PROCEDURE — 3700000001 HC ADD 15 MINUTES (ANESTHESIA): Performed by: INTERNAL MEDICINE

## 2023-01-06 PROCEDURE — 7100000011 HC PHASE II RECOVERY - ADDTL 15 MIN: Performed by: INTERNAL MEDICINE

## 2023-01-06 PROCEDURE — 87102 FUNGUS ISOLATION CULTURE: CPT

## 2023-01-06 PROCEDURE — 88341 IMHCHEM/IMCYTCHM EA ADD ANTB: CPT

## 2023-01-06 PROCEDURE — 3700000000 HC ANESTHESIA ATTENDED CARE: Performed by: INTERNAL MEDICINE

## 2023-01-06 PROCEDURE — 31628 BRONCHOSCOPY/LUNG BX EACH: CPT | Performed by: INTERNAL MEDICINE

## 2023-01-06 PROCEDURE — 87070 CULTURE OTHR SPECIMN AEROBIC: CPT

## 2023-01-06 PROCEDURE — 31627 NAVIGATIONAL BRONCHOSCOPY: CPT | Performed by: INTERNAL MEDICINE

## 2023-01-06 PROCEDURE — 89051 BODY FLUID CELL COUNT: CPT

## 2023-01-06 PROCEDURE — 85610 PROTHROMBIN TIME: CPT

## 2023-01-06 PROCEDURE — 7100000010 HC PHASE II RECOVERY - FIRST 15 MIN: Performed by: INTERNAL MEDICINE

## 2023-01-06 RX ORDER — METOCLOPRAMIDE HYDROCHLORIDE 5 MG/ML
10 INJECTION INTRAMUSCULAR; INTRAVENOUS
Status: DISCONTINUED | OUTPATIENT
Start: 2023-01-06 | End: 2023-01-06 | Stop reason: HOSPADM

## 2023-01-06 RX ORDER — SODIUM CHLORIDE 0.9 % (FLUSH) 0.9 %
5-40 SYRINGE (ML) INJECTION EVERY 12 HOURS SCHEDULED
Status: DISCONTINUED | OUTPATIENT
Start: 2023-01-06 | End: 2023-01-06 | Stop reason: HOSPADM

## 2023-01-06 RX ORDER — MAGNESIUM HYDROXIDE 1200 MG/15ML
LIQUID ORAL CONTINUOUS PRN
Status: DISCONTINUED | OUTPATIENT
Start: 2023-01-06 | End: 2023-01-06 | Stop reason: HOSPADM

## 2023-01-06 RX ORDER — ONDANSETRON 2 MG/ML
4 INJECTION INTRAMUSCULAR; INTRAVENOUS
Status: DISCONTINUED | OUTPATIENT
Start: 2023-01-06 | End: 2023-01-06 | Stop reason: HOSPADM

## 2023-01-06 RX ORDER — PROPOFOL 10 MG/ML
INJECTION, EMULSION INTRAVENOUS PRN
Status: DISCONTINUED | OUTPATIENT
Start: 2023-01-06 | End: 2023-01-06 | Stop reason: SDUPTHER

## 2023-01-06 RX ORDER — LIDOCAINE HYDROCHLORIDE 20 MG/ML
INJECTION, SOLUTION EPIDURAL; INFILTRATION; INTRACAUDAL; PERINEURAL PRN
Status: DISCONTINUED | OUTPATIENT
Start: 2023-01-06 | End: 2023-01-06 | Stop reason: SDUPTHER

## 2023-01-06 RX ORDER — FENTANYL CITRATE 50 UG/ML
50 INJECTION, SOLUTION INTRAMUSCULAR; INTRAVENOUS EVERY 10 MIN PRN
Status: DISCONTINUED | OUTPATIENT
Start: 2023-01-06 | End: 2023-01-06 | Stop reason: HOSPADM

## 2023-01-06 RX ORDER — ROCURONIUM BROMIDE 10 MG/ML
INJECTION, SOLUTION INTRAVENOUS PRN
Status: DISCONTINUED | OUTPATIENT
Start: 2023-01-06 | End: 2023-01-06 | Stop reason: SDUPTHER

## 2023-01-06 RX ORDER — ONDANSETRON 2 MG/ML
INJECTION INTRAMUSCULAR; INTRAVENOUS PRN
Status: DISCONTINUED | OUTPATIENT
Start: 2023-01-06 | End: 2023-01-06 | Stop reason: SDUPTHER

## 2023-01-06 RX ORDER — DIPHENHYDRAMINE HYDROCHLORIDE 50 MG/ML
12.5 INJECTION INTRAMUSCULAR; INTRAVENOUS
Status: DISCONTINUED | OUTPATIENT
Start: 2023-01-06 | End: 2023-01-06 | Stop reason: HOSPADM

## 2023-01-06 RX ORDER — SODIUM CHLORIDE 0.9 % (FLUSH) 0.9 %
5-40 SYRINGE (ML) INJECTION PRN
Status: DISCONTINUED | OUTPATIENT
Start: 2023-01-06 | End: 2023-01-06 | Stop reason: HOSPADM

## 2023-01-06 RX ORDER — MEPERIDINE HYDROCHLORIDE 25 MG/ML
12.5 INJECTION INTRAMUSCULAR; INTRAVENOUS; SUBCUTANEOUS
Status: DISCONTINUED | OUTPATIENT
Start: 2023-01-06 | End: 2023-01-06 | Stop reason: HOSPADM

## 2023-01-06 RX ORDER — FENTANYL CITRATE 50 UG/ML
INJECTION, SOLUTION INTRAMUSCULAR; INTRAVENOUS PRN
Status: DISCONTINUED | OUTPATIENT
Start: 2023-01-06 | End: 2023-01-06 | Stop reason: SDUPTHER

## 2023-01-06 RX ORDER — SODIUM CHLORIDE 9 MG/ML
25 INJECTION, SOLUTION INTRAVENOUS PRN
Status: DISCONTINUED | OUTPATIENT
Start: 2023-01-06 | End: 2023-01-06 | Stop reason: HOSPADM

## 2023-01-06 RX ORDER — LIDOCAINE HYDROCHLORIDE 20 MG/ML
INJECTION, SOLUTION EPIDURAL; INFILTRATION; INTRACAUDAL; PERINEURAL PRN
Status: DISCONTINUED | OUTPATIENT
Start: 2023-01-06 | End: 2023-01-06 | Stop reason: HOSPADM

## 2023-01-06 RX ORDER — DEXAMETHASONE SODIUM PHOSPHATE 4 MG/ML
INJECTION, SOLUTION INTRA-ARTICULAR; INTRALESIONAL; INTRAMUSCULAR; INTRAVENOUS; SOFT TISSUE PRN
Status: DISCONTINUED | OUTPATIENT
Start: 2023-01-06 | End: 2023-01-06 | Stop reason: SDUPTHER

## 2023-01-06 RX ADMIN — Medication 0.2 MG: at 09:17

## 2023-01-06 RX ADMIN — ROCURONIUM BROMIDE 50 MG: 10 INJECTION, SOLUTION INTRAVENOUS at 08:55

## 2023-01-06 RX ADMIN — Medication 0.1 MG: at 09:26

## 2023-01-06 RX ADMIN — Medication 0.3 MG: at 09:28

## 2023-01-06 RX ADMIN — Medication 0.1 MG: at 09:14

## 2023-01-06 RX ADMIN — LIDOCAINE HYDROCHLORIDE 40 MG: 20 INJECTION, SOLUTION EPIDURAL; INFILTRATION; INTRACAUDAL; PERINEURAL at 08:55

## 2023-01-06 RX ADMIN — DEXAMETHASONE SODIUM PHOSPHATE 8 MG: 4 INJECTION, SOLUTION INTRAMUSCULAR; INTRAVENOUS at 09:01

## 2023-01-06 RX ADMIN — Medication 0.1 MG: at 09:06

## 2023-01-06 RX ADMIN — ONDANSETRON 4 MG: 2 INJECTION INTRAMUSCULAR; INTRAVENOUS at 09:27

## 2023-01-06 RX ADMIN — Medication 0.2 MG: at 09:20

## 2023-01-06 RX ADMIN — Medication 0.3 MG: at 09:22

## 2023-01-06 RX ADMIN — Medication 0.3 MG: at 09:34

## 2023-01-06 RX ADMIN — PROPOFOL 150 MG: 10 INJECTION, EMULSION INTRAVENOUS at 08:55

## 2023-01-06 RX ADMIN — FENTANYL CITRATE 25 MCG: 50 INJECTION, SOLUTION INTRAMUSCULAR; INTRAVENOUS at 08:53

## 2023-01-06 RX ADMIN — Medication 0.2 MG: at 09:37

## 2023-01-06 RX ADMIN — SUGAMMADEX 200 MG: 100 INJECTION, SOLUTION INTRAVENOUS at 09:41

## 2023-01-06 ASSESSMENT — PAIN SCALES - GENERAL
PAINLEVEL_OUTOF10: 0

## 2023-01-06 ASSESSMENT — LIFESTYLE VARIABLES: SMOKING_STATUS: 1

## 2023-01-06 NOTE — PROGRESS NOTES
Patient able to dress self, denies complaint of pain or dyspnea. SAO2 98% on room air, Breath sounds clear to anterior auscultation. Tolerating po fluids.

## 2023-01-06 NOTE — OP NOTE
Operative Note      Patient: Rita Zacarias  YOB: 1945  MRN: [id number]    Date of Procedure: 1/6/2023    Pre-Op Diagnosis: LUNG MASS    Post-Op Diagnosis:  Non-small cell lung cancer       Procedure(s):  NAVIGATIONAL BRONCHOSCOPY    Surgeon(s):  Carla Kerns MD    Assistant:   * No surgical staff found *    Anesthesia: General    Estimated Blood Loss (mL): Minimal    Complications: None    Specimens:   ID Type Source Tests Collected by Time Destination   1 : RIGHT UPPER LOBE BRONCHOALVEOLAR LAVAGE (BAL) Body Fluid Lung CELL COUNT WITH DIFFERENTIAL, BODY FLUID, CULTURE, FUNGUS, GRAM STAIN, CULTURE WITH SMEAR, ACID FAST BACILLIUS, CULTURE, RESPIRATORY Carla Kerns MD 1/6/2023 4541    2 : WASHING Body Fluid Lung CELL COUNT WITH DIFFERENTIAL, BODY FLUID, CULTURE, FUNGUS, GRAM STAIN, AFB STAIN, CULTURE, RESPIRATORY Carla Kerns MD 1/6/2023 4418    A : RIGHT UPPER LOBE BIOPSY RAPID READ Tissue Lung SURGICAL PATHOLOGY Carla Kerns MD 1/6/2023 3697    B : RIGHT UPPER LOBE FNA Tissue Lung CYTOLOGY, NON-GYN Carla Kerns MD 1/6/2023 0930    C : RIGHT UPPER LOBE BIOPSY Tissue Lung SURGICAL PATHOLOGY Carla Kerns MD 1/6/2023 9127    D : LYMPH NODE Tissue Lymph Node CYTOLOGY, HEATH-GYN Carla Kerns MD 1/6/2023 0841    E : RIGHT UPPER LOBE 50/50 Body Fluid Lung CYTOLOGY, PURVI Kerns MD 1/6/2023 0841        Implants:  * No implants in log *      Drains: * No LDAs found *    Findings: No endobronchial lesion or mucopurulent secretion    Rapid read indicate malignant cells, non-small cell lung cancer      Detailed Description of Procedure:   PROCEDURE:  Fiberoptic bronchoscopy with     Bronchoscopy, Diagnostic  Bronchoalveolar lavage, BAL  [place] biopsy under navigation bronchoscopy and fluoroscopy guided  Transbronchial biopsy, under navigation bronchoscopy and fluoroscopy guided  Transbronchial fine-needle aspiration under navigation bronchoscopy and fluoroscopy guidance  Navigational bronchoscopy      CONSENT:  The risks and benefits of this procedure were explained to the the patient . All questions were answered and alternative options explained. The patient has been assessed and He is stable to undergo conscious sedation as well as the procedure itself. MEDICATIONS USED:  general anesthesia   Topical lidocaine 1% without epinephrine, total quantity 6 mL.  1/20,000 epi, 2 cc           DESCRIPTION OF PROCEDURE:        fiberoptic Olympus bronchoscope was inserted via ET tube to the tracheobronchial tree, and visual airway inspection of the right upper lobe, right middle lobe, right lower lobe, left upper lobe, lingula, and left lower lobe was performed. There were no endobronchial lesions, mucopurulent secretions or active bleeding. Anatomy was normal to the segmental level. Navigational bronchoscopy was used and locatable guide advanced to the location of the lung nodule and biopsy channel skewed in place. Transbronchial fine-needle aspiration x8 obtained from the right upper lobe lung nodule under fluoroscopy and navigational bronchoscopy guided    Transbronchial biopsies X  8  were obtained from the right upper lobe lung nodule under navigation bronchoscopy and fluoroscopy guidance      fluoroscopic guided cytologic brushings X  1  was obtained from the right upper lobe lung nodule under navigation bronchoscopy and fluoroscopy guidance     Bronchoalveolar lavage of the  right upper lobe segment was then obtained. There was no significant bleeding post-biopsy. Post-procedure fluorosocopy revealed no evidence of pneumothorax.      ESTIMATED BLOOD LOSS:  less than 5 cc    COMPLICATIONS:  None'      Recommendation  Will refer patient to thoracic surgery I discussed with Dr. Iris Early   Will obtain PET scan and PFT  Resume Eliquis this evening or tomorrow morning if no significant hemoptysis, discussed with the patient and his wife  Discussed with  [facility]     Electronically signed by Linda Murray MD [facility],  on 1/6/2023 at 11:41 AM

## 2023-01-06 NOTE — DISCHARGE INSTRUCTIONS
Resume Eliquis today evening or tomorrow morning if no hemoptysis. Bronchoscopy: What to Expect at [facility]     Bronchoscopy lets your doctor look at your airway through a tube called a bronchoscope. Afterward, you may feel tired for 1 or 2 days. Your mouth may feel very dry for several hours after the procedure. You may also have a sore throat and a hoarse voice for a few days. Sucking on throat lozenges or gargling with warm salt water may help soothe your sore throat. If a sample of tissue (biopsy) was taken, you may spit up a small amount of blood or have bloody saliva. This is normal.  Ask your doctor when you can drive again. Do not smoke for at least 24 hours. This care sheet gives you a general idea about how long it will take for you to recover. But each person recovers at a different pace. Follow the steps below to get better as quickly as possible. How can you care for yourself at home? Activity    Do not eat anything for 2 hours after the procedure. Rest when you feel tired. Getting enough sleep will help you recover. Avoid strenuous activities, such as bicycle riding, jogging, weight lifting, or aerobic exercise, until your doctor says it is okay. Ask your doctor when you can drive again. Diet    You can eat your normal diet. If your stomach is upset, try bland, low-fat foods like plain rice, broiled chicken, toast, and yogurt. If it is painful to swallow, start out with cold drinks, flavored ice pops, and ice cream. Next, try soft foods like pudding, yogurt, canned or cooked fruit, scrambled eggs, and mashed potatoes. Avoid eating hard or scratchy foods like chips or raw vegetables. Avoid orange or tomato juice and other acidic foods that can sting the throat. Drink plenty of fluids to avoid becoming dehydrated (unless your doctor tells you not to). Medicines    Take pain medicines exactly as directed.   If the doctor gave you a prescription medicine for pain, take it as prescribed. If you are not taking a prescription pain medicine, ask your doctor if you can take an over-the-counter medicine. If you think your pain medicine is making you sick to your stomach: Take your medicine after meals (unless your doctor has told you not to). Ask your doctor for a different pain medicine. If your doctor prescribed antibiotics, take them as directed. Do not stop taking them just because you feel better. You need to take the full course of antibiotics. Follow-up care is a key part of your treatment and safety. Be sure to make and go to all appointments, and call your doctor if you are having problems. It's also a good idea to know your test results and keep a list of the medicines you take. When should you call for help? Call 911 anytime you think you may need emergency care. For example, call if:    You passed out (lost consciousness). You have sudden chest pain and shortness of breath. You cough up large amounts of bright red blood. You have severe pain in your chest.     You have severe trouble breathing. Call your doctor now or seek immediate medical care if:    You cough up more than a few tablespoons of blood. You have pain that does not get better after you take pain medicine. You have a fever over 100°F.     You still sound hoarse after a few days. You have bubbles under the skin around the collarbone. These may crackle and pop when you press on them. Watch closely for changes in your health, and be sure to contact your doctor if you have any problems. Where can you learn more? Go to http://www.woods.com/ and enter S288 to learn more about \"Bronchoscopy: What to Expect at Home. \"  Current as of: March 9, 2022               Content Version: 13.5  © 5195-8058 [employer], [employer]. Care instructions adapted under license by [facility] [place] ([facility]).  If you have questions about a medical condition or this instruction, always ask your healthcare professional. [place] [address] any warranty or liability for your use of this information.

## 2023-01-06 NOTE — ANESTHESIA PRE PROCEDURE
Department of Anesthesiology  Preprocedure Note       Name:  Ethelene Bumpers   Age:  68 y.o.  :  1945                                          MRN:  [id number]         Date:  2023      Surgeon: Jocelyne Coffey):  Coral Morillo MD    Procedure: Procedure(s):  NAVIGATIONAL BRONCHOSCOPY  C-ARM    Medications prior to admission:   Prior to Admission medications    Medication Sig Start Date End Date Taking? Authorizing Provider   metoprolol succinate (TOPROL XL) 50 MG extended release tablet Take 3 tablets by mouth daily 1/3/23   Cymraes Republic, MD   dulaglutide (TRULICITY) 1.5 IO/4.3ZZ SC injection Inject 0.5 mLs into the skin once a week 12/15/22 3/15/23  Cymraes Republic, MD   apixaban starter pack (ELIQUIS DVT/PE STARTER PACK) 5 MG TBPK tablet Take 1 tablet by mouth See Admin Instructions 22   Zen Ryan, MD   JANUVIA 100 MG tablet Take 1 tablet by mouth daily 22   Cymraes Republic, MD   rosuvastatin (CRESTOR) 20 MG tablet Take 1 tablet by mouth daily 22   Cymraes Republic, MD   tamsulosin [facility]) 0.4 MG capsule Take 2 capsules by mouth daily 22   Cymraes Republic, MD   ipratropium (ATROVENT) 0.06 % nasal spray 2 sprays by Nasal route 3 times daily 22   Cymraes Republic, MD   triamterene-hydroCHLOROthiazide [facility]) 37.5-25 MG per tablet Take 1 tablet by mouth daily 10/13/22   Cymraes Republic, MD   losartan (COZAAR) 100 MG tablet Take 1 tablet by mouth daily 10/13/22   Cymraes Republic, MD   methylPREDNISolone (MEDROL, JIGAR,) 4 MG tablet Take by mouth.   Patient not taking: Reported on 2023 10/7/22   CHEMO Greco   glipiZIDE (GLUCOTROL) 10 MG tablet Take 1 tablet by mouth 2 times daily (before meals) 22   Cymraes Republic, MD   amLODIPine (NORVASC) 10 MG tablet Take 1 tablet by mouth daily 6/3/22   [facility], MD   FREESTYLE LITE strip USE 1 STRIP TO CHECK GLUCOSE ONCE DAILY AS DIRECTED 21   Cymraes Republic, MD   Testosterone Cypionate 200 MG/ML SOLN 1 CC EVERY 2 WEEKS 9/15/21   Harish Rodríguez MD   fluticasone (FLONASE) 50 MCG/ACT nasal spray 1 spray by Nasal route daily 8/19/21   CHEMO Johnson   albuterol sulfate  (90 Base) MCG/ACT inhaler Inhale 2 puffs into the lungs every 6 hours as needed for Wheezing 7/8/21   Nhi St MD   testosterone cypionate (DEPOTESTOTERONE CYPIONATE) 200 MG/ML injection Inject 1 mL into the muscle every 14 days. Indications: PER ORDER in OV note 9/16/20  Patient taking differently: Inject 200 mg into the muscle every 14 days. Indications: PER ORDER in OV note 3/17/21 3/17/21 1/6/23  Harish Rodríguez MD   famotidine (PEPCID) 40 MG tablet Take 1 tablet by mouth every evening 7/28/20   Nhi St MD   Handicap Placard MISC by Does not apply route DX: COPD (J44.9), primary osteoarthritis involving multiple joints (M15.0), myasthenia gravis with exacerbation (G70.01)     EXPIRES: 05/2024 5/9/19   Nhi St MD   azaTHIOprine (IMURAN) 50 MG tablet Take 50 mg by mouth Take 2 tablets by mouth in the morning and 1 tablet in the evening. Historical Provider, MD   Cyanocobalamin (VITAMIN B 12 PO) Take 1,000 mg by mouth    Historical Provider, MD   nitroGLYCERIN (NITROSTAT) 0.4 MG SL tablet Place 1 tablet under the tongue every 5 minutes as needed for Chest pain 10/25/18   Nhi St MD   Blood Glucose Monitoring Suppl CHENCHO Test once daily. Dx: E11.29 3/20/18   Nhi St MD   Lancets MISC Test once daily. Dx: E11.29 3/8/18   Nhi St MD   ascorbic acid (VITAMIN C) 500 MG tablet Take 1,000 mg by mouth daily    Historical Provider, MD   CINNAMON PO Take 500 mg by mouth 2 times daily. Historical Provider, MD   aspirin 81 MG EC tablet Take 81 mg by mouth daily. Historical Provider, MD       Current medications:    No current facility-administered medications for this encounter. Allergies:     Allergies   Allergen Reactions    Invokana [Canagliflozin] Diarrhea  Metformin And Related Diarrhea    Other      There is a list scanned in media that pt can not take due to myasthenia gravis ,  Antibiotics       Problem List:    Patient Active Problem List   Diagnosis Code    Benign prostatic hyperplasia N40.0    HTN (hypertension) I10    Chronic low back pain M54.50, G89.29    Coronary artery disease involving native coronary artery of native heart with angina pectoris ([facility]) I25.119    Bilateral leg pain M79.604, M79.605    Chronic venous insufficiency I87.2    Gastroesophageal reflux disease K21.9    Abnormal LFTs R79.89    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    Fatty infiltration of liver K76.0    Perennial allergic rhinitis J30.89    Hyperlipidemia E78.5    History of colonic polyps Z86.010    Osteoarthritis of multiple joints M15.9    Pseudophakia of both eyes Z96.1    COPD (chronic obstructive pulmonary disease) ([facility]) J44.9    History of tobacco use Z87.891    Astigmatism, regular H52.229    Type 2 diabetes mellitus with microalbuminuria, without long-term current use of insulin ([facility]) E11.29, R80.9    Erectile dysfunction N52.9    Varicose veins of both lower extremities I83.93    Hypogonadism male E29.1    History of melanoma excision Z98.890, Z85.820    Status post coronary artery bypass grafts x 5 Z95.1    Myasthenia gravis with exacerbation ([facility]) G70.01    Close exposure to COVID-19 virus Z20.822    Vitamin D deficiency E55.9    Stage 3a chronic kidney disease ([facility]) N18.31    Depression F32. A    Unsteady gait R26.81    Pulmonary embolism and infarction ([facility]) I26.99    Nodule of right lung R91.1    Pulmonary cavitary lesion J98.4       Past Medical History:        Diagnosis Date    Atypical chest pain 8/30/2012    BPH (benign prostatic hypertrophy)     CAD (coronary artery disease)     Cataracts, bilateral     Chronic low back pain     COPD (chronic obstructive pulmonary disease) ([facility] 6/1/2006    DM2 (diabetes mellitus, type 2) ([address]     Duodenitis     Erectile dysfunction 3/6/2017    Fatty infiltration of liver 2014    GERD (gastroesophageal reflux disease)     History of melanoma excision 2017    Left ear 2017    History of tobacco use 2006    HTN (hypertension)     Hyperlipidemia     Malignant melanoma of face ([address]     Malignant melanoma of skin of face ([address]     RT ear , LT ear 2017    MG (myasthenia gravis) ([address]     Nephrolithiasis     Ocular myasthenia gravis ([address] 2018    Osteoarthritis of multiple joints     hands, hips    Perennial allergic rhinitis     Personal history of colonic polyps     Pseudophakia of both eyes     Stage 3a chronic kidney disease ([address] 2022    Status post coronary artery bypass grafts x 5 2018 @ [facility]    Tobacco abuse 2022    Type 2 diabetes mellitus with microalbuminuria, without long-term current use of insulin ([address] 3/6/2017    Varicose veins of both lower extremities 2017       Past Surgical History:        Procedure Laterality Date    ANGIOPLASTY      ARM SURGERY Right 10/25/2022    CARDIAC CATHETERIZATION      #17    CATARACT REMOVAL WITH IMPLANT Left     CATARACT REMOVAL WITH IMPLANT Right     COLONOSCOPY  2009    tubular adenomas    COLONOSCOPY  2013    diverticulosis, polyps, 5y repeat (DR NAVAS)    CORONARY ARTERY BYPASS GRAFT  07/02/2018    x5 (DR JETER @ [facility])    CORONARY ARTERY BYPASS GRAFT      5 vessel    MOHS SURGERY Left 2017    melanoma of left ear (DR GODWIN)    SKIN CANCER EXCISION Left 2017    TONSILLECTOMY      UPPER GASTROINTESTINAL ENDOSCOPY      duodenitis/ poss early signs of celiac disease       Social History:    Social History     Tobacco Use    Smoking status: Former     Packs/day: 1.00     Years: 41.00     Pack years: 41.00     Types: Cigarettes     Start date:      Quit date: 2002     Years since quittin.2     Passive exposure: Past    Smokeless tobacco: Never   Substance Use Topics    Alcohol use: Never                                Counseling given: Not Answered      Vital Signs (Current):   Vitals:    01/06/23 0730   BP: 121/65   Pulse: (!) 103   Resp: 18   Temp: 98.7 °F (37.1 °C)   TempSrc: Temporal   SpO2: 96%   Weight: 190 lb (86.2 kg)   Height: 5' 11\" (1.803 m)                                              BP Readings from Last 3 Encounters:   01/06/23 121/65   12/22/22 124/66   12/15/22 112/78       NPO Status: Time of last liquid consumption: 2300                        Time of last solid consumption: 2300                        Date of last liquid consumption: 01/05/23                        Date of last solid food consumption: 01/05/23    BMI:   Wt Readings from Last 3 Encounters:   01/06/23 190 lb (86.2 kg)   12/22/22 193 lb (87.5 kg)   12/15/22 188 lb (85.3 kg)     Body mass index is 26.5 kg/m².     CBC:   Lab Results   Component Value Date/Time    WBC 8.4 12/21/2022 08:42 AM    RBC 3.78 12/21/2022 08:42 AM    RBC 4.03 03/30/2012 08:03 AM    HGB 13.6 12/21/2022 08:42 AM    HCT 39.2 12/21/2022 08:42 AM    .7 12/21/2022 08:42 AM    RDW 15.8 12/21/2022 08:42 AM     12/21/2022 08:42 AM       CMP:   Lab Results   Component Value Date/Time     12/21/2022 08:42 AM    K 4.7 12/21/2022 08:42 AM    K 4.4 12/08/2022 05:30 AM    CL 97 12/21/2022 08:42 AM    CO2 25 12/21/2022 08:42 AM    BUN 16 12/21/2022 08:42 AM    CREATININE 1.21 12/21/2022 08:42 AM    GFRAA >60.0 05/19/2022 08:08 AM    LABGLOM >60.0 12/21/2022 08:42 AM    GLUCOSE 158 12/21/2022 08:42 AM    GLUCOSE 60 03/30/2012 08:03 AM    PROT 6.4 12/21/2022 08:42 AM    CALCIUM 10.1 12/21/2022 08:42 AM    BILITOT 0.3 12/21/2022 08:42 AM    ALKPHOS 37 12/21/2022 08:42 AM    AST 21 12/21/2022 08:42 AM    ALT 28 12/21/2022 08:42 AM       POC Tests:   Recent Labs     01/06/23  0759   POCGLU 133*       Coags:   Lab Results   Component Value Date/Time    PROTIME 15.0 01/06/2023 07:50 AM INR 1.2 01/06/2023 07:50 AM    APTT 26.9 12/06/2022 06:23 PM       HCG (If Applicable): No results found for: PREGTESTUR, PREGSERUM, HCG, HCGQUANT     ABGs: No results found for: PHART, PO2ART, BJS1BEQ, DDV6HIS, BEART, X8TPBPGI     Type & Screen (If Applicable):  No results found for: LABABO, LABRH    Drug/Infectious Status (If Applicable):  No results found for: HIV, HEPCAB    COVID-19 Screening (If Applicable):   Lab Results   Component Value Date/Time    COVID19 Detected 10/28/2022 12:28 PM    COVID19 Not Detected 04/25/2021 07:23 AM           Anesthesia Evaluation    Airway: Mallampati: II  TM distance: >3 FB   Neck ROM: full  Mouth opening: > = 3 FB   Dental:    (+) upper dentures and lower dentures      Pulmonary: breath sounds clear to auscultation  (+) COPD:  current smoker          Patient did not smoke on day of surgery. Cardiovascular:    (+) hypertension:, CAD: obstructive, CABG/stent: no interval change,       ECG reviewed  Rhythm: regular             Beta Blocker:  Dose within 24 Hrs         Neuro/Psych:   (+) neuromuscular disease:, psychiatric history:            GI/Hepatic/Renal:   (+) GERD: well controlled,           Endo/Other:    (+) DiabetesType II DM, well controlled, , .                 Abdominal:             Vascular:   + PE. Other Findings:           Anesthesia Plan      general     ASA 3       Induction: intravenous. MIPS: Postoperative opioids intended and Prophylactic antiemetics administered. Anesthetic plan and risks discussed with patient. Use of blood products discussed with patient whom consented to blood products. Plan discussed with CRNA.     Attending anesthesiologist reviewed and agrees with Preprocedure content                Angel Cornell MD   1/6/2023

## 2023-01-06 NOTE — ANESTHESIA POSTPROCEDURE EVALUATION
Department of Anesthesiology  Postprocedure Note    Patient: Arnaud Charlton  MRN: [id number]  YOB: 1945  Date of evaluation: 1/6/2023      Procedure Summary     Date: 01/06/23 Room / Location: [address]    Anesthesia Start: 8166 Anesthesia Stop: 7269    Procedure: NAVIGATIONAL BRONCHOSCOPY (Throat) Diagnosis:       Lung mass      (LUNG MASS)    Surgeons: Gloria Sethi MD Responsible Provider: Angel Cornell MD    Anesthesia Type: general ASA Status: 3          Anesthesia Type: No value filed.     Wayne Phase I:      [place] Phase II:        Anesthesia Post Evaluation    Patient location during evaluation: bedside  Patient participation: complete - patient participated  Level of consciousness: awake and alert  Airway patency: patent  Nausea & Vomiting: no nausea and no vomiting  Complications: no  Cardiovascular status: hemodynamically stable  Respiratory status: acceptable  Hydration status: euvolemic

## 2023-01-06 NOTE — PROGRESS NOTES
Patient tolerating sips of pop and pudding. Denies complaint of dyspnea or pain. Wife in with patient.

## 2023-01-06 NOTE — PROGRESS NOTES
1007 - Xray completed, perfect served Dr. Cheryl Trujillo was completed.      1025 - Education provided regarding incentive spirometer

## 2023-01-08 LAB
AFB STAIN: NORMAL
CULTURE, RESPIRATORY: NORMAL
PRELIMINARY: NORMAL
PRELIMINARY: NORMAL

## 2023-01-09 LAB
AFB STAIN: NORMAL
PRELIMINARY: NORMAL
PRELIMINARY: NORMAL

## 2023-01-11 ENCOUNTER — NURSE ONLY (OUTPATIENT)
Dept: FAMILY MEDICINE CLINIC | Age: 78
End: 2023-01-11
Payer: MEDICARE

## 2023-01-11 VITALS
OXYGEN SATURATION: 96 % | HEART RATE: 96 BPM | SYSTOLIC BLOOD PRESSURE: 118 MMHG | RESPIRATION RATE: 20 BRPM | DIASTOLIC BLOOD PRESSURE: 60 MMHG

## 2023-01-11 DIAGNOSIS — E29.1 HYPOGONADISM IN MALE: Primary | ICD-10-CM

## 2023-01-11 PROCEDURE — 96372 THER/PROPH/DIAG INJ SC/IM: CPT | Performed by: FAMILY MEDICINE

## 2023-01-11 RX ORDER — TESTOSTERONE CYPIONATE 200 MG/ML
200 INJECTION INTRAMUSCULAR ONCE
Status: COMPLETED | OUTPATIENT
Start: 2023-01-11 | End: 2023-01-11

## 2023-01-11 RX ADMIN — TESTOSTERONE CYPIONATE 200 MG: 200 INJECTION INTRAMUSCULAR at 10:29

## 2023-01-11 NOTE — PROGRESS NOTES
Patient given Testosterone 200mg IM left gluteal..  Will return in 2 weeks for next injection    Patient tolerated injection well.     Administrations This Visit       testosterone cypionate (DEPOTESTOTERONE CYPIONATE) injection 200 mg       Admin Date  01/11/2023 Action  Given Dose  200 mg Route  IntraMUSCular Administered By  Ruddy Bliss LPN

## 2023-01-12 ENCOUNTER — TELEPHONE (OUTPATIENT)
Dept: FAMILY MEDICINE CLINIC | Age: 78
End: 2023-01-12

## 2023-01-12 NOTE — TELEPHONE ENCOUNTER
Farhad Salguero from [facility] asking for CTA report from when pt was in ED and the follow up progress notes . Aminata Kohler      CTA - 12/6/2022  Follow up w/ Provider on 12/15/2022    [facility]   Fax : [phone]

## 2023-01-16 ENCOUNTER — HOSPITAL ENCOUNTER (OUTPATIENT)
Dept: PULMONOLOGY | Age: 78
Discharge: HOME | End: 2023-01-16
Payer: MEDICARE

## 2023-01-16 DIAGNOSIS — C34.91 NON-SMALL CELL CANCER OF RIGHT LUNG (HCC): ICD-10-CM

## 2023-01-16 PROCEDURE — 94726 PLETHYSMOGRAPHY LUNG VOLUMES: CPT

## 2023-01-16 PROCEDURE — 94060 EVALUATION OF WHEEZING: CPT

## 2023-01-16 PROCEDURE — 6360000002 HC RX W HCPCS

## 2023-01-16 PROCEDURE — 94729 DIFFUSING CAPACITY: CPT

## 2023-01-16 RX ORDER — ALBUTEROL SULFATE 2.5 MG/3ML
SOLUTION RESPIRATORY (INHALATION)
Status: COMPLETED
Start: 2023-01-16 | End: 2023-01-16

## 2023-01-16 RX ADMIN — ALBUTEROL SULFATE 2.5 MG: 2.5 SOLUTION RESPIRATORY (INHALATION) at 08:49

## 2023-01-17 ENCOUNTER — TELEPHONE (OUTPATIENT)
Dept: PULMONOLOGY | Age: 78
End: 2023-01-17

## 2023-01-17 NOTE — TELEPHONE ENCOUNTER
[facility] CALLED IN TO THE OFFICE TO CLARIFY ON THE PTS EDUARDOIS. SHE STATES PT HAS BEEN ON A STARTED PACK ALREADY AND SHE WANTED TO MAKE SURE YOU WANTED HIM TO DO ANOTHER STARTER PACK.         PLEASE ADVISE

## 2023-01-18 ENCOUNTER — HOSPITAL ENCOUNTER (OUTPATIENT)
Dept: CT IMAGING | Age: 78
Discharge: HOME | End: 2023-01-20
Payer: MEDICARE

## 2023-01-18 DIAGNOSIS — C34.91 NON-SMALL CELL CANCER OF RIGHT LUNG (HCC): ICD-10-CM

## 2023-01-18 PROCEDURE — 78815 PET IMAGE W/CT SKULL-THIGH: CPT

## 2023-01-18 PROCEDURE — A9552 F18 FDG: HCPCS | Performed by: INTERNAL MEDICINE

## 2023-01-18 PROCEDURE — 3430000000 HC RX DIAGNOSTIC RADIOPHARMACEUTICAL: Performed by: INTERNAL MEDICINE

## 2023-01-18 RX ORDER — FLUDEOXYGLUCOSE F 18 200 MCI/ML
14.1 INJECTION, SOLUTION INTRAVENOUS
Status: COMPLETED | OUTPATIENT
Start: 2023-01-18 | End: 2023-01-18

## 2023-01-18 RX ADMIN — FLUDEOXYGLUCOSE F 18 14.1 MILLICURIE: 200 INJECTION, SOLUTION INTRAVENOUS at 15:31

## 2023-01-19 DIAGNOSIS — I26.99 PULMONARY EMBOLISM AND INFARCTION (HCC): Primary | ICD-10-CM

## 2023-01-20 ENCOUNTER — PREP FOR PROCEDURE (OUTPATIENT)
Dept: CARDIOTHORACIC SURGERY | Age: 78
End: 2023-01-20

## 2023-01-20 ENCOUNTER — PATIENT MESSAGE (OUTPATIENT)
Dept: FAMILY MEDICINE CLINIC | Age: 78
End: 2023-01-20

## 2023-01-20 ENCOUNTER — CARE COORDINATION (OUTPATIENT)
Dept: CARE COORDINATION | Age: 78
End: 2023-01-20

## 2023-01-20 ENCOUNTER — OFFICE VISIT (OUTPATIENT)
Dept: CARDIOTHORACIC SURGERY | Age: 78
End: 2023-01-20
Payer: MEDICARE

## 2023-01-20 VITALS — WEIGHT: 190 LBS | HEART RATE: 91 BPM | BODY MASS INDEX: 26.6 KG/M2 | OXYGEN SATURATION: 95 % | HEIGHT: 71 IN

## 2023-01-20 DIAGNOSIS — R80.9 TYPE 2 DIABETES MELLITUS WITH MICROALBUMINURIA, WITHOUT LONG-TERM CURRENT USE OF INSULIN (HCC): Primary | ICD-10-CM

## 2023-01-20 DIAGNOSIS — Z86.711 HISTORY OF PULMONARY EMBOLISM: Primary | ICD-10-CM

## 2023-01-20 DIAGNOSIS — E11.29 TYPE 2 DIABETES MELLITUS WITH MICROALBUMINURIA, WITHOUT LONG-TERM CURRENT USE OF INSULIN (HCC): Chronic | ICD-10-CM

## 2023-01-20 DIAGNOSIS — R80.9 TYPE 2 DIABETES MELLITUS WITH MICROALBUMINURIA, WITHOUT LONG-TERM CURRENT USE OF INSULIN (HCC): Chronic | ICD-10-CM

## 2023-01-20 DIAGNOSIS — E11.29 TYPE 2 DIABETES MELLITUS WITH MICROALBUMINURIA, WITHOUT LONG-TERM CURRENT USE OF INSULIN (HCC): Primary | ICD-10-CM

## 2023-01-20 DIAGNOSIS — C34.11 MALIGNANT NEOPLASM OF UPPER LOBE OF RIGHT LUNG (HCC): Primary | ICD-10-CM

## 2023-01-20 PROCEDURE — G8417 CALC BMI ABV UP PARAM F/U: HCPCS | Performed by: THORACIC SURGERY (CARDIOTHORACIC VASCULAR SURGERY)

## 2023-01-20 PROCEDURE — 1123F ACP DISCUSS/DSCN MKR DOCD: CPT | Performed by: THORACIC SURGERY (CARDIOTHORACIC VASCULAR SURGERY)

## 2023-01-20 PROCEDURE — 99204 OFFICE O/P NEW MOD 45 MIN: CPT | Performed by: THORACIC SURGERY (CARDIOTHORACIC VASCULAR SURGERY)

## 2023-01-20 PROCEDURE — G8427 DOCREV CUR MEDS BY ELIG CLIN: HCPCS | Performed by: THORACIC SURGERY (CARDIOTHORACIC VASCULAR SURGERY)

## 2023-01-20 PROCEDURE — 1036F TOBACCO NON-USER: CPT | Performed by: THORACIC SURGERY (CARDIOTHORACIC VASCULAR SURGERY)

## 2023-01-20 PROCEDURE — G8484 FLU IMMUNIZE NO ADMIN: HCPCS | Performed by: THORACIC SURGERY (CARDIOTHORACIC VASCULAR SURGERY)

## 2023-01-20 RX ORDER — SODIUM CHLORIDE 0.9 % (FLUSH) 0.9 %
5-40 SYRINGE (ML) INJECTION PRN
Status: CANCELLED | OUTPATIENT
Start: 2023-01-20

## 2023-01-20 RX ORDER — SODIUM CHLORIDE 0.9 % (FLUSH) 0.9 %
5-40 SYRINGE (ML) INJECTION EVERY 12 HOURS SCHEDULED
Status: CANCELLED | OUTPATIENT
Start: 2023-01-20

## 2023-01-20 RX ORDER — SODIUM CHLORIDE 9 MG/ML
INJECTION, SOLUTION INTRAVENOUS PRN
Status: CANCELLED | OUTPATIENT
Start: 2023-01-20

## 2023-01-20 RX ORDER — SITAGLIPTIN 100 MG/1
100 TABLET, FILM COATED ORAL DAILY
Qty: 90 TABLET | Refills: 3 | Status: SHIPPED | OUTPATIENT
Start: 2023-01-20 | End: 2023-01-20 | Stop reason: ALTCHOICE

## 2023-01-20 ASSESSMENT — ENCOUNTER SYMPTOMS
DIARRHEA: 0
TROUBLE SWALLOWING: 0
NAUSEA: 0
STRIDOR: 0
COUGH: 0
VOMITING: 0
SORE THROAT: 0
ABDOMINAL PAIN: 0
ABDOMINAL DISTENTION: 0
WHEEZING: 0
CHOKING: 0
VOICE CHANGE: 0
CHEST TIGHTNESS: 0
SHORTNESS OF BREATH: 0

## 2023-01-20 NOTE — CARE COORDINATION
I was able to get the patient a [employer] for his [employer]. I have requested that the provider send a script to his local pharmacy for them to bill the last and get at no charge.           [address] [place] [address]   Medication Assistance  [address] [place] [address], and [address]    (K) [phone]  (B) [phone]

## 2023-01-20 NOTE — TELEPHONE ENCOUNTER
I spoke with the pharmacy and they were able to run the Jardiance through for a Zero copay with the last I was able to get him. Since I haven't been able to get a hold of the patient since this morning the pharmacy is putting a note on the script that he must stop the Januvia and Glipizide when he starts the [place]. Thank you for all your help.

## 2023-01-20 NOTE — CARE COORDINATION
I called [email or web address] and gave them the billing information to run for Jardiance. They were able to run the script through for a zero copay. They are also going to let the patient know to stop Januvia and Glipizide when he starts the [employer] [place]. I called and explained everything to Virginia Noyola as well.

## 2023-01-20 NOTE — TELEPHONE ENCOUNTER
When I spoke with his wife in December she mentioned someone wanting to switch him from [facility] [place] to [employer] [place]. I am just going off what the patient has requested assistance on, I am not sure if this is accurate.

## 2023-01-20 NOTE — PROGRESS NOTES
Patient agrees to start Jardiance in place of glipizide and [facility] [place]. He will continue with Trulicity. Medication coverage for Milagros Wu has been obtained by staff.

## 2023-01-20 NOTE — PROGRESS NOTES
Subjective:      Patient ID: Levy Daley is a 68 y.o. male who presents today for:  Chief Complaint   Patient presents with    New Patient       HPI  Patient has a recent pulmonary embolism which was found after he was not feeling well. CAT scan also found a mass in his right upper lobe. PET scan showed hyperactivity suspicious for malignancy. Bronchoscopic biopsy was performed and positive for squamous cell cancer. He was placed on Eliquis for the last 6 weeks due to the pulmonary embolism. He is back to his usual state of health. He denies any shortness of breath now. He is back to his usual level of activity.     Past Medical History:   Diagnosis Date    Atypical chest pain 8/30/2012    BPH (benign prostatic hypertrophy)     CAD (coronary artery disease)     Cataracts, bilateral     Chronic low back pain     COPD (chronic obstructive pulmonary disease) ([address] 6/1/2006    DM2 (diabetes mellitus, type 2) ([address]     Duodenitis     Erectile dysfunction 3/6/2017    Fatty infiltration of liver 11/19/2014    GERD (gastroesophageal reflux disease)     History of melanoma excision 12/11/2017    Left ear 09/2017    History of tobacco use 6/1/2006    HTN (hypertension)     Hyperlipidemia     Malignant melanoma of face ([address]     Malignant melanoma of skin of face ([address]     RT ear 2000, LT ear 2017    MG (myasthenia gravis) ([address]     Nephrolithiasis     Ocular myasthenia gravis ([address] 8/28/2018    Osteoarthritis of multiple joints     hands, hips    Perennial allergic rhinitis     Personal history of colonic polyps     Pseudophakia of both eyes     Stage 3a chronic kidney disease ([address] 9/29/2022    Status post coronary artery bypass grafts x 5 7/30/2018 07/2018 @ [facility]    Tobacco abuse 12/8/2022    Type 2 diabetes mellitus with microalbuminuria, without long-term current use of insulin ([address] 3/6/2017    Varicose veins of both lower extremities 9/11/2017      Past Surgical History:   Procedure Laterality Date ANGIOPLASTY      ARM SURGERY Right 10/25/2022    BRONCHOSCOPY  2023    DR Sherita Hunter    BRONCHOSCOPY N/A 2023    NAVIGATIONAL BRONCHOSCOPY performed by Gonsalo Monique MD at [place] [address]      #17    CATARACT REMOVAL WITH IMPLANT Left     CATARACT REMOVAL WITH IMPLANT Right     COLONOSCOPY  2009    tubular adenomas    COLONOSCOPY      diverticulosis, polyps, 5y repeat ( [address] [place] [address])    CORONARY ARTERY BYPASS GRAFT  07/02/2018    x5 (DR JETER @ [facility])    CORONARY ARTERY BYPASS GRAFT      5 vessel    MOHS SURGERY Left 2017    melanoma of left ear (DR GODWIN)    SKIN CANCER EXCISION Left 2017    TONSILLECTOMY      UPPER GASTROINTESTINAL ENDOSCOPY      duodenitis/ poss early signs of celiac disease     Social History     Socioeconomic History    Marital status:      Spouse name: Reyna Casillas    Number of children: 2    Years of education: 12+    Highest education level: Not on file   Occupational History    Occupation: "[employer]   Tobacco Use    Smoking status: Former     Packs/day: 1.00     Years: 41.00     Pack years: 41.00     Types: Cigarettes     Start date:      Quit date: 2002     Years since quittin.3     Passive exposure: Past    Smokeless tobacco: Never   Vaping Use    Vaping Use: Never used   Substance and Sexual Activity    Alcohol use: Never    Drug use: Never    Sexual activity: Not Currently     Partners: Female   Other Topics Concern    Not on file   Social History Narrative    Not on file     Social Determinants of Health     Financial Resource Strain: Low Risk     Difficulty of Paying Living Expenses: Not hard at all   Food Insecurity: No Food Insecurity    Worried About Running Out of Food in the Last Year: Never true    Ran Out of Food in the Last Year: Never true   Transportation Needs: Not on file   Physical Activity: Inactive    Days of Exercise per Week: 0 days    Minutes of Exercise per Session: 0 min   Stress: Not on file   Social Connections: Not on file   Intimate Partner Violence: Not on file   Housing Stability: Not on file     Family History   Problem Relation Age of Onset    Heart Disease Mother     Heart Disease Brother     Diabetes Maternal Grandfather      Allergies   Allergen Reactions    Invokana [Canagliflozin] Diarrhea    Metformin And Related Diarrhea    Other      There is a list scanned in media that pt can not take due to myasthenia gravis ,  Antibiotics     Current Outpatient Medications on File Prior to Visit   Medication Sig Dispense Refill    apixaban (ELIQUIS) 5 MG TABS tablet Take 1 tablet by mouth 2 times daily 60 tablet 3    metoprolol succinate (TOPROL XL) 50 MG extended release tablet Take 3 tablets by mouth daily 270 tablet 3    dulaglutide (TRULICITY) 1.5 BD/9.5GB SC injection Inject 0.5 mLs into the skin once a week 6 mL 1    JANUVIA 100 MG tablet Take 1 tablet by mouth daily 90 tablet 1    rosuvastatin (CRESTOR) 20 MG tablet Take 1 tablet by mouth daily 90 tablet 1    tamsulosin (FLOMAX) 0.4 MG capsule Take 2 capsules by mouth daily 180 capsule 1    ipratropium (ATROVENT) 0.06 % nasal spray 2 sprays by Nasal route 3 times daily 45 mL 3    triamterene-hydroCHLOROthiazide (MAXZIDE-25) 37.5-25 MG per tablet Take 1 tablet by mouth daily 90 tablet 1    losartan (COZAAR) 100 MG tablet Take 1 tablet by mouth daily 90 tablet 1    methylPREDNISolone (MEDROL, JIGAR,) 4 MG tablet Take by mouth.  1 kit 0    glipiZIDE (GLUCOTROL) 10 MG tablet Take 1 tablet by mouth 2 times daily (before meals) 180 tablet 3    amLODIPine (NORVASC) 10 MG tablet Take 1 tablet by mouth daily 90 tablet 3    FREESTYLE LITE strip USE 1 STRIP TO CHECK GLUCOSE ONCE DAILY AS DIRECTED 50 each 0    Testosterone Cypionate 200 MG/ML SOLN 1 CC EVERY 2 WEEKS 10 mL 3    fluticasone (FLONASE) 50 MCG/ACT nasal spray 1 spray by Nasal route daily 1 Bottle 0    albuterol sulfate  (90 Base) MCG/ACT inhaler Inhale 2 puffs into the lungs every 6 hours as needed for Wheezing 3 Inhaler 0    famotidine (PEPCID) 40 MG tablet Take 1 tablet by mouth every evening 90 tablet 3    Handicap Placard MISC by Does not apply route DX: COPD (J44.9), 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 Cyanocobalamin (VITAMIN B 12 PO) Take 1,000 mg by mouth      nitroGLYCERIN (NITROSTAT) 0.4 MG SL tablet Place 1 tablet under the tongue every 5 minutes as needed for Chest pain 25 tablet 0    Blood Glucose Monitoring Suppl CHENCHO Test once daily. Dx: E11.29 1 Device 0    Lancets MISC Test once daily. Dx: E11.29 100 each 3    ascorbic acid (VITAMIN C) 500 MG tablet Take 1,000 mg by mouth daily      CINNAMON PO Take 500 mg by mouth 2 times daily. aspirin 81 MG EC tablet Take 81 mg by mouth daily. testosterone cypionate (DEPOTESTOTERONE CYPIONATE) 200 MG/ML injection Inject 1 mL into the muscle every 14 days. Indications: PER ORDER in OV note 9/16/20 (Patient taking differently: Inject 200 mg into the muscle every 14 days. Indications: PER ORDER in OV note 3/17/21) 2 mL 3     No current facility-administered medications on file prior to visit. Review of Systems   Constitutional:  Negative for activity change, appetite change, chills, diaphoresis, fatigue, fever and unexpected weight change. HENT:  Negative for sore throat, trouble swallowing and voice change. Eyes:  Negative for visual disturbance. Respiratory:  Negative for cough, choking, chest tightness, shortness of breath, wheezing and stridor. Cardiovascular:  Negative for chest pain, palpitations and leg swelling. Gastrointestinal:  Negative for abdominal distention, abdominal pain, diarrhea, nausea and vomiting. Genitourinary:  Negative for difficulty urinating.    Musculoskeletal:  Negative for gait problem and joint swelling. Skin:  Negative for rash and wound. Neurological:  Negative for dizziness, seizures and light-headedness. Hematological:  Negative for adenopathy. Does not bruise/bleed easily. Psychiatric/Behavioral:  Negative for behavioral problems and confusion. Objective:   Pulse 91   Ht 5' 11\" (1.803 m)   Wt 190 lb (86.2 kg)   SpO2 95%   BMI 26.50 kg/m²     Physical Exam  Constitutional:       General: He is not in acute distress. Appearance: He is not ill-appearing, toxic-appearing or diaphoretic. HENT:      Head: Normocephalic and atraumatic. Nose: Nose normal.   Eyes:      Extraocular Movements: Extraocular movements intact. Conjunctiva/sclera: Conjunctivae normal.      Pupils: Pupils are equal, round, and reactive to light. Neck:      Vascular: No carotid bruit. Cardiovascular:      Rate and Rhythm: Normal rate and regular rhythm. Heart sounds: Normal heart sounds. No murmur heard. No gallop. Pulmonary:      Effort: Pulmonary effort is normal. No respiratory distress. Breath sounds: Normal breath sounds. No wheezing or rales. Abdominal:      General: Abdomen is flat. Bowel sounds are normal.      Palpations: Abdomen is soft. There is no mass. Tenderness: There is no abdominal tenderness. Musculoskeletal:         General: No swelling. Cervical back: Neck supple. Right lower leg: No edema. Left lower leg: No edema. Lymphadenopathy:      Cervical: No cervical adenopathy. Skin:     General: Skin is warm and dry. Neurological:      General: No focal deficit present. Mental Status: He is alert and oriented to person, place, and time. Psychiatric:         Mood and Affect: Mood normal.         Behavior: Behavior normal.         Thought Content:  Thought content normal.         Judgment: Judgment normal.             Radiographs and Laboratory Studies:     Diagnostic Imaging Studies:    I personally viewed his CAT scan and PET scan. He has a solid mass as well as an adjacent cavitary mass in his right upper lobe with hyperactivity consistent with squamous cell cancer. He has diffuse completely calcified hilar and mediastinal lymph nodes. He has changes consistent with open heart surgery. I see no other significant pulmonary disease. Pulmonary function tests are excellent        Assessment/Plan     Biopsy-proven right upper lobe squamous cell cancer in a man with excellent pulmonary function test and recent pulmonary embolism. I would like to get bilateral lower extremity Dopplers to make sure there is not a large clot burden still waiting to break off. If there is he may need a preoperative IVC filter. We will also need clearance from his cardiologist.  As long as all is cleared he agreed with a right VATS upper lobectomy on January 31. He understands the risk benefits potential outcomes and alternative therapies and wants to proceed. He is instructed to hold his Eliquis 3 days preoperatively. Diagnosis Orders   1. Malignant neoplasm of upper lobe of right lung ([address]          No orders of the defined types were placed in this encounter. No orders of the defined types were placed in this encounter. No follow-ups on file.       Lakia Mustafa MD

## 2023-01-23 ENCOUNTER — HOSPITAL ENCOUNTER (OUTPATIENT)
Dept: ULTRASOUND IMAGING | Age: 78
Discharge: HOME | End: 2023-01-25
Payer: MEDICARE

## 2023-01-23 DIAGNOSIS — Z86.711 HISTORY OF PULMONARY EMBOLISM: ICD-10-CM

## 2023-01-23 PROCEDURE — 93970 EXTREMITY STUDY: CPT

## 2023-01-23 NOTE — TELEPHONE ENCOUNTER
From: Juanita Toure  To: Dr. Collado Chucky: 1/20/2023 7:57 PM EST  Subject: Januvia and Glipizide    When refilling prescription today for Jardiance the pharmacist questioned whether I had been told to discontinue the Januvia and Glipizide. I have been still taking both. Should I discontinue one or both?

## 2023-01-23 NOTE — TELEPHONE ENCOUNTER
Please advise patient that he should discontinue Januvia and glipizide. Moving forward his diabetes will be treated with Jardiance and Trulicity like we discussed in the office.

## 2023-01-25 ENCOUNTER — NURSE ONLY (OUTPATIENT)
Dept: FAMILY MEDICINE CLINIC | Age: 78
End: 2023-01-25
Payer: MEDICARE

## 2023-01-25 DIAGNOSIS — E29.1 HYPOGONADISM IN MALE: Primary | ICD-10-CM

## 2023-01-25 PROCEDURE — 96372 THER/PROPH/DIAG INJ SC/IM: CPT | Performed by: FAMILY MEDICINE

## 2023-01-25 RX ORDER — TESTOSTERONE CYPIONATE 200 MG/ML
200 INJECTION INTRAMUSCULAR ONCE
Status: COMPLETED | OUTPATIENT
Start: 2023-01-25 | End: 2023-01-25

## 2023-01-25 RX ADMIN — TESTOSTERONE CYPIONATE 200 MG: 200 INJECTION INTRAMUSCULAR at 10:13

## 2023-01-25 NOTE — PROGRESS NOTES
Patient given Testosterone 200mg IM right gluteal..  Will return in 2 weeks for next injection    Patient tolerated injection well.     Administrations This Visit       testosterone cypionate (DEPOTESTOTERONE CYPIONATE) injection 200 mg       Admin Date  01/25/2023 Action  Given Dose  200 mg Route  IntraMUSCular Administered By  Etienne Arias LPN

## 2023-01-26 ENCOUNTER — OFFICE VISIT (OUTPATIENT)
Dept: PULMONOLOGY | Age: 78
End: 2023-01-26
Payer: MEDICARE

## 2023-01-26 ENCOUNTER — HOSPITAL ENCOUNTER (OUTPATIENT)
Dept: PREADMISSION TESTING | Age: 78
Discharge: HOME | End: 2023-01-30
Payer: MEDICARE

## 2023-01-26 VITALS
BODY MASS INDEX: 27.44 KG/M2 | HEIGHT: 71 IN | SYSTOLIC BLOOD PRESSURE: 118 MMHG | OXYGEN SATURATION: 95 % | WEIGHT: 196 LBS | HEART RATE: 90 BPM | TEMPERATURE: 97.1 F | RESPIRATION RATE: 16 BRPM | DIASTOLIC BLOOD PRESSURE: 62 MMHG

## 2023-01-26 DIAGNOSIS — I26.99 PULMONARY EMBOLISM AND INFARCTION (HCC): ICD-10-CM

## 2023-01-26 DIAGNOSIS — C34.91 SQUAMOUS CELL CARCINOMA LUNG, RIGHT (HCC): ICD-10-CM

## 2023-01-26 DIAGNOSIS — J44.9 CHRONIC OBSTRUCTIVE PULMONARY DISEASE, UNSPECIFIED COPD TYPE (HCC): Primary | ICD-10-CM

## 2023-01-26 LAB
ABO/RH: NORMAL
ANION GAP SERPL CALCULATED.3IONS-SCNC: 14 MEQ/L (ref 9–15)
ANTIBODY SCREEN: NORMAL
APTT: 32.8 SEC (ref 24.4–36.8)
BUN BLDV-MCNC: 22 MG/DL (ref 8–23)
CALCIUM SERPL-MCNC: 9.3 MG/DL (ref 8.5–9.9)
CHLORIDE BLD-SCNC: 98 MEQ/L (ref 95–107)
CO2: 25 MEQ/L (ref 20–31)
CREAT SERPL-MCNC: 1.36 MG/DL (ref 0.7–1.2)
GFR SERPL CREATININE-BSD FRML MDRD: 53.3 ML/MIN/{1.73_M2}
GLUCOSE BLD-MCNC: 385 MG/DL (ref 70–99)
HCT VFR BLD CALC: 40.6 % (ref 42–52)
HEMOGLOBIN: 13.7 G/DL (ref 14–18)
INR BLD: 1.4
MCH RBC QN AUTO: 33.7 PG (ref 27–31.3)
MCHC RBC AUTO-ENTMCNC: 33.7 % (ref 33–37)
MCV RBC AUTO: 100 FL (ref 79–92.2)
PDW BLD-RTO: 15.2 % (ref 11.5–14.5)
PLATELET # BLD: 201 K/UL (ref 130–400)
POTASSIUM SERPL-SCNC: 5 MEQ/L (ref 3.4–4.9)
PROTHROMBIN TIME: 17 SEC (ref 12.3–14.9)
RBC # BLD: 4.06 M/UL (ref 4.7–6.1)
SODIUM BLD-SCNC: 137 MEQ/L (ref 135–144)
WBC # BLD: 9 K/UL (ref 4.8–10.8)

## 2023-01-26 PROCEDURE — G8417 CALC BMI ABV UP PARAM F/U: HCPCS | Performed by: INTERNAL MEDICINE

## 2023-01-26 PROCEDURE — 86901 BLOOD TYPING SEROLOGIC RH(D): CPT

## 2023-01-26 PROCEDURE — 3023F SPIROM DOC REV: CPT | Performed by: INTERNAL MEDICINE

## 2023-01-26 PROCEDURE — G8427 DOCREV CUR MEDS BY ELIG CLIN: HCPCS | Performed by: INTERNAL MEDICINE

## 2023-01-26 PROCEDURE — 99214 OFFICE O/P EST MOD 30 MIN: CPT | Performed by: INTERNAL MEDICINE

## 2023-01-26 PROCEDURE — 1036F TOBACCO NON-USER: CPT | Performed by: INTERNAL MEDICINE

## 2023-01-26 PROCEDURE — 85730 THROMBOPLASTIN TIME PARTIAL: CPT

## 2023-01-26 PROCEDURE — 85027 COMPLETE CBC AUTOMATED: CPT

## 2023-01-26 PROCEDURE — 86900 BLOOD TYPING SEROLOGIC ABO: CPT

## 2023-01-26 PROCEDURE — 85610 PROTHROMBIN TIME: CPT

## 2023-01-26 PROCEDURE — 1123F ACP DISCUSS/DSCN MKR DOCD: CPT | Performed by: INTERNAL MEDICINE

## 2023-01-26 PROCEDURE — 3074F SYST BP LT 130 MM HG: CPT | Performed by: INTERNAL MEDICINE

## 2023-01-26 PROCEDURE — G8484 FLU IMMUNIZE NO ADMIN: HCPCS | Performed by: INTERNAL MEDICINE

## 2023-01-26 PROCEDURE — 3078F DIAST BP <80 MM HG: CPT | Performed by: INTERNAL MEDICINE

## 2023-01-26 PROCEDURE — 86850 RBC ANTIBODY SCREEN: CPT

## 2023-01-26 PROCEDURE — 80048 BASIC METABOLIC PNL TOTAL CA: CPT

## 2023-01-26 RX ORDER — UMECLIDINIUM BROMIDE AND VILANTEROL TRIFENATATE 62.5; 25 UG/1; UG/1
1 POWDER RESPIRATORY (INHALATION) DAILY
Qty: 1 EACH | Refills: 3 | Status: SHIPPED | OUTPATIENT
Start: 2023-01-26

## 2023-01-26 NOTE — PROGRESS NOTES
Subjective:     Niles Robertson is a 68 y.o. male who complains today of:     Chief Complaint   Patient presents with    Follow-up     3 Week F/U for Cavitating mass in right upper lung lobe and COPD    Results     Bronchoscopy Biopsy and PET Scan       HPI  Patient presents for lung cancer and COPD      Patient presents for follow-up, he is scheduled for VATS next week, I did not meet with oncology yet, he has dyspnea on exertion, mild he is still able to do his daily activities, no coughing, no chest pain, he gained 6 pounds recently, no lower extremity edema, no fever or chills, and no heartburn.       Allergies:  Invokana [canagliflozin], Metformin and related, and Other  Past Medical History:   Diagnosis Date    Atypical chest pain 8/30/2012    BPH (benign prostatic hypertrophy)     CAD (coronary artery disease)     Cataracts, bilateral     Chronic low back pain     COPD (chronic obstructive pulmonary disease) ([address] 6/1/2006    DM2 (diabetes mellitus, type 2) (HCC)     Duodenitis     Erectile dysfunction 3/6/2017    Fatty infiltration of liver 11/19/2014    GERD (gastroesophageal reflux disease)     History of melanoma excision 12/11/2017    Left ear 09/2017    History of tobacco use 6/1/2006    HTN (hypertension)     Hyperlipidemia     Malignant melanoma of face ([address]     Malignant melanoma of skin of face ([address]     RT ear 2000, LT ear 2017    MG (myasthenia gravis) ([address]     Nephrolithiasis     Ocular myasthenia gravis ([address] 8/28/2018    Osteoarthritis of multiple joints     hands, hips    Perennial allergic rhinitis     Personal history of colonic polyps     Pseudophakia of both eyes     Stage 3a chronic kidney disease ([address] 9/29/2022    Status post coronary artery bypass grafts x 5 7/30/2018 07/2018 @ [facility]    Tobacco abuse 12/8/2022    Type 2 diabetes mellitus with microalbuminuria, without long-term current use of insulin ([address] 3/6/2017    Varicose veins of both lower extremities 9/11/2017     Past Surgical History:   Procedure Laterality Date    ANGIOPLASTY      ARM SURGERY Right 10/25/2022    BRONCHOSCOPY  2023    DR Tom Ibarra    BRONCHOSCOPY N/A 2023    NAVIGATIONAL BRONCHOSCOPY performed by Augusto Coleman MD at [facility]      #17    CATARACT REMOVAL WITH IMPLANT Left     CATARACT REMOVAL WITH IMPLANT Right     COLONOSCOPY  2009    tubular adenomas    COLONOSCOPY      diverticulosis, polyps, 5y repeat (DR Jorden Whipple)    CORONARY ARTERY BYPASS GRAFT  07/02/2018    x5 (DR JETER @ [facility])    CORONARY ARTERY BYPASS GRAFT      5 vessel    MOHS SURGERY Left 2017    melanoma of left ear (DR GODWIN)    SKIN CANCER EXCISION Left 2017    TONSILLECTOMY      UPPER GASTROINTESTINAL ENDOSCOPY      duodenitis/ poss early signs of celiac disease     Family History   Problem Relation Age of Onset    Heart Disease Mother     Heart Disease Brother     Diabetes Maternal Grandfather      Social History     Socioeconomic History    Marital status:      Spouse name: Rosalba Gray    Number of children: 2    Years of education: 12+    Highest education level: Not on file   Occupational History    Occupation: Farming   Tobacco Use    Smoking status: Former     Packs/day: 1.00     Years: 41.00     Pack years: 41.00     Types: Cigarettes     Start date:      Quit date: 2002     Years since quittin.3     Passive exposure: Past    Smokeless tobacco: Never   Vaping Use    Vaping Use: Never used   Substance and Sexual Activity    Alcohol use: Never    Drug use: Never    Sexual activity: Not Currently     Partners: Female   Other Topics Concern    Not on file   Social History Narrative    Not on file     Social Determinants of Health     Financial Resource Strain: Low Risk     Difficulty of Paying Living Expenses: Not hard at all   Food Insecurity: No Food Insecurity    Worried About Running Out of Food in the Last Year: Never true    [address] in the Last Year: Never true   Transportation Needs: Not on file   Physical Activity: Inactive    Days of Exercise per Week: 0 days    Minutes of Exercise per Session: 0 min   Stress: Not on file   Social Connections: Not on file   Intimate Partner Violence: Not on file   Housing Stability: Not on file         Review of Systems      ROS: 10 organs review of system is done including general, psychological, ENT, hematological, endocrine, respiratory, cardiovascular, gastrointestinal,musculoskeletal, neurological,  allergy and Immunology is done and is otherwise negative. Current Outpatient Medications   Medication Sig Dispense Refill    Umeclidinium-Vilanterol (ANORO ELLIPTA) 62.5-25 MCG/ACT AEPB Inhale 1 puff into the lungs daily 1 each 3    empagliflozin (JARDIANCE) 10 MG tablet Take 1 tablet by mouth daily 90 tablet 3    apixaban (ELIQUIS) 5 MG TABS tablet Take 1 tablet by mouth 2 times daily 60 tablet 3    metoprolol succinate (TOPROL XL) 50 MG extended release tablet Take 3 tablets by mouth daily 270 tablet 3    dulaglutide (TRULICITY) 1.5 UB/9.1TV SC injection Inject 0.5 mLs into the skin once a week 6 mL 1    rosuvastatin (CRESTOR) 20 MG tablet Take 1 tablet by mouth daily 90 tablet 1    tamsulosin (FLOMAX) 0.4 MG capsule Take 2 capsules by mouth daily 180 capsule 1    ipratropium (ATROVENT) 0.06 % nasal spray 2 sprays by Nasal route 3 times daily 45 mL 3    triamterene-hydroCHLOROthiazide (MAXZIDE-25) 37.5-25 MG per tablet Take 1 tablet by mouth daily 90 tablet 1    losartan (COZAAR) 100 MG tablet Take 1 tablet by mouth daily 90 tablet 1    methylPREDNISolone (MEDROL, JIGAR,) 4 MG tablet Take by mouth.  1 kit 0    amLODIPine (NORVASC) 10 MG tablet Take 1 tablet by mouth daily 90 tablet 3    FREESTYLE LITE strip USE 1 STRIP TO CHECK GLUCOSE ONCE DAILY AS DIRECTED 50 each 0    Testosterone Cypionate 200 MG/ML SOLN 1 CC EVERY 2 WEEKS 10 mL 3    fluticasone (FLONASE) 50 MCG/ACT nasal spray 1 spray by Nasal route daily 1 Bottle 0 albuterol sulfate  (90 Base) MCG/ACT inhaler Inhale 2 puffs into the lungs every 6 hours as needed for Wheezing 3 Inhaler 0    famotidine (PEPCID) 40 MG tablet Take 1 tablet by mouth every evening 90 tablet 3    azaTHIOprine (IMURAN) 50 MG tablet Take 50 mg by mouth Take 2 tablets by mouth in the morning and 1 tablet in the evening. Cyanocobalamin (VITAMIN B 12 PO) Take 1,000 mg by mouth      nitroGLYCERIN (NITROSTAT) 0.4 MG SL tablet Place 1 tablet under the tongue every 5 minutes as needed for Chest pain 25 tablet 0    Blood Glucose Monitoring Suppl CHENCHO Test once daily. Dx: E11.29 1 Device 0    Lancets MISC Test once daily. Dx: E11.29 100 each 3    ascorbic acid (VITAMIN C) 500 MG tablet Take 1,000 mg by mouth daily      CINNAMON PO Take 500 mg by mouth 2 times daily. aspirin 81 MG EC tablet Take 81 mg by mouth daily. testosterone cypionate (DEPOTESTOTERONE CYPIONATE) 200 MG/ML injection Inject 1 mL into the muscle every 14 days. Indications: PER ORDER in OV note 9/16/20 (Patient taking differently: Inject 200 mg into the muscle every 14 days. Indications: PER ORDER in OV note 3/17/21) 2 mL 3    Handicap Placard MISC by Does not apply route DX: COPD (J44.9), primary osteoarthritis involving multiple joints (M15.0), myasthenia gravis with exacerbation (G70.01)     EXPIRES: 05/2024 (Patient not taking: Reported on 1/26/2023) 2 each 0     No current facility-administered medications for this visit. Objective:     Vitals:    01/26/23 1002   BP: 118/62   Site: Right Upper Arm   Position: Sitting   Cuff Size: Medium Adult   Pulse: 90   Resp: 16   Temp: 97.1 °F (36.2 °C)   TempSrc: Infrared   SpO2: 95%   Weight: 196 lb (88.9 kg)   Height: 5' 11\" (1.803 m)         Physical Exam  Constitutional:       Appearance: He is well-developed. HENT:      Head: Normocephalic and atraumatic. Eyes:      General:         Left eye: No discharge.       Conjunctiva/sclera: Conjunctivae normal. Pupils: Pupils are equal, round, and reactive to light. Neck:      Vascular: No JVD. Cardiovascular:      Rate and Rhythm: Normal rate and regular rhythm. Heart sounds: Normal heart sounds. No murmur heard. No friction rub. No gallop. Pulmonary:      Effort: Pulmonary effort is normal. No respiratory distress. Breath sounds: Normal breath sounds. No wheezing or rales. Chest:      Chest wall: No tenderness. Abdominal:      General: Bowel sounds are normal.      Palpations: Abdomen is soft. Musculoskeletal:         General: No tenderness or deformity. Cervical back: Normal range of motion and neck supple. Right lower leg: No edema. Left lower leg: No edema. Skin:     General: Skin is warm and dry. Neurological:      Mental Status: He is alert and oriented to person, place, and time. Psychiatric:         Judgment: Judgment normal.       Imaging studies reviewed and interpreted by me PET CT scan January 2023, no metastatic disease   Lab results reviewed in chart  PFT January 2023, FEV1 73% FEV1/FVC 0.71, postbronchodilator FEV1/FVC 0.66      Assessment and Plan       Diagnosis Orders   1. Chronic obstructive pulmonary disease, unspecified COPD type ([address]  Umeclidinium-Vilanterol (ANORO ELLIPTA) 62.5-25 MCG/ACT AEPB      2. Pulmonary embolism and infarction ([address]        3. Squamous cell carcinoma lung, right (HCC)          COPD, mild, symptomatic, will start Anoro, reviewed with the patient side effect including urinary retention and dry mucous membrane. Use albuterol as needed. History of PE currently on Eliquis continue same  Squamous cell carcinoma, no signs of distant metastases on PET scan, scheduled for VATS next week. No orders of the defined types were placed in this encounter.     Orders Placed This Encounter   Medications    Umeclidinium-Vilanterol (ANORO ELLIPTA) 62.5-25 MCG/ACT AEPB     Sig: Inhale 1 puff into the lungs daily     Dispense:  1 each Refill:  3            Discussed with patient the importance of exercise and weight control and  overall health and well-being. Reviewed with the patient: current clinical status, medications, activities and diet. Side effects, adverse effects of the medication prescribed today, as well as treatment plan and result expectations have been discussed with the patient who expresses understanding and desires to proceed. Return in about 6 weeks (around 3/9/2023).       Michaela Florian MD

## 2023-01-27 PROBLEM — C34.91 NON-SMALL CELL CANCER OF RIGHT LUNG (HCC): Status: ACTIVE | Noted: 2023-01-27

## 2023-01-30 ENCOUNTER — ANESTHESIA EVENT (OUTPATIENT)
Dept: OPERATING ROOM | Age: 78
End: 2023-01-30
Payer: MEDICARE

## 2023-01-31 ENCOUNTER — ANESTHESIA (OUTPATIENT)
Dept: OPERATING ROOM | Age: 78
End: 2023-01-31
Payer: MEDICARE

## 2023-01-31 ENCOUNTER — HOSPITAL ENCOUNTER (INPATIENT)
Age: 78
LOS: 2 days | Discharge: HOME | End: 2023-02-02
Attending: THORACIC SURGERY (CARDIOTHORACIC VASCULAR SURGERY) | Admitting: THORACIC SURGERY (CARDIOTHORACIC VASCULAR SURGERY)
Payer: MEDICARE

## 2023-01-31 ENCOUNTER — APPOINTMENT (OUTPATIENT)
Dept: GENERAL RADIOLOGY | Age: 78
End: 2023-01-31
Attending: THORACIC SURGERY (CARDIOTHORACIC VASCULAR SURGERY)
Payer: MEDICARE

## 2023-01-31 DIAGNOSIS — C34.11 MALIGNANT NEOPLASM OF UPPER LOBE OF RIGHT LUNG (HCC): ICD-10-CM

## 2023-01-31 DIAGNOSIS — Z90.2 STATUS POST LOBECTOMY OF LUNG: Primary | ICD-10-CM

## 2023-01-31 LAB
ANION GAP SERPL CALCULATED.3IONS-SCNC: 9 MEQ/L (ref 9–15)
BUN BLDV-MCNC: 28 MG/DL (ref 8–23)
CALCIUM SERPL-MCNC: 8.2 MG/DL (ref 8.5–9.9)
CHLORIDE BLD-SCNC: 100 MEQ/L (ref 95–107)
CO2: 24 MEQ/L (ref 20–31)
CREAT SERPL-MCNC: 1.45 MG/DL (ref 0.7–1.2)
GFR SERPL CREATININE-BSD FRML MDRD: 49.4 ML/MIN/{1.73_M2}
GLUCOSE BLD-MCNC: 165 MG/DL (ref 70–99)
GLUCOSE BLD-MCNC: 188 MG/DL (ref 70–99)
GLUCOSE BLD-MCNC: 195 MG/DL (ref 70–99)
GLUCOSE BLD-MCNC: 332 MG/DL (ref 70–99)
GLUCOSE BLD-MCNC: 368 MG/DL (ref 70–99)
GLUCOSE BLD-MCNC: 391 MG/DL (ref 70–99)
HCT VFR BLD CALC: 40.3 % (ref 42–52)
HEMOGLOBIN: 13.4 G/DL (ref 14–18)
MCH RBC QN AUTO: 34.3 PG (ref 27–31.3)
MCHC RBC AUTO-ENTMCNC: 33.3 % (ref 33–37)
MCV RBC AUTO: 103 FL (ref 79–92.2)
PDW BLD-RTO: 15.4 % (ref 11.5–14.5)
PERFORMED ON: ABNORMAL
PLATELET # BLD: 177 K/UL (ref 130–400)
POTASSIUM SERPL-SCNC: 5.5 MEQ/L (ref 3.4–4.9)
RBC # BLD: 3.91 M/UL (ref 4.7–6.1)
SODIUM BLD-SCNC: 133 MEQ/L (ref 135–144)
WBC # BLD: 15 K/UL (ref 4.8–10.8)

## 2023-01-31 PROCEDURE — 3700000000 HC ANESTHESIA ATTENDED CARE: Performed by: THORACIC SURGERY (CARDIOTHORACIC VASCULAR SURGERY)

## 2023-01-31 PROCEDURE — 2720000010 HC SURG SUPPLY STERILE: Performed by: THORACIC SURGERY (CARDIOTHORACIC VASCULAR SURGERY)

## 2023-01-31 PROCEDURE — 2709999900 HC NON-CHARGEABLE SUPPLY: Performed by: THORACIC SURGERY (CARDIOTHORACIC VASCULAR SURGERY)

## 2023-01-31 PROCEDURE — 3600000013 HC SURGERY LEVEL 3 ADDTL 15MIN: Performed by: THORACIC SURGERY (CARDIOTHORACIC VASCULAR SURGERY)

## 2023-01-31 PROCEDURE — 64999 UNLISTED PX NERVOUS SYSTEM: CPT | Performed by: STUDENT IN AN ORGANIZED HEALTH CARE EDUCATION/TRAINING PROGRAM

## 2023-01-31 PROCEDURE — 6370000000 HC RX 637 (ALT 250 FOR IP): Performed by: INTERNAL MEDICINE

## 2023-01-31 PROCEDURE — 07B74ZZ EXCISION OF THORAX LYMPHATIC, PERCUTANEOUS ENDOSCOPIC APPROACH: ICD-10-PCS | Performed by: THORACIC SURGERY (CARDIOTHORACIC VASCULAR SURGERY)

## 2023-01-31 PROCEDURE — 6360000002 HC RX W HCPCS: Performed by: STUDENT IN AN ORGANIZED HEALTH CARE EDUCATION/TRAINING PROGRAM

## 2023-01-31 PROCEDURE — 88305 TISSUE EXAM BY PATHOLOGIST: CPT

## 2023-01-31 PROCEDURE — 3700000001 HC ADD 15 MINUTES (ANESTHESIA): Performed by: THORACIC SURGERY (CARDIOTHORACIC VASCULAR SURGERY)

## 2023-01-31 PROCEDURE — 85027 COMPLETE CBC AUTOMATED: CPT

## 2023-01-31 PROCEDURE — 32674 THORACOSCOPY LYMPH NODE EXC: CPT | Performed by: THORACIC SURGERY (CARDIOTHORACIC VASCULAR SURGERY)

## 2023-01-31 PROCEDURE — 94664 DEMO&/EVAL PT USE INHALER: CPT

## 2023-01-31 PROCEDURE — 71045 X-RAY EXAM CHEST 1 VIEW: CPT

## 2023-01-31 PROCEDURE — 7100000001 HC PACU RECOVERY - ADDTL 15 MIN: Performed by: THORACIC SURGERY (CARDIOTHORACIC VASCULAR SURGERY)

## 2023-01-31 PROCEDURE — 6370000000 HC RX 637 (ALT 250 FOR IP): Performed by: THORACIC SURGERY (CARDIOTHORACIC VASCULAR SURGERY)

## 2023-01-31 PROCEDURE — 36415 COLL VENOUS BLD VENIPUNCTURE: CPT

## 2023-01-31 PROCEDURE — 2580000003 HC RX 258: Performed by: STUDENT IN AN ORGANIZED HEALTH CARE EDUCATION/TRAINING PROGRAM

## 2023-01-31 PROCEDURE — 2580000003 HC RX 258: Performed by: THORACIC SURGERY (CARDIOTHORACIC VASCULAR SURGERY)

## 2023-01-31 PROCEDURE — 2500000003 HC RX 250 WO HCPCS

## 2023-01-31 PROCEDURE — 80048 BASIC METABOLIC PNL TOTAL CA: CPT

## 2023-01-31 PROCEDURE — 6360000002 HC RX W HCPCS: Performed by: THORACIC SURGERY (CARDIOTHORACIC VASCULAR SURGERY)

## 2023-01-31 PROCEDURE — 7100000000 HC PACU RECOVERY - FIRST 15 MIN: Performed by: THORACIC SURGERY (CARDIOTHORACIC VASCULAR SURGERY)

## 2023-01-31 PROCEDURE — 2060000000 HC ICU INTERMEDIATE R&B

## 2023-01-31 PROCEDURE — 88309 TISSUE EXAM BY PATHOLOGIST: CPT

## 2023-01-31 PROCEDURE — 0BTC4ZZ RESECTION OF RIGHT UPPER LUNG LOBE, PERCUTANEOUS ENDOSCOPIC APPROACH: ICD-10-PCS | Performed by: THORACIC SURGERY (CARDIOTHORACIC VASCULAR SURGERY)

## 2023-01-31 PROCEDURE — 3600000003 HC SURGERY LEVEL 3 BASE: Performed by: THORACIC SURGERY (CARDIOTHORACIC VASCULAR SURGERY)

## 2023-01-31 PROCEDURE — C1713 ANCHOR/SCREW BN/BN,TIS/BN: HCPCS | Performed by: THORACIC SURGERY (CARDIOTHORACIC VASCULAR SURGERY)

## 2023-01-31 PROCEDURE — C1729 CATH, DRAINAGE: HCPCS | Performed by: THORACIC SURGERY (CARDIOTHORACIC VASCULAR SURGERY)

## 2023-01-31 PROCEDURE — 6360000002 HC RX W HCPCS

## 2023-01-31 PROCEDURE — 32663 THORACOSCOPY W/LOBECTOMY: CPT | Performed by: THORACIC SURGERY (CARDIOTHORACIC VASCULAR SURGERY)

## 2023-01-31 RX ORDER — FLUTICASONE PROPIONATE 50 MCG
1 SPRAY, SUSPENSION (ML) NASAL DAILY
Status: DISCONTINUED | OUTPATIENT
Start: 2023-01-31 | End: 2023-02-02 | Stop reason: HOSPADM

## 2023-01-31 RX ORDER — ALBUTEROL SULFATE 90 UG/1
2 AEROSOL, METERED RESPIRATORY (INHALATION) EVERY 6 HOURS PRN
Status: DISCONTINUED | OUTPATIENT
Start: 2023-01-31 | End: 2023-02-02 | Stop reason: HOSPADM

## 2023-01-31 RX ORDER — ACETAMINOPHEN 325 MG/1
650 TABLET ORAL EVERY 4 HOURS PRN
Status: DISCONTINUED | OUTPATIENT
Start: 2023-01-31 | End: 2023-02-02 | Stop reason: HOSPADM

## 2023-01-31 RX ORDER — POLYETHYLENE GLYCOL 3350 17 G/17G
17 POWDER, FOR SOLUTION ORAL DAILY PRN
Status: DISCONTINUED | OUTPATIENT
Start: 2023-01-31 | End: 2023-02-01

## 2023-01-31 RX ORDER — INSULIN LISPRO 100 [IU]/ML
8 INJECTION, SOLUTION INTRAVENOUS; SUBCUTANEOUS ONCE
Status: COMPLETED | OUTPATIENT
Start: 2023-01-31 | End: 2023-01-31

## 2023-01-31 RX ORDER — DIPHENHYDRAMINE HYDROCHLORIDE 50 MG/ML
12.5 INJECTION INTRAMUSCULAR; INTRAVENOUS
Status: DISCONTINUED | OUTPATIENT
Start: 2023-01-31 | End: 2023-01-31 | Stop reason: HOSPADM

## 2023-01-31 RX ORDER — MAGNESIUM HYDROXIDE 1200 MG/15ML
LIQUID ORAL PRN
Status: DISCONTINUED | OUTPATIENT
Start: 2023-01-31 | End: 2023-01-31 | Stop reason: ALTCHOICE

## 2023-01-31 RX ORDER — SODIUM CHLORIDE 0.9 % (FLUSH) 0.9 %
5-40 SYRINGE (ML) INJECTION PRN
Status: DISCONTINUED | OUTPATIENT
Start: 2023-01-31 | End: 2023-01-31 | Stop reason: HOSPADM

## 2023-01-31 RX ORDER — LIDOCAINE HYDROCHLORIDE 20 MG/ML
INJECTION, SOLUTION INTRAVENOUS PRN
Status: DISCONTINUED | OUTPATIENT
Start: 2023-01-31 | End: 2023-01-31 | Stop reason: SDUPTHER

## 2023-01-31 RX ORDER — ONDANSETRON 4 MG/1
4 TABLET, ORALLY DISINTEGRATING ORAL EVERY 8 HOURS PRN
Status: DISCONTINUED | OUTPATIENT
Start: 2023-01-31 | End: 2023-02-02 | Stop reason: HOSPADM

## 2023-01-31 RX ORDER — SODIUM CHLORIDE 0.9 % (FLUSH) 0.9 %
5-40 SYRINGE (ML) INJECTION EVERY 12 HOURS SCHEDULED
Status: DISCONTINUED | OUTPATIENT
Start: 2023-01-31 | End: 2023-01-31 | Stop reason: HOSPADM

## 2023-01-31 RX ORDER — NALOXONE HYDROCHLORIDE 0.4 MG/ML
INJECTION, SOLUTION INTRAMUSCULAR; INTRAVENOUS; SUBCUTANEOUS PRN
Status: DISCONTINUED | OUTPATIENT
Start: 2023-01-31 | End: 2023-02-01

## 2023-01-31 RX ORDER — DEXTROSE MONOHYDRATE 100 MG/ML
INJECTION, SOLUTION INTRAVENOUS CONTINUOUS PRN
Status: DISCONTINUED | OUTPATIENT
Start: 2023-01-31 | End: 2023-02-02 | Stop reason: HOSPADM

## 2023-01-31 RX ORDER — SODIUM CHLORIDE 9 MG/ML
INJECTION, SOLUTION INTRAVENOUS CONTINUOUS
Status: DISCONTINUED | OUTPATIENT
Start: 2023-01-31 | End: 2023-02-01

## 2023-01-31 RX ORDER — ROPIVACAINE HYDROCHLORIDE 5 MG/ML
INJECTION, SOLUTION EPIDURAL; INFILTRATION; PERINEURAL
Status: COMPLETED | OUTPATIENT
Start: 2023-01-31 | End: 2023-01-31

## 2023-01-31 RX ORDER — SODIUM CHLORIDE 9 MG/ML
INJECTION, SOLUTION INTRAVENOUS PRN
Status: DISCONTINUED | OUTPATIENT
Start: 2023-01-31 | End: 2023-01-31 | Stop reason: HOSPADM

## 2023-01-31 RX ORDER — FENTANYL CITRATE 0.05 MG/ML
50 INJECTION, SOLUTION INTRAMUSCULAR; INTRAVENOUS EVERY 10 MIN PRN
Status: DISCONTINUED | OUTPATIENT
Start: 2023-01-31 | End: 2023-01-31 | Stop reason: HOSPADM

## 2023-01-31 RX ORDER — TRIAMTERENE AND HYDROCHLOROTHIAZIDE 37.5; 25 MG/1; MG/1
1 TABLET ORAL DAILY
Status: DISCONTINUED | OUTPATIENT
Start: 2023-01-31 | End: 2023-02-02 | Stop reason: HOSPADM

## 2023-01-31 RX ORDER — OXYCODONE HYDROCHLORIDE 5 MG/1
5 TABLET ORAL
Status: DISCONTINUED | OUTPATIENT
Start: 2023-01-31 | End: 2023-01-31 | Stop reason: HOSPADM

## 2023-01-31 RX ORDER — INSULIN LISPRO 100 [IU]/ML
0-4 INJECTION, SOLUTION INTRAVENOUS; SUBCUTANEOUS
Status: DISCONTINUED | OUTPATIENT
Start: 2023-01-31 | End: 2023-02-02 | Stop reason: HOSPADM

## 2023-01-31 RX ORDER — ONDANSETRON 2 MG/ML
4 INJECTION INTRAMUSCULAR; INTRAVENOUS EVERY 6 HOURS PRN
Status: DISCONTINUED | OUTPATIENT
Start: 2023-01-31 | End: 2023-02-02 | Stop reason: HOSPADM

## 2023-01-31 RX ORDER — ROCURONIUM BROMIDE 10 MG/ML
INJECTION, SOLUTION INTRAVENOUS PRN
Status: DISCONTINUED | OUTPATIENT
Start: 2023-01-31 | End: 2023-01-31 | Stop reason: SDUPTHER

## 2023-01-31 RX ORDER — LIDOCAINE HYDROCHLORIDE 10 MG/ML
1 INJECTION, SOLUTION EPIDURAL; INFILTRATION; INTRACAUDAL; PERINEURAL
Status: DISCONTINUED | OUTPATIENT
Start: 2023-01-31 | End: 2023-01-31 | Stop reason: HOSPADM

## 2023-01-31 RX ORDER — PROPOFOL 10 MG/ML
INJECTION, EMULSION INTRAVENOUS PRN
Status: DISCONTINUED | OUTPATIENT
Start: 2023-01-31 | End: 2023-01-31 | Stop reason: SDUPTHER

## 2023-01-31 RX ORDER — ENOXAPARIN SODIUM 100 MG/ML
40 INJECTION SUBCUTANEOUS DAILY
Status: DISCONTINUED | OUTPATIENT
Start: 2023-01-31 | End: 2023-02-02 | Stop reason: HOSPADM

## 2023-01-31 RX ORDER — MAGNESIUM SULFATE 1 G/100ML
1000 INJECTION INTRAVENOUS PRN
Status: DISCONTINUED | OUTPATIENT
Start: 2023-01-31 | End: 2023-02-02 | Stop reason: HOSPADM

## 2023-01-31 RX ORDER — AZATHIOPRINE 50 MG/1
50 TABLET ORAL 2 TIMES DAILY
Status: DISCONTINUED | OUTPATIENT
Start: 2023-01-31 | End: 2023-01-31 | Stop reason: SDUPTHER

## 2023-01-31 RX ORDER — AZATHIOPRINE 50 MG/1
50 TABLET ORAL NIGHTLY
Status: DISCONTINUED | OUTPATIENT
Start: 2023-01-31 | End: 2023-02-02 | Stop reason: HOSPADM

## 2023-01-31 RX ORDER — ROSUVASTATIN CALCIUM 20 MG/1
20 TABLET, COATED ORAL DAILY
Status: DISCONTINUED | OUTPATIENT
Start: 2023-01-31 | End: 2023-02-02 | Stop reason: HOSPADM

## 2023-01-31 RX ORDER — CEFAZOLIN SODIUM IN 0.9 % NACL 2 G/100 ML
2000 PLASTIC BAG, INJECTION (ML) INTRAVENOUS
Status: COMPLETED | OUTPATIENT
Start: 2023-01-31 | End: 2023-01-31

## 2023-01-31 RX ORDER — OMEPRAZOLE 20 MG/1
CAPSULE, DELAYED RELEASE ORAL
COMMUNITY
Start: 2023-01-31

## 2023-01-31 RX ORDER — FENTANYL CITRATE 50 UG/ML
INJECTION, SOLUTION INTRAMUSCULAR; INTRAVENOUS PRN
Status: DISCONTINUED | OUTPATIENT
Start: 2023-01-31 | End: 2023-01-31 | Stop reason: SDUPTHER

## 2023-01-31 RX ORDER — SODIUM CHLORIDE 0.9 % (FLUSH) 0.9 %
5-40 SYRINGE (ML) INJECTION EVERY 12 HOURS SCHEDULED
Status: DISCONTINUED | OUTPATIENT
Start: 2023-01-31 | End: 2023-02-02 | Stop reason: HOSPADM

## 2023-01-31 RX ORDER — TAMSULOSIN HYDROCHLORIDE 0.4 MG/1
0.8 CAPSULE ORAL DAILY
Status: DISCONTINUED | OUTPATIENT
Start: 2023-01-31 | End: 2023-02-02 | Stop reason: HOSPADM

## 2023-01-31 RX ORDER — ONDANSETRON 2 MG/ML
INJECTION INTRAMUSCULAR; INTRAVENOUS PRN
Status: DISCONTINUED | OUTPATIENT
Start: 2023-01-31 | End: 2023-01-31 | Stop reason: SDUPTHER

## 2023-01-31 RX ORDER — INSULIN LISPRO 100 [IU]/ML
0-4 INJECTION, SOLUTION INTRAVENOUS; SUBCUTANEOUS NIGHTLY
Status: DISCONTINUED | OUTPATIENT
Start: 2023-01-31 | End: 2023-02-02 | Stop reason: HOSPADM

## 2023-01-31 RX ORDER — MIDAZOLAM HYDROCHLORIDE 1 MG/ML
INJECTION INTRAMUSCULAR; INTRAVENOUS PRN
Status: DISCONTINUED | OUTPATIENT
Start: 2023-01-31 | End: 2023-01-31 | Stop reason: SDUPTHER

## 2023-01-31 RX ORDER — SODIUM CHLORIDE 0.9 % (FLUSH) 0.9 %
5-40 SYRINGE (ML) INJECTION PRN
Status: DISCONTINUED | OUTPATIENT
Start: 2023-01-31 | End: 2023-02-02 | Stop reason: HOSPADM

## 2023-01-31 RX ORDER — AMLODIPINE BESYLATE 10 MG/1
10 TABLET ORAL DAILY
Status: DISCONTINUED | OUTPATIENT
Start: 2023-01-31 | End: 2023-02-02 | Stop reason: HOSPADM

## 2023-01-31 RX ORDER — DEXTROSE AND SODIUM CHLORIDE 5; .9 G/100ML; G/100ML
INJECTION, SOLUTION INTRAVENOUS CONTINUOUS
Status: DISCONTINUED | OUTPATIENT
Start: 2023-01-31 | End: 2023-01-31

## 2023-01-31 RX ORDER — SODIUM CHLORIDE 9 MG/ML
25 INJECTION, SOLUTION INTRAVENOUS PRN
Status: DISCONTINUED | OUTPATIENT
Start: 2023-01-31 | End: 2023-01-31 | Stop reason: HOSPADM

## 2023-01-31 RX ORDER — DEXAMETHASONE SODIUM PHOSPHATE 4 MG/ML
INJECTION, SOLUTION INTRA-ARTICULAR; INTRALESIONAL; INTRAMUSCULAR; INTRAVENOUS; SOFT TISSUE PRN
Status: DISCONTINUED | OUTPATIENT
Start: 2023-01-31 | End: 2023-01-31 | Stop reason: SDUPTHER

## 2023-01-31 RX ORDER — SODIUM CHLORIDE 9 MG/ML
INJECTION, SOLUTION INTRAVENOUS PRN
Status: DISCONTINUED | OUTPATIENT
Start: 2023-01-31 | End: 2023-02-02 | Stop reason: HOSPADM

## 2023-01-31 RX ORDER — ONDANSETRON 2 MG/ML
4 INJECTION INTRAMUSCULAR; INTRAVENOUS
Status: DISCONTINUED | OUTPATIENT
Start: 2023-01-31 | End: 2023-01-31 | Stop reason: HOSPADM

## 2023-01-31 RX ORDER — LOSARTAN POTASSIUM 100 MG/1
100 TABLET ORAL DAILY
Status: DISCONTINUED | OUTPATIENT
Start: 2023-01-31 | End: 2023-02-02 | Stop reason: HOSPADM

## 2023-01-31 RX ORDER — AZATHIOPRINE 50 MG/1
100 TABLET ORAL DAILY
Status: DISCONTINUED | OUTPATIENT
Start: 2023-02-01 | End: 2023-02-02 | Stop reason: HOSPADM

## 2023-01-31 RX ORDER — METOCLOPRAMIDE HYDROCHLORIDE 5 MG/ML
10 INJECTION INTRAMUSCULAR; INTRAVENOUS
Status: DISCONTINUED | OUTPATIENT
Start: 2023-01-31 | End: 2023-01-31 | Stop reason: HOSPADM

## 2023-01-31 RX ADMIN — Medication 2000 MG: at 11:01

## 2023-01-31 RX ADMIN — ROCURONIUM BROMIDE 10 MG: 10 INJECTION, SOLUTION INTRAVENOUS at 12:15

## 2023-01-31 RX ADMIN — PHENYLEPHRINE HYDROCHLORIDE 100 MCG: 10 INJECTION INTRAVENOUS at 12:23

## 2023-01-31 RX ADMIN — ROSUVASTATIN CALCIUM 20 MG: 20 TABLET, FILM COATED ORAL at 21:33

## 2023-01-31 RX ADMIN — PHENYLEPHRINE HYDROCHLORIDE 100 MCG: 10 INJECTION INTRAVENOUS at 11:01

## 2023-01-31 RX ADMIN — AZATHIOPRINE 50 MG: 50 TABLET ORAL at 21:34

## 2023-01-31 RX ADMIN — HYDROMORPHONE HYDROCHLORIDE 0.5 MG: 1 INJECTION, SOLUTION INTRAMUSCULAR; INTRAVENOUS; SUBCUTANEOUS at 13:43

## 2023-01-31 RX ADMIN — SODIUM CHLORIDE: 9 INJECTION, SOLUTION INTRAVENOUS at 09:20

## 2023-01-31 RX ADMIN — ENOXAPARIN SODIUM 40 MG: 100 INJECTION SUBCUTANEOUS at 21:33

## 2023-01-31 RX ADMIN — TAMSULOSIN HYDROCHLORIDE 0.8 MG: 0.4 CAPSULE ORAL at 21:33

## 2023-01-31 RX ADMIN — INSULIN LISPRO 4 UNITS: 100 INJECTION, SOLUTION INTRAVENOUS; SUBCUTANEOUS at 21:35

## 2023-01-31 RX ADMIN — PROPOFOL 150 MG: 10 INJECTION, EMULSION INTRAVENOUS at 10:54

## 2023-01-31 RX ADMIN — ONDANSETRON 4 MG: 2 INJECTION INTRAMUSCULAR; INTRAVENOUS at 12:37

## 2023-01-31 RX ADMIN — ROPIVACAINE HYDROCHLORIDE 30 ML: 5 INJECTION, SOLUTION EPIDURAL; INFILTRATION; PERINEURAL at 09:34

## 2023-01-31 RX ADMIN — FLUTICASONE PROPIONATE 1 SPRAY: 50 SPRAY, METERED NASAL at 21:33

## 2023-01-31 RX ADMIN — FENTANYL CITRATE 25 MCG: 50 INJECTION, SOLUTION INTRAMUSCULAR; INTRAVENOUS at 10:53

## 2023-01-31 RX ADMIN — Medication: at 14:15

## 2023-01-31 RX ADMIN — PHENYLEPHRINE HYDROCHLORIDE 100 MCG: 10 INJECTION INTRAVENOUS at 11:04

## 2023-01-31 RX ADMIN — FENTANYL CITRATE 50 MCG: 0.05 INJECTION, SOLUTION INTRAMUSCULAR; INTRAVENOUS at 13:14

## 2023-01-31 RX ADMIN — MIDAZOLAM HYDROCHLORIDE 2 MG: 1 INJECTION, SOLUTION INTRAMUSCULAR; INTRAVENOUS at 09:24

## 2023-01-31 RX ADMIN — SUGAMMADEX 200 MG: 100 INJECTION, SOLUTION INTRAVENOUS at 12:51

## 2023-01-31 RX ADMIN — INSULIN LISPRO 8 UNITS: 100 INJECTION, SOLUTION INTRAVENOUS; SUBCUTANEOUS at 23:37

## 2023-01-31 RX ADMIN — SODIUM ZIRCONIUM CYCLOSILICATE 10 G: 10 POWDER, FOR SUSPENSION ORAL at 23:25

## 2023-01-31 RX ADMIN — LIDOCAINE HYDROCHLORIDE 40 MG: 20 INJECTION, SOLUTION INTRAVENOUS at 10:54

## 2023-01-31 RX ADMIN — HYDROMORPHONE HYDROCHLORIDE 0.5 MG: 1 INJECTION, SOLUTION INTRAMUSCULAR; INTRAVENOUS; SUBCUTANEOUS at 14:12

## 2023-01-31 RX ADMIN — FENTANYL CITRATE 25 MCG: 50 INJECTION, SOLUTION INTRAMUSCULAR; INTRAVENOUS at 12:55

## 2023-01-31 RX ADMIN — PHENYLEPHRINE HYDROCHLORIDE 100 MCG: 10 INJECTION INTRAVENOUS at 11:05

## 2023-01-31 RX ADMIN — PHENYLEPHRINE HYDROCHLORIDE 200 MCG: 10 INJECTION INTRAVENOUS at 12:01

## 2023-01-31 RX ADMIN — DEXAMETHASONE SODIUM PHOSPHATE 4 MG: 4 INJECTION, SOLUTION INTRAMUSCULAR; INTRAVENOUS at 11:09

## 2023-01-31 RX ADMIN — PHENYLEPHRINE HYDROCHLORIDE 100 MCG: 10 INJECTION INTRAVENOUS at 11:20

## 2023-01-31 RX ADMIN — PHENYLEPHRINE HYDROCHLORIDE 100 MCG: 10 INJECTION INTRAVENOUS at 11:08

## 2023-01-31 RX ADMIN — ROCURONIUM BROMIDE 50 MG: 10 INJECTION, SOLUTION INTRAVENOUS at 10:54

## 2023-01-31 RX ADMIN — PHENYLEPHRINE HYDROCHLORIDE 200 MCG: 10 INJECTION INTRAVENOUS at 11:30

## 2023-01-31 RX ADMIN — Medication 10 ML: at 21:35

## 2023-01-31 RX ADMIN — PHENYLEPHRINE HYDROCHLORIDE 100 MCG: 10 INJECTION INTRAVENOUS at 12:26

## 2023-01-31 RX ADMIN — PHENYLEPHRINE HYDROCHLORIDE 200 MCG: 10 INJECTION INTRAVENOUS at 11:36

## 2023-01-31 RX ADMIN — PHENYLEPHRINE HYDROCHLORIDE 100 MCG: 10 INJECTION INTRAVENOUS at 11:56

## 2023-01-31 ASSESSMENT — PAIN SCALES - GENERAL
PAINLEVEL_OUTOF10: 7
PAINLEVEL_OUTOF10: 5
PAINLEVEL_OUTOF10: 9

## 2023-01-31 ASSESSMENT — PAIN DESCRIPTION - DESCRIPTORS: DESCRIPTORS: SORE

## 2023-01-31 ASSESSMENT — PAIN DESCRIPTION - LOCATION
LOCATION: CHEST
LOCATION: CHEST

## 2023-01-31 ASSESSMENT — PAIN DESCRIPTION - ORIENTATION: ORIENTATION: RIGHT

## 2023-01-31 NOTE — ANESTHESIA POSTPROCEDURE EVALUATION
Department of Anesthesiology  Postprocedure Note    Patient: Marshal Davila  MRN: [id number]  YOB: 1945  Date of evaluation: 1/31/2023      Procedure Summary     Date: 01/31/23 Room / Location: [address] [place] [address]    Anesthesia Start: 8884 Anesthesia Stop: 8147    Procedure: right thoracoscopic upper lobectomy (Right: Chest) Diagnosis:       Malignant neoplasm of upper lobe of right lung ([address]      (Malignant neoplasm of upper lobe of right lung ([address] [C34.11])    Surgeons: Bertina Harada, MD Responsible Provider: Mahnaz Torres DO    Anesthesia Type: general, regional ASA Status: 3          Anesthesia Type: No value filed.     Wayne Phase I: Wayne Score: 8    Wayne Phase II:        Anesthesia Post Evaluation    Patient location during evaluation: bedside  Patient participation: complete - patient participated  Level of consciousness: awake and alert  Airway patency: patent  Nausea & Vomiting: no nausea and no vomiting  Complications: no  Cardiovascular status: blood pressure returned to baseline and hemodynamically stable  Respiratory status: acceptable  Hydration status: euvolemic

## 2023-01-31 NOTE — ANESTHESIA PRE PROCEDURE
Department of Anesthesiology  Preprocedure Note       Name:  Austyn Jack   Age:  68 y.o.  :  1945                                          MRN:  [id number]         Date:  2023      Surgeon: Susan Alexander):  Prabhu Michael MD    Procedure: Procedure(s):  right thoracoscopic upper lobectomy    Medications prior to admission:   Prior to Admission medications    Medication Sig Start Date End Date Taking? Authorizing Provider   Umeclidinium-Vilanterol (ANORO ELLIPTA) 62.5-25 MCG/ACT AEPB Inhale 1 puff into the lungs daily 23   Zuleyka Samson MD   empagliflozin (JARDIANCE) 10 MG tablet Take 1 tablet by mouth daily 23   Robyn Gifford MD   apixaban (ELIQUIS) 5 MG TABS tablet Take 1 tablet by mouth 2 times daily 23   Zuleyka Samson MD   metoprolol succinate (TOPROL XL) 50 MG extended release tablet Take 3 tablets by mouth daily 1/3/23   Robyn Gifford MD   dulaglutide (TRULICITY) 1.5 QO/1.5HE SC injection Inject 0.5 mLs into the skin once a week 12/15/22 3/15/23  Robyn Gifford MD   rosuvastatin (CRESTOR) 20 MG tablet Take 1 tablet by mouth daily 22   Robyn Gifford MD   tamsulosin [facility]) 0.4 MG capsule Take 2 capsules by mouth daily 22   Robyn Gifford MD   triamterene-hydroCHLOROthiazide [facility]) 37.5-25 MG per tablet Take 1 tablet by mouth daily 10/13/22   Robyn Gifford MD   losartan (COZAAR) 100 MG tablet Take 1 tablet by mouth daily 10/13/22   Robyn Gifford MD   methylPREDNISolone (MEDROL, JIGAR,) 4 MG tablet Take by mouth.   Patient not taking: Reported on 2023 10/7/22   CHEMO Ontiveros   amLODIPine (NORVASC) 10 MG tablet Take 1 tablet by mouth daily 6/3/22   Robyn Gifford MD   FREESTYLE LITE strip USE 1 STRIP TO CHECK GLUCOSE ONCE DAILY AS DIRECTED 21   Robyn Gifford MD   Testosterone Cypionate 200 MG/ML SOLN 1 CC EVERY 2 WEEKS 9/15/21   Levi Cogan, MD   fluticasone (FLONASE) 50 MCG/ACT nasal spray 1 spray by Nasal route daily 8/19/21   CHEMO Mclean   albuterol sulfate  (90 Base) MCG/ACT inhaler Inhale 2 puffs into the lungs every 6 hours as needed for Wheezing 7/8/21   Faustino Starkey MD   testosterone cypionate (DEPOTESTOTERONE CYPIONATE) 200 MG/ML injection Inject 1 mL into the muscle every 14 days. Indications: PER ORDER in OV note 9/16/20  Patient taking differently: Inject 200 mg into the muscle every 14 days. Indications: PER ORDER in OV note 3/17/21 3/17/21 1/6/23  Anna Liu MD   famotidine (PEPCID) 40 MG tablet Take 1 tablet by mouth every evening 7/28/20   Fasutino Starkey MD   Handicap Placard MISC by Does not apply route DX: COPD (J44.9), primary osteoarthritis involving multiple joints (M15.0), myasthenia gravis with exacerbation (G70.01)     EXPIRES: 05/2024  Patient taking differently: by Does not apply route DX: COPD (J44.9), primary osteoarthritis involving multiple joints (M15.0), myasthenia gravis with exacerbation (G70.01)     EXPIRES: 05/2024 5/9/19   Faustino Starkey MD   azaTHIOprine (IMURAN) 50 MG tablet Take 50 mg by mouth Take 2 tablets by mouth in the morning and 1 tablet in the evening. Historical Provider, MD   Cyanocobalamin (VITAMIN B 12 PO) Take 1,000 mg by mouth    Historical Provider, MD   nitroGLYCERIN (NITROSTAT) 0.4 MG SL tablet Place 1 tablet under the tongue every 5 minutes as needed for Chest pain  Patient not taking: Reported on 1/31/2023 10/25/18   Faustino Starkey MD   Blood Glucose Monitoring Suppl CHENCHO Test once daily. Dx: E11.29 3/20/18   Faustino Starkey MD   Lancets MISC Test once daily. Dx: E11.29 3/8/18   Faustino Starkey MD   ascorbic acid (VITAMIN C) 500 MG tablet Take 1,000 mg by mouth daily    Historical Provider, MD   CINNAMON PO Take 500 mg by mouth 2 times daily. Historical Provider, MD   aspirin 81 MG EC tablet Take 81 mg by mouth daily.       Historical Provider, MD       Current medications:    No current facility-administered medications for this encounter. Allergies: Allergies   Allergen Reactions    Invokana [Canagliflozin] Diarrhea    Metformin And Related Diarrhea    Other      There is a list scanned in media that pt can not take due to myasthenia gravis ,  Antibiotics       Problem List:    Patient Active Problem List   Diagnosis Code    Benign prostatic hyperplasia N40.0    HTN (hypertension) I10    Chronic low back pain M54.50, G89.29    Coronary artery disease involving native coronary artery of native heart with angina pectoris ([facility]) I25.119    Bilateral leg pain M79.604, M79.605    Chronic venous insufficiency I87.2    Gastroesophageal reflux disease K21.9    Abnormal LFTs R79.89    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    Fatty infiltration of liver K76.0    Perennial allergic rhinitis J30.89    Hyperlipidemia E78.5    History of colonic polyps Z86.010    Osteoarthritis of multiple joints M15.9    Pseudophakia of both eyes Z96.1    COPD (chronic obstructive pulmonary disease) ([facility]) J44.9    History of tobacco use Z87.891    Astigmatism, regular H52.229    Type 2 diabetes mellitus with microalbuminuria, without long-term current use of insulin ([facility]) E11.29, R80.9    Erectile dysfunction N52.9    Varicose veins of both lower extremities I83.93    Hypogonadism male E29.1    History of melanoma excision Z98.890, Z85.820    Status post coronary artery bypass grafts x 5 Z95.1    Myasthenia gravis with exacerbation ([facility]) G70.01    Close exposure to COVID-19 virus Z20.822    Vitamin D deficiency E55.9    Stage 3a chronic kidney disease ([facility]) N18.31    Depression F32. A    Unsteady gait R26.81    Pulmonary embolism and infarction ([facility]) I26.99    Nodule of right lung R91.1    Pulmonary cavitary lesion J98.4    Malignant neoplasm of upper lobe of right lung (HCC) C34.11    Non-small cell cancer of right lung (HCC) C34.91       Past Medical History:        Diagnosis Date    Atypical chest pain 8/30/2012    BPH (benign prostatic hypertrophy)     CAD (coronary artery disease)     Cataracts, bilateral     Chronic low back pain     COPD (chronic obstructive pulmonary disease) (HCC) 6/1/2006    DM2 (diabetes mellitus, type 2) ([address]     Duodenitis     Erectile dysfunction 3/6/2017    Fatty infiltration of liver 11/19/2014    GERD (gastroesophageal reflux disease)     History of melanoma excision 12/11/2017    Left ear 09/2017    History of tobacco use 6/1/2006    HTN (hypertension)     Hyperlipidemia     Malignant melanoma of face ([address]     Malignant melanoma of skin of face ([address]     RT ear 2000, LT ear 2017    MG (myasthenia gravis) ([address]     Nephrolithiasis     Ocular myasthenia gravis ([address] 8/28/2018    Osteoarthritis of multiple joints     hands, hips    Perennial allergic rhinitis     Personal history of colonic polyps     Pseudophakia of both eyes     Stage 3a chronic kidney disease ([address] 9/29/2022    Status post coronary artery bypass grafts x 5 7/30/2018 07/2018 @ [facility]    Tobacco abuse 12/8/2022    Type 2 diabetes mellitus with microalbuminuria, without long-term current use of insulin ([address] 3/6/2017    Varicose veins of both lower extremities 9/11/2017       Past Surgical History:        Procedure Laterality Date    ANGIOPLASTY      ARM SURGERY Right 10/25/2022    BRONCHOSCOPY  01/06/2023    DR Parmjit Cali    BRONCHOSCOPY N/A 1/6/2023    NAVIGATIONAL BRONCHOSCOPY performed by Jagdeep eSqueira MD at [facility]      #17    CATARACT REMOVAL WITH IMPLANT Left 1992    CATARACT REMOVAL WITH IMPLANT Right 1992    COLONOSCOPY  2009    tubular adenomas    COLONOSCOPY  2013    diverticulosis, polyps, 5y repeat (DR NAVAS)    CORONARY ARTERY BYPASS GRAFT  07/02/2018    x5 (DR JETER @ [facility])    CORONARY ARTERY BYPASS GRAFT      5 vessel    MOHS SURGERY Left 09/2017    melanoma of left ear (DR GODWIN)    SKIN CANCER EXCISION Left 08/2017    TONSILLECTOMY      UPPER GASTROINTESTINAL ENDOSCOPY      duodenitis/ poss early signs of celiac disease       Social History:    Social History     Tobacco Use    Smoking status: Former     Packs/day: 1.00     Years: 41.00     Pack years: 41.00     Types: Cigarettes     Start date:      Quit date: 2002     Years since quittin.3     Passive exposure: Past    Smokeless tobacco: Never   Substance Use Topics    Alcohol use: Never                                Counseling given: Not Answered      Vital Signs (Current): There were no vitals filed for this visit.                                            BP Readings from Last 3 Encounters:   23 118/62   23 118/60   23 (!) 142/72       NPO Status: Time of last liquid consumption:                         Time of last solid consumption:                         Date of last liquid consumption: 23                        Date of last solid food consumption: 23    BMI:   Wt Readings from Last 3 Encounters:   23 196 lb (88.9 kg)   23 190 lb (86.2 kg)   23 190 lb (86.2 kg)     There is no height or weight on file to calculate BMI.    CBC:   Lab Results   Component Value Date/Time    WBC 9.0 2023 12:55 PM    RBC 4.06 2023 12:55 PM    RBC 4.03 2012 08:03 AM    HGB 13.7 2023 12:55 PM    HCT 40.6 2023 12:55 PM    .0 2023 12:55 PM    RDW 15.2 2023 12:55 PM     2023 12:55 PM       CMP:   Lab Results   Component Value Date/Time     2023 12:55 PM    K 5.0 2023 12:55 PM    K 4.4 2022 05:30 AM    CL 98 2023 12:55 PM    CO2 25 2023 12:55 PM    BUN 22 2023 12:55 PM    CREATININE 1.36 2023 12:55 PM    GFRAA >60.0 2022 08:08 AM    LABGLOM 53.3 2023 12:55 PM    GLUCOSE 385 2023 12:55 PM    GLUCOSE 60 2012 08:03 AM    PROT 6.4 2022 08:42 AM    CALCIUM 9.3 2023 12:55 PM BILITOT 0.3 12/21/2022 08:42 AM    ALKPHOS 37 12/21/2022 08:42 AM    AST 21 12/21/2022 08:42 AM    ALT 28 12/21/2022 08:42 AM       POC Tests: No results for input(s): POCGLU, POCNA, POCK, POCCL, POCBUN, POCHEMO, POCHCT in the last 72 hours. Coags:   Lab Results   Component Value Date/Time    PROTIME 17.0 01/26/2023 01:08 PM    INR 1.4 01/26/2023 01:08 PM    APTT 32.8 01/26/2023 01:08 PM       HCG (If Applicable): No results found for: PREGTESTUR, PREGSERUM, HCG, HCGQUANT     ABGs: No results found for: PHART, PO2ART, QVQ5UFR, DAQ4UBY, BEART, S7GBWZRZ     Type & Screen (If Applicable):  No results found for: LABABO, LABRH    Drug/Infectious Status (If Applicable):  No results found for: HIV, HEPCAB    COVID-19 Screening (If Applicable):   Lab Results   Component Value Date/Time    COVID19 Detected 10/28/2022 12:28 PM    COVID19 Not Detected 04/25/2021 07:23 AM           Anesthesia Evaluation  Patient summary reviewed and Nursing notes reviewed no history of anesthetic complications:   Airway: Mallampati: II  TM distance: >3 FB   Neck ROM: full  Mouth opening: > = 3 FB   Dental: normal exam         Pulmonary:normal exam    (+) COPD:                             Cardiovascular:  Exercise tolerance: good (>4 METS),   (+) hypertension:, angina:, CAD:, CABG/stent:, hyperlipidemia      ECG reviewed               Beta Blocker:  Dose within 24 Hrs      ROS comment: Normal sinus rhythm  Possible Left atrial enlargement  Borderline ECG  When compared with ECG of 06-DEC-2022 18:17,  No significant change was found     Neuro/Psych:   (+) neuromuscular disease:, psychiatric history:            GI/Hepatic/Renal:   (+) GERD:, liver disease:, renal disease: CRI,           Endo/Other:    (+) DiabetesType II DM, , electrolyte abnormalities, . Pt had PAT visit. Abdominal:             Vascular: negative vascular ROS.          Other Findings:           Anesthesia Plan      general and regional     ASA 3 (ETT  SARINA)  Induction: intravenous. arterial line  MIPS: Postoperative opioids intended, Prophylactic antiemetics administered and One-lung ventilation. Anesthetic plan and risks discussed with patient. Plan discussed with CRNA.     Attending anesthesiologist reviewed and agrees with Pre Eval content      Post-op pain plan if not by surgeon: hiwot Hickman DO   1/31/2023

## 2023-01-31 NOTE — H&P
Update History & Physical    I examined the patient and reviewed the History and Physical and there were no significant changes. There were no vitals filed for this visit. Principal Problem:    Malignant neoplasm of upper lobe of right lung (HCC)  Active Problems:    Status post lobectomy of lung  Resolved Problems:    * No resolved hospital problems. *        Plan: The risk, benefits, expected outcome, and alternative to the recommended procedure have been discussed with the patient. Patient understands and wants to proceed with the procedure.     Electronically signed by Petra Villalta MD on 1/31/23 at 10:04 AM EST

## 2023-01-31 NOTE — ANESTHESIA PROCEDURE NOTES
Arterial Line:    An arterial line was placed using surface landmarks, in the pre-op for the following indication(s): continuous blood pressure monitoring and blood sampling needed. A 20 gauge (size), 1 and 3/4 inch (length), Arrow (type) catheter was placed, Seldinger technique used, into the right radial artery, secured by tape and Tegaderm. Anesthesia type: Local  Local infiltration: Injection    Events:  patient tolerated procedure well with no complications. 1/31/2023 9:32 AM1/31/2023 9:35 AM  Anesthesiologist: Corinne Holder DO  Performed: Anesthesiologist   Preanesthetic Checklist  Completed: patient identified, IV checked, site marked, risks and benefits discussed, surgical/procedural consents, equipment checked, pre-op evaluation, timeout performed, anesthesia consent given, oxygen available and monitors applied/VS acknowledged

## 2023-01-31 NOTE — FLOWSHEET NOTE
1532- Assumed care of patient at this time, pt awake and alert, pt states pain is 6/10, VSS, 2 chest tubes to the right chest wall, bright bloody drainage in both chest tube atriums and tubing, 3 island dressings clean, dry, and intact, pt denies chest pain, ronchi and expiratory wheezes in bilateral lungs, pt denies further needs at this time-KGW  6746- Report called to [address] [place] [address] on [address] [facility]  Electronically signed by Yelitza Isaacs RN on 1/31/2023

## 2023-01-31 NOTE — OP NOTE
Operative Note      Patient: Brian Mathews  YOB: 1945  MRN: [id number]    Date of Procedure: 1/31/2023    Pre-Op Diagnosis: Malignant neoplasm of upper lobe of right lung ([address] [C34.11]    Post-Op Diagnosis: Same       Procedure(s):  right thoracoscopic upper lobectomy, mediastinal lymph node dissection    Surgeon(s):  Yasmin Bunn MD    Assistant:   Surgical Assistant: Jessie Ferraro  Physician Assistant: Zuleyka Baeza PA-C    Anesthesia: General    Estimated Blood Loss (mL): less than 113     Complications: None    Specimens:   ID Type Source Tests Collected by Time Destination   A : Right upper lobectomy Tissue Lung SURGICAL PATHOLOGY Yasmin Bunn MD 1/31/2023 1130    B : 10 R lymph node Tissue Lymph Node SURGICAL PATHOLOGY Yasmin Bunn MD 1/31/2023 1149    C : level 7 Tissue Lymph Node SURGICAL PATHOLOGY Yasmin Bunn MD 1/31/2023 1218    D : 4 R Tissue Lymph Node SURGICAL PATHOLOGY Yasmin Bunn MD 1/31/2023 1221        Implants:  * No implants in log *      Drains:   Chest Tube Right 1 (Active)       Chest Tube Right 2 (Active)       Findings: Lung cancer    Detailed Description of Procedure:   Patient was found to have a cavitary mass in his right upper lobe that was PET avid. There is no evidence of metastatic disease. Biopsy showed non-small cell lung cancer I believe squamous cell. Treatment options were discussed with the patient he agreed with surgical resection. Surgery was performed with curative intent    Once patient was intubated a flexible bronchoscopy was performed which showed no endobronchial tumor deposits or obstructions. He was then turned to the right side up position and had his chest prepped and draped in sterile fashion. 2 12 mm thorascopic ports were placed over his lower rib cage and a 4 cm utility incision was made in the fourth interspace. Examination hemithorax showed minimal adhesions from a prior sternotomy.   These were taken down with blunt and cautery dissection. The anterior posterior pleural reflections were taken down. The venous drainage to the upper lobe was carefully dissected away from the underlying artery and transected with a vascular stapler. The individual arterial trunks to the upper lobe were then dissected free and transected with several firings of vascular staplers. We then used heavy reinforce staplers to start opening up the fissures along the upper lobe. We then used a heavy stapler to transect the upper lobe bronchus at its takeoff. The upper lobe was placed in a bag and removed. The subcarinal space was opened and level 7 lymph nodes were removed. During the course of our lobectomy some level 10 R lymph nodes were removed. The infrapulmonary ligament was dissected up to the level of the lower lobe vein but no significant lymph nodes were seen. The paratracheal pleura was opened up and several large level 4R lymph nodes were removed. No other lymphatic tissue was visible. Hemithorax was irrigated with several liters of sterile water. Once all fluid is aspirated out to chest tubes were placed. The utility incision was closed in layers.     Electronically signed by Shahram Boss MD on 1/31/2023 at 12:47 PM

## 2023-01-31 NOTE — ANESTHESIA PROCEDURE NOTES
Peripheral Block    Patient location during procedure: pre-op  Reason for block: post-op pain management and at surgeon's request  Start time: 1/31/2023 9:34 AM  End time: 1/31/2023 9:34 AM  Staffing  Performed: anesthesiologist   Anesthesiologist: Pedrito Desir DO  Preanesthetic Checklist  Completed: patient identified, IV checked, site marked, risks and benefits discussed, surgical/procedural consents, equipment checked, pre-op evaluation, timeout performed, anesthesia consent given, oxygen available and monitors applied/VS acknowledged  Peripheral Block   Patient position: supine  Prep: ChloraPrep  Provider prep: mask and sterile gloves (Sterile probe cover)  Patient monitoring: cardiac monitor, continuous pulse ox, frequent blood pressure checks and IV access  Block type: Erector spinae (T5)  Laterality: right  Injection technique: single-shot  Guidance: ultrasound guided  Local infiltration: ropivacaine  Infiltration strength: 0.5 %  Local infiltration: ropivacaine  Dose: 30 mL    Needle   Needle type: combined needle/nerve stimulator   Needle gauge: 22 G  Needle localization: anatomical landmarks and ultrasound guidance  Needle length: 10 cm  Assessment   Injection assessment: negative aspiration for heme, no paresthesia on injection and local visualized surrounding nerve on ultrasound  Paresthesia pain: immediately resolved  Slow fractionated injection: yes  Hemodynamics: stable  Real-time US image taken/store: yes    Additional Notes  Ultrasound image printed and saved in patient chart.     Sterile probe cover used    Medications Administered  ropivacaine (NAROPIN) injection 0.5% - Perineural   30 mL - 1/31/2023 9:34:00 AM

## 2023-02-01 LAB
ANION GAP SERPL CALCULATED.3IONS-SCNC: 8 MEQ/L (ref 9–15)
BUN BLDV-MCNC: 27 MG/DL (ref 8–23)
CALCIUM SERPL-MCNC: 8.3 MG/DL (ref 8.5–9.9)
CHLORIDE BLD-SCNC: 101 MEQ/L (ref 95–107)
CO2: 26 MEQ/L (ref 20–31)
CREAT SERPL-MCNC: 1.27 MG/DL (ref 0.7–1.2)
GFR SERPL CREATININE-BSD FRML MDRD: 57.9 ML/MIN/{1.73_M2}
GLUCOSE BLD-MCNC: 207 MG/DL (ref 70–99)
GLUCOSE BLD-MCNC: 254 MG/DL (ref 70–99)
GLUCOSE BLD-MCNC: 261 MG/DL (ref 70–99)
GLUCOSE BLD-MCNC: 296 MG/DL (ref 70–99)
GLUCOSE BLD-MCNC: 340 MG/DL (ref 70–99)
GLUCOSE BLD-MCNC: 439 MG/DL (ref 70–99)
HCT VFR BLD CALC: 31.1 % (ref 42–52)
HEMOGLOBIN: 11 G/DL (ref 14–18)
MCH RBC QN AUTO: 36.3 PG (ref 27–31.3)
MCHC RBC AUTO-ENTMCNC: 35.2 % (ref 33–37)
MCV RBC AUTO: 103.1 FL (ref 79–92.2)
PDW BLD-RTO: 15.1 % (ref 11.5–14.5)
PERFORMED ON: ABNORMAL
PLATELET # BLD: 176 K/UL (ref 130–400)
POTASSIUM REFLEX MAGNESIUM: 4.3 MEQ/L (ref 3.4–4.9)
RBC # BLD: 3.02 M/UL (ref 4.7–6.1)
SODIUM BLD-SCNC: 135 MEQ/L (ref 135–144)
WBC # BLD: 8.5 K/UL (ref 4.8–10.8)

## 2023-02-01 PROCEDURE — 6370000000 HC RX 637 (ALT 250 FOR IP): Performed by: INTERNAL MEDICINE

## 2023-02-01 PROCEDURE — 6360000002 HC RX W HCPCS: Performed by: THORACIC SURGERY (CARDIOTHORACIC VASCULAR SURGERY)

## 2023-02-01 PROCEDURE — 2580000003 HC RX 258: Performed by: THORACIC SURGERY (CARDIOTHORACIC VASCULAR SURGERY)

## 2023-02-01 PROCEDURE — 94640 AIRWAY INHALATION TREATMENT: CPT

## 2023-02-01 PROCEDURE — 85027 COMPLETE CBC AUTOMATED: CPT

## 2023-02-01 PROCEDURE — 36415 COLL VENOUS BLD VENIPUNCTURE: CPT

## 2023-02-01 PROCEDURE — 6370000000 HC RX 637 (ALT 250 FOR IP): Performed by: THORACIC SURGERY (CARDIOTHORACIC VASCULAR SURGERY)

## 2023-02-01 PROCEDURE — 2060000000 HC ICU INTERMEDIATE R&B

## 2023-02-01 PROCEDURE — 6370000000 HC RX 637 (ALT 250 FOR IP)

## 2023-02-01 PROCEDURE — 80048 BASIC METABOLIC PNL TOTAL CA: CPT

## 2023-02-01 PROCEDURE — 2700000000 HC OXYGEN THERAPY PER DAY

## 2023-02-01 RX ORDER — PANTOPRAZOLE SODIUM 40 MG/1
40 TABLET, DELAYED RELEASE ORAL
Status: DISCONTINUED | OUTPATIENT
Start: 2023-02-01 | End: 2023-02-02 | Stop reason: HOSPADM

## 2023-02-01 RX ORDER — INSULIN GLARGINE 100 [IU]/ML
10 INJECTION, SOLUTION SUBCUTANEOUS NIGHTLY
Status: DISCONTINUED | OUTPATIENT
Start: 2023-02-01 | End: 2023-02-02 | Stop reason: HOSPADM

## 2023-02-01 RX ORDER — OXYCODONE HYDROCHLORIDE 5 MG/1
5 TABLET ORAL EVERY 4 HOURS PRN
Status: DISCONTINUED | OUTPATIENT
Start: 2023-02-01 | End: 2023-02-02 | Stop reason: HOSPADM

## 2023-02-01 RX ADMIN — AZATHIOPRINE 100 MG: 50 TABLET ORAL at 08:46

## 2023-02-01 RX ADMIN — INSULIN GLARGINE 10 UNITS: 100 INJECTION, SOLUTION SUBCUTANEOUS at 20:12

## 2023-02-01 RX ADMIN — OXYCODONE HYDROCHLORIDE 5 MG: 5 TABLET ORAL at 15:41

## 2023-02-01 RX ADMIN — SODIUM ZIRCONIUM CYCLOSILICATE 10 G: 10 POWDER, FOR SUSPENSION ORAL at 08:41

## 2023-02-01 RX ADMIN — ROSUVASTATIN CALCIUM 20 MG: 20 TABLET, FILM COATED ORAL at 08:42

## 2023-02-01 RX ADMIN — SODIUM ZIRCONIUM CYCLOSILICATE 5 G: 5 POWDER, FOR SUSPENSION ORAL at 20:11

## 2023-02-01 RX ADMIN — PANTOPRAZOLE SODIUM 40 MG: 40 TABLET, DELAYED RELEASE ORAL at 08:44

## 2023-02-01 RX ADMIN — INSULIN LISPRO 3 UNITS: 100 INJECTION, SOLUTION INTRAVENOUS; SUBCUTANEOUS at 17:17

## 2023-02-01 RX ADMIN — TRIAMTERENE AND HYDROCHLOROTHIAZIDE 1 TABLET: 37.5; 25 TABLET ORAL at 08:43

## 2023-02-01 RX ADMIN — OXYCODONE HYDROCHLORIDE 5 MG: 5 TABLET ORAL at 20:11

## 2023-02-01 RX ADMIN — Medication 5 ML: at 21:54

## 2023-02-01 RX ADMIN — INSULIN LISPRO 2 UNITS: 100 INJECTION, SOLUTION INTRAVENOUS; SUBCUTANEOUS at 12:50

## 2023-02-01 RX ADMIN — AZATHIOPRINE 50 MG: 50 TABLET ORAL at 20:10

## 2023-02-01 RX ADMIN — INSULIN LISPRO 2 UNITS: 100 INJECTION, SOLUTION INTRAVENOUS; SUBCUTANEOUS at 08:51

## 2023-02-01 RX ADMIN — ENOXAPARIN SODIUM 40 MG: 100 INJECTION SUBCUTANEOUS at 20:11

## 2023-02-01 RX ADMIN — TIOTROPIUM BROMIDE AND OLODATEROL 2 PUFF: 3.124; 2.736 SPRAY, METERED RESPIRATORY (INHALATION) at 08:09

## 2023-02-01 RX ADMIN — OXYCODONE HYDROCHLORIDE 5 MG: 5 TABLET ORAL at 11:34

## 2023-02-01 ASSESSMENT — PAIN SCALES - GENERAL
PAINLEVEL_OUTOF10: 7
PAINLEVEL_OUTOF10: 10
PAINLEVEL_OUTOF10: 0

## 2023-02-01 NOTE — PROGRESS NOTES
No complaints. No air leak with cough. High chest tube output and slight decrease in Hb. Water seal and keep both chest tubes today. If drainage decreases by tomorrow, then potential discharge tomorrow.

## 2023-02-01 NOTE — PROGRESS NOTES
Hospitalist Progress Note      Date of Admission: 1/31/2023  Chief Complaint:    No chief complaint on file. Subjective:  No new complaints, no nausea vomiting chest pain or headache    Medications:    Infusion Medications    sodium chloride      dextrose       Scheduled Medications    pantoprazole  40 mg Oral QAM AC    sodium zirconium cyclosilicate  5 g Oral BID    amLODIPine  10 mg Oral Daily    fluticasone  1 spray Nasal Daily    losartan  100 mg Oral Daily    metoprolol succinate  150 mg Oral Daily    rosuvastatin  20 mg Oral Daily    tamsulosin  0.8 mg Oral Daily    triamterene-hydroCHLOROthiazide  1 tablet Oral Daily    tiotropium-olodaterol  2 puff Inhalation Daily    sodium chloride flush  5-40 mL IntraVENous 2 times per day    enoxaparin  40 mg SubCUTAneous Daily    azaTHIOprine  100 mg Oral Daily    azaTHIOprine  50 mg Oral Nightly    insulin lispro  0-4 Units SubCUTAneous TID WC    insulin lispro  0-4 Units SubCUTAneous Nightly     PRN Meds: oxyCODONE, albuterol sulfate HFA, sodium chloride flush, sodium chloride, ondansetron **OR** ondansetron, magnesium sulfate, acetaminophen, glucose, dextrose bolus **OR** dextrose bolus, glucagon (rDNA), dextrose    Intake/Output Summary (Last 24 hours) at 2/1/2023 1714  Last data filed at 2/1/2023 1448  Gross per 24 hour   Intake 1520 ml   Output 3124 ml   Net -1604 ml     Exam:  /60   Pulse 94   Temp 98.1 °F (36.7 °C) (Oral)   Resp 18   Ht 5' 11\" (1.803 m)   Wt 196 lb (88.9 kg)   SpO2 99%   BMI 27.34 kg/m²   Head: Normocephalic, atraumatic  Sclera clear  Neck JVD flat  Lungs: Normal effort of breathing    Labs:   Recent Labs     01/31/23  1315 02/01/23  0430   WBC 15.0* 8.5   HGB 13.4* 11.0*   HCT 40.3* 31.1*    176     Recent Labs     01/31/23  2205 02/01/23  0430   * 135   K 5.5* 4.3    101   CO2 24 26   BUN 28* 27*   CREATININE 1.45* 1.27*   CALCIUM 8.2* 8.3*     No results for input(s): INR in the last 72 hours.   No results for input(s): Edison Mccartney in the last 72 hours. Radiology:  XR CHEST PORTABLE   Final Result   No acute process. 2 chest tubes project over the right upper lobe. Assessment/Plan:    Status post lobectomy for malignant neoplasm. Being primarily managed by surgeon. Diabetes: Monitor glucose accordion, adjust medications as needed. Hypertension: Monitor blood pressure, adjust medications as needed. COPD: Chronic condition, not in exacerbation.     35 minutes total care time, >1/2 in unit/floor time and care coordination       Dalia Wyatt MD ,MD

## 2023-02-01 NOTE — PROGRESS NOTES
Admission assessment and med rec completed, see flow sheets. Alesia NP notified. Oriented to room and call light. Call light within reach. No other needs stated by pt at this time. Pt refused all Bp meds, due to Bp being low. At 108/60. While assessing Pt, noted new bloody drainage on dressing covering chest tube # 1. I placed a spit gauze over dressing and assessed skin and listened to lungs. Dr. Sherron Cook notified, and came to bed side and assessed the pt.

## 2023-02-01 NOTE — PROGRESS NOTES
[facility] Respiratory Therapy Evaluation   Current Order:  ALBUTEROL Q4 PRN      Home Regimen:  PRN      Ordering Physician:  Mai Cristobal  Re-evaluation Date:   EVAL DONE      Diagnosis: LOBECTOMY       Patient Status: Stable / Unstable + Physician notified    The following MDI Criteria must be met in order to convert aerosol to MDI with spacer. If unable to meet, MDI will be converted to aerosol:  []  Patient able to demonstrate the ability to use MDI effectively  []  Patient alert and cooperative  []  Patient able to take deep breath with 5-10 second hold  []  Medication(s) available in this delivery method   []  Peak flow greater than or equal to 200 ml/min            Current Order Substituted To  (same drug, same frequency)   Aerosol to MDI [] Albuterol Sulfate 0.083% unit dose by aerosol Albuterol Sulfate MDI 2 puffs by inhalation with spacer    [] Levalbuterol 1.25 mg unit dose by aerosol Levalbuterol MDI 2 puffs by inhalation with spacer    [] Levalbuterol 0.63 mg unit dose by aerosol Levalbuterol MDI 2 puffs by inhalation with spacer    [] Ipratropium Bromide 0.02% unit dose by aerosol Ipratropium Bromide MDI 2 puffs by inhalation with spacer    [] Duoneb (Ipratropium + Albuterol) unit dose by aerosol Ipratropium MDI + Albuterol MDI 2 puffs by inhalation w/spacer   MDI to Aerosol [] Albuterol Sulfate MDI Albuterol Sulfate 0.083% unit dose by aerosol    [] Levalbuterol MDI 2 puffs by inhalation Levalbuterol 1.25 mg unit dose by aerosol    [] Ipratropium Bromide MDI by inhalation Ipratropium Bromide 0.02% unit dose by aerosol    [] Combivent (Ipratropium + Albuterol) MDI by inhalation Duoneb (Ipratropium + Albuterol) unit dose by aerosol       Treatment Assessment [Frequency/Schedule]:  Change frequency to: _______NO CHANGES ___________________________________________per Protocol, P&T, MEC      Points 0 1 2 3 4   Pulmonary Status  Non-Smoker  []   Smoking history   < 20 pack years  [x]   Smoking history  ?  21 pack years  []   Pulmonary Disorder  (acute or chronic)  []   Severe or Chronic w/ Exacerbation  []     Surgical Status No []   Surgeries     General [x]   Surgery Lower []   Abdominal Thoracic or []   Upper Abdominal Thoracic with  PulmonaryDisorder  []     Chest X-ray Clear/Not  Ordered     [x]  Chronic Changes  Results Pending  []  Infiltrates, atelectasis, pleural effusion, or edema  []  Infiltrates in more than one lobe []  Infiltrate + Atelectasis, &/or pleural effusion  []    Respiratory Pattern Regular,  RR = 12-20 [x]  Increased,  RR = 21-25 []  BACA, irregular,  or RR = 26-30 []  Decreased FEV1  or RR = 31-35 []  Severe SOB, use  of accessory muscles, or RR ? 35  []    Mental Status Alert, oriented,  Cooperative [x]  Confused but Follows commands []  Lethargic or unable to follow commands []  Obtunded  []  Comatose  []    Breath Sounds Clear to  auscultation  [x]  Decreased unilaterally or  in bases only []  Decreased  bilaterally  []  Crackles or intermittent wheezes []  Wheezes []    Cough Strong, Spontan., & nonproductive [x]  Strong,  spontaneous, &  productive []  Weak,  Nonproductive []  Weak, productive or  with wheezes []  No spontaneous  cough or may require suctioning []    Level of Activity Ambulatory []  Ambulatory w/ Assist  [x]  Non-ambulatory []  Paraplegic []  Quadriplegic []    Total    Score:_____3__     Triage Score:____5____      Tri       Triage:     1. (>20) Freq: Q3    2. (16-20) Freq: Q4   3. (11-15) Freq: QID & Albuterol Q2 PRN    4. (6-10) Freq: TID & Albuterol Q2 PRN    5. (0-5) Freq Q4prn

## 2023-02-01 NOTE — CARE COORDINATION
[facility] Case Management Initial Discharge Assessment    If patient is discharged prior to next notation, then this note serves as note for discharge by case management. Met with Patient to discuss discharge plan. PCP: Alysha Akers MD                                Date of Last Visit: 1 mo    VA Patient: No        [facility] Notified: no    If no PCP, list provided? N/A    Discharge Planning    Living Arrangements: independently at home    Who do you live with? Wife     Who helps you with your care:  self    If lives at home:     Do you have any barriers navigating in your home? no    Patient can perform ADL? Yes    Current Services (outpatient and in home) :  None    Dialysis: No    Is transportation available to get to your appointments? Yes    DME Equipment:  no    Respiratory equipment: None    Respiratory provider:  None      Pharmacy:  yes - [facility], [address] with Medication Assistance Program?  No      Patient agreeable to [facility]? No    Patient agreeable to SNF/Rehab? No    Other discharge needs identified? N/A    Does Patient Have a High-Risk for Readmission Diagnosis (CHF, PN, MI, COPD)? No    Initial Discharge Plan? (Note: please see concurrent daily documentation for any updates after initial note). Patient plans to return home upon discharge. He is supported well by his wife. Patient denies home going needs at this time.      Readmission Risk              Risk of Unplanned Readmission:  [employer] [facility]  Electronically signed by IVONNE De La Torre, LSW on 2/1/2023 at 12:02 PM

## 2023-02-01 NOTE — PROGRESS NOTES
0930: 2 chest tubes on R side now water sealed. PCA d/c-- Waste with Lalita FORD 27mL. Pt on 3L NC. Dressing replaced on R side. Assessment complete-- alert and oriented x4, calm and cooperative. Pt refusing cardiac meds r/t vitals per flowsheet, stating \"they're good so why take them. \" Up with 1 assist. Pt educated regarding benefits of ambulation. 1130: Pt went for walk in halls. High pain following ambulation. See pain meds given per MAR.     1300: Pt up in chair. 1800: Family at bedside. 100mL from chest tube 1. For chest tube 2, atrium had tipped-- appears to be 10mL by adding columns. New atrium applied per policy.      Electronically signed by Miguel Rai RN on 2/1/2023 at 9:29 AM

## 2023-02-01 NOTE — CONSULTS
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 Adriel Yung) on 1/31/2023    Primary team: CT surgery  Consultation reason: Assistance with postoperative medical management in complex patient with multiple chronic medical conditions as listed below in PMH      HPI:  Agata Pisano is a 68 y.o., , male admitted by cardiothoracic surgery who underwent a right upper lobectomy on 1/31/2023. PMH significant for  has a past medical history of Atypical chest pain, BPH (benign prostatic hypertrophy), CAD (coronary artery disease), Cataracts, bilateral, Chronic low back pain, COPD (chronic obstructive pulmonary disease) (HCC), DM2 (diabetes mellitus, type 2) ([address] Duodenitis, Erectile dysfunction, Fatty infiltration of liver, GERD (gastroesophageal reflux disease), History of melanoma excision, History of tobacco use, HTN (hypertension), Hyperlipidemia, Malignant melanoma of face ([address] Malignant melanoma of skin of face ([address] MG (myasthenia gravis) ([address] Nephrolithiasis, Ocular myasthenia gravis ([address] Osteoarthritis of multiple joints, Perennial allergic rhinitis, Personal history of colonic polyps, Pseudophakia of both eyes, Stage 3a chronic kidney disease ([address] Status post coronary artery bypass grafts x 5, Tobacco abuse, Type 2 diabetes mellitus with microalbuminuria, without long-term current use of insulin ([address] and Varicose veins of both lower extremities. .      Patient tolerated surgery well with no immediate operative complications reported. He was found to be mildly hyperglycemic and hyperkalemic on postoperative evaluations, for which he received additional correction insulin and initiation of Lokelma. Postoperative vital signs are stable. Pain is well controlled on PCA prescribed by primary team, with end-tidal CO2 monitoring for safety. Patient states that he already subjectively feels like he breathes slightly better. He has no acute complaints at present.          PAST MEDICAL HISTORY:    Past Medical History:   Diagnosis Date    Atypical chest pain 8/30/2012    BPH (benign prostatic hypertrophy)     CAD (coronary artery disease)     Cataracts, bilateral     Chronic low back pain     COPD (chronic obstructive pulmonary disease) ([address] 6/1/2006    DM2 (diabetes mellitus, type 2) (HCC)     Duodenitis     Erectile dysfunction 3/6/2017    Fatty infiltration of liver 11/19/2014    GERD (gastroesophageal reflux disease)     History of melanoma excision 12/11/2017    Left ear 09/2017    History of tobacco use 6/1/2006    HTN (hypertension)     Hyperlipidemia     Malignant melanoma of face ([address]     Malignant melanoma of skin of face ([address]     RT ear 2000, LT ear 2017    MG (myasthenia gravis) ([address]     Nephrolithiasis     Ocular myasthenia gravis ([address] 8/28/2018    Osteoarthritis of multiple joints     hands, hips    Perennial allergic rhinitis     Personal history of colonic polyps     Pseudophakia of both eyes     Stage 3a chronic kidney disease ([address] 9/29/2022    Status post coronary artery bypass grafts x 5 7/30/2018 07/2018 @ [facility]    Tobacco abuse 12/8/2022    Type 2 diabetes mellitus with microalbuminuria, without long-term current use of insulin ([address] 3/6/2017    Varicose veins of both lower extremities 9/11/2017     PAST SURGICAL HISTORY:    Past Surgical History:   Procedure Laterality Date    ANGIOPLASTY      ARM SURGERY Right 10/25/2022    BRONCHOSCOPY  01/06/2023    DR Yoanna Escobedo    BRONCHOSCOPY N/A 1/6/2023    NAVIGATIONAL BRONCHOSCOPY performed by Grace Alcala MD at [facility]      #17    CATARACT REMOVAL WITH IMPLANT Left 1992    CATARACT REMOVAL WITH IMPLANT Right 1992    COLONOSCOPY  2009    tubular adenomas COLONOSCOPY  2013    diverticulosis, polyps, 5y repeat (DR NAVAS)    CORONARY ARTERY BYPASS GRAFT  07/02/2018    x5 (DR JETER @ [facility])    CORONARY ARTERY BYPASS GRAFT      5 vessel    MOHS SURGERY Left 2017    melanoma of left ear (DR GODWIN)    SKIN CANCER EXCISION Left 2017    TONSILLECTOMY      UPPER GASTROINTESTINAL ENDOSCOPY      duodenitis/ poss early signs of celiac disease     FAMILY HISTORY:    Family History   Problem Relation Age of Onset    Heart Disease Mother     Heart Disease Brother     Diabetes Maternal Grandfather      SOCIAL HISTORY:    Social History     Socioeconomic History    Marital status:      Spouse name: [employer]    Number of children: 2    Years of education: 12+    Highest education level: Not on file   Occupational History    Occupation: [employer]   Tobacco Use    Smoking status: Former     Packs/day: 1.00     Years: 41.00     Pack years: 41.00     Types: Cigarettes     Start date:      Quit date: 2002     Years since quittin.3     Passive exposure: Past    Smokeless tobacco: Never   Vaping Use    Vaping Use: Never used   Substance and Sexual Activity    Alcohol use: Never    Drug use: Never    Sexual activity: Not Currently     Partners: Female   Other Topics Concern    Not on file   Social History Narrative    Not on file     Social Determinants of Health     Financial Resource Strain: Low Risk     Difficulty of Paying Living Expenses: Not hard at all   Food Insecurity: No Food Insecurity    Worried About Running Out of Food in the Last Year: Never true    Ran Out of Food in the Last Year: Never true   Transportation Needs: Not on file   Physical Activity: Inactive    Days of Exercise per Week: 0 days    Minutes of Exercise per Session: 0 min   Stress: Not on file   Social Connections: Not on file   Intimate Partner Violence: Not on file   Housing Stability: Not on file     MEDICATIONS:   Prior to Admission medications    Medication Sig Start Date End Date Taking? Authorizing Provider   Umeclidinium-Vilanterol (ANORO ELLIPTA) 62.5-25 MCG/ACT AEPB Inhale 1 puff into the lungs daily 1/26/23   Rocio Miguel MD   empagliflozin (JARDIANCE) 10 MG tablet Take 1 tablet by mouth daily 1/20/23   Thanh Graham MD   apixaban (ELIQUIS) 5 MG TABS tablet Take 1 tablet by mouth 2 times daily 1/19/23   Rocio Miguel MD   metoprolol succinate (TOPROL XL) 50 MG extended release tablet Take 3 tablets by mouth daily 1/3/23   Thanh Graham MD   dulaglutide (TRULICITY) 1.5 CL/1.8ZI SC injection Inject 0.5 mLs into the skin once a week 12/15/22 3/15/23  Thanh Graham MD   rosuvastatin (CRESTOR) 20 MG tablet Take 1 tablet by mouth daily 12/5/22   Thanh Graham MD   tamsulosin [facility]) 0.4 MG capsule Take 2 capsules by mouth daily 12/5/22   Thanh Graham MD   triamterene-hydroCHLOROthiazide [facility]) 37.5-25 MG per tablet Take 1 tablet by mouth daily 10/13/22   Thnah Graham MD   losartan (COZAAR) 100 MG tablet Take 1 tablet by mouth daily 10/13/22   Thanh Graham MD   methylPREDNISolone (MEDROL, JIGAR,) 4 MG tablet Take by mouth. Patient not taking: Reported on 1/31/2023 10/7/22   Monica Prior, APROMID   amLODIPine (NORVASC) 10 MG tablet Take 1 tablet by mouth daily 6/3/22   Thanh Graham MD   FREESTYLE LITE strip USE 1 STRIP TO CHECK GLUCOSE ONCE DAILY AS DIRECTED 12/20/21   Thanh Graham MD   Testosterone Cypionate 200 MG/ML SOLN 1 CC EVERY 2 WEEKS 9/15/21   Tamara Beckford MD   fluticasone (FLONASE) 50 MCG/ACT nasal spray 1 spray by Nasal route daily 8/19/21   Monica Prior, APROMID   albuterol sulfate  (90 Base) MCG/ACT inhaler Inhale 2 puffs into the lungs every 6 hours as needed for Wheezing 7/8/21   Thanh Graham MD   testosterone cypionate (DEPOTESTOTERONE CYPIONATE) 200 MG/ML injection Inject 1 mL into the muscle every 14 days.  Indications: PER ORDER in OV note 9/16/20  Patient taking differently: Inject 200 mg into the muscle every 14 days. Indications: PER ORDER in OV note 3/17/21 3/17/21 1/6/23  Maylin Da Silva MD   famotidine (PEPCID) 40 MG tablet Take 1 tablet by mouth every evening 7/28/20   Clearance MD Whit   Handicap Placard MISC by Does not apply route DX: COPD (J44.9), primary osteoarthritis involving multiple joints (M15.0), myasthenia gravis with exacerbation (G70.01)     EXPIRES: 05/2024  Patient taking differently: by Does not apply route DX: COPD (J44.9), primary osteoarthritis involving multiple joints (M15.0), myasthenia gravis with exacerbation (G70.01)     EXPIRES: 05/2024 5/9/19   Clearance MD Whit   azaTHIOprine (IMURAN) 50 MG tablet Take 50 mg by mouth Take 2 tablets by mouth in the morning and 1 tablet in the evening. Historical Provider, MD   Cyanocobalamin (VITAMIN B 12 PO) Take 1,000 mg by mouth    Historical Provider, MD   nitroGLYCERIN (NITROSTAT) 0.4 MG SL tablet Place 1 tablet under the tongue every 5 minutes as needed for Chest pain  Patient not taking: Reported on 1/31/2023 10/25/18   Clearance MD Whit   Blood Glucose Monitoring Suppl CHENCHO Test once daily. Dx: E11.29 3/20/18   Clearance MD Whit   Lancets MISC Test once daily. Dx: E11.29 3/8/18   Clearance MD Whit   ascorbic acid (VITAMIN C) 500 MG tablet Take 1,000 mg by mouth daily    Historical Provider, MD   CINNAMON PO Take 500 mg by mouth 2 times daily. Historical Provider, MD   aspirin 81 MG EC tablet Take 81 mg by mouth daily. Historical Provider, MD       ALLERGIES: Izella Shawl, Metformin and related, and Other    REVIEW OF SYSTEM:   A full 12 point review of systems was completed, and was unremarkable except as specified above. OBJECTIVE    /63   Pulse 89   Temp 99.1 °F (37.3 °C) (Temporal)   Resp 10   SpO2 97%     PHYSICAL EXAM:   Constitutional: Adult male reclining in bed in no acute distress  Head: NCAT  Eyes: PERRLA, EOMI  ENT: Hearing grossly intact. No rhinorrhea.   End-tidal CO2 monitor in place. Neck: Trachea midline, phonation normal  Cardiovascular: RRR. No significant murmurs appreciated. Warm well perfused peripherally. No pretibial edema. SCDs present. Pulmonary: Normal rate and effort of respiration on O2 by ET-CO2 monitor NC. Grossly CTAB, with fairly normal lung sounds present in RUL as well. No coughing during exam.   Right side chest tube to atrium drain with small volume bloody output, with no leak. Scant bleed through of dressings at chest tube site. Abdomen: Soft, nontense, nondistended. Bowel sounds appreciated. Nontender to palpation. Neurologic: Alert, grossly oriented. Nonfocal.  Psychiatric: Pleasant and cooperative. DATA:     Diagnostic tests reviewed for today's visit:    Most recent labs and imaging results reviewed. LABS:    Recent Results (from the past 24 hour(s))   POCT Glucose    Collection Time: 01/31/23  8:44 AM   Result Value Ref Range    POC Glucose 188 (H) 70 - 99 mg/dl    Performed on ACCU-CHEK    CBC without Diff    Collection Time: 01/31/23  1:15 PM   Result Value Ref Range    WBC 15.0 (H) 4.8 - 10.8 K/uL    RBC 3.91 (L) 4.70 - 6.10 M/uL    Hemoglobin 13.4 (L) 14.0 - 18.0 g/dL    Hematocrit 40.3 (L) 42.0 - 52.0 %    .0 (H) 79.0 - 92.2 fL    MCH 34.3 (H) 27.0 - 31.3 pg    MCHC 33.3 33.0 - 37.0 %    RDW 15.4 (H) 11.5 - 14.5 %    Platelets 905 015 - 304 K/uL   POCT Glucose    Collection Time: 01/31/23  2:04 PM   Result Value Ref Range    POC Glucose 165 (H) 70 - 99 mg/dl    Performed on ACCU-CHEK    POCT Glucose    Collection Time: 01/31/23  6:00 PM   Result Value Ref Range    POC Glucose 195 (H) 70 - 99 mg/dl    Performed on ACCU-CHEK        IMAGING:  No results found.     VTE Prophylaxis: SCDs    ASSESSMENT AND PLAN    Principal Problem:  Malignant neoplasm of upper lobe of right lung, s/p lobectomy 1/31/2023  Plan: Management per primary team    Other Active Problems:  Hyperkalemia  Plan - lokelma TID for 36hrs, recheck labs, goal K 4.0-5.0    DM2   Plan - correction insulin. DC post-op D5/NS, switch to maintenance NS infusion. Goal -180 during admission    Myasthenia gravis - previous flare after heart surgery. Takes Imuran monotherapy to control. No increased post-op weakness at present. Discussed close monitoring for symptoms with patient and nursing. Plan: Continue home monitoring. Close monitoring for worsening weakness that might herald a post-op flare. If flare occurs, plan for steroids and cholinergic therapy.         Chronic Problems:  BPH  HTN  FERRELL  HLD  COPD not on home O2  Depression  Lumbago  Plan: Continue/hold home meds as ordered      Plan of care discussed with: patient    SIGNATURE: Lashanda Bauer DO  DATE: January 31, 2023  TIME: 7:18 PM

## 2023-02-01 NOTE — PLAN OF CARE
Problem: Safety - Adult  Goal: Free from fall injury  Flowsheets (Taken 2/1/2023 0124)  Free From Fall Injury:   Instruct family/caregiver on patient safety   Based on caregiver fall risk screen, instruct family/caregiver to ask for assistance with transferring infant if caregiver noted to have fall risk factors

## 2023-02-02 ENCOUNTER — APPOINTMENT (OUTPATIENT)
Dept: GENERAL RADIOLOGY | Age: 78
End: 2023-02-02
Attending: THORACIC SURGERY (CARDIOTHORACIC VASCULAR SURGERY)
Payer: MEDICARE

## 2023-02-02 VITALS
BODY MASS INDEX: 27.44 KG/M2 | SYSTOLIC BLOOD PRESSURE: 144 MMHG | OXYGEN SATURATION: 95 % | WEIGHT: 196 LBS | HEIGHT: 71 IN | RESPIRATION RATE: 18 BRPM | DIASTOLIC BLOOD PRESSURE: 65 MMHG | TEMPERATURE: 98 F | HEART RATE: 97 BPM

## 2023-02-02 LAB
ANION GAP SERPL CALCULATED.3IONS-SCNC: 12 MEQ/L (ref 9–15)
BUN BLDV-MCNC: 22 MG/DL (ref 8–23)
CALCIUM SERPL-MCNC: 8.7 MG/DL (ref 8.5–9.9)
CHLORIDE BLD-SCNC: 102 MEQ/L (ref 95–107)
CO2: 25 MEQ/L (ref 20–31)
CREAT SERPL-MCNC: 1.11 MG/DL (ref 0.7–1.2)
GFR SERPL CREATININE-BSD FRML MDRD: >60 ML/MIN/{1.73_M2}
GLUCOSE BLD-MCNC: 159 MG/DL (ref 70–99)
GLUCOSE BLD-MCNC: 218 MG/DL (ref 70–99)
GLUCOSE BLD-MCNC: 352 MG/DL (ref 70–99)
HCT VFR BLD CALC: 32.3 % (ref 42–52)
HEMOGLOBIN: 11.2 G/DL (ref 14–18)
MCH RBC QN AUTO: 34.6 PG (ref 27–31.3)
MCHC RBC AUTO-ENTMCNC: 34.7 % (ref 33–37)
MCV RBC AUTO: 99.8 FL (ref 79–92.2)
PDW BLD-RTO: 15.2 % (ref 11.5–14.5)
PERFORMED ON: ABNORMAL
PERFORMED ON: ABNORMAL
PLATELET # BLD: 191 K/UL (ref 130–400)
POTASSIUM REFLEX MAGNESIUM: 4.6 MEQ/L (ref 3.4–4.9)
RBC # BLD: 3.24 M/UL (ref 4.7–6.1)
SODIUM BLD-SCNC: 139 MEQ/L (ref 135–144)
WBC # BLD: 9.3 K/UL (ref 4.8–10.8)

## 2023-02-02 PROCEDURE — 80048 BASIC METABOLIC PNL TOTAL CA: CPT

## 2023-02-02 PROCEDURE — 94640 AIRWAY INHALATION TREATMENT: CPT

## 2023-02-02 PROCEDURE — 85027 COMPLETE CBC AUTOMATED: CPT

## 2023-02-02 PROCEDURE — 71045 X-RAY EXAM CHEST 1 VIEW: CPT

## 2023-02-02 PROCEDURE — 36415 COLL VENOUS BLD VENIPUNCTURE: CPT

## 2023-02-02 PROCEDURE — 6370000000 HC RX 637 (ALT 250 FOR IP): Performed by: INTERNAL MEDICINE

## 2023-02-02 PROCEDURE — 2700000000 HC OXYGEN THERAPY PER DAY

## 2023-02-02 PROCEDURE — 6370000000 HC RX 637 (ALT 250 FOR IP)

## 2023-02-02 PROCEDURE — 94618 PULMONARY STRESS TESTING: CPT

## 2023-02-02 PROCEDURE — 2580000003 HC RX 258: Performed by: THORACIC SURGERY (CARDIOTHORACIC VASCULAR SURGERY)

## 2023-02-02 PROCEDURE — 6370000000 HC RX 637 (ALT 250 FOR IP): Performed by: THORACIC SURGERY (CARDIOTHORACIC VASCULAR SURGERY)

## 2023-02-02 PROCEDURE — 6360000002 HC RX W HCPCS: Performed by: THORACIC SURGERY (CARDIOTHORACIC VASCULAR SURGERY)

## 2023-02-02 RX ORDER — OXYCODONE HYDROCHLORIDE 5 MG/1
5 TABLET ORAL EVERY 6 HOURS PRN
Qty: 25 TABLET | Refills: 0 | Status: SHIPPED | OUTPATIENT
Start: 2023-02-02 | End: 2023-02-09

## 2023-02-02 RX ADMIN — METOPROLOL SUCCINATE 150 MG: 100 TABLET, EXTENDED RELEASE ORAL at 10:49

## 2023-02-02 RX ADMIN — OXYCODONE HYDROCHLORIDE 5 MG: 5 TABLET ORAL at 05:53

## 2023-02-02 RX ADMIN — AMLODIPINE BESYLATE 10 MG: 10 TABLET ORAL at 10:50

## 2023-02-02 RX ADMIN — AZATHIOPRINE 100 MG: 50 TABLET ORAL at 10:50

## 2023-02-02 RX ADMIN — TAMSULOSIN HYDROCHLORIDE 0.8 MG: 0.4 CAPSULE ORAL at 10:50

## 2023-02-02 RX ADMIN — LOSARTAN POTASSIUM 100 MG: 100 TABLET, FILM COATED ORAL at 10:50

## 2023-02-02 RX ADMIN — SODIUM ZIRCONIUM CYCLOSILICATE 5 G: 5 POWDER, FOR SUSPENSION ORAL at 10:49

## 2023-02-02 RX ADMIN — Medication 10 ML: at 10:49

## 2023-02-02 RX ADMIN — INSULIN LISPRO 4 UNITS: 100 INJECTION, SOLUTION INTRAVENOUS; SUBCUTANEOUS at 12:28

## 2023-02-02 RX ADMIN — OXYCODONE HYDROCHLORIDE 5 MG: 5 TABLET ORAL at 14:14

## 2023-02-02 RX ADMIN — ROSUVASTATIN CALCIUM 20 MG: 20 TABLET, FILM COATED ORAL at 10:49

## 2023-02-02 RX ADMIN — FLUTICASONE PROPIONATE 1 SPRAY: 50 SPRAY, METERED NASAL at 10:51

## 2023-02-02 RX ADMIN — TIOTROPIUM BROMIDE AND OLODATEROL 2 PUFF: 3.124; 2.736 SPRAY, METERED RESPIRATORY (INHALATION) at 07:07

## 2023-02-02 RX ADMIN — ENOXAPARIN SODIUM 40 MG: 100 INJECTION SUBCUTANEOUS at 10:49

## 2023-02-02 RX ADMIN — TRIAMTERENE AND HYDROCHLOROTHIAZIDE 1 TABLET: 37.5; 25 TABLET ORAL at 10:50

## 2023-02-02 RX ADMIN — PANTOPRAZOLE SODIUM 40 MG: 40 TABLET, DELAYED RELEASE ORAL at 06:36

## 2023-02-02 ASSESSMENT — PAIN SCALES - GENERAL
PAINLEVEL_OUTOF10: 10
PAINLEVEL_OUTOF10: 5
PAINLEVEL_OUTOF10: 0
PAINLEVEL_OUTOF10: 0

## 2023-02-02 ASSESSMENT — PAIN DESCRIPTION - LOCATION: LOCATION: CHEST

## 2023-02-02 ASSESSMENT — PAIN DESCRIPTION - DESCRIPTORS: DESCRIPTORS: DISCOMFORT

## 2023-02-02 ASSESSMENT — PAIN SCALES - WONG BAKER: WONGBAKER_NUMERICALRESPONSE: 0

## 2023-02-02 ASSESSMENT — PAIN DESCRIPTION - ORIENTATION: ORIENTATION: RIGHT

## 2023-02-02 NOTE — PROGRESS NOTES
Discharge instructions provided to patient and spouse. Verbalized understanding of follow up appointments,  activity, wound care, medications and reasons to return to ED/call physician. All questions answered. Copy of discharge instructions provided. Assisted into wheelchair and taken to personal car. Denies further needs. Ivs removed without complication. Pt tolerated well. Catheters appear  intact, dressing applied. No drainage noted.

## 2023-02-02 NOTE — PLAN OF CARE
Problem: Chronic Conditions and Co-morbidities  Goal: Patient's chronic conditions and co-morbidity symptoms are monitored and maintained or improved  Outcome: Progressing     Problem: Discharge Planning  Goal: Discharge to home or other facility with appropriate resources  Outcome: Progressing     Problem: ABCDS Injury Assessment  Goal: Absence of physical injury  Outcome: Progressing     Problem: Safety - Adult  Goal: Free from fall injury  Outcome: Progressing

## 2023-02-02 NOTE — DISCHARGE SUMMARY
Physician Discharge Summary     Patient ID:  Manju Rider  [id number]  57 y.o.  1945    Admit date: 1/31/2023    Discharge date and time: No discharge date for patient encounter. 2/2/2023    Admitting Physician: Justine Sanabria MD     Discharge Physician: same    Admission Diagnoses: Malignant neoplasm of upper lobe of right lung ([address] [C34.11]  Status post lobectomy of lung [Z90.2]    Discharge Diagnoses: same    Admission Condition: good    Discharged Condition: good    Indication for Admission: surgery    Hospital Course: Pt admitted day of surgery for resection of a right lung cancer. Surgery went well. By POD 2 his chest tube output had decreased and there was no air leak. Tubes removed and cxr showed stable post op changes.   Discharged to home    Consults: hospitalist    Significant Diagnostic Studies: radiology: CXR:  stable post operative changes    Treatments: surgery: RVATS upper lobectomy    Discharge Exam:  BP (!) 144/65 Comment: Informed Jacqlyn Reyna of the Bp 0750  Pulse 97   Temp 98 °F (36.7 °C) (Oral)   Resp 18   Ht 5' 11\" (1.803 m)   Wt 196 lb (88.9 kg)   SpO2 95%   BMI 27.34 kg/m²     General Appearance:    Alert, cooperative, no distress, appears stated age   Head:    Normocephalic, without obvious abnormality, atraumatic   Eyes:    PERRL, conjunctiva/corneas clear, EOM's intact, fundi     benign, both eyes        Ears:    Normal TM's and external ear canals, both ears   Nose:   Nares normal, septum midline, mucosa normal, no drainage    or sinus tenderness   Throat:   Lips, mucosa, and tongue normal; teeth and gums normal   Neck:   Supple, symmetrical, trachea midline, no adenopathy;        thyroid:  No enlargement/tenderness/nodules; no carotid    bruit or JVD   Back:     Symmetric, no curvature, ROM normal, no CVA tenderness   Lungs:     Clear to auscultation bilaterally, respirations unlabored   Chest wall:    No tenderness or deformity   Heart:    Regular rate and rhythm, S1 and S2 normal, no murmur, rub   or gallop   Abdomen:     Soft, non-tender, bowel sounds active all four quadrants,     no masses, no organomegaly   Genitalia:    Normal male without lesion, discharge or tenderness   Rectal:    Normal tone, normal prostate, no masses or tenderness;    guaiac negative stool   Extremities:   Extremities normal, atraumatic, no cyanosis or edema   Pulses:   2+ and symmetric all extremities   Skin:   Skin color, texture, turgor normal, no rashes or lesions   Lymph nodes:   Cervical, supraclavicular, and axillary nodes normal   Neurologic:   CNII-XII intact. Normal strength, sensation and reflexes       throughout       Disposition: home    In process/preliminary results:  Outstanding Order Results       Date and Time Order Name Status Description    2/2/2023 10:30 AM XR CHEST PORTABLE In process     1/31/2023  2:18 PM Surgical Pathology In process     1/6/2023  8:41 AM Culture with Smear, Acid Fast Bacillius Preliminary     1/6/2023  8:41 AM Culture, Fungus Preliminary     1/6/2023  8:41 AM Cell Count with Differential, Body Fluid In process             Patient Instructions:   Current Discharge Medication List        START taking these medications    Details   oxyCODONE (ROXICODONE) 5 MG immediate release tablet Take 1 tablet by mouth every 6 hours as needed for Pain for up to 7 days.  Max Daily Amount: 20 mg  Qty: 25 tablet, Refills: 0    Comments: Reduce doses taken as pain becomes manageable  Associated Diagnoses: Status post lobectomy of lung           CONTINUE these medications which have NOT CHANGED    Details   omeprazole (PRILOSEC) 20 MG delayed release capsule       Umeclidinium-Vilanterol (ANORO ELLIPTA) 62.5-25 MCG/ACT AEPB Inhale 1 puff into the lungs daily  Qty: 1 each, Refills: 3    Associated Diagnoses: Chronic obstructive pulmonary disease, unspecified COPD type (HCC)      empagliflozin (JARDIANCE) 10 MG tablet Take 1 tablet by mouth daily  Qty: 90 tablet, Refills: 3    Associated Diagnoses: Type 2 diabetes mellitus with microalbuminuria, without long-term current use of insulin ([facility])      apixaban (ELIQUIS) 5 MG TABS tablet Take 1 tablet by mouth 2 times daily  Qty: 60 tablet, Refills: 3    Associated Diagnoses: Pulmonary embolism and infarction ([facility])      metoprolol succinate (TOPROL XL) 50 MG extended release tablet Take 3 tablets by mouth daily  Qty: 270 tablet, Refills: 3    Comments: Requesting 1 year supply  Associated Diagnoses: Essential hypertension      dulaglutide (TRULICITY) 1.5 QZ/7.1HD SC injection Inject 0.5 mLs into the skin once a week  Qty: 6 mL, Refills: 1    Associated Diagnoses: Type 2 diabetes mellitus with microalbuminuria, without long-term current use of insulin ([facility])      rosuvastatin (CRESTOR) 20 MG tablet Take 1 tablet by mouth daily  Qty: 90 tablet, Refills: 1    Comments: Requesting 1 year supply      tamsulosin (FLOMAX) 0.4 MG capsule Take 2 capsules by mouth daily  Qty: 180 capsule, Refills: 1    Comments: Requesting 1 year supply  Associated Diagnoses: Benign prostatic hyperplasia with nocturia      triamterene-hydroCHLOROthiazide (MAXZIDE-25) 37.5-25 MG per tablet Take 1 tablet by mouth daily  Qty: 90 tablet, Refills: 1    Comments: Requesting 1 year supply  Associated Diagnoses: Essential hypertension      losartan (COZAAR) 100 MG tablet Take 1 tablet by mouth daily  Qty: 90 tablet, Refills: 1    Comments: Requesting 1 year supply  Associated Diagnoses: Essential hypertension      methylPREDNISolone (MEDROL, JIGAR,) 4 MG tablet Take by mouth.   Qty: 1 kit, Refills: 0    Associated Diagnoses: Lower respiratory infection      amLODIPine (NORVASC) 10 MG tablet Take 1 tablet by mouth daily  Qty: 90 tablet, Refills: 3    Comments: Requesting 1 year supply  Associated Diagnoses: Essential hypertension      FREESTYLE LITE strip USE 1 STRIP TO CHECK GLUCOSE ONCE DAILY AS DIRECTED  Qty: 50 each, Refills: 0    Associated Diagnoses: Type 2 diabetes mellitus with microalbuminuria, without long-term current use of insulin (HCC)      Testosterone Cypionate 200 MG/ML SOLN 1 CC EVERY 2 WEEKS  Qty: 10 mL, Refills: 3    Associated Diagnoses: Hypogonadism male      fluticasone (FLONASE) 50 MCG/ACT nasal spray 1 spray by Nasal route daily  Qty: 1 Bottle, Refills: 0    Associated Diagnoses: Acute sinusitis, recurrence not specified, unspecified location      albuterol sulfate  (90 Base) MCG/ACT inhaler Inhale 2 puffs into the lungs every 6 hours as needed for Wheezing  Qty: 3 Inhaler, Refills: 0    Comments: Requesting 1 year supply  Associated Diagnoses: Chronic obstructive pulmonary disease, unspecified COPD type ([facility])      testosterone cypionate (DEPOTESTOTERONE CYPIONATE) 200 MG/ML injection Inject 1 mL into the muscle every 14 days. 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 Cyanocobalamin (VITAMIN B 12 PO) Take 1,000 mg by mouth      nitroGLYCERIN (NITROSTAT) 0.4 MG SL tablet Place 1 tablet under the tongue every 5 minutes as needed for Chest pain  Qty: 25 tablet, Refills: 0      Blood Glucose Monitoring Suppl CHENCHO Test once daily.   Dx: E11.29  Qty: 1 Device, Refills: 0    Comments: DISPENSE WHAT IS COVERED BY INSURANCE  Associated Diagnoses: Type 2 diabetes mellitus with microalbuminuria, without long-term current use of insulin (HCC)      Lancets MISC Test once daily. Dx: E11.29  Qty: 100 each, Refills: 3      ascorbic acid (VITAMIN C) 500 MG tablet Take 1,000 mg by mouth daily      CINNAMON PO Take 500 mg by mouth 2 times daily. aspirin 81 MG EC tablet Take 81 mg by mouth daily. Activity: no heavy lifting for 3 weeks  Diet: regular diet  Wound Care: as directed    Follow-up with Dr. Helena Persaud in 3 weeks.     Signed:  Dr. Helena Persaud  2/2/2023  10:48 AM

## 2023-02-02 NOTE — CARE COORDINATION
This LSW met with patient at bedside this am. Patient is anticipating discharge home today, 2/2/2023. Patient may require home 02 eval prior to discharge. Patient wife will provide transportation home. PASCUALW /GENEVIEVE to follow.   Electronically signed by IVONNE Hernandez, JOSE LUIS on 2/2/23 at 10:48 AM EST

## 2023-02-02 NOTE — PROGRESS NOTES
Pt assessment and vitals complete and stable. Medicated per emar. Denies needs at this time. Hopeful for discharge today, aware of order in place. Patient on 3 L nasal cannula at this time. No shortness of breath observed. Pt to get home o2 eval before discharge. Dr. Charlie Jc for order. Awaiting response. Pt up to chair. Chest tubes removed per Dr. Jenna Landers, no drainage noted on dressings. Pt aware of plan of care. Denies needs.

## 2023-02-02 NOTE — DISCHARGE INSTRUCTIONS
No lifting more than 15 pounds for 3 weeks. No driving if taking narcotics. May remove dressings on February 4 and shower. Recover incisions with fresh gauze daily until they are healed. It is okay to start Eliquis when at home.

## 2023-02-02 NOTE — PROGRESS NOTES
Pt spo2 92% on room air at rest  Pt spo2 89% on room air walking   Pt does not need home o2 at this time.   César Francois rrt   0511

## 2023-02-02 NOTE — PROGRESS NOTES
Patient is doing well. He has minimal drainage from his chest tubes. There is no evidence of an air leak with multiple strong coughs. Both chest tubes were removed. If chest x-ray is good then he can be discharged home today.

## 2023-02-02 NOTE — PROGRESS NOTES
Spiritual Care Services     Summary of Visit:   visited patient and wife in their room. Patient feeling encouraged because he is being discharged today. Patient and wife spoke of their strong shila and how that has helped them deal with various health concerns. Encounter Summary  Encounter Overview/Reason : Initial Encounter  Service Provided For[de-identified] Patient and family together  Referral/Consult From[de-identified] Rounding  Support System: Spouse, Children, Synagogue/shila community  Complexity of Encounter: Moderate  Begin Time: 1300  End Time : 1315  Total Time Calculated: 15 min  Encounter   Type: Initial Screen/Assessment     Spiritual/Emotional needs  Type: Spiritual Support                      Spiritual Assessment/Intervention/Outcomes:    Assessment: Calm    Intervention: Discussed relationship with God, Discussed belief system/Spiritism practices/shila, Nurtured Hope, Prayer (assurance of)/[place]    Outcome: Connection/Belonging, Expressed feelings of Diane, Peace and/or Love      Care Plan:    Plan and Referrals  Plan/Referrals: Continue Support (comment)          Spiritual Care Services   Electronically signed by Chaplain Brittny on 2/2/2023 at 1:30 PM.    To reach a  for emotional and spiritual support, place an [facility]'S \[facility] [place] [facility]\"" consult request.   If a  is needed immediately, dial 0 and ask to page the on-call .

## 2023-02-03 ENCOUNTER — CARE COORDINATION (OUTPATIENT)
Dept: CASE MANAGEMENT | Age: 78
End: 2023-02-03

## 2023-02-03 DIAGNOSIS — Z90.2 STATUS POST LOBECTOMY OF LUNG: Primary | ICD-10-CM

## 2023-02-03 PROCEDURE — 1111F DSCHRG MED/CURRENT MED MERGE: CPT | Performed by: FAMILY MEDICINE

## 2023-02-03 NOTE — CARE COORDINATION
[facility] Care Transitions Initial Follow Up Call    Call within 2 business days of discharge: Yes    Care Transition Nurse contacted the patient by telephone to perform post hospital discharge assessment. Verified name and  with patient as identifiers. Provided introduction to self, and explanation of the Care Transition Nurse role. Patient: Aide Self Patient :    MRN: [id number]  Reason for Admission: 2023 - 2023 [facility]. Lung cancer, s/p right thoracoscopic upper lobectomy  Discharge Date: 23 RARS: Readmission Risk Score: 20.4  NR. This admit is elective surgery. CT    PCP  10:15  Post-op BALTAZAR Apodaca  10:00  Pulm/KENYATTA Granados 3/3 10:00    START taking:  oxyCODONE (ROXICODONE)    Last Discharge  Street       Date Complaint Diagnosis Description Type Department Provider    23  Status post lobectomy of lung . .. Admission (Discharged) Kareem Rodriguez MD            Was this an external facility discharge? No Discharge Facility:     Challenges to be reviewed by the provider   Additional needs identified to be addressed with provider: No  none               Method of communication with provider: none. Lai Fagan reports post-op pain rated 3-4/10 and pain med is effective for relief. He is not experiencing any SOB at rest and denies any acute coughing or congestion. No fever, chills, flu-like symptoms. No LE edema concerns. Sats 94-95% on room air. Does not use O2 or CPAP in the home. Has his inhalers and diabetic supplies. He did not check this AM FBS yet, and enc to be diligent w/ diabetic management during recovery reporting any trending  or greater, v/u. States he does have some generalized soreness. He has poor appetite. Enc to avoid dehydration and to attempt 4-5 small nutrient dense food attempts vs 3 larger ones, v/u. No current elimination concerns.  Reviewed avoiding constipation on opiates, v/u. Declines any HHC needs at this time. Enc to reposition often and to perform gentle ambulation every 1-2 hrs while awake for good blood flow to LE, v/u. Pt aware to get xray before post-op appt. Care Transition Nurse reviewed discharge instructions with patient who verbalized understanding. The patient was given an opportunity to ask questions and does not have any further questions or concerns at this time. Were discharge instructions available to patient? Yes. Reviewed appropriate site of care based on symptoms and resources available to patient including: Urgent care clinics  Home health  When to call 911  Condition related references. The patient agrees to contact the PCP office for questions related to their healthcare. Advance Care Planning:   Does patient have an Advance Directive: Dorothy Plasencia primary decision maker [phone]       Medication reconciliation was performed with patient, who verbalizes understanding of administration of home medications. Medications reviewed, 1111F entered: yes    Was patient discharged with a pulse oximeter? Has own. Non-face-to-face services provided:  Reviewed s/s post-op infection/complication to call [phone]. Reviewed s/s PE to call 911. Reviewed s/s developing PNE to report to physician/call 911. Offered patient enrollment in the Remote Patient Monitoring (RPM) program for in-home monitoring: Patient declined.     Care Transitions 24 Hour Call    Do you have a copy of your discharge instructions?: Yes  Do you have all of your prescriptions and are they filled?: Yes  Have you scheduled your follow up appointment?: Yes  Do you have support at home?: Partner/Spouse/SO  Do you feel like you have everything you need to keep you well at home?: Yes  Are you an active caregiver in your home?: No  Care Transitions Interventions    Physical Therapy: Declined     Meds to Bed: Completed      Occupational Therapy: Declined              Follow Up  Future Appointments   Date Time Provider Department Center   2/9/2023 10:15 AM Cam Prado MD [facility] [place]   2/15/2023  9:40 AM SCHEDULE, GERBER COBB PCP TESTOSTERONE [facility] [place]   2/22/2023 10:00 AM Ayo Apodaca MD [facility] [place]   3/3/2023 10:00 AM Cam Prado MD [facility] [place]   3/9/2023 10:15 AM Hunter Granados MD [place] Pul[facility] [place]   3/22/2023  1:00 PM Michi Duran MD [place] End[facility]       Care Transition Nurse provided contact information.  Plan for follow-up call in 5-7 days based on severity of symptoms and risk factors.  Plan for next call:  CT FU, Check sats and BS ranges, Check incision site and resp concerns, Check if HHC or RPM needed.    Catrian Dos Santos RN

## 2023-02-06 ENCOUNTER — TELEPHONE (OUTPATIENT)
Dept: FAMILY MEDICINE CLINIC | Age: 78
End: 2023-02-06

## 2023-02-06 ENCOUNTER — CARE COORDINATION (OUTPATIENT)
Dept: CASE MANAGEMENT | Age: 78
End: 2023-02-06

## 2023-02-06 DIAGNOSIS — C34.11 MALIGNANT NEOPLASM OF UPPER LOBE OF RIGHT LUNG (HCC): Primary | ICD-10-CM

## 2023-02-06 NOTE — TELEPHONE ENCOUNTER
Pt needs seen to collect urine sample before any antibiotic can be started. Put him on schedule in afternoon with available provider or walk-in clinic.

## 2023-02-06 NOTE — TELEPHONE ENCOUNTER
PT requesting meds for a UTI ( says he has a burning sensation) asking if meds can be sent to [facility] in [place]     Please call Anshu's cell when ready.       [phone]

## 2023-02-06 NOTE — CARE COORDINATION
[facility] Care Transitions Follow Up Call    Care Transition Nurse contacted the patient by telephone to follow up after admission. Verified name and  with patient as identifiers. Patient: Dc Christianson  Patient :    MRN: [id number]  Reason for Admission: 2023 - 2023 [facility]. Lung cancer, s/p right thoracoscopic upper lobectomy  Discharge Date: 23 RARS: Readmission Risk Score: 20.4  NR. This admit is elective surgery. CT    PCP  10:15  Post-op BALTAZAR Apodaca  10:00  Pulm/M Darwin 3/3 10:00  Onc/Litam  1:00    Needs to be reviewed by the provider   Additional needs identified to be addressed with provider: Yes  Austin Moreno CNP and PCP routed pt is new lobectomy w/ UTI symptoms. Please be advised he is having home sats 87-88% and 91% on room air and may need new O2 eval. States urethra is swollen and burns to urinate. Diarrhea from yesterday resolved. Has SOB and cough w/out fever. He is feeling anxious and may need support w/ that. May need home nebulizer and aerosols (was using his wife's and found helpful). He declined [facility] after discharge. Method of communication with provider: chart routing. CTN received call from [facility] alerting me pt wanted a call back. Pt declined [facility] after discharge. States he is having exertional SOB, coughing w/ whitish-yellow phlegm, and room air sats 87-88% after exertion and 91% at rest now. He has used his wife's nebulizer a couple times and states that it was helpful to clear cough and bring sat up. Pt does not have a script for neb aerosols and advised to discuss this at tomorrow's appt, v/u. Advised to discuss desats because he may need new O2 eval, v/u. PCP routed to advise of the above yellow box message. Pt states no penile discharge but urethra swollen and difficult to begin stream of urine. No blood in urine or foul urine odor. He does have some flank and lung post-op pain that has not worsened.  Alert and answers questions appropriately. He is somewhat anxious. Upset someone at [place] [address] suggested hospice. Advised on what hospice vs pall care services are and enc he can discuss further w/ PCP if felt warranted, v/u. He may reconsider [place] [address] after speaking w/ physician. Pt concerned he needs O2. Advised if any changes, confusions, fevers, or persistent sats less than 90% prior to tomorrow's appt to return to ED, v/u. Has onc appt tomorrow. Addressed changes since last contact:  none  Discussed follow-up appointments. If no appointment was previously scheduled, appointment scheduling offered: Yes. Is follow up appointment scheduled within 7 days of discharge? See above appt list.    Follow Up  Future Appointments   Date Time Provider Kay Bartlett   2/7/2023 11:30 AM Mary Disha, APRN - [id number] [facility] [employer]   2/9/2023 10:15 AM MD [place] Rodriguez [address]   2/15/2023  9:40 AM SCHEDULE, GERBER COBB PCP TESTOSTERONE MLOX [facility]   2/22/2023 10:00 AM Adithya Alfonso MD [address]   3/3/2023 10:00 AM MD Cleveland Rodriguez [address]   3/9/2023 10:15 AM Vero Mathews MD [address]   3/22/2023  1:00 PM Ousmane Ivan  [address] [place] [address] follow up appointment(s):     Care Transition Nurse reviewed red flags with patient and discussed any barriers to care and/or understanding of plan of care after discharge. Discussed appropriate site of care based on symptoms and resources available to patient including: PCP  Urgent care clinics  [facility]  When to call 911  Condition related references. The patient agrees to contact the PCP office for questions related to their healthcare. Advance Care Planning:   reviewed and current. Patients top risk factors for readmission:  Lobectomy , UTI  Interventions to address risk factors:  Pt scheduled appt tomorrow for UAC&S/concern for UTI s/p lobectomy.  Reviewed to report/return to ED for trending sats less than 90% and/or increasing SOB. Offered patient enrollment in the Remote Patient Monitoring (RPM) program for in-home monitoring: Patient declined. Care Transitions Subsequent and Final Call    Subsequent and Final Calls  Do you have any ongoing symptoms?: Yes  Patient-reported symptoms: Other, Cough, Fatigue, Shortness of Breath, Pain  Interventions for patient-reported symptoms: Notified PCP/Physician  Have your medications changed?: No  Do you have any questions related to your medications?: No  Do you currently have any active services?: No  Do you have any needs or concerns that I can assist you with?: No  Identified Barriers: Lack of Education  Care Transitions Interventions    Physical Therapy: Declined     Meds to Bed: Completed      Occupational Therapy: Declined     Other Interventions:             Care Transition Nurse provided contact information for future needs. Plan for follow-up call in 7-10 days based on severity of symptoms and risk factors. Plan for next call:  CT FU, Check urine tolerance and pain, Check if on atb, check if O2 eval and home sats, check anxiety.     Karla Carias RN

## 2023-02-07 ENCOUNTER — HOSPITAL ENCOUNTER (OUTPATIENT)
Age: 78
Setting detail: SPECIMEN
Discharge: HOME | End: 2023-02-07
Payer: MEDICARE

## 2023-02-07 ENCOUNTER — OFFICE VISIT (OUTPATIENT)
Dept: FAMILY MEDICINE CLINIC | Age: 78
End: 2023-02-07
Payer: MEDICARE

## 2023-02-07 VITALS
TEMPERATURE: 97.7 F | OXYGEN SATURATION: 95 % | BODY MASS INDEX: 26.91 KG/M2 | SYSTOLIC BLOOD PRESSURE: 118 MMHG | RESPIRATION RATE: 18 BRPM | HEIGHT: 71 IN | WEIGHT: 192.2 LBS | HEART RATE: 98 BPM | DIASTOLIC BLOOD PRESSURE: 56 MMHG

## 2023-02-07 DIAGNOSIS — N30.01 ACUTE CYSTITIS WITH HEMATURIA: ICD-10-CM

## 2023-02-07 DIAGNOSIS — E11.29 TYPE 2 DIABETES MELLITUS WITH MICROALBUMINURIA, WITHOUT LONG-TERM CURRENT USE OF INSULIN (HCC): ICD-10-CM

## 2023-02-07 DIAGNOSIS — R80.9 TYPE 2 DIABETES MELLITUS WITH MICROALBUMINURIA, WITHOUT LONG-TERM CURRENT USE OF INSULIN (HCC): ICD-10-CM

## 2023-02-07 DIAGNOSIS — N30.01 ACUTE CYSTITIS WITH HEMATURIA: Primary | ICD-10-CM

## 2023-02-07 DIAGNOSIS — N48.1 BALANITIS: ICD-10-CM

## 2023-02-07 LAB
BILIRUBIN, POC: ABNORMAL
BLOOD URINE, POC: ABNORMAL
CLARITY, POC: CLEAR
COLOR, POC: YELLOW
GLUCOSE URINE, POC: ABNORMAL
KETONES, POC: ABNORMAL
LEUKOCYTE EST, POC: ABNORMAL
NITRITE, POC: ABNORMAL
PH, POC: 5.5
PROTEIN, POC: ABNORMAL
SPECIFIC GRAVITY, POC: 1.01
UROBILINOGEN, POC: ABNORMAL

## 2023-02-07 PROCEDURE — G8427 DOCREV CUR MEDS BY ELIG CLIN: HCPCS

## 2023-02-07 PROCEDURE — G8484 FLU IMMUNIZE NO ADMIN: HCPCS

## 2023-02-07 PROCEDURE — G8417 CALC BMI ABV UP PARAM F/U: HCPCS

## 2023-02-07 PROCEDURE — 81003 URINALYSIS AUTO W/O SCOPE: CPT

## 2023-02-07 PROCEDURE — 1036F TOBACCO NON-USER: CPT

## 2023-02-07 PROCEDURE — 1123F ACP DISCUSS/DSCN MKR DOCD: CPT

## 2023-02-07 PROCEDURE — 3078F DIAST BP <80 MM HG: CPT

## 2023-02-07 PROCEDURE — 3074F SYST BP LT 130 MM HG: CPT

## 2023-02-07 PROCEDURE — 99213 OFFICE O/P EST LOW 20 MIN: CPT

## 2023-02-07 PROCEDURE — 87086 URINE CULTURE/COLONY COUNT: CPT

## 2023-02-07 PROCEDURE — 1111F DSCHRG MED/CURRENT MED MERGE: CPT

## 2023-02-07 RX ORDER — SULFAMETHOXAZOLE AND TRIMETHOPRIM 800; 160 MG/1; MG/1
1 TABLET ORAL 2 TIMES DAILY
Qty: 14 TABLET | Refills: 0 | Status: SHIPPED | OUTPATIENT
Start: 2023-02-07 | End: 2023-02-14

## 2023-02-07 SDOH — ECONOMIC STABILITY: HOUSING INSECURITY
IN THE LAST 12 MONTHS, WAS THERE A TIME WHEN YOU DID NOT HAVE A STEADY PLACE TO SLEEP OR SLEPT IN A SHELTER (INCLUDING NOW)?: NO

## 2023-02-07 SDOH — ECONOMIC STABILITY: FOOD INSECURITY: WITHIN THE PAST 12 MONTHS, YOU WORRIED THAT YOUR FOOD WOULD RUN OUT BEFORE YOU GOT MONEY TO BUY MORE.: NEVER TRUE

## 2023-02-07 SDOH — ECONOMIC STABILITY: INCOME INSECURITY: HOW HARD IS IT FOR YOU TO PAY FOR THE VERY BASICS LIKE FOOD, HOUSING, MEDICAL CARE, AND HEATING?: NOT HARD AT ALL

## 2023-02-07 SDOH — ECONOMIC STABILITY: FOOD INSECURITY: WITHIN THE PAST 12 MONTHS, THE FOOD YOU BOUGHT JUST DIDN'T LAST AND YOU DIDN'T HAVE MONEY TO GET MORE.: NEVER TRUE

## 2023-02-07 ASSESSMENT — ENCOUNTER SYMPTOMS
SHORTNESS OF BREATH: 0
DIARRHEA: 0
ABDOMINAL PAIN: 1
COUGH: 0
CHEST TIGHTNESS: 0
COLOR CHANGE: 0
NAUSEA: 0
BLOOD IN STOOL: 0

## 2023-02-07 ASSESSMENT — PATIENT HEALTH QUESTIONNAIRE - PHQ9
SUM OF ALL RESPONSES TO PHQ QUESTIONS 1-9: 0
1. LITTLE INTEREST OR PLEASURE IN DOING THINGS: 0
4. FEELING TIRED OR HAVING LITTLE ENERGY: 0
10. IF YOU CHECKED OFF ANY PROBLEMS, HOW DIFFICULT HAVE THESE PROBLEMS MADE IT FOR YOU TO DO YOUR WORK, TAKE CARE OF THINGS AT HOME, OR GET ALONG WITH OTHER PEOPLE: 0
SUM OF ALL RESPONSES TO PHQ9 QUESTIONS 1 & 2: 0
8. MOVING OR SPEAKING SO SLOWLY THAT OTHER PEOPLE COULD HAVE NOTICED. OR THE OPPOSITE, BEING SO FIGETY OR RESTLESS THAT YOU HAVE BEEN MOVING AROUND A LOT MORE THAN USUAL: 0
7. TROUBLE CONCENTRATING ON THINGS, SUCH AS READING THE NEWSPAPER OR WATCHING TELEVISION: 0
SUM OF ALL RESPONSES TO PHQ QUESTIONS 1-9: 0
3. TROUBLE FALLING OR STAYING ASLEEP: 0
5. POOR APPETITE OR OVEREATING: 0
2. FEELING DOWN, DEPRESSED OR HOPELESS: 0
6. FEELING BAD ABOUT YOURSELF - OR THAT YOU ARE A FAILURE OR HAVE LET YOURSELF OR YOUR FAMILY DOWN: 0
9. THOUGHTS THAT YOU WOULD BE BETTER OFF DEAD, OR OF HURTING YOURSELF: 0

## 2023-02-07 ASSESSMENT — COLUMBIA-SUICIDE SEVERITY RATING SCALE - C-SSRS
1. WITHIN THE PAST MONTH, HAVE YOU WISHED YOU WERE DEAD OR WISHED YOU COULD GO TO SLEEP AND NOT WAKE UP?: NO
2. HAVE YOU ACTUALLY HAD ANY THOUGHTS OF KILLING YOURSELF?: NO
6. HAVE YOU EVER DONE ANYTHING, STARTED TO DO ANYTHING, OR PREPARED TO DO ANYTHING TO END YOUR LIFE?: NO

## 2023-02-07 NOTE — PROGRESS NOTES
[address] [place] [address] Encounter        ASSESSMENT/PLAN     Trinidad Kamara is a 68 y.o. male who presents with:  Reporting pain with urination and swelling of the penis starting 2 days ago. Patient was discharged from hospital 4 days  ago after having a lobectomy surgery. States he did not have catheter inserted during hospital stay. He was using urinal.  On examination of the penis there is mild swelling of the foreskin there are 3 ulcerations noted 2 to 3 mm in diameter. They have yellow exudate and surrounding erythremia. POC urinalysis shows an elevation in glucose and hematuria. Patient reports tenderness across the abdomen there is bruising across the right side of the abdomen on up to the right side of his ribs from recent surgery. Surgical incisions are well approximated closed and not draining. There is tenderness in the lower abdomen bilaterally. 1. Acute cystitis with hematuria  Advised patient increase fluid intake begin taking Bactrim twice daily. Urine culture will be sent for confirmation of cystitis. And to evaluate therapy. -     POCT Urinalysis No Micro (Auto)  -     Culture, Urine; Future  -     sulfamethoxazole-trimethoprim (BACTRIM DS;SEPTRA DS) 800-160 MG per tablet; Take 1 tablet by mouth 2 times daily for 7 days, Disp-14 tablet, R-0Normal  2. Type 2 diabetes mellitus with microalbuminuria, without long-term current use of insulin [facility])  Patient reports recent hospital stay resulted in increased glucose levels. These have returned to normal over the last few days. He is currently taking Jardiance and Trulicity for therapy. Fasting glucose yesterday morning was 120. He does have glucosuria today. His medications are continued and he will follow-up with his PCP  3. Balanitis  Advised patient to wash foreskin daily with soap and water apply metronidazole cream twice daily.   -     metroNIDAZOLE (METROCREAM) 0.75 % cream; Apply topically 2 times daily., Disp-45 g, R-0, Normal           PATIENT REFERRED TO:  Return if symptoms worsen or fail to improve. DISCHARGE MEDICATIONS:  New Prescriptions    METRONIDAZOLE (METROCREAM) 0.75 % CREAM    Apply topically 2 times daily. SULFAMETHOXAZOLE-TRIMETHOPRIM (BACTRIM DS;SEPTRA DS) 800-160 MG PER TABLET    Take 1 tablet by mouth 2 times daily for 7 days     Cannot display discharge medications since this is not an admission. Terri Escobar, APRN - CNP    CHIEF COMPLAINT       Chief Complaint   Patient presents with    Urinary Burning     Pt presents today with c/o burning when urinating x2 days         SUBJECTIVE/REVIEW OF SYSTEMS     Review of Systems   Constitutional:  Negative for fatigue and unexpected weight change. HENT: Negative. Eyes:  Negative for visual disturbance. Respiratory:  Negative for cough, chest tightness and shortness of breath. Cardiovascular:  Negative for chest pain, palpitations and leg swelling. Gastrointestinal:  Positive for abdominal pain. Negative for blood in stool, diarrhea and nausea. Endocrine: Negative for polydipsia. Genitourinary:  Positive for dysuria, frequency, genital sores, penile pain and penile swelling. Negative for flank pain, hematuria, penile discharge and scrotal swelling. Musculoskeletal: Negative. Skin:  Negative for color change. Neurological:  Negative for weakness, light-headedness, numbness and headaches. Hematological:  Does not bruise/bleed easily. Psychiatric/Behavioral:  Negative for confusion. The patient is not nervous/anxious. OBJECTIVE/PHYSICAL EXAM     Physical Exam  Constitutional:       General: He is not in acute distress. Appearance: Normal appearance. HENT:      Head: Normocephalic. Nose: Nose normal.      Mouth/Throat:      Mouth: Mucous membranes are moist.   Eyes:      Conjunctiva/sclera: Conjunctivae normal.   Cardiovascular:      Rate and Rhythm: Normal rate. Pulses: Normal pulses. Pulmonary:      Effort: Pulmonary effort is normal.   Abdominal:      General: Abdomen is flat. Bowel sounds are normal. There is no distension. Palpations: Abdomen is soft. There is no mass. Tenderness: There is abdominal tenderness. Musculoskeletal:         General: Tenderness present. Normal range of motion. Cervical back: Normal range of motion and neck supple. No rigidity. Skin:     General: Skin is warm and dry. Capillary Refill: Capillary refill takes less than 2 seconds. Coloration: Skin is not pale. Findings: Bruising present. Neurological:      General: No focal deficit present. Mental Status: He is alert and oriented to person, place, and time. Motor: No weakness. Coordination: Coordination normal.      Gait: Gait normal.   Psychiatric:         Mood and Affect: Mood normal.         Speech: Speech normal.         Behavior: Behavior normal.         Thought Content:  Thought content normal.       VITALS  BP: (!) 118/56, Temp: 97.7 °F (36.5 °C), Temp Source: Temporal, Heart Rate: 98, Resp: 18, SpO2: 95 %      PAST MEDICAL HISTORY         Diagnosis Date    Atypical chest pain 8/30/2012    BPH (benign prostatic hypertrophy)     CAD (coronary artery disease)     Cataracts, bilateral     Chronic low back pain     COPD (chronic obstructive pulmonary disease) (HCC) 6/1/2006    DM2 (diabetes mellitus, type 2) (HCC)     Duodenitis     Erectile dysfunction 3/6/2017    Fatty infiltration of liver 11/19/2014    GERD (gastroesophageal reflux disease)     History of melanoma excision 12/11/2017    Left ear 09/2017    History of tobacco use 6/1/2006    HTN (hypertension)     Hyperlipidemia     Malignant melanoma of face ([address]     Malignant melanoma of skin of face ([address]     RT ear 2000, LT ear 2017    MG (myasthenia gravis) ([address]     Nephrolithiasis     Ocular myasthenia gravis ([address] 8/28/2018    Osteoarthritis of multiple joints     hands, hips    Perennial allergic rhinitis     Personal history of colonic polyps     Pseudophakia of both eyes     Stage 3a chronic kidney disease ([facility] 9/29/2022    Status post coronary artery bypass grafts x 5 7/30/2018 07/2018 @ [facility]    Tobacco abuse 12/8/2022    Type 2 diabetes mellitus with microalbuminuria, without long-term current use of insulin ([facility] 3/6/2017    Varicose veins of both lower extremities 9/11/2017     SURGICAL HISTORY     Patient  has a past surgical history that includes Upper gastrointestinal endoscopy; Colonoscopy (2009); Cardiac catheterization; Colonoscopy (2013); Tonsillectomy (1955); Cataract removal with implant (Left, 1992); Cataract removal with implant (Right, 1992); Skin cancer excision (Left, 08/2017); Mohs surgery (Left, 09/2017); Coronary artery bypass graft (07/02/2018); Arm Surgery (Right, 10/25/2022); bronchoscopy (N/A, 01/06/2023); Lung removal, partial (Right, 01/31/2023); and Thoracoscopy (Right, 1/31/2023). CURRENT MEDICATIONS       Previous Medications    ALBUTEROL SULFATE  (90 BASE) MCG/ACT INHALER    Inhale 2 puffs into the lungs every 6 hours as needed for Wheezing    AMLODIPINE (NORVASC) 10 MG TABLET    Take 1 tablet by mouth daily    APIXABAN (ELIQUIS) 5 MG TABS TABLET    Take 1 tablet by mouth 2 times daily    ASCORBIC ACID (VITAMIN C) 500 MG TABLET    Take 1,000 mg by mouth daily    ASPIRIN 81 MG EC TABLET    Take 81 mg by mouth daily. AZATHIOPRINE (IMURAN) 50 MG TABLET    Take 50 mg by mouth Take 2 tablets by mouth in the morning and 1 tablet in the evening. BLOOD GLUCOSE MONITORING SUPPL CHENCHO    Test once daily. Dx: E11.29    CINNAMON PO    Take 500 mg by mouth 2 times daily.       CYANOCOBALAMIN (VITAMIN B 12 PO)    Take 1,000 mg by mouth    DULAGLUTIDE (TRULICITY) 1.5 UB/8.5CO SC INJECTION    Inject 0.5 mLs into the skin once a week    EMPAGLIFLOZIN (JARDIANCE) 10 MG TABLET    Take 1 tablet by mouth daily    FAMOTIDINE (PEPCID) 40 MG TABLET    Take 1 tablet by mouth every evening    FLUTICASONE (FLONASE) 50 MCG/ACT NASAL SPRAY    1 spray by Nasal route daily    FREESTYLE LITE STRIP    USE 1 STRIP TO CHECK GLUCOSE ONCE DAILY AS DIRECTED    HANDICAP PLACARD MISC    by Does not apply route DX: COPD (J44.9), primary osteoarthritis involving multiple joints (M15.0), myasthenia gravis with exacerbation (G70.01)     EXPIRES: 05/2024    LANCETS MISC    Test once daily. Dx: E11.29    LOSARTAN (COZAAR) 100 MG TABLET    Take 1 tablet by mouth daily    METHYLPREDNISOLONE (MEDROL, JIGAR,) 4 MG TABLET    Take by mouth. METOPROLOL SUCCINATE (TOPROL XL) 50 MG EXTENDED RELEASE TABLET    Take 3 tablets by mouth daily    NITROGLYCERIN (NITROSTAT) 0.4 MG SL TABLET    Place 1 tablet under the tongue every 5 minutes as needed for Chest pain    OMEPRAZOLE (PRILOSEC) 20 MG DELAYED RELEASE CAPSULE        OXYCODONE (ROXICODONE) 5 MG IMMEDIATE RELEASE TABLET    Take 1 tablet by mouth every 6 hours as needed for Pain for up to 7 days. Max Daily Amount: 20 mg    ROSUVASTATIN (CRESTOR) 20 MG TABLET    Take 1 tablet by mouth daily    TAMSULOSIN (FLOMAX) 0.4 MG CAPSULE    Take 2 capsules by mouth daily    TESTOSTERONE CYPIONATE (DEPOTESTOTERONE CYPIONATE) 200 MG/ML INJECTION    Inject 1 mL into the muscle every 14 days. Indications: PER ORDER in OV note 9/16/20    TESTOSTERONE CYPIONATE 200 MG/ML SOLN    1 CC EVERY 2 WEEKS    TRIAMTERENE-HYDROCHLOROTHIAZIDE (MAXZIDE-25) 37.5-25 MG PER TABLET    Take 1 tablet by mouth daily    UMECLIDINIUM-VILANTEROL (ANORO ELLIPTA) 62.5-25 MCG/ACT AEPB    Inhale 1 puff into the lungs daily     ALLERGIES     Patient is is allergic to invokana [canagliflozin], metformin and related, and other. FAMILY HISTORY     Patient'sfamily history includes Diabetes in his maternal grandfather; Heart Disease in his brother and mother. HISTORY     Patient  reports that he quit smoking about 20 years ago. His smoking use included cigarettes. He started smoking about 62 years ago.  He has a 41.00 pack-year smoking history. He has been exposed to tobacco smoke. He has never used smokeless tobacco. He reports that he does not drink alcohol and does not use drugs. READY CARE COURSE     Orders Placed This Encounter   Procedures    Culture, Urine     Standing Status:   Future     Standing Expiration Date:   2/7/2024     Order Specific Question:   Specify (ex-cath, midstream, cysto, etc)? Answer:   midstream    POCT Urinalysis No Micro (Auto)        Labs:  Results for POC orders placed in visit on 02/07/23   POCT Urinalysis No Micro (Auto)   Result Value Ref Range    Color, UA yellow     Clarity, UA clear     Glucose, UA POC 3+     Bilirubin, UA -     Ketones, UA -     Spec Grav, UA 1.010     Blood, UA POC 3+     pH, UA 5.5     Protein, UA POC -     Urobilinogen, UA -     Leukocytes, UA -     Nitrite, UA -      IMAGING:  No orders to display     Scheduled Meds:  Continuous Infusions:  PRN Meds:.

## 2023-02-07 NOTE — PATIENT INSTRUCTIONS
Increase water intake,  cleanse foreskin daily with soap and water and apply antibacterial cream twice daily

## 2023-02-09 ENCOUNTER — OFFICE VISIT (OUTPATIENT)
Dept: FAMILY MEDICINE CLINIC | Age: 78
End: 2023-02-09

## 2023-02-09 VITALS
HEART RATE: 99 BPM | DIASTOLIC BLOOD PRESSURE: 70 MMHG | WEIGHT: 190 LBS | SYSTOLIC BLOOD PRESSURE: 120 MMHG | OXYGEN SATURATION: 93 % | BODY MASS INDEX: 26.6 KG/M2 | RESPIRATION RATE: 28 BRPM | TEMPERATURE: 97.5 F | HEIGHT: 71 IN

## 2023-02-09 DIAGNOSIS — E11.29 TYPE 2 DIABETES MELLITUS WITH MICROALBUMINURIA, WITHOUT LONG-TERM CURRENT USE OF INSULIN (HCC): Chronic | ICD-10-CM

## 2023-02-09 DIAGNOSIS — I10 PRIMARY HYPERTENSION: Chronic | ICD-10-CM

## 2023-02-09 DIAGNOSIS — I25.119 CORONARY ARTERY DISEASE INVOLVING NATIVE CORONARY ARTERY OF NATIVE HEART WITH ANGINA PECTORIS (HCC): ICD-10-CM

## 2023-02-09 DIAGNOSIS — R80.9 TYPE 2 DIABETES MELLITUS WITH MICROALBUMINURIA, WITHOUT LONG-TERM CURRENT USE OF INSULIN (HCC): Chronic | ICD-10-CM

## 2023-02-09 DIAGNOSIS — J44.9 CHRONIC OBSTRUCTIVE PULMONARY DISEASE, UNSPECIFIED COPD TYPE (HCC): Chronic | ICD-10-CM

## 2023-02-09 DIAGNOSIS — N18.31 STAGE 3A CHRONIC KIDNEY DISEASE (HCC): ICD-10-CM

## 2023-02-09 DIAGNOSIS — C34.91 NON-SMALL CELL CANCER OF RIGHT LUNG (HCC): Primary | ICD-10-CM

## 2023-02-09 DIAGNOSIS — G70.01 MYASTHENIA GRAVIS WITH EXACERBATION (HCC): ICD-10-CM

## 2023-02-09 DIAGNOSIS — Z09 HOSPITAL DISCHARGE FOLLOW-UP: ICD-10-CM

## 2023-02-09 DIAGNOSIS — Z90.2 STATUS POST LOBECTOMY OF LUNG: ICD-10-CM

## 2023-02-09 DIAGNOSIS — R06.09 DYSPNEA ON EXERTION: ICD-10-CM

## 2023-02-09 LAB — URINE CULTURE, ROUTINE: NORMAL

## 2023-02-09 ASSESSMENT — ENCOUNTER SYMPTOMS
NAUSEA: 0
ABDOMINAL PAIN: 0
COUGH: 0
SHORTNESS OF BREATH: 1
CHOKING: 0
VOMITING: 0
CONSTIPATION: 0
DIARRHEA: 0
WHEEZING: 0

## 2023-02-09 NOTE — ASSESSMENT & PLAN NOTE
Stable blood glucose values reported at home.   Patient instructed to continue with current medication

## 2023-02-09 NOTE — ASSESSMENT & PLAN NOTE
No reported chest discomfort. Patient with noted mild dyspnea with exertion but able to complete normal daily activities. He was instructed to go to ER for any acute onset of dyspnea associated with chest discomfort, palpitations, lightheadedness/dizziness, diaphoresis, or nausea/vomiting.

## 2023-02-09 NOTE — ASSESSMENT & PLAN NOTE
Patient is status post lobectomy per cardiothoracic surgery and is established with oncology. Follow-ups are in place with the specialists moving forward for further recommendations/management.

## 2023-02-09 NOTE — ASSESSMENT & PLAN NOTE
No associated chest discomfort, palpitations, lightheadedness, or diaphoresis. Patient remains on anticoagulation, taking Eliquis as prescribed. Likely related in some degree to recent lobectomy and hospital admission. Patient has normal SPO2 at rest and with activity. He will continue with his baseline home inhalers for COPD management and contact the office for any acute change in status. Patient and wife were instructed to follow SpO2 levels and go to the emergency room for any noted dyspnea at rest associated with hypoxia of acute onset.

## 2023-02-09 NOTE — PROGRESS NOTES
Post-Discharge Transitional Care Follow Up      Claudia Sylvester   YOB: 1945    Date of Office Visit:  2/9/2023  Date of Hospital Admission: 1/31/23  Date of Hospital Discharge: 2/2/23  Readmission Risk Score (high >=14%. Medium >=10%):Readmission Risk Score: 20.4      Care management risk score Rising risk (score 2-5) and Complex Care (Scores >=6): No Risk Score On File     Non face to face  following discharge, date last encounter closed (first attempt may have been earlier): 02/03/2023     Call initiated 2 business days of discharge: Yes     ASSESSMENT AND PLAN  1. Non-small cell cancer of right lung [facility])  Assessment & Plan:  Patient is status post lobectomy per cardiothoracic surgery and is established with oncology. Follow-ups are in place with the specialists moving forward for further recommendations/management. 2. Status post lobectomy of lung  3. Dyspnea on exertion  Assessment & Plan:  No associated chest discomfort, palpitations, lightheadedness, or diaphoresis. Patient remains on anticoagulation, taking Eliquis as prescribed. Likely related in some degree to recent lobectomy and hospital admission. Patient has normal SPO2 at rest and with activity. He will continue with his baseline home inhalers for COPD management and contact the office for any acute change in status. Patient and wife were instructed to follow SpO2 levels and go to the emergency room for any noted dyspnea at rest associated with hypoxia of acute onset. 4. Chronic obstructive pulmonary disease, unspecified COPD type ([address]  Assessment & Plan:  Patient instructed to use baseline inhaler and albuterol as needed for any cough, chest tightness, or wheezing. 5. Primary hypertension  Assessment & Plan:  Blood pressure within normal limits today in the office. Patient instructed to continue with current doses of losartan, metoprolol, and amlodipine   6.  Type 2 diabetes mellitus with microalbuminuria, without long-term current use of insulin ([facility]  Assessment & Plan:  Stable blood glucose values reported at home. Patient instructed to continue with current medication   7. Coronary artery disease involving native coronary artery of native heart with angina pectoris [facility])  Assessment & Plan:  No reported chest discomfort. Patient with noted mild dyspnea with exertion but able to complete normal daily activities. He was instructed to go to ER for any acute onset of dyspnea associated with chest discomfort, palpitations, lightheadedness/dizziness, diaphoresis, or nausea/vomiting. 8. Stage 3a chronic kidney disease ([facility]  9. Myasthenia gravis with exacerbation [facility])  Assessment & Plan:  Patient established with neurology at [facility] clinic for long-term management. There are no reported problems with lower extremity weakness or unsteady gait. We will continue to monitor peripherally over time  10. Hospital discharge follow-up  -     SD DISCHARGE MEDS RECONCILED W/ CURRENT OUTPATIENT MED LIST      Medical Decision Making: moderate complexity  Return for KEEP NEXT SCHEDULED VISIT MARCH 2023. Subjective:   HPI    Inpatient course: Discharge summary reviewed- see chart. Interval history/Current status:     Patient reports feeling improved overall status post hospital discharge. He was seen on 02/07/2023 at [facility] walk-in clinic secondary to dysuria with irritation to the head of the penis and reported ulcerations of the skin. Patient was treated empirically for UTI with course of Bactrim and was given topical cream for balanitis. Patient was seen by oncology (Dr. Teo Perdue) and reviewed treatment options moving forward. They report a F/U was scheduled for next month to determine if any future managent is indicated. Patient reports feeling minimal baseline dyspnea at rest if he is talking only, and also reports some mild dyspnea with walking/activity.  He denies any cough, chest tightness, or wheezing. He reports intermittently using his wife's oxygen at 2L and feeling significantly better at baseline with his breathing. He denies any fevers, chills, sweats and denies any problems with appetite. Home BG values are reported to range from 90s to 190s since hospital discharge. Values are usually noted to be 140s to 150s. He denies any abdominal pain, nausea, vomiting, or constipation. Patient Active Problem List   Diagnosis    Benign prostatic hyperplasia    HTN (hypertension)    Chronic low back pain    Coronary artery disease involving native coronary artery of native heart with angina pectoris (HCC)    Bilateral leg pain    Chronic venous insufficiency    Gastroesophageal reflux disease    Abnormal LFTs    Lipoma of spermatic cord    Fatty infiltration of liver    Perennial allergic rhinitis    Hyperlipidemia    History of colonic polyps    Osteoarthritis of multiple joints    Pseudophakia of both eyes    COPD (chronic obstructive pulmonary disease) ([facility])    History of tobacco use    Astigmatism, regular    Type 2 diabetes mellitus with microalbuminuria, without long-term current use of insulin ([facility])    Erectile dysfunction    Varicose veins of both lower extremities    Hypogonadism male    History of melanoma excision    Status post coronary artery bypass grafts x 5    Myasthenia gravis with exacerbation ([address]    Close exposure to COVID-19 virus    Vitamin D deficiency    Stage 3a chronic kidney disease ([address]    Depression    Unsteady gait    Pulmonary embolism and infarction ([address]    Nodule of right lung    Pulmonary cavitary lesion    Malignant neoplasm of upper lobe of right lung (HCC)    Non-small cell cancer of right lung ([address]    Status post lobectomy of lung    Dyspnea on exertion       Medications listed as ordered at the time of discharge from hospital     Medication List            Accurate as of February 9, 2023 11:45 AM. If you have any questions, ask your nurse or doctor. CONTINUE taking these medications      albuterol sulfate  (90 Base) MCG/ACT inhaler  Commonly known as: PROVENTIL;VENTOLIN;PROAIR  Inhale 2 puffs into the lungs every 6 hours as needed for Wheezing     amLODIPine 10 MG tablet  Commonly known as: NORVASC  Take 1 tablet by mouth daily     Anoro Ellipta 62.5-25 MCG/ACT Aepb  Generic drug: Umeclidinium-Vilanterol  Inhale 1 puff into the lungs daily     apixaban 5 MG Tabs tablet  Commonly known as: Eliquis  Take 1 tablet by mouth 2 times daily     ascorbic acid 500 MG tablet  Commonly known as: VITAMIN C     aspirin 81 MG EC tablet     azaTHIOprine 50 MG tablet  Commonly known as: IMURAN     blood glucose monitor kit and supplies  Test once daily. Dx: E11.29     CINNAMON PO     dulaglutide 1.5 MG/0.5ML SC injection  Commonly known as: TRULICITY  Inject 0.5 mLs into the skin once a week     empagliflozin 10 MG tablet  Commonly known as: Jardiance  Take 1 tablet by mouth daily     famotidine 40 MG tablet  Commonly known as: PEPCID  Take 1 tablet by mouth every evening     fluticasone 50 MCG/ACT nasal spray  Commonly known as: FLONASE  1 spray by Nasal route daily     FREESTYLE LITE strip  Generic drug: blood glucose test strips  USE 1 STRIP TO CHECK GLUCOSE ONCE DAILY AS DIRECTED     Handicap Placard Misc  by Does not apply route DX: COPD (J44.9), primary osteoarthritis involving multiple joints (M15.0), myasthenia gravis with exacerbation (G70.01)     EXPIRES: 05/2024     Lancets Misc  Test once daily. Dx: E11.29     losartan 100 MG tablet  Commonly known as: COZAAR  Take 1 tablet by mouth daily     methylPREDNISolone 4 MG tablet  Commonly known as: MEDROL (JIGAR)  Take by mouth.     metoprolol succinate 50 MG extended release tablet  Commonly known as: TOPROL XL  Take 3 tablets by mouth daily     metroNIDAZOLE 0.75 % cream  Commonly known as: METROCREAM  Apply topically 2 times daily.      nitroGLYCERIN 0.4 MG SL tablet  Commonly known as: NITROSTAT  Place 1 tablet under the tongue every 5 minutes as needed for Chest pain     omeprazole 20 MG delayed release capsule  Commonly known as: PRILOSEC     oxyCODONE 5 MG immediate release tablet  Commonly known as: ROXICODONE  Take 1 tablet by mouth every 6 hours as needed for Pain for up to 7 days. Max Daily Amount: 20 mg     rosuvastatin 20 MG tablet  Commonly known as: CRESTOR  Take 1 tablet by mouth daily     sulfamethoxazole-trimethoprim 800-160 MG per tablet  Commonly known as: BACTRIM DS;SEPTRA DS  Take 1 tablet by mouth 2 times daily for 7 days     tamsulosin 0.4 MG capsule  Commonly known as: FLOMAX  Take 2 capsules by mouth daily     * testosterone cypionate 200 MG/ML injection  Commonly known as: DEPOTESTOTERONE CYPIONATE  Inject 1 mL into the muscle every 14 days. Indications: PER ORDER in OV note 9/16/20     * Testosterone Cypionate 200 MG/ML Soln  1 CC EVERY 2 WEEKS     triamterene-hydroCHLOROthiazide 37.5-25 MG per tablet  Commonly known as: MAXZIDE-25  Take 1 tablet by mouth daily     VITAMIN B 12 PO           * This list has 2 medication(s) that are the same as other medications prescribed for you. Read the directions carefully, and ask your doctor or other care provider to review them with you. Medications marked \"taking\" at this time  Outpatient Medications Marked as Taking for the 2/9/23 encounter (Office Visit) with Keyona Velarde MD   Medication Sig Dispense Refill    metroNIDAZOLE (METROCREAM) 0.75 % cream Apply topically 2 times daily. 45 g 0    sulfamethoxazole-trimethoprim (BACTRIM DS;SEPTRA DS) 800-160 MG per tablet Take 1 tablet by mouth 2 times daily for 7 days 14 tablet 0    oxyCODONE (ROXICODONE) 5 MG immediate release tablet Take 1 tablet by mouth every 6 hours as needed for Pain for up to 7 days.  Max Daily Amount: 20 mg 25 tablet 0    omeprazole (PRILOSEC) 20 MG delayed release capsule       Umeclidinium-Vilanterol (ANORO ELLIPTA) 62.5-25 MCG/ACT AEPB Inhale 1 puff into the lungs daily 1 each 3    empagliflozin (JARDIANCE) 10 MG tablet Take 1 tablet by mouth daily 90 tablet 3    apixaban (ELIQUIS) 5 MG TABS tablet Take 1 tablet by mouth 2 times daily 60 tablet 3    metoprolol succinate (TOPROL XL) 50 MG extended release tablet Take 3 tablets by mouth daily 270 tablet 3    dulaglutide (TRULICITY) 1.5 WO/2.3AF SC injection Inject 0.5 mLs into the skin once a week 6 mL 1    rosuvastatin (CRESTOR) 20 MG tablet Take 1 tablet by mouth daily 90 tablet 1    tamsulosin (FLOMAX) 0.4 MG capsule Take 2 capsules by mouth daily 180 capsule 1    triamterene-hydroCHLOROthiazide (MAXZIDE-25) 37.5-25 MG per tablet Take 1 tablet by mouth daily 90 tablet 1    losartan (COZAAR) 100 MG tablet Take 1 tablet by mouth daily 90 tablet 1    methylPREDNISolone (MEDROL, JIGAR,) 4 MG tablet Take by mouth. 1 kit 0    amLODIPine (NORVASC) 10 MG tablet Take 1 tablet by mouth daily 90 tablet 3    FREESTYLE LITE strip USE 1 STRIP TO CHECK GLUCOSE ONCE DAILY AS DIRECTED 50 each 0    Testosterone Cypionate 200 MG/ML SOLN 1 CC EVERY 2 WEEKS 10 mL 3    fluticasone (FLONASE) 50 MCG/ACT nasal spray 1 spray by Nasal route daily 1 Bottle 0    albuterol sulfate  (90 Base) MCG/ACT inhaler Inhale 2 puffs into the lungs every 6 hours as needed for Wheezing 3 Inhaler 0    famotidine (PEPCID) 40 MG tablet Take 1 tablet by mouth every evening 90 tablet 3    Handicap Placard MISC by Does not apply route DX: COPD (J44.9), primary osteoarthritis involving multiple joints (M15.0), myasthenia gravis with exacerbation (G70.01)     EXPIRES: 05/2024 (Patient taking differently: by Does not apply route DX: COPD (J44.9), primary osteoarthritis involving multiple joints (M15.0), myasthenia gravis with exacerbation (G70.01)     EXPIRES: 05/2024) 2 each 0    azaTHIOprine (IMURAN) 50 MG tablet Take 50 mg by mouth Take 2 tablets by mouth in the morning and 1 tablet in the evening.       Cyanocobalamin (VITAMIN B 12 PO) Take 1,000 mg by mouth nitroGLYCERIN (NITROSTAT) 0.4 MG SL tablet Place 1 tablet under the tongue every 5 minutes as needed for Chest pain 25 tablet 0    Blood Glucose Monitoring Suppl CHENCHO Test once daily. Dx: E11.29 1 Device 0    Lancets MISC Test once daily. Dx: E11.29 100 each 3    ascorbic acid (VITAMIN C) 500 MG tablet Take 1,000 mg by mouth daily      CINNAMON PO Take 500 mg by mouth 2 times daily. aspirin 81 MG EC tablet Take 81 mg by mouth daily. Medications patient taking as of now reconciled against medications ordered at time of hospital discharge: Yes    Review of Systems   Constitutional:  Positive for fatigue (improving). Negative for appetite change, chills, diaphoresis and fever. Eyes:  Negative for visual disturbance. Respiratory:  Positive for shortness of breath. Negative for cough, choking and wheezing. Cardiovascular:  Negative for chest pain, palpitations and leg swelling. No orthopnea, No PND   Gastrointestinal:  Negative for abdominal pain, constipation, diarrhea, nausea and vomiting. Endocrine: Negative for cold intolerance, heat intolerance, polydipsia, polyphagia and polyuria. Genitourinary:  Negative for dysuria and hematuria. Skin:  Negative for rash. Neurological:  Negative for dizziness, syncope, light-headedness and headaches. Objective:    /70 (Site: Right Upper Arm, Position: Sitting, Cuff Size: Medium Adult)   Pulse 99   Temp 97.5 °F (36.4 °C) (Temporal)   Resp 28   Ht 5' 11\" (1.803 m)   Wt 190 lb (86.2 kg)   SpO2 93% Comment: with activity - walking around office and talking  BMI 26.50 kg/m²   Physical Exam  Vitals reviewed. Constitutional:       General: He is not in acute distress. Appearance: Normal appearance. Cardiovascular:      Rate and Rhythm: Normal rate and regular rhythm. Heart sounds: No murmur heard. Pulmonary:      Effort: Pulmonary effort is normal. No respiratory distress.  Tachypneic: borderline - normal respiratory rate at rest when not talking. Breath sounds: Normal breath sounds. No decreased breath sounds, wheezing, rhonchi or rales. Abdominal:      General: Bowel sounds are normal.      Palpations: Abdomen is soft. Tenderness: There is no abdominal tenderness. There is no guarding or rebound. Musculoskeletal:      Right lower leg: No edema. Left lower leg: No edema. Skin:     Findings: No rash. Neurological:      Mental Status: He is alert and oriented to person, place, and time. Psychiatric:         Mood and Affect: Mood normal.         Behavior: Behavior normal.         Thought Content: Thought content normal.       An electronic signature was used to authenticate this note.   --Sonny Kramer MD

## 2023-02-09 NOTE — ASSESSMENT & PLAN NOTE
Patient instructed to use baseline inhaler and albuterol as needed for any cough, chest tightness, or wheezing.

## 2023-02-09 NOTE — ASSESSMENT & PLAN NOTE
Patient established with neurology at [facility] clinic for long-term management. There are no reported problems with lower extremity weakness or unsteady gait.   We will continue to monitor peripherally over time

## 2023-02-09 NOTE — ASSESSMENT & PLAN NOTE
Blood pressure within normal limits today in the office.  Patient instructed to continue with current doses of losartan, metoprolol, and amlodipine

## 2023-02-10 ENCOUNTER — CARE COORDINATION (OUTPATIENT)
Dept: CASE MANAGEMENT | Age: 78
End: 2023-02-10

## 2023-02-10 NOTE — CARE COORDINATION
[facility] Care Transitions Follow Up Call    Patient Current Location: [address]  Transition Nurse contacted the patient by telephone to follow up after admission. Verified name and  with patient as identifiers. Patient: Dee Aldana  Patient :    MRN: [id number]  Reason for Admission: 2023 - 2023 [facility]. Lung cancer, s/p right thoracoscopic upper lobectomy  Discharge Date: 23 RARS: Readmission Risk Score: 20.4  NR. This admit is elective surgery. CT     PCP  10:15. Attended. Office visit [facility] [place], [address]  Attended. Post-op BALTAZAR Apodaca  10:00  Pulm/KENYATTA Granados 3/3 10:00  Onc/Litam  1:00. Attended. Needs to be reviewed by the provider   Additional needs identified to be addressed with provider: No  none             Method of communication with provider: none. Brittni Gu reports he continues to have room air sats of 93-94%. He has not used his wife's O2 in the last couple days. PCP did not feel O2 warranted at this time. No fevers, chills, flu-like symptoms, or incision site concerns. States he is very fatigued and requires freq rest periods. He has exertional SOB and occasional cough. BS range 130-140. Fair appetite and no elimination concerns. No current urinary urgency/pain w/ stream. States skin lesions are looking better no drainage. Declines need for [place] [id number] services at this time and shares his wife is pending mastectomy for breast cancer coming up and may reconsider at that time. Addressed changes since last contact:  none  Discussed follow-up appointments. If no appointment was previously scheduled, appointment scheduling offered: Yes. Is follow up appointment scheduled within 7 days of discharge?  See above appt list.    Follow Up  Future Appointments   Date Time Provider Kay Bartlett   2/15/2023  9:40 AM SCHEDULE, GERBER COBB PCP TESTOSTERONE MLOX [facility]   2023 10:00 AM Adrianne Jacobsen MD [address] 3/3/2023 10:00 AM Kristin Iraheta MD [place] [address]   3/9/2023 10:15 AM Aashish Kirby MD [address]   3/22/2023  1:00 PM Federica Mike  [address]     Non-[facility] follow up appointment(s):     Care Transition Nurse reviewed red flags with patient and discussed any barriers to care and/or understanding of plan of care after discharge. Discussed appropriate site of care based on symptoms and resources available to patient including: Home health  When to call 911  Condition related references. The patient agrees to contact the PCP office for questions related to their healthcare. Advance Care Planning:   reviewed and current. Patients top risk factors for readmission:  L CA, Recent lobectomy. Interventions to address risk factors:  Pt advised to notify surgeon of any fevers, chills, sweats, flu-like symptoms, incision site infection, increasing SOB, increasing cough, blood in sputum, pain w/ deep inspiration/return to ED. Offered patient enrollment in the Remote Patient Monitoring (RPM) program for in-home monitoring: Patient declined. Care Transitions Subsequent and Final Call    Subsequent and Final Calls  Do you have any ongoing symptoms?: Yes  Patient-reported symptoms: Fatigue, Pain  Have your medications changed?: No  Do you have any questions related to your medications?: No  Do you currently have any active services?: No  Do you have any needs or concerns that I can assist you with?: No  Identified Barriers: Lack of Education  Care Transitions Interventions    Physical Therapy: Declined     Meds to Bed: Completed      Occupational Therapy: Declined     Other Interventions:             Care Transition Nurse provided contact information for future needs. Plan for follow-up call in 7-10 days based on severity of symptoms and risk factors.   Plan for next call:  CT FU, Check if HHC needs (spouse is having breast sx for CA coming up), Check home sats and oxygen needs.     Barak Rivera RN

## 2023-02-15 ENCOUNTER — NURSE ONLY (OUTPATIENT)
Dept: FAMILY MEDICINE CLINIC | Age: 78
End: 2023-02-15

## 2023-02-15 DIAGNOSIS — E29.1 HYPOGONADISM IN MALE: Primary | ICD-10-CM

## 2023-02-15 RX ORDER — TESTOSTERONE CYPIONATE 200 MG/ML
200 INJECTION INTRAMUSCULAR ONCE
Status: COMPLETED | OUTPATIENT
Start: 2023-02-15 | End: 2023-02-15

## 2023-02-15 RX ADMIN — TESTOSTERONE CYPIONATE 200 MG: 200 INJECTION INTRAMUSCULAR at 09:50

## 2023-02-16 ENCOUNTER — CARE COORDINATION (OUTPATIENT)
Dept: CASE MANAGEMENT | Age: 78
End: 2023-02-16

## 2023-02-16 NOTE — CARE COORDINATION
[facility] Transitions Follow Up Call    Patient Current Location: [address]  Transition Nurse contacted the patient by telephone to follow up after admission. Verified name and  with patient as identifiers. Patient: Itzel Powers  Patient : [birth year]   MRN: [id number]  Reason for Admission: 2023 - 2023 [facility]. Lung cancer, s/p right thoracoscopic upper lobectomy  Discharge Date: 23 RARS: Readmission Risk Score: 20.4  NR. This admit is elective surgery. CT     PCP  10:15. Attended. Next appt 3/3 10:00. Office visit Madilyn Brittle, [address]   Attended. Post-op BALTAZAR Apodaca  10:00  Pulm/KENYATTA Granados 3/3 10:00  Onc/Litam  1:00. Attended. Needs to be reviewed by the provider   Additional needs identified to be addressed with provider: No  none             Method of communication with provider: none. Radha Vieyra reports RA sats 92-94%. Feeling a little stronger. No resp symptom changes/concerns. No fever concerns. Spouse is home today from mastectomy/B CA. No home needs today or med refill needs. Addressed changes since last contact:  none  Discussed follow-up appointments. If no appointment was previously scheduled, appointment scheduling offered: Yes. Is follow up appointment scheduled within 7 days of discharge?  See above appt list.    Follow Up  Future Appointments   Date Time Provider Kay Balesi   2023 10:00 AM Serge Soto MD [address] [facility]   3/1/2023  9:20 AM SCHEDULE, GERBER COBB PCP TESTOSTERONE MLOX [facility]   3/3/2023 10:00 AM Maye Márquez MD [facility]   3/9/2023 10:15 AM Jairo Davis MD [facility]   3/15/2023  9:20 AM SCHEDULE, GERBER LINKT PCP TESTOSTERONE MLOX [facility]   3/22/2023  1:00 PM Milagros Dhillon  [address]   3/29/2023  9:20 AM SCHEDULE, GERBER LINKT PCP TESTOSTERONE MLOX [facility]     [facility] follow up appointment(s):     Care Transition Nurse reviewed red flags with patient and discussed any barriers to care and/or understanding of plan of care after discharge. Discussed appropriate site of care based on symptoms and resources available to patient including: Condition related references. The patient agrees to contact the PCP office for questions related to their healthcare. Advance Care Planning:   reviewed and current. Patients top risk factors for readmission:  L CA  Interventions to address risk factors:  Reviewed to report trending sats less than 90% to physician/return to ED       Care Transitions Subsequent and Final Call    Subsequent and Final Calls  Do you have any ongoing symptoms?: Yes  Patient-reported symptoms: Shortness of Breath, Fatigue  Have your medications changed?: No  Do you have any questions related to your medications?: No  Do you currently have any active services?: No  Do you have any needs or concerns that I can assist you with?: No  Identified Barriers: Lack of Education  Care Transitions Interventions    Physical Therapy: Declined     Meds to Bed: Completed      Occupational Therapy: Declined     Other Interventions:             Care Transition Nurse provided contact information for future needs. Plan for follow-up call in 7-10 days based on severity of symptoms and risk factors. Plan for next call:  CT FU, Check RA sats and resp concerns.     Kathy Smith RN

## 2023-02-22 ENCOUNTER — HOSPITAL ENCOUNTER (OUTPATIENT)
Dept: GENERAL RADIOLOGY | Age: 78
Discharge: HOME | End: 2023-02-24
Payer: MEDICARE

## 2023-02-22 ENCOUNTER — OFFICE VISIT (OUTPATIENT)
Dept: CARDIOTHORACIC SURGERY | Age: 78
End: 2023-02-22

## 2023-02-22 VITALS
HEART RATE: 76 BPM | OXYGEN SATURATION: 96 % | TEMPERATURE: 99 F | HEIGHT: 71 IN | BODY MASS INDEX: 26.6 KG/M2 | SYSTOLIC BLOOD PRESSURE: 118 MMHG | DIASTOLIC BLOOD PRESSURE: 62 MMHG | WEIGHT: 190 LBS

## 2023-02-22 DIAGNOSIS — Z09 STATUS POST LUNG SURGERY, FOLLOW-UP EXAM: ICD-10-CM

## 2023-02-22 DIAGNOSIS — Z09 STATUS POST LUNG SURGERY, FOLLOW-UP EXAM: Primary | ICD-10-CM

## 2023-02-22 DIAGNOSIS — C34.11 MALIGNANT NEOPLASM OF UPPER LOBE OF RIGHT LUNG (HCC): Primary | ICD-10-CM

## 2023-02-22 PROCEDURE — 99024 POSTOP FOLLOW-UP VISIT: CPT | Performed by: THORACIC SURGERY (CARDIOTHORACIC VASCULAR SURGERY)

## 2023-02-22 PROCEDURE — 71045 X-RAY EXAM CHEST 1 VIEW: CPT

## 2023-02-22 NOTE — PROGRESS NOTES
Patient is 3 weeks out from a right upper lobectomy. 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 Today in clinic he is doing well other than some continued shortness of breath. He denies any cough or hemoptysis. His shortness of breath is slowly improving. He denies any significant pain at his incisions. Patient mentioned that he is getting up every hour at night to urinate. He is concerned about his prostate. Incisions are well-healed. Lungs are clear. Heart is regular. Chest x-ray is pending. Excellent healing after lobectomy. From a surgical standpoint he has no restrictions and I will see him on an as-needed basis. He is already set up to see medical oncology. Because of the frequent nocturnal urination we will refer him to urology to be seen as soon as possible.

## 2023-02-24 ENCOUNTER — CARE COORDINATION (OUTPATIENT)
Dept: CASE MANAGEMENT | Age: 78
End: 2023-02-24

## 2023-02-24 NOTE — CARE COORDINATION
[facility] Care Transitions Follow Up Call    Care Transition Nurse attempted final CT outreach and pt declines call. CTN s/o. Patient: Katerine Alonzo  Patient : [birth year]   MRN: [id number]  Reason for Admission:  2023 - 2023 [facility]. Lung cancer, s/p right thoracoscopic upper lobectomy  Discharge Date: 23 RARS: Readmission Risk Score: 20.4  NR. CT     PCP  10:15. Attended. Next appt 3/3 10:00. Office visit Kaylan Phipps, [address] [facility]  Attended. Post-op ABLTAZAR Apodaca  10:00  Pulm/KENYATTA Granados 3/3 10:00  Onc/Litam  1:00. Attended.     Liat Dubose RN

## 2023-03-01 ENCOUNTER — NURSE ONLY (OUTPATIENT)
Dept: FAMILY MEDICINE CLINIC | Age: 78
End: 2023-03-01

## 2023-03-01 DIAGNOSIS — E29.1 HYPOGONADISM IN MALE: Primary | ICD-10-CM

## 2023-03-01 RX ORDER — TESTOSTERONE CYPIONATE 200 MG/ML
200 INJECTION INTRAMUSCULAR ONCE
Status: COMPLETED | OUTPATIENT
Start: 2023-03-01 | End: 2023-03-01

## 2023-03-01 RX ADMIN — TESTOSTERONE CYPIONATE 200 MG: 200 INJECTION INTRAMUSCULAR at 13:22

## 2023-03-01 NOTE — PROGRESS NOTES
Patient given Testosterone 200mg IM right gluteal..  Will return in 2 weeks for next injection    Patient tolerated injection well.     Administrations This Visit       testosterone cypionate (DEPOTESTOTERONE CYPIONATE) injection 200 mg       Admin Date  03/01/2023 Action  Given Dose  200 mg Route  IntraMUSCular Administered By  Og Cano LPN

## 2023-03-03 ENCOUNTER — OFFICE VISIT (OUTPATIENT)
Dept: UROLOGY | Age: 78
End: 2023-03-03

## 2023-03-03 VITALS
DIASTOLIC BLOOD PRESSURE: 62 MMHG | HEIGHT: 71 IN | SYSTOLIC BLOOD PRESSURE: 92 MMHG | WEIGHT: 190 LBS | BODY MASS INDEX: 26.6 KG/M2 | OXYGEN SATURATION: 97 % | HEART RATE: 92 BPM

## 2023-03-03 DIAGNOSIS — R35.0 FREQUENCY OF MICTURITION: ICD-10-CM

## 2023-03-03 DIAGNOSIS — R33.9 RETENTION OF URINE: Primary | ICD-10-CM

## 2023-03-03 LAB
BILIRUBIN, POC: ABNORMAL
BLOOD URINE, POC: ABNORMAL
CLARITY, POC: CLEAR
COLOR, POC: YELLOW
GLUCOSE URINE, POC: ABNORMAL
KETONES, POC: ABNORMAL
LEUKOCYTE EST, POC: ABNORMAL
NITRITE, POC: ABNORMAL
PH, POC: 6.5
POST VOID RESIDUAL (PVR): 46 ML
PROTEIN, POC: ABNORMAL
SPECIFIC GRAVITY, POC: 1.01
UROBILINOGEN, POC: 0.2

## 2023-03-03 RX ORDER — DOXYCYCLINE 100 MG/1
TABLET ORAL
COMMUNITY
Start: 2023-02-28

## 2023-03-03 NOTE — PROGRESS NOTES
[facility] EVALUATION NOTE                                                 H&P          Note:  Assessment and plan  Frequency of urination  On tamsulosin for BPH and obstruction  On multiple agents including Jardiance  Nocturia of every 1-2 hours  Minimal residual  Patient not interested in anticholinergics  Patient also defers cystoscopy  We will continue with current regimen of tamsulosin  Follow-up as needed      The note below is complete evaluation of patient on follow-up/consultation                                                                                                                                                 Reason for Visit  Nocturia    History of Present Illness  60-year-old male with history of BPH with obstruction on tamsulosin  Nocturia every 1-2 hours  Denies incontinence      Urologic Review of Systems/Symptoms  As above    Review of Systems  Hospitalization: Recent lung resection for lung cancer  All 14 categories of Review of Systems otherwise reviewed no other findings reported.   On tamsulosin  Past Medical History:   Diagnosis Date    Atypical chest pain 8/30/2012    BPH (benign prostatic hypertrophy)     CAD (coronary artery disease)     Cataracts, bilateral     Chronic low back pain     COPD (chronic obstructive pulmonary disease) ([address] 6/1/2006    DM2 (diabetes mellitus, type 2) (HCC)     Duodenitis     Erectile dysfunction 3/6/2017    Fatty infiltration of liver 11/19/2014    GERD (gastroesophageal reflux disease)     History of melanoma excision 12/11/2017    Left ear 09/2017    History of tobacco use 6/1/2006    HTN (hypertension)     Hyperlipidemia     Malignant melanoma of face ([address]     Malignant melanoma of skin of face ([address]     RT ear 2000, LT ear 2017    MG (myasthenia gravis) ([address]     Nephrolithiasis     Ocular myasthenia gravis ([address] 8/28/2018    Osteoarthritis of multiple joints     hands, hips    Perennial allergic rhinitis     Personal history of colonic polyps     Pseudophakia of both eyes     Stage 3a chronic kidney disease ([facility] 2022    Status post coronary artery bypass grafts x 5 2018 @ [facility]    Tobacco abuse 2022    Type 2 diabetes mellitus with microalbuminuria, without long-term current use of insulin ([facility] 3/6/2017    Varicose veins of both lower extremities 2017     Past Surgical History:   Procedure Laterality Date    ARM SURGERY Right 10/25/2022    BRONCHOSCOPY N/A 2023    NAVIGATIONAL BRONCHOSCOPY performed by Jenniffer Doll MD at [facility]      [address]    [address] Right     COLONOSCOPY  2009    tubular adenomas    COLONOSCOPY  2013    diverticulosis, polyps, 5y repeat (DR Babatunde Lerner)    CORONARY ARTERY BYPASS GRAFT  07/02/2018    x5 ( [facility] @ [facility])    LUNG REMOVAL, PARTIAL Right 2023    right thoracoscopic upper lobectomy, mediastinal lymph node dissection (DR PIKE)    MOHS SURGERY Left 2017    melanoma of left ear (DR GODWIN)    SKIN CANCER EXCISION Left 2017    THORACOSCOPY Right 2023    right thoracoscopic upper lobectomy performed by Salvador Ramirez MD at [address] [place] [address]      duodenitis/ poss early signs of celiac disease     Social History     Socioeconomic History    Marital status:      Spouse name: Sonia Zaman    Number of children: 2    Years of education: 12+    Highest education level: None   Occupational History    Occupation: Farming   Tobacco Use    Smoking status: Former     Packs/day: 1.00     Years: 41.00     Pack years: 41.00     Types: Cigarettes     Start date:      Quit date: 2002     Years since quittin.4     Passive exposure: Past    Smokeless tobacco: Never   Vaping Use    Vaping Use: Never used   Substance and Sexual Activity    Alcohol use: Never    Drug use: Never    Sexual activity: Not Currently     Partners: Female Social Determinants of Health     Financial Resource Strain: Low Risk     Difficulty of Paying Living Expenses: Not hard at all   Food Insecurity: No Food Insecurity    Worried About Running Out of Food in the Last Year: Never true    Ran Out of Food in the Last Year: Never true   Transportation Needs: Unknown    Lack of Transportation (Non-Medical): No   Physical Activity: Inactive    Days of Exercise per Week: 0 days    Minutes of Exercise per Session: 0 min   Housing Stability: Unknown    Unstable Housing in the Last Year: No     Family History   Problem Relation Age of Onset    Heart Disease Mother     Heart Disease Brother     Diabetes Maternal Grandfather      Current Outpatient Medications   Medication Sig Dispense Refill    metroNIDAZOLE (METROCREAM) 0.75 % cream Apply topically 2 times daily. 45 g 0    omeprazole (PRILOSEC) 20 MG delayed release capsule       Umeclidinium-Vilanterol (ANORO ELLIPTA) 62.5-25 MCG/ACT AEPB Inhale 1 puff into the lungs daily 1 each 3    empagliflozin (JARDIANCE) 10 MG tablet Take 1 tablet by mouth daily 90 tablet 3    apixaban (ELIQUIS) 5 MG TABS tablet Take 1 tablet by mouth 2 times daily 60 tablet 3    metoprolol succinate (TOPROL XL) 50 MG extended release tablet Take 3 tablets by mouth daily 270 tablet 3    dulaglutide (TRULICITY) 1.5 PT/9.4ZU SC injection Inject 0.5 mLs into the skin once a week 6 mL 1    rosuvastatin (CRESTOR) 20 MG tablet Take 1 tablet by mouth daily 90 tablet 1    tamsulosin (FLOMAX) 0.4 MG capsule Take 2 capsules by mouth daily 180 capsule 1    triamterene-hydroCHLOROthiazide (MAXZIDE-25) 37.5-25 MG per tablet Take 1 tablet by mouth daily 90 tablet 1    losartan (COZAAR) 100 MG tablet Take 1 tablet by mouth daily 90 tablet 1    methylPREDNISolone (MEDROL, JIGAR,) 4 MG tablet Take by mouth.  1 kit 0    amLODIPine (NORVASC) 10 MG tablet Take 1 tablet by mouth daily 90 tablet 3    FREESTYLE LITE strip USE 1 STRIP TO CHECK GLUCOSE ONCE DAILY AS DIRECTED 50 each 0    Testosterone Cypionate 200 MG/ML SOLN 1 CC EVERY 2 WEEKS 10 mL 3    fluticasone (FLONASE) 50 MCG/ACT nasal spray 1 spray by Nasal route daily 1 Bottle 0    albuterol sulfate  (90 Base) MCG/ACT inhaler Inhale 2 puffs into the lungs every 6 hours as needed for Wheezing 3 Inhaler 0    famotidine (PEPCID) 40 MG tablet Take 1 tablet by mouth every evening 90 tablet 3    Handicap Placard MISC by Does not apply route DX: COPD (J44.9), primary osteoarthritis involving multiple joints (M15.0), myasthenia gravis with exacerbation (G70.01)     EXPIRES: 05/2024 (Patient taking differently: by Does not apply route DX: COPD (J44.9), primary osteoarthritis involving multiple joints (M15.0), myasthenia gravis with exacerbation (G70.01)     EXPIRES: 05/2024) 2 each 0    azaTHIOprine (IMURAN) 50 MG tablet Take 50 mg by mouth Take 2 tablets by mouth in the morning and 1 tablet in the evening. Cyanocobalamin (VITAMIN B 12 PO) Take 1,000 mg by mouth      Blood Glucose Monitoring Suppl CHENCHO Test once daily. Dx: E11.29 1 Device 0    Lancets MISC Test once daily. Dx: E11.29 100 each 3    ascorbic acid (VITAMIN C) 500 MG tablet Take 1,000 mg by mouth daily      CINNAMON PO Take 500 mg by mouth 2 times daily. aspirin 81 MG EC tablet Take 81 mg by mouth daily. doxycycline monohydrate (ADOXA) 100 MG tablet TAKE 1 TABLET BY MOUTH TWICE DAILY WITH FOOD FOR 7 DAYS      testosterone cypionate (DEPOTESTOTERONE CYPIONATE) 200 MG/ML injection Inject 1 mL into the muscle every 14 days. Indications: PER ORDER in OV note 9/16/20 (Patient taking differently: Inject 200 mg into the muscle every 14 days. Indications: PER ORDER in OV note 3/17/21) 2 mL 3    nitroGLYCERIN (NITROSTAT) 0.4 MG SL tablet Place 1 tablet under the tongue every 5 minutes as needed for Chest pain (Patient not taking: Reported on 3/3/2023) 25 tablet 0     No current facility-administered medications for this visit.      Tess Griffith, Metformin and related, and Other  All reviewed and verified by Dr Jeff Niño on today's visit    PSA   Date Value Ref Range Status   05/07/2022 3.90 0.00 - 4.00 ng/mL Final     Comment:     When the Total PSA is between 3.00 and 10.00 ng/mL, consider  requesting a Free PSA to aid in diagnosis. 04/26/2022 3.30 0.00 - 4.00 ng/mL Final   03/15/2022 3.34 0.00 - 4.00 ng/mL Final   03/31/2021 3.63 0.00 - 6.22 ng/mL Final   08/12/2019 2.35 0.00 - 6.22 ng/mL Final     Results for POC orders placed in visit on 03/03/23   POCT Urinalysis No Micro (Auto)   Result Value Ref Range    Color, UA yellow     Clarity, UA clear     Glucose, UA POC >=1000 mg/dL     Bilirubin, UA neg     Ketones, UA neg     Spec Grav, UA 1.015     Blood, UA POC moderate     pH, UA 6.5     Protein, UA POC neg     Urobilinogen, UA 0.2     Leukocytes, UA neg     Nitrite, UA neg    poct post void residual   Result Value Ref Range    post void residual 46 ml    Narrative    A point of care test   Post Void Residual was completed by performing  ultrasound scan of the bladder and  reviewed by Dr Jeff Niño       Physical Exam  Vitals:    03/03/23 1153   BP: 92/62   Pulse: 92   SpO2: 97%   Weight: 190 lb (86.2 kg)   Height: 5' 11\" (1.803 m)     Constitutional: Not in distress   Urologic Exam  Postvoid residual 46 cc  Urinalysis shows microhematuria and glycosuria  Additional findings  None  Remainder the physical exam is normal  Assessment/Medical Necessity-Decision Making  History of frequency urgency's most likely secondary to diuretics and glycosuria caused by various agents patient takes for his diabetes  Patient defers cystoscopy  We will continue with tamsulosin  Plan  Follow-up as needed  Greater than 50% of 30 minutes spent consulting patient face-to-face  Orders Placed This Encounter   Procedures    POCT Urinalysis No Micro (Auto)    poct post void residual     No orders of the defined types were placed in this encounter.     Huber Urbina MD Please note this report has been partially produced using speech recognition software  And may cause contain errors related to that system including grammar, punctuation and spelling as well as words and phrases that may seem inappropriate. If there are questions or concerns please feel free to contact me to clarify.

## 2023-03-09 ENCOUNTER — OFFICE VISIT (OUTPATIENT)
Dept: PULMONOLOGY | Age: 78
End: 2023-03-09
Payer: MEDICARE

## 2023-03-09 VITALS
OXYGEN SATURATION: 97 % | WEIGHT: 196.2 LBS | TEMPERATURE: 96.9 F | SYSTOLIC BLOOD PRESSURE: 132 MMHG | RESPIRATION RATE: 16 BRPM | HEIGHT: 71 IN | BODY MASS INDEX: 27.47 KG/M2 | HEART RATE: 94 BPM | DIASTOLIC BLOOD PRESSURE: 62 MMHG

## 2023-03-09 DIAGNOSIS — J44.9 CHRONIC OBSTRUCTIVE PULMONARY DISEASE, UNSPECIFIED COPD TYPE (HCC): Primary | ICD-10-CM

## 2023-03-09 DIAGNOSIS — C34.91 SQUAMOUS CELL CARCINOMA LUNG, RIGHT (HCC): ICD-10-CM

## 2023-03-09 DIAGNOSIS — I26.99 PULMONARY EMBOLISM AND INFARCTION (HCC): ICD-10-CM

## 2023-03-09 PROCEDURE — 3023F SPIROM DOC REV: CPT | Performed by: INTERNAL MEDICINE

## 2023-03-09 PROCEDURE — 1036F TOBACCO NON-USER: CPT | Performed by: INTERNAL MEDICINE

## 2023-03-09 PROCEDURE — G8417 CALC BMI ABV UP PARAM F/U: HCPCS | Performed by: INTERNAL MEDICINE

## 2023-03-09 PROCEDURE — G8427 DOCREV CUR MEDS BY ELIG CLIN: HCPCS | Performed by: INTERNAL MEDICINE

## 2023-03-09 PROCEDURE — 1123F ACP DISCUSS/DSCN MKR DOCD: CPT | Performed by: INTERNAL MEDICINE

## 2023-03-09 PROCEDURE — 3078F DIAST BP <80 MM HG: CPT | Performed by: INTERNAL MEDICINE

## 2023-03-09 PROCEDURE — G8484 FLU IMMUNIZE NO ADMIN: HCPCS | Performed by: INTERNAL MEDICINE

## 2023-03-09 PROCEDURE — 3075F SYST BP GE 130 - 139MM HG: CPT | Performed by: INTERNAL MEDICINE

## 2023-03-09 PROCEDURE — 99213 OFFICE O/P EST LOW 20 MIN: CPT | Performed by: INTERNAL MEDICINE

## 2023-03-09 NOTE — PROGRESS NOTES
Subjective:     Ethelene Bumpers is a 68 y.o. male who complains today of:     Chief Complaint   Patient presents with    Follow-up     6 Week F/U for COPD and Pulmonary embolism        HPI  Patient presents for lung cancer and COPD    Patient presents for follow-up, he is status post right upper lobectomy for T2 N0 M0 squamous cell carcinoma. Doing good, no chest pain, no shortness of breath, no coughing, no fever no chills, no nasal congestion or postnasal drip and no heartburn.         Allergies:  Invokana [canagliflozin], Metformin and related, and Other  Past Medical History:   Diagnosis Date    Atypical chest pain 8/30/2012    BPH (benign prostatic hypertrophy)     CAD (coronary artery disease)     Cataracts, bilateral     Chronic low back pain     COPD (chronic obstructive pulmonary disease) ([address] 6/1/2006    DM2 (diabetes mellitus, type 2) (HCC)     Duodenitis     Erectile dysfunction 3/6/2017    Fatty infiltration of liver 11/19/2014    GERD (gastroesophageal reflux disease)     History of melanoma excision 12/11/2017    Left ear 09/2017    History of tobacco use 6/1/2006    HTN (hypertension)     Hyperlipidemia     Malignant melanoma of face ([address]     Malignant melanoma of skin of face ([address]     RT ear 2000, LT ear 2017    MG (myasthenia gravis) ([address]     Nephrolithiasis     Ocular myasthenia gravis ([address] 8/28/2018    Osteoarthritis of multiple joints     hands, hips    Perennial allergic rhinitis     Personal history of colonic polyps     Pseudophakia of both eyes     Stage 3a chronic kidney disease ([address] 9/29/2022    Status post coronary artery bypass grafts x 5 7/30/2018 07/2018 @ [facility]    Tobacco abuse 12/8/2022    Type 2 diabetes mellitus with microalbuminuria, without long-term current use of insulin ([address] 3/6/2017    Varicose veins of both lower extremities 9/11/2017     Past Surgical History:   Procedure Laterality Date    ARM SURGERY Right 10/25/2022    BRONCHOSCOPY N/A 01/06/2023    NAVIGATIONAL BRONCHOSCOPY performed by Coral Morillo MD at [facility]      [address]    CATARACT REMOVAL WITH IMPLANT Left     CATARACT REMOVAL WITH IMPLANT Right     COLONOSCOPY  2009    tubular adenomas    COLONOSCOPY  2013    diverticulosis, polyps, 5y repeat ( [address] [place] [address])    CORONARY ARTERY BYPASS GRAFT  07/02/2018    x5 ( [facility] @ [facility])    LUNG REMOVAL, PARTIAL Right 2023    right thoracoscopic upper lobectomy, mediastinal lymph node dissection (DR PIKE)    MOHS SURGERY Left 2017    melanoma of left ear (DR GODWIN)    SKIN CANCER EXCISION Left 2017    THORACOSCOPY Right 2023    right thoracoscopic upper lobectomy performed by Amador Camejo MD at [facility]      duodenitis/ poss early signs of celiac disease     Family History   Problem Relation Age of Onset    Heart Disease Mother     Heart Disease Brother     Diabetes Maternal Grandfather      Social History     Socioeconomic History    Marital status:      Spouse name: Jaime Harmon    Number of children: 2    Years of education: 12+    Highest education level: Not on file   Occupational History    Occupation: Farming   Tobacco Use    Smoking status: Former     Packs/day: 1.00     Years: 41.00     Pack years: 41.00     Types: Cigarettes     Start date:      Quit date: 2002     Years since quittin.4     Passive exposure: Past    Smokeless tobacco: Never   Vaping Use    Vaping Use: Never used   Substance and Sexual Activity    Alcohol use: Never    Drug use: Never    Sexual activity: Not Currently     Partners: Female   Other Topics Concern    Not on file   Social History Narrative    Not on file     Social Determinants of Health     Financial Resource Strain: Low Risk     Difficulty of Paying Living Expenses: Not hard at all   Food Insecurity: No Food Insecurity    Worried About Running Out of Food in the Last Year: Never true    [address] in the Last Year: Never true   Transportation Needs: Unknown    Lack of Transportation (Medical): Not on file    Lack of Transportation (Non-Medical): No   Physical Activity: Inactive    Days of Exercise per Week: 0 days    Minutes of Exercise per Session: 0 min   Stress: Not on file   Social Connections: Not on file   Intimate Partner Violence: Not on file   Housing Stability: Unknown    Unable to Pay for Housing in the Last Year: Not on file    Number of Places Lived in the Last Year: Not on file    Unstable Housing in the Last Year: No         Review of Systems      ROS: 10 organs review of system is done including general, psychological, ENT, hematological, endocrine, respiratory, cardiovascular, gastrointestinal,musculoskeletal, neurological,  allergy and Immunology is done and is otherwise negative.    Current Outpatient Medications   Medication Sig Dispense Refill    doxycycline monohydrate (ADOXA) 100 MG tablet TAKE 1 TABLET BY MOUTH TWICE DAILY WITH FOOD FOR 7 DAYS      metroNIDAZOLE (METROCREAM) 0.75 % cream Apply topically 2 times daily. 45 g 0    omeprazole (PRILOSEC) 20 MG delayed release capsule       Umeclidinium-Vilanterol (ANORO ELLIPTA) 62.5-25 MCG/ACT AEPB Inhale 1 puff into the lungs daily 1 each 3    empagliflozin (JARDIANCE) 10 MG tablet Take 1 tablet by mouth daily 90 tablet 3    apixaban (ELIQUIS) 5 MG TABS tablet Take 1 tablet by mouth 2 times daily 60 tablet 3    metoprolol succinate (TOPROL XL) 50 MG extended release tablet Take 3 tablets by mouth daily 270 tablet 3    dulaglutide (TRULICITY) 1.5 MG/0.5ML SC injection Inject 0.5 mLs into the skin once a week 6 mL 1    rosuvastatin (CRESTOR) 20 MG tablet Take 1 tablet by mouth daily 90 tablet 1    tamsulosin (FLOMAX) 0.4 MG capsule Take 2 capsules by mouth daily 180 capsule 1    triamterene-hydroCHLOROthiazide (MAXZIDE-25) 37.5-25 MG per tablet Take 1 tablet by mouth daily 90 tablet 1    losartan (COZAAR) 100 MG tablet Take 1 tablet by mouth daily  90 tablet 1    methylPREDNISolone (MEDROL, JIGAR,) 4 MG tablet Take by mouth. 1 kit 0    amLODIPine (NORVASC) 10 MG tablet Take 1 tablet by mouth daily 90 tablet 3    FREESTYLE LITE strip USE 1 STRIP TO CHECK GLUCOSE ONCE DAILY AS DIRECTED 50 each 0    Testosterone Cypionate 200 MG/ML SOLN 1 CC EVERY 2 WEEKS 10 mL 3    fluticasone (FLONASE) 50 MCG/ACT nasal spray 1 spray by Nasal route daily 1 Bottle 0    albuterol sulfate  (90 Base) MCG/ACT inhaler Inhale 2 puffs into the lungs every 6 hours as needed for Wheezing 3 Inhaler 0    famotidine (PEPCID) 40 MG tablet Take 1 tablet by mouth every evening 90 tablet 3    Handicap Placard MISC by Does not apply route DX: COPD (J44.9), primary osteoarthritis involving multiple joints (M15.0), myasthenia gravis with exacerbation (G70.01)     EXPIRES: 05/2024 (Patient taking differently: by Does not apply route DX: COPD (J44.9), primary osteoarthritis involving multiple joints (M15.0), myasthenia gravis with exacerbation (G70.01)     EXPIRES: 05/2024) 2 each 0    azaTHIOprine (IMURAN) 50 MG tablet Take 50 mg by mouth Take 2 tablets by mouth in the morning and 1 tablet in the evening. Cyanocobalamin (VITAMIN B 12 PO) Take 1,000 mg by mouth      nitroGLYCERIN (NITROSTAT) 0.4 MG SL tablet Place 1 tablet under the tongue every 5 minutes as needed for Chest pain 25 tablet 0    Blood Glucose Monitoring Suppl CHENCHO Test once daily. Dx: E11.29 1 Device 0    Lancets MISC Test once daily. Dx: E11.29 100 each 3    ascorbic acid (VITAMIN C) 500 MG tablet Take 1,000 mg by mouth daily      CINNAMON PO Take 500 mg by mouth 2 times daily. aspirin 81 MG EC tablet Take 81 mg by mouth daily. testosterone cypionate (DEPOTESTOTERONE CYPIONATE) 200 MG/ML injection Inject 1 mL into the muscle every 14 days. Indications: PER ORDER in OV note 9/16/20 (Patient taking differently: Inject 200 mg into the muscle every 14 days.  Indications: PER ORDER in OV note 3/17/21) 2 mL 3     No current facility-administered medications for this visit. Objective:     Vitals:    03/09/23 1012   BP: 132/62   Site: Right Upper Arm   Position: Sitting   Cuff Size: Medium Adult   Pulse: 94   Resp: 16   Temp: 96.9 °F (36.1 °C)   TempSrc: Infrared   SpO2: 97%   Weight: 196 lb 3.2 oz (89 kg)   Height: 5' 11\" (1.803 m)         Physical Exam  Constitutional:       Appearance: He is well-developed. HENT:      Head: Normocephalic and atraumatic. Eyes:      General:         Left eye: No discharge. Conjunctiva/sclera: Conjunctivae normal.      Pupils: Pupils are equal, round, and reactive to light. Neck:      Vascular: No JVD. Cardiovascular:      Rate and Rhythm: Normal rate and regular rhythm. Heart sounds: Normal heart sounds. No murmur heard. No friction rub. No gallop. Pulmonary:      Effort: Pulmonary effort is normal. No respiratory distress. Breath sounds: Normal breath sounds. No wheezing or rales. Chest:      Chest wall: No tenderness. Abdominal:      General: Bowel sounds are normal.      Palpations: Abdomen is soft. Musculoskeletal:         General: No tenderness or deformity. Cervical back: Normal range of motion and neck supple. Right lower leg: No edema. Left lower leg: No edema. Skin:     General: Skin is warm and dry. Neurological:      Mental Status: He is alert and oriented to person, place, and time. Psychiatric:         Judgment: Judgment normal.       Imaging studies reviewed and interpreted by me PET CT scan January 2023, no metastatic disease   Lab results reviewed in chart  PFT January 2023, FEV1 73% FEV1/FVC 0.71, postbronchodilator FEV1/FVC 0.66     Assessment and Plan       Diagnosis Orders   1. Chronic obstructive pulmonary disease, unspecified COPD type ([address]        2. Pulmonary embolism and infarction ([address]        3.  Squamous cell carcinoma lung, right (HCC)  CT CHEST WO CONTRAST        T2 N0 M0 squamous cell carcinoma, status post right upper lobectomy, will continue follow-up with follow-up CAT scan in 4 months. I will see him after CT chest  COPD, symptoms controlled, continue Anoro, will consider de-escalating to LAMA he currently has 3-month supply of Anoro he will finish his current supply   Yearly flu shot  Continue Eliquis      Orders Placed This Encounter   Procedures    CT CHEST WO CONTRAST     Standing Status:   Future     Standing Expiration Date:   3/9/2024     No orders of the defined types were placed in this encounter. Discussed with patient the importance of exercise and weight control and  overall health and well-being. Reviewed with the patient: current clinical status, medications, activities and diet. Side effects, adverse effects of the medication prescribed today, as well as treatment plan and result expectations have been discussed with the patient who expresses understanding and desires to proceed. Return in about 3 months (around 5/29/2023).       Michaela Florian MD

## 2023-03-13 ENCOUNTER — OFFICE VISIT (OUTPATIENT)
Dept: FAMILY MEDICINE CLINIC | Age: 78
End: 2023-03-13
Payer: MEDICARE

## 2023-03-13 VITALS
DIASTOLIC BLOOD PRESSURE: 80 MMHG | TEMPERATURE: 97.4 F | SYSTOLIC BLOOD PRESSURE: 134 MMHG | BODY MASS INDEX: 27.34 KG/M2 | OXYGEN SATURATION: 96 % | HEART RATE: 70 BPM | WEIGHT: 196 LBS

## 2023-03-13 DIAGNOSIS — C34.11 MALIGNANT NEOPLASM OF UPPER LOBE OF RIGHT LUNG (HCC): ICD-10-CM

## 2023-03-13 DIAGNOSIS — K21.9 GASTROESOPHAGEAL REFLUX DISEASE, UNSPECIFIED WHETHER ESOPHAGITIS PRESENT: ICD-10-CM

## 2023-03-13 DIAGNOSIS — G70.01 MYASTHENIA GRAVIS WITH EXACERBATION (HCC): ICD-10-CM

## 2023-03-13 DIAGNOSIS — I25.119 CORONARY ARTERY DISEASE INVOLVING NATIVE CORONARY ARTERY OF NATIVE HEART WITH ANGINA PECTORIS (HCC): Chronic | ICD-10-CM

## 2023-03-13 DIAGNOSIS — E55.9 VITAMIN D DEFICIENCY: ICD-10-CM

## 2023-03-13 DIAGNOSIS — K76.0 FATTY INFILTRATION OF LIVER: Chronic | ICD-10-CM

## 2023-03-13 DIAGNOSIS — I10 PRIMARY HYPERTENSION: Chronic | ICD-10-CM

## 2023-03-13 DIAGNOSIS — C34.91 NON-SMALL CELL CANCER OF RIGHT LUNG (HCC): ICD-10-CM

## 2023-03-13 DIAGNOSIS — R80.9 TYPE 2 DIABETES MELLITUS WITH MICROALBUMINURIA, WITHOUT LONG-TERM CURRENT USE OF INSULIN (HCC): Primary | Chronic | ICD-10-CM

## 2023-03-13 DIAGNOSIS — N18.31 STAGE 3A CHRONIC KIDNEY DISEASE (HCC): ICD-10-CM

## 2023-03-13 DIAGNOSIS — J44.9 CHRONIC OBSTRUCTIVE PULMONARY DISEASE, UNSPECIFIED COPD TYPE (HCC): Chronic | ICD-10-CM

## 2023-03-13 DIAGNOSIS — E78.2 MIXED HYPERLIPIDEMIA: Chronic | ICD-10-CM

## 2023-03-13 DIAGNOSIS — E11.29 TYPE 2 DIABETES MELLITUS WITH MICROALBUMINURIA, WITHOUT LONG-TERM CURRENT USE OF INSULIN (HCC): Primary | Chronic | ICD-10-CM

## 2023-03-13 LAB — HBA1C MFR BLD: 7.9 %

## 2023-03-13 PROCEDURE — 1123F ACP DISCUSS/DSCN MKR DOCD: CPT | Performed by: FAMILY MEDICINE

## 2023-03-13 PROCEDURE — G8484 FLU IMMUNIZE NO ADMIN: HCPCS | Performed by: FAMILY MEDICINE

## 2023-03-13 PROCEDURE — 99214 OFFICE O/P EST MOD 30 MIN: CPT | Performed by: FAMILY MEDICINE

## 2023-03-13 PROCEDURE — 3051F HG A1C>EQUAL 7.0%<8.0%: CPT | Performed by: FAMILY MEDICINE

## 2023-03-13 PROCEDURE — 3079F DIAST BP 80-89 MM HG: CPT | Performed by: FAMILY MEDICINE

## 2023-03-13 PROCEDURE — 83036 HEMOGLOBIN GLYCOSYLATED A1C: CPT | Performed by: FAMILY MEDICINE

## 2023-03-13 PROCEDURE — 3023F SPIROM DOC REV: CPT | Performed by: FAMILY MEDICINE

## 2023-03-13 PROCEDURE — 3075F SYST BP GE 130 - 139MM HG: CPT | Performed by: FAMILY MEDICINE

## 2023-03-13 PROCEDURE — 1036F TOBACCO NON-USER: CPT | Performed by: FAMILY MEDICINE

## 2023-03-13 PROCEDURE — G8427 DOCREV CUR MEDS BY ELIG CLIN: HCPCS | Performed by: FAMILY MEDICINE

## 2023-03-13 PROCEDURE — G8417 CALC BMI ABV UP PARAM F/U: HCPCS | Performed by: FAMILY MEDICINE

## 2023-03-13 RX ORDER — GLIPIZIDE 10 MG/1
10 TABLET ORAL 2 TIMES DAILY
Qty: 180 TABLET | Refills: 1 | Status: SHIPPED | OUTPATIENT
Start: 2023-03-13

## 2023-03-13 ASSESSMENT — ENCOUNTER SYMPTOMS
ANAL BLEEDING: 0
BLOOD IN STOOL: 0
WHEEZING: 0
VOMITING: 0
NAUSEA: 0
SHORTNESS OF BREATH: 1
COUGH: 0
DIARRHEA: 0
CHEST TIGHTNESS: 0
ABDOMINAL PAIN: 0
CONSTIPATION: 0

## 2023-03-13 NOTE — ASSESSMENT & PLAN NOTE
Stable. Currently asymptomatic. Patient instructed to continue with current dose of rosuvastatin and keep regular chronic cardiology follow-up visits.

## 2023-03-13 NOTE — ASSESSMENT & PLAN NOTE
A1c above goal control today in the office. Patient reports not wanting to continue with Jardiance secondary to elevated blood glucose values. He has returned to previously prescribed Januvia and glipizide. We will increase dosing of Trulicity to 4.5 mg weekly and consider adding Lantus in the future should subsequent A1c in 3 months still be above goal control.

## 2023-03-13 NOTE — ASSESSMENT & PLAN NOTE
Blood pressure within normal limits today in the office.   Patient instructed to continue with current doses of losartan, metoprolol, amlodipine, and Maxzide

## 2023-03-13 NOTE — PROGRESS NOTES
Andrez Restrepo (: ) is a 68 y.o. male, Established patient, who presents today for:    Chief Complaint   Patient presents with    Follow-up     Pt wants to discuss medication          ASSESSMENT/PLAN    1. Type 2 diabetes mellitus with microalbuminuria, without long-term current use of insulin (HCC)  Assessment & Plan:  A1c above goal control today in the office. Patient reports not wanting to continue with Jardiance secondary to elevated blood glucose values. He has returned to previously prescribed Januvia and glipizide. We will increase dosing of Trulicity to 4.5 mg weekly and consider adding Lantus in the future should subsequent A1c in 3 months still be above goal control. Orders:  -     POCT glycosylated hemoglobin (Hb A1C)  -     Comprehensive Metabolic Panel; Future  -     Lipid Panel; Future  -     Hemoglobin A1C; Future  -     Microalbumin / Creatinine Urine Ratio; Future  -     SITagliptin (JANUVIA) 100 MG tablet; Take 1 tablet by mouth daily, Disp-90 tablet, R-1Normal  -     glipiZIDE (GLUCOTROL) 10 MG tablet; Take 1 tablet by mouth 2 times daily, Disp-180 tablet, R-1Normal  -     Dulaglutide 3 MG/0.5ML SOPN; Inject 3 mg into the skin once a week, Disp-6 mL, R-1Print  2. Primary hypertension  Assessment & Plan:  Blood pressure within normal limits today in the office. Patient instructed to continue with current doses of losartan, metoprolol, amlodipine, and Maxzide  Orders:  -     CBC with Auto Differential; Future  -     Comprehensive Metabolic Panel; Future  3. Mixed hyperlipidemia  -     Comprehensive Metabolic Panel; Future  -     Lipid Panel; Future  4. Coronary artery disease involving native coronary artery of native heart with angina pectoris [facility])  Assessment & Plan:  Stable. Currently asymptomatic. Patient instructed to continue with current dose of rosuvastatin and keep regular chronic cardiology follow-up visits.   Orders:  -     CBC with Auto Differential; Future  - Lipid Panel; Future  5. Stage 3a chronic kidney disease (HCC)  -     CBC with Auto Differential; Future  -     Comprehensive Metabolic Panel; Future  6. Chronic obstructive pulmonary disease, unspecified COPD type ([facility]  Assessment & Plan:  Stable respiratory status. Patient instructed to continue with current Anoro inhaler   Orders:  -     CBC with Auto Differential; Future  7. Malignant neoplasm of upper lobe of right lung [facility])  Assessment & Plan:  Status post lobectomy. Care established with hematology/oncology and cardiothoracic surgery. Orders:  -     CBC with Auto Differential; Future  8. Gastroesophageal reflux disease, unspecified whether esophagitis present  -     CBC with Auto Differential; Future  9. Fatty infiltration of liver  -     Comprehensive Metabolic Panel; Future  10. Myasthenia gravis with exacerbation ([facility] [address]  Assessment & Plan:   Monitored by specialist- no acute findings meriting change in the plan  11. Vitamin D deficiency  -     Vitamin D 25 Hydroxy; Future  12. Non-small cell cancer of right lung [facility])  Assessment & Plan:  Status post lobectomy per cardiothoracic surgery. Patient established with hematology/oncology and pulmonology. Follow-up in place with specialist moving forward for further recommendations/management. Patient instructed to keep regular visits with the specialists as scheduled. Return in about 6 months (around 9/13/2023) for Annual Wellness Visit (ON/AFTER 09/30/23). SUBJECTIVE/OBJECTIVE:    HPI    Patient presents for chronic diabetes, hypertension, hyperlipidemia, CAD, CKD, COPD, GERD, and vitamin D deficiency follow-up. Care remains established with cardiothoracic surgery, pulmonology, and hematology/oncology for monitoring of right lung cancer status post lobectomy with known underlying COPD. Care also remains established with cardiology for monitoring of known coronary artery disease.   Patient is also [facility] neurology for myasthenia gravis. Care has been established with urology (Dr. Tori Houston) secondary to BPH with worsening lower urinary tract symptoms despite use of tamsulosin. Patient declined cystoscopy and any further medication management. Will F/U PRN with urology office should symptoms worsen further in the future. Patient reports taking medications as prescribed since the most recent visit. They deny adhering to any specific lower carbohydrate or lower salt diet. They deny any routine exercise outside of normal day-to-day activity. Current Outpatient Medications on File Prior to Visit   Medication Sig Dispense Refill    doxycycline monohydrate (ADOXA) 100 MG tablet TAKE 1 TABLET BY MOUTH TWICE DAILY WITH FOOD FOR 7 DAYS      Umeclidinium-Vilanterol (ANORO ELLIPTA) 62.5-25 MCG/ACT AEPB Inhale 1 puff into the lungs daily 1 each 3    apixaban (ELIQUIS) 5 MG TABS tablet Take 1 tablet by mouth 2 times daily 60 tablet 3    metoprolol succinate (TOPROL XL) 50 MG extended release tablet Take 3 tablets by mouth daily 270 tablet 3    rosuvastatin (CRESTOR) 20 MG tablet Take 1 tablet by mouth daily 90 tablet 1    tamsulosin (FLOMAX) 0.4 MG capsule Take 2 capsules by mouth daily 180 capsule 1    triamterene-hydroCHLOROthiazide (MAXZIDE-25) 37.5-25 MG per tablet Take 1 tablet by mouth daily 90 tablet 1    losartan (COZAAR) 100 MG tablet Take 1 tablet by mouth daily 90 tablet 1    methylPREDNISolone (MEDROL, JIGAR,) 4 MG tablet Take by mouth.  1 kit 0    amLODIPine (NORVASC) 10 MG tablet Take 1 tablet by mouth daily 90 tablet 3    FREESTYLE LITE strip USE 1 STRIP TO CHECK GLUCOSE ONCE DAILY AS DIRECTED 50 each 0    Testosterone Cypionate 200 MG/ML SOLN 1 CC EVERY 2 WEEKS 10 mL 3    fluticasone (FLONASE) 50 MCG/ACT nasal spray 1 spray by Nasal route daily 1 Bottle 0    albuterol sulfate  (90 Base) MCG/ACT inhaler Inhale 2 puffs into the lungs every 6 hours as needed for Wheezing 3 Inhaler 0    testosterone cypionate (DEPOTESTOTERONE CYPIONATE) 200 MG/ML injection Inject 1 mL into the muscle every 14 days. Indications: PER ORDER in OV note 9/16/20 (Patient taking differently: Inject 200 mg into the muscle every 14 days. 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 Cyanocobalamin (VITAMIN B 12 PO) Take 1,000 mg by mouth      nitroGLYCERIN (NITROSTAT) 0.4 MG SL tablet Place 1 tablet under the tongue every 5 minutes as needed for Chest pain 25 tablet 0    Blood Glucose Monitoring Suppl CHENCHO Test once daily. Dx: E11.29 1 Device 0    Lancets MISC Test once daily. Dx: E11.29 100 each 3    ascorbic acid (VITAMIN C) 500 MG tablet Take 1,000 mg by mouth daily      CINNAMON PO Take 500 mg by mouth 2 times daily. aspirin 81 MG EC tablet Take 81 mg by mouth daily. No current facility-administered medications on file prior to visit. Allergies   Allergen Reactions    Invokana [Canagliflozin] Diarrhea    Metformin And Related Diarrhea    Other      There is a list scanned in media that pt can not take due to myasthenia gravis ,  Antibiotics        Review of Systems   Constitutional:  Negative for appetite change, chills, diaphoresis, fatigue, fever and unexpected weight change. Eyes:  Negative for visual disturbance. Respiratory:  Positive for shortness of breath (with exertion, stable).  Negative for cough, chest tightness and wheezing. Cardiovascular:  Positive for leg swelling (chronic, stable). Negative for chest pain and palpitations. No orthopnea, No PND   Gastrointestinal:  Negative for abdominal pain, anal bleeding, blood in stool, constipation, diarrhea, nausea and vomiting. No heartburn, No melena   Endocrine: Negative for cold intolerance, heat intolerance, polydipsia, polyphagia and polyuria. Genitourinary:  Negative for dysuria and hematuria. Musculoskeletal:  Negative for myalgias. Skin:  Negative for rash. Neurological:  Negative for dizziness, syncope, weakness, light-headedness, numbness and headaches. Psychiatric/Behavioral:  Negative for dysphoric mood. The patient is not nervous/anxious. Vitals:  /80   Pulse 70   Temp 97.4 °F (36.3 °C)   Wt 196 lb (88.9 kg)   SpO2 96%   BMI 27.34 kg/m²     Results for POC orders placed in visit on 03/13/23   POCT glycosylated hemoglobin (Hb A1C)   Result Value Ref Range    Hemoglobin A1C 7.9 %         Physical Exam  Vitals reviewed. Constitutional:       General: He is not in acute distress. Appearance: He is not ill-appearing. Eyes:      General: No scleral icterus. Neck:      Thyroid: No thyroid mass, thyromegaly or thyroid tenderness. Vascular: No carotid bruit. Cardiovascular:      Rate and Rhythm: Normal rate and regular rhythm. Heart sounds: Normal heart sounds. No murmur heard. Pulmonary:      Effort: Pulmonary effort is normal. No respiratory distress. Breath sounds: Normal breath sounds. No wheezing, rhonchi or rales. Abdominal:      General: Bowel sounds are normal. There is no distension. Palpations: Abdomen is soft. Tenderness: There is no abdominal tenderness. There is no right CVA tenderness, left CVA tenderness, guarding or rebound. Musculoskeletal:      Cervical back: Neck supple. Right lower leg: Edema (1+ to midcalf) present.       Left lower leg: Edema (1-2+ to midcalf) present. Lymphadenopathy:      Cervical: No cervical adenopathy. Skin:     Findings: No rash. Neurological:      Mental Status: He is alert and oriented to person, place, and time. Psychiatric:         Mood and Affect: Mood normal.         Behavior: Behavior normal.         Thought Content: Thought content normal.       Ortho Exam (If Applicable)              An electronic signature was used to authenticate this note.      Leopoldo Wallace MD

## 2023-03-13 NOTE — ASSESSMENT & PLAN NOTE
Status post lobectomy per cardiothoracic surgery. Patient established with hematology/oncology and pulmonology. Follow-up in place with specialist moving forward for further recommendations/management. Patient instructed to keep regular visits with the specialists as scheduled.

## 2023-03-13 NOTE — ASSESSMENT & PLAN NOTE
Monitored by specialist- no acute findings meriting change in the plan Dupixent Counseling: I discussed with the patient the risks of dupilumab including but not limited to eye infection and irritation, cold sores, injection site reactions, worsening of asthma, allergic reactions and increased risk of parasitic infection.  Live vaccines should be avoided while taking dupilumab. Dupilumab will also interact with certain medications such as warfarin and cyclosporine. The patient understands that monitoring is required and they must alert us or the primary physician if symptoms of infection or other concerning signs are noted.

## 2023-03-14 ENCOUNTER — TRANSCRIBE ORDERS (OUTPATIENT)
Dept: ADMINISTRATIVE | Age: 78
End: 2023-03-14

## 2023-03-14 DIAGNOSIS — I26.99 IATROGENIC PULMONARY EMBOLISM AND INFARCTION, INITIAL ENCOUNTER (HCC): Primary | ICD-10-CM

## 2023-03-14 DIAGNOSIS — T81.718A IATROGENIC PULMONARY EMBOLISM AND INFARCTION, INITIAL ENCOUNTER (HCC): Primary | ICD-10-CM

## 2023-03-15 ENCOUNTER — NURSE ONLY (OUTPATIENT)
Dept: FAMILY MEDICINE CLINIC | Age: 78
End: 2023-03-15

## 2023-03-15 DIAGNOSIS — E29.1 HYPOGONADISM IN MALE: Primary | ICD-10-CM

## 2023-03-15 RX ORDER — TESTOSTERONE CYPIONATE 200 MG/ML
200 INJECTION INTRAMUSCULAR ONCE
Status: COMPLETED | OUTPATIENT
Start: 2023-03-15 | End: 2023-03-15

## 2023-03-15 RX ADMIN — TESTOSTERONE CYPIONATE 200 MG: 200 INJECTION INTRAMUSCULAR at 09:57

## 2023-03-15 NOTE — PROGRESS NOTES
Patient given Testosterone 200mg IM left gluteal..  Will return in 2 weeks for next injection    Patient tolerated injection well.     Administrations This Visit       testosterone cypionate (DEPOTESTOTERONE CYPIONATE) injection 200 mg       Admin Date  03/15/2023 Action  Given Dose  200 mg Route  IntraMUSCular Administered By  Keon Padilla LPN

## 2023-03-17 ENCOUNTER — HOSPITAL ENCOUNTER (OUTPATIENT)
Dept: ULTRASOUND IMAGING | Age: 78
End: 2023-03-17
Payer: MEDICARE

## 2023-03-17 DIAGNOSIS — T81.718A IATROGENIC PULMONARY EMBOLISM AND INFARCTION, INITIAL ENCOUNTER (HCC): ICD-10-CM

## 2023-03-17 DIAGNOSIS — I26.99 IATROGENIC PULMONARY EMBOLISM AND INFARCTION, INITIAL ENCOUNTER (HCC): ICD-10-CM

## 2023-03-17 PROCEDURE — 93971 EXTREMITY STUDY: CPT

## 2023-03-22 ENCOUNTER — OFFICE VISIT (OUTPATIENT)
Dept: ENDOCRINOLOGY | Age: 78
End: 2023-03-22

## 2023-03-22 VITALS
SYSTOLIC BLOOD PRESSURE: 113 MMHG | WEIGHT: 195 LBS | HEART RATE: 83 BPM | OXYGEN SATURATION: 93 % | BODY MASS INDEX: 27.3 KG/M2 | HEIGHT: 71 IN | DIASTOLIC BLOOD PRESSURE: 65 MMHG

## 2023-03-22 DIAGNOSIS — E29.1 HYPOGONADISM MALE: Primary | ICD-10-CM

## 2023-03-22 RX ORDER — TESTOSTERONE CYPIONATE 200 MG/ML
INJECTION, SOLUTION INTRAMUSCULAR
Qty: 10 ML | Refills: 3
Start: 2023-03-22 | End: 2023-05-25

## 2023-03-22 NOTE — PROGRESS NOTES
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 Dx: E11.29, Disp: 1 Device, Rfl: 0    Lancets MISC, Test once daily. Dx: E11.29, Disp: 100 each, Rfl: 3    ascorbic acid (VITAMIN C) 500 MG tablet, Take 1,000 mg by mouth daily, Disp: , Rfl:     CINNAMON PO, Take 500 mg by mouth 2 times daily. , Disp: , Rfl:     aspirin 81 MG EC tablet, Take 81 mg by mouth daily. , Disp: , Rfl:     testosterone cypionate (DEPOTESTOTERONE CYPIONATE) 200 MG/ML injection, Inject 1 mL into the muscle every 14 days. Indications: PER ORDER in OV note 9/16/20 (Patient taking differently: Inject 200 mg into the muscle every 14 days.  Indications: PER ORDER in OV note 3/17/21), Disp: 2 mL, Rfl: 3  Lab Results   Component Value Date     02/02/2023    K 4.6 02/02/2023     02/02/2023    CO2 25 02/02/2023    BUN 22 02/02/2023    CREATININE 1.11 02/02/2023    GLUCOSE 159 (H) 02/02/2023    CALCIUM 8.7 02/02/2023    PROT 6.4 12/21/2022    LABALBU 3.8 12/21/2022    BILITOT 0.3 12/21/2022    ALKPHOS 37 12/21/2022    AST 21 12/21/2022    ALT 28 12/21/2022    LABGLOM >60.0 02/02/2023    GFRAA >60.0 05/19/2022    GLOB 2.6 12/21/2022     Lab Results   Component Value Date    WBC 9.3 02/02/2023    HGB 11.2 (L) 02/02/2023    HCT 32.3 (L) 02/02/2023

## 2023-03-29 ENCOUNTER — NURSE ONLY (OUTPATIENT)
Dept: FAMILY MEDICINE CLINIC | Age: 78
End: 2023-03-29

## 2023-03-29 DIAGNOSIS — E29.1 HYPOGONADISM IN MALE: Primary | ICD-10-CM

## 2023-03-29 DIAGNOSIS — J44.9 CHRONIC OBSTRUCTIVE PULMONARY DISEASE, UNSPECIFIED COPD TYPE (HCC): ICD-10-CM

## 2023-03-29 RX ORDER — TESTOSTERONE CYPIONATE 200 MG/ML
200 INJECTION, SOLUTION INTRAMUSCULAR ONCE
Status: COMPLETED | OUTPATIENT
Start: 2023-03-29 | End: 2023-03-29

## 2023-03-29 RX ADMIN — TESTOSTERONE CYPIONATE 200 MG: 200 INJECTION, SOLUTION INTRAMUSCULAR at 09:49

## 2023-03-29 NOTE — PROGRESS NOTES
Patient given Testosterone 200mg IM right gluteal..  Will return in 2 weeks for next injection    Patient tolerated injection well.     Administrations This Visit       testosterone cypionate (DEPOTESTOTERONE CYPIONATE) injection 200 mg       Admin Date  03/29/2023 Action  Given Dose  200 mg Route  IntraMUSCular Administered By  Augustin Carlson LPN

## 2023-03-30 RX ORDER — UMECLIDINIUM BROMIDE AND VILANTEROL TRIFENATATE 62.5; 25 UG/1; UG/1
POWDER RESPIRATORY (INHALATION)
Qty: 180 EACH | Refills: 2 | Status: SHIPPED | OUTPATIENT
Start: 2023-03-30

## 2023-03-31 DIAGNOSIS — K21.9 GASTROESOPHAGEAL REFLUX DISEASE, UNSPECIFIED WHETHER ESOPHAGITIS PRESENT: ICD-10-CM

## 2023-03-31 DIAGNOSIS — K21.9 GASTROESOPHAGEAL REFLUX DISEASE: ICD-10-CM

## 2023-03-31 RX ORDER — RANITIDINE 150 MG/1
150 TABLET ORAL NIGHTLY PRN
Qty: 90 TABLET | Refills: 0 | OUTPATIENT
Start: 2023-03-31

## 2023-03-31 RX ORDER — FAMOTIDINE 40 MG/1
40 TABLET, FILM COATED ORAL EVERY EVENING
Qty: 90 TABLET | Refills: 1 | Status: SHIPPED | OUTPATIENT
Start: 2023-03-31

## 2023-03-31 NOTE — TELEPHONE ENCOUNTER
Patient should be on famotidine and not ranitidine. I have sent a refill of famotidine to the pharmacy.

## 2023-03-31 NOTE — TELEPHONE ENCOUNTER
Pharmacy is requesting medication refill.  Please approve or deny this request.    Rx requested:  Requested Prescriptions     Pending Prescriptions Disp Refills    raNITIdine (ZANTAC) 150 MG tablet [Pharmacy Med Name: RANITIDINE 150MG    TAB] 90 tablet 0     Sig: Take 1 tablet by mouth nightly as needed for Heartburn         Last Office Visit:   3/13/2023      Next Visit Date:  Future Appointments   Date Time Provider Kay Bartlett   4/12/2023  9:20 AM SCHEDULE, [facility] TESTOSTERONE [facility]   4/26/2023  9:20 AM SCHEDULE, [facility] TESTOSTERONE [facility]   5/10/2023  9:20 AM SCHEDULE, [facility] TESTOSTERONE [facility]   5/31/2023 10:30 AM Milton Vidales MD [address]   9/15/2023 10:00 AM MD [place] Smith [address]   9/21/2023  1:00 PM Lalita Rosenthal  [address]   10/2/2023 10:00 AM MD [place] Smith [address]

## 2023-04-03 DIAGNOSIS — I10 ESSENTIAL HYPERTENSION: Chronic | ICD-10-CM

## 2023-04-03 RX ORDER — LOSARTAN POTASSIUM 100 MG/1
100 TABLET ORAL DAILY
Qty: 90 TABLET | Refills: 3 | Status: SHIPPED | OUTPATIENT
Start: 2023-04-03

## 2023-04-03 RX ORDER — TRIAMTERENE AND HYDROCHLOROTHIAZIDE 37.5; 25 MG/1; MG/1
1 TABLET ORAL DAILY
Qty: 90 TABLET | Refills: 3 | Status: SHIPPED | OUTPATIENT
Start: 2023-04-03

## 2023-04-03 NOTE — TELEPHONE ENCOUNTER
Pharmacy is requesting medication refill.  Please approve or deny this request.    Rx requested:  Requested Prescriptions     Pending Prescriptions Disp Refills    triamterene-hydroCHLOROthiazide (MAXZIDE-25) 37.5-25 MG per tablet [Pharmacy Med Name: Triamterene-HCTZ 37.5-25 MG Oral Tablet] 90 tablet 3     Sig: TAKE 1 TABLET BY MOUTH  DAILY    losartan (COZAAR) 100 MG tablet [Pharmacy Med Name: Losartan Potassium 100 MG Oral Tablet] 90 tablet 3     Sig: TAKE 1 TABLET BY MOUTH  DAILY         Last Office Visit:   3/13/2023      Next Visit Date:  Future Appointments   Date Time Provider Kay Bartlett   4/12/2023  9:20 AM SCHEDULE, [facility] TESTOSTERONE [facility]X [facility]   4/26/2023  9:20 AM SCHEDULE, [facility]   5/10/2023  9:20 AM SCHEDULE, [facility] [place]   5/31/2023 10:30 AM Zuleyka Samson MD  [address]   9/15/2023 10:00 AM MD [place] [address]   9/21/2023  1:00 PM Levi Cogan, MD [address] [place] [address]   10/2/2023 10:00 AM MD [place] Chowdary [address]

## 2023-04-21 ENCOUNTER — OFFICE VISIT (OUTPATIENT)
Dept: NEUROLOGY | Age: 78
End: 2023-04-21

## 2023-04-21 VITALS
HEART RATE: 100 BPM | WEIGHT: 196.4 LBS | DIASTOLIC BLOOD PRESSURE: 60 MMHG | SYSTOLIC BLOOD PRESSURE: 116 MMHG | BODY MASS INDEX: 27.39 KG/M2

## 2023-04-21 DIAGNOSIS — G70.00 MYASTHENIA GRAVIS (HCC): ICD-10-CM

## 2023-04-21 DIAGNOSIS — H49.43 PROGRESSIVE EXTERNAL OPHTHALMOPLEGIA OF BOTH EYES: ICD-10-CM

## 2023-04-21 DIAGNOSIS — G70.00 MYASTHENIA GRAVIS (HCC): Primary | ICD-10-CM

## 2023-04-21 DIAGNOSIS — R47.1 DYSARTHRIA: ICD-10-CM

## 2023-04-21 DIAGNOSIS — R13.13 PHARYNGEAL DYSPHAGIA: ICD-10-CM

## 2023-04-21 DIAGNOSIS — H02.403 PTOSIS OF BOTH EYELIDS: ICD-10-CM

## 2023-04-21 LAB
ALBUMIN SERPL-MCNC: 4.4 G/DL (ref 3.5–4.6)
ALP SERPL-CCNC: 40 U/L (ref 35–104)
ALT SERPL-CCNC: 15 U/L (ref 0–41)
ANION GAP SERPL CALCULATED.3IONS-SCNC: 13 MEQ/L (ref 9–15)
AST SERPL-CCNC: 12 U/L (ref 0–40)
BASOPHILS # BLD: 0 K/UL (ref 0–0.2)
BASOPHILS NFR BLD: 0.3 %
BILIRUB SERPL-MCNC: 0.3 MG/DL (ref 0.2–0.7)
BUN SERPL-MCNC: 21 MG/DL (ref 8–23)
CALCIUM SERPL-MCNC: 9.7 MG/DL (ref 8.5–9.9)
CHLORIDE SERPL-SCNC: 95 MEQ/L (ref 95–107)
CO2 SERPL-SCNC: 26 MEQ/L (ref 20–31)
CREAT SERPL-MCNC: 1.49 MG/DL (ref 0.7–1.2)
EOSINOPHIL # BLD: 0 K/UL (ref 0–0.7)
EOSINOPHIL NFR BLD: 0 %
ERYTHROCYTE [DISTWIDTH] IN BLOOD BY AUTOMATED COUNT: 15 % (ref 11.5–14.5)
GLOBULIN SER CALC-MCNC: 2.7 G/DL (ref 2.3–3.5)
GLUCOSE SERPL-MCNC: 378 MG/DL (ref 70–99)
HCT VFR BLD AUTO: 46.9 % (ref 42–52)
HGB BLD-MCNC: 15.6 G/DL (ref 14–18)
LYMPHOCYTES # BLD: 0.4 K/UL (ref 1–4.8)
LYMPHOCYTES NFR BLD: 4.2 %
MCH RBC QN AUTO: 32.6 PG (ref 27–31.3)
MCHC RBC AUTO-ENTMCNC: 33.3 % (ref 33–37)
MCV RBC AUTO: 97.7 FL (ref 79–92.2)
MONOCYTES # BLD: 0.3 K/UL (ref 0.2–0.8)
MONOCYTES NFR BLD: 3.2 %
NEUTROPHILS # BLD: 9.1 K/UL (ref 1.4–6.5)
NEUTS SEG NFR BLD: 92.3 %
PLATELET # BLD AUTO: 205 K/UL (ref 130–400)
POTASSIUM SERPL-SCNC: 4.8 MEQ/L (ref 3.4–4.9)
PROT SERPL-MCNC: 7.1 G/DL (ref 6.3–8)
RBC # BLD AUTO: 4.8 M/UL (ref 4.7–6.1)
SODIUM SERPL-SCNC: 134 MEQ/L (ref 135–144)
TSH REFLEX: 0.29 UIU/ML (ref 0.44–3.86)
WBC # BLD AUTO: 9.8 K/UL (ref 4.8–10.8)

## 2023-04-21 ASSESSMENT — ENCOUNTER SYMPTOMS
COLOR CHANGE: 0
CHOKING: 0
TROUBLE SWALLOWING: 0
NAUSEA: 0
BACK PAIN: 0
PHOTOPHOBIA: 0
VOMITING: 0
SHORTNESS OF BREATH: 0

## 2023-04-24 LAB
ACHR BIND AB SER-SCNC: 2.6 NMOL/L (ref 0–0.4)
ACHR BLOCK AB/ACHR TOTAL SFR SER: 26 % (ref 0–26)
MISCELLANEOUS LAB TEST ORDER: ABNORMAL
WHOPPER PROMPT: ABNORMAL

## 2023-04-25 LAB
ACHR MOD AB/ACHR TOTAL SFR SER: 70 %
MISCELLANEOUS LAB TEST ORDER: NORMAL
WHOPPER PROMPT: NORMAL

## 2023-04-26 ENCOUNTER — NURSE ONLY (OUTPATIENT)
Dept: FAMILY MEDICINE CLINIC | Age: 78
End: 2023-04-26

## 2023-04-26 DIAGNOSIS — E29.1 HYPOGONADISM IN MALE: Primary | ICD-10-CM

## 2023-04-26 DIAGNOSIS — G70.00 MYASTHENIA GRAVIS, ACHR ANTIBODY POSITIVE (HCC): ICD-10-CM

## 2023-04-26 RX ORDER — TESTOSTERONE CYPIONATE 200 MG/ML
200 INJECTION, SOLUTION INTRAMUSCULAR ONCE
Status: COMPLETED | OUTPATIENT
Start: 2023-04-26 | End: 2023-04-26

## 2023-04-26 RX ADMIN — TESTOSTERONE CYPIONATE 120 MG: 200 INJECTION, SOLUTION INTRAMUSCULAR at 10:35

## 2023-04-26 NOTE — PROGRESS NOTES
Patient given Testosterone 120mg IM right gluteal..  Will return in 2 weeks for next injection    Patient tolerated injection well.     Administrations This Visit       testosterone cypionate (DEPOTESTOTERONE CYPIONATE) injection 200 mg       Admin Date  04/26/2023 Action  Given Dose  120 mg Route  IntraMUSCular Administered By  Ruben Valencia LPN

## 2023-05-05 ENCOUNTER — OFFICE VISIT (OUTPATIENT)
Dept: FAMILY MEDICINE CLINIC | Age: 78
End: 2023-05-05

## 2023-05-05 VITALS
BODY MASS INDEX: 26.74 KG/M2 | HEART RATE: 88 BPM | HEIGHT: 71 IN | OXYGEN SATURATION: 97 % | WEIGHT: 191 LBS | DIASTOLIC BLOOD PRESSURE: 64 MMHG | TEMPERATURE: 97 F | SYSTOLIC BLOOD PRESSURE: 116 MMHG

## 2023-05-05 DIAGNOSIS — A08.4 VIRAL GASTROENTERITIS: Primary | ICD-10-CM

## 2023-05-05 DIAGNOSIS — R11.2 NAUSEA AND VOMITING, UNSPECIFIED VOMITING TYPE: ICD-10-CM

## 2023-05-05 RX ORDER — PREDNISONE 20 MG/1
20 TABLET ORAL DAILY
COMMUNITY
Start: 2023-04-21

## 2023-05-05 RX ORDER — ONDANSETRON 4 MG/1
4 TABLET, FILM COATED ORAL 3 TIMES DAILY PRN
Qty: 15 TABLET | Refills: 0 | Status: SHIPPED | OUTPATIENT
Start: 2023-05-05

## 2023-05-05 ASSESSMENT — ENCOUNTER SYMPTOMS
RECTAL PAIN: 0
SHORTNESS OF BREATH: 0
COUGH: 0
NAUSEA: 1
BLOOD IN STOOL: 0
CONSTIPATION: 0
SORE THROAT: 0
SINUS PAIN: 0
ABDOMINAL DISTENTION: 0
DIARRHEA: 1
ABDOMINAL PAIN: 0
VOMITING: 1
ANAL BLEEDING: 0

## 2023-05-05 NOTE — PROGRESS NOTES
light-headedness and headaches. Vitals:  /64 (Site: Right Upper Arm, Position: Sitting, Cuff Size: Medium Adult)   Pulse 88   Temp 97 °F (36.1 °C) (Temporal)   Ht 5' 11\" (1.803 m)   Wt 191 lb (86.6 kg)   SpO2 97%   BMI 26.64 kg/m²     Physical Exam  Vitals reviewed. Constitutional:       General: He is not in acute distress. Appearance: Normal appearance. Cardiovascular:      Rate and Rhythm: Normal rate and regular rhythm. Heart sounds: No murmur heard. Pulmonary:      Effort: Pulmonary effort is normal. No respiratory distress. Breath sounds: Normal breath sounds. No wheezing, rhonchi or rales. Abdominal:      General: Bowel sounds are normal. There is no distension. Palpations: Abdomen is soft. Tenderness: There is no abdominal tenderness. There is no right CVA tenderness, left CVA tenderness, guarding or rebound. Musculoskeletal:      Right lower leg: No edema. Left lower leg: No edema. Skin:     Findings: No rash. Neurological:      Mental Status: He is alert and oriented to person, place, and time. Psychiatric:         Mood and Affect: Mood normal.         Behavior: Behavior normal.         Thought Content: Thought content normal.       Ortho Exam (If Applicable)              An electronic signature was used to authenticate this note.      Agnes Medel MD

## 2023-05-08 ENCOUNTER — TELEPHONE (OUTPATIENT)
Dept: NEUROLOGY | Age: 78
End: 2023-05-08

## 2023-05-10 ENCOUNTER — NURSE ONLY (OUTPATIENT)
Dept: FAMILY MEDICINE CLINIC | Age: 78
End: 2023-05-10

## 2023-05-10 DIAGNOSIS — E29.1 HYPOGONADISM IN MALE: Primary | ICD-10-CM

## 2023-05-10 RX ORDER — TESTOSTERONE CYPIONATE 200 MG/ML
200 INJECTION, SOLUTION INTRAMUSCULAR ONCE
Status: COMPLETED | OUTPATIENT
Start: 2023-05-10 | End: 2023-05-10

## 2023-05-10 RX ADMIN — TESTOSTERONE CYPIONATE 120 MG: 200 INJECTION, SOLUTION INTRAMUSCULAR at 09:51

## 2023-05-10 NOTE — PROGRESS NOTES
Patient given Testosterone 120mg IM left gluteal..  Will return in 2 weeks for next injection    Patient tolerated injection well.     Administrations This Visit       testosterone cypionate (DEPOTESTOTERONE CYPIONATE) injection 200 mg       Admin Date  05/10/2023 Action  Given Dose  120 mg Route  IntraMUSCular Administered By  Maral Ruggiero LPN

## 2023-05-11 ENCOUNTER — HOSPITAL ENCOUNTER (OUTPATIENT)
Dept: INFUSION THERAPY | Age: 78
Setting detail: INFUSION SERIES
Discharge: HOME | End: 2023-05-11
Payer: MEDICARE

## 2023-05-11 VITALS
HEART RATE: 81 BPM | BODY MASS INDEX: 26.64 KG/M2 | SYSTOLIC BLOOD PRESSURE: 105 MMHG | DIASTOLIC BLOOD PRESSURE: 63 MMHG | OXYGEN SATURATION: 96 % | WEIGHT: 191 LBS

## 2023-05-11 DIAGNOSIS — G70.00 MYASTHENIA GRAVIS (HCC): ICD-10-CM

## 2023-05-11 DIAGNOSIS — G70.00 MYASTHENIA GRAVIS, ACHR ANTIBODY POSITIVE (HCC): Primary | ICD-10-CM

## 2023-05-11 PROCEDURE — 6360000002 HC RX W HCPCS: Performed by: PSYCHIATRY & NEUROLOGY

## 2023-05-11 PROCEDURE — 96365 THER/PROPH/DIAG IV INF INIT: CPT

## 2023-05-11 PROCEDURE — 2580000003 HC RX 258: Performed by: PSYCHIATRY & NEUROLOGY

## 2023-05-11 PROCEDURE — 2580000003 HC RX 258

## 2023-05-11 RX ORDER — SODIUM CHLORIDE 9 MG/ML
INJECTION, SOLUTION INTRAVENOUS
Status: COMPLETED
Start: 2023-05-11 | End: 2023-05-11

## 2023-05-11 RX ADMIN — SODIUM CHLORIDE 250 ML: 9 INJECTION, SOLUTION INTRAVENOUS at 11:38

## 2023-05-11 RX ADMIN — EFGARTIGIMOD ALFA 866 MG: 20 INJECTION INTRAVENOUS at 12:57

## 2023-05-11 NOTE — FLOWSHEET NOTE
No side effects noted. Patient left the unit via wheelchair with his wife. All equipment used in the care for this patient has been cleaned.

## 2023-05-15 ENCOUNTER — APPOINTMENT (OUTPATIENT)
Dept: GENERAL RADIOLOGY | Age: 78
DRG: 057 | End: 2023-05-15
Payer: MEDICARE

## 2023-05-15 ENCOUNTER — TELEPHONE (OUTPATIENT)
Dept: NEUROLOGY | Age: 78
End: 2023-05-15

## 2023-05-15 ENCOUNTER — HOSPITAL ENCOUNTER (INPATIENT)
Age: 78
LOS: 1 days | Discharge: HOME | DRG: 057 | End: 2023-05-16
Attending: EMERGENCY MEDICINE | Admitting: INTERNAL MEDICINE
Payer: MEDICARE

## 2023-05-15 DIAGNOSIS — G70.01 MYASTHENIA GRAVIS IN CRISIS (HCC): Primary | ICD-10-CM

## 2023-05-15 DIAGNOSIS — G70.00 MYASTHENIA GRAVIS, ACHR ANTIBODY POSITIVE (HCC): ICD-10-CM

## 2023-05-15 LAB
ALBUMIN SERPL-MCNC: 3.7 G/DL (ref 3.5–4.6)
ALBUMIN SERPL-MCNC: 3.9 G/DL (ref 3.5–4.6)
ALP SERPL-CCNC: 47 U/L (ref 35–104)
ALP SERPL-CCNC: 51 U/L (ref 35–104)
ALT SERPL-CCNC: 67 U/L (ref 0–41)
ALT SERPL-CCNC: 76 U/L (ref 0–41)
ANION GAP SERPL CALCULATED.3IONS-SCNC: 11 MEQ/L (ref 9–15)
ANION GAP SERPL CALCULATED.3IONS-SCNC: 9 MEQ/L (ref 9–15)
APTT PPP: 26.3 SEC (ref 24.4–36.8)
AST SERPL-CCNC: 44 U/L (ref 0–40)
AST SERPL-CCNC: 57 U/L (ref 0–40)
BASE EXCESS VENOUS: 4 (ref -3–3)
BASOPHILS # BLD: 0 K/UL (ref 0–0.2)
BASOPHILS NFR BLD: 0 %
BILIRUB SERPL-MCNC: 0.3 MG/DL (ref 0.2–0.7)
BILIRUB SERPL-MCNC: 0.3 MG/DL (ref 0.2–0.7)
BUN SERPL-MCNC: 29 MG/DL (ref 8–23)
BUN SERPL-MCNC: 31 MG/DL (ref 8–23)
CALCIUM IONIZED: 1.32 MMOL/L (ref 1.12–1.32)
CALCIUM SERPL-MCNC: 10 MG/DL (ref 8.5–9.9)
CALCIUM SERPL-MCNC: 10.8 MG/DL (ref 8.5–9.9)
CHLORIDE SERPL-SCNC: 96 MEQ/L (ref 95–107)
CHLORIDE SERPL-SCNC: 98 MEQ/L (ref 95–107)
CO2 SERPL-SCNC: 25 MEQ/L (ref 20–31)
CO2 SERPL-SCNC: 28 MEQ/L (ref 20–31)
CREAT SERPL-MCNC: 1.28 MG/DL (ref 0.7–1.2)
CREAT SERPL-MCNC: 1.44 MG/DL (ref 0.7–1.2)
EOSINOPHIL # BLD: 0 K/UL (ref 0–0.7)
EOSINOPHIL NFR BLD: 0 %
ERYTHROCYTE [DISTWIDTH] IN BLOOD BY AUTOMATED COUNT: 14.8 % (ref 11.5–14.5)
GLOBULIN SER CALC-MCNC: 2.4 G/DL (ref 2.3–3.5)
GLOBULIN SER CALC-MCNC: 2.5 G/DL (ref 2.3–3.5)
GLUCOSE BLD-MCNC: 299 MG/DL (ref 70–99)
GLUCOSE SERPL-MCNC: 175 MG/DL (ref 70–99)
GLUCOSE SERPL-MCNC: 302 MG/DL (ref 70–99)
HCO3 VENOUS: 27.7 MMOL/L (ref 23–29)
HCT VFR BLD AUTO: 46.4 % (ref 42–52)
HCT VFR BLD AUTO: 49 % (ref 41–53)
HGB BLD CALC-MCNC: 16.7 GM/DL (ref 13.5–17.5)
HGB BLD-MCNC: 15.5 G/DL (ref 14–18)
INR PPP: 1.2
LACTATE: 2.09 MMOL/L (ref 0.4–2)
LYMPHOCYTES # BLD: 1.4 K/UL (ref 1–4.8)
LYMPHOCYTES NFR BLD: 10 %
MCH RBC QN AUTO: 32 PG (ref 27–31.3)
MCHC RBC AUTO-ENTMCNC: 33.4 % (ref 33–37)
MCV RBC AUTO: 95.8 FL (ref 79–92.2)
MONOCYTES # BLD: 0.3 K/UL (ref 0.2–0.8)
MONOCYTES NFR BLD: 2 %
NEUTROPHILS # BLD: 12.7 K/UL (ref 1.4–6.5)
NEUTS SEG NFR BLD: 88 %
NEUTS VAC BLD QL SMEAR: PRESENT
O2 SAT, VEN: 89 %
PCO2, VEN: 38.5 MM HG (ref 40–50)
PERFORMED ON: ABNORMAL
PH VENOUS: 7.47 (ref 7.32–7.42)
PLATELET # BLD AUTO: 220 K/UL (ref 130–400)
PLATELET BLD QL SMEAR: ADEQUATE
PO2, VEN: 53 MM HG
POC CHLORIDE: 100 MEQ/L (ref 99–110)
POC CREATININE: 1 MG/DL (ref 0.8–1.3)
POC SAMPLE TYPE: ABNORMAL
POTASSIUM SERPL-SCNC: 4.5 MEQ/L (ref 3.4–4.9)
POTASSIUM SERPL-SCNC: 4.8 MEQ/L (ref 3.5–5.1)
POTASSIUM SERPL-SCNC: 5.1 MEQ/L (ref 3.4–4.9)
PROT SERPL-MCNC: 6.1 G/DL (ref 6.3–8)
PROT SERPL-MCNC: 6.4 G/DL (ref 6.3–8)
PROTHROMBIN TIME: 15.5 SEC (ref 12.3–14.9)
RBC # BLD AUTO: 4.85 M/UL (ref 4.7–6.1)
RBC MORPH BLD: NORMAL
SODIUM BLD-SCNC: 132 MEQ/L (ref 136–145)
SODIUM SERPL-SCNC: 132 MEQ/L (ref 135–144)
SODIUM SERPL-SCNC: 135 MEQ/L (ref 135–144)
TCO2 CALC VENOUS: 29 MMOL/L
TOXIC GRANULATION: ABNORMAL
TROPONIN T SERPL-MCNC: <0.01 NG/ML (ref 0–0.01)
WBC # BLD AUTO: 14.4 K/UL (ref 4.8–10.8)

## 2023-05-15 PROCEDURE — 85025 COMPLETE CBC W/AUTO DIFF WBC: CPT

## 2023-05-15 PROCEDURE — 2060000000 HC ICU INTERMEDIATE R&B

## 2023-05-15 PROCEDURE — 83605 ASSAY OF LACTIC ACID: CPT

## 2023-05-15 PROCEDURE — 82803 BLOOD GASES ANY COMBINATION: CPT

## 2023-05-15 PROCEDURE — 84484 ASSAY OF TROPONIN QUANT: CPT

## 2023-05-15 PROCEDURE — 80053 COMPREHEN METABOLIC PANEL: CPT

## 2023-05-15 PROCEDURE — 36600 WITHDRAWAL OF ARTERIAL BLOOD: CPT

## 2023-05-15 PROCEDURE — 36415 COLL VENOUS BLD VENIPUNCTURE: CPT

## 2023-05-15 PROCEDURE — 85014 HEMATOCRIT: CPT

## 2023-05-15 PROCEDURE — 82435 ASSAY OF BLOOD CHLORIDE: CPT

## 2023-05-15 PROCEDURE — 85610 PROTHROMBIN TIME: CPT

## 2023-05-15 PROCEDURE — 84295 ASSAY OF SERUM SODIUM: CPT

## 2023-05-15 PROCEDURE — 84132 ASSAY OF SERUM POTASSIUM: CPT

## 2023-05-15 PROCEDURE — 99285 EMERGENCY DEPT VISIT HI MDM: CPT

## 2023-05-15 PROCEDURE — 71045 X-RAY EXAM CHEST 1 VIEW: CPT

## 2023-05-15 PROCEDURE — 82330 ASSAY OF CALCIUM: CPT

## 2023-05-15 PROCEDURE — 85730 THROMBOPLASTIN TIME PARTIAL: CPT

## 2023-05-15 PROCEDURE — 82565 ASSAY OF CREATININE: CPT

## 2023-05-15 RX ORDER — ONDANSETRON 2 MG/ML
4 INJECTION INTRAMUSCULAR; INTRAVENOUS EVERY 6 HOURS PRN
Status: DISCONTINUED | OUTPATIENT
Start: 2023-05-15 | End: 2023-05-16 | Stop reason: HOSPADM

## 2023-05-15 RX ORDER — POLYETHYLENE GLYCOL 3350 17 G/17G
17 POWDER, FOR SOLUTION ORAL DAILY PRN
Status: DISCONTINUED | OUTPATIENT
Start: 2023-05-15 | End: 2023-05-16 | Stop reason: HOSPADM

## 2023-05-15 RX ORDER — HYDRALAZINE HYDROCHLORIDE 20 MG/ML
10 INJECTION INTRAMUSCULAR; INTRAVENOUS EVERY 6 HOURS PRN
Status: DISCONTINUED | OUTPATIENT
Start: 2023-05-15 | End: 2023-05-16 | Stop reason: HOSPADM

## 2023-05-15 RX ORDER — ONDANSETRON 4 MG/1
4 TABLET, ORALLY DISINTEGRATING ORAL EVERY 8 HOURS PRN
Status: DISCONTINUED | OUTPATIENT
Start: 2023-05-15 | End: 2023-05-16 | Stop reason: HOSPADM

## 2023-05-15 RX ORDER — ACETAMINOPHEN 650 MG/1
650 SUPPOSITORY RECTAL EVERY 6 HOURS PRN
Status: DISCONTINUED | OUTPATIENT
Start: 2023-05-15 | End: 2023-05-16 | Stop reason: HOSPADM

## 2023-05-15 RX ORDER — INSULIN LISPRO 100 [IU]/ML
0-4 INJECTION, SOLUTION INTRAVENOUS; SUBCUTANEOUS
Status: DISCONTINUED | OUTPATIENT
Start: 2023-05-16 | End: 2023-05-16 | Stop reason: HOSPADM

## 2023-05-15 RX ORDER — SODIUM CHLORIDE 9 MG/ML
INJECTION, SOLUTION INTRAVENOUS PRN
Status: DISCONTINUED | OUTPATIENT
Start: 2023-05-15 | End: 2023-05-16 | Stop reason: HOSPADM

## 2023-05-15 RX ORDER — SODIUM CHLORIDE 0.9 % (FLUSH) 0.9 %
5-40 SYRINGE (ML) INJECTION PRN
Status: DISCONTINUED | OUTPATIENT
Start: 2023-05-15 | End: 2023-05-16 | Stop reason: HOSPADM

## 2023-05-15 RX ORDER — DEXTROSE MONOHYDRATE 100 MG/ML
INJECTION, SOLUTION INTRAVENOUS CONTINUOUS PRN
Status: DISCONTINUED | OUTPATIENT
Start: 2023-05-15 | End: 2023-05-16 | Stop reason: HOSPADM

## 2023-05-15 RX ORDER — ACETAMINOPHEN 325 MG/1
650 TABLET ORAL EVERY 6 HOURS PRN
Status: DISCONTINUED | OUTPATIENT
Start: 2023-05-15 | End: 2023-05-16 | Stop reason: HOSPADM

## 2023-05-15 RX ORDER — PANTOPRAZOLE SODIUM 40 MG/10ML
40 INJECTION, POWDER, LYOPHILIZED, FOR SOLUTION INTRAVENOUS DAILY
Status: DISCONTINUED | OUTPATIENT
Start: 2023-05-16 | End: 2023-05-16

## 2023-05-15 RX ORDER — INSULIN LISPRO 100 [IU]/ML
0-4 INJECTION, SOLUTION INTRAVENOUS; SUBCUTANEOUS NIGHTLY
Status: DISCONTINUED | OUTPATIENT
Start: 2023-05-15 | End: 2023-05-16 | Stop reason: HOSPADM

## 2023-05-15 RX ORDER — SODIUM CHLORIDE 0.9 % (FLUSH) 0.9 %
5-40 SYRINGE (ML) INJECTION EVERY 12 HOURS SCHEDULED
Status: DISCONTINUED | OUTPATIENT
Start: 2023-05-15 | End: 2023-05-16 | Stop reason: HOSPADM

## 2023-05-15 RX ORDER — ENOXAPARIN SODIUM 100 MG/ML
40 INJECTION SUBCUTANEOUS DAILY
Status: DISCONTINUED | OUTPATIENT
Start: 2023-05-16 | End: 2023-05-16 | Stop reason: HOSPADM

## 2023-05-15 ASSESSMENT — PAIN - FUNCTIONAL ASSESSMENT: PAIN_FUNCTIONAL_ASSESSMENT: NONE - DENIES PAIN

## 2023-05-15 ASSESSMENT — LIFESTYLE VARIABLES
HOW OFTEN DO YOU HAVE A DRINK CONTAINING ALCOHOL: NEVER
HOW MANY STANDARD DRINKS CONTAINING ALCOHOL DO YOU HAVE ON A TYPICAL DAY: PATIENT DOES NOT DRINK

## 2023-05-15 NOTE — ED TRIAGE NOTES
Pt a/o x 3 skin pink w/d resp non labored. Pt only complaint is weakness hard time swallowing getting worse since last Thursday after infusion. Lungs clear. Pt has hx of myasthenia gravis. pt told to go to er for lads and plasma infusion.

## 2023-05-15 NOTE — ED NOTES
Pt upset because he was told he has to be admitted. Pt states he was not prepare for it. Pt a/o x 3 skin pink w/d resp non labored. Sari Horse  Jacobo Woods  05/15/23 [id number]

## 2023-05-15 NOTE — ED PROVIDER NOTES
[address] [facility] ED  EMERGENCY DEPARTMENT ENCOUNTER      Pt Name: Bunny Cooley  MRN: [id number]  [place] [birth year]  Date of evaluation: 5/15/2023  Provider: Neela Wu MD    CHIEF COMPLAINT       Chief Complaint   Patient presents with    Other     Dr. Ayush Campos wants pt to have blood work and plasma per pt. HISTORY OF PRESENT ILLNESS   (Location/Symptom, Timing/Onset, Context/Setting, Quality, Duration, Modifying Factors, Severity)  Note limiting factors. 72-year-old male with myasthenia gravis presenting with weakness. Directed here by Dr. Ayush Campos for admission. Patient had plasmapheresis a few weeks prior and has been getting progressively worse since that time. Has some shortness of breath but notes no specific pain. Has not taken anything specifically for relief. Nursing Notes were reviewed. REVIEW OF SYSTEMS    (2-9 systems for level 4, 10 or more for level 5)     Review of Systems   Neurological:  Positive for weakness. All other systems reviewed and are negative. Except as noted above the remainder of the review of systems was reviewed and negative.        PAST MEDICAL HISTORY     Past Medical History:   Diagnosis Date    Atypical chest pain 8/30/2012    BPH (benign prostatic hypertrophy)     CAD (coronary artery disease)     Cataracts, bilateral     Chronic low back pain     COPD (chronic obstructive pulmonary disease) ([address] 6/1/2006    DM2 (diabetes mellitus, type 2) ([address]     Duodenitis     Erectile dysfunction 3/6/2017    Fatty infiltration of liver 11/19/2014    GERD (gastroesophageal reflux disease)     History of melanoma excision 12/11/2017    Left ear 09/2017    History of tobacco use 6/1/2006    HTN (hypertension)     Hyperlipidemia     Malignant melanoma of face ([address]     Malignant melanoma of skin of face ([address]     RT ear 2000, LT ear 2017    MG (myasthenia gravis) ([address]     Nephrolithiasis     Ocular myasthenia gravis ([address] 8/28/2018    Osteoarthritis of multiple

## 2023-05-15 NOTE — H&P
66-year-old man was sent from Dr. Prabhu Drake office for concern of myasthenia gravis. Patient had lung surgery 2 weeks ago where he he was treated with steroids and infusion once a week, patient that time had constant slurring of his speech fatigue and choking on soft foods. He had also double vision and eyelid drooping which was constant. Dr. Irma Jamison did recommend the patient be admitted to start plasmapheresis. Patient agreement. Patient unable to move left arm, leg weakness. Patient denies blurry vision headache nausea vomiting or chest pain. Patient had episode of myasthenia gravis 5 years ago after cardiac surgery [place] which was treated. Spoke with Dr. Irma Jamison over the phone about the care. VBG noted. Spoke with the wife at the about the [facility].       Past Medical History:   Diagnosis Date    Atypical chest pain 8/30/2012    BPH (benign prostatic hypertrophy)     CAD (coronary artery disease)     Cataracts, bilateral     Chronic low back pain     COPD (chronic obstructive pulmonary disease) ([address] 6/1/2006    DM2 (diabetes mellitus, type 2) (HCC)     Duodenitis     Erectile dysfunction 3/6/2017    Fatty infiltration of liver 11/19/2014    GERD (gastroesophageal reflux disease)     History of melanoma excision 12/11/2017    Left ear 09/2017    History of tobacco use 6/1/2006    HTN (hypertension)     Hyperlipidemia     Malignant melanoma of face ([address]     Malignant melanoma of skin of face ([address]     RT ear 2000, LT ear 2017    MG (myasthenia gravis) ([address]     Nephrolithiasis     Ocular myasthenia gravis ([address] 8/28/2018    Osteoarthritis of multiple joints     hands, hips    Perennial allergic rhinitis     Personal history of colonic polyps     Pseudophakia of both eyes     Stage 3a chronic kidney disease ([address] 9/29/2022    Status post coronary artery bypass grafts x 5 7/30/2018 07/2018 @ [facility]    Tobacco abuse 12/8/2022    Type 2 diabetes mellitus with microalbuminuria, without long-term current

## 2023-05-15 NOTE — ED NOTES
Patient report called to [facility], no questions or concerns noted at this time     Nan Reyes RN  05/15/23 1958

## 2023-05-16 VITALS
WEIGHT: 192 LBS | SYSTOLIC BLOOD PRESSURE: 116 MMHG | OXYGEN SATURATION: 93 % | HEIGHT: 71 IN | HEART RATE: 79 BPM | DIASTOLIC BLOOD PRESSURE: 68 MMHG | BODY MASS INDEX: 26.88 KG/M2 | RESPIRATION RATE: 16 BRPM | TEMPERATURE: 97.9 F

## 2023-05-16 PROBLEM — G70.01 MYASTHENIA GRAVIS IN CRISIS (HCC): Status: ACTIVE | Noted: 2023-05-16

## 2023-05-16 LAB
APTT PPP: 27.5 SEC (ref 24.4–36.8)
BASOPHILS # BLD: 0.1 K/UL (ref 0–0.2)
BASOPHILS NFR BLD: 0.4 %
EOSINOPHIL # BLD: 0.1 K/UL (ref 0–0.7)
EOSINOPHIL NFR BLD: 0.9 %
ERYTHROCYTE [DISTWIDTH] IN BLOOD BY AUTOMATED COUNT: 15 % (ref 11.5–14.5)
FIBRINOGEN PPP-MCNC: 282 MG/DL (ref 262–562)
GLUCOSE BLD-MCNC: 110 MG/DL (ref 70–99)
GLUCOSE BLD-MCNC: 157 MG/DL (ref 70–99)
GLUCOSE BLD-MCNC: 260 MG/DL (ref 70–99)
HCT VFR BLD AUTO: 44.8 % (ref 42–52)
HGB BLD-MCNC: 15.1 G/DL (ref 14–18)
INR PPP: 1.3
LYMPHOCYTES # BLD: 2.2 K/UL (ref 1–4.8)
LYMPHOCYTES NFR BLD: 16.4 %
MCH RBC QN AUTO: 32.1 PG (ref 27–31.3)
MCHC RBC AUTO-ENTMCNC: 33.7 % (ref 33–37)
MCV RBC AUTO: 95.4 FL (ref 79–92.2)
MONOCYTES # BLD: 0.8 K/UL (ref 0.2–0.8)
MONOCYTES NFR BLD: 6.3 %
NEUTROPHILS # BLD: 10.1 K/UL (ref 1.4–6.5)
NEUTS SEG NFR BLD: 76 %
PERFORMED ON: ABNORMAL
PLATELET # BLD AUTO: 216 K/UL (ref 130–400)
PROTHROMBIN TIME: 15.9 SEC (ref 12.3–14.9)
RBC # BLD AUTO: 4.7 M/UL (ref 4.7–6.1)
WBC # BLD AUTO: 13.3 K/UL (ref 4.8–10.8)

## 2023-05-16 PROCEDURE — 6370000000 HC RX 637 (ALT 250 FOR IP): Performed by: INTERNAL MEDICINE

## 2023-05-16 PROCEDURE — 92610 EVALUATE SWALLOWING FUNCTION: CPT

## 2023-05-16 PROCEDURE — 97166 OT EVAL MOD COMPLEX 45 MIN: CPT

## 2023-05-16 PROCEDURE — 6360000002 HC RX W HCPCS: Performed by: INTERNAL MEDICINE

## 2023-05-16 PROCEDURE — 2580000003 HC RX 258: Performed by: INTERNAL MEDICINE

## 2023-05-16 PROCEDURE — 99223 1ST HOSP IP/OBS HIGH 75: CPT | Performed by: INTERNAL MEDICINE

## 2023-05-16 PROCEDURE — 94799 UNLISTED PULMONARY SVC/PX: CPT

## 2023-05-16 PROCEDURE — 94640 AIRWAY INHALATION TREATMENT: CPT

## 2023-05-16 PROCEDURE — 85025 COMPLETE CBC W/AUTO DIFF WBC: CPT

## 2023-05-16 PROCEDURE — 36415 COLL VENOUS BLD VENIPUNCTURE: CPT

## 2023-05-16 PROCEDURE — 85384 FIBRINOGEN ACTIVITY: CPT

## 2023-05-16 PROCEDURE — 97162 PT EVAL MOD COMPLEX 30 MIN: CPT

## 2023-05-16 PROCEDURE — APPSS45 APP SPLIT SHARED TIME 31-45 MINUTES: Performed by: NURSE PRACTITIONER

## 2023-05-16 PROCEDURE — 85610 PROTHROMBIN TIME: CPT

## 2023-05-16 PROCEDURE — 99223 1ST HOSP IP/OBS HIGH 75: CPT | Performed by: PSYCHIATRY & NEUROLOGY

## 2023-05-16 RX ORDER — TAMSULOSIN HYDROCHLORIDE 0.4 MG/1
0.8 CAPSULE ORAL DAILY
Status: DISCONTINUED | OUTPATIENT
Start: 2023-05-16 | End: 2023-05-16 | Stop reason: HOSPADM

## 2023-05-16 RX ORDER — FLUTICASONE PROPIONATE 50 MCG
1 SPRAY, SUSPENSION (ML) NASAL DAILY
Status: DISCONTINUED | OUTPATIENT
Start: 2023-05-16 | End: 2023-05-16 | Stop reason: HOSPADM

## 2023-05-16 RX ORDER — LIDOCAINE HYDROCHLORIDE 20 MG/ML
15 SOLUTION OROPHARYNGEAL
Status: DISCONTINUED | OUTPATIENT
Start: 2023-05-16 | End: 2023-05-16 | Stop reason: HOSPADM

## 2023-05-16 RX ORDER — AZATHIOPRINE 50 MG/1
50 TABLET ORAL 2 TIMES DAILY
Status: DISCONTINUED | OUTPATIENT
Start: 2023-05-16 | End: 2023-05-16 | Stop reason: HOSPADM

## 2023-05-16 RX ORDER — AMLODIPINE BESYLATE 10 MG/1
10 TABLET ORAL DAILY
Status: DISCONTINUED | OUTPATIENT
Start: 2023-05-16 | End: 2023-05-16 | Stop reason: HOSPADM

## 2023-05-16 RX ORDER — PREDNISONE 20 MG/1
20 TABLET ORAL DAILY
Status: DISCONTINUED | OUTPATIENT
Start: 2023-05-16 | End: 2023-05-16 | Stop reason: HOSPADM

## 2023-05-16 RX ORDER — ALBUTEROL SULFATE 90 UG/1
2 AEROSOL, METERED RESPIRATORY (INHALATION) EVERY 6 HOURS PRN
Status: DISCONTINUED | OUTPATIENT
Start: 2023-05-16 | End: 2023-05-16 | Stop reason: HOSPADM

## 2023-05-16 RX ORDER — FAMOTIDINE 20 MG/1
40 TABLET, FILM COATED ORAL EVERY EVENING
Status: DISCONTINUED | OUTPATIENT
Start: 2023-05-16 | End: 2023-05-16 | Stop reason: HOSPADM

## 2023-05-16 RX ORDER — GLIPIZIDE 5 MG/1
10 TABLET ORAL 2 TIMES DAILY
Status: DISCONTINUED | OUTPATIENT
Start: 2023-05-16 | End: 2023-05-16 | Stop reason: HOSPADM

## 2023-05-16 RX ORDER — ROSUVASTATIN CALCIUM 20 MG/1
20 TABLET, COATED ORAL DAILY
Status: DISCONTINUED | OUTPATIENT
Start: 2023-05-16 | End: 2023-05-16 | Stop reason: HOSPADM

## 2023-05-16 RX ORDER — ASCORBIC ACID 500 MG
1000 TABLET ORAL DAILY
Status: DISCONTINUED | OUTPATIENT
Start: 2023-05-16 | End: 2023-05-16 | Stop reason: HOSPADM

## 2023-05-16 RX ORDER — SODIUM CHLORIDE, SODIUM LACTATE, POTASSIUM CHLORIDE, CALCIUM CHLORIDE 600; 310; 30; 20 MG/100ML; MG/100ML; MG/100ML; MG/100ML
INJECTION, SOLUTION INTRAVENOUS CONTINUOUS
Status: DISCONTINUED | OUTPATIENT
Start: 2023-05-16 | End: 2023-05-16

## 2023-05-16 RX ADMIN — FLUTICASONE PROPIONATE 1 SPRAY: 50 SPRAY, METERED NASAL at 09:41

## 2023-05-16 RX ADMIN — AMLODIPINE BESYLATE 10 MG: 10 TABLET ORAL at 09:42

## 2023-05-16 RX ADMIN — TAMSULOSIN HYDROCHLORIDE 0.8 MG: 0.4 CAPSULE ORAL at 09:41

## 2023-05-16 RX ADMIN — SODIUM CHLORIDE, PRESERVATIVE FREE 10 ML: 5 INJECTION INTRAVENOUS at 09:42

## 2023-05-16 RX ADMIN — METOPROLOL SUCCINATE 150 MG: 100 TABLET, EXTENDED RELEASE ORAL at 09:41

## 2023-05-16 RX ADMIN — AZATHIOPRINE 50 MG: 50 TABLET ORAL at 09:42

## 2023-05-16 RX ADMIN — TIOTROPIUM BROMIDE AND OLODATEROL 2 PUFF: 3.124; 2.736 SPRAY, METERED RESPIRATORY (INHALATION) at 08:49

## 2023-05-16 ASSESSMENT — ENCOUNTER SYMPTOMS
COUGH: 0
COLOR CHANGE: 0
VOMITING: 0
TROUBLE SWALLOWING: 0
CHEST TIGHTNESS: 0
ABDOMINAL DISTENTION: 0
DIARRHEA: 0
NAUSEA: 0
SHORTNESS OF BREATH: 0
WHEEZING: 0
ABDOMINAL PAIN: 0

## 2023-05-16 NOTE — ACP (ADVANCE CARE PLANNING)
Advance Care Planning   Healthcare Decision Maker:    Primary Decision Maker: Paula Hollingsworth - [phone]    Click here to complete Healthcare Decision Makers including selection of the Healthcare Decision Maker Relationship (ie \"Primary\"). Today we documented Decision Maker(s) consistent with Legal Next of Kin hierarchy.

## 2023-05-16 NOTE — CARE COORDINATION
Case Management Assessment  Initial Evaluation    Date/Time of Evaluation: 5/16/2023 11:58 AM  Assessment Completed by: Reginaldo Anderson RN    If patient is discharged prior to next notation, then this note serves as note for discharge by case management. Patient Name: Benny Urbano                   YOB: 1945  Diagnosis: [facility] gravis ([facility] [G70.00]  Myasthenia gravis in crisis [facility]) [G70.01]                   Date / Time: 5/15/2023  3:43 PM    Patient Admission Status: Inpatient   Readmission Risk (Low < 19, Mod (19-27), High > 27): Readmission Risk Score: 16.4    Current PCP: Jn Wray MD  PCP verified by CM? Yes    Chart Reviewed: Yes      History Provided by: Patient  Patient Orientation: Alert and Oriented, Person, Place, Situation, Self    Patient Cognition: Alert    Hospitalization in the last 30 days (Readmission):  No    If yes, Readmission Assessment in CM Navigator will be completed. Advance Directives:      Code Status: Full Code   Patient's Primary Decision Maker is: Legal Next of Kin    Primary Decision MakerChestamara Zarate Spouse - [phone]    Discharge Planning:    Patient lives with: Spouse/Significant Other Type of Home: House  Primary Care Giver: Self  Patient Support Systems include: Spouse/Significant Other   Current Financial resources:    Current community resources:    Current services prior to admission: None            Current DME:              Type of Home Care services:  None    ADLS  Prior functional level: Independent in ADLs/IADLs  Current functional level: Independent in ADLs/IADLs    PT AM-PAC:   /24  OT AM-PAC:   /24    Family can provide assistance at DC: Yes  Would you like Case Management to discuss the discharge plan with any other family members/significant others, and if so, who?  No  Plans to Return to Present Housing: Yes  Other Identified Issues/Barriers to RETURNING to current housing: PT/OT  Potential Assistance needed at

## 2023-05-16 NOTE — DISCHARGE INSTR - DIET

## 2023-05-16 NOTE — FLOWSHEET NOTE
Am nursing  assessment completed. Pt :  ambulatory in hallway             Alert and oriented. Breakfast: NPO pending speech  RESP:   even and non labored            Lung sounds:    clear/ diminsihed                             Oxygen: room air  Complaints of denies  Pain denies  IV:            SL    patent/ flushed/ capped, no signs of infiltration noted, dressing clean/dry/intact. TELE:        SR         Dressings:                           Precautions:              Falls:     35   Henri: 21  Chart and meds reviewed. Noted Labs:  w 13.3 h 15.1  Plan for today:    Bed wheels locked and in lowest position. Call light and bedside table within reach. NOTES:       NOTES: pt seen by speech. Dr Griselda Combs, Dr Monique Nova. Pt ok for diet. Am meds per order. Pt agitated and states he wants to be discharged. Breakfast ordered. 1213 Iv and tele removed for discharge. Care plan completed for discharge. Appropriate education addressed in discharge instructions. Instructions completed and reviewed with patient. Verbalize understanding of instructions and follow up. Personal belongings gathered. No complaints. Transport wheelchair called. Family member present.  Electronically signed by Jeramy Hilario RN on 5/16/2023 at 1:10 PM

## 2023-05-16 NOTE — PROGRESS NOTES
05/16/23 0900   Treatment   Treatment Type Spontaneous parameters   Spontaneous Parameters   NIF -60 cmH2O   VC 2.6   $NIF Charge Row $Yes
[facility] OCCUPATIONAL THERAPY EVALUATION - ACUTE     NAME: Jarvis Crane  :  (40 y.o.)  MRN: [id number]  CODE STATUS: Full Code  [facility]    Date of Service: 2023    Patient Diagnosis(es): Myasthenia gravis ([facility] [G70.00]  Myasthenia gravis in crisis [facility]) [G70.01]   Patient Active Problem List    Diagnosis Date Noted    Dyspnea on exertion 2023    Status post lobectomy of lung 2023    Non-small cell cancer of right lung ([facility] 2023    Malignant neoplasm of upper lobe of right lung ([facility] 2023    Pulmonary cavitary lesion 12/15/2022    Nodule of right lung 2022    Pulmonary embolism and infarction ([address] 2022    Stage 3a chronic kidney disease ([address] 2022    Depression 2022    Unsteady gait 2022    Myasthenia gravis, AChR antibody positive ([facility] 2023    Progressive external ophthalmoplegia of both eyes 2023    Vitamin D deficiency 2021    Close exposure to COVID-19 virus 2021    Myasthenia gravis ([address] 09/10/2018    Status post coronary artery bypass grafts x 5 2018    History of melanoma excision 2017    Hypogonadism male 2017    Varicose veins of both lower extremities 2017    Type 2 diabetes mellitus with microalbuminuria, without long-term current use of insulin ([address] 2017    Erectile dysfunction 2017    Perennial allergic rhinitis     Hyperlipidemia     History of colonic polyps     Osteoarthritis of multiple joints     Pseudophakia of both eyes     Astigmatism, regular 2015    Fatty infiltration of liver 2014    Lipoma of spermatic cord 2014    Gastroesophageal reflux disease 10/05/2012    Abnormal LFTs 10/05/2012    Bilateral leg pain 2011    Chronic venous insufficiency 2011    Benign prostatic hyperplasia     HTN (hypertension)     Chronic low back pain     Coronary artery disease involving native coronary artery of native heart with
Physical Therapy Med Surg Initial Assessment  [facility]Department[facility]  [facility]       NAME: Elvin Landry  : 1407 (7 y.o.)  MRN: [id number]  CODE STATUS: Prior    Date of Service: 2023    Patient Diagnosis(es): Myasthenia gravis ([facility] [G70.00]  Myasthenia gravis in crisis [facility]) [G70.01]   Chief Complaint   Patient presents with    Other     Dr. Aixa Lai wants pt to have blood work and plasma per pt.       Patient Active Problem List    Diagnosis Date Noted    Dyspnea on exertion 2023    Status post lobectomy of lung 2023    Non-small cell cancer of right lung ([address] 2023    Malignant neoplasm of upper lobe of right lung ([address] 2023    Pulmonary cavitary lesion 12/15/2022    Nodule of right lung 2022    Pulmonary embolism and infarction ([address] 2022    Stage 3a chronic kidney disease ([address] 2022    Depression 2022    Unsteady gait 2022    Myasthenia gravis, AChR antibody positive ([address] 2023    Progressive external ophthalmoplegia of both eyes 2023    Vitamin D deficiency 2021    Close exposure to COVID-19 virus 2021    Myasthenia gravis ([address] 09/10/2018    Status post coronary artery bypass grafts x 5 2018    History of melanoma excision 2017    Hypogonadism male 2017    Varicose veins of both lower extremities 2017    Type 2 diabetes mellitus with microalbuminuria, without long-term current use of insulin ([address] 2017    Erectile dysfunction 2017    Perennial allergic rhinitis     Hyperlipidemia     History of colonic polyps     Osteoarthritis of multiple joints     Pseudophakia of both eyes     Astigmatism, regular 2015    Fatty infiltration of liver 2014    Lipoma of spermatic cord 2014    Gastroesophageal reflux disease 10/05/2012    Abnormal LFTs 10/05/2012    Bilateral leg pain 2011    Chronic venous insufficiency 2011    Benign prostatic
Progress Note  Date:2023       Room:83/W183-01  Patient Name:Anshu Loza     YOB: 1945     Age:78 y.o. Subjective    Subjective:  Symptoms:  No shortness of breath, malaise, cough, chest pain, weakness, headache, chest pressure, anorexia, diarrhea or anxiety. Diet:  No nausea or vomiting. Review of Systems   Respiratory:  Negative for cough and shortness of breath. Cardiovascular:  Negative for chest pain. Gastrointestinal:  Negative for anorexia, diarrhea, nausea and vomiting. Neurological:  Negative for weakness. Objective         Vitals Last 24 Hours:  TEMPERATURE:  Temp  Av.9 °F (36.6 °C)  Min: 97.9 °F (36.6 °C)  Max: 97.9 °F (36.6 °C)  RESPIRATIONS RANGE: Resp  Av.7  Min: 17  Max: 18  PULSE OXIMETRY RANGE: SpO2  Av.3 %  Min: 93 %  Max: 98 %  PULSE RANGE: Pulse  Av.5  Min: 79  Max: 100  BLOOD PRESSURE RANGE: Systolic (08LJS), DIONICIO:716 , Min:116 , WKY:834   ; Diastolic (76CJF), VWY:81, Min:63, Max:83    I/O (24Hr): No intake or output data in the 24 hours ending 23 1139  Objective:  General Appearance:  Comfortable, well-appearing and in no acute distress. Vital signs: (most recent): Blood pressure 116/68, pulse 79, temperature 97.9 °F (36.6 °C), resp. rate 18, height 5' 11\" (1.803 m), weight 192 lb (87.1 kg), SpO2 93 %. HEENT: Normal HEENT exam.    Lungs: There are decreased breath sounds. Heart: S1 normal and S2 normal.    Abdomen: Abdomen is soft. Bowel sounds are normal.   There is no epigastric area or suprapubic area tenderness. Pulses: Distal pulses are intact. Neurological: Patient is alert. Pupils:  Pupils are equal, round, and reactive to light. Skin:  Warm.     Labs/Imaging/Diagnostics    Labs:  CBC:  Recent Labs     05/15/23  1600 05/15/23  1650 23  0522   WBC 14.4*  --  13.3*   RBC 4.85  --  4.70   HGB 15.5 16.7 15.1   HCT 46.4  --  44.8   MCV 95.8*  --  95.4*   RDW 14.8*  --  15.0*     --
packet of tc crackers)  Bolus Acceptance: No impairment  Bolus Formation/Control: No impairment  Propulsion: No impairment  Oral Residue: Less than 10% of bolus  Initiation of Swallow:  (no impairment suspected)  Laryngeal Elevation:  (present)  Aspiration Signs/Symptoms: None  Pharyngeal Phase Characteristics: No impairment, issues, or problems    Dysphagia Diagnosis  Dysphagia Diagnosis: No clinical indicators of dysphagia  Dysphagia Impression : No clinical indicators of oral or pharyngeal dysphagia identified at bedside. Educated patient regarding potential swallow changes that could occur due to Myesthenia Gravis and to notify medical team if he notices any signs of dysphagia. Patient demonstrated verbal understanding. Dysphagia Outcome Severity Scale: Level 6: Within functional limits/Modified independence     Recommendations  Requires SLP Intervention: No  Recommendations: Self feed  D/C Recommendations: No follow up therapy recommended post discharge  Diet Solids Recommendation: Regular  Liquid Consistency Recommendation: Thin  Compensatory Swallowing Strategies : Upright as possible for all oral intake;Small bites/sips  Recommended Form of Meds: PO  Duration of Treatment: no f/u  Frequency of Treatment: no f/u      Education  Individuals consulted  Consulted and agree with results and recommendations: Patient;RN  RN Name: LILIANA Bauer 664 in place: Yes  Type of devices: Bed alarm in place;Call light within reach  Restraints Initially in Place: No    Pain Assessment  Patient does not c/o pain. Pain Re-assessment  Patient does not c/o pain.       Therapy Time  SLP Individual Minutes  Time In: 1057  Time Out: 4279  Minutes: 12              Signature: Electronically signed by DONATO Burton on 5/16/2023 at 8:54 AM

## 2023-05-16 NOTE — CONSULTS
Renal consult dictated  will await  discussion with patient and Dr Shari Levy re plasmapheresis
[address]                      [address], [address] [facility]                                  CONSULTATION    PATIENT NAME: Amadou Balderas                    :        1945  MED REC NO:   [id number]                            ROOM:       W183  ACCOUNT NO:   [de-identified]                           ADMIT DATE: 05/15/2023  PROVIDER:     Jeramy Esqueda DO    CONSULT DATE:  2023    RENAL CONSULTATION    HISTORY OF PRESENT ILLNESS:  A 66year-old admitted to the hospital by  suggestion from Dr. Afua Still. The patient has a history of myasthenia  gravis. Two weeks ago, the patient had lung surgery and was placed on  steroid therapy for a week. Supposedly, he had slurring of his speech,  but the patient denies that presently. He also states he had choking of  his food and the patient denies that also. Lab was reviewed, all appears to be normal.  The patient is wondering  why he was admitted to the hospital.  He understands Dr. Afua Still wish to  admit him, but did not understanding any other reasoning. Talking to  him, plasmapheresis was discussed, but the patient does not want to  pursue this at present, because he is feeling well. Last episode of  myasthenias was five years ago after cardiac surgery. PAST MEDICAL HISTORY:  BPH, myasthenia gravis, coronary artery disease,  COPD, diabetes mellitus type 2, hypertension, hyperlipidemia, history of  malignant melanoma of the face. PAST SURGICAL HISTORY:  Coronary artery bypass grafting, bronchoscopy,  thoracoscopy, colonoscopy, upper endoscopy, tonsil and adenoidectomy. HABITS:  Quit smoking in , 40-pack-year smoking history. No  alcohol. No drug use. MEDICATIONS:  At time of his admission; Deltasone, Zofran, Viagra,  Cozaar, Maxzide, Anoro, Januvia, Glucotrol, dulaglutide weekly, Eliquis,  Toprol, Crestor, Flomax, Norvasc, testosterone. ALLERGIES TO MEDICATIONS:  None.     REVIEW OF SYSTEMS:  The
05/15/23  1600 05/15/23  1650 05/15/23  2229   *  --  135   K 5.1*  --  4.5   CL 96  --  98   CO2 25  --  28   BUN 31*  --  29*   CREATININE 1.44* 1.0 1.28*   GLUCOSE 302*  --  175*       MV Settings: ABGs: No results for input(s): PHART, KXK5EST, PO2ART, XET5ROO, BEART, S1YFAXMF, WDN3MBI in the last 72 hours.   O2 Device: None (Room air)  O2 Flow Rate (L/min): 0 L/min  No results found for: [address]    Radiology  I personally reviewed imaging studies films and no acute infiltrate, right lower lobe atelectasis        Assessment, plan:   Patient is at risk due to    Myasthenia gravis, with worsening symptoms, no respiratory symptoms at this point  COPD, no signs of exacerbation  T2 a N0 M0 non-small cell lung cancer status post right upper lobectomy  History of PE    Recommendation  Resume home inhalers  Monitor NIF and vital capacity daily  Speech eval  Watch volume status, maintain euvolemic  Continue cardioprotective medication  Resume Eliquis as soon as possible       Thank you for consultation    Electronically signed by Silva Almanza MD, [facility],  on 5/16/2023 at 7:41 AM
**OR** dextrose bolus, glucagon (rDNA), dextrose, hydrALAZINE    Labs:   Recent Labs     05/15/23  1600 05/15/23  1650 05/16/23  0522   WBC 14.4*  --  13.3*   HGB 15.5 16.7 15.1   HCT 46.4  --  44.8     --  216     Recent Labs     05/15/23  1600 05/15/23  1650 05/15/23  2229   *  --  135   K 5.1*  --  4.5   CL 96  --  98   CO2 25  --  28   BUN 31*  --  29*   CREATININE 1.44* 1.0 1.28*   CALCIUM 10.8*  --  10.0*     Recent Labs     05/15/23  1600 05/15/23  2229   AST 44* 57*   ALT 67* 76*   BILITOT 0.3 0.3   ALKPHOS 51 47     Recent Labs     05/15/23  1600 05/16/23  0522   INR 1.2 1.3     Recent Labs     05/15/23  1600   TROPONINI <0.010       Urinalysis:   Lab Results   Component Value Date/Time    NITRU Negative 08/14/2017 09:16 PM    WBCUA 3-5 08/14/2017 09:16 PM    BACTERIA Rare 08/14/2017 09:16 PM    RBCUA 0-2 08/14/2017 09:16 PM    BLOODU moderate 03/03/2023 11:57 AM    BLOODU Negative 08/14/2017 09:16 PM    SPECGRAV 1.015 03/03/2023 11:57 AM    SPECGRAV 1.019 08/14/2017 09:16 PM    GLUCOSEU >=1000 mg/dL 03/03/2023 11:57 AM    GLUCOSEU 100 08/14/2017 09:16 PM       Radiology:   Most recent    EEG No valid procedures specified. MRI of Brain No results found for this or any previous visit. No results found for this or any previous visit. MRA of the Head and Neck: No results found for this or any previous visit. No results found for this or any previous visit. No results found for this or any previous visit. CT of the Head: Results for orders placed during the hospital encounter of 08/12/17    CT Head WO Contrast    Narrative  EXAMINATION: CT HEAD WO CONTRAST    CLINICAL HISTORY: HEADACHE    COMPARISON:  AUGUST 1, 2016    An unenhanced scan is performed. FINDINGS:   There is no bleed, mass effect, or space occupying lesion. No extra-axial mass or fluid collections. Calvarium and skull base intact.  Atherosclerotic changes overlie the

## 2023-05-17 ENCOUNTER — CARE COORDINATION (OUTPATIENT)
Dept: CASE MANAGEMENT | Age: 78
End: 2023-05-17

## 2023-05-17 DIAGNOSIS — G70.01 MYASTHENIA GRAVIS IN CRISIS (HCC): Primary | ICD-10-CM

## 2023-05-17 PROCEDURE — 1111F DSCHRG MED/CURRENT MED MERGE: CPT | Performed by: FAMILY MEDICINE

## 2023-05-17 NOTE — CARE COORDINATION
[facility] Care Transitions Initial Follow Up Call    Call within 2 business days of discharge: Yes    Patient Current Location:  Home: [address]    Care Transition Nurse contacted the patient by telephone to perform post hospital discharge assessment. Provided introduction to self, and explanation of the Care Transition Nurse role. Patient: Jarvis Crane Patient :    MRN: <[id number]>  Reason for Admission: Myasthenia gravis in crisis  Discharge Date: 23 RARS: Readmission Risk Score: 16.3      Last Discharge  [facility]       Date Complaint Diagnosis Description Type Department Provider    5/15/23 Other Myasthenia gravis in crisis [facility]) . .. ED to Hosp-Admission (Discharged) (ADMITTED) Kristie Gustafson MD; Bowen Ernandez MD            Was this an external facility discharge? No     Challenges to be reviewed by the provider   Additional needs identified to be addressed with provider: No  none               Method of communication with provider: none. CTN called and spoke with the pt for an initial care transition call post hospital discharge. Pt admitted on 5/15/23 with Myasthenia Gravis crisis. Noted pt was advised to go to ED by Dr. Jennifer Toro (neuro) for MG crisis and to initial plasmapheresis infusions. Noted original dx MG was in 2018 following cardiac surgery per chart review. Pt is established with Dr. Mary Kerns (neurology) and initial appt was on 23. Spoke to pt today who states that he is doing \"pretty good\", but is tired and \"wore out\" stating he got minimal sleep during his hospital stay and is happy to be home. Pt states that his hospital admission was d/t miscommunication and states that it should have never occurred voicing frustration with being admitted. Noted pt declined plasmapheresis infusions during his IP stay and wanted to continue on with V[facility] infusions as an OP.  Pt first  Vy[facility]gart infusion noted to be on 23 and pt states that he is
none

## 2023-05-18 ENCOUNTER — HOSPITAL ENCOUNTER (OUTPATIENT)
Dept: INFUSION THERAPY | Age: 78
Setting detail: INFUSION SERIES
Discharge: HOME | End: 2023-05-18
Payer: MEDICARE

## 2023-05-18 VITALS
TEMPERATURE: 97.8 F | RESPIRATION RATE: 18 BRPM | HEART RATE: 77 BPM | SYSTOLIC BLOOD PRESSURE: 123 MMHG | DIASTOLIC BLOOD PRESSURE: 66 MMHG | OXYGEN SATURATION: 95 %

## 2023-05-18 DIAGNOSIS — G70.00 MYASTHENIA GRAVIS, ACHR ANTIBODY POSITIVE (HCC): Primary | ICD-10-CM

## 2023-05-18 DIAGNOSIS — G70.00 MYASTHENIA GRAVIS (HCC): ICD-10-CM

## 2023-05-18 LAB
ALBUMIN SERPL-MCNC: 3.8 G/DL (ref 3.5–4.6)
ALP SERPL-CCNC: 43 U/L (ref 35–104)
ALT SERPL-CCNC: 44 U/L (ref 0–41)
AST SERPL-CCNC: 16 U/L (ref 0–40)
BILIRUB DIRECT SERPL-MCNC: <0.2 MG/DL (ref 0–0.4)
BILIRUB INDIRECT SERPL-MCNC: ABNORMAL MG/DL (ref 0–0.6)
BILIRUB SERPL-MCNC: <0.2 MG/DL (ref 0.2–0.7)
PROT SERPL-MCNC: 5.9 G/DL (ref 6.3–8)

## 2023-05-18 PROCEDURE — 96413 CHEMO IV INFUSION 1 HR: CPT

## 2023-05-18 PROCEDURE — 80076 HEPATIC FUNCTION PANEL: CPT

## 2023-05-18 PROCEDURE — 2580000003 HC RX 258: Performed by: PSYCHIATRY & NEUROLOGY

## 2023-05-18 PROCEDURE — 6360000002 HC RX W HCPCS: Performed by: PSYCHIATRY & NEUROLOGY

## 2023-05-18 PROCEDURE — 96365 THER/PROPH/DIAG IV INF INIT: CPT

## 2023-05-18 PROCEDURE — 36415 COLL VENOUS BLD VENIPUNCTURE: CPT

## 2023-05-18 RX ADMIN — EFGARTIGIMOD ALFA 871 MG: 20 INJECTION INTRAVENOUS at 11:44

## 2023-05-18 NOTE — FLOWSHEET NOTE
Patient to the floor via wheelchair for his week #2 infusion. Vital  signs taken. Denies any discomfort. Call light within reach.

## 2023-05-19 ENCOUNTER — OFFICE VISIT (OUTPATIENT)
Dept: FAMILY MEDICINE CLINIC | Age: 78
End: 2023-05-19

## 2023-05-19 VITALS
SYSTOLIC BLOOD PRESSURE: 96 MMHG | WEIGHT: 189 LBS | TEMPERATURE: 97.3 F | OXYGEN SATURATION: 95 % | HEART RATE: 80 BPM | BODY MASS INDEX: 26.46 KG/M2 | DIASTOLIC BLOOD PRESSURE: 44 MMHG | HEIGHT: 71 IN

## 2023-05-19 DIAGNOSIS — M54.50 CHRONIC LOW BACK PAIN, UNSPECIFIED BACK PAIN LATERALITY, UNSPECIFIED WHETHER SCIATICA PRESENT: ICD-10-CM

## 2023-05-19 DIAGNOSIS — G70.01 MYASTHENIA GRAVIS IN CRISIS (HCC): Primary | ICD-10-CM

## 2023-05-19 DIAGNOSIS — K76.0 FATTY INFILTRATION OF LIVER: Chronic | ICD-10-CM

## 2023-05-19 DIAGNOSIS — I10 PRIMARY HYPERTENSION: Chronic | ICD-10-CM

## 2023-05-19 DIAGNOSIS — G89.29 CHRONIC LOW BACK PAIN, UNSPECIFIED BACK PAIN LATERALITY, UNSPECIFIED WHETHER SCIATICA PRESENT: ICD-10-CM

## 2023-05-19 DIAGNOSIS — R79.89 ABNORMAL LFTS: ICD-10-CM

## 2023-05-19 DIAGNOSIS — J44.9 CHRONIC OBSTRUCTIVE PULMONARY DISEASE, UNSPECIFIED COPD TYPE (HCC): Chronic | ICD-10-CM

## 2023-05-19 DIAGNOSIS — I10 ESSENTIAL HYPERTENSION: Chronic | ICD-10-CM

## 2023-05-19 DIAGNOSIS — N18.31 STAGE 3A CHRONIC KIDNEY DISEASE (HCC): ICD-10-CM

## 2023-05-19 DIAGNOSIS — R26.81 UNSTEADY GAIT: ICD-10-CM

## 2023-05-19 LAB
EKG ATRIAL RATE: 78 BPM
EKG P AXIS: 79 DEGREES
EKG P-R INTERVAL: 156 MS
EKG Q-T INTERVAL: 332 MS
EKG QRS DURATION: 82 MS
EKG QTC CALCULATION (BAZETT): 378 MS
EKG R AXIS: 20 DEGREES
EKG T AXIS: 68 DEGREES
EKG VENTRICULAR RATE: 78 BPM

## 2023-05-19 RX ORDER — LOSARTAN POTASSIUM 100 MG/1
50 TABLET ORAL DAILY
Qty: 90 TABLET | Refills: 1
Start: 2023-05-19

## 2023-05-19 RX ORDER — METOPROLOL SUCCINATE 50 MG/1
100 TABLET, EXTENDED RELEASE ORAL DAILY
Qty: 270 TABLET | Refills: 1
Start: 2023-05-19

## 2023-05-19 RX ORDER — PREDNISONE 20 MG/1
20 TABLET ORAL DAILY
Qty: 90 TABLET | Refills: 1 | Status: CANCELLED | OUTPATIENT
Start: 2023-05-19

## 2023-05-19 ASSESSMENT — ENCOUNTER SYMPTOMS
COUGH: 0
CHEST TIGHTNESS: 0
VOMITING: 0
NAUSEA: 0
WHEEZING: 1
DIARRHEA: 0
SHORTNESS OF BREATH: 0
ABDOMINAL PAIN: 0
CHOKING: 0
BACK PAIN: 1
CONSTIPATION: 0

## 2023-05-19 NOTE — ASSESSMENT & PLAN NOTE
Stable respiratory status with normal SPO2 on room air. We will continue to follow over time. Patient instructed to continue with current inhaler medication.

## 2023-05-19 NOTE — PATIENT INSTRUCTIONS
- DECREASE LOSARTAN DOSE TO 50 MG DAILY (CUT PILL IN HALF)  - DECREASE METOPROLOL DOSE  MG DAILY (TAKE 2 PILLS DAILY INSTEAD OF 3)

## 2023-05-19 NOTE — ASSESSMENT & PLAN NOTE
Low BP today in the office. Patient currently asymptomatic. We will decrease dose of losartan and metoprolol and keep close follow-up with nurse visit BP/pulse check in 1 week and hypertension follow-up in 2 weeks.

## 2023-05-19 NOTE — ASSESSMENT & PLAN NOTE
Patient was instructed to hold statin until LFTs had returned to normal baseline. We will continue to follow and make further recommendations moving forward regarding cholesterol management.

## 2023-05-19 NOTE — PROGRESS NOTES
and regular rhythm. Heart sounds: Normal heart sounds. No murmur heard. Pulmonary:      Effort: Pulmonary effort is normal. No tachypnea, accessory muscle usage or respiratory distress. Breath sounds: No decreased air movement. Wheezing (Scattered end-expiratory wheezing) present. No decreased breath sounds, rhonchi or rales. Abdominal:      General: Bowel sounds are normal. There is no distension. Palpations: Abdomen is soft. Tenderness: There is no abdominal tenderness. There is no right CVA tenderness, left CVA tenderness, guarding or rebound. Musculoskeletal:      Cervical back: Neck supple. Right lower leg: No edema. Left lower leg: No edema. Lymphadenopathy:      Cervical: No cervical adenopathy. Skin:     Findings: No rash. Neurological:      Mental Status: He is alert and oriented to person, place, and time. Cranial Nerves: Cranial nerves 2-12 are intact. No cranial nerve deficit, dysarthria or facial asymmetry. Sensory: Sensation is intact. No sensory deficit. Motor: Motor function is intact. No weakness, tremor, atrophy or abnormal muscle tone. Gait: Gait normal.      Comments: +5/5 strength BUE and BLE   Psychiatric:         Mood and Affect: Mood normal.         Behavior: Behavior normal.         Thought Content: Thought content normal.       Ortho Exam (If Applicable)              An electronic signature was used to authenticate this note.      Nikkie Rincon MD

## 2023-05-19 NOTE — ASSESSMENT & PLAN NOTE
Crisis appears to be clinically resolved. Patient is established with neurology for long-term management of myasthenia gravis and continues on infusion treatments.

## 2023-05-22 ENCOUNTER — PATIENT MESSAGE (OUTPATIENT)
Dept: NEUROLOGY | Age: 78
End: 2023-05-22

## 2023-05-23 ENCOUNTER — TELEPHONE (OUTPATIENT)
Dept: NEUROLOGY | Age: 78
End: 2023-05-23

## 2023-05-23 NOTE — TELEPHONE ENCOUNTER
Patient is requesting medication refill.  Please approve or deny this request.    Rx requested:  Requested Prescriptions     Pending Prescriptions Disp Refills    predniSONE (DELTASONE) 20 MG tablet 90 tablet 1     Sig: Take 1 tablet by mouth daily         Last Office Visit:   4/21/2023      Next Visit Date:  Future Appointments   Date Time Provider Kay Balesi   5/24/2023  9:20 AM SCHEDULE, MLOR TC [facility] PCP TESTOSTERONE [facility]X [facility]   5/25/2023 11:00 AM MLOZ INFUSION CHAIR [address] [place] [address]   5/31/2023 10:30 AM Wilmar Narayan MD  [address]   5/31/2023 12:00 PM Yen Ag, [address] [place] [address]   6/1/2023 11:00 AM MLOZ INFUSION CHAIR 7 [address] [place] [address]   6/2/2023  9:00 AM MD [place] [address]   6/6/2023  9:20 AM SCHEDULE, MLOR TC [facility] PCP TESTOSTERONE MLOX [facility]   6/20/2023 10:40 AM Jeaneth Conde DO [facility]   6/21/2023  9:20 AM SCHEDULE, MLOR TC [facility] PCP TESTOSTERONE MLOX [facility]   7/5/2023  9:20 AM SCHEDULE, MLOR TC [facility] PCP TESTOSTERONE MLOX [facility]   7/11/2023  2:30 PM [facility] Neurology -   9/21/2023  1:00 PM Michi Chan  S [address]   10/2/2023 10:00 AM MD [place] Ar[address] [place] [address]

## 2023-05-24 ENCOUNTER — NURSE ONLY (OUTPATIENT)
Dept: FAMILY MEDICINE CLINIC | Age: 78
End: 2023-05-24
Payer: MEDICARE

## 2023-05-24 DIAGNOSIS — R80.9 TYPE 2 DIABETES MELLITUS WITH MICROALBUMINURIA, WITHOUT LONG-TERM CURRENT USE OF INSULIN (HCC): Chronic | ICD-10-CM

## 2023-05-24 DIAGNOSIS — E11.29 TYPE 2 DIABETES MELLITUS WITH MICROALBUMINURIA, WITHOUT LONG-TERM CURRENT USE OF INSULIN (HCC): Chronic | ICD-10-CM

## 2023-05-24 DIAGNOSIS — E29.1 HYPOGONADISM IN MALE: Primary | ICD-10-CM

## 2023-05-24 PROCEDURE — 96372 THER/PROPH/DIAG INJ SC/IM: CPT | Performed by: FAMILY MEDICINE

## 2023-05-24 RX ORDER — PREDNISONE 20 MG/1
20 TABLET ORAL DAILY
Qty: 90 TABLET | Refills: 1 | Status: SHIPPED | OUTPATIENT
Start: 2023-05-24

## 2023-05-24 RX ORDER — PREDNISONE 20 MG/1
20 TABLET ORAL DAILY
Qty: 90 TABLET | Refills: 1 | Status: SHIPPED | OUTPATIENT
Start: 2023-05-24 | End: 2023-05-24 | Stop reason: SDUPTHER

## 2023-05-24 RX ORDER — TESTOSTERONE CYPIONATE 200 MG/ML
120 INJECTION, SOLUTION INTRAMUSCULAR ONCE
Status: COMPLETED | OUTPATIENT
Start: 2023-05-24 | End: 2023-05-24

## 2023-05-24 RX ORDER — DULAGLUTIDE 1.5 MG/.5ML
1.5 INJECTION, SOLUTION SUBCUTANEOUS WEEKLY
Qty: 6 ML | Refills: 1 | OUTPATIENT
Start: 2023-05-24

## 2023-05-24 RX ADMIN — TESTOSTERONE CYPIONATE 120 MG: 200 INJECTION, SOLUTION INTRAMUSCULAR at 10:08

## 2023-05-24 NOTE — TELEPHONE ENCOUNTER
Patient is requesting medication refill.  Please approve or deny this request.    Rx requested:  Requested Prescriptions     Pending Prescriptions Disp Refills    predniSONE (DELTASONE) 20 MG tablet 90 tablet 1     Sig: Take 1 tablet by mouth daily         Last Office Visit:   4/21/2023      Next Visit Date:  Future Appointments   Date Time Provider Kay Balesi   5/25/2023 11:00 AM MLOZ INFUSION CHAIR [address] [place] [address]   5/31/2023 10:30 AM Vero Mathews MD  [address]   5/31/2023 12:00 PM Crystal Vásquez, [address] [place] [address]   6/1/2023 11:00 AM MLOZ INFUSION CHAIR [address] [place] [address]   6/2/2023  9:00 AM Tyrell Moore MD [facility]   6/5/2023  9:30 AM Mebla Bolden, [address] [place] [address]   6/6/2023  9:20 AM SCHEDULE, MLOR TC [facility]ST PCP TESTOSTERONE MLOX [facility]   6/20/2023 10:40 AM Edgewater Prim, DO [facility]   6/21/2023  9:20 AM SCHEDULE, MLOR TC [facility] PCP TESTOSTERONE [facility]   7/5/2023  9:20 AM SCHEDULE, MLOR TC [facility] PCP TESTOSTERONE [facility]X [facility]   7/11/2023  2:30 PM Phil Daniels MD [facility] Neurology -   9/21/2023  1:00 PM Michi Taylor  S [address]   10/2/2023 10:00 AM Tyrell Moore MD [address]

## 2023-05-24 NOTE — PROGRESS NOTES
Patient given Testosterone 120mg IM right gluteal..  Will return in 2 weeks for next injection    Patient tolerated injection well.     Administrations This Visit       testosterone cypionate (DEPOTESTOTERONE CYPIONATE) injection 120 mg       Admin Date  05/24/2023 Action  Given Dose  120 mg Route  IntraMUSCular Administered By  Tamera Huang LPN

## 2023-05-25 ENCOUNTER — HOSPITAL ENCOUNTER (OUTPATIENT)
Dept: INFUSION THERAPY | Age: 78
Setting detail: INFUSION SERIES
Discharge: HOME | End: 2023-05-25
Payer: MEDICARE

## 2023-05-25 ENCOUNTER — CARE COORDINATION (OUTPATIENT)
Dept: CASE MANAGEMENT | Age: 78
End: 2023-05-25

## 2023-05-25 VITALS
RESPIRATION RATE: 16 BRPM | HEART RATE: 90 BPM | TEMPERATURE: 97.4 F | DIASTOLIC BLOOD PRESSURE: 66 MMHG | SYSTOLIC BLOOD PRESSURE: 130 MMHG

## 2023-05-25 DIAGNOSIS — G70.00 MYASTHENIA GRAVIS (HCC): ICD-10-CM

## 2023-05-25 DIAGNOSIS — G70.00 MYASTHENIA GRAVIS, ACHR ANTIBODY POSITIVE (HCC): Primary | ICD-10-CM

## 2023-05-25 PROCEDURE — 6360000002 HC RX W HCPCS: Performed by: PSYCHIATRY & NEUROLOGY

## 2023-05-25 PROCEDURE — 2580000003 HC RX 258: Performed by: PSYCHIATRY & NEUROLOGY

## 2023-05-25 PROCEDURE — 96365 THER/PROPH/DIAG IV INF INIT: CPT

## 2023-05-25 RX ADMIN — EFGARTIGIMOD ALFA 857 MG: 20 INJECTION INTRAVENOUS at 12:10

## 2023-05-25 ASSESSMENT — PAIN DESCRIPTION - LOCATION: LOCATION: BACK

## 2023-05-25 ASSESSMENT — PAIN DESCRIPTION - PAIN TYPE: TYPE: CHRONIC PAIN

## 2023-05-25 ASSESSMENT — PAIN SCALES - GENERAL: PAINLEVEL_OUTOF10: 7

## 2023-05-25 NOTE — FLOWSHEET NOTE
No side effects noted. Patient left the unit via wheelchair. All equipment used in the care for this patient has been cleaned.

## 2023-05-25 NOTE — CARE COORDINATION
[facility] Care Transitions Follow Up Call    Patient: Trey Prajapati  Patient :    MRN: <[id number]>  Reason for Admission: Myasthenia Gravis in crisis  Discharge Date: 23 RARS: Readmission Risk Score: 16.3      Attempted to reach the patient for subsequent Care Transition call. Message left with CTN's contact information requesting return phone call. Will attempt outreach again.       Follow Up  Future Appointments   Date Time Provider Kay Tri   2023 10:30 AM David Onofre MD  [address]   2023 12:00 PM Lindsay Flanagan, [address] [place] [address]   2023 11:00 AM MLOZ INFUSION CHAIR 7 [address] [facility]   2023  9:00 AM MD Whit Armendariz [facility]   2023  9:30 AM Bunny Mosley, [address] [place] [address]   2023  9:20 AM SCHEDULE, MLOR TC [facility] PCP TESTOSTERONE M[facility]X [facility]   2023 10:40 AM Andriy Moreno DO [facility]   2023  9:20 AM SCHEDULE, MLOR TC [facility] PCP TESTOSTERONE MLOX [facility]   2023  9:20 AM SCHEDULE, MLOR TC [facility] PCP TESTOSTERONE MLOX [facility]   2023  2:30 PM Phil Zamora MD [address] Neurology -   2023  1:00 PM Kenya Arcos  S [address]   10/2/2023 10:00 AM MD Florecita Armendariz[address] Gera Hess RN

## 2023-05-25 NOTE — FLOWSHEET NOTE
Patient arrived to unit via wheelchair. Patient resting in chair. Vital signs obtained. IV obtained. Call light within reach. 1210: Infusion started.  Patient given print out for medication with potential side effects per patient request.

## 2023-05-31 ENCOUNTER — OFFICE VISIT (OUTPATIENT)
Dept: PULMONOLOGY | Age: 78
End: 2023-05-31
Payer: MEDICARE

## 2023-05-31 ENCOUNTER — TELEPHONE (OUTPATIENT)
Dept: FAMILY MEDICINE CLINIC | Age: 78
End: 2023-05-31

## 2023-05-31 VITALS
HEIGHT: 71 IN | HEART RATE: 83 BPM | SYSTOLIC BLOOD PRESSURE: 102 MMHG | WEIGHT: 188 LBS | BODY MASS INDEX: 26.32 KG/M2 | OXYGEN SATURATION: 96 % | TEMPERATURE: 96.9 F | DIASTOLIC BLOOD PRESSURE: 62 MMHG | RESPIRATION RATE: 16 BRPM

## 2023-05-31 DIAGNOSIS — I26.99 PULMONARY EMBOLISM AND INFARCTION (HCC): ICD-10-CM

## 2023-05-31 DIAGNOSIS — C34.91 NON-SMALL CELL CANCER OF RIGHT LUNG (HCC): Primary | ICD-10-CM

## 2023-05-31 DIAGNOSIS — M54.50 CHRONIC LOW BACK PAIN, UNSPECIFIED BACK PAIN LATERALITY, UNSPECIFIED WHETHER SCIATICA PRESENT: ICD-10-CM

## 2023-05-31 DIAGNOSIS — R06.09 DOE (DYSPNEA ON EXERTION): ICD-10-CM

## 2023-05-31 DIAGNOSIS — I25.119 CORONARY ARTERY DISEASE INVOLVING NATIVE CORONARY ARTERY OF NATIVE HEART WITH ANGINA PECTORIS (HCC): Chronic | ICD-10-CM

## 2023-05-31 DIAGNOSIS — C34.91 SQUAMOUS CELL CARCINOMA LUNG, RIGHT (HCC): ICD-10-CM

## 2023-05-31 DIAGNOSIS — R80.9 TYPE 2 DIABETES MELLITUS WITH MICROALBUMINURIA, WITHOUT LONG-TERM CURRENT USE OF INSULIN (HCC): Chronic | ICD-10-CM

## 2023-05-31 DIAGNOSIS — E11.29 TYPE 2 DIABETES MELLITUS WITH MICROALBUMINURIA, WITHOUT LONG-TERM CURRENT USE OF INSULIN (HCC): Chronic | ICD-10-CM

## 2023-05-31 DIAGNOSIS — J44.9 CHRONIC OBSTRUCTIVE PULMONARY DISEASE, UNSPECIFIED COPD TYPE (HCC): Primary | ICD-10-CM

## 2023-05-31 DIAGNOSIS — Z95.1 STATUS POST CORONARY ARTERY BYPASS GRAFTS X 5: Chronic | ICD-10-CM

## 2023-05-31 DIAGNOSIS — J44.9 CHRONIC OBSTRUCTIVE PULMONARY DISEASE, UNSPECIFIED COPD TYPE (HCC): Chronic | ICD-10-CM

## 2023-05-31 DIAGNOSIS — G70.00 MYASTHENIA GRAVIS, ACHR ANTIBODY POSITIVE (HCC): ICD-10-CM

## 2023-05-31 DIAGNOSIS — G89.29 CHRONIC LOW BACK PAIN, UNSPECIFIED BACK PAIN LATERALITY, UNSPECIFIED WHETHER SCIATICA PRESENT: ICD-10-CM

## 2023-05-31 PROCEDURE — 99213 OFFICE O/P EST LOW 20 MIN: CPT | Performed by: INTERNAL MEDICINE

## 2023-05-31 PROCEDURE — 3023F SPIROM DOC REV: CPT | Performed by: INTERNAL MEDICINE

## 2023-05-31 PROCEDURE — 1111F DSCHRG MED/CURRENT MED MERGE: CPT | Performed by: INTERNAL MEDICINE

## 2023-05-31 PROCEDURE — 1123F ACP DISCUSS/DSCN MKR DOCD: CPT | Performed by: INTERNAL MEDICINE

## 2023-05-31 PROCEDURE — 3074F SYST BP LT 130 MM HG: CPT | Performed by: INTERNAL MEDICINE

## 2023-05-31 PROCEDURE — G8417 CALC BMI ABV UP PARAM F/U: HCPCS | Performed by: INTERNAL MEDICINE

## 2023-05-31 PROCEDURE — 1036F TOBACCO NON-USER: CPT | Performed by: INTERNAL MEDICINE

## 2023-05-31 PROCEDURE — 3078F DIAST BP <80 MM HG: CPT | Performed by: INTERNAL MEDICINE

## 2023-05-31 PROCEDURE — G8427 DOCREV CUR MEDS BY ELIG CLIN: HCPCS | Performed by: INTERNAL MEDICINE

## 2023-05-31 NOTE — PROGRESS NOTES
Subjective:     Kayleigh Langley is a 66 y.o. male who complains today of:     Chief Complaint   Patient presents with    Follow-up     3 Month F/U for COPD       HPI  Patient presents for lung cancer and COPD    5/31/2023  Patient has generalized weakness, he has myasthenia gravis, has been acting up on him for the last few weeks, he is currently undergoing infusion treatment and on prednisone, he follows up with neurology, he has rehab scheduled to start next week. No chest pain, shortness of breath mainly exertional, no coughing, no fever no chills his weight is stable, no lower extremity edema. He is supposed to do follow-up CAT scan earlier this month, however he forgot about it. 3/9/2023  Patient presents for follow-up, he is status post right upper lobectomy for T2 N0 M0 squamous cell carcinoma. Doing good, no chest pain, no shortness of breath, no coughing, no fever no chills, no nasal congestion or postnasal drip and no heartburn.         Allergies:  Invokana [canagliflozin], Metformin and related, and Other  Past Medical History:   Diagnosis Date    Atypical chest pain 8/30/2012    BPH (benign prostatic hypertrophy)     CAD (coronary artery disease)     Cataracts, bilateral     Chronic low back pain     COPD (chronic obstructive pulmonary disease) ([address] 6/1/2006    DM2 (diabetes mellitus, type 2) (HCC)     Duodenitis     Erectile dysfunction 3/6/2017    Fatty infiltration of liver 11/19/2014    GERD (gastroesophageal reflux disease)     History of melanoma excision 12/11/2017    Left ear 09/2017    History of tobacco use 6/1/2006    HTN (hypertension)     Hyperlipidemia     Malignant melanoma of face ([address]     Malignant melanoma of skin of face ([address]     RT ear 2000, LT ear 2017    MG (myasthenia gravis) ([address]     Nephrolithiasis     Ocular myasthenia gravis ([address] 8/28/2018    Osteoarthritis of multiple joints     hands, hips    Perennial allergic rhinitis     Personal history of colonic polyps

## 2023-06-01 ENCOUNTER — HOSPITAL ENCOUNTER (OUTPATIENT)
Dept: INFUSION THERAPY | Age: 78
Setting detail: INFUSION SERIES
Discharge: HOME | End: 2023-06-01
Payer: MEDICARE

## 2023-06-01 VITALS — DIASTOLIC BLOOD PRESSURE: 47 MMHG | TEMPERATURE: 97.6 F | SYSTOLIC BLOOD PRESSURE: 81 MMHG | HEART RATE: 83 BPM

## 2023-06-01 DIAGNOSIS — G70.00 MYASTHENIA GRAVIS, ACHR ANTIBODY POSITIVE (HCC): Primary | ICD-10-CM

## 2023-06-01 DIAGNOSIS — G70.00 MYASTHENIA GRAVIS (HCC): ICD-10-CM

## 2023-06-01 LAB
ALBUMIN SERPL-MCNC: 4 G/DL (ref 3.5–4.6)
ALP SERPL-CCNC: 39 U/L (ref 35–104)
ALT SERPL-CCNC: 24 U/L (ref 0–41)
ANION GAP SERPL CALCULATED.3IONS-SCNC: 17 MEQ/L (ref 9–15)
AST SERPL-CCNC: 15 U/L (ref 0–40)
BILIRUB SERPL-MCNC: 0.3 MG/DL (ref 0.2–0.7)
BUN SERPL-MCNC: 35 MG/DL (ref 8–23)
CALCIUM SERPL-MCNC: 9.9 MG/DL (ref 8.5–9.9)
CHLORIDE SERPL-SCNC: 92 MEQ/L (ref 95–107)
CO2 SERPL-SCNC: 24 MEQ/L (ref 20–31)
CREAT SERPL-MCNC: 1.7 MG/DL (ref 0.7–1.2)
GLOBULIN SER CALC-MCNC: 1.7 G/DL (ref 2.3–3.5)
GLUCOSE SERPL-MCNC: 364 MG/DL (ref 70–99)
POTASSIUM SERPL-SCNC: 4.9 MEQ/L (ref 3.4–4.9)
PROT SERPL-MCNC: 5.7 G/DL (ref 6.3–8)
SODIUM SERPL-SCNC: 133 MEQ/L (ref 135–144)

## 2023-06-01 PROCEDURE — 2580000003 HC RX 258: Performed by: PSYCHIATRY & NEUROLOGY

## 2023-06-01 PROCEDURE — 6360000002 HC RX W HCPCS: Performed by: PSYCHIATRY & NEUROLOGY

## 2023-06-01 PROCEDURE — 96413 CHEMO IV INFUSION 1 HR: CPT

## 2023-06-01 PROCEDURE — 80053 COMPREHEN METABOLIC PANEL: CPT

## 2023-06-01 RX ADMIN — EFGARTIGIMOD ALFA 853 MG: 20 INJECTION INTRAVENOUS at 11:59

## 2023-06-01 NOTE — PROGRESS NOTES
No signs/symptoms of reactions noted during waiting period. Pt BP low. Advises pt to go to Urgent care.

## 2023-06-01 NOTE — PROGRESS NOTES
AVS given. Pt leaving with spouse via w/c. All equipment used in the care for this patient has been cleaned.

## 2023-06-01 NOTE — PROGRESS NOTES
Pt to [facility] via w/c and walks to chair. Pt states all information is the same as last week. Call light in reach. Spouse at chairside.

## 2023-06-02 ENCOUNTER — OFFICE VISIT (OUTPATIENT)
Dept: FAMILY MEDICINE CLINIC | Age: 78
End: 2023-06-02

## 2023-06-02 ENCOUNTER — CARE COORDINATION (OUTPATIENT)
Dept: CASE MANAGEMENT | Age: 78
End: 2023-06-02

## 2023-06-02 VITALS
HEART RATE: 87 BPM | BODY MASS INDEX: 26.32 KG/M2 | TEMPERATURE: 97.2 F | DIASTOLIC BLOOD PRESSURE: 60 MMHG | HEIGHT: 71 IN | OXYGEN SATURATION: 96 % | SYSTOLIC BLOOD PRESSURE: 108 MMHG | WEIGHT: 188 LBS

## 2023-06-02 DIAGNOSIS — M15.9 PRIMARY OSTEOARTHRITIS INVOLVING MULTIPLE JOINTS: Chronic | ICD-10-CM

## 2023-06-02 DIAGNOSIS — M79.605 BILATERAL LEG PAIN: ICD-10-CM

## 2023-06-02 DIAGNOSIS — G89.29 CHRONIC LOW BACK PAIN, UNSPECIFIED BACK PAIN LATERALITY, UNSPECIFIED WHETHER SCIATICA PRESENT: ICD-10-CM

## 2023-06-02 DIAGNOSIS — M79.604 BILATERAL LEG PAIN: ICD-10-CM

## 2023-06-02 DIAGNOSIS — J44.9 CHRONIC OBSTRUCTIVE PULMONARY DISEASE, UNSPECIFIED COPD TYPE (HCC): Chronic | ICD-10-CM

## 2023-06-02 DIAGNOSIS — G70.00 MYASTHENIA GRAVIS (HCC): ICD-10-CM

## 2023-06-02 DIAGNOSIS — I10 PRIMARY HYPERTENSION: Primary | Chronic | ICD-10-CM

## 2023-06-02 DIAGNOSIS — M54.50 CHRONIC LOW BACK PAIN, UNSPECIFIED BACK PAIN LATERALITY, UNSPECIFIED WHETHER SCIATICA PRESENT: ICD-10-CM

## 2023-06-02 RX ORDER — LOSARTAN POTASSIUM 50 MG/1
50 TABLET ORAL DAILY
Qty: 90 TABLET | Refills: 1 | Status: SHIPPED | OUTPATIENT
Start: 2023-06-02

## 2023-06-02 ASSESSMENT — ENCOUNTER SYMPTOMS
NAUSEA: 0
BACK PAIN: 1
VOMITING: 0
COUGH: 0
ABDOMINAL PAIN: 0
SHORTNESS OF BREATH: 1
WHEEZING: 0
CHEST TIGHTNESS: 0

## 2023-06-02 NOTE — PROGRESS NOTES
Franca Osborne (: [id number]) is a 66 y.o. male, Established patient, who presents today for:    Chief Complaint   Patient presents with    Hypertension     Patient is present or 2 week f/u          ASSESSMENT/PLAN    1. Primary hypertension  Assessment & Plan:  Blood pressure remains borderline low today in the office. I again stressed with patient the importance of medication adjustments. He agrees to decrease dose of losartan to 50 mg daily and to decrease dose of metoprolol to 100 mg daily. Patient will keep close follow-up in 1 to 2 weeks for nurse visit BP/pulse check and for hypertension follow-up in 3 to 4 weeks. Orders:  -     losartan (COZAAR) 50 MG tablet; Take 1 tablet by mouth daily, Disp-90 tablet, R-1Normal  2. Myasthenia gravis [facility])  Assessment & Plan:  Contributing to lower extremity weakness and difficulty with ambulation. Will start process of helping patient obtain motorized scooter/mobility device to help facilitate patient travel and mobility. 3. Primary osteoarthritis involving multiple joints  Assessment & Plan:  Contributing to lower extremity pain and difficulty with ambulation. Will start process of helping patient obtain motorized scooter/mobility device to help facilitate patient travel and mobility. 4. Chronic low back pain, unspecified back pain laterality, unspecified whether sciatica present  Assessment & Plan:  Contributing to lower extremity discomfort and difficulty with ambulation. Will start process of helping patient obtain motorized scooter/mobility device to help facilitate patient travel and mobility. 5. Bilateral leg pain  Assessment & Plan:   Contributing to difficulty with ambulation. Will start process of helping patient obtain motorized scooter/mobility device to help facilitate patient travel and mobility. 6. Chronic obstructive pulmonary disease, unspecified COPD type ([facility]  Assessment & Plan:  Contributing to decreased activity tolerance.   Will

## 2023-06-02 NOTE — CARE COORDINATION
[facility] Care Transitions Follow Up Call    Patient Current Location:  Home: [place] [address]  [address]    Care Transition Nurse contacted the patient by telephone to follow up after admission on 5/15/23. Patient: Hazel Porras  Patient : [id number]   MRN: <[id number]>  Reason for Admission: MG in crisis  Discharge Date: 23 RARS: Readmission Risk Score: 16.3      Needs to be reviewed by the provider   Additional needs identified to be addressed with provider: No  none             Method of communication with provider: none. CTN called and spoke with the pt for a sub care transition call. Pt admitted on 5/15/23 with Myasthenia Gravis crisis. Noted pt was advised to go to ED by Dr. Ina Espinoza (neuro) for MG crisis and to have an initial plasmapheresis infusions, however, had declined infusions. Noted original dx MG was in 2018 following cardiac surgery per chart review. Pt is established with Dr. Lakisha Rivera (neurology) and initial appt was on 23. Pt states that he is doing okay but has been feeling tired. Pt just completed f/u with his pcp today and BP addressed. Pt had low BP at infusion center yesterday. Pt states only symptoms he has had is feeling tired. He denies any dizziness/lightheadedness or syncope. He states that he is to be on Losartan 50 mg daily and Metoprolol 100 mg daily per his pcp. Pt states that he does monitor his BP at home and keeps a log. Pt advised to keep pcp updated regarding BP readings and report any low BPs. Pt had a recent f/u with pulm on 23. Pt to have repeat f/u CT of the chest per pulmonary order and is scheduled for 23. Pt reports that from a pulmonary standpoint, he feels that he is at his baseline with no worsening of sob. CTN noted pt had requested a palliative medicine referral and pt is scheduled for his initial home visit on 23. Reviewed all upcoming appt with pt and he is aware of all.  Pt continues to receive QThurs [facility]

## 2023-06-02 NOTE — ASSESSMENT & PLAN NOTE
Blood pressure remains borderline low today in the office. I again stressed with patient the importance of medication adjustments. He agrees to decrease dose of losartan to 50 mg daily and to decrease dose of metoprolol to 100 mg daily. Patient will keep close follow-up in 1 to 2 weeks for nurse visit BP/pulse check and for hypertension follow-up in 3 to 4 weeks.

## 2023-06-02 NOTE — ASSESSMENT & PLAN NOTE
Contributing to lower extremity discomfort and difficulty with ambulation. Will start process of helping patient obtain motorized scooter/mobility device to help facilitate patient travel and mobility.

## 2023-06-02 NOTE — ASSESSMENT & PLAN NOTE
Contributing to decreased activity tolerance. Will start process of helping patient obtain motorized scooter/mobility device to help facilitate patient travel and mobility.

## 2023-06-02 NOTE — ASSESSMENT & PLAN NOTE
Contributing to lower extremity pain and difficulty with ambulation. Will start process of helping patient obtain motorized scooter/mobility device to help facilitate patient travel and mobility.

## 2023-06-02 NOTE — ASSESSMENT & PLAN NOTE
Contributing to difficulty with ambulation. Will start process of helping patient obtain motorized scooter/mobility device to help facilitate patient travel and mobility.

## 2023-06-02 NOTE — PATIENT INSTRUCTIONS
MEDICATION ADJUSTMENTS:    LOSARTAN SHOULD BE 50 MG DAILY    2.  METOPROLOL SHOULD  MG DAILY (2 TABLETS AT THE SAME TIME)

## 2023-06-02 NOTE — ASSESSMENT & PLAN NOTE
Contributing to lower extremity weakness and difficulty with ambulation. Will start process of helping patient obtain motorized scooter/mobility device to help facilitate patient travel and mobility.

## 2023-06-05 ENCOUNTER — HOSPITAL ENCOUNTER (OUTPATIENT)
Dept: PHYSICAL THERAPY | Age: 78
Setting detail: THERAPIES SERIES
Discharge: HOME | End: 2023-06-05
Payer: MEDICARE

## 2023-06-05 ENCOUNTER — HOSPITAL ENCOUNTER (OUTPATIENT)
Dept: CT IMAGING | Age: 78
Discharge: HOME | End: 2023-06-07
Attending: INTERNAL MEDICINE
Payer: MEDICARE

## 2023-06-05 DIAGNOSIS — C34.91 SQUAMOUS CELL CARCINOMA LUNG, RIGHT (HCC): ICD-10-CM

## 2023-06-05 PROCEDURE — 97110 THERAPEUTIC EXERCISES: CPT

## 2023-06-05 PROCEDURE — 97162 PT EVAL MOD COMPLEX 30 MIN: CPT

## 2023-06-05 PROCEDURE — 71250 CT THORAX DX C-: CPT

## 2023-06-05 ASSESSMENT — PAIN DESCRIPTION - ORIENTATION: ORIENTATION: RIGHT

## 2023-06-05 ASSESSMENT — PAIN DESCRIPTION - DESCRIPTORS: DESCRIPTORS: ACHING;SHARP

## 2023-06-05 ASSESSMENT — PAIN DESCRIPTION - PAIN TYPE: TYPE: ACUTE PAIN

## 2023-06-05 ASSESSMENT — PAIN DESCRIPTION - LOCATION: LOCATION: BACK

## 2023-06-05 ASSESSMENT — PAIN SCALES - GENERAL: PAINLEVEL_OUTOF10: 3

## 2023-06-05 NOTE — PROGRESS NOTES
Physical Therapy: Initial Evaluation    Patient: Joan Hu (1 y.o. male)   Examination Date:   Plan of Care Certification Period: 2023 to 23      :  1945 ;    Confirmed: Yes MRN: [id number]  CSN: [id number]   Insurance: Payor: MEDICARE / Plan: MEDICARE PART A AND B / Product Type: *No Product type* /   Insurance ID: [id number] - (Medicare) Secondary Insurance (if applicable):  [facility]   Referring Physician: MD Eve Guevara   PCP: Alysha Akers MD Visits to Date/Visits Approved:     No Show/Cancelled Appts: 0      Medical Diagnosis: Unsteadiness on feet [R26.81]  Low back pain, unspecified [M54.50]  Other chronic pain [G89.29] Unsteady gait and Chronic low back pain, weakness  Treatment Diagnosis: Unsteady gait and chronic back pain     PERTINENT MEDICAL HISTORY   Patient Assessed for Rehabilitation Services: Yes  Self reported health status[de-identified] Good    Medical History: Chart Reviewed: Yes   Past Medical History:   Diagnosis Date    Atypical chest pain 2012    BPH (benign prostatic hypertrophy)     CAD (coronary artery disease)     Cataracts, bilateral     Chronic low back pain     COPD (chronic obstructive pulmonary disease) ([address] 2006    DM2 (diabetes mellitus, type 2) ([address]     Duodenitis     Erectile dysfunction 3/6/2017    Fatty infiltration of liver 2014    GERD (gastroesophageal reflux disease)     History of melanoma excision 2017    Left ear 2017    History of tobacco use 2006    HTN (hypertension)     Hyperlipidemia     Malignant melanoma of face ([address]     Malignant melanoma of skin of face ([address]     RT ear , LT ear 2017    MG (myasthenia gravis) ([address]     Nephrolithiasis     Ocular myasthenia gravis ([address] 2018    Osteoarthritis of multiple joints     hands, hips    Perennial allergic rhinitis     Personal history of colonic polyps     Pseudophakia of both eyes     Stage 3a chronic kidney disease ([address]

## 2023-06-06 ENCOUNTER — OFFICE VISIT (OUTPATIENT)
Dept: PALLATIVE CARE | Age: 78
End: 2023-06-06
Payer: MEDICARE

## 2023-06-06 ENCOUNTER — NURSE ONLY (OUTPATIENT)
Dept: FAMILY MEDICINE CLINIC | Age: 78
End: 2023-06-06
Payer: MEDICARE

## 2023-06-06 VITALS
OXYGEN SATURATION: 91 % | SYSTOLIC BLOOD PRESSURE: 116 MMHG | DIASTOLIC BLOOD PRESSURE: 60 MMHG | TEMPERATURE: 98 F | HEART RATE: 52 BPM

## 2023-06-06 DIAGNOSIS — J44.9 CHRONIC OBSTRUCTIVE PULMONARY DISEASE, UNSPECIFIED COPD TYPE (HCC): Primary | Chronic | ICD-10-CM

## 2023-06-06 DIAGNOSIS — R53.1 WEAKNESS: ICD-10-CM

## 2023-06-06 DIAGNOSIS — Z71.89 GOALS OF CARE, COUNSELING/DISCUSSION: ICD-10-CM

## 2023-06-06 DIAGNOSIS — Z51.5 PALLIATIVE CARE ENCOUNTER: ICD-10-CM

## 2023-06-06 DIAGNOSIS — C34.91 NON-SMALL CELL CANCER OF RIGHT LUNG (HCC): ICD-10-CM

## 2023-06-06 DIAGNOSIS — Z71.89 ADVANCED CARE PLANNING/COUNSELING DISCUSSION: ICD-10-CM

## 2023-06-06 DIAGNOSIS — R53.81 PHYSICAL DEBILITY: ICD-10-CM

## 2023-06-06 DIAGNOSIS — Z78.9 FULL CODE STATUS: ICD-10-CM

## 2023-06-06 DIAGNOSIS — F41.8 ANXIETY ABOUT HEALTH: ICD-10-CM

## 2023-06-06 DIAGNOSIS — E29.1 HYPOGONADISM IN MALE: Primary | ICD-10-CM

## 2023-06-06 DIAGNOSIS — N18.32 STAGE 3B CHRONIC KIDNEY DISEASE (CKD) (HCC): Primary | ICD-10-CM

## 2023-06-06 DIAGNOSIS — G70.00 MYASTHENIA GRAVIS (HCC): ICD-10-CM

## 2023-06-06 PROCEDURE — G8417 CALC BMI ABV UP PARAM F/U: HCPCS

## 2023-06-06 PROCEDURE — 1123F ACP DISCUSS/DSCN MKR DOCD: CPT

## 2023-06-06 PROCEDURE — 1036F TOBACCO NON-USER: CPT

## 2023-06-06 PROCEDURE — 99345 HOME/RES VST NEW HIGH MDM 75: CPT

## 2023-06-06 PROCEDURE — 3078F DIAST BP <80 MM HG: CPT

## 2023-06-06 PROCEDURE — 3074F SYST BP LT 130 MM HG: CPT

## 2023-06-06 RX ORDER — TESTOSTERONE CYPIONATE 200 MG/ML
120 INJECTION, SOLUTION INTRAMUSCULAR ONCE
Status: COMPLETED | OUTPATIENT
Start: 2023-06-06 | End: 2023-06-06

## 2023-06-06 RX ORDER — ACETAMINOPHEN 500 MG
1000 TABLET ORAL EVERY 8 HOURS PRN
COMMUNITY

## 2023-06-06 RX ORDER — BUSPIRONE HYDROCHLORIDE 5 MG/1
5 TABLET ORAL DAILY
Qty: 30 TABLET | Refills: 0 | Status: SHIPPED | OUTPATIENT
Start: 2023-06-06 | End: 2023-07-06

## 2023-06-06 RX ORDER — LIDOCAINE 50 MG/G
1 PATCH TOPICAL NIGHTLY PRN
COMMUNITY

## 2023-06-06 RX ADMIN — TESTOSTERONE CYPIONATE 120 MG: 200 INJECTION, SOLUTION INTRAMUSCULAR at 09:31

## 2023-06-06 ASSESSMENT — PATIENT HEALTH QUESTIONNAIRE - PHQ9
SUM OF ALL RESPONSES TO PHQ9 QUESTIONS 1 & 2: 2
2. FEELING DOWN, DEPRESSED OR HOPELESS: 1
SUM OF ALL RESPONSES TO PHQ QUESTIONS 1-9: 2
SUM OF ALL RESPONSES TO PHQ QUESTIONS 1-9: 2
1. LITTLE INTEREST OR PLEASURE IN DOING THINGS: 1
SUM OF ALL RESPONSES TO PHQ QUESTIONS 1-9: 2
SUM OF ALL RESPONSES TO PHQ QUESTIONS 1-9: 2

## 2023-06-06 ASSESSMENT — VISUAL ACUITY: OU: 1

## 2023-06-06 ASSESSMENT — ENCOUNTER SYMPTOMS
SPUTUM PRODUCTION: 1
SHORTNESS OF BREATH: 1
BACK PAIN: 1

## 2023-06-06 ASSESSMENT — COPD QUESTIONNAIRES: COPD: 1

## 2023-06-06 NOTE — PROGRESS NOTES
Patient given Testosterone 120mg IM left gluteal..  Will return in 2 weeks for next injection    Patient tolerated injection well.     Administrations This Visit       testosterone cypionate (DEPOTESTOTERONE CYPIONATE) injection 120 mg       Admin Date  06/06/2023 Action  Given Dose  120 mg Route  IntraMUSCular Administered By  Germaine Turpin LPN

## 2023-06-06 NOTE — PROGRESS NOTES
NAD.    COPD/SOB: Patient feels increasingly SOB with current Myasthenia Gravis. Patient has little sputum production that is tan/clear and thick. No current S/S of respiratory infection at this time. Lung cancer: Patient has previously completed all recommended treatments. Patient had RUL lobectomy and his providers have been monitoring the nodules. Pain: Patient reports pain in his right lower back. Rates the pain at a Pain Score:   2 on a scale of 0 to 10. Describes pain as aching and constant. Aggravating factors include movement and exercise. Alleviating factors include repositioning and medication. Pain is worse in the night time of day. Patient states that the pain does not radiate. Other symptoms when pain occurs are fatigue. Current pain medications include Tylenol, Lidocaine Patches, Voltaren gel and heating pad. No sedation, constipation, or adverse effects on current regimen. Appetite: Patient typically eats his meals but is \"picky\" with his food per his wife. Patient has lost some weight unexpectedly about 5 lbs after most recent surgery and increased pain. Past weight recordings have been stable per EMR. Patient's wife assists with weight monitoring. Mood: Patient has been experiencing increased anxiety related to his condition and decreased mobility. Patient was previously very active and has some anxiety with this as well. No current S/S of SI, depression or HI. Patient has intermittent anxiety and depression. Sleep: Patient has been experiencing increased difficulty getting restful sleep related to pain. Patient had a better night sleeping as he tried patches. Skin: Patient denies any current skin openings, wounds or delayed wound healing. GI/: Patient had previously experienced hematuria during Myasthenia Gravis infusions. Hematuria has stopped. No other S/S of hematuria or infection. Patient denies any constipation.      I have personally reviewed the patient's most

## 2023-06-08 ENCOUNTER — HOSPITAL ENCOUNTER (EMERGENCY)
Age: 78
Discharge: HOME | End: 2023-06-08
Attending: EMERGENCY MEDICINE
Payer: MEDICARE

## 2023-06-08 VITALS
SYSTOLIC BLOOD PRESSURE: 127 MMHG | HEIGHT: 71 IN | HEART RATE: 80 BPM | BODY MASS INDEX: 26.6 KG/M2 | OXYGEN SATURATION: 94 % | WEIGHT: 190 LBS | TEMPERATURE: 97 F | RESPIRATION RATE: 20 BRPM | DIASTOLIC BLOOD PRESSURE: 70 MMHG

## 2023-06-08 DIAGNOSIS — S05.02XA ABRASION OF LEFT CORNEA, INITIAL ENCOUNTER: Primary | ICD-10-CM

## 2023-06-08 PROCEDURE — 6370000000 HC RX 637 (ALT 250 FOR IP): Performed by: EMERGENCY MEDICINE

## 2023-06-08 RX ORDER — HYDROCODONE BITARTRATE AND ACETAMINOPHEN 5; 325 MG/1; MG/1
1 TABLET ORAL ONCE
Status: COMPLETED | OUTPATIENT
Start: 2023-06-08 | End: 2023-06-08

## 2023-06-08 RX ORDER — HYDROCODONE BITARTRATE AND ACETAMINOPHEN 5; 325 MG/1; MG/1
1 TABLET ORAL
Qty: 12 TABLET | Refills: 0 | Status: SHIPPED | OUTPATIENT
Start: 2023-06-08 | End: 2023-06-11

## 2023-06-08 RX ORDER — BACITRACIN ZINC AND POLYMYXIN B SULFATE 500; 10000 [USP'U]/G; [USP'U]/G
OINTMENT OPHTHALMIC ONCE
Status: COMPLETED | OUTPATIENT
Start: 2023-06-08 | End: 2023-06-08

## 2023-06-08 RX ADMIN — HYDROCODONE BITARTRATE AND ACETAMINOPHEN 1 TABLET: 5; 325 TABLET ORAL at 18:48

## 2023-06-08 RX ADMIN — BACITRACIN ZINC AND POLYMYXIN B SULFATE: 500; 10000 OINTMENT OPHTHALMIC at 18:48

## 2023-06-08 RX ADMIN — FLUORESCEIN SODIUM 1 MG: 1 STRIP OPHTHALMIC at 18:48

## 2023-06-08 ASSESSMENT — PAIN - FUNCTIONAL ASSESSMENT: PAIN_FUNCTIONAL_ASSESSMENT: 0-10

## 2023-06-08 ASSESSMENT — PAIN SCALES - GENERAL: PAINLEVEL_OUTOF10: 3

## 2023-06-08 NOTE — ED PROVIDER NOTES
increased pain discharge visual change. Patient to follow-up with his ophthalmologist for repeat assessment in the next 2 to 3 days    PATIENT REFERRED TO:  Corby Zhu MD  [address] [place] [address]  [phone]    In 2 days      your opthamology    In 2 days        DISCHARGE MEDICATIONS:  New Prescriptions    HYDROCODONE-ACETAMINOPHEN (NORCO) 5-325 MG PER TABLET    Take 1 tablet by mouth every 4-6 hours as needed for Pain for up to 3 days. Intended supply: 3 days. Take lowest dose possible to manage pain Max Daily Amount: 6 tablets     Controlled Substances Monitoring:     No flowsheet data found.     (Please note that portions of this note were completed with a voice recognition program.  Efforts were made to edit the dictations but occasionally words are mis-transcribed.)    Yuniel Ortiz DO (electronically signed)  Attending Emergency Physician            Yuniel Ortiz DO  06/08/23 0419
X Size Of Lesion In Cm (Optional): 0
Scheduling Instructions (Optional): cosmetic SK treatments
Introduction Text (Please End With A Colon): :
Other Procedure: SK (ln2)
Detail Level: Simple
Instructions (Optional): Refer to Dr. Tubbs quoted $150.00 by Dr. Wagoner; photo obtained. Task sent to Doctor Del Cid.
Procedure To Be Performed At Next Visit: Cryotherapy (Cosmetic)

## 2023-06-09 ENCOUNTER — CARE COORDINATION (OUTPATIENT)
Dept: CASE MANAGEMENT | Age: 78
End: 2023-06-09

## 2023-06-09 NOTE — CARE COORDINATION
[facility] Care Transitions ED Follow Up Call    Patient Current Location:  Home: [place] [address]  [address]    Care Transition Nurse contacted the patient by telephone to follow up after admission on 5/15/23. Patient: Rom Blandon  Patient : [id number]   MRN: <[id number]>  Reason for Admission: MG in crisis  Discharge Date: 23 RARS: Readmission Risk Score: 16.3      Needs to be reviewed by the provider   Additional needs identified to be addressed with provider: No  none             Method of communication with provider: none. CTN called and spoke with pt for a f/u ED care transition call. Noted pt was in ED yesterday for abrasion of L cornea. Pt states that he scratched his eye somehow yesterday and tried to go to walk-in care but they were all closed and so he proceeded to ED. Pt states that he is doing a little better today. Reports L eye is swollen and eye is somewhat difficult to open because of the swelling. Pt denies any visual impairments, but has some tearing from  the eye. Pt states he was given bacitracin ointment to apply to his eye TID, which he is doing. Pt was given a rx for Norco which pt states that he has not needed to take yet. Pt states that he already reached out to his opthalm office and is waiting on a call back to be scheduled to be seen. Pt did not voice any immediate needs/concerns. CTN will f/u with pt next week as planned for regular CT outreach. Addressed changes since last contact:  medications-Norco and Bacitracin ointment from ED. Palliative medicine started pt on Buspar 5 mg daily (twice daily for increased anxiety)  ED visit on 23 for corneal abrasion.   OP PT visits  Initial home palliative care visit on 23    Follow Up  Future Appointments   Date Time Provider Kay Bartlett   2023  9:23 AM Patrizia Carpio, PTA [address]   2023 10:04 AM Patrizia Carpio, PTA [address]   2023 10:40 AM Justin Camarena DO [place] CARD

## 2023-06-19 ENCOUNTER — HOSPITAL ENCOUNTER (OUTPATIENT)
Dept: PHYSICAL THERAPY | Age: 78
Setting detail: THERAPIES SERIES
Discharge: HOME | End: 2023-06-19
Payer: MEDICARE

## 2023-06-19 PROCEDURE — 97140 MANUAL THERAPY 1/> REGIONS: CPT

## 2023-06-19 PROCEDURE — 97110 THERAPEUTIC EXERCISES: CPT

## 2023-06-19 PROCEDURE — 97012 MECHANICAL TRACTION THERAPY: CPT

## 2023-06-19 ASSESSMENT — PAIN SCALES - GENERAL: PAINLEVEL_OUTOF10: 5

## 2023-06-19 NOTE — PROGRESS NOTES
Physical Therapy  Physical Therapy: Daily Note   Patient: Hailee Lombardo (44 y.o. male)   Examination Date: 10/52/6106  Plan of Care/Certification Expiration Date: 23    No data recorded   :  1945 # of Visits since [facility]:   4   MRN: [id number]  CSN: [id number] Start of Care Date:   2023   Insurance: Payor: MEDICARE / Plan: MEDICARE PART A AND B / Product Type: *No Product type* /   Insurance ID: [id number] - (Medicare) Secondary Insurance (if applicable): [facility]   Referring Physician: MD Gonzalo Drummondgas   PCP: Jaelyn Hartmann MD Visits to Date/Visits Approved:     No Show/Cancelled Appts:      Medical Diagnosis: Unsteadiness on feet [R26.81]  Low back pain, unspecified [M54.50]  Other chronic pain [G89.29]    Treatment Diagnosis: Unsteady gait and chronic back pain        SUBJECTIVE EXAMINATION   Pain Level: Pain Screening  Patient Currently in Pain: Yes  Pain Assessment: 0-10  Pain Level: 5    Patient Comments: Subjective: Pt. states his back is hurting him today more R sided and into R hip. Pt. reports his kids took him on the [facility] yesterday for Father's Day. Pt. states he is going to try to get in [place] later today for adjustment. Has been going for 40 years.     HEP Compliance: Good        OBJECTIVE EXAMINATION   Restrictions:  Restrictions/Precautions: Up as Tolerated   No data recorded No data recorded              TREATMENT     Exercises:      Treatment Reasoning    Exercise 17: seated low back stretch 30 sec x 3  Exercise 18: LTR hold 10-20 sec x 5 ea way  Exercise 19: Supine HS stretch 30 sec x 3 B RLE lacking 20 deg and LLE lacking 30 deg  Exercise 20: [facility] reviewed for HEP                          Manual Therapy: (CPT 27880) Treatment Reasoning     Joint Mobilization: R long leg distraction for pain releif  Manual Traction: Hooklying manual lumbar traction good response and releif in pain                      Modalities  Mechanical Traction:

## 2023-06-20 ENCOUNTER — OFFICE VISIT (OUTPATIENT)
Dept: CARDIOLOGY CLINIC | Age: 78
End: 2023-06-20

## 2023-06-20 VITALS
SYSTOLIC BLOOD PRESSURE: 106 MMHG | WEIGHT: 176.4 LBS | DIASTOLIC BLOOD PRESSURE: 66 MMHG | HEIGHT: 71 IN | HEART RATE: 78 BPM | BODY MASS INDEX: 24.69 KG/M2 | OXYGEN SATURATION: 96 %

## 2023-06-20 DIAGNOSIS — R07.2 PRECORDIAL PAIN: ICD-10-CM

## 2023-06-20 DIAGNOSIS — R06.09 DOE (DYSPNEA ON EXERTION): ICD-10-CM

## 2023-06-20 DIAGNOSIS — I10 HYPERTENSION, UNSPECIFIED TYPE: Primary | ICD-10-CM

## 2023-06-20 DIAGNOSIS — R94.31 ABNORMAL EKG: ICD-10-CM

## 2023-06-20 RX ORDER — ROSUVASTATIN CALCIUM 20 MG/1
20 TABLET, COATED ORAL NIGHTLY
Qty: 90 TABLET | Refills: 2 | Status: SHIPPED | OUTPATIENT
Start: 2023-06-20

## 2023-06-20 RX ORDER — PREDNISONE 10 MG/1
TABLET ORAL
COMMUNITY
Start: 2023-06-13

## 2023-06-20 RX ORDER — EFGARTIGIMOD ALFA 20 MG/ML
INJECTION INTRAVENOUS
COMMUNITY
Start: 2023-06-13

## 2023-06-20 NOTE — PROGRESS NOTES
2023    MD Rhea Mclean [address]    RE: Lewis Chamberlain   : [phone]     Dear Dr. Juan Antonio Chi MD :    I had the pleasure of seeing your patient, Lewis Chamberlain in the office today on 2023. As you are well aware, Mr. Rena Sanchez is a pleasant 66 y.o.  male with history of multiple medical problems who was kindly referred to me for evaluation of CAD, status post 5 vessel CABG with LIMA to LAD, SVG to OM, SVG to diagonal branch, and Y graft SVG to PDA and PLV (2018). He reports a history of 4 previous coronary stents and 17 total cardiac catheterizations. He was previously following with Dr. Scott Vidal at [facility] [employer] [facility] but prefers to be closer to home. Currently, he reports dyspnea with more physical degrees of activity. He reports intermittent chest pains. No palpitations, near-syncope, or syncope. He reports compliance with medications. Past Medical History: He  has a past medical history of Atypical chest pain, BPH (benign prostatic hypertrophy), CAD (coronary artery disease), Cataracts, bilateral, Chronic low back pain, COPD (chronic obstructive pulmonary disease) (HCC), DM2 (diabetes mellitus, type 2) ([address] Duodenitis, Erectile dysfunction, Fatty infiltration of liver, GERD (gastroesophageal reflux disease), History of melanoma excision, History of tobacco use, HTN (hypertension), Hyperlipidemia, Malignant melanoma of face ([address] Malignant melanoma of skin of face ([address] MG (myasthenia gravis) ([address] Nephrolithiasis, Ocular myasthenia gravis ([address] Osteoarthritis of multiple joints, Perennial allergic rhinitis, Personal history of colonic polyps, Pseudophakia of both eyes, Stage 3a chronic kidney disease ([address] Status post coronary artery bypass grafts x 5, Tobacco abuse, Type 2 diabetes mellitus with microalbuminuria, without long-term current use of insulin ([address] and Varicose veins of both lower extremities.      Surgical

## 2023-06-21 ENCOUNTER — HOSPITAL ENCOUNTER (OUTPATIENT)
Dept: PHYSICAL THERAPY | Age: 78
Setting detail: THERAPIES SERIES
Discharge: HOME | End: 2023-06-21
Payer: MEDICARE

## 2023-06-21 ENCOUNTER — NURSE ONLY (OUTPATIENT)
Dept: FAMILY MEDICINE CLINIC | Age: 78
End: 2023-06-21
Payer: MEDICARE

## 2023-06-21 DIAGNOSIS — E29.1 HYPOGONADISM IN MALE: Primary | ICD-10-CM

## 2023-06-21 PROCEDURE — 96372 THER/PROPH/DIAG INJ SC/IM: CPT | Performed by: FAMILY MEDICINE

## 2023-06-21 PROCEDURE — 97110 THERAPEUTIC EXERCISES: CPT

## 2023-06-21 PROCEDURE — 97140 MANUAL THERAPY 1/> REGIONS: CPT

## 2023-06-21 RX ORDER — TESTOSTERONE CYPIONATE 200 MG/ML
120 INJECTION, SOLUTION INTRAMUSCULAR ONCE
Status: COMPLETED | OUTPATIENT
Start: 2023-06-21 | End: 2023-06-21

## 2023-06-21 RX ADMIN — TESTOSTERONE CYPIONATE 120 MG: 200 INJECTION, SOLUTION INTRAMUSCULAR at 11:14

## 2023-06-21 ASSESSMENT — PAIN SCALES - GENERAL: PAINLEVEL_OUTOF10: 4

## 2023-06-21 NOTE — PROGRESS NOTES
Patient given Testosterone 120mg IM right gluteal..  Will return in 2 weeks for next injection    Patient tolerated injection well.     Administrations This Visit       testosterone cypionate (DEPOTESTOTERONE CYPIONATE) injection 120 mg       Admin Date  06/21/2023 Action  Given Dose  120 mg Route  IntraMUSCular Administered By  Jesus Graves LPN

## 2023-06-21 NOTE — PROGRESS NOTES
Physical Therapy: Daily Note   Patient: Carol Gallegos (78 y.o. male)   Examination Date:   Plan of Care/Certification Expiration Date: 23    No data recorded   :  1945 # of Visits since [facility]:   5   MRN: [id number]  CSN: [id number] Start of Care Date:   2023   Insurance: Payor: MEDICARE / Plan: MEDICARE PART A AND B / Product Type: *No Product type* /   Insurance ID: [id number] - (Medicare) Secondary Insurance (if applicable): [facility]   Referring Physician: Dorrie Closs, MD [place]   PCP: Rashaad Vasquez MD Visits to Date/Visits Approved:   /      No Show/Cancelled Appts:   /       Medical Diagnosis: Unsteadiness on feet [R26.81]  Low back pain, unspecified [M54.50]  Other chronic pain [G89.29] Unsteady gait and Chronic low back pain, weakness  Treatment Diagnosis: Unsteady gait and chronic back pain        SUBJECTIVE EXAMINATION   Pain Level: Pain Screening  Pain Assessment: 0-10  Pain Level: 4 (4/10 back and hip R, 2/10 R arm and leg)    Patient Comments: Subjective: Pt reports traction last session and increased pain that night, couldn't sleep, then had chiropractic treatment yesterday and felt much better after last treatment. Pt reports also a fall yesterday going up the steps carrying groceries hit head R arm, R leg and bruised on nose and head, did not lose consciousness, and did not go to ER.     HEP Compliance: Good        OBJECTIVE EXAMINATION       TREATMENT     Exercises:      Treatment Reasoning    Exercise 9: Bridge x 3 reps with increased LBP thus deferred additional reps  Exercise 10: SLR L x 10 hold 1 sec, R x 10  hold 1 sec VC for correct form- vcs to keep knee straight and foot pointed toward ceiling  Exercise 11: Hooklying hip ABD x 15 hold 5 sec GTB  Exercise 12: B sidelying clamshells GTB 10 hold 3 sec  Exercise 18: LTR hold 10-20 sec x 3 ea way  Exercise 19: Supine HS stretch 30 sec x 3 B RLE  Exercise 20: SKTC x 1 each prior to strengthening ex

## 2023-06-26 ENCOUNTER — HOSPITAL ENCOUNTER (OUTPATIENT)
Dept: PHYSICAL THERAPY | Age: 78
Setting detail: THERAPIES SERIES
Discharge: HOME | End: 2023-06-26
Payer: MEDICARE

## 2023-06-28 ENCOUNTER — HOSPITAL ENCOUNTER (OUTPATIENT)
Dept: PHYSICAL THERAPY | Age: 78
Setting detail: THERAPIES SERIES
Discharge: HOME | End: 2023-06-28
Payer: MEDICARE

## 2023-06-28 PROCEDURE — 97140 MANUAL THERAPY 1/> REGIONS: CPT

## 2023-06-28 PROCEDURE — 97110 THERAPEUTIC EXERCISES: CPT

## 2023-06-28 ASSESSMENT — PAIN DESCRIPTION - DESCRIPTORS: DESCRIPTORS: ACHING;SORE

## 2023-06-28 ASSESSMENT — PAIN DESCRIPTION - PAIN TYPE: TYPE: CHRONIC PAIN

## 2023-06-28 ASSESSMENT — PAIN DESCRIPTION - ORIENTATION: ORIENTATION: RIGHT;LOWER

## 2023-06-28 ASSESSMENT — PAIN DESCRIPTION - LOCATION: LOCATION: BACK

## 2023-06-28 ASSESSMENT — PAIN SCALES - GENERAL: PAINLEVEL_OUTOF10: 5

## 2023-06-30 ENCOUNTER — OFFICE VISIT (OUTPATIENT)
Dept: FAMILY MEDICINE CLINIC | Age: 78
End: 2023-06-30

## 2023-06-30 VITALS
HEART RATE: 83 BPM | SYSTOLIC BLOOD PRESSURE: 108 MMHG | TEMPERATURE: 97.5 F | BODY MASS INDEX: 25.84 KG/M2 | HEIGHT: 71 IN | OXYGEN SATURATION: 93 % | WEIGHT: 184.6 LBS | DIASTOLIC BLOOD PRESSURE: 64 MMHG

## 2023-06-30 DIAGNOSIS — R26.81 UNSTEADY GAIT: ICD-10-CM

## 2023-06-30 DIAGNOSIS — E11.29 TYPE 2 DIABETES MELLITUS WITH MICROALBUMINURIA, WITHOUT LONG-TERM CURRENT USE OF INSULIN (HCC): ICD-10-CM

## 2023-06-30 DIAGNOSIS — G70.00 MYASTHENIA GRAVIS (HCC): Primary | ICD-10-CM

## 2023-06-30 DIAGNOSIS — M54.50 CHRONIC LOW BACK PAIN, UNSPECIFIED BACK PAIN LATERALITY, UNSPECIFIED WHETHER SCIATICA PRESENT: ICD-10-CM

## 2023-06-30 DIAGNOSIS — I10 PRIMARY HYPERTENSION: Primary | Chronic | ICD-10-CM

## 2023-06-30 DIAGNOSIS — M79.605 BILATERAL LEG PAIN: ICD-10-CM

## 2023-06-30 DIAGNOSIS — M15.9 PRIMARY OSTEOARTHRITIS INVOLVING MULTIPLE JOINTS: Chronic | ICD-10-CM

## 2023-06-30 DIAGNOSIS — M79.604 BILATERAL LEG PAIN: ICD-10-CM

## 2023-06-30 DIAGNOSIS — R80.9 TYPE 2 DIABETES MELLITUS WITH MICROALBUMINURIA, WITHOUT LONG-TERM CURRENT USE OF INSULIN (HCC): ICD-10-CM

## 2023-06-30 DIAGNOSIS — G89.29 CHRONIC LOW BACK PAIN, UNSPECIFIED BACK PAIN LATERALITY, UNSPECIFIED WHETHER SCIATICA PRESENT: ICD-10-CM

## 2023-06-30 DIAGNOSIS — G70.00 MYASTHENIA GRAVIS (HCC): ICD-10-CM

## 2023-06-30 LAB — HBA1C MFR BLD: 10.8 %

## 2023-06-30 RX ORDER — PEN NEEDLE, DIABETIC 31 GX5/16"
1 NEEDLE, DISPOSABLE MISCELLANEOUS DAILY
Qty: 100 EACH | Refills: 3 | Status: SHIPPED | OUTPATIENT
Start: 2023-06-30

## 2023-06-30 RX ORDER — LOSARTAN POTASSIUM 25 MG/1
25 TABLET ORAL DAILY
Qty: 90 TABLET | Refills: 1 | Status: SHIPPED | OUTPATIENT
Start: 2023-06-30

## 2023-06-30 RX ORDER — INSULIN GLARGINE 100 [IU]/ML
10 INJECTION, SOLUTION SUBCUTANEOUS NIGHTLY
Qty: 5 ADJUSTABLE DOSE PRE-FILLED PEN SYRINGE | Refills: 0 | Status: SHIPPED | OUTPATIENT
Start: 2023-06-30 | End: 2023-06-30 | Stop reason: SDUPTHER

## 2023-06-30 RX ORDER — INSULIN GLARGINE 100 [IU]/ML
10 INJECTION, SOLUTION SUBCUTANEOUS NIGHTLY
Qty: 5 ADJUSTABLE DOSE PRE-FILLED PEN SYRINGE | Refills: 0 | Status: SHIPPED | OUTPATIENT
Start: 2023-06-30

## 2023-06-30 RX ORDER — PEN NEEDLE, DIABETIC 31 GX5/16"
1 NEEDLE, DISPOSABLE MISCELLANEOUS DAILY
Qty: 100 EACH | Refills: 3 | Status: SHIPPED | OUTPATIENT
Start: 2023-06-30 | End: 2023-06-30 | Stop reason: SDUPTHER

## 2023-06-30 RX ORDER — METOPROLOL SUCCINATE 50 MG/1
50 TABLET, EXTENDED RELEASE ORAL DAILY
Qty: 90 TABLET | Refills: 1
Start: 2023-06-30

## 2023-06-30 ASSESSMENT — ENCOUNTER SYMPTOMS
DIARRHEA: 0
NAUSEA: 0
VOMITING: 0
CHEST TIGHTNESS: 0
ABDOMINAL PAIN: 0
COUGH: 0
SHORTNESS OF BREATH: 1
WHEEZING: 0

## 2023-07-05 ENCOUNTER — NURSE ONLY (OUTPATIENT)
Dept: FAMILY MEDICINE CLINIC | Age: 78
End: 2023-07-05
Payer: MEDICARE

## 2023-07-05 DIAGNOSIS — E29.1 HYPOGONADISM IN MALE: Primary | ICD-10-CM

## 2023-07-05 PROCEDURE — 96372 THER/PROPH/DIAG INJ SC/IM: CPT | Performed by: FAMILY MEDICINE

## 2023-07-05 RX ORDER — TESTOSTERONE CYPIONATE 200 MG/ML
120 INJECTION, SOLUTION INTRAMUSCULAR ONCE
Status: COMPLETED | OUTPATIENT
Start: 2023-07-05 | End: 2023-07-05

## 2023-07-05 RX ADMIN — TESTOSTERONE CYPIONATE 120 MG: 200 INJECTION, SOLUTION INTRAMUSCULAR at 11:23

## 2023-07-05 NOTE — PROGRESS NOTES
Patient given Testosterone 130mg IM left gluteal..  Will return in 2 weeks for next injection    Patient tolerated injection well.     Administrations This Visit       testosterone cypionate (DEPOTESTOTERONE CYPIONATE) injection 120 mg       Admin Date  07/05/2023 Action  Given Dose  120 mg Route  IntraMUSCular Administered By  Miriam Alba LPN

## 2023-07-06 ENCOUNTER — HOSPITAL ENCOUNTER (OUTPATIENT)
Dept: NON INVASIVE DIAGNOSTICS | Age: 78
Discharge: HOME | End: 2023-07-06
Attending: INTERNAL MEDICINE
Payer: MEDICARE

## 2023-07-06 ENCOUNTER — HOSPITAL ENCOUNTER (OUTPATIENT)
Dept: NUCLEAR MEDICINE | Age: 78
Discharge: HOME | End: 2023-07-08
Attending: INTERNAL MEDICINE
Payer: MEDICARE

## 2023-07-06 DIAGNOSIS — R06.09 DOE (DYSPNEA ON EXERTION): ICD-10-CM

## 2023-07-06 DIAGNOSIS — R94.31 ABNORMAL EKG: ICD-10-CM

## 2023-07-06 DIAGNOSIS — R07.2 PRECORDIAL PAIN: ICD-10-CM

## 2023-07-06 PROCEDURE — A9502 TC99M TETROFOSMIN: HCPCS | Performed by: INTERNAL MEDICINE

## 2023-07-06 PROCEDURE — 93017 CV STRESS TEST TRACING ONLY: CPT

## 2023-07-06 PROCEDURE — 3430000000 HC RX DIAGNOSTIC RADIOPHARMACEUTICAL: Performed by: INTERNAL MEDICINE

## 2023-07-06 PROCEDURE — 6360000002 HC RX W HCPCS: Performed by: INTERNAL MEDICINE

## 2023-07-06 PROCEDURE — 78452 HT MUSCLE IMAGE SPECT MULT: CPT

## 2023-07-06 PROCEDURE — 2580000003 HC RX 258: Performed by: INTERNAL MEDICINE

## 2023-07-06 RX ORDER — REGADENOSON 0.08 MG/ML
0.4 INJECTION, SOLUTION INTRAVENOUS
Status: COMPLETED | OUTPATIENT
Start: 2023-07-06 | End: 2023-07-06

## 2023-07-06 RX ORDER — SODIUM CHLORIDE 0.9 % (FLUSH) 0.9 %
10 SYRINGE (ML) INJECTION PRN
Status: COMPLETED | OUTPATIENT
Start: 2023-07-06 | End: 2023-07-06

## 2023-07-06 RX ADMIN — Medication 10 ML: at 09:15

## 2023-07-06 RX ADMIN — REGADENOSON 0.4 MG: 0.08 INJECTION, SOLUTION INTRAVENOUS at 10:51

## 2023-07-06 RX ADMIN — TETROFOSMIN 32.4 MILLICURIE: 1.38 INJECTION, POWDER, LYOPHILIZED, FOR SOLUTION INTRAVENOUS at 10:51

## 2023-07-06 RX ADMIN — TETROFOSMIN 11.4 MILLICURIE: 1.38 INJECTION, POWDER, LYOPHILIZED, FOR SOLUTION INTRAVENOUS at 09:15

## 2023-07-06 RX ADMIN — Medication 10 ML: at 10:51

## 2023-07-06 RX ADMIN — Medication 10 ML: at 10:52

## 2023-07-06 NOTE — PROGRESS NOTES
Reviewed history, allergies, and medications. Consent confirmed. Lexiscan exam explained. Placed patient on monitor. @ [address] here to inject [address] SOB noted during recovery phase. Denied chest pain. No ectopy noted. Doctor to further review and interpret results. Patient off monitor and instructed to eat, will have last part of exam in 1 hour.

## 2023-07-06 NOTE — PROCEDURES
[facility] [place], [address] [facility]                              CARDIAC STRESS TEST    PATIENT NAME: Kaycee Monson                    :        1945  MED REC NO:   [id number]                            ROOM:  ACCOUNT NO:   [de-identified]                           ADMIT DATE: 2023  PROVIDER:     Rajendra Horner MD    CARDIOVASCULAR DIAGNOSTIC DEPARTMENT    DATE OF STUDY:  2023    PHARMACOLOGICAL SPECT IMAGING    INDICATION OF THE PROCEDURE:  Dyspnea on exertion and chest pain. DESCRIPTION OF PROCEDURE:  The patient was injected with 11.4 mCi of  Myoview and resting images were obtained. The patient was then stressed  according to standard Lexiscan protocol. Additional 32.4 mCi Myoview  were injected. Post-stress imagings were obtained. FINDINGS:  Underlying electrocardiogram is sinus rhythm with PVCs. Heart rate is 86, blood pressure 129/66. Peak heart rate is 95, peak  blood pressure 137/74. The patient remained in sinus rhythm throughout the study. The patient  continued to have isolated PVCs. No ST-segment changes. No significant  additional arrhythmias were noted. Tomographic images reveal transient ischemic dilatation score of 1.11. Left ventricle ejection fraction of 66% by gating. Perfusion scan demonstrates diaphragmatic attenuation artifact affecting  the base of the inferior wall, otherwise normal tracer uptake throughout  the left ventricle segments. IMPRESSION:  1. Normal myocardial perfusion imaging with no evidence of  stress-induced ischemia nor prior myocardial infarction. 2.  Normal left ventricle ejection fraction.         Rajendra Horner MD    D: 2023 #17:15:17       T: 2023 17:17:48     [email or web address]  Job#: [id number]     Doc#: [id number]    CC:

## 2023-07-07 PROBLEM — H02.403 PTOSIS OF BOTH EYELIDS: Status: ACTIVE | Noted: 2023-07-07

## 2023-07-07 PROBLEM — R13.13 PHARYNGEAL DYSPHAGIA: Status: ACTIVE | Noted: 2023-07-07

## 2023-07-07 PROBLEM — R47.1 DYSARTHRIA: Status: ACTIVE | Noted: 2023-07-07

## 2023-07-10 ENCOUNTER — HOSPITAL ENCOUNTER (OUTPATIENT)
Dept: LAB | Age: 78
Discharge: HOME | End: 2023-07-10
Payer: MEDICARE

## 2023-07-10 ENCOUNTER — HOSPITAL ENCOUNTER (OUTPATIENT)
Dept: CT IMAGING | Age: 78
Discharge: HOME | End: 2023-07-12
Payer: MEDICARE

## 2023-07-10 DIAGNOSIS — N18.32 STAGE 3B CHRONIC KIDNEY DISEASE (CKD) (HCC): ICD-10-CM

## 2023-07-10 DIAGNOSIS — C34.11 MALIGNANT NEOPLASM OF UPPER LOBE OF RIGHT LUNG (HCC): ICD-10-CM

## 2023-07-10 LAB
ANION GAP SERPL CALCULATED.3IONS-SCNC: 12 MEQ/L (ref 9–15)
BUN SERPL-MCNC: 22 MG/DL (ref 8–23)
CALCIUM SERPL-MCNC: 9.7 MG/DL (ref 8.5–9.9)
CHLORIDE SERPL-SCNC: 96 MEQ/L (ref 95–107)
CO2 SERPL-SCNC: 27 MEQ/L (ref 20–31)
CREAT SERPL-MCNC: 1.4 MG/DL (ref 0.7–1.2)
GLUCOSE SERPL-MCNC: 265 MG/DL (ref 70–99)
POTASSIUM SERPL-SCNC: 4.3 MEQ/L (ref 3.4–4.9)
SODIUM SERPL-SCNC: 135 MEQ/L (ref 135–144)

## 2023-07-10 PROCEDURE — 80048 BASIC METABOLIC PNL TOTAL CA: CPT

## 2023-07-10 PROCEDURE — 71260 CT THORAX DX C+: CPT

## 2023-07-10 PROCEDURE — 6360000004 HC RX CONTRAST MEDICATION: Performed by: INTERNAL MEDICINE

## 2023-07-10 PROCEDURE — 36415 COLL VENOUS BLD VENIPUNCTURE: CPT

## 2023-07-10 RX ADMIN — IOPAMIDOL 75 ML: 612 INJECTION, SOLUTION INTRAVENOUS at 11:05

## 2023-07-11 ENCOUNTER — OFFICE VISIT (OUTPATIENT)
Dept: NEUROLOGY | Age: 78
End: 2023-07-11
Payer: MEDICARE

## 2023-07-11 VITALS
DIASTOLIC BLOOD PRESSURE: 62 MMHG | HEART RATE: 91 BPM | WEIGHT: 185 LBS | SYSTOLIC BLOOD PRESSURE: 120 MMHG | BODY MASS INDEX: 25.8 KG/M2

## 2023-07-11 DIAGNOSIS — H02.403 PTOSIS OF BOTH EYELIDS: ICD-10-CM

## 2023-07-11 DIAGNOSIS — G70.00 MYASTHENIA GRAVIS (HCC): Primary | ICD-10-CM

## 2023-07-11 DIAGNOSIS — G70.00 MYASTHENIA GRAVIS, ACHR ANTIBODY POSITIVE (HCC): ICD-10-CM

## 2023-07-11 DIAGNOSIS — H49.43 PROGRESSIVE EXTERNAL OPHTHALMOPLEGIA OF BOTH EYES: ICD-10-CM

## 2023-07-11 DIAGNOSIS — R26.81 UNSTEADY GAIT: ICD-10-CM

## 2023-07-11 PROCEDURE — G8417 CALC BMI ABV UP PARAM F/U: HCPCS | Performed by: PSYCHIATRY & NEUROLOGY

## 2023-07-11 PROCEDURE — G8427 DOCREV CUR MEDS BY ELIG CLIN: HCPCS | Performed by: PSYCHIATRY & NEUROLOGY

## 2023-07-11 PROCEDURE — 1036F TOBACCO NON-USER: CPT | Performed by: PSYCHIATRY & NEUROLOGY

## 2023-07-11 PROCEDURE — 3078F DIAST BP <80 MM HG: CPT | Performed by: PSYCHIATRY & NEUROLOGY

## 2023-07-11 PROCEDURE — 1123F ACP DISCUSS/DSCN MKR DOCD: CPT | Performed by: PSYCHIATRY & NEUROLOGY

## 2023-07-11 PROCEDURE — 3074F SYST BP LT 130 MM HG: CPT | Performed by: PSYCHIATRY & NEUROLOGY

## 2023-07-11 PROCEDURE — 99214 OFFICE O/P EST MOD 30 MIN: CPT | Performed by: PSYCHIATRY & NEUROLOGY

## 2023-07-11 ASSESSMENT — ENCOUNTER SYMPTOMS
VOMITING: 0
COLOR CHANGE: 0
TROUBLE SWALLOWING: 0
SHORTNESS OF BREATH: 1
BACK PAIN: 0
NAUSEA: 0
CHOKING: 0
PHOTOPHOBIA: 0

## 2023-07-11 NOTE — PROGRESS NOTES
Subjective:      Patient ID: Carman Merlin is a 66 y.o. male who presents today for:  Chief Complaint   Patient presents with    Follow-up     Pt states that he is doing pretty good. He states that he is weak right now. Pt is wondering if he should take the next round of vyvgart. The wife states that since his last treatment he has had more difficulty breathing and a little weaker. HPI 68-year right-handed gentleman with a history of myasthenia gravis appears his last inpatient was short of breath and was in the emergency room and we had admitted him to consider plasmapheresis  . 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 He still short of breath. Added prednisone up to 30 mg and his sugars are off the wall. Patient is here for continued increasing shortness of breath  Patient still has considerable ptosis and has noted he did not tolerate Mestinon in the past.  Patient was treated with a cycle of Vyvgart though he had not seen much improvement. His other issues appears to be lung cancer which is also contributing to some of his shortness of breath.   He has not developed any other bulbar features    Past Medical History:   Diagnosis Date    Atypical chest pain 8/30/2012    BPH (benign prostatic hypertrophy)     CAD (coronary artery disease)     Cataracts, bilateral     Chronic low back pain     COPD (chronic obstructive pulmonary disease) ([address]) 6/1/2006    DM2 (diabetes mellitus, type 2) ([address])     Duodenitis     Erectile dysfunction 3/6/2017    Fatty infiltration of liver 11/19/2014    GERD (gastroesophageal reflux disease)     History of melanoma excision 12/11/2017    Left ear 09/2017    History of tobacco use 6/1/2006    HTN (hypertension)     Hyperlipidemia     Malignant melanoma of face ([address])     Malignant melanoma of skin of face ([address])     RT ear 2000, LT ear 2017    MG (myasthenia gravis) ([address])     Nephrolithiasis     Ocular

## 2023-07-12 ENCOUNTER — HOSPITAL ENCOUNTER (OUTPATIENT)
Dept: PHYSICAL THERAPY | Age: 78
Setting detail: THERAPIES SERIES
Discharge: HOME | End: 2023-07-12
Payer: MEDICARE

## 2023-07-12 PROCEDURE — 97530 THERAPEUTIC ACTIVITIES: CPT

## 2023-07-12 PROCEDURE — 97112 NEUROMUSCULAR REEDUCATION: CPT

## 2023-07-12 ASSESSMENT — PAIN DESCRIPTION - ORIENTATION: ORIENTATION: RIGHT;LEFT

## 2023-07-12 ASSESSMENT — PAIN DESCRIPTION - PAIN TYPE: TYPE: CHRONIC PAIN

## 2023-07-12 ASSESSMENT — PAIN SCALES - GENERAL: PAINLEVEL_OUTOF10: 5

## 2023-07-12 ASSESSMENT — PAIN DESCRIPTION - LOCATION: LOCATION: BACK

## 2023-07-12 NOTE — PROGRESS NOTES
Physical Therapy: Recheck Note   Patient: Cristiano Azul (23 y.o. male)   Examination Date: 1475  Plan of Care/Certification Expiration Date: 23    Progress Note Counter: Recheck completed with plans to continue 1-2/ week for 4-6 weeks to progress with POC (Pt to continue 1x per week)     :  1945 # of Visits since [facility]:      MRN: [id number]  CSN: [id number] Start of Care Date:   2023   Insurance: Payor: MEDICARE / Plan: MEDICARE PART A AND B / Product Type: *No Product type* /   Insurance ID: [id number] - (Medicare) Secondary Insurance (if applicable): [facility]   Referring Physician: Neymar Sanon MD [facility]   PCP: Neymar Sanon MD Visits to Date/Visits Approved:     No Show/Cancelled Appts:      Medical Diagnosis: Unsteadiness on feet [R26.81]  Low back pain, unspecified [M54.50]  Other chronic pain [G89.29]    Treatment Diagnosis: Unsteady gait and chronic back pain        SUBJECTIVE EXAMINATION   Pain Level: Pain Screening  Patient Currently in Pain: Yes  Pain Assessment: 0-10  Pain Level: 5  Pain Type: Chronic pain  Pain Location: Back  Pain Orientation: Right, Left    Patient Comments: Subjective: Pt reports having 5/10 sharp right low back pain.     HEP Compliance: Good        OBJECTIVE EXAMINATION   Restrictions:  Restrictions/Precautions: Up as Tolerated   No data recorded No data recorded          Ambulation/Gait (if applicable): Pt ambulated  without AD with an antalgic gait pattern with decreased step length B LE with aquick pace and 5/10 right low back pain for 45'x 2       Balance Screen:  SLR R= 2 sec   L= 3 seconds         Left Strength  Right Strength         Strength LLE  L Hip Flexion: 4/5, 4+/5  L Hip ABduction: 4+/5  L Hip ADduction: 4+/5  L Hip Internal Rotation: 4/5  L Hip External Rotation: 4/5  L Knee Flexion: 4+/5  L Knee Extension: 4+/5  L Ankle Dorsiflexion: 4/5  L Ankle Plantar Flexion: 4/5    Strength RLE  R Hip Flexion: 4/5, 4+/5  R Hip

## 2023-07-13 ENCOUNTER — OFFICE VISIT (OUTPATIENT)
Dept: PULMONOLOGY | Age: 78
End: 2023-07-13
Payer: MEDICARE

## 2023-07-13 ENCOUNTER — PREP FOR PROCEDURE (OUTPATIENT)
Dept: PULMONOLOGY | Age: 78
End: 2023-07-13

## 2023-07-13 VITALS
WEIGHT: 187.8 LBS | TEMPERATURE: 97.5 F | OXYGEN SATURATION: 94 % | RESPIRATION RATE: 16 BRPM | HEIGHT: 71 IN | HEART RATE: 86 BPM | SYSTOLIC BLOOD PRESSURE: 132 MMHG | DIASTOLIC BLOOD PRESSURE: 80 MMHG | BODY MASS INDEX: 26.29 KG/M2

## 2023-07-13 DIAGNOSIS — R06.09 DOE (DYSPNEA ON EXERTION): Primary | ICD-10-CM

## 2023-07-13 DIAGNOSIS — I10 ESSENTIAL HYPERTENSION: Chronic | ICD-10-CM

## 2023-07-13 DIAGNOSIS — C34.91 SQUAMOUS CELL CARCINOMA LUNG, RIGHT (HCC): ICD-10-CM

## 2023-07-13 DIAGNOSIS — J44.9 CHRONIC OBSTRUCTIVE PULMONARY DISEASE, UNSPECIFIED COPD TYPE (HCC): ICD-10-CM

## 2023-07-13 DIAGNOSIS — I26.99 PULMONARY EMBOLISM AND INFARCTION (HCC): ICD-10-CM

## 2023-07-13 PROCEDURE — G8427 DOCREV CUR MEDS BY ELIG CLIN: HCPCS | Performed by: INTERNAL MEDICINE

## 2023-07-13 PROCEDURE — 1036F TOBACCO NON-USER: CPT | Performed by: INTERNAL MEDICINE

## 2023-07-13 PROCEDURE — 3023F SPIROM DOC REV: CPT | Performed by: INTERNAL MEDICINE

## 2023-07-13 PROCEDURE — G8417 CALC BMI ABV UP PARAM F/U: HCPCS | Performed by: INTERNAL MEDICINE

## 2023-07-13 PROCEDURE — 99215 OFFICE O/P EST HI 40 MIN: CPT | Performed by: INTERNAL MEDICINE

## 2023-07-13 PROCEDURE — 3075F SYST BP GE 130 - 139MM HG: CPT | Performed by: INTERNAL MEDICINE

## 2023-07-13 PROCEDURE — 1123F ACP DISCUSS/DSCN MKR DOCD: CPT | Performed by: INTERNAL MEDICINE

## 2023-07-13 PROCEDURE — 3079F DIAST BP 80-89 MM HG: CPT | Performed by: INTERNAL MEDICINE

## 2023-07-13 NOTE — PROGRESS NOTES
Subjective:     Enedina Lewis is a 66 y.o. male who complains today of:     Chief Complaint   Patient presents with    Follow-up     Increased shortness of breath       HPI  Patient presents for worsening shortness of breath    7/13/2023  Patient called yesterday because he is getting more short of breath, he cannot walk more than 10 feet without getting short of breath, he is tachypneic at rest, short of breath on minimal activity, no coughing, no chest pain, no worsening lower extremity edema, no fever or chills. 5/31/2023  Patient has generalized weakness, he has myasthenia gravis, has been acting up on him for the last few weeks, he is currently undergoing infusion treatment and on prednisone, he follows up with neurology, he has rehab scheduled to start next week. No chest pain, shortness of breath mainly exertional, no coughing, no fever no chills his weight is stable, no lower extremity edema. He is supposed to do follow-up CAT scan earlier this month, however he forgot about it. 3/9/2023  Patient presents for follow-up, he is status post right upper lobectomy for T2 N0 M0 squamous cell carcinoma. Doing good, no chest pain, no shortness of breath, no coughing, no fever no chills, no nasal congestion or postnasal drip and no heartburn.         Allergies:  Invokana [canagliflozin], Metformin and related, and Other  Past Medical History:   Diagnosis Date    Atypical chest pain 8/30/2012    BPH (benign prostatic hypertrophy)     CAD (coronary artery disease)     Cataracts, bilateral     Chronic low back pain     COPD (chronic obstructive pulmonary disease) ([address]) 6/1/2006    DM2 (diabetes mellitus, type 2) (HCC)     Duodenitis     Erectile dysfunction 3/6/2017    Fatty infiltration of liver 11/19/2014    GERD (gastroesophageal reflux disease)     History of melanoma excision 12/11/2017    Left ear 09/2017    History of tobacco use 6/1/2006    HTN (hypertension)     Hyperlipidemia     Malignant melanoma

## 2023-07-14 ENCOUNTER — HOSPITAL ENCOUNTER (OUTPATIENT)
Age: 78
Setting detail: OBSERVATION
LOS: 1 days | Discharge: HOME | End: 2023-07-15
Attending: FAMILY MEDICINE | Admitting: INTERNAL MEDICINE
Payer: MEDICARE

## 2023-07-14 PROBLEM — R06.02 SHORTNESS OF BREATH: Status: ACTIVE | Noted: 2023-07-14

## 2023-07-14 LAB
GLUCOSE BLD-MCNC: 270 MG/DL (ref 70–99)
PERFORMED ON: ABNORMAL

## 2023-07-14 PROCEDURE — 94664 DEMO&/EVAL PT USE INHALER: CPT

## 2023-07-14 PROCEDURE — 6370000000 HC RX 637 (ALT 250 FOR IP): Performed by: INTERNAL MEDICINE

## 2023-07-14 PROCEDURE — 94761 N-INVAS EAR/PLS OXIMETRY MLT: CPT

## 2023-07-14 PROCEDURE — 94799 UNLISTED PULMONARY SVC/PX: CPT

## 2023-07-14 PROCEDURE — G0379 DIRECT REFER HOSPITAL OBSERV: HCPCS

## 2023-07-14 PROCEDURE — 94640 AIRWAY INHALATION TREATMENT: CPT

## 2023-07-14 PROCEDURE — G0378 HOSPITAL OBSERVATION PER HR: HCPCS

## 2023-07-14 PROCEDURE — 99223 1ST HOSP IP/OBS HIGH 75: CPT | Performed by: INTERNAL MEDICINE

## 2023-07-14 RX ORDER — SODIUM CHLORIDE 0.9 % (FLUSH) 0.9 %
5-40 SYRINGE (ML) INJECTION PRN
Status: DISCONTINUED | OUTPATIENT
Start: 2023-07-14 | End: 2023-07-15 | Stop reason: HOSPADM

## 2023-07-14 RX ORDER — DEXTROSE MONOHYDRATE 100 MG/ML
INJECTION, SOLUTION INTRAVENOUS CONTINUOUS PRN
Status: DISCONTINUED | OUTPATIENT
Start: 2023-07-14 | End: 2023-07-15 | Stop reason: HOSPADM

## 2023-07-14 RX ORDER — SODIUM CHLORIDE 9 MG/ML
INJECTION, SOLUTION INTRAVENOUS PRN
Status: DISCONTINUED | OUTPATIENT
Start: 2023-07-14 | End: 2023-07-15 | Stop reason: HOSPADM

## 2023-07-14 RX ORDER — METOPROLOL SUCCINATE 50 MG/1
50 TABLET, EXTENDED RELEASE ORAL DAILY
Status: DISCONTINUED | OUTPATIENT
Start: 2023-07-15 | End: 2023-07-15 | Stop reason: HOSPADM

## 2023-07-14 RX ORDER — TRIAMTERENE AND HYDROCHLOROTHIAZIDE 37.5; 25 MG/1; MG/1
1 TABLET ORAL DAILY
Status: DISCONTINUED | OUTPATIENT
Start: 2023-07-15 | End: 2023-07-15 | Stop reason: HOSPADM

## 2023-07-14 RX ORDER — SODIUM CHLORIDE 0.9 % (FLUSH) 0.9 %
5-40 SYRINGE (ML) INJECTION EVERY 12 HOURS SCHEDULED
Status: DISCONTINUED | OUTPATIENT
Start: 2023-07-14 | End: 2023-07-15 | Stop reason: HOSPADM

## 2023-07-14 RX ORDER — INSULIN LISPRO 100 [IU]/ML
0-8 INJECTION, SOLUTION INTRAVENOUS; SUBCUTANEOUS
Status: DISCONTINUED | OUTPATIENT
Start: 2023-07-14 | End: 2023-07-15 | Stop reason: HOSPADM

## 2023-07-14 RX ORDER — FLUTICASONE PROPIONATE 50 MCG
1 SPRAY, SUSPENSION (ML) NASAL DAILY
Status: DISCONTINUED | OUTPATIENT
Start: 2023-07-15 | End: 2023-07-15 | Stop reason: HOSPADM

## 2023-07-14 RX ORDER — GLUCAGON 1 MG/ML
1 KIT INJECTION PRN
Status: DISCONTINUED | OUTPATIENT
Start: 2023-07-14 | End: 2023-07-15 | Stop reason: HOSPADM

## 2023-07-14 RX ORDER — AZATHIOPRINE 50 MG/1
50 TABLET ORAL DAILY
Status: DISCONTINUED | OUTPATIENT
Start: 2023-07-15 | End: 2023-07-15 | Stop reason: HOSPADM

## 2023-07-14 RX ORDER — TAMSULOSIN HYDROCHLORIDE 0.4 MG/1
0.8 CAPSULE ORAL DAILY
Status: DISCONTINUED | OUTPATIENT
Start: 2023-07-15 | End: 2023-07-15 | Stop reason: HOSPADM

## 2023-07-14 RX ORDER — FAMOTIDINE 20 MG/1
40 TABLET, FILM COATED ORAL EVERY EVENING
Status: DISCONTINUED | OUTPATIENT
Start: 2023-07-14 | End: 2023-07-15 | Stop reason: HOSPADM

## 2023-07-14 RX ORDER — ASPIRIN 81 MG/1
81 TABLET ORAL DAILY
Status: DISCONTINUED | OUTPATIENT
Start: 2023-07-15 | End: 2023-07-15 | Stop reason: HOSPADM

## 2023-07-14 RX ORDER — ALBUTEROL SULFATE 90 UG/1
2 AEROSOL, METERED RESPIRATORY (INHALATION) EVERY 6 HOURS PRN
Status: DISCONTINUED | OUTPATIENT
Start: 2023-07-14 | End: 2023-07-15 | Stop reason: HOSPADM

## 2023-07-14 RX ORDER — AMLODIPINE BESYLATE 10 MG/1
10 TABLET ORAL DAILY
Status: DISCONTINUED | OUTPATIENT
Start: 2023-07-15 | End: 2023-07-15 | Stop reason: HOSPADM

## 2023-07-14 RX ORDER — AMLODIPINE BESYLATE 10 MG/1
10 TABLET ORAL DAILY
Qty: 90 TABLET | Refills: 1 | Status: SHIPPED | OUTPATIENT
Start: 2023-07-14

## 2023-07-14 RX ORDER — ACETAMINOPHEN 650 MG/1
650 SUPPOSITORY RECTAL EVERY 6 HOURS PRN
Status: DISCONTINUED | OUTPATIENT
Start: 2023-07-14 | End: 2023-07-15 | Stop reason: HOSPADM

## 2023-07-14 RX ORDER — LOSARTAN POTASSIUM 25 MG/1
25 TABLET ORAL DAILY
Status: DISCONTINUED | OUTPATIENT
Start: 2023-07-15 | End: 2023-07-15 | Stop reason: HOSPADM

## 2023-07-14 RX ORDER — PREDNISONE 10 MG/1
20 TABLET ORAL DAILY
Status: DISCONTINUED | OUTPATIENT
Start: 2023-07-15 | End: 2023-07-15 | Stop reason: HOSPADM

## 2023-07-14 RX ORDER — ONDANSETRON 2 MG/ML
4 INJECTION INTRAMUSCULAR; INTRAVENOUS EVERY 6 HOURS PRN
Status: DISCONTINUED | OUTPATIENT
Start: 2023-07-14 | End: 2023-07-15 | Stop reason: HOSPADM

## 2023-07-14 RX ORDER — ONDANSETRON 4 MG/1
4 TABLET, ORALLY DISINTEGRATING ORAL EVERY 8 HOURS PRN
Status: DISCONTINUED | OUTPATIENT
Start: 2023-07-14 | End: 2023-07-15 | Stop reason: HOSPADM

## 2023-07-14 RX ORDER — INSULIN GLARGINE 100 [IU]/ML
10 INJECTION, SOLUTION SUBCUTANEOUS NIGHTLY
Status: DISCONTINUED | OUTPATIENT
Start: 2023-07-14 | End: 2023-07-15 | Stop reason: HOSPADM

## 2023-07-14 RX ORDER — INSULIN LISPRO 100 [IU]/ML
0-4 INJECTION, SOLUTION INTRAVENOUS; SUBCUTANEOUS NIGHTLY
Status: DISCONTINUED | OUTPATIENT
Start: 2023-07-14 | End: 2023-07-15 | Stop reason: HOSPADM

## 2023-07-14 RX ORDER — ACETAMINOPHEN 325 MG/1
650 TABLET ORAL EVERY 6 HOURS PRN
Status: DISCONTINUED | OUTPATIENT
Start: 2023-07-14 | End: 2023-07-15 | Stop reason: HOSPADM

## 2023-07-14 RX ORDER — POLYETHYLENE GLYCOL 3350 17 G/17G
17 POWDER, FOR SOLUTION ORAL DAILY PRN
Status: DISCONTINUED | OUTPATIENT
Start: 2023-07-14 | End: 2023-07-15 | Stop reason: HOSPADM

## 2023-07-14 RX ORDER — ROSUVASTATIN CALCIUM 20 MG/1
20 TABLET, COATED ORAL NIGHTLY
Status: DISCONTINUED | OUTPATIENT
Start: 2023-07-14 | End: 2023-07-15 | Stop reason: HOSPADM

## 2023-07-14 RX ADMIN — ALBUTEROL SULFATE 2 PUFF: 90 AEROSOL, METERED RESPIRATORY (INHALATION) at 20:32

## 2023-07-14 RX ADMIN — ROSUVASTATIN CALCIUM 20 MG: 20 TABLET, FILM COATED ORAL at 20:48

## 2023-07-14 RX ADMIN — FAMOTIDINE 40 MG: 20 TABLET ORAL at 20:49

## 2023-07-14 RX ADMIN — INSULIN GLARGINE 10 UNITS: 100 INJECTION, SOLUTION SUBCUTANEOUS at 20:47

## 2023-07-14 ASSESSMENT — LIFESTYLE VARIABLES: HOW OFTEN DO YOU HAVE A DRINK CONTAINING ALCOHOL: NEVER

## 2023-07-14 NOTE — H&P
Hospital Medicine  History and Physical    Patient:  Enedina Lewis  MRN: [id number]    CHIEF COMPLAINT:  No chief complaint on file. History Obtained From:  Patient, EMR  Primary Care Physician: Ingris Pratt MD    HISTORY OF PRESENT ILLNESS:   78-year-old male with history of myasthenia gravis, lung cancer s/p RUL resection, CAD with prior CABG, COPD, diabetes, prior PE on Eliquis presents from home per direction of his pulmonologist for worsening dyspnea. His dyspnea initially began 2 weeks after his lung surgery in [address] [place] [address] Road the last 2 weeks it has been worsening. He does also feel his arms and legs get worn out more easily than usual.  Due to concern for possible flare of myasthenia gravis, he was recommended to come to the hospital for neurology evaluation. He denies any recent illness, fever, chills. He does not have much cough. He also denies chest pain, abdominal pain, change in bowels or urination. He does not smoke, drink alcohol, or use illicit drugs.       Past Medical History:      Diagnosis Date    Atypical chest pain 8/30/2012    BPH (benign prostatic hypertrophy)     CAD (coronary artery disease)     Cataracts, bilateral     Chronic low back pain     COPD (chronic obstructive pulmonary disease) ([address]) 6/1/2006    DM2 (diabetes mellitus, type 2) (HCC)     Duodenitis     Erectile dysfunction 3/6/2017    Fatty infiltration of liver 11/19/2014    GERD (gastroesophageal reflux disease)     History of melanoma excision 12/11/2017    Left ear 09/2017    History of tobacco use 6/1/2006    HTN (hypertension)     Hyperlipidemia     Malignant melanoma of face ([address])     Malignant melanoma of skin of face ([address])     RT ear 2000, LT ear 2017    MG (myasthenia gravis) ([address])     Nephrolithiasis     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

## 2023-07-14 NOTE — TELEPHONE ENCOUNTER
Pharmacy is requesting medication refill.  Please approve or deny this request.    Rx requested:  Requested Prescriptions     Pending Prescriptions Disp Refills    amLODIPine (NORVASC) 10 MG tablet [Pharmacy Med Name: amLODIPine Besylate 10 MG Oral Tablet] 90 tablet 3     Sig: TAKE 1 TABLET BY MOUTH  DAILY         Last Office Visit:   6/30/2023      Next Visit Date:  Future Appointments   Date Time Provider [address]   7/18/2023  2:00 PM CHEMO Ruiz - [facility]   7/19/2023  9:30 AM SCHEDULE, MLOR TC AMHERST PCP TESTOSTERONE MLOX [facility]   7/26/2023 10:30 AM Gwyn Reagan, [address] [place] [address]   7/28/2023 11:30 AM MD Maximino Martinez [facility]   8/1/2023 10:20 [facility]   8/2/2023  9:30 AM SCHEDULE, MLOR TC AMHERST PCP TESTOSTERONE Deisy Core [facility]   8/2/2023 28:73 AM Doneduardo Casarez, PTA [address]   8/3/2023  2:30 PM Jose Pyle MD [facility]   8/16/2023  9:30 AM SCHEDULE, MLOR TC AMHERST PCP TESTOSTERONE MLOX [facility]   8/30/2023  9:30 AM SCHEDULE, MLOR TC AMHERST PCP TESTOSTERONE MLOX [facility]   9/21/2023  1:00 PM Michi Ward MD [address] [place] [address]   10/2/2023 10:00 AM MD Isamar Martinez  [place]   10/17/2023  3:00 PM Phil Melendez MD [address] Neurology -

## 2023-07-15 VITALS
HEIGHT: 71 IN | SYSTOLIC BLOOD PRESSURE: 112 MMHG | TEMPERATURE: 97.2 F | BODY MASS INDEX: 26.19 KG/M2 | HEART RATE: 98 BPM | OXYGEN SATURATION: 94 % | RESPIRATION RATE: 20 BRPM | DIASTOLIC BLOOD PRESSURE: 66 MMHG

## 2023-07-15 LAB
ANION GAP SERPL CALCULATED.3IONS-SCNC: 12 MEQ/L (ref 9–15)
ANISOCYTOSIS BLD QL SMEAR: ABNORMAL
BASOPHILS # BLD: 0 K/UL (ref 0–0.2)
BASOPHILS NFR BLD: 0 %
BUN SERPL-MCNC: 22 MG/DL (ref 8–23)
CALCIUM SERPL-MCNC: 9.9 MG/DL (ref 8.5–9.9)
CHLORIDE SERPL-SCNC: 95 MEQ/L (ref 95–107)
CO2 SERPL-SCNC: 29 MEQ/L (ref 20–31)
CREAT SERPL-MCNC: 1.27 MG/DL (ref 0.7–1.2)
EOSINOPHIL # BLD: 0.1 K/UL (ref 0–0.7)
EOSINOPHIL NFR BLD: 1 %
ERYTHROCYTE [DISTWIDTH] IN BLOOD BY AUTOMATED COUNT: 17.2 % (ref 11.5–14.5)
GLUCOSE BLD-MCNC: 116 MG/DL (ref 70–99)
GLUCOSE BLD-MCNC: 205 MG/DL (ref 70–99)
GLUCOSE SERPL-MCNC: 112 MG/DL (ref 70–99)
HCT VFR BLD AUTO: 39.3 % (ref 42–52)
HGB BLD-MCNC: 13.5 G/DL (ref 14–18)
LYMPHOCYTES # BLD: 1.3 K/UL (ref 1–4.8)
LYMPHOCYTES NFR BLD: 10 %
MCH RBC QN AUTO: 33.4 PG (ref 27–31.3)
MCHC RBC AUTO-ENTMCNC: 34.4 % (ref 33–37)
MCV RBC AUTO: 97.1 FL (ref 79–92.2)
METAMYELOCYTES NFR BLD MANUAL: 1 %
MONOCYTES # BLD: 0.7 K/UL (ref 0.2–0.8)
MONOCYTES NFR BLD: 7 %
NEUTROPHILS # BLD: 8.1 K/UL (ref 1.4–6.5)
NEUTS BAND NFR BLD MANUAL: 2 %
NEUTS SEG NFR BLD: 76 %
PERFORMED ON: ABNORMAL
PERFORMED ON: ABNORMAL
PLATELET # BLD AUTO: 266 K/UL (ref 130–400)
PLATELET BLD QL SMEAR: ADEQUATE
POTASSIUM SERPL-SCNC: 5.1 MEQ/L (ref 3.4–4.9)
RBC # BLD AUTO: 4.04 M/UL (ref 4.7–6.1)
SODIUM SERPL-SCNC: 136 MEQ/L (ref 135–144)
VARIANT LYMPHS NFR BLD: 3 %
WBC # BLD AUTO: 10.3 K/UL (ref 4.8–10.8)

## 2023-07-15 PROCEDURE — 94761 N-INVAS EAR/PLS OXIMETRY MLT: CPT

## 2023-07-15 PROCEDURE — 6370000000 HC RX 637 (ALT 250 FOR IP): Performed by: INTERNAL MEDICINE

## 2023-07-15 PROCEDURE — 80048 BASIC METABOLIC PNL TOTAL CA: CPT

## 2023-07-15 PROCEDURE — 6360000002 HC RX W HCPCS: Performed by: PSYCHIATRY & NEUROLOGY

## 2023-07-15 PROCEDURE — 94640 AIRWAY INHALATION TREATMENT: CPT

## 2023-07-15 PROCEDURE — G0378 HOSPITAL OBSERVATION PER HR: HCPCS

## 2023-07-15 PROCEDURE — 85025 COMPLETE CBC W/AUTO DIFF WBC: CPT

## 2023-07-15 PROCEDURE — 36415 COLL VENOUS BLD VENIPUNCTURE: CPT

## 2023-07-15 PROCEDURE — 96366 THER/PROPH/DIAG IV INF ADDON: CPT

## 2023-07-15 PROCEDURE — 96365 THER/PROPH/DIAG IV INF INIT: CPT

## 2023-07-15 PROCEDURE — 99232 SBSQ HOSP IP/OBS MODERATE 35: CPT | Performed by: INTERNAL MEDICINE

## 2023-07-15 PROCEDURE — 99222 1ST HOSP IP/OBS MODERATE 55: CPT | Performed by: PSYCHIATRY & NEUROLOGY

## 2023-07-15 PROCEDURE — 6360000002 HC RX W HCPCS: Performed by: INTERNAL MEDICINE

## 2023-07-15 PROCEDURE — 94799 UNLISTED PULMONARY SVC/PX: CPT

## 2023-07-15 RX ADMIN — TRIAMTERENE AND HYDROCHLOROTHIAZIDE 1 TABLET: 37.5; 25 TABLET ORAL at 09:02

## 2023-07-15 RX ADMIN — ASPIRIN 81 MG: 81 TABLET, COATED ORAL at 09:02

## 2023-07-15 RX ADMIN — METOPROLOL SUCCINATE 50 MG: 50 TABLET, EXTENDED RELEASE ORAL at 09:02

## 2023-07-15 RX ADMIN — AMLODIPINE BESYLATE 10 MG: 10 TABLET ORAL at 09:02

## 2023-07-15 RX ADMIN — TIOTROPIUM BROMIDE AND OLODATEROL 2 PUFF: 3.124; 2.736 SPRAY, METERED RESPIRATORY (INHALATION) at 08:11

## 2023-07-15 RX ADMIN — AZATHIOPRINE 50 MG: 50 TABLET ORAL at 09:02

## 2023-07-15 RX ADMIN — PREDNISONE 20 MG: 10 TABLET ORAL at 09:02

## 2023-07-15 RX ADMIN — LOSARTAN POTASSIUM 25 MG: 25 TABLET, FILM COATED ORAL at 09:02

## 2023-07-15 RX ADMIN — TAMSULOSIN HYDROCHLORIDE 0.8 MG: 0.4 CAPSULE ORAL at 09:02

## 2023-07-15 RX ADMIN — IMMUNE GLOBULIN (HUMAN) 40 G: 10 INJECTION INTRAVENOUS; SUBCUTANEOUS at 12:31

## 2023-07-15 NOTE — PLAN OF CARE
Problem: Discharge Planning  Goal: Discharge to home or other facility with appropriate resources  Outcome: Adequate for Discharge     Problem: Chronic Conditions and Co-morbidities  Goal: Patient's chronic conditions and co-morbidity symptoms are monitored and maintained or improved  Outcome: Adequate for Discharge     Problem: ABCDS Injury Assessment  Goal: Absence of physical injury  Outcome: Adequate for Discharge     Problem: Safety - Adult  Goal: Free from fall injury  Outcome: Adequate for Discharge

## 2023-07-15 NOTE — PLAN OF CARE
Problem: Discharge Planning  Goal: Discharge to home or other facility with appropriate resources  Outcome: Progressing  Flowsheets (Taken 7/14/2023 2040)  Discharge to home or other facility with appropriate resources:   Identify barriers to discharge with patient and caregiver   Arrange for needed discharge resources and transportation as appropriate   Identify discharge learning needs (meds, wound care, etc)   Refer to discharge planning if patient needs post-hospital services based on physician order or complex needs related to functional status, cognitive ability or social support system     Problem: Chronic Conditions and Co-morbidities  Goal: Patient's chronic conditions and co-morbidity symptoms are monitored and maintained or improved  Outcome: Progressing  Flowsheets (Taken 7/14/2023 2040)  Care Plan - Patient's Chronic Conditions and Co-Morbidity Symptoms are Monitored and Maintained or Improved:   Monitor and assess patient's chronic conditions and comorbid symptoms for stability, deterioration, or improvement   Collaborate with multidisciplinary team to address chronic and comorbid conditions and prevent exacerbation or deterioration   Update acute care plan with appropriate goals if chronic or comorbid symptoms are exacerbated and prevent overall improvement and discharge     Problem: ABCDS Injury Assessment  Goal: Absence of physical injury  Outcome: Progressing  Flowsheets (Taken 7/15/2023 0023)  Absence of Physical Injury: Implement safety measures based on patient assessment     Problem: Safety - Adult  Goal: Free from fall injury  Outcome: Progressing

## 2023-07-15 NOTE — H&P (VIEW-ONLY)
HGB 13.5*   HCT 39.3*   MCV 97.1*        Recent Labs     07/15/23  0609      K 5.1*   CL 95   CO2 29   BUN 22   CREATININE 1.27*   GLUCOSE 112*   CALCIUM 9.9   LABGLOM 57.7*       MV Settings:          No results for input(s): PHART, AUU4DPZ, PO2ART, YUV3UUT, BEART, X3UCTMIB in the last 72 hours. O2 Device: None (Room air)    ADULT DIET;  Regular; 4 carb choices (60 gm/meal)     MEDICATIONS during current hospitalization:    Continuous Infusions:   sodium chloride      dextrose         Scheduled Meds:   immune globulin  40 g IntraVENous Once    amLODIPine  10 mg Oral Daily    tiotropium-olodaterol  2 puff Inhalation Daily    [Held by provider] apixaban  5 mg Oral BID    aspirin  81 mg Oral Daily    azaTHIOprine  50 mg Oral Daily    famotidine  40 mg Oral QPM    fluticasone  1 spray Nasal Daily    insulin glargine  10 Units SubCUTAneous Nightly    losartan  25 mg Oral Daily    metoprolol succinate  50 mg Oral Daily    predniSONE  20 mg Oral Daily    rosuvastatin  20 mg Oral Nightly    tamsulosin  0.8 mg Oral Daily    triamterene-hydroCHLOROthiazide  1 tablet Oral Daily    sodium chloride flush  5-40 mL IntraVENous 2 times per day    insulin lispro  0-8 Units SubCUTAneous TID WC    insulin lispro  0-4 Units SubCUTAneous Nightly       PRN Meds:albuterol sulfate HFA, sodium chloride flush, sodium chloride, ondansetron **OR** ondansetron, polyethylene glycol, acetaminophen **OR** acetaminophen, glucose, dextrose bolus **OR** dextrose bolus, glucagon (rDNA), dextrose    Radiology  CT chest reviewed with the patient, right hilar mass, concerning for malignancy recurrence      IMPRESSION AND SUGGESTION:  Patient is at risk due to   Myasthenia gravis with acute exacerbation, bedside spirometry results are low to borderline  COPD, no signs of exacerbation  Squamous cell lung cancer, T2 N0 M0, status post right upper lobectomy, concern for recurrence in the right hilum  History of PE, on

## 2023-07-15 NOTE — CONSULTS
Pulmonary Medicine  Consult Note      Reason for consultation: Worsening shortness of breath    Consulting physician: Dr. Lance Billy:      This is 66years old male with past medical history significant for lung cancer status post right upper lobectomy, myasthenia gravis, coronary artery disease with prior CABG, COPD, diabetes mellitus, prior pulmonary embolism on Eliquis was presented from home for worsening shortness of breath. He feels his arms and legs get worn out more easily than usual and it was worried about flareup on his myasthenia. He has been following with Dr. Sarah Saenz neurologist and came to hospital for evaluation. Patient was also seen by Dr. Liz Albert and he was advised to get bronchoscopy with EBUS on Friday but he decided to have procedure done 2 weeks after. He felt dyspnea has been worsening over last 2 weeks. He denies any fever or chills. No chest pain. No cough or sputum . No nausea vomiting diarrhea or abdominal pain.     Past Medical History:        Diagnosis Date    Atypical chest pain 8/30/2012    BPH (benign prostatic hypertrophy)     CAD (coronary artery disease)     Cataracts, bilateral     Chronic low back pain     COPD (chronic obstructive pulmonary disease) ([address]) 6/1/2006    DM2 (diabetes mellitus, type 2) (HCC)     Duodenitis     Erectile dysfunction 3/6/2017    Fatty infiltration of liver 11/19/2014    GERD (gastroesophageal reflux disease)     History of melanoma excision 12/11/2017    Left ear 09/2017    History of tobacco use 6/1/2006    HTN (hypertension)     Hyperlipidemia     Malignant melanoma of face ([address])     Malignant melanoma of skin of face ([address])     RT ear 2000, LT ear 2017    MG (myasthenia gravis) ([address])     Nephrolithiasis     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
is symmetric, tongue is midline, good shoulder shrug present bilaterally, hearing intact bilaterally. Motor examination shows 5/5 MRC grade strength in uppers and lowers proximally as well as distally. Sensory examination is intact in all 4 extremities equally. Reflexes 1 at biceps biceps triceps brachialis 1 - patellar is absent at Achilles. Finger-nose as well as rapid altering movements well. Gait and station not tested. Medications:  Reviewed    Infusion Medications:    sodium chloride      dextrose       Scheduled Medications:    amLODIPine  10 mg Oral Daily    tiotropium-olodaterol  2 puff Inhalation Daily    [Held by provider] apixaban  5 mg Oral BID    aspirin  81 mg Oral Daily    azaTHIOprine  50 mg Oral Daily    famotidine  40 mg Oral QPM    fluticasone  1 spray Nasal Daily    insulin glargine  10 Units SubCUTAneous Nightly    losartan  25 mg Oral Daily    metoprolol succinate  50 mg Oral Daily    predniSONE  20 mg Oral Daily    rosuvastatin  20 mg Oral Nightly    tamsulosin  0.8 mg Oral Daily    triamterene-hydroCHLOROthiazide  1 tablet Oral Daily    sodium chloride flush  5-40 mL IntraVENous 2 times per day    insulin lispro  0-8 Units SubCUTAneous TID WC    insulin lispro  0-4 Units SubCUTAneous Nightly     PRN Meds: albuterol sulfate HFA, sodium chloride flush, sodium chloride, ondansetron **OR** ondansetron, polyethylene glycol, acetaminophen **OR** acetaminophen, glucose, dextrose bolus **OR** dextrose bolus, glucagon (rDNA), dextrose    Labs:   Recent Labs     07/15/23  0609   WBC 10.3   HGB 13.5*   HCT 39.3*        Recent Labs     07/15/23  0609      K 5.1*   CL 95   CO2 29   BUN 22   CREATININE 1.27*   CALCIUM 9.9     No results for input(s): AST, ALT, BILIDIR, BILITOT, ALKPHOS in the last 72 hours. No results for input(s): INR in the last 72 hours. No results for input(s): Rafael Lash in the last 72 hours.     Urinalysis:   Lab Results   Component Value

## 2023-07-17 ENCOUNTER — ANESTHESIA EVENT (OUTPATIENT)
Dept: OPERATING ROOM | Age: 78
End: 2023-07-17
Payer: MEDICARE

## 2023-07-18 ENCOUNTER — PREP FOR PROCEDURE (OUTPATIENT)
Dept: PULMONOLOGY | Age: 78
End: 2023-07-18

## 2023-07-18 ENCOUNTER — CLINICAL DOCUMENTATION ONLY (OUTPATIENT)
Facility: CLINIC | Age: 78
End: 2023-07-18

## 2023-07-18 ENCOUNTER — OFFICE VISIT (OUTPATIENT)
Dept: PALLATIVE CARE | Age: 78
End: 2023-07-18
Payer: MEDICARE

## 2023-07-18 VITALS
HEART RATE: 93 BPM | WEIGHT: 184 LBS | BODY MASS INDEX: 25.66 KG/M2 | DIASTOLIC BLOOD PRESSURE: 60 MMHG | TEMPERATURE: 98.6 F | OXYGEN SATURATION: 98 % | RESPIRATION RATE: 20 BRPM | SYSTOLIC BLOOD PRESSURE: 132 MMHG

## 2023-07-18 DIAGNOSIS — R63.4 WEIGHT LOSS, UNINTENTIONAL: ICD-10-CM

## 2023-07-18 DIAGNOSIS — G70.00 MYASTHENIA GRAVIS (HCC): ICD-10-CM

## 2023-07-18 DIAGNOSIS — R06.02 SOB (SHORTNESS OF BREATH): ICD-10-CM

## 2023-07-18 DIAGNOSIS — Z51.5 PALLIATIVE CARE ENCOUNTER: ICD-10-CM

## 2023-07-18 DIAGNOSIS — R60.0 BILATERAL LOWER EXTREMITY EDEMA: Primary | ICD-10-CM

## 2023-07-18 DIAGNOSIS — R63.0 POOR APPETITE: ICD-10-CM

## 2023-07-18 DIAGNOSIS — G89.29 CHRONIC RIGHT-SIDED LOW BACK PAIN WITH RIGHT-SIDED SCIATICA: ICD-10-CM

## 2023-07-18 DIAGNOSIS — R52 INCREASED PAIN: ICD-10-CM

## 2023-07-18 DIAGNOSIS — F41.8 ANXIETY ABOUT HEALTH: ICD-10-CM

## 2023-07-18 DIAGNOSIS — R53.81 PHYSICAL DEBILITY: ICD-10-CM

## 2023-07-18 DIAGNOSIS — M54.41 CHRONIC RIGHT-SIDED LOW BACK PAIN WITH RIGHT-SIDED SCIATICA: ICD-10-CM

## 2023-07-18 PROCEDURE — 3075F SYST BP GE 130 - 139MM HG: CPT

## 2023-07-18 PROCEDURE — 1036F TOBACCO NON-USER: CPT

## 2023-07-18 PROCEDURE — 99350 HOME/RES VST EST HIGH MDM 60: CPT

## 2023-07-18 PROCEDURE — 1123F ACP DISCUSS/DSCN MKR DOCD: CPT

## 2023-07-18 PROCEDURE — G8417 CALC BMI ABV UP PARAM F/U: HCPCS

## 2023-07-18 PROCEDURE — 3078F DIAST BP <80 MM HG: CPT

## 2023-07-18 RX ORDER — HYDROCODONE BITARTRATE AND ACETAMINOPHEN 5; 325 MG/1; MG/1
1 TABLET ORAL 2 TIMES DAILY PRN
Qty: 28 TABLET | Refills: 0 | Status: SHIPPED | OUTPATIENT
Start: 2023-07-18 | End: 2023-08-01

## 2023-07-18 RX ORDER — MIRTAZAPINE 7.5 MG/1
7.5 TABLET, FILM COATED ORAL NIGHTLY
Qty: 30 TABLET | Refills: 5 | Status: SHIPPED | OUTPATIENT
Start: 2023-07-18

## 2023-07-18 RX ORDER — BUSPIRONE HYDROCHLORIDE 10 MG/1
10 TABLET ORAL 2 TIMES DAILY
COMMUNITY
End: 2023-07-18 | Stop reason: SDUPTHER

## 2023-07-18 RX ORDER — BUSPIRONE HYDROCHLORIDE 10 MG/1
10 TABLET ORAL 2 TIMES DAILY
Qty: 60 TABLET | Refills: 0 | Status: SHIPPED | OUTPATIENT
Start: 2023-07-18 | End: 2023-08-17

## 2023-07-18 ASSESSMENT — PATIENT HEALTH QUESTIONNAIRE - PHQ9
4. FEELING TIRED OR HAVING LITTLE ENERGY: 3
7. TROUBLE CONCENTRATING ON THINGS, SUCH AS READING THE NEWSPAPER OR WATCHING TELEVISION: 0
10. IF YOU CHECKED OFF ANY PROBLEMS, HOW DIFFICULT HAVE THESE PROBLEMS MADE IT FOR YOU TO DO YOUR WORK, TAKE CARE OF THINGS AT HOME, OR GET ALONG WITH OTHER PEOPLE: 1
2. FEELING DOWN, DEPRESSED OR HOPELESS: 3
3. TROUBLE FALLING OR STAYING ASLEEP: 3
SUM OF ALL RESPONSES TO PHQ QUESTIONS 1-9: 18
8. MOVING OR SPEAKING SO SLOWLY THAT OTHER PEOPLE COULD HAVE NOTICED. OR THE OPPOSITE, BEING SO FIGETY OR RESTLESS THAT YOU HAVE BEEN MOVING AROUND A LOT MORE THAN USUAL: 0
SUM OF ALL RESPONSES TO PHQ QUESTIONS 1-9: 18
9. THOUGHTS THAT YOU WOULD BE BETTER OFF DEAD, OR OF HURTING YOURSELF: 0
SUM OF ALL RESPONSES TO PHQ QUESTIONS 1-9: 18
SUM OF ALL RESPONSES TO PHQ QUESTIONS 1-9: 18
1. LITTLE INTEREST OR PLEASURE IN DOING THINGS: 3
6. FEELING BAD ABOUT YOURSELF - OR THAT YOU ARE A FAILURE OR HAVE LET YOURSELF OR YOUR FAMILY DOWN: 3
SUM OF ALL RESPONSES TO PHQ9 QUESTIONS 1 & 2: 6
5. POOR APPETITE OR OVEREATING: 3

## 2023-07-18 ASSESSMENT — COPD QUESTIONNAIRES: COPD: 1

## 2023-07-18 ASSESSMENT — ENCOUNTER SYMPTOMS
TROUBLE SWALLOWING: 0
SHORTNESS OF BREATH: 1
BACK PAIN: 1
GASTROINTESTINAL NEGATIVE: 1
SPUTUM PRODUCTION: 1

## 2023-07-18 ASSESSMENT — VISUAL ACUITY: OU: 1

## 2023-07-18 NOTE — PROGRESS NOTES
and decreased mobility. Patient was previously very active and has some anxiety with this as well. No current S/S of SI, depression or HI. Sleep: Patient has been experiencing increased difficulty getting restful sleep related to pain and increased anxiety. Skin: Patient denies any current skin openings, wounds or delayed wound healing. GI/: Patient denies any blood in urine or stool. Patient denies any other GI or  issue/concern. I have personally reviewed the patient's most recent and available provider notes, lab work and imaging.       Past Medical History:   Diagnosis Date    Atypical chest pain 8/30/2012    BPH (benign prostatic hypertrophy)     CAD (coronary artery disease)     Cataracts, bilateral     Chronic low back pain     COPD (chronic obstructive pulmonary disease) ([address]) 6/1/2006    DM2 (diabetes mellitus, type 2) (HCC)     Duodenitis     Erectile dysfunction 3/6/2017    Fatty infiltration of liver 11/19/2014    GERD (gastroesophageal reflux disease)     History of melanoma excision 12/11/2017    Left ear 09/2017    History of tobacco use 6/1/2006    HTN (hypertension)     Hyperlipidemia     Malignant melanoma of face ([address])     Malignant melanoma of skin of face ([address])     RT ear 2000, LT ear 2017    MG (myasthenia gravis) ([address])     Nephrolithiasis     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 chronic kidney disease ([address]) 9/29/2022    Status post coronary artery bypass grafts x 5 7/30/2018 07/2018 @ [facility]    Tobacco abuse 12/8/2022    Type 2 diabetes mellitus with microalbuminuria, without long-term current use of insulin ([address]) 3/6/2017    Varicose veins of both lower extremities 9/11/2017     Past Surgical History:   Procedure Laterality Date    ARM SURGERY Right 10/25/2022    BRONCHOSCOPY N/A 01/06/2023    NAVIGATIONAL

## 2023-07-19 ENCOUNTER — ANESTHESIA (OUTPATIENT)
Dept: OPERATING ROOM | Age: 78
End: 2023-07-19
Payer: MEDICARE

## 2023-07-19 ENCOUNTER — HOSPITAL ENCOUNTER (OUTPATIENT)
Age: 78
Setting detail: OUTPATIENT SURGERY
Discharge: HOME | End: 2023-07-19
Attending: INTERNAL MEDICINE | Admitting: INTERNAL MEDICINE
Payer: MEDICARE

## 2023-07-19 ENCOUNTER — APPOINTMENT (OUTPATIENT)
Dept: PHYSICAL THERAPY | Age: 78
End: 2023-07-19
Payer: MEDICARE

## 2023-07-19 VITALS
DIASTOLIC BLOOD PRESSURE: 53 MMHG | OXYGEN SATURATION: 100 % | HEART RATE: 88 BPM | TEMPERATURE: 97.1 F | RESPIRATION RATE: 16 BRPM | SYSTOLIC BLOOD PRESSURE: 87 MMHG

## 2023-07-19 DIAGNOSIS — R91.8 LUNG MASS: ICD-10-CM

## 2023-07-19 DIAGNOSIS — C34.91 NON-SMALL CELL CANCER OF RIGHT LUNG (HCC): Primary | ICD-10-CM

## 2023-07-19 LAB
ANION GAP SERPL CALCULATED.3IONS-SCNC: 10 MEQ/L (ref 9–15)
BUN SERPL-MCNC: 28 MG/DL (ref 8–23)
CALCIUM SERPL-MCNC: 9.7 MG/DL (ref 8.5–9.9)
CHLORIDE SERPL-SCNC: 97 MEQ/L (ref 95–107)
CO2 SERPL-SCNC: 29 MEQ/L (ref 20–31)
CREAT SERPL-MCNC: 1.46 MG/DL (ref 0.7–1.2)
ERYTHROCYTE [DISTWIDTH] IN BLOOD BY AUTOMATED COUNT: 17.1 % (ref 11.5–14.5)
GLUCOSE BLD-MCNC: 105 MG/DL (ref 70–99)
GLUCOSE BLD-MCNC: 87 MG/DL (ref 70–99)
GLUCOSE SERPL-MCNC: 97 MG/DL (ref 70–99)
HCT VFR BLD AUTO: 38.3 % (ref 42–52)
HGB BLD-MCNC: 13 G/DL (ref 14–18)
INR PPP: 1.3
MCH RBC QN AUTO: 32.7 PG (ref 27–31.3)
MCHC RBC AUTO-ENTMCNC: 34.1 % (ref 33–37)
MCV RBC AUTO: 96 FL (ref 79–92.2)
PERFORMED ON: ABNORMAL
PERFORMED ON: NORMAL
PLATELET # BLD AUTO: 267 K/UL (ref 130–400)
POTASSIUM SERPL-SCNC: 4.1 MEQ/L (ref 3.4–4.9)
PROTHROMBIN TIME: 16 SEC (ref 12.3–14.9)
RBC # BLD AUTO: 3.99 M/UL (ref 4.7–6.1)
SODIUM SERPL-SCNC: 136 MEQ/L (ref 135–144)
WBC # BLD AUTO: 9.5 K/UL (ref 4.8–10.8)

## 2023-07-19 PROCEDURE — 88342 IMHCHEM/IMCYTCHM 1ST ANTB: CPT

## 2023-07-19 PROCEDURE — 6360000002 HC RX W HCPCS: Performed by: INTERNAL MEDICINE

## 2023-07-19 PROCEDURE — 2580000003 HC RX 258: Performed by: INTERNAL MEDICINE

## 2023-07-19 PROCEDURE — 2580000003 HC RX 258

## 2023-07-19 PROCEDURE — 7100000001 HC PACU RECOVERY - ADDTL 15 MIN: Performed by: INTERNAL MEDICINE

## 2023-07-19 PROCEDURE — 3603165200 HC BRNCHSC EBUS GUIDED SAMPL 1/2 NODE STATION/STRUX: Performed by: INTERNAL MEDICINE

## 2023-07-19 PROCEDURE — C9399 UNCLASSIFIED DRUGS OR BIOLOG: HCPCS | Performed by: ANESTHESIOLOGY

## 2023-07-19 PROCEDURE — 88305 TISSUE EXAM BY PATHOLOGIST: CPT

## 2023-07-19 PROCEDURE — 3700000000 HC ANESTHESIA ATTENDED CARE: Performed by: INTERNAL MEDICINE

## 2023-07-19 PROCEDURE — 88341 IMHCHEM/IMCYTCHM EA ADD ANTB: CPT

## 2023-07-19 PROCEDURE — 88173 CYTOPATH EVAL FNA REPORT: CPT

## 2023-07-19 PROCEDURE — 7100000000 HC PACU RECOVERY - FIRST 15 MIN: Performed by: INTERNAL MEDICINE

## 2023-07-19 PROCEDURE — A4217 STERILE WATER/SALINE, 500 ML: HCPCS | Performed by: INTERNAL MEDICINE

## 2023-07-19 PROCEDURE — 6370000000 HC RX 637 (ALT 250 FOR IP): Performed by: ANESTHESIOLOGY

## 2023-07-19 PROCEDURE — 80048 BASIC METABOLIC PNL TOTAL CA: CPT

## 2023-07-19 PROCEDURE — 31653 BRONCH EBUS SAMPLNG 3/> NODE: CPT | Performed by: INTERNAL MEDICINE

## 2023-07-19 PROCEDURE — 7100000011 HC PHASE II RECOVERY - ADDTL 15 MIN: Performed by: INTERNAL MEDICINE

## 2023-07-19 PROCEDURE — 2500000003 HC RX 250 WO HCPCS: Performed by: INTERNAL MEDICINE

## 2023-07-19 PROCEDURE — 85610 PROTHROMBIN TIME: CPT

## 2023-07-19 PROCEDURE — 7100000010 HC PHASE II RECOVERY - FIRST 15 MIN: Performed by: INTERNAL MEDICINE

## 2023-07-19 PROCEDURE — 85027 COMPLETE CBC AUTOMATED: CPT

## 2023-07-19 PROCEDURE — 6360000002 HC RX W HCPCS: Performed by: ANESTHESIOLOGY

## 2023-07-19 PROCEDURE — 2720000010 HC SURG SUPPLY STERILE: Performed by: INTERNAL MEDICINE

## 2023-07-19 PROCEDURE — 2709999900 HC NON-CHARGEABLE SUPPLY: Performed by: INTERNAL MEDICINE

## 2023-07-19 PROCEDURE — 3700000001 HC ADD 15 MINUTES (ANESTHESIA): Performed by: INTERNAL MEDICINE

## 2023-07-19 PROCEDURE — 2500000003 HC RX 250 WO HCPCS: Performed by: ANESTHESIOLOGY

## 2023-07-19 PROCEDURE — 2580000003 HC RX 258: Performed by: NURSE PRACTITIONER

## 2023-07-19 RX ORDER — METOCLOPRAMIDE HYDROCHLORIDE 5 MG/ML
10 INJECTION INTRAMUSCULAR; INTRAVENOUS
Status: DISCONTINUED | OUTPATIENT
Start: 2023-07-19 | End: 2023-07-19 | Stop reason: HOSPADM

## 2023-07-19 RX ORDER — SODIUM CHLORIDE, SODIUM LACTATE, POTASSIUM CHLORIDE, AND CALCIUM CHLORIDE .6; .31; .03; .02 G/100ML; G/100ML; G/100ML; G/100ML
500 INJECTION, SOLUTION INTRAVENOUS ONCE
Status: COMPLETED | OUTPATIENT
Start: 2023-07-19 | End: 2023-07-19

## 2023-07-19 RX ORDER — SODIUM CHLORIDE 0.9 % (FLUSH) 0.9 %
5-40 SYRINGE (ML) INJECTION PRN
Status: DISCONTINUED | OUTPATIENT
Start: 2023-07-19 | End: 2023-07-19 | Stop reason: HOSPADM

## 2023-07-19 RX ORDER — METOPROLOL SUCCINATE 25 MG/1
25 TABLET, EXTENDED RELEASE ORAL ONCE
Status: COMPLETED | OUTPATIENT
Start: 2023-07-19 | End: 2023-07-19

## 2023-07-19 RX ORDER — MEPERIDINE HYDROCHLORIDE 25 MG/ML
12.5 INJECTION INTRAMUSCULAR; INTRAVENOUS; SUBCUTANEOUS
Status: DISCONTINUED | OUTPATIENT
Start: 2023-07-19 | End: 2023-07-19 | Stop reason: HOSPADM

## 2023-07-19 RX ORDER — ONDANSETRON 2 MG/ML
4 INJECTION INTRAMUSCULAR; INTRAVENOUS
Status: DISCONTINUED | OUTPATIENT
Start: 2023-07-19 | End: 2023-07-19 | Stop reason: HOSPADM

## 2023-07-19 RX ORDER — SODIUM CHLORIDE 0.9 % (FLUSH) 0.9 %
5-40 SYRINGE (ML) INJECTION EVERY 12 HOURS SCHEDULED
Status: DISCONTINUED | OUTPATIENT
Start: 2023-07-19 | End: 2023-07-19 | Stop reason: HOSPADM

## 2023-07-19 RX ORDER — DIPHENHYDRAMINE HYDROCHLORIDE 50 MG/ML
12.5 INJECTION INTRAMUSCULAR; INTRAVENOUS
Status: DISCONTINUED | OUTPATIENT
Start: 2023-07-19 | End: 2023-07-19 | Stop reason: HOSPADM

## 2023-07-19 RX ORDER — LIDOCAINE HYDROCHLORIDE 20 MG/ML
INJECTION, SOLUTION EPIDURAL; INFILTRATION; INTRACAUDAL; PERINEURAL PRN
Status: DISCONTINUED | OUTPATIENT
Start: 2023-07-19 | End: 2023-07-19 | Stop reason: HOSPADM

## 2023-07-19 RX ORDER — FENTANYL CITRATE 50 UG/ML
INJECTION, SOLUTION INTRAMUSCULAR; INTRAVENOUS PRN
Status: DISCONTINUED | OUTPATIENT
Start: 2023-07-19 | End: 2023-07-19 | Stop reason: SDUPTHER

## 2023-07-19 RX ORDER — MAGNESIUM HYDROXIDE 1200 MG/15ML
LIQUID ORAL CONTINUOUS PRN
Status: DISCONTINUED | OUTPATIENT
Start: 2023-07-19 | End: 2023-07-19 | Stop reason: HOSPADM

## 2023-07-19 RX ORDER — ONDANSETRON 2 MG/ML
INJECTION INTRAMUSCULAR; INTRAVENOUS PRN
Status: DISCONTINUED | OUTPATIENT
Start: 2023-07-19 | End: 2023-07-19 | Stop reason: SDUPTHER

## 2023-07-19 RX ORDER — SODIUM CHLORIDE 9 MG/ML
INJECTION, SOLUTION INTRAVENOUS CONTINUOUS
Status: DISCONTINUED | OUTPATIENT
Start: 2023-07-19 | End: 2023-07-19 | Stop reason: HOSPADM

## 2023-07-19 RX ORDER — OXYCODONE HYDROCHLORIDE 5 MG/1
5 TABLET ORAL
Status: DISCONTINUED | OUTPATIENT
Start: 2023-07-19 | End: 2023-07-19 | Stop reason: HOSPADM

## 2023-07-19 RX ORDER — SODIUM CHLORIDE, SODIUM LACTATE, POTASSIUM CHLORIDE, CALCIUM CHLORIDE 600; 310; 30; 20 MG/100ML; MG/100ML; MG/100ML; MG/100ML
INJECTION, SOLUTION INTRAVENOUS
Status: COMPLETED
Start: 2023-07-19 | End: 2023-07-19

## 2023-07-19 RX ORDER — GLYCOPYRROLATE 0.2 MG/ML
INJECTION INTRAMUSCULAR; INTRAVENOUS PRN
Status: DISCONTINUED | OUTPATIENT
Start: 2023-07-19 | End: 2023-07-19 | Stop reason: SDUPTHER

## 2023-07-19 RX ORDER — FENTANYL CITRATE 0.05 MG/ML
50 INJECTION, SOLUTION INTRAMUSCULAR; INTRAVENOUS EVERY 10 MIN PRN
Status: DISCONTINUED | OUTPATIENT
Start: 2023-07-19 | End: 2023-07-19 | Stop reason: HOSPADM

## 2023-07-19 RX ORDER — ROCURONIUM BROMIDE 10 MG/ML
INJECTION, SOLUTION INTRAVENOUS PRN
Status: DISCONTINUED | OUTPATIENT
Start: 2023-07-19 | End: 2023-07-19 | Stop reason: SDUPTHER

## 2023-07-19 RX ORDER — PROPOFOL 10 MG/ML
INJECTION, EMULSION INTRAVENOUS PRN
Status: DISCONTINUED | OUTPATIENT
Start: 2023-07-19 | End: 2023-07-19 | Stop reason: SDUPTHER

## 2023-07-19 RX ORDER — SODIUM CHLORIDE 9 MG/ML
INJECTION, SOLUTION INTRAVENOUS PRN
Status: DISCONTINUED | OUTPATIENT
Start: 2023-07-19 | End: 2023-07-19 | Stop reason: HOSPADM

## 2023-07-19 RX ORDER — MIDAZOLAM HYDROCHLORIDE 1 MG/ML
INJECTION INTRAMUSCULAR; INTRAVENOUS PRN
Status: DISCONTINUED | OUTPATIENT
Start: 2023-07-19 | End: 2023-07-19 | Stop reason: SDUPTHER

## 2023-07-19 RX ADMIN — PHENYLEPHRINE HYDROCHLORIDE 100 MCG: 10 INJECTION INTRAVENOUS at 07:57

## 2023-07-19 RX ADMIN — SUGAMMADEX 200 MG: 100 INJECTION, SOLUTION INTRAVENOUS at 08:42

## 2023-07-19 RX ADMIN — ROCURONIUM BROMIDE 50 MG: 10 INJECTION INTRAVENOUS at 07:43

## 2023-07-19 RX ADMIN — METOPROLOL SUCCINATE 25 MG: 25 TABLET, EXTENDED RELEASE ORAL at 07:24

## 2023-07-19 RX ADMIN — GLYCOPYRROLATE 0.4 MG: 0.2 INJECTION INTRAMUSCULAR; INTRAVENOUS at 07:47

## 2023-07-19 RX ADMIN — SODIUM CHLORIDE, SODIUM LACTATE, POTASSIUM CHLORIDE, AND CALCIUM CHLORIDE 500 ML: .6; .31; .03; .02 INJECTION, SOLUTION INTRAVENOUS at 10:09

## 2023-07-19 RX ADMIN — PHENYLEPHRINE HYDROCHLORIDE 100 MCG: 10 INJECTION INTRAVENOUS at 07:49

## 2023-07-19 RX ADMIN — PHENYLEPHRINE HYDROCHLORIDE 100 MCG: 10 INJECTION INTRAVENOUS at 07:53

## 2023-07-19 RX ADMIN — PROPOFOL 200 MG: 10 INJECTION, EMULSION INTRAVENOUS at 07:43

## 2023-07-19 RX ADMIN — ONDANSETRON 4 MG: 2 INJECTION INTRAMUSCULAR; INTRAVENOUS at 07:47

## 2023-07-19 RX ADMIN — SODIUM CHLORIDE 1000 ML: 9 INJECTION, SOLUTION INTRAVENOUS at 07:08

## 2023-07-19 RX ADMIN — MIDAZOLAM HYDROCHLORIDE 2 MG: 1 INJECTION, SOLUTION INTRAMUSCULAR; INTRAVENOUS at 07:39

## 2023-07-19 RX ADMIN — FENTANYL CITRATE 100 MCG: 50 INJECTION, SOLUTION INTRAMUSCULAR; INTRAVENOUS at 07:43

## 2023-07-19 RX ADMIN — SODIUM CHLORIDE, POTASSIUM CHLORIDE, SODIUM LACTATE AND CALCIUM CHLORIDE 500 ML: 600; 310; 30; 20 INJECTION, SOLUTION INTRAVENOUS at 10:09

## 2023-07-19 ASSESSMENT — PAIN SCALES - GENERAL
PAINLEVEL_OUTOF10: 5
PAINLEVEL_OUTOF10: 0

## 2023-07-19 ASSESSMENT — PAIN - FUNCTIONAL ASSESSMENT
PAIN_FUNCTIONAL_ASSESSMENT: PREVENTS OR INTERFERES WITH MANY ACTIVE NOT PASSIVE ACTIVITIES
PAIN_FUNCTIONAL_ASSESSMENT: 0-10
PAIN_FUNCTIONAL_ASSESSMENT: PREVENTS OR INTERFERES SOME ACTIVE ACTIVITIES AND ADLS

## 2023-07-19 ASSESSMENT — PAIN DESCRIPTION - FREQUENCY: FREQUENCY: CONTINUOUS

## 2023-07-19 ASSESSMENT — ENCOUNTER SYMPTOMS: SHORTNESS OF BREATH: 1

## 2023-07-19 ASSESSMENT — PAIN DESCRIPTION - LOCATION: LOCATION: BACK

## 2023-07-19 ASSESSMENT — PAIN DESCRIPTION - ORIENTATION: ORIENTATION: LOWER

## 2023-07-19 ASSESSMENT — PAIN DESCRIPTION - PAIN TYPE: TYPE: CHRONIC PAIN

## 2023-07-19 ASSESSMENT — PAIN DESCRIPTION - ONSET: ONSET: ON-GOING

## 2023-07-19 NOTE — INTERVAL H&P NOTE
Update History & Physical    The patient's History and Physical of July 15, he is feeling better, stronger, shortness of breath improved, he has not used Eliquis for 2 days, no issues. Was reviewed with the patient and I examined the patient. There was no change. The surgical site was confirmed by the patient and me. Plan for EBUS TBNA      Discussed with patient risks include but not limited to bleeding, infection, lung collapse , cardiac arrhythmia, need for other procedures or allergy to med, benefits and alternatives discussed with patient.  Patient  agrees to proceed with procedure        Electronically signed by Chacorta Box MD on 7/19/2023 at 7:26 AM

## 2023-07-19 NOTE — DISCHARGE INSTRUCTIONS
Resume Eliquis tomorrow morning 7/20/2023 if no active bleed     Bronchoscopy: What to Expect at [address]     Bronchoscopy lets your doctor look at your airway through a tube called a bronchoscope. Afterward, you may feel tired for 1 or 2 days. Your mouth may feel very dry for several hours after the procedure. You may also have a sore throat and a hoarse voice for a few days. Sucking on throat lozenges or gargling with warm salt water may help soothe your sore throat. If a sample of tissue (biopsy) was taken, you may spit up a small amount of blood or have bloody saliva. This is normal.  Ask your doctor when you can drive again. Do not smoke for at least 24 hours. This care sheet gives you a general idea about how long it will take for you to recover. But each person recovers at a different pace. Follow the steps below to get better as quickly as possible. How can you care for yourself at home? Activity    Do not eat anything for 2 hours after the procedure. Rest when you feel tired. Getting enough sleep will help you recover. Avoid strenuous activities, such as bicycle riding, jogging, weight lifting, or aerobic exercise, until your doctor says it is okay. Ask your doctor when you can drive again. Diet    You can eat your normal diet. If your stomach is upset, try bland, low-fat foods like plain rice, broiled chicken, toast, and yogurt. If it is painful to swallow, start out with cold drinks, flavored ice pops, and ice cream. Next, try soft foods like pudding, yogurt, canned or cooked fruit, scrambled eggs, and mashed potatoes. Avoid eating hard or scratchy foods like chips or raw vegetables. Avoid orange or tomato juice and other acidic foods that can sting the throat. Drink plenty of fluids to avoid becoming dehydrated (unless your doctor tells you not to). Medicines    Take pain medicines exactly as directed.   If the doctor gave you a prescription medicine for pain, take

## 2023-07-19 NOTE — OP NOTE
Operative Note      Patient: Suly Hopper  YOB: 1945  MRN: [id number]    Date of Procedure: 7/19/2023    Pre-Op Diagnosis Codes:     * Lung mass [R91.8]    Post-Op Diagnosis: Same       Procedure(s):  BRONCHOSCOPY ENDOBRONCHIAL ULTRASOUND    Surgeon(s):  Hodan Parsons MD    Assistant:   * No surgical staff found *    Anesthesia: General    Estimated Blood Loss (mL): Minimal    Complications: None    Specimens:   ID Type Source Tests Collected by Time Destination   B : LYMPH NODE R 4 Tissue Lymph Node CYTOLOGY, NON-GYN Hodan Parsons MD 7/19/2023 0801    C : LYMPH NODE STATION 7 RAPID READ Tissue Lymph Node SURGICAL PATHOLOGY Hodan Parsons MD 7/19/2023 0806    D : LYMPH NODE STATION 7 Tissue Lymph Node CYTOLOGY, NON-GYN Hodan Parsons MD 7/19/2023 0810    E : LYMPH NODE R 10 Tissue Lymph Node CYTOLOGY, NON-GYN Hodan Parsons MD 7/19/2023 0820        Implants:  * No implants in log *      Drains: * No LDAs found *    Findings: Mediastinal lymphadenopathy        Detailed Description of Procedure:   PROCEDURE:   Fiberoptic bronchoscopy with   1. Limited visual airway inspection. 2. EBUS-guided TBNA of stations R4, 7 and R10         DESCRIPTION OF PROCEDURE:      A curvilinear EBUS Olympus bronchoscope was inserted into ET tube to the tracheobronchial tree . Limited visual airway inspection was then performed and no abnormality was noted in the trachea or mainstem bronchi. The bronchoscope was then withdrawn up into the trachea and a kelsey survey was begun. EBUS-guided TBNA of stations R4, 7 out of 10 was then performed. There was no significant bleeding post-biopsy. Rapid on-site examination revealed nondiagnostic. The patient had stable vital signs throughout the entire procedure and supplemental oxygen was utilized with no desaturation during the procedure.      ESTIMATED BLOOD LOSS:   less than 5 cc    COMPLICATIONS:   None    will obtain PET scan  Discussed with

## 2023-07-19 NOTE — PROGRESS NOTES
Spoke to Dr Ninfa Anand regard pt's B/P 79/52. Dr Ninfa Anand wants another 500ml LR hung bolus. Will continue to monitor.

## 2023-07-19 NOTE — ANESTHESIA PRE PROCEDURE
Endo/Other:    (+) Diabetes, . Abdominal: normal exam            Vascular: Other Findings:           Anesthesia Plan      general     ASA 4       Induction: intravenous. MIPS: Prophylactic antiemetics administered. Anesthetic plan and risks discussed with patient.                         Atif Fairchild MD   7/19/2023

## 2023-07-19 NOTE — PROGRESS NOTES
Dr Jose Goldberg notified of B/P 87/53 and 1200ML LR infused. Per Dr. Jose Goldberg ok to discharge patient.

## 2023-07-19 NOTE — ANESTHESIA POSTPROCEDURE EVALUATION
Department of Anesthesiology  Postprocedure Note    Patient: Radha Case  MRN: [id number]  YOB: 1945  Date of evaluation: 7/19/2023      Procedure Summary     Date: 07/19/23 Room / Location: [address]    Anesthesia Start: 0994 Anesthesia Stop:     Procedure: BRONCHOSCOPY ENDOBRONCHIAL ULTRASOUND Diagnosis:       Lung mass      (Lung mass [R91.8])    Surgeons: Hilary Herring MD Responsible Provider: Timmy Alcala MD    Anesthesia Type: general ASA Status: 4          Anesthesia Type: No value filed.     Wayne Phase I: Wayne Score: 10    Wayne Phase II:        Anesthesia Post Evaluation    Patient location during evaluation: PACU  Patient participation: complete - patient participated  Level of consciousness: awake  Pain score: 0  Airway patency: patent  Nausea & Vomiting: no nausea  Complications: no  Cardiovascular status: hemodynamically stable  Respiratory status: face mask  Hydration status: euvolemic

## 2023-07-24 ENCOUNTER — TELEPHONE (OUTPATIENT)
Dept: SURGERY | Age: 78
End: 2023-07-24

## 2023-07-24 NOTE — TELEPHONE ENCOUNTER
Spoke with the patient, and updated regarding biopsy results, recurrent squamous cell carcinoma.   He has appointment with oncology on the 31st

## 2023-07-26 ENCOUNTER — HOSPITAL ENCOUNTER (OUTPATIENT)
Dept: PHYSICAL THERAPY | Age: 78
Setting detail: THERAPIES SERIES
Discharge: HOME | End: 2023-07-26
Payer: MEDICARE

## 2023-07-26 PROCEDURE — 97110 THERAPEUTIC EXERCISES: CPT

## 2023-07-26 ASSESSMENT — PAIN SCALES - GENERAL: PAINLEVEL_OUTOF10: 3

## 2023-07-26 ASSESSMENT — PAIN DESCRIPTION - PAIN TYPE: TYPE: CHRONIC PAIN

## 2023-07-26 ASSESSMENT — PAIN DESCRIPTION - DESCRIPTORS: DESCRIPTORS: ACHING

## 2023-07-26 ASSESSMENT — PAIN DESCRIPTION - LOCATION: LOCATION: BACK

## 2023-07-26 NOTE — PROGRESS NOTES
Physical Therapy: Daily Note - PLACED ON HOLD    Patient: Cristiano Azul (81 y.o. male)   Examination Date:   Plan of Care/Certification Expiration Date: 23    Progress Note Counter: Recheck completed with plans to continue 1-2/ week for 4-6 weeks to progress with POC (Pt to continue 1x per week); 23: Pt reports being dx again with right lung cancer on 23 and will be starting chemo next week. Pt unsure of how he is going to feel so going to place pt on hold and pt to call in a few weeks with update and if able to return to PT or not. :  1945 # of Visits since [facility]:      MRN: [id number]  CSN: [id number] Start of Care Date:   2023   Insurance: Payor: MEDICARE / Plan: MEDICARE PART A AND B / Product Type: *No Product type* /   Insurance ID: [id number] - (Medicare) Secondary Insurance (if applicable): [facility]   Referring Physician: Neymar Sanon MD [facility]   PCP: Neymar Sanon MD Visits to Date/Visits Approved:     No Show/Cancelled Appts:      Medical Diagnosis: Unsteadiness on feet [R26.81]  Low back pain, unspecified [M54.50]  Other chronic pain [G89.29]    Treatment Diagnosis: Unsteady gait and chronic back pain        SUBJECTIVE EXAMINATION   Pain Level: Pain Screening  Patient Currently in Pain: Yes  Pain Assessment: 0-10  Pain Level: 3  Pain Type: Chronic pain  Pain Location: Back  Pain Descriptors: Aching    Patient Comments: Subjective: Pt reports having 3/10 sharp right low back pain.     HEP Compliance: Good        OBJECTIVE EXAMINATION   Restrictions:  Restrictions/Precautions: Up as Tolerated   No data recorded No data recorded        TREATMENT     Exercises:      Treatment Reasoning    Exercise 1: Mini squats x 10  Exercise 15: mid rows RTB x 10 hold 3 sec  Exercise 16: B shd ext x RTB x 10 hold 3 sec  Exercise 17: seated low back stretch 30 sec x 3  Exercise 18: Seated lateral back stretch x 3 H30  Exercise 19: *Standing march x 10

## 2023-07-27 ENCOUNTER — TELEPHONE (OUTPATIENT)
Dept: FAMILY MEDICINE CLINIC | Age: 78
End: 2023-07-27

## 2023-07-27 ENCOUNTER — HOSPITAL ENCOUNTER (OUTPATIENT)
Dept: INFUSION THERAPY | Age: 78
Setting detail: INFUSION SERIES
Discharge: HOME | End: 2023-07-27
Payer: MEDICARE

## 2023-07-27 VITALS
BODY MASS INDEX: 25.8 KG/M2 | OXYGEN SATURATION: 95 % | WEIGHT: 185 LBS | RESPIRATION RATE: 18 BRPM | HEART RATE: 83 BPM | SYSTOLIC BLOOD PRESSURE: 99 MMHG | DIASTOLIC BLOOD PRESSURE: 62 MMHG | TEMPERATURE: 97.9 F

## 2023-07-27 DIAGNOSIS — G70.00 MYASTHENIA GRAVIS, ACHR ANTIBODY POSITIVE (HCC): Primary | ICD-10-CM

## 2023-07-27 DIAGNOSIS — G70.00 MYASTHENIA GRAVIS (HCC): ICD-10-CM

## 2023-07-27 PROCEDURE — 2580000003 HC RX 258: Performed by: PSYCHIATRY & NEUROLOGY

## 2023-07-27 PROCEDURE — 6360000002 HC RX W HCPCS: Performed by: PSYCHIATRY & NEUROLOGY

## 2023-07-27 PROCEDURE — 96365 THER/PROPH/DIAG IV INF INIT: CPT

## 2023-07-27 RX ADMIN — EFGARTIGIMOD ALFA 839 MG: 20 INJECTION INTRAVENOUS at 11:52

## 2023-07-27 ASSESSMENT — PAIN DESCRIPTION - PAIN TYPE: TYPE: CHRONIC PAIN

## 2023-07-27 ASSESSMENT — PAIN SCALES - GENERAL: PAINLEVEL_OUTOF10: 5

## 2023-07-27 ASSESSMENT — PAIN DESCRIPTION - LOCATION: LOCATION: BACK

## 2023-07-27 ASSESSMENT — PAIN DESCRIPTION - DESCRIPTORS: DESCRIPTORS: ACHING

## 2023-07-27 NOTE — FLOWSHEET NOTE
Patient to the floor via wheelchair for his week 1 infusion. Vital signs taken. Complains of back pain which is chronic. No distress noted. Call light within reach. Wife at side.

## 2023-07-27 NOTE — TELEPHONE ENCOUNTER
Sylvia Trimble from [address] [facility] is calling to see if the mobility assessment will be done at this patient's next appt. The patient indicated that he's supposed to meet w/ his PCP AND Rosa from [address] [place] [address] at his next appt to secure a power scooter. Can you please reach out to Sylvia Trimble to update her.

## 2023-07-27 NOTE — TELEPHONE ENCOUNTER
What type of mobility assessment is being recommended? We already discussed at his most recent visit his request for mobility scooter and documentation was placed in the chart. Order for mobility scooter has already been signed and should be in process for faxing.

## 2023-07-28 ENCOUNTER — OFFICE VISIT (OUTPATIENT)
Dept: FAMILY MEDICINE CLINIC | Age: 78
End: 2023-07-28

## 2023-07-28 VITALS
TEMPERATURE: 97.8 F | WEIGHT: 182.2 LBS | OXYGEN SATURATION: 95 % | HEIGHT: 71 IN | DIASTOLIC BLOOD PRESSURE: 58 MMHG | BODY MASS INDEX: 25.51 KG/M2 | SYSTOLIC BLOOD PRESSURE: 106 MMHG | HEART RATE: 90 BPM

## 2023-07-28 DIAGNOSIS — J44.9 CHRONIC OBSTRUCTIVE PULMONARY DISEASE, UNSPECIFIED COPD TYPE (HCC): Chronic | ICD-10-CM

## 2023-07-28 DIAGNOSIS — E55.9 VITAMIN D DEFICIENCY: ICD-10-CM

## 2023-07-28 DIAGNOSIS — E11.29 TYPE 2 DIABETES MELLITUS WITH MICROALBUMINURIA, WITHOUT LONG-TERM CURRENT USE OF INSULIN (HCC): Primary | ICD-10-CM

## 2023-07-28 DIAGNOSIS — G70.00 MYASTHENIA GRAVIS (HCC): ICD-10-CM

## 2023-07-28 DIAGNOSIS — K21.9 GASTROESOPHAGEAL REFLUX DISEASE, UNSPECIFIED WHETHER ESOPHAGITIS PRESENT: ICD-10-CM

## 2023-07-28 DIAGNOSIS — I25.119 CORONARY ARTERY DISEASE INVOLVING NATIVE CORONARY ARTERY OF NATIVE HEART WITH ANGINA PECTORIS (HCC): Chronic | ICD-10-CM

## 2023-07-28 DIAGNOSIS — C34.11 MALIGNANT NEOPLASM OF UPPER LOBE OF RIGHT LUNG (HCC): ICD-10-CM

## 2023-07-28 DIAGNOSIS — N18.31 STAGE 3A CHRONIC KIDNEY DISEASE (HCC): ICD-10-CM

## 2023-07-28 DIAGNOSIS — R80.9 TYPE 2 DIABETES MELLITUS WITH MICROALBUMINURIA, WITHOUT LONG-TERM CURRENT USE OF INSULIN (HCC): Primary | ICD-10-CM

## 2023-07-28 DIAGNOSIS — K76.0 FATTY INFILTRATION OF LIVER: Chronic | ICD-10-CM

## 2023-07-28 DIAGNOSIS — I10 PRIMARY HYPERTENSION: Chronic | ICD-10-CM

## 2023-07-28 DIAGNOSIS — E78.2 MIXED HYPERLIPIDEMIA: Chronic | ICD-10-CM

## 2023-07-28 LAB — HBA1C MFR BLD: 8.7 %

## 2023-07-28 RX ORDER — INSULIN GLARGINE 100 [IU]/ML
20 INJECTION, SOLUTION SUBCUTANEOUS NIGHTLY
Qty: 5 ADJUSTABLE DOSE PRE-FILLED PEN SYRINGE | Refills: 1 | Status: SHIPPED | OUTPATIENT
Start: 2023-07-28

## 2023-07-28 ASSESSMENT — ENCOUNTER SYMPTOMS
CONSTIPATION: 1
ABDOMINAL PAIN: 0
ANAL BLEEDING: 0
CHEST TIGHTNESS: 0
SHORTNESS OF BREATH: 1
NAUSEA: 0
COUGH: 1
WHEEZING: 1
BLOOD IN STOOL: 0
VOMITING: 0
DIARRHEA: 0

## 2023-07-28 NOTE — ASSESSMENT & PLAN NOTE
Blood pressure within normal limits today in the office.   Patient instructed to continue with current doses of losartan, metoprolol, amlodipine, and triamterene-hydrochlorothiazide

## 2023-07-28 NOTE — ASSESSMENT & PLAN NOTE
Most recent A1c improved but still above goal control. Patient will increase dose of Lantus to 20 units at bedtime and continue with current doses of Trulicity and glipizide.

## 2023-07-28 NOTE — ASSESSMENT & PLAN NOTE
Patient established with pulmonology and hematology oncology for further management. Upcoming visits in place to discuss management plan moving forward.

## 2023-07-28 NOTE — PROGRESS NOTES
distress. Breath sounds: Decreased air movement present. Decreased breath sounds present. No wheezing, rhonchi or rales. Abdominal:      General: Bowel sounds are normal. There is no distension. Palpations: Abdomen is soft. Tenderness: There is no abdominal tenderness. There is no right CVA tenderness, left CVA tenderness, guarding or rebound. Musculoskeletal:      Cervical back: Neck supple. Right lower leg: No edema. Left lower leg: No edema. Lymphadenopathy:      Cervical: No cervical adenopathy. Skin:     Findings: No rash. Neurological:      Mental Status: He is alert and oriented to person, place, and time. Psychiatric:         Mood and Affect: Mood normal.         Behavior: Behavior normal.         Thought Content: Thought content normal.       Ortho Exam (If Applicable)              An electronic signature was used to authenticate this note.      Marjan Crockett MD

## 2023-07-31 DIAGNOSIS — R35.1 BENIGN PROSTATIC HYPERPLASIA WITH NOCTURIA: Chronic | ICD-10-CM

## 2023-07-31 DIAGNOSIS — N40.1 BENIGN PROSTATIC HYPERPLASIA WITH NOCTURIA: Chronic | ICD-10-CM

## 2023-07-31 RX ORDER — TAMSULOSIN HYDROCHLORIDE 0.4 MG/1
0.8 CAPSULE ORAL DAILY
Qty: 180 CAPSULE | Refills: 1 | Status: SHIPPED | OUTPATIENT
Start: 2023-07-31

## 2023-08-01 ENCOUNTER — OFFICE VISIT (OUTPATIENT)
Dept: CARDIOLOGY CLINIC | Age: 78
End: 2023-08-01
Payer: MEDICARE

## 2023-08-01 VITALS
WEIGHT: 182 LBS | HEART RATE: 83 BPM | HEIGHT: 71 IN | SYSTOLIC BLOOD PRESSURE: 110 MMHG | OXYGEN SATURATION: 90 % | DIASTOLIC BLOOD PRESSURE: 64 MMHG | BODY MASS INDEX: 25.48 KG/M2

## 2023-08-01 DIAGNOSIS — I25.10 CORONARY ARTERY DISEASE INVOLVING NATIVE CORONARY ARTERY OF NATIVE HEART WITHOUT ANGINA PECTORIS: Primary | ICD-10-CM

## 2023-08-01 PROCEDURE — G8427 DOCREV CUR MEDS BY ELIG CLIN: HCPCS | Performed by: INTERNAL MEDICINE

## 2023-08-01 PROCEDURE — 1036F TOBACCO NON-USER: CPT | Performed by: INTERNAL MEDICINE

## 2023-08-01 PROCEDURE — 3074F SYST BP LT 130 MM HG: CPT | Performed by: INTERNAL MEDICINE

## 2023-08-01 PROCEDURE — G8417 CALC BMI ABV UP PARAM F/U: HCPCS | Performed by: INTERNAL MEDICINE

## 2023-08-01 PROCEDURE — 1123F ACP DISCUSS/DSCN MKR DOCD: CPT | Performed by: INTERNAL MEDICINE

## 2023-08-01 PROCEDURE — 3078F DIAST BP <80 MM HG: CPT | Performed by: INTERNAL MEDICINE

## 2023-08-01 PROCEDURE — 99204 OFFICE O/P NEW MOD 45 MIN: CPT | Performed by: INTERNAL MEDICINE

## 2023-08-01 NOTE — PROGRESS NOTES
function, last known EF 60%. Hypertension with possible hypertensive heart disease  Hyperlipidemia  Diabetes mellitus type 2  COPD  History of DVT/PE on Eliquis  Myasthenic gravis      RECOMMENDATIONS:  Mr. Evelia Hernandez appears to be stable from a cardiac standpoint. No further cardiac testing needed at this time. The best course of action is continued medical therapy and aggressive risk factor modification. Continue current cardiac medications including amlodipine losartan Crestor and Toprol. The patient is agreeable to the plan. Follow up cardiac evaluation in 1 year, sooner if needed. Thank you very much for allowing me to participate in Mr. Reshma Willard cardiac care. Should you have any questions, please do not hesitate to contact me.      Sincerely,    [place], [address], [address] and [facility] [place]

## 2023-08-02 ENCOUNTER — NURSE ONLY (OUTPATIENT)
Dept: FAMILY MEDICINE CLINIC | Age: 78
End: 2023-08-02
Payer: MEDICARE

## 2023-08-02 ENCOUNTER — OFFICE VISIT (OUTPATIENT)
Dept: PALLATIVE CARE | Age: 78
End: 2023-08-02
Payer: MEDICARE

## 2023-08-02 VITALS
SYSTOLIC BLOOD PRESSURE: 108 MMHG | DIASTOLIC BLOOD PRESSURE: 70 MMHG | HEART RATE: 91 BPM | OXYGEN SATURATION: 97 % | RESPIRATION RATE: 20 BRPM | TEMPERATURE: 97.8 F

## 2023-08-02 DIAGNOSIS — R53.81 PHYSICAL DEBILITY: ICD-10-CM

## 2023-08-02 DIAGNOSIS — R63.0 POOR APPETITE: ICD-10-CM

## 2023-08-02 DIAGNOSIS — Z51.5 PALLIATIVE CARE ENCOUNTER: ICD-10-CM

## 2023-08-02 DIAGNOSIS — E29.1 HYPOGONADISM IN MALE: Primary | ICD-10-CM

## 2023-08-02 DIAGNOSIS — F41.8 ANXIETY ABOUT HEALTH: ICD-10-CM

## 2023-08-02 DIAGNOSIS — C34.91 NON-SMALL CELL CANCER OF RIGHT LUNG (HCC): Primary | ICD-10-CM

## 2023-08-02 PROCEDURE — G8417 CALC BMI ABV UP PARAM F/U: HCPCS

## 2023-08-02 PROCEDURE — 3074F SYST BP LT 130 MM HG: CPT

## 2023-08-02 PROCEDURE — 3078F DIAST BP <80 MM HG: CPT

## 2023-08-02 PROCEDURE — 1036F TOBACCO NON-USER: CPT

## 2023-08-02 PROCEDURE — 99349 HOME/RES VST EST MOD MDM 40: CPT

## 2023-08-02 PROCEDURE — 1123F ACP DISCUSS/DSCN MKR DOCD: CPT

## 2023-08-02 RX ORDER — TESTOSTERONE CYPIONATE 200 MG/ML
120 INJECTION, SOLUTION INTRAMUSCULAR ONCE
Status: COMPLETED | OUTPATIENT
Start: 2023-08-02 | End: 2023-08-02

## 2023-08-02 RX ORDER — HYDROCODONE BITARTRATE AND ACETAMINOPHEN 5; 325 MG/1; MG/1
1 TABLET ORAL 2 TIMES DAILY PRN
COMMUNITY
End: 2023-08-02 | Stop reason: SDUPTHER

## 2023-08-02 RX ORDER — BUSPIRONE HYDROCHLORIDE 10 MG/1
10 TABLET ORAL 2 TIMES DAILY
Qty: 60 TABLET | Refills: 2 | Status: SHIPPED | OUTPATIENT
Start: 2023-08-02 | End: 2023-10-31

## 2023-08-02 RX ORDER — HYDROCODONE BITARTRATE AND ACETAMINOPHEN 5; 325 MG/1; MG/1
1 TABLET ORAL 2 TIMES DAILY PRN
Qty: 60 TABLET | Refills: 0 | Status: SHIPPED | OUTPATIENT
Start: 2023-08-02 | End: 2023-09-01

## 2023-08-02 RX ADMIN — TESTOSTERONE CYPIONATE 120 MG: 200 INJECTION, SOLUTION INTRAMUSCULAR at 09:56

## 2023-08-02 ASSESSMENT — ENCOUNTER SYMPTOMS
SHORTNESS OF BREATH: 1
TROUBLE SWALLOWING: 0
BACK PAIN: 1
GASTROINTESTINAL NEGATIVE: 1

## 2023-08-02 ASSESSMENT — VISUAL ACUITY: OU: 1

## 2023-08-02 NOTE — PROGRESS NOTES
Patient given Testosterone 120mg IM right gluteal..  Will return in 2 weeks for next injection    Patient tolerated injection well.     Administrations This Visit       testosterone cypionate (DEPOTESTOTERONE CYPIONATE) injection 120 mg       Admin Date  08/02/2023 Action  Given Dose  120 mg Route  IntraMUSCular Administered By  Rubio Altamirano LPN

## 2023-08-02 NOTE — PROGRESS NOTES
effort is normal. No accessory muscle usage, prolonged expiration or respiratory distress. Breath sounds: Decreased air movement present. Examination of the left-middle field reveals decreased breath sounds. Examination of the left-lower field reveals decreased breath sounds. Decreased breath sounds present. No wheezing, rhonchi or rales. Abdominal:      General: Abdomen is flat and protuberant. Bowel sounds are normal. There is no distension. Palpations: Abdomen is soft. Tenderness: There is no abdominal tenderness. There is no guarding or rebound. Musculoskeletal:         General: Swelling (BLE) present. Cervical back: Full passive range of motion without pain, normal range of motion and neck supple. No edema, erythema or signs of trauma. No pain with movement. Right lower leg: No edema. Left lower le+ Pitting Edema present. Feet:      Right foot:      Skin integrity: Skin integrity normal.      Left foot:      Skin integrity: Skin integrity normal.   Skin:     General: Skin is warm and dry. Capillary Refill: Capillary refill takes 2 to 3 seconds. Coloration: Skin is pale. Findings: Bruising present. No rash or wound. Nails: There is no clubbing. Neurological:      General: No focal deficit present. Mental Status: He is alert, oriented to person, place, and time and easily aroused. Mental status is at baseline. GCS: GCS eye subscore is 4. GCS verbal subscore is 5. GCS motor subscore is 6. Motor: Weakness present. Gait: Gait normal.   Psychiatric:         Attention and Perception: Attention and perception normal.         Mood and Affect: Mood and affect normal.         Speech: Speech normal.         Behavior: Behavior normal. Behavior is cooperative. Thought Content: Thought content normal. Thought content does not include homicidal or suicidal ideation.          Cognition and Memory: Cognition and memory normal.

## 2023-08-03 ENCOUNTER — HOSPITAL ENCOUNTER (OUTPATIENT)
Dept: INFUSION THERAPY | Age: 78
Setting detail: INFUSION SERIES
Discharge: HOME | End: 2023-08-03
Payer: MEDICARE

## 2023-08-03 ENCOUNTER — OFFICE VISIT (OUTPATIENT)
Dept: PULMONOLOGY | Age: 78
End: 2023-08-03
Payer: MEDICARE

## 2023-08-03 ENCOUNTER — TRANSCRIBE ORDERS (OUTPATIENT)
Dept: ADMINISTRATIVE | Age: 78
End: 2023-08-03

## 2023-08-03 VITALS
SYSTOLIC BLOOD PRESSURE: 105 MMHG | DIASTOLIC BLOOD PRESSURE: 64 MMHG | HEART RATE: 82 BPM | TEMPERATURE: 98 F | RESPIRATION RATE: 18 BRPM

## 2023-08-03 VITALS
HEIGHT: 71 IN | HEART RATE: 96 BPM | TEMPERATURE: 97 F | BODY MASS INDEX: 25.9 KG/M2 | DIASTOLIC BLOOD PRESSURE: 64 MMHG | OXYGEN SATURATION: 94 % | SYSTOLIC BLOOD PRESSURE: 122 MMHG | RESPIRATION RATE: 16 BRPM | WEIGHT: 185 LBS

## 2023-08-03 DIAGNOSIS — G70.00 MYASTHENIA GRAVIS, ACHR ANTIBODY POSITIVE (HCC): Primary | ICD-10-CM

## 2023-08-03 DIAGNOSIS — C34.11 MALIGNANT NEOPLASM OF UPPER LOBE OF RIGHT LUNG (HCC): Primary | ICD-10-CM

## 2023-08-03 DIAGNOSIS — G70.00 MYASTHENIA GRAVIS (HCC): ICD-10-CM

## 2023-08-03 DIAGNOSIS — C34.91 SQUAMOUS CELL CARCINOMA LUNG, RIGHT (HCC): ICD-10-CM

## 2023-08-03 DIAGNOSIS — J44.9 CHRONIC OBSTRUCTIVE PULMONARY DISEASE, UNSPECIFIED COPD TYPE (HCC): Primary | ICD-10-CM

## 2023-08-03 DIAGNOSIS — I26.99 PULMONARY EMBOLISM AND INFARCTION (HCC): ICD-10-CM

## 2023-08-03 PROCEDURE — 1123F ACP DISCUSS/DSCN MKR DOCD: CPT | Performed by: INTERNAL MEDICINE

## 2023-08-03 PROCEDURE — 96365 THER/PROPH/DIAG IV INF INIT: CPT

## 2023-08-03 PROCEDURE — G8417 CALC BMI ABV UP PARAM F/U: HCPCS | Performed by: INTERNAL MEDICINE

## 2023-08-03 PROCEDURE — G8427 DOCREV CUR MEDS BY ELIG CLIN: HCPCS | Performed by: INTERNAL MEDICINE

## 2023-08-03 PROCEDURE — 3023F SPIROM DOC REV: CPT | Performed by: INTERNAL MEDICINE

## 2023-08-03 PROCEDURE — 99214 OFFICE O/P EST MOD 30 MIN: CPT | Performed by: INTERNAL MEDICINE

## 2023-08-03 PROCEDURE — 3074F SYST BP LT 130 MM HG: CPT | Performed by: INTERNAL MEDICINE

## 2023-08-03 PROCEDURE — 3078F DIAST BP <80 MM HG: CPT | Performed by: INTERNAL MEDICINE

## 2023-08-03 PROCEDURE — 6360000002 HC RX W HCPCS: Performed by: PSYCHIATRY & NEUROLOGY

## 2023-08-03 PROCEDURE — 2580000003 HC RX 258: Performed by: PSYCHIATRY & NEUROLOGY

## 2023-08-03 PROCEDURE — 1036F TOBACCO NON-USER: CPT | Performed by: INTERNAL MEDICINE

## 2023-08-03 RX ORDER — FLUTICASONE FUROATE, UMECLIDINIUM BROMIDE AND VILANTEROL TRIFENATATE 100; 62.5; 25 UG/1; UG/1; UG/1
1 POWDER RESPIRATORY (INHALATION) DAILY
Qty: 3 EACH | Refills: 2 | Status: SHIPPED | OUTPATIENT
Start: 2023-08-03

## 2023-08-03 RX ADMIN — EFGARTIGIMOD ALFA 826 MG: 20 INJECTION INTRAVENOUS at 11:22

## 2023-08-03 NOTE — PROGRESS NOTES
No sxs reactions during observation. Pt declines AVS.  Left unit with spouse. Will return next week. All equipment used in the care for this patient has been cleaned.

## 2023-08-03 NOTE — PROGRESS NOTES
Subjective:     Enedina Lewis is a 66 y.o. male who complains today of:     Chief Complaint   Patient presents with    Follow-up     1 Month F/U for COPD       HPI  Patient presents for worsening shortness of breath    8/3/2023  He has dyspnea on exertion, muscle strength is getting better, no chest pain, no coughing, no lower extremity edema, no fever, no chills. He currently follows up with oncology, pending PET scan, plan to start chemotherapy. No heartburn, no nasal congestion or postnasal drip. 7/13/2023  Patient called yesterday because he is getting more short of breath, he cannot walk more than 10 feet without getting short of breath, he is tachypneic at rest, short of breath on minimal activity, no coughing, no chest pain, no worsening lower extremity edema, no fever or chills. 5/31/2023  Patient has generalized weakness, he has myasthenia gravis, has been acting up on him for the last few weeks, he is currently undergoing infusion treatment and on prednisone, he follows up with neurology, he has rehab scheduled to start next week. No chest pain, shortness of breath mainly exertional, no coughing, no fever no chills his weight is stable, no lower extremity edema. He is supposed to do follow-up CAT scan earlier this month, however he forgot about it. 3/9/2023  Patient presents for follow-up, he is status post right upper lobectomy for T2 N0 M0 squamous cell carcinoma. Doing good, no chest pain, no shortness of breath, no coughing, no fever no chills, no nasal congestion or postnasal drip and no heartburn.         Allergies:  Invokana [canagliflozin], Metformin and related, and Other  Past Medical History:   Diagnosis Date    Atypical chest pain 8/30/2012    BPH (benign prostatic hypertrophy)     CAD (coronary artery disease)     Cataracts, bilateral     Chronic low back pain     COPD (chronic obstructive pulmonary disease) ([address]) 6/1/2006    DM2 (diabetes mellitus, type 2) ([address])

## 2023-08-03 NOTE — PROGRESS NOTES
Infusion completed. Pt  tolerated well. No sxs reactions. Pt up  to  BRP independently, spouse at chairside. Both are eating lunch. Observation in progress.

## 2023-08-04 ENCOUNTER — CARE COORDINATION (OUTPATIENT)
Dept: CARE COORDINATION | Age: 78
End: 2023-08-04

## 2023-08-04 DIAGNOSIS — E11.29 TYPE 2 DIABETES MELLITUS WITH MICROALBUMINURIA, WITHOUT LONG-TERM CURRENT USE OF INSULIN (HCC): Chronic | ICD-10-CM

## 2023-08-04 DIAGNOSIS — I10 PRIMARY HYPERTENSION: Chronic | ICD-10-CM

## 2023-08-04 DIAGNOSIS — J44.9 CHRONIC OBSTRUCTIVE PULMONARY DISEASE, UNSPECIFIED COPD TYPE (HCC): Primary | Chronic | ICD-10-CM

## 2023-08-04 DIAGNOSIS — R80.9 TYPE 2 DIABETES MELLITUS WITH MICROALBUMINURIA, WITHOUT LONG-TERM CURRENT USE OF INSULIN (HCC): Chronic | ICD-10-CM

## 2023-08-04 SDOH — ECONOMIC STABILITY: TRANSPORTATION INSECURITY
IN THE PAST 12 MONTHS, HAS THE LACK OF TRANSPORTATION KEPT YOU FROM MEDICAL APPOINTMENTS OR FROM GETTING MEDICATIONS?: NO

## 2023-08-04 SDOH — ECONOMIC STABILITY: HOUSING INSECURITY: IN THE LAST 12 MONTHS, HOW MANY PLACES HAVE YOU LIVED?: 1

## 2023-08-04 SDOH — ECONOMIC STABILITY: INCOME INSECURITY: IN THE LAST 12 MONTHS, WAS THERE A TIME WHEN YOU WERE NOT ABLE TO PAY THE MORTGAGE OR RENT ON TIME?: NO

## 2023-08-04 SDOH — ECONOMIC STABILITY: TRANSPORTATION INSECURITY
IN THE PAST 12 MONTHS, HAS LACK OF TRANSPORTATION KEPT YOU FROM MEETINGS, WORK, OR FROM GETTING THINGS NEEDED FOR DAILY LIVING?: NO

## 2023-08-04 ASSESSMENT — ENCOUNTER SYMPTOMS: DYSPNEA ASSOCIATED WITH: EXERTION

## 2023-08-04 NOTE — PROGRESS NOTES
Remote Patient Monitoring Treatment Plan    Received request from ACM/WILL Mackenzie RN  to order remote patient monitoring for in home monitoring of COPD, Diabetes, and HTN and order completed. Patient will be monitoring blood pressure   glucose  pulse ox   survey questions. Please note: ACM has stated no scale is needed. Pt will not require weight monitoring via RPM.       Patient will engage in Remote Patient Monitoring each day to develop the skills necessary for self management. RPM Care Team Responsibilities:   Alerts will be reviewed daily and addressed within 2-4 hours during operational hours (Monday -Friday 9 am-4 pm)  Alert response and intervention documented in patient medical record  Alert response escalated to PCP per protocol and documented in patient medical record  Patient monitored over approximately  days  Discharge from program based on self-management readiness    See care coordination encounters for additional details.

## 2023-08-04 NOTE — CARE COORDINATION
Ambulatory Care Coordination Note  2023    Patient Current Location:  Home: [address] [place] [id number]    ACM contacted the patient by telephone. Verified name and  with patient as identifiers. Provided introduction to self, and explanation of the ACM role. ACM: Devendra Fuller RN    Challenges to be reviewed by the provider   Additional needs identified to be addressed with provider: No  none               Method of communication with provider: none. spoke with patient for care coordination enrollment for COPD, lung Ca, Myasthenia Gravis, CAD, DM, and education  Patient agreed to care coordination follow up calls  ACM discussed RPM and patient agreed to program.  ACM will message RPM team for set up. Patient aware someone from RPM will call  ACM reviewed medications, CC protocol, goals, education. Patient states lung ca is back. History of RUL lobectomy in 2023. Following Dr. Chari Turcios. Pending scan results, will start chemo. Will discuss on next call when patient knows more of plan from oncology. COPD better. SOB w/ exertion normal for patient. Denies cough, congestion, CP, or PND. Denies O2 need at this call. Following with Dr. Aurelia Mendiola pulmonology. Seen yesterday 8/3/23. DM is stable. -140 per patient. Checks BS three times daily. Taking Lantus 15-20 units daily. Also taking Trulicity and Glipizide. Denies hypo/hyperglycemia symptoms. Discussed low sugar diet. Understands roles sugars play on BS. Patient states has myasthenia gravis and gets outpatient infusion therapy per Dr. Liset Manzano neurology. Patient states CAD and history of 4 stents. Follows Dr. Caleb Escalera cardiology. Patient states follows palliative care. 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   Does drive and denies falls, dizziness, headaches, or CP    PLAN:  Continue with Care coordination follow up for COPD, lung ca, DM, CAD, Myasthenia gravis, and

## 2023-08-04 NOTE — CARE COORDINATION
Remote Patient Kit Ordering Note      Date/Time:  8/4/2023 1:06 PM      [x] [facility] confirmed patient shipping address  [x] Patient will receive package over the next 1-3 business days. Someone 21 years or older must be present to sign for UPS delivery. [x] HRS will contact patient within 24 hours, an [address] [place] [address] will call the patient directly: If the patient does not answer, HRS will follow up with the clinical team notifying them about the unsuccessful attempt to contact the patient. HRS will make three call attempts to the patient. Provide patient with [facility] Virtual install number is: [phone]. [x] ACM will contact patient once equipment is active to welcome them to the program.                                                         [x] Hours of RPM monitoring - Monday-Friday [id number]; encourage patient to get vitals entered by [facility] each day to have the alert addressed same day. [x][facility] mailed RPM Patient flyer to patient. All questions answered at this time. ACM made aware the RPM kit has been ordered. [facility].

## 2023-08-09 ENCOUNTER — HOSPITAL ENCOUNTER (OUTPATIENT)
Dept: CT IMAGING | Age: 78
Discharge: HOME | End: 2023-08-11
Payer: MEDICARE

## 2023-08-09 DIAGNOSIS — I10 PRIMARY HYPERTENSION: Chronic | ICD-10-CM

## 2023-08-09 DIAGNOSIS — C34.91 NON-SMALL CELL CANCER OF RIGHT LUNG (HCC): ICD-10-CM

## 2023-08-09 DIAGNOSIS — R91.8 LUNG MASS: ICD-10-CM

## 2023-08-09 PROCEDURE — 3430000000 HC RX DIAGNOSTIC RADIOPHARMACEUTICAL: Performed by: INTERNAL MEDICINE

## 2023-08-09 PROCEDURE — A9552 F18 FDG: HCPCS | Performed by: INTERNAL MEDICINE

## 2023-08-09 PROCEDURE — 78815 PET IMAGE W/CT SKULL-THIGH: CPT

## 2023-08-09 RX ORDER — FLUDEOXYGLUCOSE F 18 200 MCI/ML
14.9 INJECTION, SOLUTION INTRAVENOUS
Status: COMPLETED | OUTPATIENT
Start: 2023-08-09 | End: 2023-08-09

## 2023-08-09 RX ORDER — METOPROLOL SUCCINATE 50 MG/1
50 TABLET, EXTENDED RELEASE ORAL DAILY
Qty: 90 TABLET | Refills: 1 | Status: SHIPPED | OUTPATIENT
Start: 2023-08-09

## 2023-08-09 RX ADMIN — FLUDEOXYGLUCOSE F 18 14.9 MILLICURIE: 200 INJECTION, SOLUTION INTRAVENOUS at 14:30

## 2023-08-09 NOTE — TELEPHONE ENCOUNTER
Pt is requesting medication refill.  Please approve or deny this request.    Rx requested:  Requested Prescriptions     Pending Prescriptions Disp Refills    metoprolol succinate (TOPROL XL) 50 MG extended release tablet 90 tablet 1     Sig: Take 1 tablet by mouth daily         Last Office Visit:   7/28/2023      Next Visit Date:  Future Appointments   Date Time Provider [address]   8/9/2023  2:30 PM [address] [place] [address] PET ([address] [place] [address]) N [facility]   8/10/2023 10:30 AM MLOZ INFUSION BED 1 [address] [place] [address]   8/10/2023  1:30 PM [facility] MRI ROOM 1 [facility]   8/16/2023  9:30 AM SCHEDULE, MLTESSA LINKT PCP TESTOSTERONE MLOX [facility] [place]   8/17/2023 10:30 AM MLOZ INFUSION CHAIR 6 [address] [place] [address]   8/30/2023  9:30 AM SCHEDULE, MLTESSA LINKT PCP TESTOSTERONE MLOX [facility] [place]   9/6/2023  3:00 PM Pan Daughters, APRN - CNP [facility] [place]   9/21/2023  1:00 PM Michi Pompa  [facility]   10/2/2023 10:00 AM MD Isamar Liao  Main   10/17/2023  3:00 PM Phil Fisher MD [facility] 5510 [facility] Neurology -   10/30/2023 11:00 AM MD Isamar Liao Main   11/7/2023  1:30 PM Ifeanyi Scherer MD 1010 [facility]   8/6/2024 11:20 AM Jael Anne DO [address]

## 2023-08-10 ENCOUNTER — HOSPITAL ENCOUNTER (OUTPATIENT)
Dept: INFUSION THERAPY | Age: 78
Setting detail: INFUSION SERIES
Discharge: HOME | End: 2023-08-10
Payer: MEDICARE

## 2023-08-10 ENCOUNTER — CARE COORDINATION (OUTPATIENT)
Dept: CARE COORDINATION | Age: 78
End: 2023-08-10

## 2023-08-10 VITALS
RESPIRATION RATE: 18 BRPM | DIASTOLIC BLOOD PRESSURE: 64 MMHG | HEART RATE: 96 BPM | SYSTOLIC BLOOD PRESSURE: 131 MMHG | TEMPERATURE: 97.8 F

## 2023-08-10 DIAGNOSIS — G70.00 MYASTHENIA GRAVIS, ACHR ANTIBODY POSITIVE (HCC): Primary | ICD-10-CM

## 2023-08-10 DIAGNOSIS — G70.00 MYASTHENIA GRAVIS (HCC): ICD-10-CM

## 2023-08-10 PROCEDURE — 2580000003 HC RX 258: Performed by: PSYCHIATRY & NEUROLOGY

## 2023-08-10 PROCEDURE — 6360000002 HC RX W HCPCS: Performed by: PSYCHIATRY & NEUROLOGY

## 2023-08-10 PROCEDURE — 96365 THER/PROPH/DIAG IV INF INIT: CPT

## 2023-08-10 PROCEDURE — 96372 THER/PROPH/DIAG INJ SC/IM: CPT

## 2023-08-10 RX ADMIN — EFGARTIGIMOD ALFA 839 MG: 20 INJECTION INTRAVENOUS at 11:15

## 2023-08-10 ASSESSMENT — ENCOUNTER SYMPTOMS: DYSPNEA ASSOCIATED WITH: MINIMAL EXERTION

## 2023-08-10 NOTE — CARE COORDINATION
Ambulatory Care Coordination Note  8/10/2023    Patient Current Location:  Home: [address]  [address]     ACM contacted the patient by telephone. Verified name and  with patient as identifiers. Provided introduction to self, and explanation of the ACM role. Challenges to be reviewed by the provider   Additional needs identified to be addressed with provider: No  none               Method of communication with provider: none. ACM: Vladislav Lovell, RN    spoke with patient for care coordination follow up for Myasthenia Gravis, COPD, Lung ca, DM, and education  Will be receiving RPM kit. Not activated yet  Patient seen by Dr. Eliecer Clemons on . Had PET scan and MRI yesterday and last week. Waiting for results of testing to determine what chemo and if cancer has spread. Patient is positive attitude and states he'll do whatever plan Dr. Eliecer Clemons feels is best.  Follow up . COPD & Myasthenia gravis is worsening due to lung cancer in right lung back. Had RUL lobectomy 23. Patient has increase SOB, cough, and congestion. Patient has impaired speech and does complain of generalized weakness. Reports has infusion today for Myasthenia gravis. Gets every 2 weeks. Patient reports feeling improved on day of and couple days after infusion. Denies choking. Denies edema. Denies CP  DM is stable.  today which is little elevated. Patient thinks due to infusion. Ranges below 140 usually. Denies hypo/hyperglycemia symptoms. Understands to report above 300 or below 70. Discussed diet. Advised low sugar, low carb diet. Encouraged exercise but patient states is hard due to lung ca and Myasthenia gravis. Discussed follow up call. ACM will call in 2 weeks after Dr. Sandi Omalley appt to discuss treatment and plan. Patient agreed.   Patient understands to reach out to PCP or ACM with questions or needs    PLAN:  Continue with care coordination follow up for Lung ca,

## 2023-08-10 NOTE — FLOWSHEET NOTE
Patient to the floor via wheelchair for his Round 2 week #3 treatment . Vital signs taken. Call light within reach. Wife is at side.

## 2023-08-11 ENCOUNTER — CARE COORDINATION (OUTPATIENT)
Dept: CASE MANAGEMENT | Age: 78
End: 2023-08-11

## 2023-08-11 DIAGNOSIS — K21.9 GASTROESOPHAGEAL REFLUX DISEASE, UNSPECIFIED WHETHER ESOPHAGITIS PRESENT: ICD-10-CM

## 2023-08-11 DIAGNOSIS — R80.9 TYPE 2 DIABETES MELLITUS WITH MICROALBUMINURIA, WITHOUT LONG-TERM CURRENT USE OF INSULIN (HCC): ICD-10-CM

## 2023-08-11 DIAGNOSIS — N18.31 STAGE 3A CHRONIC KIDNEY DISEASE (HCC): ICD-10-CM

## 2023-08-11 DIAGNOSIS — E11.29 TYPE 2 DIABETES MELLITUS WITH MICROALBUMINURIA, WITHOUT LONG-TERM CURRENT USE OF INSULIN (HCC): ICD-10-CM

## 2023-08-11 DIAGNOSIS — I10 PRIMARY HYPERTENSION: Chronic | ICD-10-CM

## 2023-08-11 DIAGNOSIS — K76.0 FATTY INFILTRATION OF LIVER: Chronic | ICD-10-CM

## 2023-08-11 DIAGNOSIS — C34.11 MALIGNANT NEOPLASM OF UPPER LOBE OF RIGHT LUNG (HCC): ICD-10-CM

## 2023-08-11 DIAGNOSIS — E55.9 VITAMIN D DEFICIENCY: ICD-10-CM

## 2023-08-11 DIAGNOSIS — E78.2 MIXED HYPERLIPIDEMIA: Chronic | ICD-10-CM

## 2023-08-11 DIAGNOSIS — I25.119 CORONARY ARTERY DISEASE INVOLVING NATIVE CORONARY ARTERY OF NATIVE HEART WITH ANGINA PECTORIS (HCC): Chronic | ICD-10-CM

## 2023-08-11 DIAGNOSIS — J44.9 CHRONIC OBSTRUCTIVE PULMONARY DISEASE, UNSPECIFIED COPD TYPE (HCC): Chronic | ICD-10-CM

## 2023-08-11 LAB
ALBUMIN SERPL-MCNC: 4.5 G/DL (ref 3.5–4.6)
ALP SERPL-CCNC: 45 U/L (ref 35–104)
ALT SERPL-CCNC: 25 U/L (ref 0–41)
ANION GAP SERPL CALCULATED.3IONS-SCNC: 12 MEQ/L (ref 9–15)
ANISOCYTOSIS BLD QL SMEAR: ABNORMAL
AST SERPL-CCNC: 21 U/L (ref 0–40)
BASOPHILS # BLD: 0.1 K/UL (ref 0–0.2)
BASOPHILS NFR BLD: 1 %
BILIRUB SERPL-MCNC: 0.5 MG/DL (ref 0.2–0.7)
BUN SERPL-MCNC: 17 MG/DL (ref 8–23)
CALCIUM SERPL-MCNC: 10.1 MG/DL (ref 8.5–9.9)
CHLORIDE SERPL-SCNC: 97 MEQ/L (ref 95–107)
CHOLEST SERPL-MCNC: 250 MG/DL (ref 0–199)
CO2 SERPL-SCNC: 29 MEQ/L (ref 20–31)
CREAT SERPL-MCNC: 1.13 MG/DL (ref 0.7–1.2)
EOSINOPHIL # BLD: 0.2 K/UL (ref 0–0.7)
EOSINOPHIL NFR BLD: 2 %
ERYTHROCYTE [DISTWIDTH] IN BLOOD BY AUTOMATED COUNT: 17.1 % (ref 11.5–14.5)
GLOBULIN SER CALC-MCNC: 1.9 G/DL (ref 2.3–3.5)
GLUCOSE SERPL-MCNC: 121 MG/DL (ref 70–99)
HCT VFR BLD AUTO: 40.7 % (ref 42–52)
HDLC SERPL-MCNC: 59 MG/DL (ref 40–59)
HGB BLD-MCNC: 13.5 G/DL (ref 14–18)
LDLC SERPL CALC-MCNC: ABNORMAL MG/DL (ref 0–129)
LYMPHOCYTES # BLD: 0.8 K/UL (ref 1–4.8)
LYMPHOCYTES NFR BLD: 8 %
MCH RBC QN AUTO: 34.8 PG (ref 27–31.3)
MCHC RBC AUTO-ENTMCNC: 33.1 % (ref 33–37)
MCV RBC AUTO: 105.1 FL (ref 79–92.2)
MONOCYTES # BLD: 0.5 K/UL (ref 0.2–0.8)
MONOCYTES NFR BLD: 5 %
NEUTROPHILS # BLD: 8.1 K/UL (ref 1.4–6.5)
NEUTS SEG NFR BLD: 84 %
PLATELET # BLD AUTO: 185 K/UL (ref 130–400)
PLATELET BLD QL SMEAR: ADEQUATE
POLYCHROMASIA BLD QL SMEAR: ABNORMAL
POTASSIUM SERPL-SCNC: 4.7 MEQ/L (ref 3.4–4.9)
PROT SERPL-MCNC: 6.4 G/DL (ref 6.3–8)
RBC # BLD AUTO: 3.88 M/UL (ref 4.7–6.1)
SODIUM SERPL-SCNC: 138 MEQ/L (ref 135–144)
TRIGL SERPL-MCNC: 427 MG/DL (ref 0–150)
WBC # BLD AUTO: 9.7 K/UL (ref 4.8–10.8)

## 2023-08-11 NOTE — CARE COORDINATION
Date/Time:  8/11/2023 2:24 PM  LPN attempted to reach patient by telephone regarding red alert in remote patient monitoring program. Left HIPPA compliant message requesting a return call. Will attempt to reach patient again.     ENROLLED IN RPM FOR DM COPD HTN  Red alert for pulse ox of 90%

## 2023-08-11 NOTE — CARE COORDINATION
Remote Alert Monitoring Note  Rpm alert to be reviewed by the provider   red alert   pulse ox reading (90%)   Additional needs to be addressed by N/A: No                    Date/Time:  2023 2:49 PM  Patient Current Location: Home: [address]  [address]  LPN contacted patient by telephone. Verified patients name and  as identifiers. Background: enrolled in RPM for DM COPD and HTN  Refer to 911 immediately if:  Patient unresponsive or unable to provide history  Change in cognition or sudden confusion  Patient unable to respond in complete sentences  Intense chest pain/tightness  Any concern for any clinical emergency  Red Alert: Provider response time of 1 hr required for any red alert requiring intervention  Yellow Alert: Provider response time of 3hr required for any escalated yellow alert    O2 Triage  Are you having any Chest Pain? no   Are you having any Shortness of Breath? yes   Swelling in your hands or feet? YES     Are you having any other health concerns or issues? Yes  PARTIAL LUNG REMOVED       Clinical Interventions: Reviewed and followed up on alerts and treatments-SPOKE TO CRISTY regarding RPM red alert for low pulse ox reading of 90%. Pt states he had a partial lung removal in January of this year. He does not use oxygen at home. Uses his Trelegy inhaler daily . Rarely uses his albuterol inhaler. Requested pt recheck his pulse ox now. New reading is 92%    Plan/Follow Up: Will continue to review, monitor and address alerts with follow up based on severity of symptoms and risk factors.

## 2023-08-12 LAB — VITAMIN D 25-HYDROXY: 39.4 NG/ML

## 2023-08-14 DIAGNOSIS — I26.99 PULMONARY EMBOLISM AND INFARCTION (HCC): ICD-10-CM

## 2023-08-15 ENCOUNTER — CARE COORDINATION (OUTPATIENT)
Dept: CARE COORDINATION | Age: 78
End: 2023-08-15

## 2023-08-15 ENCOUNTER — TELEPHONE (OUTPATIENT)
Dept: PULMONOLOGY | Age: 78
End: 2023-08-15

## 2023-08-15 NOTE — CARE COORDINATION
Remote Patient Monitoring Welcome Note  Date/Time:  8/15/2023 9:51 AM  Patient Current Location: [place]  Verified patients name and  as identifiers. Completed and confirmed the following:   Emergency Contact: quinten Dhillon [phone]  [x] Patient received all RPM equipment (tablet, scale, blood pressure device and cuff, and pulse oximeter)  Cuff Size: regular (9.05\"-15.74\")    Weight Scale:  regular (<330lbs)                    [x] Instructed patient keep box for use when returning equipment                                                          [x] Reviewed Patient Welcome Letter with patient                         [x] Reviewed expectations for patient and care team  Monitoring hours M-F 9-4pm  Completing monitoring by 12pm on  so that alerts can be responded to in the same day  Patient weighs self at same time every day (or after urinating and waking up)  Take blood pressure 1-2 hrs after medications   RPM team may have different phone area code (including [place], [place], [place] or [place])                              [x] Instructed patient to keep scale on flat surface                                                         [x] Instructed patient to keep tablet plugged in at all times                         [x] Instructed how to contact IT support ([phone])  [x] Provided Remote Patient Monitoring care  information               All questions answered at this time. RPM active and compliant. Vitals stable.

## 2023-08-15 NOTE — TELEPHONE ENCOUNTER
----- Message from Ifeanyi Scherer MD sent at 8/15/2023 10:46 AM EDT -----    ----- Message -----  From: Candie King Incoming Radiant Results From [employer]  Sent: 8/10/2023   1:19 PM EDT  To: Ifeanyi Scherer MD

## 2023-08-15 NOTE — TELEPHONE ENCOUNTER
Please approve or deny this request.    Rx requested:  Requested Prescriptions     Pending Prescriptions Disp Refills    apixaban (ELIQUIS) 5 MG TABS tablet 60 tablet 3     Sig: Take 1 tablet by mouth 2 times daily         Last Office Visit:   8/3/2023      Next Visit Date:  Future Appointments   Date Time Provider [address]   8/16/2023  9:30 AM SCHEDULE, MLOR TC JOSEERST PCP TESTOSTERONE MLOX [facility]   8/17/2023 10:30 AM MLOZ INFUSION CHAIR 6 42[facility]   8/18/2023  7:30 PM [place] MRI ROOM 2 MLOZ MRI MOLZ Fac RAD   8/30/2023  9:30 AM SCHEDULE, GERBER LINKT PCP TESTOSTERONE MLOX Amh [facility]   9/6/2023  3:00 PM CHEMO Guz[facility] - [facility]   9/21/2023  1:00 PM Michi Velasco  [facility]   10/2/2023 10:00 AM MD Isamar Lugo Main   10/17/2023  3:00 PM Phil Aktins MD [facility] [address] [place] [address] Neurology    10/30/2023 11:00 AM MD Isamar Lugo Main   11/7/2023  1:30 PM Yoko Thomas MD 1010 [facility]   8/6/2024 11:20 AM Chuy Olvera DO [address]

## 2023-08-16 ENCOUNTER — NURSE ONLY (OUTPATIENT)
Dept: FAMILY MEDICINE CLINIC | Age: 78
End: 2023-08-16
Payer: MEDICARE

## 2023-08-16 DIAGNOSIS — E29.1 HYPOGONADISM IN MALE: Primary | ICD-10-CM

## 2023-08-16 PROCEDURE — 96372 THER/PROPH/DIAG INJ SC/IM: CPT | Performed by: INTERNAL MEDICINE

## 2023-08-16 RX ORDER — TESTOSTERONE CYPIONATE 200 MG/ML
120 INJECTION, SOLUTION INTRAMUSCULAR ONCE
Status: COMPLETED | OUTPATIENT
Start: 2023-08-16 | End: 2023-08-16

## 2023-08-16 RX ADMIN — TESTOSTERONE CYPIONATE 120 MG: 200 INJECTION, SOLUTION INTRAMUSCULAR at 10:19

## 2023-08-16 NOTE — PROGRESS NOTES
Patient given Testosterone 120mg IM left gluteal..  Will return in 2 weeks for next injection    Patient tolerated injection well.     Administrations This Visit       testosterone cypionate (DEPOTESTOTERONE CYPIONATE) injection 120 mg       Admin Date  08/16/2023 Action  Given Dose  120 mg Route  [employer] Administered By  Dennis Reyna LPN

## 2023-08-16 NOTE — RESULT ENCOUNTER NOTE
Please notify patient that recent labwork shows the followin. Cholesterol testing shows high total cholesterol and very high triglyceride levels which preclude the calculation of his bad cholesterol level. This is significantly different compared to previous testing which is typically been at goal control. Please find out if patient had this testing done when he was not fasting. Also please find out if patient is taking rosuvastatin 20 mg daily as prescribed. Please advise patient that any other lab results not specifically mentioned in message above are either normal, stable compared to previous results, not clinically significant, or would not change the current treatment plan.

## 2023-08-17 ENCOUNTER — CARE COORDINATION (OUTPATIENT)
Dept: CASE MANAGEMENT | Age: 78
End: 2023-08-17

## 2023-08-17 ENCOUNTER — HOSPITAL ENCOUNTER (OUTPATIENT)
Dept: INFUSION THERAPY | Age: 78
Setting detail: INFUSION SERIES
Discharge: HOME | End: 2023-08-17
Payer: MEDICARE

## 2023-08-17 VITALS
SYSTOLIC BLOOD PRESSURE: 103 MMHG | OXYGEN SATURATION: 94 % | TEMPERATURE: 97.9 F | RESPIRATION RATE: 18 BRPM | DIASTOLIC BLOOD PRESSURE: 64 MMHG | HEART RATE: 81 BPM

## 2023-08-17 DIAGNOSIS — G70.00 MYASTHENIA GRAVIS (HCC): ICD-10-CM

## 2023-08-17 DIAGNOSIS — G70.00 MYASTHENIA GRAVIS, ACHR ANTIBODY POSITIVE (HCC): Primary | ICD-10-CM

## 2023-08-17 PROCEDURE — 96365 THER/PROPH/DIAG IV INF INIT: CPT

## 2023-08-17 PROCEDURE — 6360000002 HC RX W HCPCS: Performed by: PSYCHIATRY & NEUROLOGY

## 2023-08-17 PROCEDURE — 2580000003 HC RX 258: Performed by: PSYCHIATRY & NEUROLOGY

## 2023-08-17 RX ADMIN — EFGARTIGIMOD ALFA 864 MG: 20 INJECTION INTRAVENOUS at 11:15

## 2023-08-17 NOTE — CARE COORDINATION
Remote Alert Monitoring Note  Rpm alert to be reviewed by the provider   red alert   weight (190.4)   Additional needs to be addressed by N/A: No                    Date/Time:  2023 11:37 AM  Patient Current Location: Home: [address]  [address] [place] [address]  LPN contacted patient by telephone. Verified patients name and  as identifiers. Background: Patient is enrolled in RPM r/t DM, COPD, HTN  Refer to 911 immediately if:  Patient unresponsive or unable to provide history  Change in cognition or sudden confusion  Patient unable to respond in complete sentences  Intense chest pain/tightness  Any concern for any clinical emergency  Red Alert: Provider response time of 1 hr required for any red alert requiring intervention  Yellow Alert: Provider response time of 3hr required for any escalated yellow alert    Clinical Interventions:  LPN CC spoke with patient. States he is doing fine. We reviewed his recent weights. It appears that the weight taken  was an error. He states he has a regular scale at home as well & the weight on that that day was 188. LPN CC to change weight as the weight taken  was 167.4, which is well below baseline. Patient also notes that d/t illness he had recently lost some weight. He is actively trying to regain some of this weight. Patient does not have CHF listed as a dx. LPN CC to send to E96 for possible consideration on removing weight metric altogether to avoid alerts d/t weight increase. Plan/Follow Up: Will continue to review, monitor and address alerts with follow up based on severity of symptoms and risk factors.    ---- Current Patient Metrics ---- Blood Pressure: 116/62, 91bpm Glucose: -mg/dl Pulseox: 93%, 88bpm Survey: - Weight: 190.4lbs Note Created at: 2023 11:43 AM ET ---- Time-Spent: 7 minutes 0 seconds

## 2023-08-17 NOTE — FLOWSHEET NOTE
Patient to the floor for his last vyvgart infusion. Vital signs taken. No distress noted. Call light within reach. Wife at side.

## 2023-08-17 NOTE — CARE COORDINATION
Can stop weight monitoring through RPM per patients request.    No CHF diagnosis  Is trying to gain weight   Request to D/C weight monitoring to RPM team

## 2023-08-18 ENCOUNTER — HOSPITAL ENCOUNTER (OUTPATIENT)
Dept: MRI IMAGING | Age: 78
End: 2023-08-18
Attending: INTERNAL MEDICINE
Payer: MEDICARE

## 2023-08-18 DIAGNOSIS — C34.11 MALIGNANT NEOPLASM OF UPPER LOBE OF RIGHT LUNG (HCC): ICD-10-CM

## 2023-08-18 PROCEDURE — 70553 MRI BRAIN STEM W/O & W/DYE: CPT

## 2023-08-18 PROCEDURE — 6360000004 HC RX CONTRAST MEDICATION: Performed by: INTERNAL MEDICINE

## 2023-08-18 PROCEDURE — A9577 INJ MULTIHANCE: HCPCS | Performed by: INTERNAL MEDICINE

## 2023-08-18 RX ADMIN — GADOBENATE DIMEGLUMINE 20 ML: 529 INJECTION, SOLUTION INTRAVENOUS at 17:39

## 2023-08-18 NOTE — PROGRESS NOTES
Remote Patient Monitoring Change to Monitoring Parameters    Patient currently being managed with remote patient monitoring for COPD, Diabetes, and HTN. Request to deactivated weight monitoring in order to accommodate patient's baseline measurements, or associated changes in patient's status. According to ACM, pt is trying to GAIN weight and has no documented hx of HF. RPM care plan has been updated to reflect this change. Pt will continue to be offered monitoring of BP, pulse ox, HR, blood glucose and COPD sx survey responses. See care coordination encounters for additional details.

## 2023-08-25 ENCOUNTER — HOSPITAL ENCOUNTER (EMERGENCY)
Age: 78
Discharge: HOME | End: 2023-08-25
Attending: EMERGENCY MEDICINE
Payer: MEDICARE

## 2023-08-25 ENCOUNTER — APPOINTMENT (OUTPATIENT)
Dept: CT IMAGING | Age: 78
End: 2023-08-25
Payer: MEDICARE

## 2023-08-25 ENCOUNTER — CARE COORDINATION (OUTPATIENT)
Dept: CASE MANAGEMENT | Age: 78
End: 2023-08-25

## 2023-08-25 VITALS
HEIGHT: 71 IN | SYSTOLIC BLOOD PRESSURE: 117 MMHG | OXYGEN SATURATION: 94 % | WEIGHT: 189 LBS | DIASTOLIC BLOOD PRESSURE: 63 MMHG | BODY MASS INDEX: 26.46 KG/M2 | TEMPERATURE: 98.4 F | HEART RATE: 102 BPM | RESPIRATION RATE: 19 BRPM

## 2023-08-25 DIAGNOSIS — R19.7 DIARRHEA, UNSPECIFIED TYPE: Primary | ICD-10-CM

## 2023-08-25 DIAGNOSIS — N30.00 ACUTE CYSTITIS WITHOUT HEMATURIA: ICD-10-CM

## 2023-08-25 LAB
ALBUMIN SERPL-MCNC: 4.6 G/DL (ref 3.5–4.6)
ALP SERPL-CCNC: 63 U/L (ref 35–104)
ALT SERPL-CCNC: 19 U/L (ref 0–41)
ANION GAP SERPL CALCULATED.3IONS-SCNC: 16 MEQ/L (ref 9–15)
AST SERPL-CCNC: 21 U/L (ref 0–40)
BACTERIA URNS QL MICRO: ABNORMAL /HPF
BASOPHILS # BLD: 0 K/UL (ref 0–0.1)
BASOPHILS NFR BLD: 0.4 % (ref 0.2–1.2)
BILIRUB SERPL-MCNC: 1 MG/DL (ref 0.2–0.7)
BILIRUB UR QL STRIP: NEGATIVE
BUN SERPL-MCNC: 17 MG/DL (ref 8–23)
CALCIUM SERPL-MCNC: 10.4 MG/DL (ref 8.5–9.9)
CHLORIDE SERPL-SCNC: 95 MEQ/L (ref 95–107)
CLARITY UR: CLEAR
CO2 SERPL-SCNC: 26 MEQ/L (ref 20–31)
COLOR UR: YELLOW
CREAT SERPL-MCNC: 1.29 MG/DL (ref 0.7–1.2)
EKG ATRIAL RATE: 110 BPM
EKG P AXIS: 59 DEGREES
EKG P-R INTERVAL: 142 MS
EKG Q-T INTERVAL: 318 MS
EKG QRS DURATION: 88 MS
EKG QTC CALCULATION (BAZETT): 430 MS
EKG R AXIS: 8 DEGREES
EKG T AXIS: 80 DEGREES
EKG VENTRICULAR RATE: 110 BPM
EOSINOPHIL # BLD: 0.1 K/UL (ref 0–0.5)
EOSINOPHIL NFR BLD: 0.5 % (ref 0.8–7)
ERYTHROCYTE [DISTWIDTH] IN BLOOD BY AUTOMATED COUNT: 15 % (ref 11.6–14.4)
GLOBULIN SER CALC-MCNC: 2.3 G/DL (ref 2.3–3.5)
GLUCOSE SERPL-MCNC: 121 MG/DL (ref 70–99)
GLUCOSE UR STRIP-MCNC: NEGATIVE MG/DL
HCT VFR BLD AUTO: 43.4 % (ref 42–52)
HGB BLD-MCNC: 14.3 G/DL (ref 13.7–17.5)
HGB UR QL STRIP: ABNORMAL
IMM GRANULOCYTES # BLD: 0.1 K/UL
IMM GRANULOCYTES NFR BLD: 0.9 %
INR PPP: 1.2
KETONES UR STRIP-MCNC: ABNORMAL MG/DL
LEUKOCYTE ESTERASE UR QL STRIP: ABNORMAL
LYMPHOCYTES # BLD: 0.9 K/UL (ref 1.3–3.6)
LYMPHOCYTES NFR BLD: 9.5 %
MAGNESIUM SERPL-MCNC: 1.7 MG/DL (ref 1.7–2.4)
MCH RBC QN AUTO: 34.1 PG (ref 25.7–32.2)
MCHC RBC AUTO-ENTMCNC: 32.9 % (ref 32.3–36.5)
MCV RBC AUTO: 103.6 FL (ref 79–92.2)
MONOCYTES # BLD: 0.8 K/UL (ref 0.3–0.8)
MONOCYTES NFR BLD: 8.6 % (ref 5.3–12.2)
NEUTROPHILS # BLD: 7.3 K/UL (ref 1.8–5.4)
NEUTS SEG NFR BLD: 80.1 % (ref 34–67.9)
NITRITE UR QL STRIP: POSITIVE
PH UR STRIP: 7 [PH] (ref 5–9)
PLATELET # BLD AUTO: 177 K/UL (ref 163–337)
POTASSIUM SERPL-SCNC: 4.3 MEQ/L (ref 3.4–4.9)
PROT SERPL-MCNC: 6.9 G/DL (ref 6.3–8)
PROT UR STRIP-MCNC: 30 MG/DL
PROTHROMBIN TIME: 15.5 SEC (ref 12.3–14.9)
RBC # BLD AUTO: 4.19 M/UL (ref 4.63–6.08)
RBC #/AREA URNS HPF: ABNORMAL /HPF (ref 0–2)
SODIUM SERPL-SCNC: 137 MEQ/L (ref 135–144)
SP GR UR STRIP: 1.01 (ref 1–1.03)
TROPONIN T SERPL-MCNC: <0.01 NG/ML (ref 0–0.01)
TSH SERPL-MCNC: 0.66 UIU/ML (ref 0.44–3.86)
URINE REFLEX TO CULTURE: YES
UROBILINOGEN UR STRIP-ACNC: 0.2 E.U./DL
WBC # BLD AUTO: 9.1 K/UL (ref 4.2–9)
WBC #/AREA URNS HPF: ABNORMAL /HPF (ref 0–5)

## 2023-08-25 PROCEDURE — 81001 URINALYSIS AUTO W/SCOPE: CPT

## 2023-08-25 PROCEDURE — 87449 NOS EACH ORGANISM AG IA: CPT

## 2023-08-25 PROCEDURE — 83735 ASSAY OF MAGNESIUM: CPT

## 2023-08-25 PROCEDURE — 84484 ASSAY OF TROPONIN QUANT: CPT

## 2023-08-25 PROCEDURE — 93005 ELECTROCARDIOGRAM TRACING: CPT

## 2023-08-25 PROCEDURE — 74177 CT ABD & PELVIS W/CONTRAST: CPT

## 2023-08-25 PROCEDURE — 6370000000 HC RX 637 (ALT 250 FOR IP): Performed by: EMERGENCY MEDICINE

## 2023-08-25 PROCEDURE — 36415 COLL VENOUS BLD VENIPUNCTURE: CPT

## 2023-08-25 PROCEDURE — 87324 CLOSTRIDIUM AG IA: CPT

## 2023-08-25 PROCEDURE — 84443 ASSAY THYROID STIM HORMONE: CPT

## 2023-08-25 PROCEDURE — 6360000004 HC RX CONTRAST MEDICATION: Performed by: EMERGENCY MEDICINE

## 2023-08-25 PROCEDURE — 87507 IADNA-DNA/RNA PROBE TQ 12-25: CPT

## 2023-08-25 PROCEDURE — 87086 URINE CULTURE/COLONY COUNT: CPT

## 2023-08-25 PROCEDURE — 99285 EMERGENCY DEPT VISIT HI MDM: CPT

## 2023-08-25 PROCEDURE — 85025 COMPLETE CBC W/AUTO DIFF WBC: CPT

## 2023-08-25 PROCEDURE — 80053 COMPREHEN METABOLIC PANEL: CPT

## 2023-08-25 PROCEDURE — 94640 AIRWAY INHALATION TREATMENT: CPT

## 2023-08-25 PROCEDURE — 93010 ELECTROCARDIOGRAM REPORT: CPT | Performed by: INTERNAL MEDICINE

## 2023-08-25 PROCEDURE — 2580000003 HC RX 258: Performed by: EMERGENCY MEDICINE

## 2023-08-25 PROCEDURE — 85610 PROTHROMBIN TIME: CPT

## 2023-08-25 RX ORDER — CIPROFLOXACIN 500 MG/1
500 TABLET, FILM COATED ORAL 2 TIMES DAILY
Qty: 14 TABLET | Refills: 0 | Status: SHIPPED | OUTPATIENT
Start: 2023-08-25 | End: 2023-09-01

## 2023-08-25 RX ORDER — 0.9 % SODIUM CHLORIDE 0.9 %
1000 INTRAVENOUS SOLUTION INTRAVENOUS ONCE
Status: COMPLETED | OUTPATIENT
Start: 2023-08-25 | End: 2023-08-25

## 2023-08-25 RX ORDER — IPRATROPIUM BROMIDE AND ALBUTEROL SULFATE 2.5; .5 MG/3ML; MG/3ML
1 SOLUTION RESPIRATORY (INHALATION) ONCE
Status: COMPLETED | OUTPATIENT
Start: 2023-08-25 | End: 2023-08-25

## 2023-08-25 RX ORDER — LOPERAMIDE HYDROCHLORIDE 2 MG/1
4 CAPSULE ORAL ONCE
Status: COMPLETED | OUTPATIENT
Start: 2023-08-25 | End: 2023-08-25

## 2023-08-25 RX ORDER — DIPHENOXYLATE HYDROCHLORIDE AND ATROPINE SULFATE 2.5; .025 MG/1; MG/1
1 TABLET ORAL 4 TIMES DAILY PRN
Qty: 20 TABLET | Refills: 0 | Status: SHIPPED | OUTPATIENT
Start: 2023-08-25 | End: 2023-08-30

## 2023-08-25 RX ADMIN — LOPERAMIDE HYDROCHLORIDE 4 MG: 2 CAPSULE ORAL at 16:29

## 2023-08-25 RX ADMIN — IPRATROPIUM BROMIDE AND ALBUTEROL SULFATE 1 DOSE: .5; 3 SOLUTION RESPIRATORY (INHALATION) at 16:41

## 2023-08-25 RX ADMIN — IOPAMIDOL 75 ML: 755 INJECTION, SOLUTION INTRAVENOUS at 17:16

## 2023-08-25 RX ADMIN — SODIUM CHLORIDE 1000 ML: 9 INJECTION, SOLUTION INTRAVENOUS at 16:29

## 2023-08-25 ASSESSMENT — ENCOUNTER SYMPTOMS
COUGH: 0
STRIDOR: 0
EYE DISCHARGE: 0
WHEEZING: 0
SORE THROAT: 0
FACIAL SWELLING: 0
DIARRHEA: 1
TROUBLE SWALLOWING: 0
ABDOMINAL PAIN: 0
BLOOD IN STOOL: 0
CHEST TIGHTNESS: 0
EYE REDNESS: 0
CONSTIPATION: 0
VOICE CHANGE: 0
SHORTNESS OF BREATH: 0
VOMITING: 0
BACK PAIN: 0
CHOKING: 0
SINUS PRESSURE: 0
EYE PAIN: 0

## 2023-08-25 ASSESSMENT — PAIN - FUNCTIONAL ASSESSMENT
PAIN_FUNCTIONAL_ASSESSMENT: NONE - DENIES PAIN
PAIN_FUNCTIONAL_ASSESSMENT: NONE - DENIES PAIN

## 2023-08-25 NOTE — CARE COORDINATION
Remote Alert Monitoring Note  Rpm alert to be reviewed by the provider   red alert   blood pressure heart rate (125) and pulse ox reading (126)   Additional needs to be addressed by PCP: No                    Date/Time:  2023 1:34 PM  Patient Current Location: Home: [address]  [address]  LPN contacted patient by telephone. Verified patients name and  as identifiers. Background: ENROLLED IN RPM DM COPD HTN   Refer to 911 immediately if:  Patient unresponsive or unable to provide history  Change in cognition or sudden confusion  Patient unable to respond in complete sentences  Intense chest pain/tightness  Any concern for any clinical emergency  Red Alert: Provider response time of 1 hr required for any red alert requiring intervention  Yellow Alert: Provider response time of 3hr required for any escalated yellow alert    HR Triage  Are you having any Chest Pain? no   Are you having any Shortness of Breath? no   Are you having any dizziness? no   Are you feeling more fatigued or tired than normal? yes   Are you having any other health concerns or issues? Yes  DIARRHEA UPSET STOMACH  pt has cancer of lung and liver      Clinical Interventions: Reviewed and followed up on alerts and treatments-Spoke to Benny Mclaughlin regarding [facility] red alert for high heart rate. Pt states he is having issues at home with his refrigerator and he has a repair man there now. He has no taken any medications today. He is very upset about his food being spoiled. Educated on self care. Requested pt take medications now and recheck metrics. Pt also states he has lung and liver CA. He has an upset stomach and diarrhea. Pt took meds while on the phone with this nurse. Message sent to PCP. Plan/Follow Up: Will continue to review, monitor and address alerts with follow up based on severity of symptoms and risk factors.

## 2023-08-25 NOTE — CARE COORDINATION
Remote Patient Monitoring Note  Date/Time:  2023 2:59 PM  Patient Current Location: Home: [address] [place] [address] [place] [id number]  LPN contacted patient by telephone regarding red alert received for blood pressure heart rate (137) and pulse ox heart rate (126). Verified patients name and  as identifiers. Background: enrolled in RPM for DM COPD HTN  Clinical Interventions: Reviewed and followed up on alerts and treatments-spoke to Ileana Reagan regarding high heart rate. Pt reports he has had diarrhea x 2 days. Ill feeling. Educated on dehydration. Pt reports he is going to the ED. He has an appt at [facility] Monday r/t CA diagnosis. Escalated alert to RN-Rosa Tovar RN ACM  Escalated alert to PCP-Dr Prado    Plan/Follow Up: Will continue to review, monitor and address alerts with follow up based on severity of symptoms and risk factors.

## 2023-08-25 NOTE — ED PROVIDER NOTES
[facility] ED  eMERGENCY dEPARTMENT eNCOUnter      Pt Name: Aundrea Duarte  MRN: [id number]  [address] [facility] [place] 1945  Date of evaluation: 8/25/2023  Provider: Yolanda Blackwell MD    [address]       Chief Complaint   Patient presents with    Shortness of Breath     Starting yesterday    Diarrhea         HISTORY OF PRESENT ILLNESS   (Location/Symptom, Timing/Onset,Context/Setting, Quality, Duration, Modifying Factors, Severity)  Note limiting factors. Aundrea Duarte is a 66 y.o. male who presents to the emergency department patient with history of lung cancer undergone right upper lobe lobectomy in the past history of pulmonary embolism renal insufficiency anxiety depression , unsteady gait patient recently undergoing testing and shows patient has a right hilar mass in the report patient received that is metastatic to liver that made him upset and patient thinking that that is because of his diarrhea as patient has no recent traveling no use of antibiotics denies any seafood recently patient has several episodes of diarrhea with slight mucus started yesterday few episodes did not eat this morning but no vomiting no nausea patient has not started any chemo yet but patient is supposed to go for second opinion patient is short of breath is nothing unusual for him denies any chest tightness or chest pain at this time no fever no chills    HPI    NursingNotes were reviewed. REVIEW OF SYSTEMS    (2-9 systems for level 4, 10 or more for level 5)     Review of Systems   Constitutional:  Positive for activity change and appetite change. Negative for fever. HENT:  Negative for congestion, drooling, facial swelling, mouth sores, nosebleeds, sinus pressure, sore throat, trouble swallowing and voice change. Eyes:  Negative for pain, discharge, redness and visual disturbance. Respiratory:  Negative for cough, choking, chest tightness, shortness of breath, wheezing and stridor.     Cardiovascular:  Negative for Take 1 tablet by mouth Take 2 tablets by mouth in the morning and 1 tablet in the evening. BLOOD GLUCOSE MONITORING SUPPL CHENCHO    Test once daily. Dx: E11.29    BLOOD GLUCOSE TEST STRIPS (FREESTYLE LITE) STRIP    As needed. BUSPIRONE (BUSPAR) 10 MG TABLET    Take 1 tablet by mouth in the morning and at bedtime Indications: Feeling Anxious Take one pill twice daily for anxiety. CINNAMON PO    Take 500 mg by mouth 2 times daily. CYANOCOBALAMIN (VITAMIN B 12 PO)    Take 1,000 mg by mouth daily    DICLOFENAC SODIUM (VOLTAREN) 1 % GEL    Apply 4 g topically 4 times daily as needed for Pain    DULAGLUTIDE 3 MG/0.5ML SOPN    Inject 3 mg into the skin once a week    EFGARTIGIMOD LEON-FCAB (VYVGART) 400 MG/20ML SOLN INJECTION    10 mg/kg (maximum dose: 1.2 g) once weekly for 4 weeks. Subsequent treatment cycles of 10 mg/kg (maximum dose: 1.2 g) once weekly for 4 weeks may be administered based on clinical evaluation and no sooner than 50 days from the start of the previous treatment cycle. 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  Max Daily Amount: 2 tablets    INSULIN GLARGINE (LANTUS SOLOSTAR) 100 UNIT/ML INJECTION PEN    Inject 20 Units into the skin nightly    INSULIN PEN NEEDLE ([employer] PEN NEEDLES 31G) 31G X 8 MM MISC    1

## 2023-08-25 NOTE — ED TRIAGE NOTES
Pt states he had part of his lung removed due to cancer on Jan 30th, 2023, states he was informed yesterday that he has cancer in his liver now too. Pt states shortness of breath began after visiting his doctor yesterday, denies pain.  Pt states he is suppose to start chemo on Friday and has another appt next week in [place] for 2nd opinion

## 2023-08-26 LAB
ADV 40+41 DNA STL QL NAA+NON-PROBE: NOT DETECTED
C CAYETANENSIS DNA STL QL NAA+NON-PROBE: NOT DETECTED
C COLI+JEJ+UPSA DNA STL QL NAA+NON-PROBE: NOT DETECTED
C DIFF TOX A+B STL QL IA: NORMAL
CRYPTOSP DNA STL QL NAA+NON-PROBE: NOT DETECTED
E HISTOLYT DNA STL QL NAA+NON-PROBE: NOT DETECTED
EAEC PAA PLAS AGGR+AATA ST NAA+NON-PRB: NOT DETECTED
EC STX1+STX2 GENES STL QL NAA+NON-PROBE: NOT DETECTED
EPEC EAE GENE STL QL NAA+NON-PROBE: NOT DETECTED
ETEC LTA+ST1A+ST1B TOX ST NAA+NON-PROBE: NOT DETECTED
G LAMBLIA DNA STL QL NAA+NON-PROBE: NOT DETECTED
GI PATH DNA+RNA PNL STL NAA+NON-PROBE: NOT DETECTED
NOROVIRUS GI+II RNA STL QL NAA+NON-PROBE: NOT DETECTED
P SHIGELLOIDES DNA STL QL NAA+NON-PROBE: NOT DETECTED
RVA RNA STL QL NAA+NON-PROBE: NOT DETECTED
S ENT+BONG DNA STL QL NAA+NON-PROBE: NOT DETECTED
SAPO I+II+IV+V RNA STL QL NAA+NON-PROBE: NOT DETECTED
SHIGELLA SP+EIEC IPAH ST NAA+NON-PROBE: NOT DETECTED
V CHOL+PARA+VUL DNA STL QL NAA+NON-PROBE: NOT DETECTED
V CHOLERAE DNA STL QL NAA+NON-PROBE: NOT DETECTED
Y ENTEROCOL DNA STL QL NAA+NON-PROBE: NOT DETECTED

## 2023-08-26 NOTE — ED NOTES
I was advised patient has UTI. I ordered Cipro Rx and it was sent to the pharmacy.        Marylin Walsh DO  08/25/23 2008

## 2023-08-26 NOTE — ED NOTES
Patients spouse notified of antibiotic prescription sent to pharmacy.       Charley Pineda RN  08/25/23 [id number]

## 2023-08-27 LAB — BACTERIA UR CULT: NORMAL

## 2023-08-28 ENCOUNTER — CARE COORDINATION (OUTPATIENT)
Dept: CARE COORDINATION | Age: 78
End: 2023-08-28

## 2023-08-28 DIAGNOSIS — J30.89 PERENNIAL ALLERGIC RHINITIS: Chronic | ICD-10-CM

## 2023-08-28 RX ORDER — IPRATROPIUM BROMIDE 42 UG/1
2 SPRAY, METERED NASAL 3 TIMES DAILY
Qty: 45 ML | Refills: 3 | Status: SHIPPED | OUTPATIENT
Start: 2023-08-28

## 2023-08-28 ASSESSMENT — ENCOUNTER SYMPTOMS: DYSPNEA ASSOCIATED WITH: EXERTION

## 2023-08-28 NOTE — CARE COORDINATION
Ambulatory Care Coordination Note  2023    Patient Current Location: [address] [place] [address]     ACM contacted the patient by telephone. Verified name and  with patient as identifiers. Provided introduction to self, and explanation of the ACM role. Challenges to be reviewed by the provider   Additional needs identified to be addressed with provider: No  none               Method of communication with provider: none. ACM: Tee Isaacs, RN    spoke with patient for care coordination follow up for COPD, Lung cancer, CAD, DM, Myasthenia gravis, and ED follow up  Patient states he is waiting room at [facility] oncology waiting for follow up appt with oncologist.  Patient states he is reviewing testing and will decide next step if chemo or what options there are. Patient advised this ACM to call next week for update. Patient in ED on  for diarrhea. UA showed UTI and given Cipro and Lomotil. Patient states diarrhea is gone today and denies dysuria, hematuria, or other symptoms. Advised to finish Cipro as prescribed. Voiced understanding  COPD at baseline. Denies new or worsening symptoms. Denies cough or congestion. SOB noted and normal for patient d/t lung cancer and lobectomy. DM is stable.  today. Still having ups and downs with BS. Denies hypo/hyperglycemia episodes. Advised to report below 70 or above 300. Voiced understanding  RPM active and compliance. /71, 124/66, 119/73  P been elevated due to not feeling well.   Monitor for additional needs, advised to call with questions.       PLAN:  Continue with care coordination follow up for Lung ca, COPD, DM, CAD, Myasthenia gravis  Review zone tools  Reinforce early symptom monitoring  Encourage low sugar, low sodium diet  Encourage exercise  Review oncology findings  Monitor BS  Continue with RPM  Continue medications   Palliative   Endo  10/2 PCP  10/17 Neuro      Offered patient enrollment in the Remote Patient Monitoring (RPM)

## 2023-08-29 ENCOUNTER — CARE COORDINATION (OUTPATIENT)
Dept: CASE MANAGEMENT | Age: 78
End: 2023-08-29

## 2023-08-29 NOTE — CARE COORDINATION
Remote Patient Monitoring Note  Date/Time:  2023 2:24 PM  Patient Current Location: [place]  LPN contacted patient by telephone regarding  NO METRICS x 2 days  Verified patients name and  as identifiers. Background: enrolled in [facility] for DM COPD AND HTN  Clinical Interventions: Reviewed and followed up on alerts and treatments-spoke to Ilsa Carrillo regarding no metrics x 2 days. Pt states he had an appt yesterday at the [facility] and today he is getting a treatment for his cancer in [place]. Pt will check metrics when available. Plan/Follow Up: Will continue to review, monitor and address alerts with follow up based on severity of symptoms and risk factors.

## 2023-08-30 ENCOUNTER — HOSPITAL ENCOUNTER (OUTPATIENT)
Dept: LAB | Age: 78
Discharge: HOME | End: 2023-08-30
Payer: MEDICARE

## 2023-08-30 ENCOUNTER — NURSE ONLY (OUTPATIENT)
Dept: FAMILY MEDICINE CLINIC | Age: 78
End: 2023-08-30
Payer: MEDICARE

## 2023-08-30 DIAGNOSIS — E29.1 HYPOGONADISM MALE: ICD-10-CM

## 2023-08-30 DIAGNOSIS — E29.1 HYPOGONADISM IN MALE: Primary | ICD-10-CM

## 2023-08-30 LAB
ERYTHROCYTE [DISTWIDTH] IN BLOOD BY AUTOMATED COUNT: 15.1 % (ref 11.5–14.5)
HCT VFR BLD AUTO: 37.5 % (ref 42–52)
HGB BLD-MCNC: 12.8 G/DL (ref 14–18)
MCH RBC QN AUTO: 35.3 PG (ref 27–31.3)
MCHC RBC AUTO-ENTMCNC: 34 % (ref 33–37)
MCV RBC AUTO: 103.9 FL (ref 79–92.2)
PLATELET # BLD AUTO: 228 K/UL (ref 130–400)
PSA SERPL-MCNC: 3.02 NG/ML (ref 0–4)
RBC # BLD AUTO: 3.61 M/UL (ref 4.7–6.1)
SHBG SERPL-SCNC: 68 NMOL/L (ref 11–80)
TESTOST FREE SERPL-MCNC: 24.6 PG/ML (ref 47–244)
TESTOST SERPL-MCNC: 212 NG/DL (ref 220–1000)
WBC # BLD AUTO: 8.7 K/UL (ref 4.8–10.8)

## 2023-08-30 PROCEDURE — 84153 ASSAY OF PSA TOTAL: CPT

## 2023-08-30 PROCEDURE — 96372 THER/PROPH/DIAG INJ SC/IM: CPT | Performed by: FAMILY MEDICINE

## 2023-08-30 PROCEDURE — 36415 COLL VENOUS BLD VENIPUNCTURE: CPT

## 2023-08-30 PROCEDURE — 84403 ASSAY OF TOTAL TESTOSTERONE: CPT

## 2023-08-30 PROCEDURE — 85027 COMPLETE CBC AUTOMATED: CPT

## 2023-08-30 PROCEDURE — 84270 ASSAY OF SEX HORMONE GLOBUL: CPT

## 2023-08-30 RX ORDER — TESTOSTERONE CYPIONATE 200 MG/ML
120 INJECTION, SOLUTION INTRAMUSCULAR ONCE
Status: COMPLETED | OUTPATIENT
Start: 2023-08-30 | End: 2023-08-30

## 2023-08-30 RX ADMIN — TESTOSTERONE CYPIONATE 120 MG: 200 INJECTION, SOLUTION INTRAMUSCULAR at 09:39

## 2023-08-30 NOTE — PROGRESS NOTES
Patient given Testosterone 120 mg IM right gluteal. Will return in 2 weeks for next injection. Tolerated well. Message sent To Dr. Kalyn Childs to make him aware last labs I see done were 12/21/22, labs ordered on 3/22/23 with no results and requested updated Testosterone injection order. 97

## 2023-09-05 ENCOUNTER — CARE COORDINATION (OUTPATIENT)
Dept: CARE COORDINATION | Age: 78
End: 2023-09-05

## 2023-09-05 ASSESSMENT — ENCOUNTER SYMPTOMS: DYSPNEA ASSOCIATED WITH: EXERTION

## 2023-09-05 NOTE — CARE COORDINATION
Whitney Fierro MD [facility] [address] [facility] -   10/30/2023 11:00 AM Angelito Li MD [address]   2023  1:30 PM Woody Unger MD [facility]   2024 11:20 AM DO MADAI Orozco CARD [facility] EMERGENCY [facility]   ,   Diabetes Assessment    Medic Alert ID: No  Meal Planning: Avoidance of concentrated sweets, Calorie counting, Carb counting   How often do you test your blood sugar?: Meals, Daily   Do you have barriers with adherence to non-pharmacologic self-management interventions?  (Nutrition/Exercise/Self-Monitoring): No   Have you ever had to go to the ED for symptoms of low blood sugar?: No       No patient-reported symptoms   Do you have hyperglycemia symptoms?: No   Do you have hypoglycemia symptoms?: No   Last Blood Sugar Value: 144   Blood Sugar Monitoring Regimen: Before Meals, Morning Fasting   Blood Sugar Trends: No Change        ,   COPD Assessment    Does the patient understand envrionmental exposure?: Yes  Is the patient able to verbalize Rescue vs. Long Acting medications?: Yes  Does the patient have a nebulizer?: No  Does the patient use a space with inhaled medications?: No     No patient-reported symptoms         Symptoms:  COPD associated increased fatigue: Pos      Symptom course: stable  Breathlessness: exertion  Increase use of rapid acting/rescue inhaled medications?: No  Change in chronic cough?: No/At Baseline  Change in sputum?: No/At Baseline  Sputum characteristics: Clear, Thin  Self Monitoring - SaO2: Yes  Baseline SaO2 Readin  Have you had a recent diagnosis of pneumonia either by PCP or at a hospital?: No     ,   General Assessment    Do you have any symptoms that are causing concern?: Yes  Progression since Onset: Gradually Worsening  Reported Symptoms: Fatigue, Weakness (Comment:  started Chemo)     , No linked episodes, and This patient was permanently screened out of Care Coordination on 2023 for the following reason:

## 2023-09-06 ENCOUNTER — CARE COORDINATION (OUTPATIENT)
Dept: CASE MANAGEMENT | Age: 78
End: 2023-09-06

## 2023-09-06 NOTE — CARE COORDINATION
Remote Alert Monitoring Note  Rpm alert to be reviewed by the provider   red alert   pulse ox reading (91%)   Additional needs to be addressed by N/A: No                    Date/Time:  2023 11:12 AM  Patient Current Location: Home: [address]  [address]  LPN contacted patient by telephone. Verified patients name and  as identifiers. Background: enrolled in RPM for DM COPD HTN  Refer to 911 immediately if:  Patient unresponsive or unable to provide history  Change in cognition or sudden confusion  Patient unable to respond in complete sentences  Intense chest pain/tightness  Any concern for any clinical emergency  Red Alert: Provider response time of 1 hr required for any red alert requiring intervention  Yellow Alert: Provider response time of 3hr required for any escalated yellow alert    O2 Triage  Are you having any Chest Pain? no   Are you having any Shortness of Breath? no   Swelling in your hands or feet? no     Are you having any other health concerns or issues? Yes  HX OF LUNG CA now liver and thyroid      Clinical Interventions: Reviewed and followed up on alerts and treatments-   spoke to Lakshmi Castaneda regarding pulse ox reading of 91%. Pt states he is feeling \"ok\" Recently started chemotherapy. Denies chest pain or dyspnea. No use of oxygen at home. Pt rechecked his pulse ox. New reading os 95%  Plan/Follow Up: Will continue to review, monitor and address alerts with follow up based on severity of symptoms and risk factors.

## 2023-09-08 ENCOUNTER — HOSPITAL ENCOUNTER (OUTPATIENT)
Age: 78
Setting detail: SPECIMEN
Discharge: HOME | End: 2023-09-08
Payer: MEDICARE

## 2023-09-08 ENCOUNTER — OFFICE VISIT (OUTPATIENT)
Dept: FAMILY MEDICINE CLINIC | Age: 78
End: 2023-09-08

## 2023-09-08 VITALS
DIASTOLIC BLOOD PRESSURE: 62 MMHG | SYSTOLIC BLOOD PRESSURE: 122 MMHG | WEIGHT: 189 LBS | BODY MASS INDEX: 26.36 KG/M2 | OXYGEN SATURATION: 96 % | HEART RATE: 86 BPM

## 2023-09-08 DIAGNOSIS — R81 GLUCOSURIA: ICD-10-CM

## 2023-09-08 DIAGNOSIS — N48.1 BALANITIS: Primary | ICD-10-CM

## 2023-09-08 DIAGNOSIS — R31.9 HEMATURIA, UNSPECIFIED TYPE: ICD-10-CM

## 2023-09-08 LAB
BACTERIA URNS QL MICRO: NEGATIVE /HPF
BILIRUB UR QL STRIP: NEGATIVE
BILIRUBIN, POC: ABNORMAL
BLOOD URINE, POC: ABNORMAL
CLARITY UR: CLEAR
CLARITY, POC: CLEAR
COLOR UR: YELLOW
COLOR, POC: YELLOW
EPI CELLS #/AREA URNS AUTO: ABNORMAL /HPF (ref 0–5)
GLUCOSE UR STRIP-MCNC: 250 MG/DL
GLUCOSE URINE, POC: ABNORMAL
HGB UR QL STRIP: ABNORMAL
HYALINE CASTS #/AREA URNS AUTO: ABNORMAL /HPF (ref 0–5)
KETONES UR STRIP-MCNC: NEGATIVE MG/DL
KETONES, POC: ABNORMAL
LEUKOCYTE EST, POC: ABNORMAL
LEUKOCYTE ESTERASE UR QL STRIP: NEGATIVE
NITRITE UR QL STRIP: NEGATIVE
NITRITE, POC: ABNORMAL
PH UR STRIP: 7.5 [PH] (ref 5–9)
PH, POC: 7
PROT UR STRIP-MCNC: NEGATIVE MG/DL
PROTEIN, POC: ABNORMAL
RBC #/AREA URNS AUTO: ABNORMAL /HPF (ref 0–5)
SP GR UR STRIP: 1.01 (ref 1–1.03)
SPECIFIC GRAVITY, POC: 1.01
UROBILINOGEN UR STRIP-ACNC: 0.2 E.U./DL
UROBILINOGEN, POC: ABNORMAL
WBC #/AREA URNS AUTO: ABNORMAL /HPF (ref 0–5)

## 2023-09-08 PROCEDURE — 81001 URINALYSIS AUTO W/SCOPE: CPT

## 2023-09-08 PROCEDURE — 87086 URINE CULTURE/COLONY COUNT: CPT

## 2023-09-08 RX ORDER — FLUCONAZOLE 150 MG/1
150 TABLET ORAL ONCE
Qty: 1 TABLET | Refills: 0 | Status: SHIPPED | OUTPATIENT
Start: 2023-09-08 | End: 2023-09-08

## 2023-09-08 RX ORDER — CLOTRIMAZOLE 1 %
CREAM (GRAM) TOPICAL
Qty: 60 G | Refills: 0 | Status: SHIPPED | OUTPATIENT
Start: 2023-09-08 | End: 2023-09-15

## 2023-09-08 ASSESSMENT — ENCOUNTER SYMPTOMS
SHORTNESS OF BREATH: 0
NAUSEA: 0
ABDOMINAL PAIN: 0
DIARRHEA: 0
VOMITING: 0

## 2023-09-08 NOTE — PROGRESS NOTES
as needed for Pain      lidocaine (LIDODERM) 5 % Place 1 patch onto the skin nightly as needed for Pain 12 hours on, 12 hours off. Heating Pad PADS by Does not apply route      diclofenac sodium (VOLTAREN) 1 % GEL Apply 4 g topically 4 times daily as needed for Pain      Dulaglutide 3 MG/0.5ML SOPN Inject 3 mg into the skin once a week 6 mL 1    predniSONE (DELTASONE) 20 MG tablet Take 1 tablet by mouth daily 90 tablet 1    ondansetron (ZOFRAN) 4 MG tablet Take 1 tablet by mouth 3 times daily as needed for Nausea or Vomiting 15 tablet 0    sildenafil (VIAGRA) 100 MG tablet TAKE ONE TABLET BY MOUTH APPROXIMATELY ONE HOUR BEFORE SEXUAL ACTIVITY. DO NOT USE MORE THAN ONE DOSE DAILY 6 tablet 0    triamterene-hydroCHLOROthiazide (MAXZIDE-25) 37.5-25 MG per tablet TAKE 1 TABLET BY MOUTH  DAILY 90 tablet 3    famotidine (PEPCID) 40 MG tablet Take 1 tablet by mouth every evening 90 tablet 1    testosterone cypionate (DEPOTESTOTERONE CYPIONATE) 200 MG/ML injection Indications: PER ORDER in OV note 9/16/20 0.6 cc every 2 weeks (Patient taking differently: 0.6 mLs. 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       Cyanocobalamin (VITAMIN B 12 PO) Take 1,000 mg by mouth daily

## 2023-09-10 LAB — BACTERIA UR CULT: NORMAL

## 2023-09-12 ENCOUNTER — CARE COORDINATION (OUTPATIENT)
Dept: CARE COORDINATION | Age: 78
End: 2023-09-12

## 2023-09-12 ASSESSMENT — ENCOUNTER SYMPTOMS: DYSPNEA ASSOCIATED WITH: EXERTION

## 2023-09-12 NOTE — CARE COORDINATION
sugar?: No       No patient-reported symptoms   Do you have hyperglycemia symptoms?: No   Do you have hypoglycemia symptoms?: No   Last Blood Sugar Value: 123   Blood Sugar Monitoring Regimen: Before Meals, Morning Fasting, At Bedtime   Blood Sugar Trends: No Change        ,   COPD Assessment    Does the patient understand envrionmental exposure?: Yes  Is the patient able to verbalize Rescue vs. Long Acting medications?: Yes  Does the patient have a nebulizer?: No  Does the patient use a space with inhaled medications?: No     No patient-reported symptoms         Symptoms:     Symptom course: stable  Breathlessness: exertion  Increase use of rapid acting/rescue inhaled medications?: No  Change in chronic cough?: No/At Baseline  Change in sputum?: No/At Baseline  Self Monitoring - SaO2: Yes  Baseline SaO2 Readin  Have you had a recent diagnosis of pneumonia either by PCP or at a hospital?: No     ,   General Assessment    Do you have any symptoms that are causing concern?: Yes  Progression since Onset: Gradually Worsening  Reported Symptoms: Fatigue (Comment: started Chemo therapy last week)     , No linked episodes, and This patient was permanently screened out of Care Coordination on 2023 for the following reason:

## 2023-09-13 ENCOUNTER — NURSE ONLY (OUTPATIENT)
Dept: FAMILY MEDICINE CLINIC | Age: 78
End: 2023-09-13
Payer: MEDICARE

## 2023-09-13 DIAGNOSIS — E29.1 HYPOGONADISM IN MALE: Primary | ICD-10-CM

## 2023-09-13 PROCEDURE — 96372 THER/PROPH/DIAG INJ SC/IM: CPT | Performed by: FAMILY MEDICINE

## 2023-09-13 RX ORDER — TESTOSTERONE CYPIONATE 200 MG/ML
120 INJECTION, SOLUTION INTRAMUSCULAR ONCE
Status: COMPLETED | OUTPATIENT
Start: 2023-09-13 | End: 2023-09-13

## 2023-09-13 RX ADMIN — TESTOSTERONE CYPIONATE 120 MG: 200 INJECTION, SOLUTION INTRAMUSCULAR at 10:19

## 2023-09-13 NOTE — PROGRESS NOTES
Patient given Testosterone 120mg IM left gluteal..  Will return in 2 weeks for next injection    Patient tolerated injection well.     Administrations This Visit       testosterone cypionate (DEPOTESTOTERONE CYPIONATE) injection 120 mg       Admin Date  09/13/2023 Action  Given Dose  120 mg Route  Intra[employer] Administered By  Christle Rahman LPN

## 2023-09-15 ENCOUNTER — CARE COORDINATION (OUTPATIENT)
Dept: CASE MANAGEMENT | Age: 78
End: 2023-09-15

## 2023-09-15 NOTE — CARE COORDINATION
Remote Alert Monitoring Note  Rpm alert to be reviewed by the provider   red alert   pulse ox reading (91%)   Additional needs to be addressed by N/A: No                    Date/Time:  9/15/2023 12:15 PM  Patient Current Location: Home: [address]  [address]  LPN contacted patient by telephone. Verified patients name and  as identifiers. Background: enrolled in RPM for DM COPD HTN  Refer to 911 immediately if:  Patient unresponsive or unable to provide history  Change in cognition or sudden confusion  Patient unable to respond in complete sentences  Intense chest pain/tightness  Any concern for any clinical emergency  Red Alert: Provider response time of 1 hr required for any red alert requiring intervention  Yellow Alert: Provider response time of 3hr required for any escalated yellow alert    O2 Triage  Are you having any Chest Pain? no   Are you having any Shortness of Breath? no   Swelling in your hands or feet? no     Are you having any other health concerns or issues? Cancer Chemo       Clinical Interventions: Reviewed and followed up on alerts and treatments-spoke to Lyndsay Moreira regarding RPM red alert for pulse ox of 91%. Pt reports no chest pain or dyspnea. Reports fatgue r/t his Cancer diagnosis. No use of oxygen at home. Takes all medications as prescribed. Requested pt recheck his pulse ox. New reading 95%. All other metrics WNL  Plan/Follow Up: Will continue to review, monitor and address alerts with follow up based on severity of symptoms and risk factors.

## 2023-09-17 ENCOUNTER — HOSPITAL ENCOUNTER (EMERGENCY)
Age: 78
Discharge: HOME | End: 2023-09-17
Attending: EMERGENCY MEDICINE
Payer: MEDICARE

## 2023-09-17 VITALS
TEMPERATURE: 98.2 F | BODY MASS INDEX: 26.46 KG/M2 | OXYGEN SATURATION: 96 % | HEIGHT: 71 IN | DIASTOLIC BLOOD PRESSURE: 69 MMHG | WEIGHT: 189 LBS | HEART RATE: 90 BPM | SYSTOLIC BLOOD PRESSURE: 127 MMHG | RESPIRATION RATE: 18 BRPM

## 2023-09-17 DIAGNOSIS — Z71.1 FEARED COMPLAINT WITHOUT DIAGNOSIS: Primary | ICD-10-CM

## 2023-09-17 LAB
ALBUMIN SERPL-MCNC: 4.2 G/DL (ref 3.5–4.6)
ALP SERPL-CCNC: 65 U/L (ref 35–104)
ALT SERPL-CCNC: 34 U/L (ref 0–41)
ANION GAP SERPL CALCULATED.3IONS-SCNC: 14 MEQ/L (ref 9–15)
AST SERPL-CCNC: 22 U/L (ref 0–40)
BASOPHILS # BLD: 0.1 K/UL (ref 0–0.1)
BASOPHILS NFR BLD: 0.8 % (ref 0.2–1.2)
BILIRUB SERPL-MCNC: 0.3 MG/DL (ref 0.2–0.7)
BUN SERPL-MCNC: 25 MG/DL (ref 8–23)
CALCIUM SERPL-MCNC: 9.6 MG/DL (ref 8.5–9.9)
CHLORIDE SERPL-SCNC: 99 MEQ/L (ref 95–107)
CO2 SERPL-SCNC: 23 MEQ/L (ref 20–31)
CREAT SERPL-MCNC: 1.27 MG/DL (ref 0.7–1.2)
EOSINOPHIL # BLD: 0 K/UL (ref 0–0.5)
EOSINOPHIL NFR BLD: 0.2 % (ref 0.8–7)
ERYTHROCYTE [DISTWIDTH] IN BLOOD BY AUTOMATED COUNT: 14.4 % (ref 11.6–14.4)
GLOBULIN SER CALC-MCNC: 2.2 G/DL (ref 2.3–3.5)
GLUCOSE SERPL-MCNC: 205 MG/DL (ref 70–99)
HCT VFR BLD AUTO: 37.5 % (ref 42–52)
HGB BLD-MCNC: 12.2 G/DL (ref 13.7–17.5)
IMM GRANULOCYTES # BLD: 0.4 K/UL
IMM GRANULOCYTES NFR BLD: 5.6 %
LYMPHOCYTES # BLD: 0.7 K/UL (ref 1.3–3.6)
LYMPHOCYTES NFR BLD: 10.1 %
MCH RBC QN AUTO: 33.4 PG (ref 25.7–32.2)
MCHC RBC AUTO-ENTMCNC: 32.5 % (ref 32.3–36.5)
MCV RBC AUTO: 102.7 FL (ref 79–92.2)
MONOCYTES # BLD: 0.9 K/UL (ref 0.3–0.8)
MONOCYTES NFR BLD: 13.4 % (ref 5.3–12.2)
NEUTROPHILS # BLD: 4.5 K/UL (ref 1.8–5.4)
NEUTS SEG NFR BLD: 69.9 % (ref 34–67.9)
PLATELET # BLD AUTO: 154 K/UL (ref 163–337)
POTASSIUM SERPL-SCNC: 4.8 MEQ/L (ref 3.4–4.9)
PROT SERPL-MCNC: 6.4 G/DL (ref 6.3–8)
RBC # BLD AUTO: 3.65 M/UL (ref 4.63–6.08)
SODIUM SERPL-SCNC: 136 MEQ/L (ref 135–144)
WBC # BLD AUTO: 6.4 K/UL (ref 4.2–9)

## 2023-09-17 PROCEDURE — 36415 COLL VENOUS BLD VENIPUNCTURE: CPT

## 2023-09-17 PROCEDURE — 93005 ELECTROCARDIOGRAM TRACING: CPT

## 2023-09-17 PROCEDURE — 80053 COMPREHEN METABOLIC PANEL: CPT

## 2023-09-17 PROCEDURE — 85025 COMPLETE CBC W/AUTO DIFF WBC: CPT

## 2023-09-17 PROCEDURE — 99284 EMERGENCY DEPT VISIT MOD MDM: CPT

## 2023-09-17 ASSESSMENT — PAIN - FUNCTIONAL ASSESSMENT: PAIN_FUNCTIONAL_ASSESSMENT: NONE - DENIES PAIN

## 2023-09-18 LAB
EKG ATRIAL RATE: 92 BPM
EKG P AXIS: 54 DEGREES
EKG P-R INTERVAL: 146 MS
EKG Q-T INTERVAL: 316 MS
EKG QRS DURATION: 96 MS
EKG QTC CALCULATION (BAZETT): 390 MS
EKG R AXIS: 24 DEGREES
EKG T AXIS: 44 DEGREES
EKG VENTRICULAR RATE: 92 BPM

## 2023-09-18 PROCEDURE — 93010 ELECTROCARDIOGRAM REPORT: CPT | Performed by: INTERNAL MEDICINE

## 2023-09-18 NOTE — ED PROVIDER NOTES
07/2018 @ [facility]    Tobacco abuse 12/8/2022    Type 2 diabetes mellitus with microalbuminuria, without long-term current use of insulin ([address]) 3/6/2017    Varicose veins of both lower extremities 9/11/2017         SURGICAL HISTORY       Past Surgical History:   Procedure Laterality Date    ARM SURGERY Right 10/25/2022    BRONCHOSCOPY N/A 01/06/2023    NAVIGATIONAL BRONCHOSCOPY performed by Adolfo Ward MD at [facility] N/A 7/19/2023    BRONCHOSCOPY ENDOBRONCHIAL ULTRASOUND performed by Adolfo Ward MD at [address] [place] [address] [address]      [address]    CATARACT REMOVAL WITH IMPLANT Left 1992    CATARACT REMOVAL WITH IMPLANT Right 1992    COLONOSCOPY  2009    tubular adenomas    COLONOSCOPY  2013    diverticulosis, polyps, 5y repeat ( [address] [place] [address])    CORONARY ARTERY BYPASS GRAFT  07/02/2018    x5 ( [facility] @ [facility])    LUNG REMOVAL, PARTIAL Right 01/31/2023    right thoracoscopic upper lobectomy, mediastinal lymph node dissection (DR PIKE)    MOHS SURGERY Left 09/2017    melanoma of left ear (DR GODWIN)    SKIN CANCER EXCISION Left 08/2017    THORACOSCOPY Right 1/31/2023    right thoracoscopic upper lobectomy performed by Beulah Vaz MD at [address] [place] [address]      duodenitis/ poss early signs of celiac disease         CURRENT MEDICATIONS       Current Discharge Medication List        CONTINUE these medications which have NOT CHANGED    Details   ipratropium (ATROVENT) 0.06 % nasal spray 2 sprays by Nasal route 3 times daily  Qty: 45 mL, Refills: 3    Comments: Requesting 1 year supply  Associated Diagnoses: Perennial allergic rhinitis      apixaban (ELIQUIS) 5 MG TABS tablet Take 1 tablet by mouth 2 times daily  Qty: 60 tablet, Refills: 3    Associated Diagnoses: Pulmonary embolism and infarction (HCC)      metoprolol succinate (TOPROL XL) 50 MG extended release tablet Take 1 tablet by mouth daily  Qty: 90 tablet, Refills: 1    Comments: Requesting 1 year

## 2023-09-18 NOTE — ED NOTES
Pt comes to the ED from home after having outpatient labs drawn. Pt states his pcp called and told him to come to the ER for a critical K+ level. Pt states they did not tell him a number just to come to the ER. Pt denies any ill feeling, states this call was on Saturday but since he felt fine he did not come immediately. Pt is A&Ox4, respirations even and unlabored. Pt given a gown and cardio called for EKG.       Khadijah Mehta RN  09/17/23 2032

## 2023-09-19 ENCOUNTER — CARE COORDINATION (OUTPATIENT)
Dept: CARE COORDINATION | Age: 78
End: 2023-09-19

## 2023-09-19 ASSESSMENT — ENCOUNTER SYMPTOMS: DYSPNEA ASSOCIATED WITH: EXERTION

## 2023-09-19 NOTE — CARE COORDINATION
Ambulatory Care Coordination Note  2023    Patient Current Location:  Home: [address]  [address]     ACM contacted the patient by telephone. Verified name and  with patient as identifiers. Provided introduction to self, and explanation of the ACM role. Challenges to be reviewed by the provider   Additional needs identified to be addressed with provider: No  none               Method of communication with provider: none. ACM: Camron Montoya, RN    spoke with patient for care coordination follow up for COPD, Lung cancer, DM, Myasthenia gravis, CAD  Patient states he is feeling fatigued. Questions chemo & Myasthenia gravis. ACM encouraged rest but reiterated the importance of light exercise/activity as well. Patient voiced understanding. 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 ACM provided listening ear and offered encouragement. COPD at baseline. SOB w/ exertion stable. Reports occas increase in SOB depending on how many days after chemo or if Myasthenia gravis symptoms are flaring up. Pulse ox 91-95% room air. Denies cough or congestion. DM stable. BS . Denies hypo/hyperglycemia symptoms. Takes medications as prescribed  Lung cancer is stable. Started Chemo few weeks ago. Denies nausea/vomiting. Reports fatigue. Denies bleeding, dizziness, or headaches. RPM active. /66, 123/64, 112/63, 124/72. ED visit on  for elevated Potassium at hem/onc. Potassium in ED was normal and patient had no symptoms so sent home. PLAN:  Continue with care coordination follow up  Reinforce early symptom monitoring  Continue Chemo  Continue medications  Monitor BS  Continue RPM    Offered patient enrollment in the Remote Patient Monitoring (RPM) program for in-home monitoring: Yes, patient already enrolled.     Lab Results       None                 Goals Addressed                   This

## 2023-09-21 ENCOUNTER — OFFICE VISIT (OUTPATIENT)
Dept: ENDOCRINOLOGY | Age: 78
End: 2023-09-21
Payer: MEDICARE

## 2023-09-21 VITALS
HEART RATE: 92 BPM | HEIGHT: 67 IN | SYSTOLIC BLOOD PRESSURE: 101 MMHG | DIASTOLIC BLOOD PRESSURE: 61 MMHG | OXYGEN SATURATION: 92 % | BODY MASS INDEX: 31.08 KG/M2 | WEIGHT: 198 LBS

## 2023-09-21 DIAGNOSIS — E29.1 HYPOGONADISM MALE: Primary | ICD-10-CM

## 2023-09-21 PROCEDURE — 3078F DIAST BP <80 MM HG: CPT | Performed by: INTERNAL MEDICINE

## 2023-09-21 PROCEDURE — 3074F SYST BP LT 130 MM HG: CPT | Performed by: INTERNAL MEDICINE

## 2023-09-21 PROCEDURE — 1123F ACP DISCUSS/DSCN MKR DOCD: CPT | Performed by: INTERNAL MEDICINE

## 2023-09-21 PROCEDURE — 99213 OFFICE O/P EST LOW 20 MIN: CPT | Performed by: INTERNAL MEDICINE

## 2023-09-21 PROCEDURE — G8417 CALC BMI ABV UP PARAM F/U: HCPCS | Performed by: INTERNAL MEDICINE

## 2023-09-21 PROCEDURE — 1036F TOBACCO NON-USER: CPT | Performed by: INTERNAL MEDICINE

## 2023-09-21 PROCEDURE — G8427 DOCREV CUR MEDS BY ELIG CLIN: HCPCS | Performed by: INTERNAL MEDICINE

## 2023-09-21 NOTE — PROGRESS NOTES
9/21/2023    Assessment:       Diagnosis Orders   1. Hypogonadism male              PLAN:     Discontinue testosterone for now while patient is getting treated for his cancer follow-up as needed after that    Subjective:     Chief Complaint   Patient presents with    Hypogonadism     Vitals:    09/21/23 1302   BP: 101/61   Pulse: 92   SpO2: 92%   Weight: 198 lb (89.8 kg)   Height: 5' 7\" (1.702 m)     Wt Readings from Last 3 Encounters:   09/21/23 198 lb (89.8 kg)   09/17/23 189 lb (85.7 kg)   09/08/23 189 lb (85.7 kg)     BP Readings from Last 3 Encounters:   09/21/23 101/61   09/17/23 127/69   09/08/23 122/62     Follow-up on hypogonadism patient on testosterone injections recent history of lung cancer and according to patient history of liver and lymph node cancer getting treatment to chemo and cancer center locally complains of fatigue    Other  This is a chronic problem. The current episode started more than 1 year ago. The problem has been waxing and waning. Associated symptoms include fatigue. Treatments tried: Testosterone injections. The treatment provided moderate relief.      Past Medical History:   Diagnosis Date    Atypical chest pain 8/30/2012    BPH (benign prostatic hypertrophy)     CAD (coronary artery disease)     Cataracts, bilateral     Chronic low back pain     COPD (chronic obstructive pulmonary disease) ([address]) 6/1/2006    DM2 (diabetes mellitus, type 2) (HCC)     Duodenitis     Erectile dysfunction 3/6/2017    Fatty infiltration of liver 11/19/2014    GERD (gastroesophageal reflux disease)     History of melanoma excision 12/11/2017    Left ear 09/2017    History of tobacco use 6/1/2006    HTN (hypertension)     Hyperlipidemia     Malignant melanoma of face ([address])     Malignant melanoma of skin of face ([address])     RT ear 2000, LT ear 2017    MG (myasthenia gravis) ([address])     Nephrolithiasis     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    Osteoarthritis of multiple joints     hands, hips    Perennial

## 2023-09-22 ENCOUNTER — CARE COORDINATION (OUTPATIENT)
Dept: CARE COORDINATION | Age: 78
End: 2023-09-22

## 2023-09-22 NOTE — CARE COORDINATION
Remote Patient Monitoring Note  Date/Time:  2023 1:31 PM  Patient Current Location: [place]  RN  contacted  wife Alexander Fowler  by telephone regarding No VS metrics x2 days. Verified patients name and  as identifiers. Background: Pt enrolled in RPM r/t DM, COPD, HTN  Clinical Interventions: Reviewed and followed up on alerts and treatments-RN spoke to quinten Fowler, states equipment is functioning, pt is currently at appt for chemo treatment and left the house early for appts yesterday. RN voiced understanding. Will plan for pt to resume RPM tomorrow if able. Plan/Follow Up: Will continue to review, monitor and address alerts with follow up based on severity of symptoms and risk factors.

## 2023-09-25 ENCOUNTER — CARE COORDINATION (OUTPATIENT)
Dept: CASE MANAGEMENT | Age: 78
End: 2023-09-25

## 2023-09-25 NOTE — CARE COORDINATION
Remote Alert Monitoring Note  Rpm alert to be reviewed by the provider   red alert   pulse ox reading (91%)   Additional needs to be addressed by N/A: No                    Date/Time:  2023 9:18 AM  Patient Current Location: Home: [address]  [address] [place] [address]  LPN contacted patient by telephone. Verified patients name and  as identifiers. Background: ENROLLED IN RPM FOR DM COPD HTN   Refer to 911 immediately if:  Patient unresponsive or unable to provide history  Change in cognition or sudden confusion  Patient unable to respond in complete sentences  Intense chest pain/tightness  Any concern for any clinical emergency  Red Alert: Provider response time of 1 hr required for any red alert requiring intervention  Yellow Alert: Provider response time of 3hr required for any escalated yellow alert    O2 Triage  Are you having any Chest Pain? no   Are you having any Shortness of Breath? no   Swelling in your hands or feet? no     Are you having any other health concerns or issues? no      Clinical Interventions: Reviewed and followed up on alerts and treatments-spoke to Dena Mcfarland regarding RPM red alert for low pulse ox reading. Pt denies chest pain or dyspnea. Speaks in full sentences with no respiratory distress. Pt has used his inhaler this morning. No use of oxygen. Requested pt recheck pulse ox. Reading remains at 91%. Requested pt warm his hands and recheck. Pulse ox 93%. Plan/Follow Up: Will continue to review, monitor and address alerts with follow up based on severity of symptoms and risk factors.

## 2023-09-27 ENCOUNTER — CARE COORDINATION (OUTPATIENT)
Dept: CARE COORDINATION | Age: 78
End: 2023-09-27

## 2023-09-27 NOTE — CARE COORDINATION
Attempted to reach for care coordination follow up  Left message to return call or will call next week

## 2023-10-02 ENCOUNTER — CARE COORDINATION (OUTPATIENT)
Dept: CARE COORDINATION | Age: 78
End: 2023-10-02

## 2023-10-02 ENCOUNTER — CARE COORDINATION (OUTPATIENT)
Dept: CASE MANAGEMENT | Age: 78
End: 2023-10-02

## 2023-10-02 ENCOUNTER — OFFICE VISIT (OUTPATIENT)
Dept: PALLATIVE CARE | Age: 78
End: 2023-10-02
Payer: MEDICARE

## 2023-10-02 VITALS
TEMPERATURE: 97.9 F | OXYGEN SATURATION: 93 % | SYSTOLIC BLOOD PRESSURE: 112 MMHG | BODY MASS INDEX: 30.23 KG/M2 | WEIGHT: 193 LBS | DIASTOLIC BLOOD PRESSURE: 62 MMHG | HEART RATE: 96 BPM | RESPIRATION RATE: 20 BRPM

## 2023-10-02 DIAGNOSIS — R53.81 PHYSICAL DEBILITY: ICD-10-CM

## 2023-10-02 DIAGNOSIS — R52 INCREASED PAIN: ICD-10-CM

## 2023-10-02 DIAGNOSIS — C34.91 NON-SMALL CELL CANCER OF RIGHT LUNG (HCC): Primary | ICD-10-CM

## 2023-10-02 DIAGNOSIS — Z51.5 PALLIATIVE CARE ENCOUNTER: ICD-10-CM

## 2023-10-02 DIAGNOSIS — R63.0 POOR APPETITE: ICD-10-CM

## 2023-10-02 DIAGNOSIS — Z78.9 IMPAIRED MOBILITY AND ADLS: ICD-10-CM

## 2023-10-02 DIAGNOSIS — Z74.09 IMPAIRED MOBILITY AND ADLS: ICD-10-CM

## 2023-10-02 DIAGNOSIS — F41.8 ANXIETY ABOUT HEALTH: ICD-10-CM

## 2023-10-02 PROCEDURE — G8417 CALC BMI ABV UP PARAM F/U: HCPCS

## 2023-10-02 PROCEDURE — 3078F DIAST BP <80 MM HG: CPT

## 2023-10-02 PROCEDURE — G8484 FLU IMMUNIZE NO ADMIN: HCPCS

## 2023-10-02 PROCEDURE — 99349 HOME/RES VST EST MOD MDM 40: CPT

## 2023-10-02 PROCEDURE — 3074F SYST BP LT 130 MM HG: CPT

## 2023-10-02 PROCEDURE — 1036F TOBACCO NON-USER: CPT

## 2023-10-02 PROCEDURE — 1123F ACP DISCUSS/DSCN MKR DOCD: CPT

## 2023-10-02 ASSESSMENT — ENCOUNTER SYMPTOMS
BACK PAIN: 1
SHORTNESS OF BREATH: 1
DYSPNEA ASSOCIATED WITH: MINIMAL EXERTION
TROUBLE SWALLOWING: 0
GASTROINTESTINAL NEGATIVE: 1

## 2023-10-02 ASSESSMENT — PATIENT HEALTH QUESTIONNAIRE - PHQ9
SUM OF ALL RESPONSES TO PHQ QUESTIONS 1-9: 1
SUM OF ALL RESPONSES TO PHQ QUESTIONS 1-9: 1
SUM OF ALL RESPONSES TO PHQ9 QUESTIONS 1 & 2: 1
SUM OF ALL RESPONSES TO PHQ QUESTIONS 1-9: 1
2. FEELING DOWN, DEPRESSED OR HOPELESS: 1
1. LITTLE INTEREST OR PLEASURE IN DOING THINGS: 0
SUM OF ALL RESPONSES TO PHQ QUESTIONS 1-9: 1

## 2023-10-02 ASSESSMENT — VISUAL ACUITY: OU: 1

## 2023-10-02 NOTE — PROGRESS NOTES
Subjective:      Patient Id: Seen Ilsa Carrillo at  home in [facility] , for follow up Palliative Care visit for non-small cell cancer of right lung, COPD, back/hip pain and symptom management. He was accompanied to the appointment by: Wife Pat. Chief Complaint   Patient presents with    Cancer     Lung    Follow-up    Back Pain        HPI         Leandro Schroeder is a 66 y.o. male was seen and evaluated palliative care for symptom management related to COPD, increased bilateral lower extremity edema, worsening back pain and non-small cell lung cancer. Ilsa Carrillo has complex medical history that includes non-small cell cancer of right lung S/P right upper lobectomy, COPD, T2DM, myasthenia gravis, chronic low back pain, CAD, coronary artery bypass graft x5, HTN, osteoarthritis, tobacco abuse, HLD, GERD. Home visit is necessary in lieu of office due to significant frailty and high symptom burden from comorbid illnesses. Patient complains of lung cancer, insomnia and back pain. Patient follows with PCP, pulmonology, neurology, oncology, urology and cardiology. General: Upon entering the room I find the patient  ambulating without device, alert and oriented x4, calm, cooperative and in NAD. COPD/SOB: Patient experiences BACA and decreased endurance with current Myasthenia Gravis and chemotherapy. Patient has little sputum production that is tan/clear and thick. No current S/S of respiratory infection at this time. Patient takes medications and treatments as ordered. Lung cancer: Patient recently evaluated and diagnosised with a return of his right lung cancer with lymph node involvement with oncology. Patient is now on chemotherapy. Patient had RUL lobectomy and his providers have been monitoring the nodules. Patient has been experiencing increased BACA while on treatment, insomnia and fatigue.     Pain: Patient's quality of life and pain have been improved with recent addition of Norco 5/325 mg p.o. twice daily as

## 2023-10-02 NOTE — CARE COORDINATION
Remote Alert Monitoring Note  Rpm alert to be reviewed by the provider   yellow alert   blood pressure heart rate (112)   Additional needs to be addressed by N/A: No                    Date/Time:  10/2/2023 12:35 PM  Patient Current Location: Home: [address]  [address] [place] [address]  LPN contacted patient by telephone. Verified patients name and  as identifiers. Background: enrolled in RPM for DM COPD AND HTN  Refer to 911 immediately if:  Patient unresponsive or unable to provide history  Change in cognition or sudden confusion  Patient unable to respond in complete sentences  Intense chest pain/tightness  Any concern for any clinical emergency  Red Alert: Provider response time of 1 hr required for any red alert requiring intervention  Yellow Alert: Provider response time of 3hr required for any escalated yellow alert    HR Triage  Are you having any Chest Pain? no   Are you having any Shortness of Breath? no   Are you having any dizziness? no   Are you feeling more fatigued or tired than normal? no   Are you having any other health concerns or issues? no      Clinical Interventions: Reviewed and followed up on alerts and treatments-spoke to [facility] regarding RPM yellow alert for high HR. Pt   states he feels good. He has fatigue from chemotherapy. No chest pain, dyspnea or dizziness. Requested pt recheck his metrics. New reading os 116/60 HR is 105. Plan/Follow Up: Will continue to review, monitor and address alerts with follow up based on severity of symptoms and risk factors.

## 2023-10-02 NOTE — CARE COORDINATION
Ambulatory Care Coordination Note  10/2/2023    Patient Current Location:  Home: [address]  [address]     ACM contacted the patient by telephone. Verified name and  with patient as identifiers. Provided introduction to self, and explanation of the ACM role. Challenges to be reviewed by the provider   Additional needs identified to be addressed with provider: No  none               Method of communication with provider: none. ACM: Jitendra Yee RN    spoke with patient for care coordination follow up for Myasthenia gravis, COPD, DM, and Lung cancer  RPM active. ACM reviewed last 7 days of activity  Patient is still getting chemo. Next round next week. States makes extremely tired and weak. Getting labs done on Friday. Advised to increase protein and eat 3-5 low fat, low sugar small meals daily. Encouraged to exercise daily to keep strength up. Voiced understanding. Encouraged to get outside this week since it's nice and enjoy sun and fresh air. 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 COPD/lung cancer at baseline. SOB w/ exertion stable. Decreased endurance noted. Slight cough but no infection per palliative care visit today. Chemo every 21 days. Next week dose. DM stable.  today. Ranges   BP stable. 116/60 today. Ranges 106-120/50-60s. Denies dizziness or headaches  Weight increases couple lbs. 193#. States has appetite but gets full quicker. Encouraged to eat small meals more frequently. Voiced understanding. Pain is controlled with Norco, voltaren gel, and lidocaine patches. Advised to exercise and stretch. Take as directed. Encouraged patient to reach out with questions or issues.       PLAN:  Continue with CC follow up  Continue with palliative care  Encourage diet and exercise  Continue with chemo    Offered patient enrollment in the Remote Patient Monitoring (RPM) program

## 2023-10-03 ENCOUNTER — CARE COORDINATION (OUTPATIENT)
Dept: CASE MANAGEMENT | Age: 78
End: 2023-10-03

## 2023-10-03 NOTE — CARE COORDINATION
----- Message from ANGELO Chilel sent at 8/17/2018  8:54 AM EDT -----  Let her know her std results are negative   Remote Alert Monitoring Note  Rpm alert to be reviewed by the provider   yellow alert   blood pressure heart rate (112)   Additional needs to be addressed by N/A: No                    Date/Time:  10/3/2023 10:06 AM  Patient Current Location: Home: [facility] [address]  LPN contacted patient by telephone. Verified patients name and  as identifiers. Background: enrolled in RPM for DM COPD and HTN   Refer to 911 immediately if:  Patient unresponsive or unable to provide history  Change in cognition or sudden confusion  Patient unable to respond in complete sentences  Intense chest pain/tightness  Any concern for any clinical emergency  Red Alert: Provider response time of 1 hr required for any red alert requiring intervention  Yellow Alert: Provider response time of 3hr required for any escalated yellow alert    HR Triage  Are you having any Chest Pain? no   Are you having any Shortness of Breath? no   Are you having any dizziness? no   Are you feeling more fatigued or tired than normal? yes   Are you having any other health concerns or issues? Yes  ankle swelling       Clinical Interventions: Reviewed and followed up on alerts and treatments-spoke to Cornelious Kussmaul regarding RPM yellow alert for increased HR of 112. Pt states he has no chest pain or dyspnea. He reports swelling in ankles of BLE. Pt reports he is taking all medications as directed. Takes Toprol and all other medications as directed. Requested pt recheck his metrics. New BP/ HR is 113/72 ,110. Plan/Follow Up: Will continue to review, monitor and address alerts with follow up based on severity of symptoms and risk factors.

## 2023-10-04 ENCOUNTER — TELEPHONE (OUTPATIENT)
Dept: PRIMARY CARE CLINIC | Age: 78
End: 2023-10-04

## 2023-10-04 ENCOUNTER — CARE COORDINATION (OUTPATIENT)
Dept: CASE MANAGEMENT | Age: 78
End: 2023-10-04

## 2023-10-04 NOTE — CARE COORDINATION
Remote Patient Monitoring Note      Date/Time:  10/4/2023 1:38 PM  Patient [facility] [address] [id number]  LPN contacted patient by telephone regarding red alert received for blood pressure heart rate (128). Verified patients name and  as identifiers. Background: enrolled in RPM for DM COPD AND HTN   Clinical Interventions: Reviewed and followed up on alerts and treatments-spoke to Palmira Hart regarding RPM red alert high HR. Message obtained from PCP stating he should be seen at urgent care of ED. 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 Plan/Follow Up: Will continue to review, monitor and address alerts with follow up based on severity of symptoms and risk factors.

## 2023-10-04 NOTE — CARE COORDINATION
Remote Alert Monitoring Note  Rpm alert to be reviewed by the provider   red alert   blood pressure heart rate (126) and pulse ox heart rate (127)   Additional needs to be addressed by PCP: No                    Date/Time:  10/4/2023 10:03 AM  Patient Current Location: Home: [address]  [address]  LPN contacted patient by telephone. Verified patients name and  as identifiers. Background: enrolled in RPM for DM COPD HTN   Refer to 911 immediately if:  Patient unresponsive or unable to provide history  Change in cognition or sudden confusion  Patient unable to respond in complete sentences  Intense chest pain/tightness  Any concern for any clinical emergency  Red Alert: Provider response time of 1 hr required for any red alert requiring intervention  Yellow Alert: Provider response time of 3hr required for any escalated yellow alert    HR Triage  Are you having any Chest Pain? no   Are you having any Shortness of Breath? no   Are you having any dizziness? no   Are you feeling more fatigued or tired than normal? no   Are you having any other health concerns or issues? no      Clinical Interventions: Reviewed and followed up on alerts and treatments-spoke to Cornelious Kussmaul regarding RPM red alert for high HR. Pt states he has some fatigue but \" I'm not doing anything, I feel fine\" Denies chest pain, dyspnea or dizziness. Pt took medicatoins 2 hours ago. Takes Toprol XL   requested pt recheck metrics. HR remains high. 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 HR remains high 132. Message sent to PCP. Escalated alert to PCP-DR FITZPATRICK   Plan/Follow Up: Will continue to review, monitor and address alerts with follow up based on severity of symptoms and risk factors.

## 2023-10-04 NOTE — TELEPHONE ENCOUNTER
If patient is having any persistent tachycardia he needs to be seen by a provider ASAP for further management.  Patient should be seen in walk-in clinic or ER if walk-in clinic unavailable

## 2023-10-05 ENCOUNTER — CARE COORDINATION (OUTPATIENT)
Dept: CASE MANAGEMENT | Age: 78
End: 2023-10-05

## 2023-10-05 ENCOUNTER — HOSPITAL ENCOUNTER (OUTPATIENT)
Dept: LAB | Age: 78
Discharge: HOME | End: 2023-10-05
Payer: MEDICARE

## 2023-10-05 ENCOUNTER — TELEPHONE (OUTPATIENT)
Dept: CARDIOLOGY CLINIC | Age: 78
End: 2023-10-05

## 2023-10-05 ENCOUNTER — OFFICE VISIT (OUTPATIENT)
Dept: FAMILY MEDICINE CLINIC | Age: 78
End: 2023-10-05

## 2023-10-05 VITALS
OXYGEN SATURATION: 96 % | TEMPERATURE: 97.6 F | HEIGHT: 71 IN | DIASTOLIC BLOOD PRESSURE: 60 MMHG | BODY MASS INDEX: 26.74 KG/M2 | WEIGHT: 191 LBS | SYSTOLIC BLOOD PRESSURE: 120 MMHG | HEART RATE: 119 BPM

## 2023-10-05 DIAGNOSIS — I10 PRIMARY HYPERTENSION: Chronic | ICD-10-CM

## 2023-10-05 DIAGNOSIS — R06.09 DYSPNEA ON EXERTION: ICD-10-CM

## 2023-10-05 DIAGNOSIS — R00.0 TACHYCARDIA: Primary | ICD-10-CM

## 2023-10-05 DIAGNOSIS — J44.9 CHRONIC OBSTRUCTIVE PULMONARY DISEASE, UNSPECIFIED COPD TYPE (HCC): Chronic | ICD-10-CM

## 2023-10-05 DIAGNOSIS — R00.0 TACHYCARDIA: ICD-10-CM

## 2023-10-05 LAB
ANION GAP SERPL CALCULATED.3IONS-SCNC: 14 MEQ/L (ref 9–15)
BASOPHILS # BLD: 0 K/UL (ref 0–0.2)
BASOPHILS NFR BLD: 0.2 %
BNP BLD-MCNC: 506 PG/ML
BUN SERPL-MCNC: 24 MG/DL (ref 8–23)
CALCIUM SERPL-MCNC: 9.4 MG/DL (ref 8.5–9.9)
CHLORIDE SERPL-SCNC: 96 MEQ/L (ref 95–107)
CO2 SERPL-SCNC: 26 MEQ/L (ref 20–31)
CREAT SERPL-MCNC: 1.31 MG/DL (ref 0.7–1.2)
EOSINOPHIL # BLD: 0 K/UL (ref 0–0.7)
EOSINOPHIL NFR BLD: 0 %
ERYTHROCYTE [DISTWIDTH] IN BLOOD BY AUTOMATED COUNT: 15.2 % (ref 11.5–14.5)
GLUCOSE SERPL-MCNC: 365 MG/DL (ref 70–99)
HCT VFR BLD AUTO: 34.7 % (ref 42–52)
HGB BLD-MCNC: 11.3 G/DL (ref 14–18)
LYMPHOCYTES # BLD: 0.3 K/UL (ref 1–4.8)
LYMPHOCYTES NFR BLD: 5.8 %
MCH RBC QN AUTO: 33.6 PG (ref 27–31.3)
MCHC RBC AUTO-ENTMCNC: 32.6 % (ref 33–37)
MCV RBC AUTO: 103.3 FL (ref 79–92.2)
MONOCYTES # BLD: 0.3 K/UL (ref 0.2–0.8)
MONOCYTES NFR BLD: 5.8 %
NEUTROPHILS # BLD: 4.1 K/UL (ref 1.4–6.5)
NEUTS SEG NFR BLD: 86.9 %
PLATELET # BLD AUTO: 101 K/UL (ref 130–400)
POTASSIUM SERPL-SCNC: 5.2 MEQ/L (ref 3.4–4.9)
RBC # BLD AUTO: 3.36 M/UL (ref 4.7–6.1)
SODIUM SERPL-SCNC: 136 MEQ/L (ref 135–144)
TSH SERPL-MCNC: 0.2 UIU/ML (ref 0.44–3.86)
WBC # BLD AUTO: 4.7 K/UL (ref 4.8–10.8)

## 2023-10-05 PROCEDURE — 36415 COLL VENOUS BLD VENIPUNCTURE: CPT

## 2023-10-05 PROCEDURE — 84443 ASSAY THYROID STIM HORMONE: CPT

## 2023-10-05 PROCEDURE — 83880 ASSAY OF NATRIURETIC PEPTIDE: CPT

## 2023-10-05 PROCEDURE — 80048 BASIC METABOLIC PNL TOTAL CA: CPT

## 2023-10-05 PROCEDURE — 85025 COMPLETE CBC W/AUTO DIFF WBC: CPT

## 2023-10-05 ASSESSMENT — ENCOUNTER SYMPTOMS
COUGH: 0
WHEEZING: 0
ABDOMINAL PAIN: 0
SHORTNESS OF BREATH: 1
VOMITING: 0
NAUSEA: 0
CHEST TIGHTNESS: 0
DIARRHEA: 0

## 2023-10-05 NOTE — CARE COORDINATION
Remote Alert Monitoring Note  Rpm alert to be reviewed by the provider   red alert   blood pressure heart rate (127)   Additional needs to be addressed by N/A: No                    Date/Time:  10/5/2023 8:52 AM  Patient Current Location: Home: [address]  [address]  LPN contacted patient by telephone. Verified patients name and  as identifiers. Background: enrolled in RPM for DM COPD HTN  Refer to 911 immediately if:  Patient unresponsive or unable to provide history  Change in cognition or sudden confusion  Patient unable to respond in complete sentences  Intense chest pain/tightness  Any concern for any clinical emergency  Red Alert: Provider response time of 1 hr required for any red alert requiring intervention  Yellow Alert: Provider response time of 3hr required for any escalated yellow alert    HR Triage  Are you having any Chest Pain? no   Are you having any Shortness of Breath? no   Are you having any dizziness? no   Are you feeling more fatigued or tired than normal? yes   Are you having any other health concerns or issues? Yes [address] [place] on chemo      Clinical Interventions: Reviewed and followed up on alerts and treatments-spoke to [facility] regarding RPM red alert for high HR. Pt states he has no chest pain, dyspnea or dizziness. He is fatigued. Pt states his fatigue has increased since starting Chemotherapy for lung CA. Pt has had increased HR x4 days. His PCP recommends being seen at walk in clinic or ED. Pt states he will go today. Update to ACM  Plan/Follow Up: Will continue to review, monitor and address alerts with follow up based on severity of symptoms and risk factors.

## 2023-10-05 NOTE — TELEPHONE ENCOUNTER
10/04/23 11:20 AM     REMOTE PATIENT MONITORING HIGH ALERT RESPONSE     11:30 AM Attempted twice to reach pt but he is not answering the phone at this time. Left message introducing myself as the nurse practitioner with the [address] [facility] remote patient monitoring program and that I would like to talk to him for a few minutes about the alert we received today for elevated HR. Left my phone # in message and requested a return call at his earliest convenience. Next called his wife's phone twice but she also did not answer. Left message as above. Next called RPM LPN and asked if she can try to reach pt and have him call me back. She agrees to this, called pt, but had to leave a message also. 12:29 PM Neither pt nor his wife returned the call. Contacted RPM LPN to inform of this and advise that she let pt's provider team know that alert will need to be handled by them today as I am now off for the day. She agrees to this plan. CHIEF COMPLAINT    Responding to a high RPM alert for BP HR of 126-128 and pulse ox HR of 127     CURRENT MEDICATIONS    Current Outpatient Rx   Medication Sig Dispense Refill    ipratropium (ATROVENT) 0.06 % nasal spray 2 sprays by Nasal route 3 times daily 45 mL 3    apixaban (ELIQUIS) 5 MG TABS tablet Take 1 tablet by mouth 2 times daily 60 tablet 3    metoprolol succinate (TOPROL XL) 50 MG extended release tablet Take 1 tablet by mouth daily 90 tablet 1    fluticasone-umeclidin-vilant (TRELEGY ELLIPTA) 100-62.5-25 MCG/ACT AEPB inhaler Inhale 1 puff into the lungs daily 3 each 2    busPIRone (BUSPAR) 10 MG tablet Take 1 tablet by mouth in the morning and at bedtime Indications: Feeling Anxious Take one pill twice daily for anxiety.  60 tablet 2    tamsulosin (FLOMAX) 0.4 MG capsule Take 2 capsules by mouth daily 180 capsule 1    insulin glargine (LANTUS SOLOSTAR) 100 UNIT/ML injection pen Inject 20 Units into the skin nightly 5 Adjustable Dose Pre-filled Pen Syringe 1    mirtazapine (REMERON) 7.5 MG
If patient is having any persistent tachycardia he needs to be seen by a provider ASAP for further management. Patient should be seen in walk-in clinic or ER if walk-in clinic unavailable.
Pt aware, will come to walk in
No

## 2023-10-05 NOTE — PROGRESS NOTES
Enedina Lewis (: ) is a 66 y.o. male, Established patient, who presents today for:    Chief Complaint   Patient presents with    Tachycardia    Shortness of Breath         ASSESSMENT/PLAN    1. Tachycardia  Comments:  Unclear etiology. Currently hemodynamically stable w/o signs of arrhythmia or acute coronary event on EKG. Will obtain ASAP cardiology F/U for further eval.  Orders:  -     EKG 12 Lead  -     CBC with Auto Differential; Future  -     Basic Metabolic Panel; Future  -     TSH; Future  -     Brain Natriuretic Peptide; Future  2. Dyspnea on exertion  Comments:  Patient instructed to go to the ER for any acutely worsening BACA or any, dyspnea at rest, particularly if associated w/chest pain, N/V, or diaphoresis. Orders:  -     Brain Natriuretic Peptide; Future  3. Primary hypertension  Assessment & Plan:  Blood pressure within normal limits today in the office. Patient instructed to continue with current doses of losartan, metoprolol, and amlodipine. Orders:  -     EKG 12 Lead  -     CBC with Auto Differential; Future  -     Basic Metabolic Panel; Future  -     TSH; Future  -     Brain Natriuretic Peptide; Future  4. Chronic obstructive pulmonary disease, unspecified COPD type ([address])  Assessment & Plan:  Stable baseline respiratory status. Patient instructed to continue with current medication       Return for  [facility] VISIT 2023. SUBJECTIVE/OBJECTIVE:    HPI    Patient presents for acute visit regarding tachycardia. There is a reported 2-day history of heart rate in the 100s to 110s without exertion. Patient denies any chest pain, lightheadedness, dizziness, worsening lower extremity edema, or syncope. He reports baseline dyspnea on exertion with no reported worsening dyspnea at rest.  Patient denies any upper respiratory symptoms or feeling ill. He reports being able to do yard work yesterday without any significant difficulty.   Patient continues with

## 2023-10-05 NOTE — TELEPHONE ENCOUNTER
Spoke with Denisse Toussaint advised to come to [place] to place a Holter  monitor so we can capture what is going on. Then we tobin schedule a appointment to go over there results. Dr Barrera Hay spoke with Dr Александр Heath in regards of plan of care.

## 2023-10-05 NOTE — ASSESSMENT & PLAN NOTE
Blood pressure within normal limits today in the office. Patient instructed to continue with current doses of losartan, metoprolol, and amlodipine.

## 2023-10-06 ENCOUNTER — NURSE ONLY (OUTPATIENT)
Dept: CARDIOLOGY CLINIC | Age: 78
End: 2023-10-06

## 2023-10-09 ENCOUNTER — CARE COORDINATION (OUTPATIENT)
Dept: CASE MANAGEMENT | Age: 78
End: 2023-10-09

## 2023-10-09 NOTE — CARE COORDINATION
Take a few deep breaths before testing. Advised Patient to contact PCP 24/7 regarding Syncope, dizziness and any health concerns for early outpatient intervention in an effort to avoid hospitalization. Report any worsening symptoms to PCP and/or Call 911 and/or GO TO  EMERGENCY ROOM if symptoms are severe or worsening. Expresses understanding. Plan/Follow Up: Will continue to review, monitor and address alerts with follow up based on severity of symptoms and risk factors.

## 2023-10-11 ENCOUNTER — CARE COORDINATION (OUTPATIENT)
Dept: CASE MANAGEMENT | Age: 78
End: 2023-10-11

## 2023-10-11 NOTE — CARE COORDINATION
Remote Alert Monitoring Note  Rpm alert to be reviewed by the provider   yellow alert   blood pressure heart rate (118)   Additional needs to be addressed by N/A: No                    Date/Time:  10/11/2023 10:31 AM  Patient Current Location: [facility] contacted patient by telephone. Verified patients name and  as identifiers. Background: DM, COPD, HTN  Refer to 911 immediately if:  Patient unresponsive or unable to provide history  Change in cognition or sudden confusion  Patient unable to respond in complete sentences  Intense chest pain/tightness  Any concern for any clinical emergency  Red Alert: Provider response time of 1 hr required for any red alert requiring intervention  Yellow Alert: Provider response time of 3hr required for any escalated yellow alert    HR Triage  Are you having any Chest Pain? no   Are you having any Shortness of Breath? no   Are you having any dizziness? no   Are you feeling more fatigued or tired than normal? no   Are you having any other health concerns or issues? no      Clinical Interventions: Reviewed and followed up on alerts and treatments-Patient has elevated BP HR of 118. Patient denies CP, SOB, Uses no oxygen at home, Dizziness, Fatigue. Patient is upset that he didn't get to see Cardiologist yesterday. Patient states, \"I don't have anything going on now\". I will see the Cardiologist next Monday\". Patient has taken medications including Metoprolol 50 mg Daily at 7 AM today. Patient will recheck BP HR later today. Education of patient/family/caregiver/guardian to support self-management-Recheck BP HR   Instructed to place BP cuff on upper arm snuggly. Sit with feet flat on the floor. Sit quietly and relax for 5 minutes before taking BP. Advised Patient to contact PCP  regarding CP, SOB, Dizziness and  any health concerns for early outpatient intervention in an effort to avoid hospitalization.   Report any worsening symptoms to PCP and/or Call 911 and/or

## 2023-10-11 NOTE — CARE COORDINATION
Remote Alert Monitoring Note  Rpm alert to be reviewed by the provider   yellow alert   blood pressure heart rate (118)   Additional needs to be addressed by N/A: No                   Patient rechecked HR for 118. Attempted to call patient. Left message on Voice mail to call back.     Amos Peng LPN    [phone]  [facility] / [address] [place] [address] / Triston Silva

## 2023-10-12 ENCOUNTER — CARE COORDINATION (OUTPATIENT)
Dept: CASE MANAGEMENT | Age: 78
End: 2023-10-12

## 2023-10-12 DIAGNOSIS — R79.89 LOW SERUM THYROID STIMULATING HORMONE (TSH): ICD-10-CM

## 2023-10-12 DIAGNOSIS — I10 PRIMARY HYPERTENSION: Chronic | ICD-10-CM

## 2023-10-12 DIAGNOSIS — E87.5 HYPERKALEMIA: Primary | ICD-10-CM

## 2023-10-12 RX ORDER — METOPROLOL SUCCINATE 50 MG/1
50 TABLET, EXTENDED RELEASE ORAL DAILY
Qty: 30 TABLET | Refills: 0 | Status: SHIPPED | OUTPATIENT
Start: 2023-10-12

## 2023-10-13 VITALS
BODY MASS INDEX: 26.56 KG/M2 | OXYGEN SATURATION: 96 % | HEART RATE: 119 BPM | WEIGHT: 190.4 LBS | DIASTOLIC BLOOD PRESSURE: 75 MMHG | SYSTOLIC BLOOD PRESSURE: 148 MMHG

## 2023-10-16 ENCOUNTER — CARE COORDINATION (OUTPATIENT)
Dept: CARE COORDINATION | Age: 78
End: 2023-10-16

## 2023-10-16 ENCOUNTER — CARE COORDINATION (OUTPATIENT)
Dept: CASE MANAGEMENT | Age: 78
End: 2023-10-16

## 2023-10-16 ENCOUNTER — OFFICE VISIT (OUTPATIENT)
Dept: CARDIOLOGY CLINIC | Age: 78
End: 2023-10-16
Payer: MEDICARE

## 2023-10-16 VITALS
WEIGHT: 197 LBS | OXYGEN SATURATION: 97 % | BODY MASS INDEX: 27.48 KG/M2 | SYSTOLIC BLOOD PRESSURE: 104 MMHG | HEART RATE: 88 BPM | DIASTOLIC BLOOD PRESSURE: 58 MMHG

## 2023-10-16 DIAGNOSIS — I10 ESSENTIAL HYPERTENSION: Chronic | ICD-10-CM

## 2023-10-16 DIAGNOSIS — R06.09 DOE (DYSPNEA ON EXERTION): Primary | ICD-10-CM

## 2023-10-16 PROCEDURE — G8484 FLU IMMUNIZE NO ADMIN: HCPCS | Performed by: INTERNAL MEDICINE

## 2023-10-16 PROCEDURE — G8417 CALC BMI ABV UP PARAM F/U: HCPCS | Performed by: INTERNAL MEDICINE

## 2023-10-16 PROCEDURE — 1036F TOBACCO NON-USER: CPT | Performed by: INTERNAL MEDICINE

## 2023-10-16 PROCEDURE — G8427 DOCREV CUR MEDS BY ELIG CLIN: HCPCS | Performed by: INTERNAL MEDICINE

## 2023-10-16 PROCEDURE — 3074F SYST BP LT 130 MM HG: CPT | Performed by: INTERNAL MEDICINE

## 2023-10-16 PROCEDURE — 99214 OFFICE O/P EST MOD 30 MIN: CPT | Performed by: INTERNAL MEDICINE

## 2023-10-16 PROCEDURE — 1123F ACP DISCUSS/DSCN MKR DOCD: CPT | Performed by: INTERNAL MEDICINE

## 2023-10-16 PROCEDURE — 3078F DIAST BP <80 MM HG: CPT | Performed by: INTERNAL MEDICINE

## 2023-10-16 RX ORDER — POTASSIUM CHLORIDE 750 MG/1
10 TABLET, EXTENDED RELEASE ORAL DAILY
Qty: 7 TABLET | Refills: 1 | Status: ON HOLD | OUTPATIENT
Start: 2023-10-16

## 2023-10-16 RX ORDER — AMLODIPINE BESYLATE 10 MG/1
5 TABLET ORAL DAILY
Qty: 90 TABLET | Refills: 1 | Status: ON HOLD | OUTPATIENT
Start: 2023-10-16

## 2023-10-16 RX ORDER — FUROSEMIDE 20 MG/1
20 TABLET ORAL DAILY
Qty: 7 TABLET | Refills: 1 | Status: ON HOLD | OUTPATIENT
Start: 2023-10-16

## 2023-10-16 ASSESSMENT — ENCOUNTER SYMPTOMS: DYSPNEA ASSOCIATED WITH: EXERTION

## 2023-10-16 NOTE — CARE COORDINATION
Remote Patient Monitoring Note      Date/Time:  10/16/2023 10:53 AM  Patient Current Location: [place]       Background: DM, COPD, HTN  Clinical Interventions: Reviewed and followed up on alerts and treatments-       Routing Current VS to Cardiologist for appointment today. Plan/Follow Up: Will continue to review, monitor and address alerts with follow up based on severity of symptoms and risk factors.

## 2023-10-16 NOTE — PATIENT INSTRUCTIONS
Lasix 20 mg daily x 7 days then stop  Potassium 10 mEq daily x 7 days  Blood work in 1 week   Decrease amlodipine to 5 mg daily  Echocardiogram as soon as possible. Follow up in 6-8 weeks.

## 2023-10-16 NOTE — PROGRESS NOTES
recent stress test (7/6/2023). Historically normal LV systolic function, last known EF 60%. Hypertension with possible hypertensive heart disease  Hyperlipidemia  Diabetes mellitus type 2  COPD  History of DVT/PE on Eliquis  Myasthenic gravis      RECOMMENDATIONS:  Mr. Pio Gutierrez is recommended an echocardiogram for updated structural heart evaluation. We will try to get this done as soon as possible. In the meantime, start Lasix 20 mg daily and potassium KCl he is advised to decrease amlodipine to 5 mg daily. He will continue losartan Crestor and Toprol. The patient is agreeable to the plan. Follow up cardiac evaluation in 6-8 weeks, sooner if needed. Thank you very much for allowing me to participate in Mr. Zoie Luque cardiac care. Should you have any questions, please do not hesitate to contact me.      Sincerely,    Dakota Rizo DO, [address], [address] [facility] [place]

## 2023-10-16 NOTE — CARE COORDINATION
Chuy Olvera DO [address] [facility]   2024  1:00 PM Satinder Woodward  [facility]   2024 11:20 AM Linda Ng DO [facility]   ,   Diabetes Assessment    Medic Alert ID: No  Meal Planning: Avoidance of concentrated sweets, Calorie counting, Carb counting   How often do you test your blood sugar?: Meals, Daily   Do you have barriers with adherence to non-pharmacologic self-management interventions?  (Nutrition/Exercise/Self-Monitoring): No   Have you ever had to go to the ED for symptoms of low blood sugar?: No       No patient-reported symptoms   Do you have hyperglycemia symptoms?: No   Do you have hypoglycemia symptoms?: No   Last Blood Sugar Value: 143   Blood Sugar Monitoring Regimen: Before Meals, Morning Fasting   Blood Sugar Trends: No Change        ,   COPD Assessment    Does the patient understand envrionmental exposure?: Yes  Is the patient able to verbalize Rescue vs. Long Acting medications?: Yes  Does the patient have a nebulizer?: No  Does the patient use a space with inhaled medications?: No     Other symptoms causing concern         Symptoms:  COPD associated increased fatigue: Pos      Symptom course: stable  Breathlessness: exertion  Increase use of rapid acting/rescue inhaled medications?: No  Change in chronic cough?: No/At Baseline  Change in sputum?: No/At Baseline  Self Monitoring - SaO2: Yes  Baseline SaO2 Readin  Have you had a recent diagnosis of pneumonia either by PCP or at a hospital?: No     ,   General Assessment    Do you have any symptoms that are causing concern?: Yes  Progression since Onset: Gradually Worsening  Reported Symptoms: Other (Comment: Myasthenia Gravis symptoms)     , No linked episodes, and This patient was permanently screened out of Care Coordination on 10/16/2023 for the following reason:

## 2023-10-17 ENCOUNTER — OFFICE VISIT (OUTPATIENT)
Dept: NEUROLOGY | Age: 78
End: 2023-10-17
Payer: MEDICARE

## 2023-10-17 ENCOUNTER — HOSPITAL ENCOUNTER (INPATIENT)
Age: 78
LOS: 8 days | Discharge: HOME | DRG: 056 | End: 2023-10-25
Attending: INTERNAL MEDICINE | Admitting: INTERNAL MEDICINE
Payer: MEDICARE

## 2023-10-17 ENCOUNTER — HOSPITAL ENCOUNTER (OUTPATIENT)
Age: 78
Discharge: HOME | End: 2023-10-19
Attending: INTERNAL MEDICINE
Payer: MEDICARE

## 2023-10-17 VITALS
DIASTOLIC BLOOD PRESSURE: 58 MMHG | WEIGHT: 197 LBS | BODY MASS INDEX: 27.58 KG/M2 | SYSTOLIC BLOOD PRESSURE: 104 MMHG | HEIGHT: 71 IN

## 2023-10-17 VITALS
SYSTOLIC BLOOD PRESSURE: 112 MMHG | DIASTOLIC BLOOD PRESSURE: 60 MMHG | HEART RATE: 99 BPM | BODY MASS INDEX: 27.64 KG/M2 | WEIGHT: 198.2 LBS

## 2023-10-17 DIAGNOSIS — C34.90 NON-SMALL CELL LUNG CANCER, UNSPECIFIED LATERALITY (HCC): Primary | ICD-10-CM

## 2023-10-17 DIAGNOSIS — R73.9 HYPERGLYCEMIA: ICD-10-CM

## 2023-10-17 DIAGNOSIS — G70.00 MG (MYASTHENIA GRAVIS) (HCC): ICD-10-CM

## 2023-10-17 DIAGNOSIS — H02.403 PTOSIS OF BOTH EYELIDS: ICD-10-CM

## 2023-10-17 DIAGNOSIS — U07.1 COVID-19: ICD-10-CM

## 2023-10-17 DIAGNOSIS — G70.01 MYASTHENIA GRAVIS IN CRISIS (HCC): ICD-10-CM

## 2023-10-17 DIAGNOSIS — D68.9 COAGULOPATHY (HCC): ICD-10-CM

## 2023-10-17 DIAGNOSIS — G70.00 MYASTHENIA GRAVIS (HCC): Primary | ICD-10-CM

## 2023-10-17 DIAGNOSIS — R06.09 DOE (DYSPNEA ON EXERTION): ICD-10-CM

## 2023-10-17 DIAGNOSIS — R47.1 DYSARTHRIA: ICD-10-CM

## 2023-10-17 PROBLEM — C22.9 LIVER CANCER (HCC): Status: ACTIVE | Noted: 2023-10-17

## 2023-10-17 LAB
ALBUMIN SERPL-MCNC: 4.1 G/DL (ref 3.5–4.6)
ALP SERPL-CCNC: 94 U/L (ref 35–104)
ALT SERPL-CCNC: 39 U/L (ref 0–41)
ANION GAP SERPL CALCULATED.3IONS-SCNC: 10 MEQ/L (ref 9–15)
APTT PPP: 28.7 SEC (ref 24.4–36.8)
AST SERPL-CCNC: 43 U/L (ref 0–40)
BASE EXCESS ARTERIAL: 4 (ref -3–3)
BASOPHILS # BLD: 0 K/UL (ref 0–0.2)
BASOPHILS NFR BLD: 0.4 %
BILIRUB SERPL-MCNC: 0.3 MG/DL (ref 0.2–0.7)
BUN SERPL-MCNC: 31 MG/DL (ref 8–23)
CALCIUM IONIZED: 1.27 MMOL/L (ref 1.12–1.32)
CALCIUM SERPL-MCNC: 9.6 MG/DL (ref 8.5–9.9)
CHLORIDE SERPL-SCNC: 94 MEQ/L (ref 95–107)
CO2 SERPL-SCNC: 27 MEQ/L (ref 20–31)
CREAT SERPL-MCNC: 1.53 MG/DL (ref 0.7–1.2)
ECHO AO ROOT DIAM: 4.5 CM
ECHO AO ROOT INDEX: 2.14 CM/M2
ECHO AV AREA PEAK VELOCITY: 2.9 CM2
ECHO AV AREA VTI: 3.2 CM2
ECHO AV AREA/BSA PEAK VELOCITY: 1.4 CM2/M2
ECHO AV AREA/BSA VTI: 1.5 CM2/M2
ECHO AV CUSP MM: 2 CM
ECHO AV MEAN GRADIENT: 2 MMHG
ECHO AV MEAN VELOCITY: 0.7 M/S
ECHO AV PEAK GRADIENT: 3 MMHG
ECHO AV PEAK VELOCITY: 1 M/S
ECHO AV VELOCITY RATIO: 1
ECHO AV VTI: 21 CM
ECHO BSA: 2.12 M2
ECHO EST RA PRESSURE: 10 MMHG
ECHO LA VOL 2C: 53 ML (ref 18–58)
ECHO LA VOL 2C: 56 ML (ref 18–58)
ECHO LA VOL 4C: 46 ML (ref 18–58)
ECHO LA VOL 4C: 48 ML (ref 18–58)
ECHO LA VOLUME AREA LENGTH: 53 ML
ECHO LA VOLUME INDEX AREA LENGTH: 25 ML/M2 (ref 16–34)
ECHO LV E' LATERAL VELOCITY: 13 CM/S
ECHO LV E' SEPTAL VELOCITY: 8 CM/S
ECHO LV EDV A2C: 77 ML
ECHO LV EDV A4C: 93 ML
ECHO LV EDV BP: 87 ML (ref 67–155)
ECHO LV EDV INDEX A4C: 44 ML/M2
ECHO LV EDV INDEX BP: 41 ML/M2
ECHO LV EDV NDEX A2C: 37 ML/M2
ECHO LV EJECTION FRACTION A2C: 59 %
ECHO LV EJECTION FRACTION A4C: 60 %
ECHO LV EJECTION FRACTION BIPLANE: 59 % (ref 55–100)
ECHO LV ESV A2C: 31 ML
ECHO LV ESV A4C: 38 ML
ECHO LV ESV BP: 35 ML (ref 22–58)
ECHO LV ESV INDEX A2C: 15 ML/M2
ECHO LV ESV INDEX A4C: 18 ML/M2
ECHO LV ESV INDEX BP: 17 ML/M2
ECHO LV FRACTIONAL SHORTENING: 30 % (ref 28–44)
ECHO LV INTERNAL DIMENSION DIASTOLE INDEX: 2.19 CM/M2
ECHO LV INTERNAL DIMENSION DIASTOLIC: 4.6 CM (ref 4.2–5.9)
ECHO LV INTERNAL DIMENSION SYSTOLIC INDEX: 1.52 CM/M2
ECHO LV INTERNAL DIMENSION SYSTOLIC: 3.2 CM
ECHO LV IVSD: 1.1 CM (ref 0.6–1)
ECHO LV IVSS: 1.4 CM
ECHO LV MASS 2D: 169.9 G (ref 88–224)
ECHO LV MASS INDEX 2D: 80.9 G/M2 (ref 49–115)
ECHO LV POSTERIOR WALL DIASTOLIC: 1 CM (ref 0.6–1)
ECHO LV POSTERIOR WALL SYSTOLIC: 1.2 CM
ECHO LV RELATIVE WALL THICKNESS RATIO: 0.43
ECHO LVOT AREA: 3.1 CM2
ECHO LVOT AV VTI INDEX: 1.03
ECHO LVOT DIAM: 2 CM
ECHO LVOT MEAN GRADIENT: 2 MMHG
ECHO LVOT PEAK GRADIENT: 3 MMHG
ECHO LVOT PEAK VELOCITY: 1 M/S
ECHO LVOT STROKE VOLUME INDEX: 32.4 ML/M2
ECHO LVOT SV: 68.1 ML
ECHO LVOT VTI: 21.7 CM
ECHO MV A VELOCITY: 0.99 M/S
ECHO MV AREA VTI: 3.1 CM2
ECHO MV E DECELERATION TIME (DT): 238.4 MS
ECHO MV E VELOCITY: 0.68 M/S
ECHO MV E/A RATIO: 0.69
ECHO MV E/E' LATERAL: 5.23
ECHO MV E/E' RATIO (AVERAGED): 6.87
ECHO MV E/E' SEPTAL: 8.5
ECHO MV LVOT VTI INDEX: 1.01
ECHO MV MAX VELOCITY: 1 M/S
ECHO MV MEAN GRADIENT: 2 MMHG
ECHO MV MEAN VELOCITY: 0.7 M/S
ECHO MV PEAK GRADIENT: 4 MMHG
ECHO MV VTI: 22 CM
ECHO PV MAX VELOCITY: 0.9 M/S
ECHO PV PEAK GRADIENT: 3 MMHG
ECHO RIGHT VENTRICULAR SYSTOLIC PRESSURE (RVSP): 27 MMHG
ECHO RV INTERNAL DIMENSION: 2.8 CM
ECHO TV REGURGITANT MAX VELOCITY: 2.05 M/S
ECHO TV REGURGITANT PEAK GRADIENT: 17 MMHG
EOSINOPHIL # BLD: 0 K/UL (ref 0–0.7)
EOSINOPHIL NFR BLD: 0 %
ERYTHROCYTE [DISTWIDTH] IN BLOOD BY AUTOMATED COUNT: 16.8 % (ref 11.5–14.5)
FIBRINOGEN PPP-MCNC: 479 MG/DL (ref 262–562)
GLOBULIN SER CALC-MCNC: 2.6 G/DL (ref 2.3–3.5)
GLUCOSE BLD-MCNC: 289 MG/DL (ref 70–99)
GLUCOSE BLD-MCNC: 346 MG/DL (ref 70–99)
GLUCOSE BLD-MCNC: 366 MG/DL (ref 70–99)
GLUCOSE SERPL-MCNC: 380 MG/DL (ref 70–99)
HCO3 ARTERIAL: 28 MMOL/L (ref 21–29)
HCT VFR BLD AUTO: 35 % (ref 41–53)
HCT VFR BLD AUTO: 35.7 % (ref 42–52)
HGB BLD CALC-MCNC: 12 GM/DL (ref 13.5–17.5)
HGB BLD-MCNC: 11.7 G/DL (ref 14–18)
INR PPP: 1.3
LACTATE: 2.77 MMOL/L (ref 0.4–2)
LYMPHOCYTES # BLD: 0.7 K/UL (ref 1–4.8)
LYMPHOCYTES NFR BLD: 7.7 %
MCH RBC QN AUTO: 33.8 PG (ref 27–31.3)
MCHC RBC AUTO-ENTMCNC: 32.8 % (ref 33–37)
MCV RBC AUTO: 103.2 FL (ref 79–92.2)
MONOCYTES # BLD: 0.9 K/UL (ref 0.2–0.8)
MONOCYTES NFR BLD: 9.5 %
NEUTROPHILS # BLD: 7.2 K/UL (ref 1.4–6.5)
NEUTS SEG NFR BLD: 78.7 %
O2 SAT, ARTERIAL: 96 % (ref 93–100)
PCO2 ARTERIAL: 40 MM HG (ref 35–45)
PERFORMED ON: ABNORMAL
PH ARTERIAL: 7.45 (ref 7.35–7.45)
PLATELET # BLD AUTO: 349 K/UL (ref 130–400)
PO2 ARTERIAL: 80 MM HG (ref 75–108)
POC CHLORIDE: 97 MEQ/L (ref 99–110)
POC CREATININE: 1.4 MG/DL (ref 0.8–1.3)
POC SAMPLE TYPE: ABNORMAL
POTASSIUM SERPL-SCNC: 4.5 MEQ/L (ref 3.5–5.1)
POTASSIUM SERPL-SCNC: 5.4 MEQ/L (ref 3.4–4.9)
PROT SERPL-MCNC: 6.7 G/DL (ref 6.3–8)
PROTHROMBIN TIME: 16.2 SEC (ref 12.3–14.9)
RBC # BLD AUTO: 3.46 M/UL (ref 4.7–6.1)
SODIUM BLD-SCNC: 131 MEQ/L (ref 136–145)
SODIUM SERPL-SCNC: 131 MEQ/L (ref 135–144)
TCO2 ARTERIAL: 29 MMOL/L (ref 21–32)
WBC # BLD AUTO: 9.1 K/UL (ref 4.8–10.8)

## 2023-10-17 PROCEDURE — 85384 FIBRINOGEN ACTIVITY: CPT

## 2023-10-17 PROCEDURE — 80053 COMPREHEN METABOLIC PANEL: CPT

## 2023-10-17 PROCEDURE — 1210000000 HC MED SURG R&B

## 2023-10-17 PROCEDURE — 36600 WITHDRAWAL OF ARTERIAL BLOOD: CPT

## 2023-10-17 PROCEDURE — 6360000002 HC RX W HCPCS: Performed by: INTERNAL MEDICINE

## 2023-10-17 PROCEDURE — 1036F TOBACCO NON-USER: CPT | Performed by: PSYCHIATRY & NEUROLOGY

## 2023-10-17 PROCEDURE — 82803 BLOOD GASES ANY COMBINATION: CPT

## 2023-10-17 PROCEDURE — 1123F ACP DISCUSS/DSCN MKR DOCD: CPT | Performed by: PSYCHIATRY & NEUROLOGY

## 2023-10-17 PROCEDURE — 85014 HEMATOCRIT: CPT

## 2023-10-17 PROCEDURE — 2580000003 HC RX 258: Performed by: PHYSICIAN ASSISTANT

## 2023-10-17 PROCEDURE — 3075F SYST BP GE 130 - 139MM HG: CPT | Performed by: PSYCHIATRY & NEUROLOGY

## 2023-10-17 PROCEDURE — 84295 ASSAY OF SERUM SODIUM: CPT

## 2023-10-17 PROCEDURE — G8427 DOCREV CUR MEDS BY ELIG CLIN: HCPCS | Performed by: PSYCHIATRY & NEUROLOGY

## 2023-10-17 PROCEDURE — 84132 ASSAY OF SERUM POTASSIUM: CPT

## 2023-10-17 PROCEDURE — 93306 TTE W/DOPPLER COMPLETE: CPT

## 2023-10-17 PROCEDURE — 99285 EMERGENCY DEPT VISIT HI MDM: CPT

## 2023-10-17 PROCEDURE — 36415 COLL VENOUS BLD VENIPUNCTURE: CPT

## 2023-10-17 PROCEDURE — G8417 CALC BMI ABV UP PARAM F/U: HCPCS | Performed by: PSYCHIATRY & NEUROLOGY

## 2023-10-17 PROCEDURE — 82435 ASSAY OF BLOOD CHLORIDE: CPT

## 2023-10-17 PROCEDURE — G8484 FLU IMMUNIZE NO ADMIN: HCPCS | Performed by: PSYCHIATRY & NEUROLOGY

## 2023-10-17 PROCEDURE — 2580000003 HC RX 258: Performed by: INTERNAL MEDICINE

## 2023-10-17 PROCEDURE — 85025 COMPLETE CBC W/AUTO DIFF WBC: CPT

## 2023-10-17 PROCEDURE — 83605 ASSAY OF LACTIC ACID: CPT

## 2023-10-17 PROCEDURE — 93306 TTE W/DOPPLER COMPLETE: CPT | Performed by: INTERNAL MEDICINE

## 2023-10-17 PROCEDURE — 82565 ASSAY OF CREATININE: CPT

## 2023-10-17 PROCEDURE — 6370000000 HC RX 637 (ALT 250 FOR IP): Performed by: INTERNAL MEDICINE

## 2023-10-17 PROCEDURE — 85610 PROTHROMBIN TIME: CPT

## 2023-10-17 PROCEDURE — 99215 OFFICE O/P EST HI 40 MIN: CPT | Performed by: PSYCHIATRY & NEUROLOGY

## 2023-10-17 PROCEDURE — 85730 THROMBOPLASTIN TIME PARTIAL: CPT

## 2023-10-17 PROCEDURE — 96374 THER/PROPH/DIAG INJ IV PUSH: CPT

## 2023-10-17 PROCEDURE — 6370000000 HC RX 637 (ALT 250 FOR IP): Performed by: PHYSICIAN ASSISTANT

## 2023-10-17 PROCEDURE — 3079F DIAST BP 80-89 MM HG: CPT | Performed by: PSYCHIATRY & NEUROLOGY

## 2023-10-17 PROCEDURE — 82330 ASSAY OF CALCIUM: CPT

## 2023-10-17 RX ORDER — MIRTAZAPINE 15 MG/1
7.5 TABLET, FILM COATED ORAL NIGHTLY
Status: DISCONTINUED | OUTPATIENT
Start: 2023-10-17 | End: 2023-10-25 | Stop reason: HOSPADM

## 2023-10-17 RX ORDER — FLUTICASONE PROPIONATE 50 MCG
1 SPRAY, SUSPENSION (ML) NASAL DAILY
Status: DISCONTINUED | OUTPATIENT
Start: 2023-10-18 | End: 2023-10-25 | Stop reason: HOSPADM

## 2023-10-17 RX ORDER — FAMOTIDINE 20 MG/1
40 TABLET, FILM COATED ORAL EVERY EVENING
Status: DISCONTINUED | OUTPATIENT
Start: 2023-10-17 | End: 2023-10-25 | Stop reason: HOSPADM

## 2023-10-17 RX ORDER — POLYETHYLENE GLYCOL 3350 17 G/17G
17 POWDER, FOR SOLUTION ORAL DAILY PRN
Status: DISCONTINUED | OUTPATIENT
Start: 2023-10-17 | End: 2023-10-25 | Stop reason: HOSPADM

## 2023-10-17 RX ORDER — INSULIN LISPRO 100 [IU]/ML
10 INJECTION, SOLUTION INTRAVENOUS; SUBCUTANEOUS ONCE
Status: DISCONTINUED | OUTPATIENT
Start: 2023-10-17 | End: 2023-10-17

## 2023-10-17 RX ORDER — METOPROLOL SUCCINATE 50 MG/1
50 TABLET, EXTENDED RELEASE ORAL DAILY
Status: DISCONTINUED | OUTPATIENT
Start: 2023-10-18 | End: 2023-10-25 | Stop reason: HOSPADM

## 2023-10-17 RX ORDER — SODIUM CHLORIDE 9 MG/ML
INJECTION, SOLUTION INTRAVENOUS PRN
Status: DISCONTINUED | OUTPATIENT
Start: 2023-10-17 | End: 2023-10-25 | Stop reason: HOSPADM

## 2023-10-17 RX ORDER — AMLODIPINE BESYLATE 5 MG/1
5 TABLET ORAL DAILY
Status: DISCONTINUED | OUTPATIENT
Start: 2023-10-18 | End: 2023-10-21

## 2023-10-17 RX ORDER — TAMSULOSIN HYDROCHLORIDE 0.4 MG/1
0.8 CAPSULE ORAL DAILY
Status: DISCONTINUED | OUTPATIENT
Start: 2023-10-18 | End: 2023-10-25 | Stop reason: HOSPADM

## 2023-10-17 RX ORDER — GLUCAGON 1 MG/ML
1 KIT INJECTION PRN
Status: DISCONTINUED | OUTPATIENT
Start: 2023-10-17 | End: 2023-10-25 | Stop reason: HOSPADM

## 2023-10-17 RX ORDER — ALBUTEROL SULFATE 90 UG/1
2 AEROSOL, METERED RESPIRATORY (INHALATION) EVERY 6 HOURS PRN
Status: DISCONTINUED | OUTPATIENT
Start: 2023-10-17 | End: 2023-10-25 | Stop reason: HOSPADM

## 2023-10-17 RX ORDER — BUSPIRONE HYDROCHLORIDE 10 MG/1
10 TABLET ORAL 2 TIMES DAILY
Status: DISCONTINUED | OUTPATIENT
Start: 2023-10-17 | End: 2023-10-25 | Stop reason: HOSPADM

## 2023-10-17 RX ORDER — PREDNISONE 20 MG/1
20 TABLET ORAL DAILY
Status: DISCONTINUED | OUTPATIENT
Start: 2023-10-18 | End: 2023-10-22

## 2023-10-17 RX ORDER — ACETAMINOPHEN 650 MG/1
650 SUPPOSITORY RECTAL EVERY 6 HOURS PRN
Status: DISCONTINUED | OUTPATIENT
Start: 2023-10-17 | End: 2023-10-25 | Stop reason: HOSPADM

## 2023-10-17 RX ORDER — ONDANSETRON 2 MG/ML
4 INJECTION INTRAMUSCULAR; INTRAVENOUS EVERY 6 HOURS PRN
Status: DISCONTINUED | OUTPATIENT
Start: 2023-10-17 | End: 2023-10-25 | Stop reason: HOSPADM

## 2023-10-17 RX ORDER — DEXTROSE MONOHYDRATE 100 MG/ML
INJECTION, SOLUTION INTRAVENOUS CONTINUOUS PRN
Status: DISCONTINUED | OUTPATIENT
Start: 2023-10-17 | End: 2023-10-25 | Stop reason: HOSPADM

## 2023-10-17 RX ORDER — SODIUM CHLORIDE 9 MG/ML
INJECTION, SOLUTION INTRAVENOUS CONTINUOUS
Status: DISPENSED | OUTPATIENT
Start: 2023-10-17 | End: 2023-10-18

## 2023-10-17 RX ORDER — ACETAMINOPHEN 325 MG/1
650 TABLET ORAL EVERY 6 HOURS PRN
Status: DISCONTINUED | OUTPATIENT
Start: 2023-10-17 | End: 2023-10-25 | Stop reason: HOSPADM

## 2023-10-17 RX ORDER — FUROSEMIDE 20 MG/1
20 TABLET ORAL DAILY
Status: DISCONTINUED | OUTPATIENT
Start: 2023-10-18 | End: 2023-10-21

## 2023-10-17 RX ORDER — ACETAMINOPHEN 80 MG
TABLET,CHEWABLE ORAL ONCE
Status: COMPLETED | OUTPATIENT
Start: 2023-10-17 | End: 2023-10-18

## 2023-10-17 RX ORDER — SODIUM CHLORIDE 0.9 % (FLUSH) 0.9 %
5-40 SYRINGE (ML) INJECTION EVERY 12 HOURS SCHEDULED
Status: DISCONTINUED | OUTPATIENT
Start: 2023-10-17 | End: 2023-10-25 | Stop reason: HOSPADM

## 2023-10-17 RX ORDER — ONDANSETRON 4 MG/1
4 TABLET, ORALLY DISINTEGRATING ORAL EVERY 8 HOURS PRN
Status: DISCONTINUED | OUTPATIENT
Start: 2023-10-17 | End: 2023-10-25 | Stop reason: HOSPADM

## 2023-10-17 RX ORDER — AZATHIOPRINE 50 MG/1
100 TABLET ORAL DAILY
Status: DISCONTINUED | OUTPATIENT
Start: 2023-10-18 | End: 2023-10-25 | Stop reason: HOSPADM

## 2023-10-17 RX ORDER — AZATHIOPRINE 50 MG/1
50 TABLET ORAL NIGHTLY
Status: DISCONTINUED | OUTPATIENT
Start: 2023-10-17 | End: 2023-10-25 | Stop reason: HOSPADM

## 2023-10-17 RX ORDER — BUDESONIDE AND FORMOTEROL FUMARATE DIHYDRATE 160; 4.5 UG/1; UG/1
2 AEROSOL RESPIRATORY (INHALATION)
Status: DISCONTINUED | OUTPATIENT
Start: 2023-10-17 | End: 2023-10-25 | Stop reason: HOSPADM

## 2023-10-17 RX ORDER — INSULIN LISPRO 100 [IU]/ML
0-8 INJECTION, SOLUTION INTRAVENOUS; SUBCUTANEOUS EVERY 4 HOURS
Status: DISCONTINUED | OUTPATIENT
Start: 2023-10-17 | End: 2023-10-18

## 2023-10-17 RX ORDER — INSULIN GLARGINE 100 [IU]/ML
15 INJECTION, SOLUTION SUBCUTANEOUS NIGHTLY
Status: DISCONTINUED | OUTPATIENT
Start: 2023-10-17 | End: 2023-10-18

## 2023-10-17 RX ORDER — IPRATROPIUM BROMIDE 42 UG/1
2 SPRAY, METERED NASAL 3 TIMES DAILY
Status: DISCONTINUED | OUTPATIENT
Start: 2023-10-17 | End: 2023-10-25 | Stop reason: HOSPADM

## 2023-10-17 RX ORDER — 0.9 % SODIUM CHLORIDE 0.9 %
1000 INTRAVENOUS SOLUTION INTRAVENOUS ONCE
Status: COMPLETED | OUTPATIENT
Start: 2023-10-17 | End: 2023-10-17

## 2023-10-17 RX ORDER — SODIUM CHLORIDE 0.9 % (FLUSH) 0.9 %
5-40 SYRINGE (ML) INJECTION PRN
Status: DISCONTINUED | OUTPATIENT
Start: 2023-10-17 | End: 2023-10-25 | Stop reason: HOSPADM

## 2023-10-17 RX ADMIN — INSULIN HUMAN 8 UNITS: 100 INJECTION, SOLUTION PARENTERAL at 19:56

## 2023-10-17 RX ADMIN — SODIUM CHLORIDE: 9 INJECTION, SOLUTION INTRAVENOUS at 21:51

## 2023-10-17 RX ADMIN — BUSPIRONE HYDROCHLORIDE 10 MG: 10 TABLET ORAL at 22:01

## 2023-10-17 RX ADMIN — AZATHIOPRINE 50 MG: 50 TABLET ORAL at 22:01

## 2023-10-17 RX ADMIN — FAMOTIDINE 40 MG: 20 TABLET ORAL at 22:01

## 2023-10-17 RX ADMIN — INSULIN GLARGINE 15 UNITS: 100 INJECTION, SOLUTION SUBCUTANEOUS at 21:48

## 2023-10-17 RX ADMIN — SODIUM CHLORIDE 1000 ML: 9 INJECTION, SOLUTION INTRAVENOUS at 19:52

## 2023-10-17 RX ADMIN — INSULIN LISPRO 4 UNITS: 100 INJECTION, SOLUTION INTRAVENOUS; SUBCUTANEOUS at 21:48

## 2023-10-17 RX ADMIN — MIRTAZAPINE 7.5 MG: 15 TABLET, FILM COATED ORAL at 22:01

## 2023-10-17 ASSESSMENT — PAIN - FUNCTIONAL ASSESSMENT: PAIN_FUNCTIONAL_ASSESSMENT: NONE - DENIES PAIN

## 2023-10-17 ASSESSMENT — ENCOUNTER SYMPTOMS
SHORTNESS OF BREATH: 1
ABDOMINAL PAIN: 0
COLOR CHANGE: 0
COUGH: 0
CHOKING: 0
BACK PAIN: 0
VOMITING: 0
BACK PAIN: 0
PHOTOPHOBIA: 0
SHORTNESS OF BREATH: 0
TROUBLE SWALLOWING: 0
TROUBLE SWALLOWING: 0
CHEST TIGHTNESS: 0
VOMITING: 0
NAUSEA: 0

## 2023-10-17 NOTE — ED PROVIDER NOTES
Atypical chest pain 8/30/2012    BPH (benign prostatic hypertrophy)     CAD (coronary artery disease)     Cataracts, bilateral     Chronic low back pain     COPD (chronic obstructive pulmonary disease) ([address]) 6/1/2006    DM2 (diabetes mellitus, type 2) ([address])     Duodenitis     Erectile dysfunction 3/6/2017    Fatty infiltration of liver 11/19/2014    GERD (gastroesophageal reflux disease)     History of melanoma excision 12/11/2017    Left ear 09/2017    History of tobacco use 6/1/2006    HTN (hypertension)     Hyperlipidemia     Malignant melanoma of face ([address])     Malignant melanoma of skin of face ([address])     RT ear 2000, LT ear 2017    MG (myasthenia gravis) ([address])     Nephrolithiasis     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 chronic kidney disease ([address]) 9/29/2022    Status post coronary artery bypass grafts x 5 7/30/2018 07/2018 @ [facility]    Tobacco abuse 12/8/2022    Type 2 diabetes mellitus with microalbuminuria, without long-term current use of insulin ([address]) 3/6/2017    Varicose veins of both lower extremities 9/11/2017         SURGICAL HISTORY       Past Surgical History:   Procedure Laterality Date    ARM SURGERY Right 10/25/2022    BRONCHOSCOPY N/A 01/06/2023    NAVIGATIONAL BRONCHOSCOPY performed by Hai Cueva MD at [facility] N/A 7/19/2023    BRONCHOSCOPY ENDOBRONCHIAL ULTRASOUND performed by Hai Cueva MD at [address] [place] [address] [address]      [address]    CATARACT REMOVAL WITH IMPLANT Left 1992    CATARACT REMOVAL WITH IMPLANT Right 1992    COLONOSCOPY  2009    tubular adenomas    COLONOSCOPY  2013    diverticulosis, polyps, 5y repeat ( [address] )    CORONARY ARTERY BYPASS GRAFT  07/02/2018    x5 ( [facility] @ [facility])    LUNG REMOVAL, PARTIAL Right 01/31/2023    right thoracoscopic upper lobectomy, mediastinal lymph node

## 2023-10-17 NOTE — ED NOTES
Perfect Serve to [facility] Dr. Lord Hernandez @[id number]     Maxine Callaway  10/17/23 1928

## 2023-10-17 NOTE — PROGRESS NOTES
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    Tobacco abuse 12/8/2022    Type 2 diabetes mellitus with microalbuminuria, without long-term current use of insulin ([address]) 3/6/2017    Varicose veins of both lower extremities 9/11/2017     Past Surgical History:   Procedure Laterality Date    ARM SURGERY Right 10/25/2022    BRONCHOSCOPY N/A 01/06/2023    NAVIGATIONAL BRONCHOSCOPY performed by Hilary Herring MD at [facility] N/A 7/19/2023    BRONCHOSCOPY ENDOBRONCHIAL ULTRASOUND performed by Hilary Herring MD at [address] [place] [address] [address]      [address]    CATARACT REMOVAL WITH IMPLANT Left 1992    CATARACT REMOVAL WITH IMPLANT Right 1992    COLONOSCOPY  2009    tubular adenomas    COLONOSCOPY  2013    diverticulosis, polyps, 5y repeat ([address] )    CORONARY ARTERY BYPASS GRAFT  07/02/2018    x5 ( [facility] JIM[facility] @ [facility])    LUNG REMOVAL, PARTIAL Right 01/31/2023    right thoracoscopic upper lobectomy, mediastinal lymph node dissection (DR PIKE)    MOHS SURGERY Left 09/2017    melanoma of left ear (DR GODWIN)    SKIN CANCER EXCISION Left 08/2017    THORACOSCOPY Right 1/31/2023    right thoracoscopic upper lobectomy performed by Laura Olivera MD at [address] [place] [address]      duodenitis/ poss early signs of celiac disease     Social History     Socioeconomic History    Marital status:      Spouse name: Oscar Macias    Number of children: 2

## 2023-10-18 ENCOUNTER — HOSPITAL ENCOUNTER (INPATIENT)
Dept: INTERVENTIONAL RADIOLOGY/VASCULAR | Age: 78
Discharge: HOME | DRG: 056 | End: 2023-10-20
Payer: MEDICARE

## 2023-10-18 ENCOUNTER — APPOINTMENT (OUTPATIENT)
Dept: GENERAL RADIOLOGY | Age: 78
DRG: 056 | End: 2023-10-18
Payer: MEDICARE

## 2023-10-18 ENCOUNTER — CARE COORDINATION (OUTPATIENT)
Dept: CASE MANAGEMENT | Age: 78
End: 2023-10-18

## 2023-10-18 ENCOUNTER — CARE COORDINATION (OUTPATIENT)
Dept: CARE COORDINATION | Age: 78
End: 2023-10-18

## 2023-10-18 VITALS
DIASTOLIC BLOOD PRESSURE: 70 MMHG | OXYGEN SATURATION: 93 % | SYSTOLIC BLOOD PRESSURE: 133 MMHG | RESPIRATION RATE: 20 BRPM | HEART RATE: 101 BPM

## 2023-10-18 LAB
ALBUMIN SERPL-MCNC: 3.5 G/DL (ref 3.5–4.6)
ALP SERPL-CCNC: 73 U/L (ref 35–104)
ALT SERPL-CCNC: 31 U/L (ref 0–41)
ANION GAP SERPL CALCULATED.3IONS-SCNC: 10 MEQ/L (ref 9–15)
APTT PPP: 26.7 SEC (ref 24.4–36.8)
APTT PPP: 28.2 SEC (ref 24.4–36.8)
AST SERPL-CCNC: 26 U/L (ref 0–40)
BASOPHILS # BLD: 0 K/UL (ref 0–0.2)
BASOPHILS # BLD: 0 K/UL (ref 0–0.2)
BASOPHILS NFR BLD: 0.2 %
BASOPHILS NFR BLD: 0.3 %
BILIRUB SERPL-MCNC: 0.3 MG/DL (ref 0.2–0.7)
BUN SERPL-MCNC: 26 MG/DL (ref 8–23)
CALCIUM SERPL-MCNC: 9 MG/DL (ref 8.5–9.9)
CHLORIDE SERPL-SCNC: 101 MEQ/L (ref 95–107)
CO2 SERPL-SCNC: 28 MEQ/L (ref 20–31)
CREAT SERPL-MCNC: 1.16 MG/DL (ref 0.7–1.2)
EOSINOPHIL # BLD: 0 K/UL (ref 0–0.7)
EOSINOPHIL # BLD: 0 K/UL (ref 0–0.7)
EOSINOPHIL NFR BLD: 0 %
EOSINOPHIL NFR BLD: 0.1 %
ERYTHROCYTE [DISTWIDTH] IN BLOOD BY AUTOMATED COUNT: 16.3 % (ref 11.5–14.5)
ERYTHROCYTE [DISTWIDTH] IN BLOOD BY AUTOMATED COUNT: 16.6 % (ref 11.5–14.5)
FIBRINOGEN PPP-MCNC: 481 MG/DL (ref 262–562)
GLOBULIN SER CALC-MCNC: 2.1 G/DL (ref 2.3–3.5)
GLUCOSE BLD-MCNC: 135 MG/DL (ref 70–99)
GLUCOSE BLD-MCNC: 149 MG/DL (ref 70–99)
GLUCOSE BLD-MCNC: 381 MG/DL (ref 70–99)
GLUCOSE BLD-MCNC: 528 MG/DL (ref 70–99)
GLUCOSE BLD-MCNC: 85 MG/DL (ref 70–99)
GLUCOSE BLD-MCNC: 85 MG/DL (ref 70–99)
GLUCOSE BLD-MCNC: 92 MG/DL (ref 70–99)
GLUCOSE SERPL-MCNC: 77 MG/DL (ref 70–99)
HCT VFR BLD AUTO: 31.2 % (ref 42–52)
HCT VFR BLD AUTO: 34.3 % (ref 42–52)
HGB BLD-MCNC: 10.2 G/DL (ref 14–18)
HGB BLD-MCNC: 11.4 G/DL (ref 14–18)
LDH SERPL-CCNC: 338 U/L (ref 135–225)
LYMPHOCYTES # BLD: 0.2 K/UL (ref 1–4.8)
LYMPHOCYTES # BLD: 1.3 K/UL (ref 1–4.8)
LYMPHOCYTES NFR BLD: 13.6 %
LYMPHOCYTES NFR BLD: 2.7 %
MAGNESIUM SERPL-MCNC: 1.7 MG/DL (ref 1.7–2.4)
MCH RBC QN AUTO: 33 PG (ref 27–31.3)
MCH RBC QN AUTO: 33.9 PG (ref 27–31.3)
MCHC RBC AUTO-ENTMCNC: 32.7 % (ref 33–37)
MCHC RBC AUTO-ENTMCNC: 33.2 % (ref 33–37)
MCV RBC AUTO: 101 FL (ref 79–92.2)
MCV RBC AUTO: 102.1 FL (ref 79–92.2)
MONOCYTES # BLD: 0.4 K/UL (ref 0.2–0.8)
MONOCYTES # BLD: 1 K/UL (ref 0.2–0.8)
MONOCYTES NFR BLD: 10.5 %
MONOCYTES NFR BLD: 4.4 %
NEUTROPHILS # BLD: 6.7 K/UL (ref 1.4–6.5)
NEUTROPHILS # BLD: 7.5 K/UL (ref 1.4–6.5)
NEUTS SEG NFR BLD: 71.9 %
NEUTS SEG NFR BLD: 90.5 %
PERFORMED ON: ABNORMAL
PERFORMED ON: NORMAL
PHOSPHATE SERPL-MCNC: 3.9 MG/DL (ref 2.3–4.8)
PLATELET # BLD AUTO: 286 K/UL (ref 130–400)
PLATELET # BLD AUTO: 309 K/UL (ref 130–400)
POTASSIUM SERPL-SCNC: 3.9 MEQ/L (ref 3.4–4.9)
PROT SERPL-MCNC: 5.6 G/DL (ref 6.3–8)
RBC # BLD AUTO: 3.09 M/UL (ref 4.7–6.1)
RBC # BLD AUTO: 3.36 M/UL (ref 4.7–6.1)
SODIUM SERPL-SCNC: 139 MEQ/L (ref 135–144)
WBC # BLD AUTO: 8.2 K/UL (ref 4.8–10.8)
WBC # BLD AUTO: 9.3 K/UL (ref 4.8–10.8)

## 2023-10-18 PROCEDURE — 84100 ASSAY OF PHOSPHORUS: CPT

## 2023-10-18 PROCEDURE — 99223 1ST HOSP IP/OBS HIGH 75: CPT | Performed by: RADIOLOGY

## 2023-10-18 PROCEDURE — 36556 INSERT NON-TUNNEL CV CATH: CPT

## 2023-10-18 PROCEDURE — 99222 1ST HOSP IP/OBS MODERATE 55: CPT | Performed by: PSYCHIATRY & NEUROLOGY

## 2023-10-18 PROCEDURE — 6370000000 HC RX 637 (ALT 250 FOR IP): Performed by: INTERNAL MEDICINE

## 2023-10-18 PROCEDURE — 76937 US GUIDE VASCULAR ACCESS: CPT | Performed by: RADIOLOGY

## 2023-10-18 PROCEDURE — 80053 COMPREHEN METABOLIC PANEL: CPT

## 2023-10-18 PROCEDURE — 82330 ASSAY OF CALCIUM: CPT

## 2023-10-18 PROCEDURE — A4217 STERILE WATER/SALINE, 500 ML: HCPCS | Performed by: RADIOLOGY

## 2023-10-18 PROCEDURE — 71045 X-RAY EXAM CHEST 1 VIEW: CPT

## 2023-10-18 PROCEDURE — 1210000000 HC MED SURG R&B

## 2023-10-18 PROCEDURE — 85025 COMPLETE CBC W/AUTO DIFF WBC: CPT

## 2023-10-18 PROCEDURE — 77001 FLUOROGUIDE FOR VEIN DEVICE: CPT | Performed by: RADIOLOGY

## 2023-10-18 PROCEDURE — 83519 RIA NONANTIBODY: CPT

## 2023-10-18 PROCEDURE — 94640 AIRWAY INHALATION TREATMENT: CPT

## 2023-10-18 PROCEDURE — 94760 N-INVAS EAR/PLS OXIMETRY 1: CPT

## 2023-10-18 PROCEDURE — 05HM33Z INSERTION OF INFUSION DEVICE INTO RIGHT INTERNAL JUGULAR VEIN, PERCUTANEOUS APPROACH: ICD-10-PCS | Performed by: RADIOLOGY

## 2023-10-18 PROCEDURE — C1769 GUIDE WIRE: HCPCS

## 2023-10-18 PROCEDURE — 83516 IMMUNOASSAY NONANTIBODY: CPT

## 2023-10-18 PROCEDURE — 2580000003 HC RX 258: Performed by: INTERNAL MEDICINE

## 2023-10-18 PROCEDURE — 36415 COLL VENOUS BLD VENIPUNCTURE: CPT

## 2023-10-18 PROCEDURE — 6360000002 HC RX W HCPCS: Performed by: INTERNAL MEDICINE

## 2023-10-18 PROCEDURE — 77001 FLUOROGUIDE FOR VEIN DEVICE: CPT

## 2023-10-18 PROCEDURE — 83735 ASSAY OF MAGNESIUM: CPT

## 2023-10-18 PROCEDURE — 2500000003 HC RX 250 WO HCPCS: Performed by: RADIOLOGY

## 2023-10-18 PROCEDURE — 6360000002 HC RX W HCPCS: Performed by: RADIOLOGY

## 2023-10-18 PROCEDURE — 76937 US GUIDE VASCULAR ACCESS: CPT

## 2023-10-18 PROCEDURE — 2580000003 HC RX 258: Performed by: RADIOLOGY

## 2023-10-18 PROCEDURE — 36556 INSERT NON-TUNNEL CV CATH: CPT | Performed by: RADIOLOGY

## 2023-10-18 PROCEDURE — 85730 THROMBOPLASTIN TIME PARTIAL: CPT

## 2023-10-18 PROCEDURE — 83615 LACTATE (LD) (LDH) ENZYME: CPT

## 2023-10-18 PROCEDURE — 85384 FIBRINOGEN ACTIVITY: CPT

## 2023-10-18 RX ORDER — INSULIN LISPRO 100 [IU]/ML
0-16 INJECTION, SOLUTION INTRAVENOUS; SUBCUTANEOUS
Status: DISCONTINUED | OUTPATIENT
Start: 2023-10-19 | End: 2023-10-18

## 2023-10-18 RX ORDER — ANTICOAGULANT CITRATE DEXTROSE SOLUTION FORMULA A 12.25; 11; 3.65 G/500ML; G/500ML; G/500ML
SOLUTION INTRAVENOUS
Status: DISCONTINUED | OUTPATIENT
Start: 2023-10-18 | End: 2023-10-25 | Stop reason: HOSPADM

## 2023-10-18 RX ORDER — ALBUMIN, HUMAN INJ 5% 5 %
175 SOLUTION INTRAVENOUS
Status: COMPLETED | OUTPATIENT
Start: 2023-10-18 | End: 2023-10-25

## 2023-10-18 RX ORDER — LIDOCAINE HYDROCHLORIDE 20 MG/ML
INJECTION, SOLUTION INFILTRATION; PERINEURAL PRN
Status: COMPLETED | OUTPATIENT
Start: 2023-10-18 | End: 2023-10-18

## 2023-10-18 RX ORDER — 0.9 % SODIUM CHLORIDE 0.9 %
2000 INTRAVENOUS SOLUTION INTRAVENOUS
Status: ACTIVE | OUTPATIENT
Start: 2023-10-18 | End: 2023-10-19

## 2023-10-18 RX ORDER — INSULIN LISPRO 100 [IU]/ML
0-16 INJECTION, SOLUTION INTRAVENOUS; SUBCUTANEOUS
Status: DISCONTINUED | OUTPATIENT
Start: 2023-10-18 | End: 2023-10-18

## 2023-10-18 RX ORDER — INSULIN GLARGINE 100 [IU]/ML
20 INJECTION, SOLUTION SUBCUTANEOUS NIGHTLY
Status: DISCONTINUED | OUTPATIENT
Start: 2023-10-18 | End: 2023-10-19

## 2023-10-18 RX ORDER — HEPARIN SODIUM 1000 [USP'U]/ML
1000 INJECTION, SOLUTION INTRAVENOUS; SUBCUTANEOUS PRN
Status: DISCONTINUED | OUTPATIENT
Start: 2023-10-18 | End: 2023-10-25 | Stop reason: HOSPADM

## 2023-10-18 RX ORDER — MAGNESIUM HYDROXIDE 1200 MG/15ML
LIQUID ORAL CONTINUOUS PRN
Status: COMPLETED | OUTPATIENT
Start: 2023-10-18 | End: 2023-10-18

## 2023-10-18 RX ORDER — INSULIN LISPRO 100 [IU]/ML
0-4 INJECTION, SOLUTION INTRAVENOUS; SUBCUTANEOUS NIGHTLY
Status: DISCONTINUED | OUTPATIENT
Start: 2023-10-18 | End: 2023-10-18

## 2023-10-18 RX ORDER — HEPARIN SODIUM 1000 [USP'U]/ML
INJECTION, SOLUTION INTRAVENOUS; SUBCUTANEOUS PRN
Status: COMPLETED | OUTPATIENT
Start: 2023-10-18 | End: 2023-10-18

## 2023-10-18 RX ORDER — INSULIN LISPRO 100 [IU]/ML
0-16 INJECTION, SOLUTION INTRAVENOUS; SUBCUTANEOUS
Status: DISCONTINUED | OUTPATIENT
Start: 2023-10-18 | End: 2023-10-19

## 2023-10-18 RX ADMIN — MIRTAZAPINE 7.5 MG: 15 TABLET, FILM COATED ORAL at 21:29

## 2023-10-18 RX ADMIN — SODIUM CHLORIDE 500 ML: 900 IRRIGANT IRRIGATION at 10:19

## 2023-10-18 RX ADMIN — INSULIN GLARGINE 20 UNITS: 100 INJECTION, SOLUTION SUBCUTANEOUS at 21:29

## 2023-10-18 RX ADMIN — INSULIN LISPRO 16 UNITS: 100 INJECTION, SOLUTION INTRAVENOUS; SUBCUTANEOUS at 22:18

## 2023-10-18 RX ADMIN — FUROSEMIDE 20 MG: 20 TABLET ORAL at 09:08

## 2023-10-18 RX ADMIN — IPRATROPIUM BROMIDE 2 SPRAY: 42 SPRAY NASAL at 14:48

## 2023-10-18 RX ADMIN — AZATHIOPRINE 100 MG: 50 TABLET ORAL at 09:08

## 2023-10-18 RX ADMIN — BUSPIRONE HYDROCHLORIDE 10 MG: 10 TABLET ORAL at 09:08

## 2023-10-18 RX ADMIN — BUDESONIDE AND FORMOTEROL FUMARATE DIHYDRATE 2 PUFF: 160; 4.5 AEROSOL RESPIRATORY (INHALATION) at 19:58

## 2023-10-18 RX ADMIN — AMLODIPINE BESYLATE 5 MG: 5 TABLET ORAL at 09:08

## 2023-10-18 RX ADMIN — IPRATROPIUM BROMIDE 2 SPRAY: 42 SPRAY NASAL at 09:12

## 2023-10-18 RX ADMIN — METOPROLOL SUCCINATE 50 MG: 50 TABLET, EXTENDED RELEASE ORAL at 09:08

## 2023-10-18 RX ADMIN — FAMOTIDINE 40 MG: 20 TABLET ORAL at 17:50

## 2023-10-18 RX ADMIN — BUDESONIDE AND FORMOTEROL FUMARATE DIHYDRATE 2 PUFF: 160; 4.5 AEROSOL RESPIRATORY (INHALATION) at 07:19

## 2023-10-18 RX ADMIN — LIDOCAINE HYDROCHLORIDE 9 ML: 20 INJECTION, SOLUTION INFILTRATION; PERINEURAL at 10:19

## 2023-10-18 RX ADMIN — PREDNISONE 20 MG: 20 TABLET ORAL at 09:09

## 2023-10-18 RX ADMIN — Medication: at 04:49

## 2023-10-18 RX ADMIN — TIOTROPIUM BROMIDE INHALATION SPRAY 2 PUFF: 3.12 SPRAY, METERED RESPIRATORY (INHALATION) at 07:19

## 2023-10-18 RX ADMIN — BUSPIRONE HYDROCHLORIDE 10 MG: 10 TABLET ORAL at 21:29

## 2023-10-18 RX ADMIN — HEPARIN SODIUM 3600 UNITS: 1000 INJECTION INTRAVENOUS; SUBCUTANEOUS at 10:22

## 2023-10-18 RX ADMIN — TAMSULOSIN HYDROCHLORIDE 0.8 MG: 0.4 CAPSULE ORAL at 09:08

## 2023-10-18 RX ADMIN — INSULIN LISPRO 8 UNITS: 100 INJECTION, SOLUTION INTRAVENOUS; SUBCUTANEOUS at 17:00

## 2023-10-18 RX ADMIN — AZATHIOPRINE 50 MG: 50 TABLET ORAL at 21:29

## 2023-10-18 RX ADMIN — SODIUM CHLORIDE: 9 INJECTION, SOLUTION INTRAVENOUS at 06:38

## 2023-10-18 ASSESSMENT — ENCOUNTER SYMPTOMS
WHEEZING: 0
COLOR CHANGE: 0
BACK PAIN: 0
ABDOMINAL PAIN: 0
NAUSEA: 0
BACK PAIN: 0
VOMITING: 0
CONSTIPATION: 0
NAUSEA: 0
EYE DISCHARGE: 0
VOMITING: 0
TROUBLE SWALLOWING: 0
ABDOMINAL DISTENTION: 0
CHOKING: 1
DIARRHEA: 0
APNEA: 0
COUGH: 0
PHOTOPHOBIA: 0
RESPIRATORY NEGATIVE: 1
GASTROINTESTINAL NEGATIVE: 1
SHORTNESS OF BREATH: 1
EYES NEGATIVE: 1
SHORTNESS OF BREATH: 0
PHOTOPHOBIA: 0

## 2023-10-18 NOTE — CONSULTS
discharge. Left eye: No discharge. Conjunctiva/sclera: Conjunctivae normal.   Neck:      Thyroid: No thyromegaly. Vascular: No JVD. Trachea: No tracheal deviation. Cardiovascular:      Rate and Rhythm: Normal rate and regular rhythm. Heart sounds: Normal heart sounds. No murmur heard. No friction rub. No gallop. Pulmonary:      Effort: No respiratory distress. Breath sounds: No stridor. No wheezing or rales. Chest:      Chest wall: No tenderness. Abdominal:      General: Bowel sounds are normal. There is no distension. Palpations: Abdomen is soft. There is no mass. Tenderness: There is no abdominal tenderness. There is no guarding or rebound. Hernia: No hernia is present. Musculoskeletal:         General: No tenderness or deformity. Cervical back: Neck supple. Skin:     General: Skin is dry. Coloration: Skin is not pale. Findings: No erythema or rash. Neurological:      Mental Status: He is alert and oriented to person, place, and time. Cranial Nerves: No cranial nerve deficit. Psychiatric:         Behavior: Behavior normal.         Thought Content: Thought content normal.         Judgment: Judgment normal.         Lab Results   Component Value Date/Time    WBC 9.3 10/18/2023 04:49 AM    HGB 10.2 10/18/2023 04:49 AM    HCT 31.2 10/18/2023 04:49 AM     10/18/2023 04:49 AM    .0 10/18/2023 04:49 AM       Echo (TTE) Complete    Result Date: 10/17/2023    Left Ventricle: Normal left ventricular systolic function with a visually estimated EF of 50 - 55%. Left ventricle size is normal. Normal wall thickness. Normal wall motion. Diastolic dysfunction present with normal LV EF. Mitral Valve: Mild annular calcification of the mitral valve. ASSESSMENT ANDPLAN:    ASSESSMENT: Patient with myasthenia gravis interfering with chemotherapy for liver cancer.     PLAN: Placement of a temporary dialysis catheter for

## 2023-10-18 NOTE — PLAN OF CARE
Problem: Discharge Planning  Goal: Discharge to home or other facility with appropriate resources  Outcome: Progressing  Flowsheets (Taken 10/17/2023 2103)  Discharge to home or other facility with appropriate resources: Identify barriers to discharge with patient and caregiver     Problem: Safety - Adult  Goal: Free from fall injury  Outcome: Progressing  Flowsheets (Taken 10/17/2023 2229)  Free From Fall Injury: Instruct family/caregiver on patient safety     Problem: ABCDS Injury Assessment  Goal: Absence of physical injury  Outcome: Progressing  Flowsheets (Taken 10/17/2023 2229)  Absence of Physical Injury: Implement safety measures based on patient assessment

## 2023-10-18 NOTE — OR NURSING
NO SEDATION     1005 Patient assisted to IR table in supine position. Consent verified for Temporary Hemodialysis Catheter. Patient on vitals monitor, VSS. R internal jugular assessed for patency using Ultrasound guidance. 1008 R neck area cleaned generously with Chloroprep by AR. Once dry, full body drape applied in sterile fashion by Romeo Castillo. 1018 Timeout performed. 1019 Using U/S, Dr. Rachelle Duval administered Lidocaine 2% to R neck area, see eMar.    1020 Using U/S guidance, 5F MP set used to gain access into vessel. Under fluoroscopy guidance, InQwire inserted to maintain access      1021 Medcomp 11.5 F x 15 cm RaByAllAccountsson Duo-Flow IJ double lumen catheter (LOT# [id number],  exp 03/04/2027) placed. Placement confirmed by fluoro imaging as well as both ports aspirating blood and flushing easily per MD.      1022 Red port instilled with 1.4 ml of heparin, see eMar.  Blue port instilled with 2.2 ml of heparin, see eMar. Catheter ready for use, order placed. 1023 Catheter sutured to skin with Prolene 2.0 suture. Catheter insertion site dressed with Biopatch, gauze and large tegaderm by IR Tech. Patient tolerated procedure well, despite back pain d/t positioning, verbal and tactile reassurance given throughout procedure. VSS. 1035 Patient assisted back onto cart. Report given to LILIANA Villanueva.

## 2023-10-18 NOTE — CARE COORDINATION
*We've extended our hours of operation! See details below!    --------------------------------------------------------------------------------------------------------------  Lincoln Park 
Remote Patient Monitoring Note    Date/Time:  10/18/2023 11:05 AM  Patient Current Location: [address] [place] [address] noted pt admitted to [facility] on 10/17/23  Background: enrolled in RPM for DM COPD HTN  Clinical Interventions: Reviewed and followed up on alerts and treatments-RPM PLACED ON PAUSE. ADMITTED 10/17/23  GISELE    Plan/Follow Up: Will continue to review, monitor and address alerts with follow up based on severity of symptoms and risk factors.
  --------------------------------------------------------------------------------------------------------------  [id number] [employer]. [address] [place] [address], [place] [address]. All rights reserved. This information is not intended as a substitute for professional medical care. Always follow your healthcare professional's instructions.        
26 y/o M presents to ED for his Prolixin injection. Pt states he gets these injections every 3 weeks at the [facility] and is due this week. He states that his injection was not called in at the right time as the [facility] has been changing the schedule without the pt's notice. Mother states that his behavior can change quickly if he is not given his medication at the right time. Pt currently denies SI/HI, fever, no reported dangerous behavior.

## 2023-10-18 NOTE — ACP (ADVANCE CARE PLANNING)
Advance Care Planning     Advance Care Planning Activator (Inpatient)  Conversation Note      Date of ACP Conversation: 10/17/2023     Conversation Conducted with: Patient with Decision Making Capacity    ACP Activator: Jacqui Landers RN        Health Care Decision Maker:     Current Designated Health Care Decision Maker:     Primary Decision Maker: Jomar Anand Spouse - [phone]    Care Preferences    Ventilation: \"If you were in your present state of health and suddenly became very ill and were unable to breathe on your own, what would your preference be about the use of a ventilator (breathing machine) if it were available to you? \"      Would the patient desire the use of ventilator (breathing machine)?: yes    \"If your health worsens and it becomes clear that your chance of recovery is unlikely, what would your preference be about the use of a ventilator (breathing machine) if it were available to you? \"     Would the patient desire the use of ventilator (breathing machine)?: Yes      Resuscitation  \"CPR works best to restart the heart when there is a sudden event, like a heart attack, in someone who is otherwise healthy. Unfortunately, CPR does not typically restart the heart for people who have serious health conditions or who are very sick. \"    \"In the event your heart stopped as a result of an underlying serious health condition, would you want attempts to be made to restart your heart (answer \"yes\" for attempt to resuscitate) or would you prefer a natural death (answer \"no\" for do not attempt to resuscitate)? \" yes       [x] Yes   [] No   Educated Patient / Edin Hall regarding differences between Advance Directives and portable DNR orders.     Length of ACP Conversation in minutes:  10    Conversation Outcomes:  ACP discussion completed    Follow-up plan:    [] Schedule follow-up conversation to continue planning  [] Referred individual to Provider for additional questions/concerns   []

## 2023-10-18 NOTE — ED NOTES
This RN called nurse to nurse to [place], [place]. Pt stable for transport.       Chaim Mark, RN  10/17/23 2040

## 2023-10-19 PROBLEM — R13.19 ESOPHAGEAL DYSPHAGIA: Status: ACTIVE | Noted: 2023-10-19

## 2023-10-19 LAB
ALBUMIN SERPL-MCNC: 3.5 G/DL (ref 3.5–4.6)
ALP SERPL-CCNC: 78 U/L (ref 35–104)
ALT SERPL-CCNC: 28 U/L (ref 0–41)
ANION GAP SERPL CALCULATED.3IONS-SCNC: 15 MEQ/L (ref 9–15)
AST SERPL-CCNC: 20 U/L (ref 0–40)
BASOPHILS # BLD: 0 K/UL (ref 0–0.2)
BASOPHILS NFR BLD: 0.4 %
BILIRUB SERPL-MCNC: 0.3 MG/DL (ref 0.2–0.7)
BNP BLD-MCNC: 365 PG/ML
BUN SERPL-MCNC: 27 MG/DL (ref 8–23)
CALCIUM SERPL-MCNC: 9.2 MG/DL (ref 8.5–9.9)
CHLORIDE SERPL-SCNC: 98 MEQ/L (ref 95–107)
CO2 SERPL-SCNC: 26 MEQ/L (ref 20–31)
CREAT SERPL-MCNC: 1.19 MG/DL (ref 0.7–1.2)
EOSINOPHIL # BLD: 0 K/UL (ref 0–0.7)
EOSINOPHIL NFR BLD: 0.1 %
ERYTHROCYTE [DISTWIDTH] IN BLOOD BY AUTOMATED COUNT: 16.3 % (ref 11.5–14.5)
GLOBULIN SER CALC-MCNC: 2.5 G/DL (ref 2.3–3.5)
GLUCOSE BLD-MCNC: 115 MG/DL (ref 70–99)
GLUCOSE BLD-MCNC: 185 MG/DL (ref 70–99)
GLUCOSE BLD-MCNC: 447 MG/DL (ref 70–99)
GLUCOSE BLD-MCNC: 452 MG/DL (ref 70–99)
GLUCOSE BLD-MCNC: 457 MG/DL (ref 70–99)
GLUCOSE SERPL-MCNC: 89 MG/DL (ref 70–99)
HCT VFR BLD AUTO: 31.4 % (ref 42–52)
HGB BLD-MCNC: 10.6 G/DL (ref 14–18)
LYMPHOCYTES # BLD: 1.1 K/UL (ref 1–4.8)
LYMPHOCYTES NFR BLD: 10.7 %
MCH RBC QN AUTO: 34.5 PG (ref 27–31.3)
MCHC RBC AUTO-ENTMCNC: 33.8 % (ref 33–37)
MCV RBC AUTO: 102.3 FL (ref 79–92.2)
MONOCYTES # BLD: 1.2 K/UL (ref 0.2–0.8)
MONOCYTES NFR BLD: 12 %
NEUTROPHILS # BLD: 7.3 K/UL (ref 1.4–6.5)
NEUTS SEG NFR BLD: 74.5 %
PERFORMED ON: ABNORMAL
PLATELET # BLD AUTO: 288 K/UL (ref 130–400)
POTASSIUM SERPL-SCNC: 3.9 MEQ/L (ref 3.4–4.9)
PROT SERPL-MCNC: 6 G/DL (ref 6.3–8)
RBC # BLD AUTO: 3.07 M/UL (ref 4.7–6.1)
SODIUM SERPL-SCNC: 139 MEQ/L (ref 135–144)
WBC # BLD AUTO: 9.8 K/UL (ref 4.8–10.8)

## 2023-10-19 PROCEDURE — 2580000003 HC RX 258: Performed by: INTERNAL MEDICINE

## 2023-10-19 PROCEDURE — 36415 COLL VENOUS BLD VENIPUNCTURE: CPT

## 2023-10-19 PROCEDURE — 6360000002 HC RX W HCPCS: Performed by: INTERNAL MEDICINE

## 2023-10-19 PROCEDURE — 6370000000 HC RX 637 (ALT 250 FOR IP): Performed by: INTERNAL MEDICINE

## 2023-10-19 PROCEDURE — 94799 UNLISTED PULMONARY SVC/PX: CPT

## 2023-10-19 PROCEDURE — 2500000003 HC RX 250 WO HCPCS: Performed by: INTERNAL MEDICINE

## 2023-10-19 PROCEDURE — 94640 AIRWAY INHALATION TREATMENT: CPT

## 2023-10-19 PROCEDURE — 99232 SBSQ HOSP IP/OBS MODERATE 35: CPT | Performed by: PSYCHIATRY & NEUROLOGY

## 2023-10-19 PROCEDURE — 94761 N-INVAS EAR/PLS OXIMETRY MLT: CPT

## 2023-10-19 PROCEDURE — 90945 DIALYSIS ONE EVALUATION: CPT

## 2023-10-19 PROCEDURE — 80053 COMPREHEN METABOLIC PANEL: CPT

## 2023-10-19 PROCEDURE — APPSS30 APP SPLIT SHARED TIME 16-30 MINUTES: Performed by: NURSE PRACTITIONER

## 2023-10-19 PROCEDURE — 83880 ASSAY OF NATRIURETIC PEPTIDE: CPT

## 2023-10-19 PROCEDURE — 85025 COMPLETE CBC W/AUTO DIFF WBC: CPT

## 2023-10-19 PROCEDURE — P9041 ALBUMIN (HUMAN),5%, 50ML: HCPCS | Performed by: INTERNAL MEDICINE

## 2023-10-19 PROCEDURE — 1210000000 HC MED SURG R&B

## 2023-10-19 RX ORDER — INSULIN LISPRO 100 [IU]/ML
0-4 INJECTION, SOLUTION INTRAVENOUS; SUBCUTANEOUS NIGHTLY
Status: DISCONTINUED | OUTPATIENT
Start: 2023-10-19 | End: 2023-10-25 | Stop reason: HOSPADM

## 2023-10-19 RX ORDER — INSULIN LISPRO 100 [IU]/ML
0-16 INJECTION, SOLUTION INTRAVENOUS; SUBCUTANEOUS
Status: DISCONTINUED | OUTPATIENT
Start: 2023-10-19 | End: 2023-10-25 | Stop reason: HOSPADM

## 2023-10-19 RX ORDER — MECOBALAMIN 5000 MCG
5 TABLET,DISINTEGRATING ORAL NIGHTLY
Status: DISCONTINUED | OUTPATIENT
Start: 2023-10-19 | End: 2023-10-25 | Stop reason: HOSPADM

## 2023-10-19 RX ORDER — INSULIN GLARGINE 100 [IU]/ML
30 INJECTION, SOLUTION SUBCUTANEOUS NIGHTLY
Status: DISCONTINUED | OUTPATIENT
Start: 2023-10-19 | End: 2023-10-20

## 2023-10-19 RX ADMIN — ALBUMIN (HUMAN) 175 G: 12.5 INJECTION, SOLUTION INTRAVENOUS at 12:16

## 2023-10-19 RX ADMIN — FUROSEMIDE 20 MG: 20 TABLET ORAL at 08:07

## 2023-10-19 RX ADMIN — AMLODIPINE BESYLATE 5 MG: 5 TABLET ORAL at 08:07

## 2023-10-19 RX ADMIN — FAMOTIDINE 40 MG: 20 TABLET ORAL at 17:11

## 2023-10-19 RX ADMIN — AZATHIOPRINE 50 MG: 50 TABLET ORAL at 21:58

## 2023-10-19 RX ADMIN — TIOTROPIUM BROMIDE INHALATION SPRAY 2 PUFF: 3.12 SPRAY, METERED RESPIRATORY (INHALATION) at 07:27

## 2023-10-19 RX ADMIN — INSULIN GLARGINE 30 UNITS: 100 INJECTION, SOLUTION SUBCUTANEOUS at 21:57

## 2023-10-19 RX ADMIN — Medication 10 ML: at 22:00

## 2023-10-19 RX ADMIN — TAMSULOSIN HYDROCHLORIDE 0.8 MG: 0.4 CAPSULE ORAL at 08:08

## 2023-10-19 RX ADMIN — Medication 5 MG: at 22:25

## 2023-10-19 RX ADMIN — AZATHIOPRINE 100 MG: 50 TABLET ORAL at 08:07

## 2023-10-19 RX ADMIN — BUSPIRONE HYDROCHLORIDE 10 MG: 10 TABLET ORAL at 21:59

## 2023-10-19 RX ADMIN — IPRATROPIUM BROMIDE 2 SPRAY: 42 SPRAY NASAL at 22:00

## 2023-10-19 RX ADMIN — BUSPIRONE HYDROCHLORIDE 10 MG: 10 TABLET ORAL at 08:07

## 2023-10-19 RX ADMIN — INSULIN LISPRO 16 UNITS: 100 INJECTION, SOLUTION INTRAVENOUS; SUBCUTANEOUS at 21:57

## 2023-10-19 RX ADMIN — Medication 10 ML: at 08:09

## 2023-10-19 RX ADMIN — INSULIN LISPRO 16 UNITS: 100 INJECTION, SOLUTION INTRAVENOUS; SUBCUTANEOUS at 17:11

## 2023-10-19 RX ADMIN — ANTICOAGULANT CITRATE DEXTROSE SOLUTION FORMULA A: 12.25; 11; 3.65 SOLUTION INTRAVENOUS at 12:17

## 2023-10-19 RX ADMIN — INSULIN LISPRO 16 UNITS: 100 INJECTION, SOLUTION INTRAVENOUS; SUBCUTANEOUS at 11:56

## 2023-10-19 RX ADMIN — PREDNISONE 20 MG: 20 TABLET ORAL at 08:07

## 2023-10-19 RX ADMIN — BUDESONIDE AND FORMOTEROL FUMARATE DIHYDRATE 2 PUFF: 160; 4.5 AEROSOL RESPIRATORY (INHALATION) at 07:27

## 2023-10-19 RX ADMIN — METOPROLOL SUCCINATE 50 MG: 50 TABLET, EXTENDED RELEASE ORAL at 08:07

## 2023-10-19 RX ADMIN — CALCIUM GLUCONATE 3500 MG: 98 INJECTION, SOLUTION INTRAVENOUS at 12:18

## 2023-10-19 RX ADMIN — MIRTAZAPINE 7.5 MG: 15 TABLET, FILM COATED ORAL at 21:58

## 2023-10-19 RX ADMIN — BUDESONIDE AND FORMOTEROL FUMARATE DIHYDRATE 2 PUFF: 160; 4.5 AEROSOL RESPIRATORY (INHALATION) at 20:06

## 2023-10-19 ASSESSMENT — ENCOUNTER SYMPTOMS
VOMITING: 0
ABDOMINAL DISTENTION: 0
SHORTNESS OF BREATH: 1
CHEST TIGHTNESS: 0
WHEEZING: 0
ABDOMINAL PAIN: 0
NAUSEA: 0
TROUBLE SWALLOWING: 1
COUGH: 1
COLOR CHANGE: 0

## 2023-10-19 ASSESSMENT — PAIN SCALES - GENERAL: PAINLEVEL_OUTOF10: 0

## 2023-10-19 NOTE — FLOWSHEET NOTE
Dr. Don Currie returned call updated on need for informed consent to be sign.  Will address in the AM.

## 2023-10-19 NOTE — PLAN OF CARE
Problem: Discharge Planning  Goal: Discharge to home or other facility with appropriate resources  10/18/2023 2027 by Yi Paul RN  Outcome: Progressing  10/18/2023 2026 by Yi Paul RN  Outcome: Progressing     Problem: Safety - Adult  Goal: Free from fall injury  10/18/2023 2027 by Yi Paul RN  Outcome: Progressing  10/18/2023 2026 by Yi Paul RN  Outcome: Progressing     Problem: ABCDS Injury Assessment  Goal: Absence of physical injury  10/18/2023 2027 by Yi Paul RN  Outcome: Progressing  10/18/2023 2026 by Yi Paul RN  Outcome: Progressing     Problem: Chronic Conditions and Co-morbidities  Goal: Patient's chronic conditions and co-morbidity symptoms are monitored and maintained or improved  10/18/2023 2027 by Yi Paul RN  Outcome: Progressing  10/18/2023 2026 by Yi Paul RN  Outcome: Progressing

## 2023-10-20 LAB
ACHR BIND AB SER-SCNC: 0.1 NMOL/L (ref 0–0.4)
ALBUMIN SERPL-MCNC: 3.9 G/DL (ref 3.5–4.6)
ALP SERPL-CCNC: 38 U/L (ref 35–104)
ALT SERPL-CCNC: 15 U/L (ref 0–41)
ANION GAP SERPL CALCULATED.3IONS-SCNC: 12 MEQ/L (ref 9–15)
AST SERPL-CCNC: 20 U/L (ref 0–40)
BASOPHILS # BLD: 0 K/UL (ref 0–0.2)
BASOPHILS NFR BLD: 0.4 %
BILIRUB SERPL-MCNC: 0.4 MG/DL (ref 0.2–0.7)
BUN SERPL-MCNC: 26 MG/DL (ref 8–23)
CA-I ADJ PH7.4 SERPL-SCNC: 1.2 MMOL/L (ref 1.09–1.3)
CA-I SERPL ISE-SCNC: 1.19 MMOL/L (ref 1.09–1.3)
CALCIUM SERPL-MCNC: 8.6 MG/DL (ref 8.5–9.9)
CHLORIDE SERPL-SCNC: 101 MEQ/L (ref 95–107)
CO2 SERPL-SCNC: 26 MEQ/L (ref 20–31)
CREAT SERPL-MCNC: 1.04 MG/DL (ref 0.7–1.2)
EOSINOPHIL # BLD: 0 K/UL (ref 0–0.7)
EOSINOPHIL NFR BLD: 0.1 %
ERYTHROCYTE [DISTWIDTH] IN BLOOD BY AUTOMATED COUNT: 16.5 % (ref 11.5–14.5)
GLOBULIN SER CALC-MCNC: 1.1 G/DL (ref 2.3–3.5)
GLUCOSE BLD-MCNC: 136 MG/DL (ref 70–99)
GLUCOSE BLD-MCNC: 233 MG/DL (ref 70–99)
GLUCOSE BLD-MCNC: 269 MG/DL (ref 70–99)
GLUCOSE BLD-MCNC: 463 MG/DL (ref 70–99)
GLUCOSE SERPL-MCNC: 127 MG/DL (ref 70–99)
HCT VFR BLD AUTO: 30.7 % (ref 42–52)
HGB BLD-MCNC: 10.1 G/DL (ref 14–18)
LYMPHOCYTES # BLD: 1.3 K/UL (ref 1–4.8)
LYMPHOCYTES NFR BLD: 12.7 %
MCH RBC QN AUTO: 33.7 PG (ref 27–31.3)
MCHC RBC AUTO-ENTMCNC: 32.9 % (ref 33–37)
MCV RBC AUTO: 102.3 FL (ref 79–92.2)
MONOCYTES # BLD: 1.1 K/UL (ref 0.2–0.8)
MONOCYTES NFR BLD: 10.8 %
NEUTROPHILS # BLD: 7.4 K/UL (ref 1.4–6.5)
NEUTS SEG NFR BLD: 73.6 %
PERFORMED ON: ABNORMAL
PLATELET # BLD AUTO: 247 K/UL (ref 130–400)
POTASSIUM SERPL-SCNC: 3.6 MEQ/L (ref 3.4–4.9)
PROT SERPL-MCNC: 5 G/DL (ref 6.3–8)
RBC # BLD AUTO: 3 M/UL (ref 4.7–6.1)
SODIUM SERPL-SCNC: 139 MEQ/L (ref 135–144)
WBC # BLD AUTO: 10 K/UL (ref 4.8–10.8)

## 2023-10-20 PROCEDURE — 85025 COMPLETE CBC W/AUTO DIFF WBC: CPT

## 2023-10-20 PROCEDURE — 94640 AIRWAY INHALATION TREATMENT: CPT

## 2023-10-20 PROCEDURE — 1210000000 HC MED SURG R&B

## 2023-10-20 PROCEDURE — 2580000003 HC RX 258: Performed by: INTERNAL MEDICINE

## 2023-10-20 PROCEDURE — 6370000000 HC RX 637 (ALT 250 FOR IP): Performed by: INTERNAL MEDICINE

## 2023-10-20 PROCEDURE — 80053 COMPREHEN METABOLIC PANEL: CPT

## 2023-10-20 PROCEDURE — APPSS15 APP SPLIT SHARED TIME 0-15 MINUTES: Performed by: NURSE PRACTITIONER

## 2023-10-20 PROCEDURE — 99232 SBSQ HOSP IP/OBS MODERATE 35: CPT | Performed by: PSYCHIATRY & NEUROLOGY

## 2023-10-20 PROCEDURE — 36415 COLL VENOUS BLD VENIPUNCTURE: CPT

## 2023-10-20 PROCEDURE — 94799 UNLISTED PULMONARY SVC/PX: CPT

## 2023-10-20 PROCEDURE — 6360000002 HC RX W HCPCS: Performed by: INTERNAL MEDICINE

## 2023-10-20 RX ORDER — INSULIN LISPRO 100 [IU]/ML
10 INJECTION, SOLUTION INTRAVENOUS; SUBCUTANEOUS
Status: DISCONTINUED | OUTPATIENT
Start: 2023-10-21 | End: 2023-10-25 | Stop reason: HOSPADM

## 2023-10-20 RX ORDER — INSULIN LISPRO 100 [IU]/ML
6 INJECTION, SOLUTION INTRAVENOUS; SUBCUTANEOUS
Status: DISCONTINUED | OUTPATIENT
Start: 2023-10-20 | End: 2023-10-20

## 2023-10-20 RX ORDER — INSULIN GLARGINE 100 [IU]/ML
40 INJECTION, SOLUTION SUBCUTANEOUS NIGHTLY
Status: DISCONTINUED | OUTPATIENT
Start: 2023-10-20 | End: 2023-10-25 | Stop reason: HOSPADM

## 2023-10-20 RX ADMIN — BUDESONIDE AND FORMOTEROL FUMARATE DIHYDRATE 2 PUFF: 160; 4.5 AEROSOL RESPIRATORY (INHALATION) at 19:23

## 2023-10-20 RX ADMIN — Medication 10 ML: at 21:32

## 2023-10-20 RX ADMIN — FAMOTIDINE 40 MG: 20 TABLET ORAL at 17:05

## 2023-10-20 RX ADMIN — PREDNISONE 20 MG: 20 TABLET ORAL at 10:30

## 2023-10-20 RX ADMIN — TAMSULOSIN HYDROCHLORIDE 0.8 MG: 0.4 CAPSULE ORAL at 10:30

## 2023-10-20 RX ADMIN — INSULIN LISPRO 8 UNITS: 100 INJECTION, SOLUTION INTRAVENOUS; SUBCUTANEOUS at 13:21

## 2023-10-20 RX ADMIN — Medication 10 ML: at 10:31

## 2023-10-20 RX ADMIN — AMLODIPINE BESYLATE 5 MG: 5 TABLET ORAL at 10:30

## 2023-10-20 RX ADMIN — INSULIN LISPRO 16 UNITS: 100 INJECTION, SOLUTION INTRAVENOUS; SUBCUTANEOUS at 16:43

## 2023-10-20 RX ADMIN — INSULIN LISPRO 6 UNITS: 100 INJECTION, SOLUTION INTRAVENOUS; SUBCUTANEOUS at 16:43

## 2023-10-20 RX ADMIN — FUROSEMIDE 20 MG: 20 TABLET ORAL at 10:30

## 2023-10-20 RX ADMIN — INSULIN GLARGINE 40 UNITS: 100 INJECTION, SOLUTION SUBCUTANEOUS at 21:31

## 2023-10-20 RX ADMIN — Medication 5 MG: at 21:37

## 2023-10-20 RX ADMIN — APIXABAN 5 MG: 5 TABLET, FILM COATED ORAL at 21:31

## 2023-10-20 RX ADMIN — METOPROLOL SUCCINATE 50 MG: 50 TABLET, EXTENDED RELEASE ORAL at 10:30

## 2023-10-20 RX ADMIN — MIRTAZAPINE 7.5 MG: 15 TABLET, FILM COATED ORAL at 21:31

## 2023-10-20 RX ADMIN — BUSPIRONE HYDROCHLORIDE 10 MG: 10 TABLET ORAL at 21:31

## 2023-10-20 RX ADMIN — AZATHIOPRINE 50 MG: 50 TABLET ORAL at 21:31

## 2023-10-20 RX ADMIN — BUSPIRONE HYDROCHLORIDE 10 MG: 10 TABLET ORAL at 10:30

## 2023-10-20 RX ADMIN — AZATHIOPRINE 100 MG: 50 TABLET ORAL at 10:30

## 2023-10-20 RX ADMIN — INSULIN LISPRO 6 UNITS: 100 INJECTION, SOLUTION INTRAVENOUS; SUBCUTANEOUS at 13:21

## 2023-10-20 ASSESSMENT — ENCOUNTER SYMPTOMS
TROUBLE SWALLOWING: 0
ABDOMINAL PAIN: 0
NAUSEA: 0
CHEST TIGHTNESS: 0
SHORTNESS OF BREATH: 1
COLOR CHANGE: 0
COUGH: 1
ABDOMINAL DISTENTION: 0
WHEEZING: 0
VOMITING: 0

## 2023-10-20 ASSESSMENT — PAIN SCALES - GENERAL: PAINLEVEL_OUTOF10: 0

## 2023-10-21 LAB
ACHR BLOCK AB/ACHR TOTAL SFR SER: 29 % (ref 0–26)
ALBUMIN SERPL-MCNC: 3.8 G/DL (ref 3.5–4.6)
ALP SERPL-CCNC: 50 U/L (ref 35–104)
ALT SERPL-CCNC: 21 U/L (ref 0–41)
ANION GAP SERPL CALCULATED.3IONS-SCNC: 12 MEQ/L (ref 9–15)
AST SERPL-CCNC: 22 U/L (ref 0–40)
BASOPHILS # BLD: 0 K/UL (ref 0–0.2)
BASOPHILS NFR BLD: 0.4 %
BILIRUB SERPL-MCNC: 0.3 MG/DL (ref 0.2–0.7)
BUN SERPL-MCNC: 23 MG/DL (ref 8–23)
CALCIUM SERPL-MCNC: 8.9 MG/DL (ref 8.5–9.9)
CHLORIDE SERPL-SCNC: 103 MEQ/L (ref 95–107)
CO2 SERPL-SCNC: 26 MEQ/L (ref 20–31)
CREAT SERPL-MCNC: 1.09 MG/DL (ref 0.7–1.2)
EOSINOPHIL # BLD: 0 K/UL (ref 0–0.7)
EOSINOPHIL NFR BLD: 0.1 %
ERYTHROCYTE [DISTWIDTH] IN BLOOD BY AUTOMATED COUNT: 16.3 % (ref 11.5–14.5)
GLOBULIN SER CALC-MCNC: 1.5 G/DL (ref 2.3–3.5)
GLUCOSE BLD-MCNC: 270 MG/DL (ref 70–99)
GLUCOSE BLD-MCNC: 316 MG/DL (ref 70–99)
GLUCOSE BLD-MCNC: 437 MG/DL (ref 70–99)
GLUCOSE SERPL-MCNC: 183 MG/DL (ref 70–99)
HCT VFR BLD AUTO: 30.7 % (ref 42–52)
HGB BLD-MCNC: 10 G/DL (ref 14–18)
LYMPHOCYTES # BLD: 1.2 K/UL (ref 1–4.8)
LYMPHOCYTES NFR BLD: 11.1 %
MCH RBC QN AUTO: 33.2 PG (ref 27–31.3)
MCHC RBC AUTO-ENTMCNC: 32.6 % (ref 33–37)
MCV RBC AUTO: 102 FL (ref 79–92.2)
MONOCYTES # BLD: 1.1 K/UL (ref 0.2–0.8)
MONOCYTES NFR BLD: 10.9 %
NEUTROPHILS # BLD: 7.8 K/UL (ref 1.4–6.5)
NEUTS SEG NFR BLD: 75.3 %
PERFORMED ON: ABNORMAL
PLATELET # BLD AUTO: 231 K/UL (ref 130–400)
POTASSIUM SERPL-SCNC: 4.3 MEQ/L (ref 3.4–4.9)
PROT SERPL-MCNC: 5.3 G/DL (ref 6.3–8)
RBC # BLD AUTO: 3.01 M/UL (ref 4.7–6.1)
SODIUM SERPL-SCNC: 141 MEQ/L (ref 135–144)
WBC # BLD AUTO: 10.3 K/UL (ref 4.8–10.8)

## 2023-10-21 PROCEDURE — 6360000002 HC RX W HCPCS: Performed by: INTERNAL MEDICINE

## 2023-10-21 PROCEDURE — 2580000003 HC RX 258: Performed by: INTERNAL MEDICINE

## 2023-10-21 PROCEDURE — 6370000000 HC RX 637 (ALT 250 FOR IP): Performed by: INTERNAL MEDICINE

## 2023-10-21 PROCEDURE — 94799 UNLISTED PULMONARY SVC/PX: CPT

## 2023-10-21 PROCEDURE — 2500000003 HC RX 250 WO HCPCS: Performed by: INTERNAL MEDICINE

## 2023-10-21 PROCEDURE — 80053 COMPREHEN METABOLIC PANEL: CPT

## 2023-10-21 PROCEDURE — 1210000000 HC MED SURG R&B

## 2023-10-21 PROCEDURE — 36415 COLL VENOUS BLD VENIPUNCTURE: CPT

## 2023-10-21 PROCEDURE — 99232 SBSQ HOSP IP/OBS MODERATE 35: CPT | Performed by: PSYCHIATRY & NEUROLOGY

## 2023-10-21 PROCEDURE — 85025 COMPLETE CBC W/AUTO DIFF WBC: CPT

## 2023-10-21 PROCEDURE — 94760 N-INVAS EAR/PLS OXIMETRY 1: CPT

## 2023-10-21 PROCEDURE — 94640 AIRWAY INHALATION TREATMENT: CPT

## 2023-10-21 PROCEDURE — P9041 ALBUMIN (HUMAN),5%, 50ML: HCPCS | Performed by: INTERNAL MEDICINE

## 2023-10-21 PROCEDURE — 94761 N-INVAS EAR/PLS OXIMETRY MLT: CPT

## 2023-10-21 RX ORDER — LOPERAMIDE HYDROCHLORIDE 2 MG/1
2 CAPSULE ORAL 4 TIMES DAILY PRN
Status: CANCELLED | OUTPATIENT
Start: 2023-10-21

## 2023-10-21 RX ORDER — FUROSEMIDE 10 MG/ML
20 INJECTION INTRAMUSCULAR; INTRAVENOUS DAILY
Status: DISCONTINUED | OUTPATIENT
Start: 2023-10-21 | End: 2023-10-21

## 2023-10-21 RX ORDER — DIPHENOXYLATE HYDROCHLORIDE AND ATROPINE SULFATE 2.5; .025 MG/1; MG/1
1 TABLET ORAL 4 TIMES DAILY PRN
Status: DISCONTINUED | OUTPATIENT
Start: 2023-10-21 | End: 2023-10-25 | Stop reason: HOSPADM

## 2023-10-21 RX ORDER — FUROSEMIDE 10 MG/ML
20 INJECTION INTRAMUSCULAR; INTRAVENOUS
Status: DISPENSED | OUTPATIENT
Start: 2023-10-21 | End: 2023-10-22

## 2023-10-21 RX ORDER — FUROSEMIDE 20 MG/1
20 TABLET ORAL DAILY
Status: DISCONTINUED | OUTPATIENT
Start: 2023-10-22 | End: 2023-10-25 | Stop reason: HOSPADM

## 2023-10-21 RX ADMIN — Medication 10 ML: at 21:43

## 2023-10-21 RX ADMIN — APIXABAN 5 MG: 5 TABLET, FILM COATED ORAL at 21:42

## 2023-10-21 RX ADMIN — Medication 10 ML: at 09:07

## 2023-10-21 RX ADMIN — MIRTAZAPINE 7.5 MG: 15 TABLET, FILM COATED ORAL at 21:41

## 2023-10-21 RX ADMIN — DIPHENOXYLATE HYDROCHLORIDE AND ATROPINE SULFATE 1 TABLET: 2.5; .025 TABLET ORAL at 02:34

## 2023-10-21 RX ADMIN — INSULIN GLARGINE 40 UNITS: 100 INJECTION, SOLUTION SUBCUTANEOUS at 21:44

## 2023-10-21 RX ADMIN — BUDESONIDE AND FORMOTEROL FUMARATE DIHYDRATE 2 PUFF: 160; 4.5 AEROSOL RESPIRATORY (INHALATION) at 19:40

## 2023-10-21 RX ADMIN — INSULIN LISPRO 10 UNITS: 100 INJECTION, SOLUTION INTRAVENOUS; SUBCUTANEOUS at 13:50

## 2023-10-21 RX ADMIN — BUSPIRONE HYDROCHLORIDE 10 MG: 10 TABLET ORAL at 09:02

## 2023-10-21 RX ADMIN — INSULIN LISPRO 4 UNITS: 100 INJECTION, SOLUTION INTRAVENOUS; SUBCUTANEOUS at 21:44

## 2023-10-21 RX ADMIN — APIXABAN 5 MG: 5 TABLET, FILM COATED ORAL at 09:02

## 2023-10-21 RX ADMIN — TIOTROPIUM BROMIDE INHALATION SPRAY 2 PUFF: 3.12 SPRAY, METERED RESPIRATORY (INHALATION) at 08:21

## 2023-10-21 RX ADMIN — INSULIN LISPRO 8 UNITS: 100 INJECTION, SOLUTION INTRAVENOUS; SUBCUTANEOUS at 13:50

## 2023-10-21 RX ADMIN — Medication 5 MG: at 21:41

## 2023-10-21 RX ADMIN — FUROSEMIDE 20 MG: 10 INJECTION, SOLUTION INTRAMUSCULAR; INTRAVENOUS at 15:36

## 2023-10-21 RX ADMIN — ALBUMIN (HUMAN) 175 G: 12.5 INJECTION, SOLUTION INTRAVENOUS at 09:58

## 2023-10-21 RX ADMIN — ANTICOAGULANT CITRATE DEXTROSE SOLUTION FORMULA A: 12.25; 11; 3.65 SOLUTION INTRAVENOUS at 09:57

## 2023-10-21 RX ADMIN — BUSPIRONE HYDROCHLORIDE 10 MG: 10 TABLET ORAL at 21:41

## 2023-10-21 RX ADMIN — PREDNISONE 20 MG: 20 TABLET ORAL at 09:02

## 2023-10-21 RX ADMIN — FAMOTIDINE 40 MG: 20 TABLET ORAL at 18:08

## 2023-10-21 RX ADMIN — AZATHIOPRINE 50 MG: 50 TABLET ORAL at 21:41

## 2023-10-21 RX ADMIN — INSULIN LISPRO 10 UNITS: 100 INJECTION, SOLUTION INTRAVENOUS; SUBCUTANEOUS at 18:09

## 2023-10-21 RX ADMIN — AZATHIOPRINE 100 MG: 50 TABLET ORAL at 09:02

## 2023-10-21 RX ADMIN — TAMSULOSIN HYDROCHLORIDE 0.8 MG: 0.4 CAPSULE ORAL at 09:01

## 2023-10-21 RX ADMIN — CALCIUM GLUCONATE 3500 MG: 98 INJECTION, SOLUTION INTRAVENOUS at 09:55

## 2023-10-21 RX ADMIN — BUDESONIDE AND FORMOTEROL FUMARATE DIHYDRATE 2 PUFF: 160; 4.5 AEROSOL RESPIRATORY (INHALATION) at 08:21

## 2023-10-21 RX ADMIN — METOPROLOL SUCCINATE 50 MG: 50 TABLET, EXTENDED RELEASE ORAL at 13:46

## 2023-10-21 RX ADMIN — INSULIN LISPRO 16 UNITS: 100 INJECTION, SOLUTION INTRAVENOUS; SUBCUTANEOUS at 18:08

## 2023-10-21 ASSESSMENT — ENCOUNTER SYMPTOMS
COUGH: 1
CHEST TIGHTNESS: 0
WHEEZING: 0
TROUBLE SWALLOWING: 0
ABDOMINAL PAIN: 0
COLOR CHANGE: 0
VOMITING: 0
ABDOMINAL DISTENTION: 0
NAUSEA: 0
SHORTNESS OF BREATH: 1

## 2023-10-21 ASSESSMENT — PAIN SCALES - GENERAL: PAINLEVEL_OUTOF10: 0

## 2023-10-21 NOTE — PLAN OF CARE
Problem: Discharge Planning  Goal: Discharge to home or other facility with appropriate resources  Outcome: Progressing  Flowsheets (Taken 10/20/2023 2120)  Discharge to home or other facility with appropriate resources:   Identify barriers to discharge with patient and caregiver   Identify discharge learning needs (meds, wound care, etc)     Problem: ABCDS Injury Assessment  Goal: Absence of physical injury  Outcome: Progressing     Problem: Chronic Conditions and Co-morbidities  Goal: Patient's chronic conditions and co-morbidity symptoms are monitored and maintained or improved  Outcome: Progressing  Flowsheets (Taken 10/20/2023 2120)  Care Plan - Patient's Chronic Conditions and Co-Morbidity Symptoms are Monitored and Maintained or Improved: Monitor and assess patient's chronic conditions and comorbid symptoms for stability, deterioration, or improvement     Problem: Safety - Adult  Goal: Free from fall injury  Outcome: Adequate for Discharge     Problem: Pain  Goal: Verbalizes/displays adequate comfort level or baseline comfort level  Outcome: Adequate for Discharge

## 2023-10-21 NOTE — CONSULTS
Renal note dictated  consult noted  Hyponatremia corrected  now with evolemic state  normal renal function    Plasmaphoresis  therapy Monday MS samayoa
No JVD or adenopathy. CHEST:  Lungs are clear. CARDIOVASCULAR:  Heart is regular with a 1/6 systolic murmur. ABDOMEN:  Soft. No pain. No guarding or rigidity. EXTREMITIES:  No edema. Weakness of lower limbs. IMPRESSION:  1. Hyponatremia, resolved. 2.  Myasthenia gravis with exacerbation. Plasmapheresis was started. 3.  History of small cell lung cancer. PLAN:  Continue plasmapheresis as organized by Dr. Jasmina Wolfe.         Cathy Jose DO    D: 10/21/2023 11:23:54       T: 10/21/2023 12:39:18     [id number]  Job#: [id number]     Doc#: [id number]    CC:
Take 1 tablet by mouth daily 7 tablet 1    potassium chloride (KLOR-CON M) 10 MEQ extended release tablet Take 1 tablet by mouth daily 7 tablet 1    amLODIPine (NORVASC) 10 MG tablet Take 0.5 tablets by mouth daily 90 tablet 1    metoprolol succinate (TOPROL XL) 50 MG extended release tablet Take 1 tablet by mouth daily 30 tablet 0    ipratropium (ATROVENT) 0.06 % nasal spray 2 sprays by Nasal route 3 times daily 45 mL 3    apixaban (ELIQUIS) 5 MG TABS tablet Take 1 tablet by mouth 2 times daily 60 tablet 3    fluticasone-umeclidin-vilant (TRELEGY ELLIPTA) 100-62.5-25 MCG/ACT AEPB inhaler Inhale 1 puff into the lungs daily 3 each 2    busPIRone (BUSPAR) 10 MG tablet Take 1 tablet by mouth in the morning and at bedtime Indications: Feeling Anxious Take one pill twice daily for anxiety. 60 tablet 2    tamsulosin (FLOMAX) 0.4 MG capsule Take 2 capsules by mouth daily 180 capsule 1    insulin glargine (LANTUS SOLOSTAR) 100 UNIT/ML injection pen Inject 20 Units into the skin nightly 5 Adjustable Dose Pre-filled Pen Syringe 1    mirtazapine (REMERON) 7.5 MG tablet Take 1 tablet by mouth nightly 30 tablet 5    blood glucose test strips (FREESTYLE LITE) strip As needed.  100 each 5    losartan (COZAAR) 25 MG tablet Take 1 tablet by mouth daily 90 tablet 1    Insulin Pen Needle ([employer] PEN NEEDLES 31G) 31G X 8 MM MISC 1 each by Does not apply route daily 100 each 3    rosuvastatin (CRESTOR) 20 MG tablet Take 1 tablet by mouth nightly 90 tablet 2    acetaminophen (TYLENOL) 500 MG tablet Take 2 tablets by mouth every 8 hours as needed for Pain      Heating Pad PADS by Does not apply route      diclofenac sodium (VOLTAREN) 1 % GEL Apply 4 g topically 4 times daily as needed for Pain      Dulaglutide 3 MG/0.5ML SOPN Inject 3 mg into the skin once a week 6 mL 1    predniSONE (DELTASONE) 20 MG tablet Take 1 tablet by mouth daily 90 tablet 1    ondansetron (ZOFRAN) 4 MG tablet Take 1 tablet by mouth 3 times daily as needed for
symmetric. Babinski sign absent on the right side. Babinski sign absent on the left side. Psychiatric:         Mood and Affect: Mood normal.         No results found.     Lab Results   Component Value Date/Time    WBC 9.3 10/18/2023 04:49 AM    RBC 3.09 10/18/2023 04:49 AM    RBC 4.03 03/30/2012 08:03 AM    HGB 10.2 10/18/2023 04:49 AM    HCT 31.2 10/18/2023 04:49 AM    .0 10/18/2023 04:49 AM    MCH 33.0 10/18/2023 04:49 AM    MCHC 32.7 10/18/2023 04:49 AM    RDW 16.3 10/18/2023 04:49 AM     10/18/2023 04:49 AM    MPV 8.7 02/17/2015 08:35 AM     Lab Results   Component Value Date/Time     10/18/2023 04:49 AM    K 3.9 10/18/2023 04:49 AM     10/18/2023 04:49 AM    CO2 28 10/18/2023 04:49 AM    BUN 26 10/18/2023 04:49 AM    CREATININE 1.16 10/18/2023 04:49 AM    GFRAA >60.0 05/19/2022 08:08 AM    LABGLOM >60.0 10/18/2023 04:49 AM    GLUCOSE 77 10/18/2023 04:49 AM    GLUCOSE 60 03/30/2012 08:03 AM    PROT 5.6 10/18/2023 04:49 AM    LABALBU 3.5 10/18/2023 04:49 AM    LABALBU 4.7 03/30/2012 08:03 AM    CALCIUM 9.0 10/18/2023 04:49 AM    BILITOT 0.3 10/18/2023 04:49 AM    ALKPHOS 73 10/18/2023 04:49 AM    AST 26 10/18/2023 04:49 AM    ALT 31 10/18/2023 04:49 AM     Lab Results   Component Value Date/Time    PROTIME 16.2 10/17/2023 06:30 PM    INR 1.3 10/17/2023 06:30 PM     Lab Results   Component Value Date/Time    TSH 0.198 10/05/2023 04:04 PM    ALDPDSCR48 901 06/07/2021 11:03 AM    FOLATE 16.0 06/07/2021 11:03 AM    FERRITIN 267 09/26/2011 08:25 AM    IRON 80 09/26/2011 08:25 AM    TIBC 365 09/26/2011 08:25 AM     Lab Results   Component Value Date/Time    TRIG 427 08/11/2023 08:18 AM    HDL 59 08/11/2023 08:18 AM    LDLCALC see below 08/11/2023 08:18 AM     No results found for: \"LABAMPH\", \"BARBSCNU\", \"LABBENZ\", \"CANNAB\", \"COCAINESCRN\", \"LABMETH\", \"OPIATESCREENURINE\", \"PHENCYCLIDINESCREENURINE\", \"PPXUR\", \"ETOH\"  No results found for: \"LITHIUM\", \"DILFRTOT\", \"VALPROATE\"    Assessment:
sterile bandaging was placed. Echo (TTE) Complete    Result Date: 10/17/2023    Left Ventricle: Normal left ventricular systolic function with a visually estimated EF of 50 - 55%. Left ventricle size is normal. Normal wall thickness. Normal wall motion. Diastolic dysfunction present with normal LV EF. Mitral Valve: Mild annular calcification of the mitral valve. Extended cardiac holter monitor (48 hrs - 15 days)    [facility] [address] [place] [address] Date:  10/16/23 Name: Joceline Salinas MR#:  [id number] YOB: 1945 Referring Physicians:  Marilyn Clark MD EXAMINATION:  48 Hour Holter Monitor INDICATION FOR STUDY: Tachycardia DESCRIPTION OF PROCEDURE: The patient was monitored for 48 hours starting on 10/6/2023, and completed the full monitoring period until discontinued on 10/8/2023. There were multiple data transmissions. FINDINGS: The underlying rhythm is normal sinus rhythm/sinus tachycardia with an average heart rate of 114 beats per minute. Minimum heart rate was 89 beats per minute. There were no prolonged pauses of over 2.5 seconds. Maximum heart rate was 143 beats per minute. During the study there were frequent episodes of sinus tachycardia. There were no significant or sustained supraventricular arrhythmias seen. No ventricular arrhythmias seen. No significant bradyarrhythmias, AV blocks, or prolonged pauses seen. There were occasional PVCs and rare PACs. Calculated PVC burden of 0.3%. The patient reported no symptomatic episodes. IMPRESSION: This 48-hour Holter monitor reveals predominantly sinus tachycardia with an average heart rate of 114 bpm.  No significant arrhythmias were seen. Thank you for allowing me to participate in your patient's cardiac care. Should you have questions, please do not hesitate to contact me. Lexine Cooks, DO, [facility], [address] [facility] and [address] [facility]       .

## 2023-10-22 LAB
GLUCOSE BLD-MCNC: 157 MG/DL (ref 70–99)
GLUCOSE BLD-MCNC: 227 MG/DL (ref 70–99)
GLUCOSE BLD-MCNC: 286 MG/DL (ref 70–99)
GLUCOSE BLD-MCNC: 587 MG/DL (ref 70–99)
PERFORMED ON: ABNORMAL

## 2023-10-22 PROCEDURE — 94761 N-INVAS EAR/PLS OXIMETRY MLT: CPT

## 2023-10-22 PROCEDURE — 94640 AIRWAY INHALATION TREATMENT: CPT

## 2023-10-22 PROCEDURE — 6360000002 HC RX W HCPCS: Performed by: INTERNAL MEDICINE

## 2023-10-22 PROCEDURE — 6370000000 HC RX 637 (ALT 250 FOR IP): Performed by: INTERNAL MEDICINE

## 2023-10-22 PROCEDURE — 1210000000 HC MED SURG R&B

## 2023-10-22 PROCEDURE — 2580000003 HC RX 258: Performed by: INTERNAL MEDICINE

## 2023-10-22 RX ORDER — PREDNISONE 5 MG/1
5 TABLET ORAL DAILY
Status: DISCONTINUED | OUTPATIENT
Start: 2023-10-26 | End: 2023-10-25 | Stop reason: HOSPADM

## 2023-10-22 RX ORDER — PREDNISONE 10 MG/1
10 TABLET ORAL DAILY
Status: COMPLETED | OUTPATIENT
Start: 2023-10-22 | End: 2023-10-25

## 2023-10-22 RX ORDER — ZOLPIDEM TARTRATE 5 MG/1
2.5 TABLET ORAL NIGHTLY
Status: COMPLETED | OUTPATIENT
Start: 2023-10-22 | End: 2023-10-22

## 2023-10-22 RX ADMIN — BUDESONIDE AND FORMOTEROL FUMARATE DIHYDRATE 2 PUFF: 160; 4.5 AEROSOL RESPIRATORY (INHALATION) at 19:34

## 2023-10-22 RX ADMIN — BUSPIRONE HYDROCHLORIDE 10 MG: 10 TABLET ORAL at 20:24

## 2023-10-22 RX ADMIN — Medication 5 MG: at 20:29

## 2023-10-22 RX ADMIN — FUROSEMIDE 20 MG: 20 TABLET ORAL at 09:45

## 2023-10-22 RX ADMIN — MIRTAZAPINE 7.5 MG: 15 TABLET, FILM COATED ORAL at 20:24

## 2023-10-22 RX ADMIN — INSULIN LISPRO 16 UNITS: 100 INJECTION, SOLUTION INTRAVENOUS; SUBCUTANEOUS at 18:11

## 2023-10-22 RX ADMIN — INSULIN LISPRO 8 UNITS: 100 INJECTION, SOLUTION INTRAVENOUS; SUBCUTANEOUS at 13:52

## 2023-10-22 RX ADMIN — INSULIN LISPRO 10 UNITS: 100 INJECTION, SOLUTION INTRAVENOUS; SUBCUTANEOUS at 18:12

## 2023-10-22 RX ADMIN — INSULIN LISPRO 10 UNITS: 100 INJECTION, SOLUTION INTRAVENOUS; SUBCUTANEOUS at 09:48

## 2023-10-22 RX ADMIN — FLUTICASONE PROPIONATE 1 SPRAY: 50 SPRAY, METERED NASAL at 09:48

## 2023-10-22 RX ADMIN — Medication 10 ML: at 13:55

## 2023-10-22 RX ADMIN — IPRATROPIUM BROMIDE 2 SPRAY: 42 SPRAY NASAL at 09:48

## 2023-10-22 RX ADMIN — APIXABAN 5 MG: 5 TABLET, FILM COATED ORAL at 20:24

## 2023-10-22 RX ADMIN — ZOLPIDEM TARTRATE 2.5 MG: 5 TABLET ORAL at 20:24

## 2023-10-22 RX ADMIN — PREDNISONE 10 MG: 10 TABLET ORAL at 18:10

## 2023-10-22 RX ADMIN — APIXABAN 5 MG: 5 TABLET, FILM COATED ORAL at 09:46

## 2023-10-22 RX ADMIN — BUDESONIDE AND FORMOTEROL FUMARATE DIHYDRATE 2 PUFF: 160; 4.5 AEROSOL RESPIRATORY (INHALATION) at 08:02

## 2023-10-22 RX ADMIN — METOPROLOL SUCCINATE 50 MG: 50 TABLET, EXTENDED RELEASE ORAL at 09:44

## 2023-10-22 RX ADMIN — BUSPIRONE HYDROCHLORIDE 10 MG: 10 TABLET ORAL at 09:45

## 2023-10-22 RX ADMIN — FAMOTIDINE 40 MG: 20 TABLET ORAL at 18:09

## 2023-10-22 RX ADMIN — AZATHIOPRINE 100 MG: 50 TABLET ORAL at 09:45

## 2023-10-22 RX ADMIN — INSULIN LISPRO 10 UNITS: 100 INJECTION, SOLUTION INTRAVENOUS; SUBCUTANEOUS at 13:53

## 2023-10-22 RX ADMIN — TAMSULOSIN HYDROCHLORIDE 0.8 MG: 0.4 CAPSULE ORAL at 09:46

## 2023-10-22 RX ADMIN — Medication 10 ML: at 20:25

## 2023-10-22 RX ADMIN — TIOTROPIUM BROMIDE INHALATION SPRAY 2 PUFF: 3.12 SPRAY, METERED RESPIRATORY (INHALATION) at 08:02

## 2023-10-22 RX ADMIN — PREDNISONE 20 MG: 20 TABLET ORAL at 09:44

## 2023-10-22 RX ADMIN — AZATHIOPRINE 50 MG: 50 TABLET ORAL at 20:24

## 2023-10-22 RX ADMIN — INSULIN GLARGINE 40 UNITS: 100 INJECTION, SOLUTION SUBCUTANEOUS at 20:26

## 2023-10-22 RX ADMIN — DIPHENOXYLATE HYDROCHLORIDE AND ATROPINE SULFATE 1 TABLET: 2.5; .025 TABLET ORAL at 13:50

## 2023-10-22 ASSESSMENT — PAIN SCALES - GENERAL
PAINLEVEL_OUTOF10: 0

## 2023-10-22 NOTE — PLAN OF CARE
Problem: Discharge Planning  Goal: Discharge to home or other facility with appropriate resources  Outcome: Progressing  Flowsheets (Taken 10/21/2023 1915)  Discharge to home or other facility with appropriate resources:   Identify barriers to discharge with patient and caregiver   Arrange for needed discharge resources and transportation as appropriate   Identify discharge learning needs (meds, wound care, etc)     Problem: Safety - Adult  Goal: Free from fall injury  Outcome: Progressing     Problem: ABCDS Injury Assessment  Goal: Absence of physical injury  Outcome: Progressing     Problem: Chronic Conditions and Co-morbidities  Goal: Patient's chronic conditions and co-morbidity symptoms are monitored and maintained or improved  Outcome: Progressing  Flowsheets (Taken 10/21/2023 1915)  Care Plan - Patient's Chronic Conditions and Co-Morbidity Symptoms are Monitored and Maintained or Improved:   Monitor and assess patient's chronic conditions and comorbid symptoms for stability, deterioration, or improvement   Collaborate with multidisciplinary team to address chronic and comorbid conditions and prevent exacerbation or deterioration   Update acute care plan with appropriate goals if chronic or comorbid symptoms are exacerbated and prevent overall improvement and discharge     Problem: Pain  Goal: Verbalizes/displays adequate comfort level or baseline comfort level  Outcome: Progressing  Flowsheets (Taken 10/21/2023 1915)  Verbalizes/displays adequate comfort level or baseline comfort level: Encourage patient to monitor pain and request assistance

## 2023-10-23 LAB
ALBUMIN SERPL-MCNC: 4.1 G/DL (ref 3.5–4.6)
ALP SERPL-CCNC: 50 U/L (ref 35–104)
ALT SERPL-CCNC: 23 U/L (ref 0–41)
ANION GAP SERPL CALCULATED.3IONS-SCNC: 10 MEQ/L (ref 9–15)
AST SERPL-CCNC: 21 U/L (ref 0–40)
BASOPHILS # BLD: 0 K/UL (ref 0–0.2)
BASOPHILS NFR BLD: 0.2 %
BILIRUB SERPL-MCNC: <0.2 MG/DL (ref 0.2–0.7)
BUN SERPL-MCNC: 18 MG/DL (ref 8–23)
CALCIUM SERPL-MCNC: 9.1 MG/DL (ref 8.5–9.9)
CHLORIDE SERPL-SCNC: 103 MEQ/L (ref 95–107)
CO2 SERPL-SCNC: 27 MEQ/L (ref 20–31)
CREAT SERPL-MCNC: 0.96 MG/DL (ref 0.7–1.2)
EOSINOPHIL # BLD: 0 K/UL (ref 0–0.7)
EOSINOPHIL NFR BLD: 0 %
ERYTHROCYTE [DISTWIDTH] IN BLOOD BY AUTOMATED COUNT: 16.2 % (ref 11.5–14.5)
FIBRINOGEN PPP-MCNC: 254 MG/DL (ref 262–562)
GLOBULIN SER CALC-MCNC: 1.2 G/DL (ref 2.3–3.5)
GLUCOSE BLD-MCNC: 272 MG/DL (ref 70–99)
GLUCOSE BLD-MCNC: 272 MG/DL (ref 70–99)
GLUCOSE BLD-MCNC: 277 MG/DL (ref 70–99)
GLUCOSE BLD-MCNC: 339 MG/DL (ref 70–99)
GLUCOSE SERPL-MCNC: 207 MG/DL (ref 70–99)
HCT VFR BLD AUTO: 32.1 % (ref 42–52)
HGB BLD-MCNC: 10.3 G/DL (ref 14–18)
INR PPP: 1.5
LYMPHOCYTES # BLD: 0.8 K/UL (ref 1–4.8)
LYMPHOCYTES NFR BLD: 7.1 %
MCH RBC QN AUTO: 32.9 PG (ref 27–31.3)
MCHC RBC AUTO-ENTMCNC: 32.1 % (ref 33–37)
MCV RBC AUTO: 102.6 FL (ref 79–92.2)
MONOCYTES # BLD: 1 K/UL (ref 0.2–0.8)
MONOCYTES NFR BLD: 8.6 %
NEUTROPHILS # BLD: 9.3 K/UL (ref 1.4–6.5)
NEUTS SEG NFR BLD: 82.1 %
PERFORMED ON: ABNORMAL
PLATELET # BLD AUTO: 207 K/UL (ref 130–400)
POTASSIUM SERPL-SCNC: 4.2 MEQ/L (ref 3.4–4.9)
PROT SERPL-MCNC: 5.3 G/DL (ref 6.3–8)
PROTHROMBIN TIME: 18.5 SEC (ref 12.3–14.9)
RBC # BLD AUTO: 3.13 M/UL (ref 4.7–6.1)
SODIUM SERPL-SCNC: 140 MEQ/L (ref 135–144)
WBC # BLD AUTO: 11.3 K/UL (ref 4.8–10.8)

## 2023-10-23 PROCEDURE — 2500000003 HC RX 250 WO HCPCS: Performed by: INTERNAL MEDICINE

## 2023-10-23 PROCEDURE — 6360000002 HC RX W HCPCS: Performed by: INTERNAL MEDICINE

## 2023-10-23 PROCEDURE — 99232 SBSQ HOSP IP/OBS MODERATE 35: CPT | Performed by: PSYCHIATRY & NEUROLOGY

## 2023-10-23 PROCEDURE — 36415 COLL VENOUS BLD VENIPUNCTURE: CPT

## 2023-10-23 PROCEDURE — 6370000000 HC RX 637 (ALT 250 FOR IP): Performed by: INTERNAL MEDICINE

## 2023-10-23 PROCEDURE — 2580000003 HC RX 258: Performed by: INTERNAL MEDICINE

## 2023-10-23 PROCEDURE — 85025 COMPLETE CBC W/AUTO DIFF WBC: CPT

## 2023-10-23 PROCEDURE — 94761 N-INVAS EAR/PLS OXIMETRY MLT: CPT

## 2023-10-23 PROCEDURE — 85384 FIBRINOGEN ACTIVITY: CPT

## 2023-10-23 PROCEDURE — 85610 PROTHROMBIN TIME: CPT

## 2023-10-23 PROCEDURE — 1210000000 HC MED SURG R&B

## 2023-10-23 PROCEDURE — 94640 AIRWAY INHALATION TREATMENT: CPT

## 2023-10-23 PROCEDURE — P9041 ALBUMIN (HUMAN),5%, 50ML: HCPCS | Performed by: INTERNAL MEDICINE

## 2023-10-23 PROCEDURE — 80053 COMPREHEN METABOLIC PANEL: CPT

## 2023-10-23 RX ORDER — FUROSEMIDE 10 MG/ML
20 INJECTION INTRAMUSCULAR; INTRAVENOUS DAILY
Status: COMPLETED | OUTPATIENT
Start: 2023-10-23 | End: 2023-10-24

## 2023-10-23 RX ADMIN — BUSPIRONE HYDROCHLORIDE 10 MG: 10 TABLET ORAL at 22:01

## 2023-10-23 RX ADMIN — BUDESONIDE AND FORMOTEROL FUMARATE DIHYDRATE 2 PUFF: 160; 4.5 AEROSOL RESPIRATORY (INHALATION) at 20:01

## 2023-10-23 RX ADMIN — ALBUMIN (HUMAN) 175 G: 12.5 INJECTION, SOLUTION INTRAVENOUS at 09:33

## 2023-10-23 RX ADMIN — APIXABAN 5 MG: 5 TABLET, FILM COATED ORAL at 22:00

## 2023-10-23 RX ADMIN — METOPROLOL SUCCINATE 50 MG: 50 TABLET, EXTENDED RELEASE ORAL at 08:47

## 2023-10-23 RX ADMIN — AZATHIOPRINE 50 MG: 50 TABLET ORAL at 22:01

## 2023-10-23 RX ADMIN — CALCIUM GLUCONATE 3500 MG: 98 INJECTION, SOLUTION INTRAVENOUS at 09:33

## 2023-10-23 RX ADMIN — APIXABAN 5 MG: 5 TABLET, FILM COATED ORAL at 08:47

## 2023-10-23 RX ADMIN — INSULIN LISPRO 8 UNITS: 100 INJECTION, SOLUTION INTRAVENOUS; SUBCUTANEOUS at 08:48

## 2023-10-23 RX ADMIN — AZATHIOPRINE 100 MG: 50 TABLET ORAL at 08:47

## 2023-10-23 RX ADMIN — INSULIN LISPRO 8 UNITS: 100 INJECTION, SOLUTION INTRAVENOUS; SUBCUTANEOUS at 13:10

## 2023-10-23 RX ADMIN — Medication 5 MG: at 22:00

## 2023-10-23 RX ADMIN — Medication 10 ML: at 22:02

## 2023-10-23 RX ADMIN — FAMOTIDINE 40 MG: 20 TABLET ORAL at 17:19

## 2023-10-23 RX ADMIN — BUSPIRONE HYDROCHLORIDE 10 MG: 10 TABLET ORAL at 08:47

## 2023-10-23 RX ADMIN — INSULIN LISPRO 10 UNITS: 100 INJECTION, SOLUTION INTRAVENOUS; SUBCUTANEOUS at 17:19

## 2023-10-23 RX ADMIN — INSULIN LISPRO 8 UNITS: 100 INJECTION, SOLUTION INTRAVENOUS; SUBCUTANEOUS at 17:19

## 2023-10-23 RX ADMIN — TAMSULOSIN HYDROCHLORIDE 0.8 MG: 0.4 CAPSULE ORAL at 08:47

## 2023-10-23 RX ADMIN — INSULIN GLARGINE 40 UNITS: 100 INJECTION, SOLUTION SUBCUTANEOUS at 22:00

## 2023-10-23 RX ADMIN — IPRATROPIUM BROMIDE 2 SPRAY: 42 SPRAY NASAL at 22:04

## 2023-10-23 RX ADMIN — INSULIN LISPRO 10 UNITS: 100 INJECTION, SOLUTION INTRAVENOUS; SUBCUTANEOUS at 08:48

## 2023-10-23 RX ADMIN — MIRTAZAPINE 7.5 MG: 15 TABLET, FILM COATED ORAL at 22:00

## 2023-10-23 RX ADMIN — INSULIN LISPRO 10 UNITS: 100 INJECTION, SOLUTION INTRAVENOUS; SUBCUTANEOUS at 13:10

## 2023-10-23 RX ADMIN — INSULIN LISPRO 4 UNITS: 100 INJECTION, SOLUTION INTRAVENOUS; SUBCUTANEOUS at 22:00

## 2023-10-23 RX ADMIN — PREDNISONE 10 MG: 10 TABLET ORAL at 08:47

## 2023-10-23 RX ADMIN — Medication 10 ML: at 08:47

## 2023-10-23 RX ADMIN — FUROSEMIDE 20 MG: 10 INJECTION, SOLUTION INTRAMUSCULAR; INTRAVENOUS at 15:30

## 2023-10-23 RX ADMIN — ANTICOAGULANT CITRATE DEXTROSE SOLUTION FORMULA A: 12.25; 11; 3.65 SOLUTION INTRAVENOUS at 09:33

## 2023-10-23 ASSESSMENT — ENCOUNTER SYMPTOMS
ABDOMINAL PAIN: 0
CHEST TIGHTNESS: 0
WHEEZING: 0
TROUBLE SWALLOWING: 0
COLOR CHANGE: 0
ABDOMINAL DISTENTION: 0
NAUSEA: 0
COUGH: 1
VOMITING: 0
SHORTNESS OF BREATH: 1

## 2023-10-23 ASSESSMENT — PAIN SCALES - GENERAL
PAINLEVEL_OUTOF10: 0

## 2023-10-23 NOTE — PLAN OF CARE
Problem: Discharge Planning  Goal: Discharge to home or other facility with appropriate resources  Outcome: Progressing  Flowsheets (Taken 10/22/2023 2019)  Discharge to home or other facility with appropriate resources:   Identify barriers to discharge with patient and caregiver   Arrange for needed discharge resources and transportation as appropriate   Identify discharge learning needs (meds, wound care, etc)     Problem: Safety - Adult  Goal: Free from fall injury  Outcome: Progressing     Problem: ABCDS Injury Assessment  Goal: Absence of physical injury  Outcome: Progressing     Problem: Chronic Conditions and Co-morbidities  Goal: Patient's chronic conditions and co-morbidity symptoms are monitored and maintained or improved  Outcome: Progressing  Flowsheets (Taken 10/22/2023 2019)  Care Plan - Patient's Chronic Conditions and Co-Morbidity Symptoms are Monitored and Maintained or Improved:   Monitor and assess patient's chronic conditions and comorbid symptoms for stability, deterioration, or improvement   Collaborate with multidisciplinary team to address chronic and comorbid conditions and prevent exacerbation or deterioration   Update acute care plan with appropriate goals if chronic or comorbid symptoms are exacerbated and prevent overall improvement and discharge     Problem: Pain  Goal: Verbalizes/displays adequate comfort level or baseline comfort level  Outcome: Progressing  Flowsheets (Taken 10/22/2023 1944)  Verbalizes/displays adequate comfort level or baseline comfort level: Encourage patient to monitor pain and request assistance

## 2023-10-24 LAB
GLUCOSE BLD-MCNC: 129 MG/DL (ref 70–99)
GLUCOSE BLD-MCNC: 164 MG/DL (ref 70–99)
GLUCOSE BLD-MCNC: 231 MG/DL (ref 70–99)
GLUCOSE BLD-MCNC: 402 MG/DL (ref 70–99)
INFLUENZA A BY PCR: NEGATIVE
INFLUENZA B BY PCR: NEGATIVE
PERFORMED ON: ABNORMAL
SARS-COV-2 RDRP RESP QL NAA+PROBE: DETECTED

## 2023-10-24 PROCEDURE — APPSS30 APP SPLIT SHARED TIME 16-30 MINUTES: Performed by: NURSE PRACTITIONER

## 2023-10-24 PROCEDURE — 6360000002 HC RX W HCPCS: Performed by: INTERNAL MEDICINE

## 2023-10-24 PROCEDURE — 87502 INFLUENZA DNA AMP PROBE: CPT

## 2023-10-24 PROCEDURE — 87635 SARS-COV-2 COVID-19 AMP PRB: CPT

## 2023-10-24 PROCEDURE — 2580000003 HC RX 258: Performed by: INTERNAL MEDICINE

## 2023-10-24 PROCEDURE — 6370000000 HC RX 637 (ALT 250 FOR IP): Performed by: INTERNAL MEDICINE

## 2023-10-24 PROCEDURE — 99232 SBSQ HOSP IP/OBS MODERATE 35: CPT | Performed by: PSYCHIATRY & NEUROLOGY

## 2023-10-24 PROCEDURE — 94761 N-INVAS EAR/PLS OXIMETRY MLT: CPT

## 2023-10-24 PROCEDURE — 94640 AIRWAY INHALATION TREATMENT: CPT

## 2023-10-24 PROCEDURE — 1210000000 HC MED SURG R&B

## 2023-10-24 RX ORDER — ASCORBIC ACID 500 MG
500 TABLET ORAL DAILY
Status: DISCONTINUED | OUTPATIENT
Start: 2023-10-24 | End: 2023-10-25 | Stop reason: HOSPADM

## 2023-10-24 RX ADMIN — PREDNISONE 10 MG: 10 TABLET ORAL at 09:24

## 2023-10-24 RX ADMIN — AZATHIOPRINE 50 MG: 50 TABLET ORAL at 20:17

## 2023-10-24 RX ADMIN — INSULIN LISPRO 10 UNITS: 100 INJECTION, SOLUTION INTRAVENOUS; SUBCUTANEOUS at 18:31

## 2023-10-24 RX ADMIN — INSULIN LISPRO 4 UNITS: 100 INJECTION, SOLUTION INTRAVENOUS; SUBCUTANEOUS at 18:31

## 2023-10-24 RX ADMIN — BUSPIRONE HYDROCHLORIDE 10 MG: 10 TABLET ORAL at 09:24

## 2023-10-24 RX ADMIN — Medication 10 ML: at 09:40

## 2023-10-24 RX ADMIN — OXYCODONE HYDROCHLORIDE AND ACETAMINOPHEN 500 MG: 500 TABLET ORAL at 12:46

## 2023-10-24 RX ADMIN — INSULIN LISPRO 16 UNITS: 100 INJECTION, SOLUTION INTRAVENOUS; SUBCUTANEOUS at 13:08

## 2023-10-24 RX ADMIN — APIXABAN 5 MG: 5 TABLET, FILM COATED ORAL at 09:24

## 2023-10-24 RX ADMIN — AZATHIOPRINE 100 MG: 50 TABLET ORAL at 10:39

## 2023-10-24 RX ADMIN — FUROSEMIDE 20 MG: 10 INJECTION, SOLUTION INTRAMUSCULAR; INTRAVENOUS at 09:24

## 2023-10-24 RX ADMIN — Medication 5 MG: at 20:17

## 2023-10-24 RX ADMIN — Medication 10 ML: at 20:16

## 2023-10-24 RX ADMIN — BUSPIRONE HYDROCHLORIDE 10 MG: 10 TABLET ORAL at 20:17

## 2023-10-24 RX ADMIN — METOPROLOL SUCCINATE 50 MG: 50 TABLET, EXTENDED RELEASE ORAL at 09:24

## 2023-10-24 RX ADMIN — BENZOCAINE 6 MG-MENTHOL 10 MG LOZENGES 1 LOZENGE: at 18:30

## 2023-10-24 RX ADMIN — MIRTAZAPINE 7.5 MG: 15 TABLET, FILM COATED ORAL at 20:17

## 2023-10-24 RX ADMIN — APIXABAN 5 MG: 5 TABLET, FILM COATED ORAL at 20:17

## 2023-10-24 RX ADMIN — BENZOCAINE 6 MG-MENTHOL 10 MG LOZENGES 1 LOZENGE: at 12:45

## 2023-10-24 RX ADMIN — TIOTROPIUM BROMIDE INHALATION SPRAY 2 PUFF: 3.12 SPRAY, METERED RESPIRATORY (INHALATION) at 07:24

## 2023-10-24 RX ADMIN — BUDESONIDE AND FORMOTEROL FUMARATE DIHYDRATE 2 PUFF: 160; 4.5 AEROSOL RESPIRATORY (INHALATION) at 19:29

## 2023-10-24 RX ADMIN — INSULIN GLARGINE 40 UNITS: 100 INJECTION, SOLUTION SUBCUTANEOUS at 20:18

## 2023-10-24 RX ADMIN — INSULIN LISPRO 10 UNITS: 100 INJECTION, SOLUTION INTRAVENOUS; SUBCUTANEOUS at 13:08

## 2023-10-24 RX ADMIN — TAMSULOSIN HYDROCHLORIDE 0.8 MG: 0.4 CAPSULE ORAL at 09:24

## 2023-10-24 RX ADMIN — FAMOTIDINE 40 MG: 20 TABLET ORAL at 18:31

## 2023-10-24 RX ADMIN — BUDESONIDE AND FORMOTEROL FUMARATE DIHYDRATE 2 PUFF: 160; 4.5 AEROSOL RESPIRATORY (INHALATION) at 07:24

## 2023-10-24 ASSESSMENT — ENCOUNTER SYMPTOMS
COUGH: 1
NAUSEA: 0
VOMITING: 0
ABDOMINAL PAIN: 0
WHEEZING: 0
TROUBLE SWALLOWING: 0
ABDOMINAL DISTENTION: 0
CHEST TIGHTNESS: 0
SHORTNESS OF BREATH: 1
COLOR CHANGE: 0

## 2023-10-25 VITALS
HEART RATE: 92 BPM | OXYGEN SATURATION: 93 % | WEIGHT: 197.09 LBS | RESPIRATION RATE: 18 BRPM | TEMPERATURE: 98.7 F | SYSTOLIC BLOOD PRESSURE: 109 MMHG | BODY MASS INDEX: 26.7 KG/M2 | DIASTOLIC BLOOD PRESSURE: 55 MMHG | HEIGHT: 72 IN

## 2023-10-25 PROBLEM — U07.1 COVID-19: Status: ACTIVE | Noted: 2023-10-25

## 2023-10-25 LAB
ALBUMIN SERPL-MCNC: 4.3 G/DL (ref 3.5–4.6)
ALP SERPL-CCNC: 73 U/L (ref 35–104)
ALT SERPL-CCNC: 33 U/L (ref 0–41)
ANION GAP SERPL CALCULATED.3IONS-SCNC: 13 MEQ/L (ref 9–15)
APTT PPP: 38.3 SEC (ref 24.4–36.8)
AST SERPL-CCNC: 44 U/L (ref 0–40)
BASOPHILS # BLD: 0 K/UL (ref 0–0.2)
BASOPHILS NFR BLD: 0.3 %
BILIRUB SERPL-MCNC: 0.5 MG/DL (ref 0.2–0.7)
BUN SERPL-MCNC: 16 MG/DL (ref 8–23)
CALCIUM SERPL-MCNC: 9.6 MG/DL (ref 8.5–9.9)
CHLORIDE SERPL-SCNC: 101 MEQ/L (ref 95–107)
CO2 SERPL-SCNC: 28 MEQ/L (ref 20–31)
CREAT SERPL-MCNC: 1.07 MG/DL (ref 0.7–1.2)
EOSINOPHIL # BLD: 0 K/UL (ref 0–0.7)
EOSINOPHIL NFR BLD: 0.1 %
ERYTHROCYTE [DISTWIDTH] IN BLOOD BY AUTOMATED COUNT: 16.6 % (ref 11.5–14.5)
FIBRINOGEN PPP-MCNC: 159 MG/DL (ref 262–562)
GLOBULIN SER CALC-MCNC: 1.7 G/DL (ref 2.3–3.5)
GLUCOSE BLD-MCNC: 111 MG/DL (ref 70–99)
GLUCOSE BLD-MCNC: 247 MG/DL (ref 70–99)
GLUCOSE SERPL-MCNC: 102 MG/DL (ref 70–99)
HCT VFR BLD AUTO: 37 % (ref 42–52)
HGB BLD-MCNC: 11.6 G/DL (ref 14–18)
INR PPP: 2.5
LYMPHOCYTES # BLD: 0.8 K/UL (ref 1–4.8)
LYMPHOCYTES NFR BLD: 6.8 %
MCH RBC QN AUTO: 32.5 PG (ref 27–31.3)
MCHC RBC AUTO-ENTMCNC: 31.4 % (ref 33–37)
MCV RBC AUTO: 103.6 FL (ref 79–92.2)
MONOCYTES # BLD: 1.1 K/UL (ref 0.2–0.8)
MONOCYTES NFR BLD: 8.8 %
NEUTROPHILS # BLD: 9.9 K/UL (ref 1.4–6.5)
NEUTS SEG NFR BLD: 79.7 %
PERFORMED ON: ABNORMAL
PERFORMED ON: ABNORMAL
PLATELET # BLD AUTO: 194 K/UL (ref 130–400)
POTASSIUM SERPL-SCNC: 4.1 MEQ/L (ref 3.4–4.9)
PROCALCITONIN SERPL IA-MCNC: 0.24 NG/ML (ref 0–0.15)
PROT SERPL-MCNC: 6 G/DL (ref 6.3–8)
PROTHROMBIN TIME: 27.2 SEC (ref 12.3–14.9)
RBC # BLD AUTO: 3.57 M/UL (ref 4.7–6.1)
SODIUM SERPL-SCNC: 142 MEQ/L (ref 135–144)
WBC # BLD AUTO: 12.4 K/UL (ref 4.8–10.8)

## 2023-10-25 PROCEDURE — 6370000000 HC RX 637 (ALT 250 FOR IP): Performed by: INTERNAL MEDICINE

## 2023-10-25 PROCEDURE — 6360000002 HC RX W HCPCS: Performed by: INTERNAL MEDICINE

## 2023-10-25 PROCEDURE — 36415 COLL VENOUS BLD VENIPUNCTURE: CPT

## 2023-10-25 PROCEDURE — 94640 AIRWAY INHALATION TREATMENT: CPT

## 2023-10-25 PROCEDURE — 85025 COMPLETE CBC W/AUTO DIFF WBC: CPT

## 2023-10-25 PROCEDURE — 2580000003 HC RX 258: Performed by: INTERNAL MEDICINE

## 2023-10-25 PROCEDURE — 80053 COMPREHEN METABOLIC PANEL: CPT

## 2023-10-25 PROCEDURE — 2500000003 HC RX 250 WO HCPCS: Performed by: INTERNAL MEDICINE

## 2023-10-25 PROCEDURE — APPSS15 APP SPLIT SHARED TIME 0-15 MINUTES: Performed by: NURSE PRACTITIONER

## 2023-10-25 PROCEDURE — 85730 THROMBOPLASTIN TIME PARTIAL: CPT

## 2023-10-25 PROCEDURE — 84145 PROCALCITONIN (PCT): CPT

## 2023-10-25 PROCEDURE — 85610 PROTHROMBIN TIME: CPT

## 2023-10-25 PROCEDURE — P9041 ALBUMIN (HUMAN),5%, 50ML: HCPCS | Performed by: INTERNAL MEDICINE

## 2023-10-25 PROCEDURE — 99232 SBSQ HOSP IP/OBS MODERATE 35: CPT | Performed by: PSYCHIATRY & NEUROLOGY

## 2023-10-25 PROCEDURE — 85384 FIBRINOGEN ACTIVITY: CPT

## 2023-10-25 RX ORDER — PREDNISONE 5 MG/1
5 TABLET ORAL DAILY
Qty: 4 TABLET | Refills: 0 | Status: SHIPPED | OUTPATIENT
Start: 2023-10-26 | End: 2023-10-30

## 2023-10-25 RX ORDER — FUROSEMIDE 20 MG/1
20 TABLET ORAL DAILY
Qty: 30 TABLET | Refills: 0 | Status: SHIPPED | OUTPATIENT
Start: 2023-10-25

## 2023-10-25 RX ADMIN — METOPROLOL SUCCINATE 50 MG: 50 TABLET, EXTENDED RELEASE ORAL at 08:18

## 2023-10-25 RX ADMIN — INSULIN LISPRO 10 UNITS: 100 INJECTION, SOLUTION INTRAVENOUS; SUBCUTANEOUS at 13:49

## 2023-10-25 RX ADMIN — OXYCODONE HYDROCHLORIDE AND ACETAMINOPHEN 500 MG: 500 TABLET ORAL at 08:18

## 2023-10-25 RX ADMIN — ACETAMINOPHEN 650 MG: 325 TABLET ORAL at 08:17

## 2023-10-25 RX ADMIN — APIXABAN 5 MG: 5 TABLET, FILM COATED ORAL at 08:17

## 2023-10-25 RX ADMIN — IPRATROPIUM BROMIDE 2 SPRAY: 42 SPRAY NASAL at 08:18

## 2023-10-25 RX ADMIN — FLUTICASONE PROPIONATE 1 SPRAY: 50 SPRAY, METERED NASAL at 08:20

## 2023-10-25 RX ADMIN — PREDNISONE 10 MG: 10 TABLET ORAL at 08:17

## 2023-10-25 RX ADMIN — FUROSEMIDE 20 MG: 20 TABLET ORAL at 08:17

## 2023-10-25 RX ADMIN — CALCIUM GLUCONATE 3500 MG: 98 INJECTION, SOLUTION INTRAVENOUS at 09:08

## 2023-10-25 RX ADMIN — Medication 10 ML: at 08:21

## 2023-10-25 RX ADMIN — IPRATROPIUM BROMIDE 2 SPRAY: 42 SPRAY NASAL at 13:50

## 2023-10-25 RX ADMIN — BUDESONIDE AND FORMOTEROL FUMARATE DIHYDRATE 2 PUFF: 160; 4.5 AEROSOL RESPIRATORY (INHALATION) at 07:53

## 2023-10-25 RX ADMIN — TIOTROPIUM BROMIDE INHALATION SPRAY 2 PUFF: 3.12 SPRAY, METERED RESPIRATORY (INHALATION) at 07:53

## 2023-10-25 RX ADMIN — INSULIN LISPRO 4 UNITS: 100 INJECTION, SOLUTION INTRAVENOUS; SUBCUTANEOUS at 13:48

## 2023-10-25 RX ADMIN — BUSPIRONE HYDROCHLORIDE 10 MG: 10 TABLET ORAL at 08:17

## 2023-10-25 RX ADMIN — ANTICOAGULANT CITRATE DEXTROSE SOLUTION FORMULA A: 12.25; 11; 3.65 SOLUTION INTRAVENOUS at 09:09

## 2023-10-25 RX ADMIN — TAMSULOSIN HYDROCHLORIDE 0.8 MG: 0.4 CAPSULE ORAL at 08:18

## 2023-10-25 RX ADMIN — ALBUMIN (HUMAN) 175 G: 12.5 INJECTION, SOLUTION INTRAVENOUS at 09:10

## 2023-10-25 RX ADMIN — AZATHIOPRINE 100 MG: 50 TABLET ORAL at 13:47

## 2023-10-25 ASSESSMENT — ENCOUNTER SYMPTOMS
TROUBLE SWALLOWING: 0
COLOR CHANGE: 0
ABDOMINAL PAIN: 0
SHORTNESS OF BREATH: 1
ABDOMINAL DISTENTION: 0
COUGH: 1
WHEEZING: 0
VOMITING: 0
CHEST TIGHTNESS: 0
NAUSEA: 0

## 2023-10-25 NOTE — CARE COORDINATION
ATTEMPTED TO SET UP TREATMENTS FOR PATIENT OUTPATIENT. CALL [facility] TO INQUIRE ABOUT OUTPATIENT PLASMAPHERESIS. THIS SERVICE IS NOT PROVIDED BY OUR INFUSION [facility]ENTER. THEY RECOMMENDED I CALL DIALYSIS. CALL PLACE TO [facility] DIALYSIS. SPOKE WITH NURSE. STATES THEY DO NOT PROVIDE OUTPATIENT PLASMAPHERESIS.
CONTINUE CURRENT POC. PLAN PER NEURO NOTES IS D/C WEDNESDAY AFTER LAST PLAMA P. TX AND CATHETER REMOVAL. D/C PLAN REMAINS HOME.
Case Management Assessment  Initial Evaluation    Date/Time of Evaluation: 10/17/2023 8:36 PM  Assessment Completed by: Nae Bryant RN    If patient is discharged prior to next notation, then this note serves as note for discharge by case management. Patient Name: Otis Oconnor                   YOB: 1945  Diagnosis: Liver cancer [facility]) [C22.9]                   Date / Time: 10/17/2023  6:21 PM    Patient Admission Status: Inpatient   Readmission Risk (Low < 19, Mod (19-27), High > 27): Readmission Risk Score: 23    Current PCP: Uday Dumont MD  PCP verified by CM? (P) Yes    Chart Reviewed: Yes      History Provided by: (P) Patient  Patient Orientation: (P) Alert and Oriented, Person, Place, Situation, Self    Patient Cognition: (P) Alert    Hospitalization in the last 30 days (Readmission):  No    If yes, Readmission Assessment in CM Navigator will be completed.     Advance Directives:      Code Status: Prior   Patient's Primary Decision Maker is: (P) Legal Next of Kin (wife Deirdre Chi)    Primary Decision Maker: Rob Hopkins - Spouse - [phone]    Discharge Planning:    Patient lives with: (P) Spouse/Significant Other Type of Home: (P) House  Primary Care Giver: (P) Self  Patient Support Systems include: (P) Spouse/Significant Other, [employer]   Current Financial resources: (P) Medicare  Current community resources: (P) None  Current services prior to admission: (P) None            Current DME:              Type of Home Care services:  (P) None    ADLS  Prior functional level: (P) Independent in ADLs/IADLs  Current functional level: (P) Independent in ADLs/IADLs    PT AM-PAC:   /24  OT AM-PAC:   /24    Family can provide assistance at DC: (P) Yes  Would you like Case Management to discuss the discharge plan with any other family members/significant others, and if so, who? (P) No  Plans to Return to Present Housing: (P) Yes  Other Identified Issues/Barriers to
D/C PLAN REMAINS UNCHANGED AT THIS TIME. PATIENT TO START PLASMAPHERESIS TODAY. PLAN FOR 5 TX TOTAL EVERY OTHER DAY.
Met [facility] patient and spouse to discuss dc planning. Patient is currently hopeful to return home after plasmapheresis. currently declines snf/c CM/LSW to follow.
PER ROUNDS, LAST PLASMAPHERESIS  IS WEDNESDAY. DC PLAN REMAINS HOME ONCE MEDICALLY CLEAR.
QUALITY ROUND COMPLETE. PER MEHUL RN PATIENT HAS NOW AGREED TO A TOTAL OF 4 TX. PLAN FOR TX 2 TOMORROW.
This LSW spoke with patient and  wife via phone today. Patient is anticipating discharge home today, 10/25/23. Patient and wife deny home going needs.   Electronically signed by IVONNE Zuluaga, LSW on 10/25/23 at 1:56 PM EDT
housing: none  Potential Assistance needed at discharge: (P) Other (Comment) (pt denied needs)            Potential DME:    Patient expects to discharge to: (P) [address] [facility] for transportation at discharge:      Financial    Payor: Laura Stage / Plan: MEDICARE PART A AND B / Product Type: *No Product type* /     Does insurance require precert for SNF: No    Potential assistance Purchasing Medications: (P) No  M[facility]-to-Beds request:        [address] [facility] [address]  [address]  Phone: [phone] Fax: [phone]    [facility] ([address] [facility] [address] [place] [address] [phone] - f [phone] 409 [facility] 100  277 [facility] [address]  Phone: [phone] Fax: [address] [facility] [address] [place] [address] 149 [facility] 20  102 [facility]  Phone: [phone] Fax: 751 [facility], [address] [place] [address] [phone] - f [phone]  [address] [place] [address]  Phone: [phone] Fax: [phone]    Dyllan Zamorano, [address] [place] [address] [phone] Eileen Schaffersksia [phone]  [address] [place] [address],[address]  95[address] [facility] [address]  Phone: [phone] Fax: [address], [address] [address] Kimo LOPEZ [address]  [address] Kimo LOPEZ [address] 300 [facility]  Phone: [phone] Fax: [phone]      Notes:    Factors facilitating achievement of predicted outcomes: Family support, Motivated, Cooperative, Pleasant, Sense of humor, and Good insight into deficits    Barriers to discharge: none    Additional Case Management Notes: the patient denied any d/c needs and declined information on post d/c services.  He denied using any durable medical equipment or being on home O2 or

## 2023-10-25 NOTE — FLOWSHEET NOTE
1216 To inpt room for temp HD cath removal. Report received from nurse, Talha Birmnigham. Dressing removed. Area cleansed with chloraprep and allowed to dry. Area draped with sterile towels. Sutures removed. 15cm,11.5FR, Medcomp temp HD cath removed. Pressure held x 25mins. Pt tolerated it well, slight bleeding, controlled with pressure. Reassurance provided throughout procedure. Steri-strips, gauze and tegaderm applied. Per Lyssa Robledo, CNP, pt to hold eliquis tonight and restart tomorrow. Pt and wife notified and verbalized understanding.

## 2023-10-25 NOTE — FLOWSHEET NOTE
0705 instructed pt and his wife how to hold pressure to temporary catheter removal site. Verbalized understanding. Pt will hold dose of eliquis tonight. Support given. Puma Gentile RN updated.

## 2023-10-26 ENCOUNTER — TELEPHONE (OUTPATIENT)
Dept: NEUROLOGY | Age: 78
End: 2023-10-26

## 2023-10-26 ENCOUNTER — CLINICAL DOCUMENTATION ONLY (OUTPATIENT)
Facility: CLINIC | Age: 78
End: 2023-10-26

## 2023-10-26 ENCOUNTER — TELEPHONE (OUTPATIENT)
Dept: PALLATIVE CARE | Age: 78
End: 2023-10-26

## 2023-10-26 ENCOUNTER — PATIENT MESSAGE (OUTPATIENT)
Dept: FAMILY MEDICINE CLINIC | Age: 78
End: 2023-10-26

## 2023-10-26 DIAGNOSIS — U07.1 DIARRHEA DUE TO COVID-19: Primary | ICD-10-CM

## 2023-10-26 DIAGNOSIS — A08.39 DIARRHEA DUE TO COVID-19: Primary | ICD-10-CM

## 2023-10-26 DIAGNOSIS — Z51.5 PALLIATIVE CARE ENCOUNTER: ICD-10-CM

## 2023-10-26 RX ORDER — LOPERAMIDE HYDROCHLORIDE 2 MG/1
2 CAPSULE ORAL 4 TIMES DAILY PRN
Qty: 56 CAPSULE | Refills: 0 | Status: SHIPPED | OUTPATIENT
Start: 2023-10-26 | End: 2023-10-26

## 2023-10-26 RX ORDER — LOPERAMIDE HYDROCHLORIDE 2 MG/1
2 CAPSULE ORAL 4 TIMES DAILY PRN
Qty: 56 CAPSULE | Refills: 0 | Status: SHIPPED | OUTPATIENT
Start: 2023-10-26 | End: 2023-11-09

## 2023-10-26 NOTE — TELEPHONE ENCOUNTER
Called patient x 1  on preferred mobile number to discuss, no answer. Left message for him to call the office. Was able to reach patient on secondary home number. He reported not picking up RX for paxlovid sent to pharmacy by hospitalist on discharge from the hospital. He states he has had diarrhea every 30-60 minutes with watery stool, but no blood or mucus and no rectal pain. He denies any current nausea or vomiting and denies problems with oral intake of fluids. He denies any worsening dyspnea at rest, chest pain, worsening cough, fevers/chills/sweats, or abdominal pain. Patient was instructed to have a family member  his paxlovid along with imodium OTC for PRN use to help with diarrhea. He was instructed to HOLD his rosuvastatin for 1 week and restart 2 days after completing full course of Paxlovid. He was instructed to increase fluids to 48-64 oz daily and to go to ER for any fevers above 103 F with any worsening dyspnea at rest, worsening productive cough, worsening abdominal pain, hematochezia/rectal bleeding, or intractable nausea/vomiting. Patient voiced understanding and thanked me for the phone call.

## 2023-10-26 NOTE — TELEPHONE ENCOUNTER
Patient was d/c from [facility] 10/25/2023 per verbal with Jim Lang to make his 2/2024 (4 mo follow up) a hospital follow up , he did not need to see him in 6-8 weeks.  Patient advised

## 2023-10-26 NOTE — TELEPHONE ENCOUNTER
Tenisha Rajput called in to explain that Mer Manzo has been admitted in the hospital for 9 days and was discharged yesterday, 10/25. He tested positive for COVID on 10/24. He started the medication for this yesterday, Paxlovid. He was vomiting last night and today has had diarrhea. She explains he has had 6 episodes. She is keeping him hydrated and made him jello. She explains that he had tried this medication in the past and had the same symptoms so he will not be taking it anymore. I asked if he has taken anything OTC for the diarrhea she said she gave him Mylanta. He does not need anything for the vomiting. She is requesting something to help with the diarrhea. Pharmacy is [facility] in [place].

## 2023-10-27 ENCOUNTER — CARE COORDINATION (OUTPATIENT)
Dept: CASE MANAGEMENT | Age: 78
End: 2023-10-27

## 2023-10-27 NOTE — CARE COORDINATION
Remote Patient Monitoring Note  Date/Time:  10/27/2023 12:26 PM  Patient Current Location: Home: [address] [place] [address] [place] [id number]  LPN contacted patient by telephone regarding RPM .Verified patients name and  as identifiers. Background: ENROLLED IN RPM for DM COPD HTN   Clinical Interventions: Reviewed and followed up on alerts and treatments-spoke to Kush Dacosta regarding RPM. Pt was discharged from [facility] on 10/25/23. Updated that equipment is back on and he can resume monitoring. Verbalized understanding. Plan/Follow Up: Will continue to review, monitor and address alerts with follow up based on severity of symptoms and risk factors.

## 2023-10-30 ENCOUNTER — TELEPHONE (OUTPATIENT)
Dept: PALLATIVE CARE | Age: 78
End: 2023-10-30

## 2023-10-30 ENCOUNTER — CARE COORDINATION (OUTPATIENT)
Dept: CARE COORDINATION | Age: 78
End: 2023-10-30

## 2023-10-30 ENCOUNTER — CARE COORDINATION (OUTPATIENT)
Dept: CASE MANAGEMENT | Age: 78
End: 2023-10-30

## 2023-10-30 NOTE — CARE COORDINATION
Remote Patient Monitoring Note  Date/Time:  10/30/2023 1:36 PM  Patient Current Location: Home: [address]  [facility] [place] [id number]  LPN contacted patient by telephone regarding yellow alert received for no metrics z . Verified patients name and  as identifiers. Background: ENROLLED in RPM for DM COPD HTN  Clinical Interventions: Reviewed and followed up on alerts and treatments-spoke to wife Cee Pollard regarding no metrics in RPM. Pt was discharged from the hospital   on 10/25/23. She reports he contracted Covid and is now very weak. Cee Pollard states she would like to wait \" a day or 2 to obtain metrics. Recommended obtaining vitals to monitor BP PULSE ox and weights that can be reported to the PCP and specialists. Verbalized understanding. Educated on maintaining hydration and to have pt drink Glucerna or boost for nutrition. Verbalized understanding. Plan/Follow Up: Will continue to review, monitor and address alerts with follow up based on severity of symptoms and risk factors.

## 2023-10-30 NOTE — CARE COORDINATION
Date/Time:  10/30/2023 9:04 AM  LPN attempted to reach patient by telephone regarding yellow alert in remote patient monitoring program. Left HIPPA compliant message requesting a return call. Will attempt to reach patient again.     RPM alert for no metrics x 4 days  Enrolled in RPM for DM COPD AND HTN

## 2023-10-31 ENCOUNTER — CARE COORDINATION (OUTPATIENT)
Dept: CASE MANAGEMENT | Age: 78
End: 2023-10-31

## 2023-10-31 ENCOUNTER — TELEPHONE (OUTPATIENT)
Dept: OTHER | Facility: CLINIC | Age: 78
End: 2023-10-31

## 2023-10-31 ENCOUNTER — APPOINTMENT (OUTPATIENT)
Dept: ULTRASOUND IMAGING | Age: 78
End: 2023-10-31
Payer: MEDICARE

## 2023-10-31 ENCOUNTER — HOSPITAL ENCOUNTER (EMERGENCY)
Age: 78
Discharge: HOME | End: 2023-10-31
Attending: EMERGENCY MEDICINE
Payer: MEDICARE

## 2023-10-31 ENCOUNTER — APPOINTMENT (OUTPATIENT)
Dept: CT IMAGING | Age: 78
End: 2023-10-31
Payer: MEDICARE

## 2023-10-31 ENCOUNTER — OFFICE VISIT (OUTPATIENT)
Dept: FAMILY MEDICINE CLINIC | Age: 78
End: 2023-10-31
Payer: MEDICARE

## 2023-10-31 ENCOUNTER — APPOINTMENT (OUTPATIENT)
Dept: GENERAL RADIOLOGY | Age: 78
End: 2023-10-31
Payer: MEDICARE

## 2023-10-31 VITALS
WEIGHT: 197 LBS | HEART RATE: 114 BPM | HEIGHT: 72 IN | SYSTOLIC BLOOD PRESSURE: 126 MMHG | OXYGEN SATURATION: 97 % | TEMPERATURE: 97.1 F | BODY MASS INDEX: 26.68 KG/M2 | DIASTOLIC BLOOD PRESSURE: 62 MMHG

## 2023-10-31 VITALS
TEMPERATURE: 98.7 F | DIASTOLIC BLOOD PRESSURE: 68 MMHG | OXYGEN SATURATION: 95 % | RESPIRATION RATE: 22 BRPM | HEART RATE: 108 BPM | SYSTOLIC BLOOD PRESSURE: 114 MMHG

## 2023-10-31 DIAGNOSIS — R11.2 NAUSEA AND VOMITING, UNSPECIFIED VOMITING TYPE: Primary | ICD-10-CM

## 2023-10-31 DIAGNOSIS — J44.1 COPD EXACERBATION (HCC): ICD-10-CM

## 2023-10-31 DIAGNOSIS — R60.9 PERIPHERAL EDEMA: ICD-10-CM

## 2023-10-31 DIAGNOSIS — M79.89 LEG SWELLING: ICD-10-CM

## 2023-10-31 DIAGNOSIS — R06.00 DYSPNEA, UNSPECIFIED TYPE: Primary | ICD-10-CM

## 2023-10-31 DIAGNOSIS — R79.89 ELEVATED TROPONIN: ICD-10-CM

## 2023-10-31 DIAGNOSIS — U07.1 COVID-19: ICD-10-CM

## 2023-10-31 LAB
ALBUMIN SERPL-MCNC: 3.5 G/DL (ref 3.5–4.6)
ALP SERPL-CCNC: 134 U/L (ref 35–104)
ALT SERPL-CCNC: 38 U/L (ref 0–41)
AMORPH SED URNS QL MICRO: ABNORMAL
ANION GAP SERPL CALCULATED.3IONS-SCNC: 14 MEQ/L (ref 9–15)
AST SERPL-CCNC: 43 U/L (ref 0–40)
BASE EXCESS ARTERIAL: -3
BASOPHILS # BLD: 0.1 K/UL (ref 0–0.1)
BASOPHILS NFR BLD: 0.3 % (ref 0.2–1.2)
BILIRUB SERPL-MCNC: 0.6 MG/DL (ref 0.2–0.7)
BILIRUB UR QL STRIP: NEGATIVE
BNP BLD-MCNC: 278 PG/ML
BUN SERPL-MCNC: 21 MG/DL (ref 8–23)
CALCIUM SERPL-MCNC: 9.2 MG/DL (ref 8.5–9.9)
CHLORIDE SERPL-SCNC: 94 MEQ/L (ref 95–107)
CLARITY UR: CLEAR
CO2 SERPL-SCNC: 21 MEQ/L (ref 20–31)
COLOR UR: NORMAL
CREAT SERPL-MCNC: 1.2 MG/DL (ref 0.7–1.2)
D DIMER PPP FEU-MCNC: 5.86 MG/L FEU (ref 0–0.5)
EOSINOPHIL # BLD: 0 K/UL (ref 0–0.5)
EOSINOPHIL NFR BLD: 0.2 % (ref 0.8–7)
EPI CELLS #/AREA URNS HPF: ABNORMAL /HPF
ERYTHROCYTE [DISTWIDTH] IN BLOOD BY AUTOMATED COUNT: 15.9 % (ref 11.6–14.4)
GLOBULIN SER CALC-MCNC: 2.6 G/DL (ref 2.3–3.5)
GLUCOSE SERPL-MCNC: 171 MG/DL (ref 70–99)
GLUCOSE UR STRIP-MCNC: NEGATIVE MG/DL
HCO3 ARTERIAL: 21.2 MMOL/L (ref 21–29)
HCT VFR BLD AUTO: 32.9 % (ref 42–52)
HGB BLD-MCNC: 10.9 G/DL (ref 13.7–17.5)
HGB UR QL STRIP: NORMAL
IMM GRANULOCYTES # BLD: 0.1 K/UL
IMM GRANULOCYTES NFR BLD: 0.6 %
INFLUENZA A ANTIBODY: NORMAL
INFLUENZA B ANTIBODY: NORMAL
KETONES UR STRIP-MCNC: NEGATIVE MG/DL
LACTATE BLDV-SCNC: 2 MMOL/L (ref 0.5–2.2)
LEUKOCYTE ESTERASE UR QL STRIP: NEGATIVE
LYMPHOCYTES # BLD: 0.8 K/UL (ref 1.3–3.6)
LYMPHOCYTES NFR BLD: 5 %
Lab: 123
MCH RBC QN AUTO: 32.6 PG (ref 25.7–32.2)
MCHC RBC AUTO-ENTMCNC: 33.1 % (ref 32.3–36.5)
MCV RBC AUTO: 98.5 FL (ref 79–92.2)
MONOCYTES # BLD: 1.2 K/UL (ref 0.3–0.8)
MONOCYTES NFR BLD: 7.7 % (ref 5.3–12.2)
NEUTROPHILS # BLD: 13.8 K/UL (ref 1.8–5.4)
NEUTS SEG NFR BLD: 86.2 % (ref 34–67.9)
NITRITE UR QL STRIP: NEGATIVE
O2 SAT, ARTERIAL: 94 % (ref 93–100)
PCO2 ARTERIAL: 32 MM HG (ref 35–45)
PERFORMED ON: ABNORMAL
PERFORMING INSTRUMENT: NORMAL
PH ARTERIAL: 7.43 (ref 7.35–7.45)
PH UR STRIP: 5 [PH] (ref 5–9)
PLATELET # BLD AUTO: 289 K/UL (ref 163–337)
PO2 ARTERIAL: 69 MM HG (ref 75–108)
POC FIO2: 21
POC SAMPLE TYPE: ABNORMAL
POTASSIUM SERPL-SCNC: 4.2 MEQ/L (ref 3.4–4.9)
PROT SERPL-MCNC: 6.1 G/DL (ref 6.3–8)
PROT UR STRIP-MCNC: NEGATIVE MG/DL
QC PASS/FAIL: NORMAL
RBC # BLD AUTO: 3.34 M/UL (ref 4.63–6.08)
RBC #/AREA URNS HPF: ABNORMAL /HPF (ref 0–2)
SARS-COV-2 RDRP RESP QL NAA+PROBE: DETECTED
SARS-COV-2, POC: NORMAL
SODIUM SERPL-SCNC: 129 MEQ/L (ref 135–144)
SP GR UR STRIP: 1.01 (ref 1–1.03)
TCO2 ARTERIAL: 22 MMOL/L (ref 70–99)
TROPONIN, HIGH SENSITIVITY: 27 NG/L (ref 0–19)
TROPONIN, HIGH SENSITIVITY: 28 NG/L (ref 0–19)
URINE REFLEX TO CULTURE: NORMAL
UROBILINOGEN UR STRIP-ACNC: 0.2 E.U./DL
WBC # BLD AUTO: 16 K/UL (ref 4.2–9)
WBC #/AREA URNS HPF: ABNORMAL /HPF (ref 0–5)

## 2023-10-31 PROCEDURE — 36415 COLL VENOUS BLD VENIPUNCTURE: CPT

## 2023-10-31 PROCEDURE — 36600 WITHDRAWAL OF ARTERIAL BLOOD: CPT

## 2023-10-31 PROCEDURE — 6370000000 HC RX 637 (ALT 250 FOR IP): Performed by: EMERGENCY MEDICINE

## 2023-10-31 PROCEDURE — 93970 EXTREMITY STUDY: CPT

## 2023-10-31 PROCEDURE — 99285 EMERGENCY DEPT VISIT HI MDM: CPT

## 2023-10-31 PROCEDURE — 87635 SARS-COV-2 COVID-19 AMP PRB: CPT

## 2023-10-31 PROCEDURE — 96374 THER/PROPH/DIAG INJ IV PUSH: CPT

## 2023-10-31 PROCEDURE — 74022 RADEX COMPL AQT ABD SERIES: CPT

## 2023-10-31 PROCEDURE — 99213 OFFICE O/P EST LOW 20 MIN: CPT

## 2023-10-31 PROCEDURE — 3078F DIAST BP <80 MM HG: CPT

## 2023-10-31 PROCEDURE — 83605 ASSAY OF LACTIC ACID: CPT

## 2023-10-31 PROCEDURE — G8484 FLU IMMUNIZE NO ADMIN: HCPCS

## 2023-10-31 PROCEDURE — 82803 BLOOD GASES ANY COMBINATION: CPT

## 2023-10-31 PROCEDURE — 81001 URINALYSIS AUTO W/SCOPE: CPT

## 2023-10-31 PROCEDURE — 83880 ASSAY OF NATRIURETIC PEPTIDE: CPT

## 2023-10-31 PROCEDURE — 1036F TOBACCO NON-USER: CPT

## 2023-10-31 PROCEDURE — 94640 AIRWAY INHALATION TREATMENT: CPT

## 2023-10-31 PROCEDURE — 85025 COMPLETE CBC W/AUTO DIFF WBC: CPT

## 2023-10-31 PROCEDURE — 85379 FIBRIN DEGRADATION QUANT: CPT

## 2023-10-31 PROCEDURE — 1111F DSCHRG MED/CURRENT MED MERGE: CPT

## 2023-10-31 PROCEDURE — 80053 COMPREHEN METABOLIC PANEL: CPT

## 2023-10-31 PROCEDURE — 93005 ELECTROCARDIOGRAM TRACING: CPT

## 2023-10-31 PROCEDURE — G8427 DOCREV CUR MEDS BY ELIG CLIN: HCPCS

## 2023-10-31 PROCEDURE — 87426 SARSCOV CORONAVIRUS AG IA: CPT

## 2023-10-31 PROCEDURE — G8417 CALC BMI ABV UP PARAM F/U: HCPCS

## 2023-10-31 PROCEDURE — 87804 INFLUENZA ASSAY W/OPTIC: CPT

## 2023-10-31 PROCEDURE — 87040 BLOOD CULTURE FOR BACTERIA: CPT

## 2023-10-31 PROCEDURE — 1123F ACP DISCUSS/DSCN MKR DOCD: CPT

## 2023-10-31 PROCEDURE — 84484 ASSAY OF TROPONIN QUANT: CPT

## 2023-10-31 PROCEDURE — 6360000002 HC RX W HCPCS: Performed by: EMERGENCY MEDICINE

## 2023-10-31 PROCEDURE — 71275 CT ANGIOGRAPHY CHEST: CPT

## 2023-10-31 PROCEDURE — 6360000004 HC RX CONTRAST MEDICATION: Performed by: EMERGENCY MEDICINE

## 2023-10-31 PROCEDURE — 96375 TX/PRO/DX INJ NEW DRUG ADDON: CPT

## 2023-10-31 PROCEDURE — 3074F SYST BP LT 130 MM HG: CPT

## 2023-10-31 RX ORDER — FUROSEMIDE 10 MG/ML
40 INJECTION INTRAMUSCULAR; INTRAVENOUS ONCE
Status: COMPLETED | OUTPATIENT
Start: 2023-10-31 | End: 2023-10-31

## 2023-10-31 RX ORDER — ALBUTEROL SULFATE 90 UG/1
4 AEROSOL, METERED RESPIRATORY (INHALATION) ONCE
Status: COMPLETED | OUTPATIENT
Start: 2023-10-31 | End: 2023-10-31

## 2023-10-31 RX ORDER — IPRATROPIUM BROMIDE AND ALBUTEROL SULFATE 2.5; .5 MG/3ML; MG/3ML
1 SOLUTION RESPIRATORY (INHALATION) ONCE
Status: DISCONTINUED | OUTPATIENT
Start: 2023-10-31 | End: 2023-10-31

## 2023-10-31 RX ORDER — DEXAMETHASONE SODIUM PHOSPHATE 10 MG/ML
8 INJECTION, SOLUTION INTRAMUSCULAR; INTRAVENOUS ONCE
Status: COMPLETED | OUTPATIENT
Start: 2023-10-31 | End: 2023-10-31

## 2023-10-31 RX ADMIN — DEXAMETHASONE SODIUM PHOSPHATE 8 MG: 10 INJECTION, SOLUTION INTRAMUSCULAR; INTRAVENOUS at 15:54

## 2023-10-31 RX ADMIN — FUROSEMIDE 40 MG: 10 INJECTION, SOLUTION INTRAMUSCULAR; INTRAVENOUS at 15:54

## 2023-10-31 RX ADMIN — ALBUTEROL SULFATE 4 PUFF: 108 AEROSOL, METERED RESPIRATORY (INHALATION) at 17:19

## 2023-10-31 RX ADMIN — IOPAMIDOL 75 ML: 755 INJECTION, SOLUTION INTRAVENOUS at 16:52

## 2023-10-31 ASSESSMENT — ENCOUNTER SYMPTOMS
BACK PAIN: 0
ABDOMINAL PAIN: 0
COLOR CHANGE: 0
SHORTNESS OF BREATH: 1
SHORTNESS OF BREATH: 1
EYE REDNESS: 0
EYE DISCHARGE: 0
SINUS PAIN: 0
COUGH: 0
COUGH: 1
SORE THROAT: 1
DIARRHEA: 1
NAUSEA: 1
VOMITING: 0

## 2023-10-31 ASSESSMENT — PAIN - FUNCTIONAL ASSESSMENT: PAIN_FUNCTIONAL_ASSESSMENT: NONE - DENIES PAIN

## 2023-10-31 NOTE — ED NOTES
Dr. Nixon Bartow aware Duo-neb treatment cannot be performed secondary to positive covid testing.       Nabeel Yost RN  10/31/23 0357

## 2023-10-31 NOTE — ED PROVIDER NOTES
double lumen catheter (LOT# [id number],  exp 03/04/2027) placed by Dr. Edis Buchanan    IR NONTUNNELED VASCULAR CATHETER  10/18/2023    IR NONTUNNELED VASCULAR CATHETER 10/18/2023 [facility] SPECIAL PROCEDURE    LUNG REMOVAL, PARTIAL Right 01/31/2023    right thoracoscopic upper lobectomy, mediastinal lymph node dissection (DR PIKE)    MOHS SURGERY Left 09/2017    melanoma of left ear (DR GODWIN)    SKIN CANCER EXCISION Left 08/2017    THORACOSCOPY Right 01/31/2023    right thoracoscopic upper lobectomy performed by Gilbert Jones MD at [address] [place] [address]      duodenitis/ poss early signs of celiac disease         CURRENT MEDICATIONS       Previous Medications    ACETAMINOPHEN (TYLENOL) 500 MG TABLET    Take 2 tablets by mouth every 8 hours as needed for Pain    ALBUTEROL SULFATE  (90 BASE) MCG/ACT INHALER    Inhale 2 puffs into the lungs every 6 hours as needed for Wheezing    APIXABAN (ELIQUIS) 5 MG TABS TABLET    Take 1 tablet by mouth 2 times daily    ASCORBIC ACID (VITAMIN C) 500 MG TABLET    Take 2 tablets by mouth daily    ASPIRIN 81 MG EC TABLET    Take 1 tablet by mouth daily    AZATHIOPRINE (IMURAN) 50 MG TABLET    Take 1 tablet by mouth Take 2 tablets by mouth in the morning and 1 tablet in the evening. BLOOD GLUCOSE MONITORING SUPPL CHENCHO    Test once daily. Dx: E11.29    BLOOD GLUCOSE TEST STRIPS (FREESTYLE LITE) STRIP    As needed. BUSPIRONE (BUSPAR) 10 MG TABLET    Take 1 tablet by mouth in the morning and at bedtime Indications: Feeling Anxious Take one pill twice daily for anxiety. CINNAMON PO    Take 500 mg by mouth 2 times daily.       CYANOCOBALAMIN (VITAMIN B 12 PO)    Take 1,000 mg by mouth daily    DICLOFENAC SODIUM (VOLTAREN) 1 % GEL    Apply 4 g topically 4 times daily as needed for Pain    DULAGLUTIDE 3 MG/0.5ML SOPN    Inject 3 mg into the skin once a week    FAMOTIDINE (PEPCID) 40 MG TABLET    Take 1 tablet by mouth every evening

## 2023-10-31 NOTE — PROGRESS NOTES
[address] PRIMARY CARE          ASSESSMENT/PLAN     Skip Ruffin is a 66 y.o. male who presents with:  Significant weakness nausea vomiting, and increase in leg swelling starting after being diagnosed with COVID 19. Hx of Current liver cancer secondary to lung cancer. Mild nonproductive cough and sore throat. Diarrhea ended yesterday. Pos COVID 19 10/24. Was unable to take PAXLOVID due to diarrhea. Discussed symptoms of severe weakness following diarrhea and possibility of dehydration with patient additionally 3+ lower extremity edema that he states has worsened could indicate worsening of liver function or kidney function. Advised patient to follow-up in ER immediately for labs. Patient firmly states he will not allow himself to be admitted. Advised him that while I am recommending ER follow-up to at least determine the cause of his symptoms he would have the option to refuse admission if he so chose. Advised him that I cannot properly evaluate his condition from the urgent care. Wheelchair is provided to take him to ER triage report is called        1. Nausea and vomiting, unspecified vomiting type  2. Leg swelling           PATIENT REFERRED TO:  Return for [facility] Department. DISCHARGE MEDICATIONS:  New Prescriptions    No medications on file     Cannot display discharge medications since this is not an admission. Rene Harp, APRN - CNP    CHIEF COMPLAINT       Chief Complaint   Patient presents with    Post-COVID Symptoms     Patient tested positive covid on 10/24/23. Patient reports he still is not feeling well. Has a cough, sob (breathes heavy), pt does have cancer in liver & lymph nodes. SUBJECTIVE/REVIEW OF SYSTEMS     Review of Systems   Constitutional:  Positive for fatigue. Negative for chills and fever. HENT:  Negative for congestion, ear pain, rhinorrhea, sinus pressure and sore throat. Eyes: Negative.     Respiratory:  Positive for cough and

## 2023-10-31 NOTE — TELEPHONE ENCOUNTER
Writer contacted Dr. Paula Brush via text to inform of 30 day readmission risk.      Attending physician: Aaron Whiteside

## 2023-10-31 NOTE — CARE COORDINATION
Remote Alert Monitoring Note  Rpm alert to be reviewed by the provider   red alert   blood pressure heart rate (124)   Additional needs to be addressed by PCP: No                   Patient is currently in the ER possibly going to be admitted at this time. Will F/U tomorrow.     Huber Lloyd LPN    [phone]  [facility] / [address] [place] [address] / Darrion Covington

## 2023-10-31 NOTE — CARE COORDINATION
Remote Alert Monitoring Note  Rpm alert to be reviewed by the provider   red alert   blood pressure heart rate (124)   Additional needs to be addressed by PCP: No                    Date/Time:  10/31/2023 12:16 PM  Patient Current Location: [facility] contacted patient by telephone. Verified patients name and  as identifiers. Background: HTN, DM, COPD  Refer to 911 immediately if:  Patient unresponsive or unable to provide history  Change in cognition or sudden confusion  Patient unable to respond in complete sentences  Intense chest pain/tightness  Any concern for any clinical emergency  Red Alert: Provider response time of 1 hr required for any red alert requiring intervention  Yellow Alert: Provider response time of 3hr required for any escalated yellow alert    HR Triage  Are you having any Chest Pain? no   Are you having any Shortness of Breath? no   Are you having any dizziness? no   Are you feeling more fatigued or tired than normal? yes   Are you having any other health concerns or issues? yes      Clinical Interventions: Reviewed and followed up on alerts and treatments-Patient has elevated BP HR of 124. Patient states he is going to a [facility] now. He is not having CP or SOB. He is fatigued due to just getting out of the hospital and being on chemo. He said  he is not feeling well. When patient asked if he has taken his Oxygen level yet he said it doesn't work. Will Call [facility] and order him a new Pulse Ox. Patient states, \"I don't have time or energy to fool around with this stuff any more\" - Referring to retaking BP again. Patient said he will try to take it when he gets back from [facility]. Called [facility], Spoke to Catherine. He will call patient later on this afternoon to Troubleshoot before they send him a new Pulse Ox. Plan/Follow Up: Will continue to review, monitor and address alerts with follow up based on severity of symptoms and risk factors.

## 2023-11-01 ENCOUNTER — OFFICE VISIT (OUTPATIENT)
Dept: FAMILY MEDICINE CLINIC | Age: 78
End: 2023-11-01
Payer: MEDICARE

## 2023-11-01 VITALS
SYSTOLIC BLOOD PRESSURE: 130 MMHG | BODY MASS INDEX: 26.68 KG/M2 | OXYGEN SATURATION: 98 % | DIASTOLIC BLOOD PRESSURE: 64 MMHG | WEIGHT: 197 LBS | TEMPERATURE: 97.8 F | HEIGHT: 72 IN | HEART RATE: 102 BPM | RESPIRATION RATE: 22 BRPM

## 2023-11-01 DIAGNOSIS — U07.1 COVID-19 VIRUS INFECTION: Primary | ICD-10-CM

## 2023-11-01 DIAGNOSIS — C34.91 NON-SMALL CELL CANCER OF RIGHT LUNG (HCC): ICD-10-CM

## 2023-11-01 DIAGNOSIS — R53.81 MALAISE AND FATIGUE: ICD-10-CM

## 2023-11-01 DIAGNOSIS — J44.1 CHRONIC OBSTRUCTIVE PULMONARY DISEASE WITH ACUTE EXACERBATION (HCC): Chronic | ICD-10-CM

## 2023-11-01 DIAGNOSIS — C22.9 MALIGNANT NEOPLASM OF LIVER, UNSPECIFIED LIVER MALIGNANCY TYPE (HCC): ICD-10-CM

## 2023-11-01 DIAGNOSIS — R79.89 ELEVATED TROPONIN LEVEL: ICD-10-CM

## 2023-11-01 DIAGNOSIS — R60.0 BILATERAL LOWER EXTREMITY EDEMA: ICD-10-CM

## 2023-11-01 DIAGNOSIS — R06.09 DYSPNEA ON EXERTION: ICD-10-CM

## 2023-11-01 DIAGNOSIS — R53.83 MALAISE AND FATIGUE: ICD-10-CM

## 2023-11-01 LAB
EKG ATRIAL RATE: 108 BPM
EKG P AXIS: 61 DEGREES
EKG P-R INTERVAL: 142 MS
EKG Q-T INTERVAL: 348 MS
EKG QRS DURATION: 88 MS
EKG QTC CALCULATION (BAZETT): 466 MS
EKG R AXIS: 29 DEGREES
EKG T AXIS: 63 DEGREES
EKG VENTRICULAR RATE: 108 BPM

## 2023-11-01 PROCEDURE — G8484 FLU IMMUNIZE NO ADMIN: HCPCS | Performed by: FAMILY MEDICINE

## 2023-11-01 PROCEDURE — 99215 OFFICE O/P EST HI 40 MIN: CPT | Performed by: FAMILY MEDICINE

## 2023-11-01 PROCEDURE — G8417 CALC BMI ABV UP PARAM F/U: HCPCS | Performed by: FAMILY MEDICINE

## 2023-11-01 PROCEDURE — 1111F DSCHRG MED/CURRENT MED MERGE: CPT | Performed by: FAMILY MEDICINE

## 2023-11-01 PROCEDURE — 1036F TOBACCO NON-USER: CPT | Performed by: FAMILY MEDICINE

## 2023-11-01 PROCEDURE — 1123F ACP DISCUSS/DSCN MKR DOCD: CPT | Performed by: FAMILY MEDICINE

## 2023-11-01 PROCEDURE — 3075F SYST BP GE 130 - 139MM HG: CPT | Performed by: FAMILY MEDICINE

## 2023-11-01 PROCEDURE — G8427 DOCREV CUR MEDS BY ELIG CLIN: HCPCS | Performed by: FAMILY MEDICINE

## 2023-11-01 PROCEDURE — 3078F DIAST BP <80 MM HG: CPT | Performed by: FAMILY MEDICINE

## 2023-11-01 PROCEDURE — 3023F SPIROM DOC REV: CPT | Performed by: FAMILY MEDICINE

## 2023-11-01 ASSESSMENT — ENCOUNTER SYMPTOMS
DIARRHEA: 0
NAUSEA: 0
TROUBLE SWALLOWING: 0
SHORTNESS OF BREATH: 0
CHEST TIGHTNESS: 0
SINUS PAIN: 0
SORE THROAT: 1
ABDOMINAL PAIN: 0
VOMITING: 0
COUGH: 0

## 2023-11-01 NOTE — ASSESSMENT & PLAN NOTE
Noted enlarging malignant neoplastic process in the right hilum and mediastinum on recent CTA of the chest.  Patient established with hematology/oncology for any further recommendations.

## 2023-11-01 NOTE — ASSESSMENT & PLAN NOTE
I reviewed with patient that in light of current COVID-19 infection with worsening fatigue/malaise, decreased appetite/poor PO intake/hydration, hyponatremia on recent labwork, dyspnea on exertion, abnormal recent abg, elevated troponins, and worsened lower extremity edema in the setting of known COPD with likely exacerbation, myesthenia gravis and metastatic lung cancer, that he should return to the ER for likely admission and higher level of care. After long discussion patient agrees to go to [facility] ER for acute evaluation and likely admission.

## 2023-11-01 NOTE — ASSESSMENT & PLAN NOTE
Likely metastatic from the lung. Patient established with hematology/oncology for further management.

## 2023-11-01 NOTE — PROGRESS NOTES
Take 1 tablet by mouth 2 times daily 60 tablet 3    fluticasone-umeclidin-vilant (TRELEGY ELLIPTA) 100-62.5-25 MCG/ACT AEPB inhaler Inhale 1 puff into the lungs daily 3 each 2    tamsulosin (FLOMAX) 0.4 MG capsule Take 2 capsules by mouth daily 180 capsule 1    insulin glargine (LANTUS SOLOSTAR) 100 UNIT/ML injection pen Inject 20 Units into the skin nightly 5 Adjustable Dose Pre-filled Pen Syringe 1    mirtazapine (REMERON) 7.5 MG tablet Take 1 tablet by mouth nightly 30 tablet 5    blood glucose test strips (FREESTYLE LITE) strip As needed. 100 each 5    losartan (COZAAR) 25 MG tablet Take 1 tablet by mouth daily 90 tablet 1    Insulin Pen Needle ([employer] PEN NEEDLES 31G) 31G X 8 MM MISC 1 each by Does not apply route daily 100 each 3    rosuvastatin (CRESTOR) 20 MG tablet Take 1 tablet by mouth nightly 90 tablet 2    acetaminophen (TYLENOL) 500 MG tablet Take 2 tablets by mouth every 8 hours as needed for Pain      Heating Pad PADS by Does not apply route      diclofenac sodium (VOLTAREN) 1 % GEL Apply 4 g topically 4 times daily as needed for Pain      Dulaglutide 3 MG/0.5ML SOPN Inject 3 mg into the skin once a week 6 mL 1    ondansetron (ZOFRAN) 4 MG tablet Take 1 tablet by mouth 3 times daily as needed for Nausea or Vomiting 15 tablet 0    sildenafil (VIAGRA) 100 MG tablet TAKE ONE TABLET BY MOUTH APPROXIMATELY ONE HOUR BEFORE SEXUAL ACTIVITY.  DO NOT USE MORE THAN ONE DOSE DAILY 6 tablet 0    famotidine (PEPCID) 40 MG tablet Take 1 tablet by mouth every evening 90 tablet 1    SITagliptin (JANUVIA) 100 MG tablet Take 1 tablet by mouth daily 90 tablet 1    glipiZIDE (GLUCOTROL) 10 MG tablet Take 1 tablet by mouth 2 times daily 180 tablet 1    fluticasone (FLONASE) 50 MCG/ACT nasal spray 1 spray by Nasal route daily 1 Bottle 0    albuterol sulfate  (90 Base) MCG/ACT inhaler Inhale 2 puffs into the lungs every 6 hours as needed for Wheezing 3 Inhaler 0    Handicap Placard MISC by Does not apply route DX:

## 2023-11-01 NOTE — ASSESSMENT & PLAN NOTE
See above. Decreased breath sounds and tachypnea with abnormal recent ABG consistent with likely acute exacerbation.

## 2023-11-02 LAB
BACTERIA BLD CULT ORG #2: NORMAL
BACTERIA BLD CULT: NORMAL

## 2023-11-02 ASSESSMENT — ENCOUNTER SYMPTOMS
ABDOMINAL PAIN: 0
WHEEZING: 0
DIARRHEA: 1
NAUSEA: 1
SINUS PRESSURE: 0
RHINORRHEA: 0
EYES NEGATIVE: 1
SORE THROAT: 0

## 2023-11-03 ENCOUNTER — TELEPHONE (OUTPATIENT)
Dept: CARDIOLOGY CLINIC | Age: 78
End: 2023-11-03

## 2023-11-03 ENCOUNTER — PATIENT MESSAGE (OUTPATIENT)
Dept: NEUROLOGY | Age: 78
End: 2023-11-03

## 2023-11-03 ENCOUNTER — CARE COORDINATION (OUTPATIENT)
Dept: CARE COORDINATION | Age: 78
End: 2023-11-03

## 2023-11-03 ASSESSMENT — ENCOUNTER SYMPTOMS: DYSPNEA ASSOCIATED WITH: MINIMAL EXERTION

## 2023-11-03 NOTE — CARE COORDINATION
Community Resources  Diabetes Education: Completed (Comment: 8/4/23)  Fall Risk Prevention: Completed (Comment: 8/4/23)  Life Skills Coaching: Not Started (Comment: 8/4/23)  Registered Dietician: Not Started (Comment: 8/4/23)  Social Work: Not Started (Comment: 8/4/23)  Zone Management Tools: Completed (Comment: mailed 8/4/23)          Goals Addressed                   This Visit's Progress     Conditions and Symptoms   On track     I will schedule office visits, as directed by my provider. I will keep my appointment or reschedule if I have to cancel. I will notify my provider of any barriers to my plan of care. I will follow my Zone Management tool to seek urgent or emergent care. I will notify my provider of any symptoms that indicate a worsening of my condition. Barriers: overwhelmed by complexity of regimen and stress  Plan for overcoming my barriers: will work with ACM towards goals and ACM will provide education and resources  Confidence: 10/10  Anticipated Goal Completion Date: 11/10/23         Self Monitoring   On track     Self-Monitored Blood Glucose - I will check my blood sugar Fasting blood sugar and blood sugars ac & HS  I will notify my provider of any trends of increasing or decreasing blood sugars over a 1 month period. I will notify my provider if I have any blood sugar readings less than 70 more than 2 times a month. Patient Reported Blood Glucose No flowsheet data found.     Barriers: overwhelmed by complexity of regimen and stress  Plan for overcoming my barriers: will work with ACM towards goals and will monitor BS and keep log to report to endo appt  Confidence: 9/10  Anticipated Goal Completion Date: 11/10/23                Future Appointments   Date Time Provider [address]   11/6/2023  2:00 PM Vita Crigler, APRN - CNP [facility]   11/7/2023  1:30 PM Tereza Persaud, [address]   11/16/2023 11:00 AM [address] [facility], Margaret Geiger  [address]

## 2023-11-03 NOTE — TELEPHONE ENCOUNTER
LMTCB   Need for michi to call the pharmacy to fax over his out of pocket expenses for the application for eliquis needs to have spent 409.34 at pharmacy or to drop that off to the office so we can send it to [facility]

## 2023-11-06 ENCOUNTER — OFFICE VISIT (OUTPATIENT)
Dept: PALLATIVE CARE | Age: 78
End: 2023-11-06
Payer: MEDICARE

## 2023-11-06 ENCOUNTER — TELEPHONE (OUTPATIENT)
Dept: NEUROLOGY | Age: 78
End: 2023-11-06

## 2023-11-06 VITALS
SYSTOLIC BLOOD PRESSURE: 126 MMHG | TEMPERATURE: 98.5 F | HEART RATE: 92 BPM | DIASTOLIC BLOOD PRESSURE: 68 MMHG | RESPIRATION RATE: 18 BRPM | OXYGEN SATURATION: 97 %

## 2023-11-06 DIAGNOSIS — Z51.5 PALLIATIVE CARE ENCOUNTER: ICD-10-CM

## 2023-11-06 DIAGNOSIS — J44.1 COPD WITH ACUTE EXACERBATION (HCC): Primary | ICD-10-CM

## 2023-11-06 DIAGNOSIS — G93.31 POSTVIRAL FATIGUE SYNDROME: ICD-10-CM

## 2023-11-06 DIAGNOSIS — C34.91 NON-SMALL CELL CANCER OF RIGHT LUNG (HCC): ICD-10-CM

## 2023-11-06 DIAGNOSIS — R09.3 ABNORMAL SPUTUM COLOR: ICD-10-CM

## 2023-11-06 DIAGNOSIS — G89.3 NEOPLASM RELATED PAIN: ICD-10-CM

## 2023-11-06 LAB
BACTERIA BLD CULT ORG #2: NORMAL
BACTERIA BLD CULT: NORMAL

## 2023-11-06 PROCEDURE — 99350 HOME/RES VST EST HIGH MDM 60: CPT

## 2023-11-06 PROCEDURE — 1036F TOBACCO NON-USER: CPT

## 2023-11-06 PROCEDURE — G8417 CALC BMI ABV UP PARAM F/U: HCPCS

## 2023-11-06 PROCEDURE — 3074F SYST BP LT 130 MM HG: CPT

## 2023-11-06 PROCEDURE — 1123F ACP DISCUSS/DSCN MKR DOCD: CPT

## 2023-11-06 PROCEDURE — G8484 FLU IMMUNIZE NO ADMIN: HCPCS

## 2023-11-06 PROCEDURE — 3078F DIAST BP <80 MM HG: CPT

## 2023-11-06 RX ORDER — AMOXICILLIN AND CLAVULANATE POTASSIUM 875; 125 MG/1; MG/1
1 TABLET, FILM COATED ORAL 2 TIMES DAILY
Qty: 14 TABLET | Refills: 0 | Status: SHIPPED | OUTPATIENT
Start: 2023-11-06 | End: 2023-11-13

## 2023-11-06 RX ORDER — HYDROCODONE BITARTRATE AND ACETAMINOPHEN 5; 325 MG/1; MG/1
1 TABLET ORAL 2 TIMES DAILY PRN
Qty: 60 TABLET | Refills: 0 | Status: SHIPPED | OUTPATIENT
Start: 2023-11-06 | End: 2023-12-06

## 2023-11-06 RX ORDER — METHYLPREDNISOLONE 4 MG/1
TABLET ORAL
Qty: 1 KIT | Refills: 0 | Status: SHIPPED | OUTPATIENT
Start: 2023-11-06

## 2023-11-06 RX ORDER — ZINC GLUCONATE 50 MG
50 TABLET ORAL DAILY
Qty: 30 TABLET | Refills: 0 | Status: SHIPPED | OUTPATIENT
Start: 2023-11-06 | End: 2023-12-06

## 2023-11-06 ASSESSMENT — PATIENT HEALTH QUESTIONNAIRE - PHQ9
SUM OF ALL RESPONSES TO PHQ QUESTIONS 1-9: 1
1. LITTLE INTEREST OR PLEASURE IN DOING THINGS: 0
SUM OF ALL RESPONSES TO PHQ QUESTIONS 1-9: 1
SUM OF ALL RESPONSES TO PHQ9 QUESTIONS 1 & 2: 1
2. FEELING DOWN, DEPRESSED OR HOPELESS: 1

## 2023-11-06 ASSESSMENT — ENCOUNTER SYMPTOMS
COUGH: 1
WHEEZING: 1
SHORTNESS OF BREATH: 1
TROUBLE SWALLOWING: 0
GASTROINTESTINAL NEGATIVE: 1
BACK PAIN: 1

## 2023-11-06 ASSESSMENT — VISUAL ACUITY: OU: 1

## 2023-11-06 NOTE — TELEPHONE ENCOUNTER
Alisha message from patient:       I have been taking this medication for the Myasenia Gravis. If these antibodies are gone do I continue this med ? Medication in question is AzaTHIOprine Tab 50 mg  Please advise.

## 2023-11-06 NOTE — PROGRESS NOTES
Subjective:      Patient Id: Seen Josy Hernandez at  home in [facility] , for follow up Palliative Care visit. He was accompanied to the appointment by: Wife Pat. Chief Complaint   Patient presents with    Follow-up    Cancer    COPD     Acute exacerbation        HPI         Juanita Powers is a 66 y.o. male was seen and evaluated palliative care for symptom management related to COPD, back pain and non-small cell lung cancer. Josy Hernandez has complex medical history that includes non-small cell cancer of right lung S/P right upper lobectomy, COPD, T2DM, myasthenia gravis, chronic low back pain, CAD, coronary artery bypass graft x5, HTN, osteoarthritis, tobacco abuse, HLD, GERD. Home visit is necessary in lieu of office due to significant frailty and high symptom burden from comorbid illnesses. Patient complains of lung cancer, neoplasm pain, sputum color change and fatigue. Patient follows with PCP, pulmonology, neurology, oncology, urology and cardiology. General: Upon entering the room I find the patient  ambulating without device, alert and oriented x4, calm, cooperative and in NAD. COPD/SOB: Patient now having increased sputum color change, increased fatigue and increased SOB especially at night before. Patient experiences BACA and decreased endurance with current Myasthenia Gravis and chemotherapy. Patient sputum production bright green/yellow. Patient using oxygen nightly related to increased SOB. Patient takes medications and treatments as ordered. 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 Patient is planning on resuming chemotherapy later this week. Patient had previous RUL lobectomy. Patient has been experiencing increased BACA while on treatment, insomnia and fatigue.     Pain/neoplasm related pain: Patient's quality of life and pain

## 2023-11-07 ENCOUNTER — OFFICE VISIT (OUTPATIENT)
Dept: PULMONOLOGY | Age: 78
End: 2023-11-07
Payer: MEDICARE

## 2023-11-07 VITALS
SYSTOLIC BLOOD PRESSURE: 109 MMHG | HEIGHT: 71 IN | TEMPERATURE: 97.2 F | BODY MASS INDEX: 28.42 KG/M2 | OXYGEN SATURATION: 92 % | DIASTOLIC BLOOD PRESSURE: 60 MMHG | WEIGHT: 203 LBS | RESPIRATION RATE: 18 BRPM | HEART RATE: 90 BPM

## 2023-11-07 DIAGNOSIS — G70.00 MYASTHENIA GRAVIS (HCC): ICD-10-CM

## 2023-11-07 DIAGNOSIS — C34.91 SQUAMOUS CELL CARCINOMA LUNG, RIGHT (HCC): ICD-10-CM

## 2023-11-07 DIAGNOSIS — I26.99 PULMONARY EMBOLISM AND INFARCTION (HCC): ICD-10-CM

## 2023-11-07 DIAGNOSIS — E87.79 OTHER HYPERVOLEMIA: ICD-10-CM

## 2023-11-07 DIAGNOSIS — J44.9 CHRONIC OBSTRUCTIVE PULMONARY DISEASE, UNSPECIFIED COPD TYPE (HCC): Primary | ICD-10-CM

## 2023-11-07 PROCEDURE — G8427 DOCREV CUR MEDS BY ELIG CLIN: HCPCS | Performed by: INTERNAL MEDICINE

## 2023-11-07 PROCEDURE — 3074F SYST BP LT 130 MM HG: CPT | Performed by: INTERNAL MEDICINE

## 2023-11-07 PROCEDURE — 99214 OFFICE O/P EST MOD 30 MIN: CPT | Performed by: INTERNAL MEDICINE

## 2023-11-07 PROCEDURE — 3078F DIAST BP <80 MM HG: CPT | Performed by: INTERNAL MEDICINE

## 2023-11-07 PROCEDURE — G8417 CALC BMI ABV UP PARAM F/U: HCPCS | Performed by: INTERNAL MEDICINE

## 2023-11-07 PROCEDURE — 1111F DSCHRG MED/CURRENT MED MERGE: CPT | Performed by: INTERNAL MEDICINE

## 2023-11-07 PROCEDURE — 1036F TOBACCO NON-USER: CPT | Performed by: INTERNAL MEDICINE

## 2023-11-07 PROCEDURE — 3023F SPIROM DOC REV: CPT | Performed by: INTERNAL MEDICINE

## 2023-11-07 PROCEDURE — 1123F ACP DISCUSS/DSCN MKR DOCD: CPT | Performed by: INTERNAL MEDICINE

## 2023-11-07 PROCEDURE — G8484 FLU IMMUNIZE NO ADMIN: HCPCS | Performed by: INTERNAL MEDICINE

## 2023-11-08 ENCOUNTER — CARE COORDINATION (OUTPATIENT)
Dept: CASE MANAGEMENT | Age: 78
End: 2023-11-08

## 2023-11-08 NOTE — CARE COORDINATION
Date/Time:  11/8/2023 12:13 PM  LPN attempted to reach family by telephone regarding yellow alert in remote patient monitoring program. Left HIPPA compliant message requesting a return call. Will attempt to reach patient again.     RPM yellow alert for no metrics x 2 days  Pt was seen by PCP 11/1/23  Seen by Pulmonology and Palliative care on 11/7/23

## 2023-11-10 ENCOUNTER — TELEPHONE (OUTPATIENT)
Dept: PALLATIVE CARE | Age: 78
End: 2023-11-10

## 2023-11-10 DIAGNOSIS — E87.79 OTHER HYPERVOLEMIA: Primary | ICD-10-CM

## 2023-11-10 DIAGNOSIS — E87.79 OTHER HYPERVOLEMIA: ICD-10-CM

## 2023-11-10 RX ORDER — FUROSEMIDE 20 MG/1
20 TABLET ORAL DAILY
Qty: 30 TABLET | Refills: 2 | Status: SHIPPED | OUTPATIENT
Start: 2023-11-10

## 2023-11-10 NOTE — TELEPHONE ENCOUNTER
I was able to contact Kavita Kim this afternoon. He said that his symptoms have improved with the antibiotic and steroid. He is currently at the cancer center receiving treatment. No other needs at this time.

## 2023-11-13 ENCOUNTER — APPOINTMENT (OUTPATIENT)
Dept: CT IMAGING | Age: 78
DRG: 871 | End: 2023-11-13
Payer: MEDICARE

## 2023-11-13 ENCOUNTER — CARE COORDINATION (OUTPATIENT)
Dept: CASE MANAGEMENT | Age: 78
End: 2023-11-13

## 2023-11-13 ENCOUNTER — TELEPHONE (OUTPATIENT)
Dept: OTHER | Facility: CLINIC | Age: 78
End: 2023-11-13

## 2023-11-13 ENCOUNTER — APPOINTMENT (OUTPATIENT)
Dept: ULTRASOUND IMAGING | Age: 78
DRG: 871 | End: 2023-11-13
Payer: MEDICARE

## 2023-11-13 ENCOUNTER — APPOINTMENT (OUTPATIENT)
Dept: GENERAL RADIOLOGY | Age: 78
DRG: 871 | End: 2023-11-13
Payer: MEDICARE

## 2023-11-13 ENCOUNTER — HOSPITAL ENCOUNTER (INPATIENT)
Age: 78
LOS: 3 days | Discharge: ANOTHER HOSPITAL | DRG: 871 | End: 2023-11-16
Attending: EMERGENCY MEDICINE | Admitting: INTERNAL MEDICINE
Payer: MEDICARE

## 2023-11-13 DIAGNOSIS — E11.29 TYPE 2 DIABETES MELLITUS WITH MICROALBUMINURIA, WITHOUT LONG-TERM CURRENT USE OF INSULIN (HCC): ICD-10-CM

## 2023-11-13 DIAGNOSIS — U07.1 COVID: ICD-10-CM

## 2023-11-13 DIAGNOSIS — U07.1 COVID-19 VIRUS INFECTION: ICD-10-CM

## 2023-11-13 DIAGNOSIS — R09.02 HYPOXIA: Primary | ICD-10-CM

## 2023-11-13 DIAGNOSIS — R80.9 TYPE 2 DIABETES MELLITUS WITH MICROALBUMINURIA, WITHOUT LONG-TERM CURRENT USE OF INSULIN (HCC): ICD-10-CM

## 2023-11-13 DIAGNOSIS — R79.89 POSITIVE D DIMER: ICD-10-CM

## 2023-11-13 PROBLEM — J96.01 ACUTE HYPOXEMIC RESPIRATORY FAILURE (HCC): Status: ACTIVE | Noted: 2023-11-13

## 2023-11-13 LAB
ADV 40+41 DNA STL QL NAA+NON-PROBE: NOT DETECTED
ALBUMIN SERPL-MCNC: 3.3 G/DL (ref 3.5–4.6)
ALP SERPL-CCNC: 125 U/L (ref 35–104)
ALT SERPL-CCNC: 30 U/L (ref 0–41)
ANION GAP SERPL CALCULATED.3IONS-SCNC: 12 MEQ/L (ref 9–15)
AST SERPL-CCNC: 22 U/L (ref 0–40)
BASOPHILS # BLD: 0 K/UL (ref 0–0.1)
BASOPHILS NFR BLD: 0.1 % (ref 0.2–1.2)
BILIRUB SERPL-MCNC: 1 MG/DL (ref 0.2–0.7)
BILIRUB UR QL STRIP: NEGATIVE
BNP BLD-MCNC: 752 PG/ML
BUN SERPL-MCNC: 38 MG/DL (ref 8–23)
C CAYETANENSIS DNA STL QL NAA+NON-PROBE: NOT DETECTED
C COLI+JEJ+UPSA DNA STL QL NAA+NON-PROBE: NOT DETECTED
CALCIUM SERPL-MCNC: 9.2 MG/DL (ref 8.5–9.9)
CHLORIDE SERPL-SCNC: 92 MEQ/L (ref 95–107)
CLARITY UR: CLEAR
CO2 SERPL-SCNC: 28 MEQ/L (ref 20–31)
COLOR UR: YELLOW
CREAT SERPL-MCNC: 1.18 MG/DL (ref 0.7–1.2)
CRYPTOSP DNA STL QL NAA+NON-PROBE: NOT DETECTED
D DIMER PPP FEU-MCNC: 2.3 MG/L FEU (ref 0–0.5)
E HISTOLYT DNA STL QL NAA+NON-PROBE: NOT DETECTED
EAEC PAA PLAS AGGR+AATA ST NAA+NON-PRB: NOT DETECTED
EC STX1+STX2 GENES STL QL NAA+NON-PROBE: NOT DETECTED
EKG ATRIAL RATE: 119 BPM
EKG P AXIS: 74 DEGREES
EKG P-R INTERVAL: 138 MS
EKG Q-T INTERVAL: 320 MS
EKG QRS DURATION: 84 MS
EKG QTC CALCULATION (BAZETT): 450 MS
EKG R AXIS: 27 DEGREES
EKG T AXIS: 97 DEGREES
EKG VENTRICULAR RATE: 119 BPM
EOSINOPHIL # BLD: 0 K/UL (ref 0–0.5)
EOSINOPHIL NFR BLD: 0.1 % (ref 0.8–7)
EPEC EAE GENE STL QL NAA+NON-PROBE: NOT DETECTED
EPI CELLS #/AREA URNS HPF: ABNORMAL /HPF
ERYTHROCYTE [DISTWIDTH] IN BLOOD BY AUTOMATED COUNT: 16.4 % (ref 11.6–14.4)
ETEC LTA+ST1A+ST1B TOX ST NAA+NON-PROBE: NOT DETECTED
G LAMBLIA DNA STL QL NAA+NON-PROBE: NOT DETECTED
GI PATH DNA+RNA PNL STL NAA+NON-PROBE: NOT DETECTED
GLOBULIN SER CALC-MCNC: 2.9 G/DL (ref 2.3–3.5)
GLUCOSE BLD-MCNC: 279 MG/DL (ref 70–99)
GLUCOSE BLD-MCNC: 441 MG/DL (ref 70–99)
GLUCOSE BLD-MCNC: 446 MG/DL (ref 70–99)
GLUCOSE BLD-MCNC: 499 MG/DL (ref 70–99)
GLUCOSE SERPL-MCNC: 234 MG/DL (ref 70–99)
GLUCOSE UR STRIP-MCNC: 250 MG/DL
HBA1C MFR BLD: 9.1 % (ref 4.8–5.9)
HCT VFR BLD AUTO: 31.5 % (ref 42–52)
HGB BLD-MCNC: 10.2 G/DL (ref 13.7–17.5)
HGB UR QL STRIP: ABNORMAL
IMM GRANULOCYTES # BLD: 0.2 K/UL
IMM GRANULOCYTES NFR BLD: 0.8 %
KETONES UR STRIP-MCNC: NEGATIVE MG/DL
LACTIC ACID, SEPSIS: 1.8 MMOL/L (ref 0.5–1.9)
LACTIC ACID, SEPSIS: 2.1 MMOL/L (ref 0.5–1.9)
LEUKOCYTE ESTERASE UR QL STRIP: NEGATIVE
LYMPHOCYTES # BLD: 1.4 K/UL (ref 1.3–3.6)
LYMPHOCYTES NFR BLD: 5 %
MCH RBC QN AUTO: 32.7 PG (ref 25.7–32.2)
MCHC RBC AUTO-ENTMCNC: 32.4 % (ref 32.3–36.5)
MCV RBC AUTO: 101 FL (ref 79–92.2)
MONOCYTES # BLD: 0 K/UL (ref 0.3–0.8)
MONOCYTES NFR BLD: 0.6 % (ref 5.3–12.2)
NEUTROPHILS # BLD: 26.8 K/UL (ref 1.8–5.4)
NEUTS BAND NFR BLD MANUAL: 17 %
NEUTS SEG NFR BLD: 78 % (ref 34–67.9)
NITRITE UR QL STRIP: NEGATIVE
NOROVIRUS GI+II RNA STL QL NAA+NON-PROBE: NOT DETECTED
P SHIGELLOIDES DNA STL QL NAA+NON-PROBE: NOT DETECTED
PERFORMED ON: ABNORMAL
PH UR STRIP: 5.5 [PH] (ref 5–9)
PLATELET # BLD AUTO: 233 K/UL (ref 163–337)
PLATELET BLD QL SMEAR: ADEQUATE
POTASSIUM SERPL-SCNC: 4.9 MEQ/L (ref 3.4–4.9)
PROCALCITONIN SERPL IA-MCNC: 3.52 NG/ML (ref 0–0.15)
PROT SERPL-MCNC: 6.2 G/DL (ref 6.3–8)
PROT UR STRIP-MCNC: NEGATIVE MG/DL
RBC # BLD AUTO: 3.12 M/UL (ref 4.63–6.08)
RBC #/AREA URNS HPF: ABNORMAL /HPF (ref 0–2)
REASON FOR REJECTION: NORMAL
REJECTED TEST: NORMAL
RVA RNA STL QL NAA+NON-PROBE: NOT DETECTED
S ENT+BONG DNA STL QL NAA+NON-PROBE: NOT DETECTED
SAPO I+II+IV+V RNA STL QL NAA+NON-PROBE: NOT DETECTED
SARS-COV-2 RDRP RESP QL NAA+PROBE: DETECTED
SHIGELLA SP+EIEC IPAH ST NAA+NON-PROBE: NOT DETECTED
SLIDE REVIEW: ABNORMAL
SODIUM SERPL-SCNC: 132 MEQ/L (ref 135–144)
SP GR UR STRIP: 1.01 (ref 1–1.03)
TROPONIN, HIGH SENSITIVITY: 29 NG/L (ref 0–19)
TROPONIN, HIGH SENSITIVITY: 30 NG/L (ref 0–19)
UROBILINOGEN UR STRIP-ACNC: 0.2 E.U./DL
V CHOL+PARA+VUL DNA STL QL NAA+NON-PROBE: NOT DETECTED
V CHOLERAE DNA STL QL NAA+NON-PROBE: NOT DETECTED
WBC # BLD AUTO: 28.2 K/UL (ref 4.2–9)
WBC #/AREA URNS HPF: ABNORMAL /HPF (ref 0–5)
Y ENTEROCOL DNA STL QL NAA+NON-PROBE: NOT DETECTED

## 2023-11-13 PROCEDURE — 93970 EXTREMITY STUDY: CPT

## 2023-11-13 PROCEDURE — 6360000004 HC RX CONTRAST MEDICATION: Performed by: EMERGENCY MEDICINE

## 2023-11-13 PROCEDURE — 83605 ASSAY OF LACTIC ACID: CPT

## 2023-11-13 PROCEDURE — 6360000002 HC RX W HCPCS: Performed by: EMERGENCY MEDICINE

## 2023-11-13 PROCEDURE — 83880 ASSAY OF NATRIURETIC PEPTIDE: CPT

## 2023-11-13 PROCEDURE — 2500000003 HC RX 250 WO HCPCS: Performed by: INTERNAL MEDICINE

## 2023-11-13 PROCEDURE — 87507 IADNA-DNA/RNA PROBE TQ 12-25: CPT

## 2023-11-13 PROCEDURE — 2580000003 HC RX 258: Performed by: INTERNAL MEDICINE

## 2023-11-13 PROCEDURE — 83036 HEMOGLOBIN GLYCOSYLATED A1C: CPT

## 2023-11-13 PROCEDURE — 81001 URINALYSIS AUTO W/SCOPE: CPT

## 2023-11-13 PROCEDURE — 36415 COLL VENOUS BLD VENIPUNCTURE: CPT

## 2023-11-13 PROCEDURE — 2700000000 HC OXYGEN THERAPY PER DAY

## 2023-11-13 PROCEDURE — 71045 X-RAY EXAM CHEST 1 VIEW: CPT

## 2023-11-13 PROCEDURE — 84145 PROCALCITONIN (PCT): CPT

## 2023-11-13 PROCEDURE — 6370000000 HC RX 637 (ALT 250 FOR IP): Performed by: INTERNAL MEDICINE

## 2023-11-13 PROCEDURE — 87635 SARS-COV-2 COVID-19 AMP PRB: CPT

## 2023-11-13 PROCEDURE — 80053 COMPREHEN METABOLIC PANEL: CPT

## 2023-11-13 PROCEDURE — 85025 COMPLETE CBC W/AUTO DIFF WBC: CPT

## 2023-11-13 PROCEDURE — 93005 ELECTROCARDIOGRAM TRACING: CPT

## 2023-11-13 PROCEDURE — 94640 AIRWAY INHALATION TREATMENT: CPT

## 2023-11-13 PROCEDURE — 2580000003 HC RX 258: Performed by: EMERGENCY MEDICINE

## 2023-11-13 PROCEDURE — 1210000000 HC MED SURG R&B

## 2023-11-13 PROCEDURE — 96365 THER/PROPH/DIAG IV INF INIT: CPT

## 2023-11-13 PROCEDURE — 84484 ASSAY OF TROPONIN QUANT: CPT

## 2023-11-13 PROCEDURE — 93010 ELECTROCARDIOGRAM REPORT: CPT | Performed by: INTERNAL MEDICINE

## 2023-11-13 PROCEDURE — 99223 1ST HOSP IP/OBS HIGH 75: CPT | Performed by: INTERNAL MEDICINE

## 2023-11-13 PROCEDURE — 71275 CT ANGIOGRAPHY CHEST: CPT

## 2023-11-13 PROCEDURE — 99285 EMERGENCY DEPT VISIT HI MDM: CPT

## 2023-11-13 PROCEDURE — 87040 BLOOD CULTURE FOR BACTERIA: CPT

## 2023-11-13 PROCEDURE — 85379 FIBRIN DEGRADATION QUANT: CPT

## 2023-11-13 PROCEDURE — 96367 TX/PROPH/DG ADDL SEQ IV INF: CPT

## 2023-11-13 PROCEDURE — 96375 TX/PRO/DX INJ NEW DRUG ADDON: CPT

## 2023-11-13 PROCEDURE — 6360000002 HC RX W HCPCS: Performed by: INTERNAL MEDICINE

## 2023-11-13 RX ORDER — ASPIRIN 81 MG/1
81 TABLET ORAL DAILY
Status: DISCONTINUED | OUTPATIENT
Start: 2023-11-13 | End: 2023-11-16 | Stop reason: HOSPADM

## 2023-11-13 RX ORDER — INSULIN LISPRO 100 [IU]/ML
6 INJECTION, SOLUTION INTRAVENOUS; SUBCUTANEOUS ONCE
Status: COMPLETED | OUTPATIENT
Start: 2023-11-13 | End: 2023-11-13

## 2023-11-13 RX ORDER — INSULIN LISPRO 100 [IU]/ML
0-4 INJECTION, SOLUTION INTRAVENOUS; SUBCUTANEOUS NIGHTLY
Status: DISCONTINUED | OUTPATIENT
Start: 2023-11-13 | End: 2023-11-16 | Stop reason: HOSPADM

## 2023-11-13 RX ORDER — ONDANSETRON 2 MG/ML
4 INJECTION INTRAMUSCULAR; INTRAVENOUS ONCE
Status: DISCONTINUED | OUTPATIENT
Start: 2023-11-13 | End: 2023-11-16 | Stop reason: HOSPADM

## 2023-11-13 RX ORDER — DEXTROSE MONOHYDRATE 100 MG/ML
INJECTION, SOLUTION INTRAVENOUS CONTINUOUS PRN
Status: DISCONTINUED | OUTPATIENT
Start: 2023-11-13 | End: 2023-11-16 | Stop reason: HOSPADM

## 2023-11-13 RX ORDER — METOPROLOL SUCCINATE 25 MG/1
25 TABLET, EXTENDED RELEASE ORAL DAILY
Status: DISCONTINUED | OUTPATIENT
Start: 2023-11-13 | End: 2023-11-16 | Stop reason: HOSPADM

## 2023-11-13 RX ORDER — SODIUM CHLORIDE 9 MG/ML
INJECTION, SOLUTION INTRAVENOUS CONTINUOUS
Status: DISCONTINUED | OUTPATIENT
Start: 2023-11-13 | End: 2023-11-14

## 2023-11-13 RX ORDER — FUROSEMIDE 10 MG/ML
60 INJECTION INTRAMUSCULAR; INTRAVENOUS ONCE
Status: COMPLETED | OUTPATIENT
Start: 2023-11-13 | End: 2023-11-13

## 2023-11-13 RX ORDER — TAMSULOSIN HYDROCHLORIDE 0.4 MG/1
0.8 CAPSULE ORAL DAILY
Status: DISCONTINUED | OUTPATIENT
Start: 2023-11-13 | End: 2023-11-16 | Stop reason: HOSPADM

## 2023-11-13 RX ORDER — INSULIN LISPRO 100 [IU]/ML
0-8 INJECTION, SOLUTION INTRAVENOUS; SUBCUTANEOUS
Status: DISCONTINUED | OUTPATIENT
Start: 2023-11-13 | End: 2023-11-16 | Stop reason: HOSPADM

## 2023-11-13 RX ORDER — CHOLECALCIFEROL (VITAMIN D3) 125 MCG
1000 CAPSULE ORAL DAILY
Status: DISCONTINUED | OUTPATIENT
Start: 2023-11-13 | End: 2023-11-16 | Stop reason: HOSPADM

## 2023-11-13 RX ORDER — INSULIN LISPRO 100 [IU]/ML
12 INJECTION, SOLUTION INTRAVENOUS; SUBCUTANEOUS ONCE
Status: COMPLETED | OUTPATIENT
Start: 2023-11-13 | End: 2023-11-13

## 2023-11-13 RX ORDER — GLIPIZIDE 5 MG/1
5 TABLET ORAL 2 TIMES DAILY WITH MEALS
Status: DISCONTINUED | OUTPATIENT
Start: 2023-11-13 | End: 2023-11-13

## 2023-11-13 RX ORDER — INSULIN GLARGINE 100 [IU]/ML
20 INJECTION, SOLUTION SUBCUTANEOUS NIGHTLY
Status: DISCONTINUED | OUTPATIENT
Start: 2023-11-13 | End: 2023-11-16 | Stop reason: HOSPADM

## 2023-11-13 RX ORDER — INSULIN GLARGINE 100 [IU]/ML
20 INJECTION, SOLUTION SUBCUTANEOUS NIGHTLY
Qty: 20 ML | Refills: 1 | Status: SHIPPED | OUTPATIENT
Start: 2023-11-13

## 2023-11-13 RX ORDER — ASCORBIC ACID 500 MG
1000 TABLET ORAL DAILY
Status: DISCONTINUED | OUTPATIENT
Start: 2023-11-13 | End: 2023-11-16 | Stop reason: HOSPADM

## 2023-11-13 RX ORDER — GLIPIZIDE 5 MG/1
10 TABLET ORAL 2 TIMES DAILY WITH MEALS
Status: DISCONTINUED | OUTPATIENT
Start: 2023-11-14 | End: 2023-11-15

## 2023-11-13 RX ORDER — INSULIN LISPRO 100 [IU]/ML
0.05 INJECTION, SOLUTION INTRAVENOUS; SUBCUTANEOUS
Status: DISCONTINUED | OUTPATIENT
Start: 2023-11-13 | End: 2023-11-14

## 2023-11-13 RX ORDER — SODIUM CHLORIDE 9 MG/ML
INJECTION, SOLUTION INTRAVENOUS CONTINUOUS
Status: DISCONTINUED | OUTPATIENT
Start: 2023-11-13 | End: 2023-11-13

## 2023-11-13 RX ORDER — CODEINE PHOSPHATE AND GUAIFENESIN 10; 100 MG/5ML; MG/5ML
10 SOLUTION ORAL EVERY 8 HOURS
Status: DISCONTINUED | OUTPATIENT
Start: 2023-11-13 | End: 2023-11-16 | Stop reason: HOSPADM

## 2023-11-13 RX ORDER — DEXAMETHASONE SODIUM PHOSPHATE 10 MG/ML
6 INJECTION INTRAMUSCULAR; INTRAVENOUS EVERY 12 HOURS SCHEDULED
Status: DISCONTINUED | OUTPATIENT
Start: 2023-11-13 | End: 2023-11-14

## 2023-11-13 RX ORDER — ONDANSETRON 2 MG/ML
4 INJECTION INTRAMUSCULAR; INTRAVENOUS ONCE
Status: COMPLETED | OUTPATIENT
Start: 2023-11-13 | End: 2023-11-13

## 2023-11-13 RX ORDER — DIPHENOXYLATE HYDROCHLORIDE AND ATROPINE SULFATE 2.5; .025 MG/1; MG/1
1 TABLET ORAL 3 TIMES DAILY
Status: DISPENSED | OUTPATIENT
Start: 2023-11-13 | End: 2023-11-14

## 2023-11-13 RX ADMIN — INSULIN LISPRO 6 UNITS: 100 INJECTION, SOLUTION INTRAVENOUS; SUBCUTANEOUS at 17:13

## 2023-11-13 RX ADMIN — IPRATROPIUM BROMIDE AND ALBUTEROL 1 PUFF: 20; 100 SPRAY, METERED RESPIRATORY (INHALATION) at 11:08

## 2023-11-13 RX ADMIN — GLIPIZIDE 5 MG: 5 TABLET ORAL at 11:00

## 2023-11-13 RX ADMIN — GLIPIZIDE 5 MG: 5 TABLET ORAL at 17:14

## 2023-11-13 RX ADMIN — DEXAMETHASONE SODIUM PHOSPHATE 6 MG: 10 INJECTION INTRAMUSCULAR; INTRAVENOUS at 22:19

## 2023-11-13 RX ADMIN — ONDANSETRON 4 MG: 2 INJECTION INTRAMUSCULAR; INTRAVENOUS at 08:15

## 2023-11-13 RX ADMIN — METOPROLOL SUCCINATE 25 MG: 25 TABLET, EXTENDED RELEASE ORAL at 10:59

## 2023-11-13 RX ADMIN — DEXAMETHASONE SODIUM PHOSPHATE 6 MG: 10 INJECTION INTRAMUSCULAR; INTRAVENOUS at 10:59

## 2023-11-13 RX ADMIN — FUROSEMIDE 60 MG: 10 INJECTION, SOLUTION INTRAMUSCULAR; INTRAVENOUS at 06:34

## 2023-11-13 RX ADMIN — DOXYCYCLINE 100 MG: 100 INJECTION, POWDER, LYOPHILIZED, FOR SOLUTION INTRAVENOUS at 22:24

## 2023-11-13 RX ADMIN — INSULIN LISPRO 12 UNITS: 100 INJECTION, SOLUTION INTRAVENOUS; SUBCUTANEOUS at 20:34

## 2023-11-13 RX ADMIN — TAMSULOSIN HYDROCHLORIDE 0.8 MG: 0.4 CAPSULE ORAL at 10:59

## 2023-11-13 RX ADMIN — OXYCODONE HYDROCHLORIDE AND ACETAMINOPHEN 1000 MG: 500 TABLET ORAL at 10:59

## 2023-11-13 RX ADMIN — INSULIN GLARGINE 20 UNITS: 100 INJECTION, SOLUTION SUBCUTANEOUS at 20:30

## 2023-11-13 RX ADMIN — PIPERACILLIN AND TAZOBACTAM 3375 MG: 3; .375 INJECTION, POWDER, LYOPHILIZED, FOR SOLUTION INTRAVENOUS at 17:18

## 2023-11-13 RX ADMIN — Medication 1000 MCG: at 10:59

## 2023-11-13 RX ADMIN — AZITHROMYCIN MONOHYDRATE 500 MG: 500 INJECTION, POWDER, LYOPHILIZED, FOR SOLUTION INTRAVENOUS at 07:49

## 2023-11-13 RX ADMIN — DOXYCYCLINE 100 MG: 100 INJECTION, POWDER, LYOPHILIZED, FOR SOLUTION INTRAVENOUS at 11:02

## 2023-11-13 RX ADMIN — IOPAMIDOL 75 ML: 755 INJECTION, SOLUTION INTRAVENOUS at 07:29

## 2023-11-13 RX ADMIN — SODIUM CHLORIDE: 9 INJECTION, SOLUTION INTRAVENOUS at 20:29

## 2023-11-13 RX ADMIN — ASPIRIN 81 MG: 81 TABLET, COATED ORAL at 11:00

## 2023-11-13 RX ADMIN — DIPHENOXYLATE HYDROCHLORIDE AND ATROPINE SULFATE 1 TABLET: 2.5; .025 TABLET ORAL at 20:30

## 2023-11-13 RX ADMIN — INSULIN LISPRO 8 UNITS: 100 INJECTION, SOLUTION INTRAVENOUS; SUBCUTANEOUS at 17:13

## 2023-11-13 RX ADMIN — INSULIN LISPRO 4 UNITS: 100 INJECTION, SOLUTION INTRAVENOUS; SUBCUTANEOUS at 17:14

## 2023-11-13 RX ADMIN — INSULIN LISPRO 4 UNITS: 100 INJECTION, SOLUTION INTRAVENOUS; SUBCUTANEOUS at 12:41

## 2023-11-13 RX ADMIN — APIXABAN 5 MG: 5 TABLET, FILM COATED ORAL at 20:30

## 2023-11-13 RX ADMIN — SODIUM CHLORIDE: 9 INJECTION, SOLUTION INTRAVENOUS at 07:18

## 2023-11-13 RX ADMIN — CEFTRIAXONE 1000 MG: 1 INJECTION, POWDER, FOR SOLUTION INTRAMUSCULAR; INTRAVENOUS at 07:19

## 2023-11-13 RX ADMIN — PIPERACILLIN AND TAZOBACTAM 4500 MG: 4; .5 INJECTION, POWDER, FOR SOLUTION INTRAVENOUS at 11:56

## 2023-11-13 RX ADMIN — INSULIN LISPRO 4 UNITS: 100 INJECTION, SOLUTION INTRAVENOUS; SUBCUTANEOUS at 20:33

## 2023-11-13 RX ADMIN — APIXABAN 5 MG: 5 TABLET, FILM COATED ORAL at 10:59

## 2023-11-13 RX ADMIN — SODIUM CHLORIDE: 9 INJECTION, SOLUTION INTRAVENOUS at 11:01

## 2023-11-13 RX ADMIN — IPRATROPIUM BROMIDE AND ALBUTEROL 1 PUFF: 20; 100 SPRAY, METERED RESPIRATORY (INHALATION) at 18:08

## 2023-11-13 RX ADMIN — GUAIFENESIN AND CODEINE PHOSPHATE 10 ML: 100; 10 SOLUTION ORAL at 10:59

## 2023-11-13 RX ADMIN — DIPHENOXYLATE HYDROCHLORIDE AND ATROPINE SULFATE 1 TABLET: 2.5; .025 TABLET ORAL at 11:00

## 2023-11-13 RX ADMIN — GUAIFENESIN AND CODEINE PHOSPHATE 10 ML: 100; 10 SOLUTION ORAL at 17:14

## 2023-11-13 ASSESSMENT — ENCOUNTER SYMPTOMS
COUGH: 1
BACK PAIN: 0
WHEEZING: 1
SORE THROAT: 0
SHORTNESS OF BREATH: 1
ABDOMINAL PAIN: 0
EYE DISCHARGE: 0
EYE REDNESS: 0
VOMITING: 0
NAUSEA: 0

## 2023-11-13 ASSESSMENT — PAIN SCALES - GENERAL: PAINLEVEL_OUTOF10: 3

## 2023-11-13 ASSESSMENT — PAIN - FUNCTIONAL ASSESSMENT: PAIN_FUNCTIONAL_ASSESSMENT: 0-10

## 2023-11-13 ASSESSMENT — PAIN DESCRIPTION - FREQUENCY: FREQUENCY: CONTINUOUS

## 2023-11-13 ASSESSMENT — PAIN DESCRIPTION - ORIENTATION: ORIENTATION: OTHER (COMMENT)

## 2023-11-13 ASSESSMENT — PAIN DESCRIPTION - LOCATION: LOCATION: ABDOMEN

## 2023-11-13 ASSESSMENT — PAIN DESCRIPTION - PAIN TYPE: TYPE: ACUTE PAIN

## 2023-11-13 NOTE — ED NOTES
Registration notified of bed assignment.  Electronically signed by Radha Honeycutt RN on 11/13/2023 at 8:42 AM       Radha Honeycutt RN  11/13/23 [id number]

## 2023-11-13 NOTE — H&P
Hospital Medicine  History and Physical    Patient:  Radha Case  MRN: [id number]    CHIEF COMPLAINT:    Chief Complaint   Patient presents with    Diarrhea    Emesis    Fatigue    other      Had chemo Friday afternoon and has been not feeling well since saturday       History Obtained From:  Patient, EMR  Primary Care Physician: Kashif Estes MD    HISTORY OF PRESENT ILLNESS:   The patient is a 66 y.o. male with PMH of lung cancer on chemotherapy, myasthenia gravis for which she completed the course of the plasmapheresis, COPD. Patient came in with shortness of breath, cough productive to yellowish sputum. He was found to have bilateral infiltration on CTA. Is found also to have sepsis. Sepsis initial evaluation and treatment had completed in the emergency room department. Please refer to ER record for details. His lactic acid was elevated. Repeat lactic acid is normal.  Patient was recommended to be transferred to [facility] to be seen by multiple specialist however he declined due to his dissatisfaction with the recent admission to [facility]. I accepted him to be admitted to the [facility] after I explained to the patient and his wife the relatively limited resources compared to [facility].  Patient reported having diarrhea. No abdominal pain. Nausea but no vomiting. No bloody stool.     Past Medical History:      Diagnosis Date    Atypical chest pain 8/30/2012    BPH (benign prostatic hypertrophy)     CAD (coronary artery disease)     Cataracts, bilateral     Chronic low back pain     COPD (chronic obstructive pulmonary disease) ([address]) 6/1/2006    DM2 (diabetes mellitus, type 2) ([address])     Duodenitis     Erectile dysfunction 3/6/2017    Fatty infiltration of liver 11/19/2014    GERD (gastroesophageal reflux disease)     History of melanoma excision 12/11/2017    Left ear 09/2017    History of tobacco use 6/1/2006    HTN (hypertension)     Hyperlipidemia     Malignant melanoma of face ([address])     Malignant upper abdomen reveals multiple hypoenhancing lesions throughout the liver most consistent of hepatic metastasis. XR CHEST PORTABLE    Result Date: 11/13/2023  EXAMINATION: ONE XRAY VIEW OF THE CHEST 11/13/2023 6:23 am COMPARISON: 10/31/2023 HISTORY: ORDERING SYSTEM PROVIDED HISTORY: SOB TECHNOLOGIST PROVIDED HISTORY: Reason for exam:->SOB What reading provider will be dictating this exam?->CRC FINDINGS: There is bibasilar interstitial process. There are no alveolar infiltrates or effusions. Heart size is normal.  There has been sternotomy. No interval change           Assessment and Plan     *Pneumonia which is likely mixed between healthcare associated pneumonia given his recent admission to [facility], COVID-19 pneumonia, obstructive pneumonia given his history of lung cancer. *Acute COPD exacerbation    *Lung cancer for which patient is receiving chemotherapy    *Sepsis present on admission. Please refer to sepsis initial evaluation and management completed in the emergency room department. Patient had received appropriate blood culture and fluid management. Repeat lactic acid is normal.  Patient has appropriate perfusion. *COVID-19 positivity. Patient tested positive for for COVID-19 about 14 days ago. Patient never been vaccinated for COVID. *Acute on chronic hypoxic respiratory failure. Likely caused by above. *Myasthenia gravis. *Cachexia, frailty, failure to thrive. *Diarrhea without any abdominal pain. This could be caused by viral or bacterial enteritis, side effect to chemotherapy and immunotherapy, COVID-19 infection, C. difficile infection and others. *Anemia, no evidence of acute blood loss. *Multiple other medical issues not listed above. Plan:  I had accepted to admit patient to [facility] given his refusal to be transferred to [facility] EMERGENCY [facility] [place] to be seen by multi specialist such as pulmonary, neurology and oncology.   I made it clear to patient and his

## 2023-11-13 NOTE — ED NOTES
Nursing supervisor aware patient is ready to transport to the floor.  Electronically signed by Leonardo Queen RN on 11/13/2023 at 8:50 AM       Kristin Mccracken  11/13/23 1639

## 2023-11-13 NOTE — ED NOTES
Dr. Vasile Vu spoke to [facility] ([facility]) face to face regarding admission. Pt accepted to Med Surg. This Rn attempted to call nursing supervisor to notify of admission for bed assignment. Salvador Reyes states she will call back when done on the other line.  Electronically signed by Guillermo Flores RN on 11/13/2023 at 8:29 AM       Guillermo Flores RN  11/13/23 [id number]

## 2023-11-13 NOTE — ED NOTES
Return from [address] [place] [address], [address] [place] [address], [address] [place] [address]  11/13/23 [id number]

## 2023-11-13 NOTE — ED TRIAGE NOTES
Pt states he had his third chemo treatment 3 days ago and ever since then has been having nausea, vomiting, and diarrhea. Pt was prescribed Compazine yesterday but did not help. Pt reports last night he began having shortness of breath as well. Pt SpO2 upon arrival 86% on room air. 4L oxygen via nasal cannula applied.

## 2023-11-13 NOTE — ED PROVIDER NOTES
[address] [facility] UNIT  EMERGENCY DEPARTMENT ENCOUNTER      Pt Name: Sylvia Acosta  MRN: [id number]  [address] [facility] 1945  Date of evaluation: 11/13/2023  Provider: January Asher MD    [address]       Chief Complaint   Patient presents with    Diarrhea    Emesis    Fatigue    other      Had chemo Friday afternoon and has been not feeling well since saturday         HISTORY OF PRESENT ILLNESS   (Location/Symptom, Timing/Onset, Context/Setting, Quality, Duration, Modifying Factors, Severity)  Note limiting factors. Sylvia Acosta is a 66 y.o. male who presents to the emergency department with covid and SOB. He has increased symptoms over the last 2 days. The history is provided by the patient. Shortness of Breath  Severity:  Moderate  Onset quality:  Sudden  Timing:  Constant  Progression:  Worsening  Chronicity:  New  Context: URI    Relieved by:  Nothing  Worsened by: Activity and deep breathing  Ineffective treatments:  None tried  Associated symptoms: cough and wheezing    Associated symptoms: no abdominal pain, no chest pain, no ear pain, no fever, no neck pain, no rash, no sore throat and no vomiting    Risk factors: hx of cancer and prolonged immobilization        Nursing Notes were reviewed. REVIEW OF SYSTEMS    (2-9 systems for level 4, 10 or more for level 5)     Review of Systems   Constitutional:  Negative for chills and fever. HENT:  Negative for ear pain and sore throat. Eyes:  Negative for discharge and redness. Respiratory:  Positive for cough, shortness of breath and wheezing. Cardiovascular:  Negative for chest pain and palpitations. Gastrointestinal:  Negative for abdominal pain, nausea and vomiting. Genitourinary:  Negative for difficulty urinating and dysuria. Musculoskeletal:  Negative for back pain and neck pain. Skin:  Negative for rash and wound. Neurological:  Negative for dizziness and syncope. Psychiatric/Behavioral:  Negative for confusion.  The patient is disease, unspecified whether esophagitis present      SITagliptin (JANUVIA) 100 MG tablet Take 1 tablet by mouth daily  Qty: 90 tablet, Refills: 1    Associated Diagnoses: Type 2 diabetes mellitus with microalbuminuria, without long-term current use of insulin ([facility])      glipiZIDE (GLUCOTROL) 10 MG tablet Take 1 tablet by mouth 2 times daily  Qty: 180 tablet, Refills: 1    Associated Diagnoses: Type 2 diabetes mellitus with microalbuminuria, without long-term current use of insulin ([facility])      fluticasone (FLONASE) 50 MCG/ACT nasal spray 1 spray by Nasal route daily  Qty: 1 Bottle, Refills: 0    Associated Diagnoses: Acute sinusitis, recurrence not specified, unspecified location      albuterol sulfate  (90 Base) MCG/ACT inhaler Inhale 2 puffs into the lungs every 6 hours as needed for Wheezing  Qty: 3 Inhaler, Refills: 0    Comments: Requesting 1 year supply  Associated Diagnoses: Chronic obstructive pulmonary disease, unspecified COPD type ([address])      Handicap Placard MISC by Does not apply route DX: COPD (J44.9), primary osteoarthritis involving multiple joints (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 Cyanocobalamin (VITAMIN B 12 PO) Take 1,000 mg by mouth daily      nitroGLYCERIN (NITROSTAT) 0.4 MG SL tablet Place 1 tablet under the tongue every 5 minutes as needed for Chest pain  Qty: 25 tablet, Refills: 0      Blood Glucose Monitoring Suppl CHENCHO Test once daily.   Dx: E11.29  Qty: 1 Device, Refills: 0    Comments: DISPENSE WHAT IS COVERED BY INSURANCE  Associated Diagnoses: Type 2 diabetes mellitus with microalbuminuria, without

## 2023-11-13 NOTE — CARE COORDINATION
Remote Patient Monitoring Note  Date/Time:  11/13/2023 12:28 PM  Patient Current Location: [address] [place] [address] pt admitted on 11/13/23 To [facility]. RPM PAUSED  Background: ENROLLED IN RPM for DM COPD HTN  Plan/Follow Up: Will continue to review, monitor and address alerts with follow up based on severity of symptoms and risk factors.

## 2023-11-13 NOTE — PROGRESS NOTES
Pt admitted to room 200 for resp failure. COVID +. Pt also c/o N,V for past 3 days following chemo treatment. Pt currently on chemo for liver CA. Pt a and o x4. Accompanied by wife. Pt head to toe complete. Pt has dentures in both top and bottom. Pt is dyspnea and is labored with exertion. Pt has +3 BLE pitting edema. Pt is on 4LNC. No home 02. Pt is up with 1 SBA with no device. Tolerated fairly well. Pt having episodes of diarrhea. Stool already sent in ER per report for GI panel and c-diff. Pt has pal care appt with josias at his house tomorrow. PS sent to Bryon Ewing to make him aware that pt is admitted here in hospital. Also new Pulm consult to Dr. Johana Castano to make him aware that pt is admitted here for resp failure. PS sent to Dr. Johana Castano. Status. Read. New orders received for BLE US. US done . Waiting for results. Resp cup placed at bedside for the need of sputum sample. Pt aware. Also Urinal placed at bedside for need of UA. No other needs at this time. New order for tele placed. Will initiate. Bed alarm on and call light in reach.

## 2023-11-13 NOTE — TELEPHONE ENCOUNTER
Patient is requesting medication refill.  Please approve or deny this request.    Rx requested:  Requested Prescriptions     Pending Prescriptions Disp Refills    LANTUS SOLOSTAR 100 UNIT/ML injection pen [Pharmacy Med Name: Lantus SoloStar 100 UNIT/ML Subcutaneous Solution Pen-injector] 30 mL 3     Sig: INJECT SUBCUTANEOUSLY [address] [place] Office Visit:   11/1/2023      Next Visit Date:  Future Appointments   Date Time Provider [address]   11/15/2023  2:30 PM CHEMO Tracey CNP [facility]   11/16/2023 11:00 AM Alison Prado MD [address] [place] [address]   11/27/2023  1:30 PM Ifeanyi Scherer MD [address] [place] [address]   11/29/2023 12:20 PM Mary Ann Farah, [address] [place] [address]   1/9/2024  2:00 PM CHEMO Tracey CNP [facility]   2/20/2024  2:30 PM Carlitos Patrick MD [address] Neurology -   2/22/2024  1:00 PM Rubina Balbuena MD [facility]   8/6/2024 11:20 AM Radha Ramírez DO [address]

## 2023-11-13 NOTE — CONSULTS
Pulmonary Medicine  Consult Note    TELEHEALTH EVALUATION -- Audio/Visual    Reason for consultation: Respiratory failure    Consulting physician: Dr. Dona Harrison:      This is 19-year-old male with past medical history significant for lung cancer on chemotherapy.,  Myasthenia gravis, COPD who was presented to ER with increased shortness of breath, cough productive of yellow mucus. He has no fever. He said he received 3rd chemotherapy on  Friday then he was feeling sick and having nausea and diarrhea. Patient was found having bilateral infiltration on CT chest.  Lactic acid was elevated. Patient was advised to come to [place] for admission at [facility] due to likely sepsis but patient did not wanted to come to [place] due to his recent dissatisfaction with the recent admission. He denies having any abdominal pain. He does have swelling in the lower and upper extremity. He is currently on 2 L O2 via nasal cannula. O2 saturation is 97%. Wife at bedside. Audiovisual consultation was done with Win.       Past Medical History:        Diagnosis Date    Atypical chest pain 8/30/2012    BPH (benign prostatic hypertrophy)     CAD (coronary artery disease)     Cataracts, bilateral     Chronic low back pain     COPD (chronic obstructive pulmonary disease) ([address]) 6/1/2006    DM2 (diabetes mellitus, type 2) (HCC)     Duodenitis     Erectile dysfunction 3/6/2017    Fatty infiltration of liver 11/19/2014    GERD (gastroesophageal reflux disease)     History of melanoma excision 12/11/2017    Left ear 09/2017    History of tobacco use 6/1/2006    HTN (hypertension)     Hyperlipidemia     Malignant melanoma of face ([address])     Malignant melanoma of skin of face ([address])     RT ear 2000, LT ear 2017    MG (myasthenia gravis) ([address])     Nephrolithiasis     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    Osteoarthritis of multiple joints     hands, hips    Perennial allergic rhinitis provided through a video synchronous discussion virtually to substitute for in-person clinic visit.

## 2023-11-13 NOTE — PROGRESS NOTES
Lab in [address] called and stated that the stool sent over from ER was formed and they cant run a C-Diff on a \"formed stool\". A new loose stool has to be sent over for c-diff testing. Hat placed in toilet for new stool sample collection.

## 2023-11-14 LAB
GLUCOSE BLD-MCNC: 244 MG/DL (ref 70–99)
GLUCOSE BLD-MCNC: 350 MG/DL (ref 70–99)
GLUCOSE BLD-MCNC: 360 MG/DL (ref 70–99)
GLUCOSE BLD-MCNC: 511 MG/DL (ref 70–99)
PERFORMED ON: ABNORMAL

## 2023-11-14 PROCEDURE — 87070 CULTURE OTHR SPECIMN AEROBIC: CPT

## 2023-11-14 PROCEDURE — 94640 AIRWAY INHALATION TREATMENT: CPT

## 2023-11-14 PROCEDURE — 87186 SC STD MICRODIL/AGAR DIL: CPT

## 2023-11-14 PROCEDURE — 87077 CULTURE AEROBIC IDENTIFY: CPT

## 2023-11-14 PROCEDURE — 2700000000 HC OXYGEN THERAPY PER DAY

## 2023-11-14 PROCEDURE — 97116 GAIT TRAINING THERAPY: CPT

## 2023-11-14 PROCEDURE — 99232 SBSQ HOSP IP/OBS MODERATE 35: CPT | Performed by: INTERNAL MEDICINE

## 2023-11-14 PROCEDURE — 6360000002 HC RX W HCPCS: Performed by: INTERNAL MEDICINE

## 2023-11-14 PROCEDURE — 87449 NOS EACH ORGANISM AG IA: CPT

## 2023-11-14 PROCEDURE — 6370000000 HC RX 637 (ALT 250 FOR IP): Performed by: INTERNAL MEDICINE

## 2023-11-14 PROCEDURE — 2500000003 HC RX 250 WO HCPCS: Performed by: INTERNAL MEDICINE

## 2023-11-14 PROCEDURE — 87324 CLOSTRIDIUM AG IA: CPT

## 2023-11-14 PROCEDURE — 97162 PT EVAL MOD COMPLEX 30 MIN: CPT

## 2023-11-14 PROCEDURE — 87493 C DIFF AMPLIFIED PROBE: CPT

## 2023-11-14 PROCEDURE — 2580000003 HC RX 258: Performed by: INTERNAL MEDICINE

## 2023-11-14 PROCEDURE — 1210000000 HC MED SURG R&B

## 2023-11-14 RX ORDER — DIPHENOXYLATE HYDROCHLORIDE AND ATROPINE SULFATE 2.5; .025 MG/1; MG/1
1 TABLET ORAL 3 TIMES DAILY PRN
Status: DISCONTINUED | OUTPATIENT
Start: 2023-11-14 | End: 2023-11-16 | Stop reason: HOSPADM

## 2023-11-14 RX ORDER — METOCLOPRAMIDE HYDROCHLORIDE 5 MG/ML
5 INJECTION INTRAMUSCULAR; INTRAVENOUS EVERY 4 HOURS PRN
Status: DISCONTINUED | OUTPATIENT
Start: 2023-11-14 | End: 2023-11-16 | Stop reason: HOSPADM

## 2023-11-14 RX ORDER — INSULIN LISPRO 100 [IU]/ML
10 INJECTION, SOLUTION INTRAVENOUS; SUBCUTANEOUS ONCE
Status: COMPLETED | OUTPATIENT
Start: 2023-11-14 | End: 2023-11-14

## 2023-11-14 RX ORDER — ALOGLIPTIN 12.5 MG/1
12.5 TABLET, FILM COATED ORAL DAILY
Status: DISCONTINUED | OUTPATIENT
Start: 2023-11-14 | End: 2023-11-16 | Stop reason: HOSPADM

## 2023-11-14 RX ORDER — INSULIN LISPRO 100 [IU]/ML
7 INJECTION, SOLUTION INTRAVENOUS; SUBCUTANEOUS
Status: DISCONTINUED | OUTPATIENT
Start: 2023-11-14 | End: 2023-11-16

## 2023-11-14 RX ORDER — CALCIUM CARBONATE 500 MG/1
500 TABLET, CHEWABLE ORAL 3 TIMES DAILY PRN
Status: DISCONTINUED | OUTPATIENT
Start: 2023-11-14 | End: 2023-11-16 | Stop reason: HOSPADM

## 2023-11-14 RX ORDER — SODIUM CHLORIDE 9 MG/ML
INJECTION, SOLUTION INTRAVENOUS CONTINUOUS
Status: DISPENSED | OUTPATIENT
Start: 2023-11-14 | End: 2023-11-14

## 2023-11-14 RX ADMIN — ANTACID TABLETS 500 MG: 500 TABLET, CHEWABLE ORAL at 09:48

## 2023-11-14 RX ADMIN — METOPROLOL SUCCINATE 25 MG: 25 TABLET, EXTENDED RELEASE ORAL at 08:27

## 2023-11-14 RX ADMIN — GUAIFENESIN AND CODEINE PHOSPHATE 10 ML: 100; 10 SOLUTION ORAL at 01:46

## 2023-11-14 RX ADMIN — PIPERACILLIN AND TAZOBACTAM 3375 MG: 3; .375 INJECTION, POWDER, LYOPHILIZED, FOR SOLUTION INTRAVENOUS at 01:49

## 2023-11-14 RX ADMIN — SODIUM CHLORIDE: 9 INJECTION, SOLUTION INTRAVENOUS at 08:41

## 2023-11-14 RX ADMIN — TAMSULOSIN HYDROCHLORIDE 0.8 MG: 0.4 CAPSULE ORAL at 08:28

## 2023-11-14 RX ADMIN — IPRATROPIUM BROMIDE AND ALBUTEROL 1 PUFF: 20; 100 SPRAY, METERED RESPIRATORY (INHALATION) at 11:04

## 2023-11-14 RX ADMIN — INSULIN LISPRO 10 UNITS: 100 INJECTION, SOLUTION INTRAVENOUS; SUBCUTANEOUS at 23:08

## 2023-11-14 RX ADMIN — INSULIN LISPRO 7 UNITS: 100 INJECTION, SOLUTION INTRAVENOUS; SUBCUTANEOUS at 12:24

## 2023-11-14 RX ADMIN — DIPHENOXYLATE HYDROCHLORIDE AND ATROPINE SULFATE 1 TABLET: 2.5; .025 TABLET ORAL at 08:31

## 2023-11-14 RX ADMIN — GLIPIZIDE 10 MG: 5 TABLET ORAL at 08:28

## 2023-11-14 RX ADMIN — ASPIRIN 81 MG: 81 TABLET, COATED ORAL at 08:28

## 2023-11-14 RX ADMIN — INSULIN LISPRO 7 UNITS: 100 INJECTION, SOLUTION INTRAVENOUS; SUBCUTANEOUS at 16:50

## 2023-11-14 RX ADMIN — APIXABAN 5 MG: 5 TABLET, FILM COATED ORAL at 20:01

## 2023-11-14 RX ADMIN — Medication 1000 MCG: at 08:28

## 2023-11-14 RX ADMIN — ANTACID TABLETS 500 MG: 500 TABLET, CHEWABLE ORAL at 20:01

## 2023-11-14 RX ADMIN — GUAIFENESIN AND CODEINE PHOSPHATE 10 ML: 100; 10 SOLUTION ORAL at 09:48

## 2023-11-14 RX ADMIN — INSULIN LISPRO 4 UNITS: 100 INJECTION, SOLUTION INTRAVENOUS; SUBCUTANEOUS at 20:05

## 2023-11-14 RX ADMIN — APIXABAN 5 MG: 5 TABLET, FILM COATED ORAL at 08:28

## 2023-11-14 RX ADMIN — GLIPIZIDE 10 MG: 5 TABLET ORAL at 16:49

## 2023-11-14 RX ADMIN — INSULIN GLARGINE 20 UNITS: 100 INJECTION, SOLUTION SUBCUTANEOUS at 20:05

## 2023-11-14 RX ADMIN — OXYCODONE HYDROCHLORIDE AND ACETAMINOPHEN 1000 MG: 500 TABLET ORAL at 08:28

## 2023-11-14 RX ADMIN — PIPERACILLIN AND TAZOBACTAM 3375 MG: 3; .375 INJECTION, POWDER, LYOPHILIZED, FOR SOLUTION INTRAVENOUS at 16:51

## 2023-11-14 RX ADMIN — GUAIFENESIN AND CODEINE PHOSPHATE 10 ML: 100; 10 SOLUTION ORAL at 16:49

## 2023-11-14 RX ADMIN — IPRATROPIUM BROMIDE AND ALBUTEROL 1 PUFF: 20; 100 SPRAY, METERED RESPIRATORY (INHALATION) at 05:37

## 2023-11-14 RX ADMIN — PIPERACILLIN AND TAZOBACTAM 3375 MG: 3; .375 INJECTION, POWDER, LYOPHILIZED, FOR SOLUTION INTRAVENOUS at 09:50

## 2023-11-14 RX ADMIN — ALOGLIPTIN 12.5 MG: 12.5 TABLET, FILM COATED ORAL at 08:28

## 2023-11-14 RX ADMIN — INSULIN LISPRO 8 UNITS: 100 INJECTION, SOLUTION INTRAVENOUS; SUBCUTANEOUS at 16:50

## 2023-11-14 RX ADMIN — DEXAMETHASONE SODIUM PHOSPHATE 6 MG: 10 INJECTION INTRAMUSCULAR; INTRAVENOUS at 08:28

## 2023-11-14 RX ADMIN — INSULIN LISPRO 2 UNITS: 100 INJECTION, SOLUTION INTRAVENOUS; SUBCUTANEOUS at 08:30

## 2023-11-14 RX ADMIN — INSULIN LISPRO 8 UNITS: 100 INJECTION, SOLUTION INTRAVENOUS; SUBCUTANEOUS at 12:24

## 2023-11-14 RX ADMIN — IPRATROPIUM BROMIDE AND ALBUTEROL 1 PUFF: 20; 100 SPRAY, METERED RESPIRATORY (INHALATION) at 18:03

## 2023-11-14 RX ADMIN — INSULIN LISPRO 7 UNITS: 100 INJECTION, SOLUTION INTRAVENOUS; SUBCUTANEOUS at 08:30

## 2023-11-14 RX ADMIN — DOXYCYCLINE 100 MG: 100 INJECTION, POWDER, LYOPHILIZED, FOR SOLUTION INTRAVENOUS at 08:42

## 2023-11-14 RX ADMIN — DOXYCYCLINE 100 MG: 100 INJECTION, POWDER, LYOPHILIZED, FOR SOLUTION INTRAVENOUS at 20:12

## 2023-11-14 ASSESSMENT — PAIN SCALES - GENERAL: PAINLEVEL_OUTOF10: 0

## 2023-11-14 NOTE — PROGRESS NOTES
it.  The vein was patent. Using ultrasound access, puncture was made of the right internal jugular vein using a 21 GA needle. A wire was advanced into the IVC, a short  incision was made at the puncture site and serial dilatation performed. The catheter was then advanced over the wire. The tip of the catheter lies at the SVC/right atrial junction. The line flushes and aspirates appropriately. The catheter lines were both flushed with 10 cc of normal saline, and then blocked with 100 units/cc heparin. The catheter was sutured to the skin with nylon suture, and sterile bandaging was placed. Echo (TTE) Complete    Result Date: 10/17/2023    Left Ventricle: Normal left ventricular systolic function with a visually estimated EF of 50 - 55%. Left ventricle size is normal. Normal wall thickness. Normal wall motion. Diastolic dysfunction present with normal LV EF. Mitral Valve: Mild annular calcification of the mitral valve. Extended cardiac holter monitor (48 hrs - 15 days)    [facility] Cardiovascular Services [address] [place] [address] Date:  10/16/23 Name: Xavier Pop MR#:  [id number] YOB: 1945 Referring Physicians:  Miguel Mac MD EXAMINATION:  48 Hour Holter Monitor INDICATION FOR STUDY: Tachycardia DESCRIPTION OF PROCEDURE: The patient was monitored for 48 hours starting on 10/6/2023, and completed the full monitoring period until discontinued on 10/8/2023. There were multiple data transmissions. FINDINGS: The underlying rhythm is normal sinus rhythm/sinus tachycardia with an average heart rate of 114 beats per minute. Minimum heart rate was 89 beats per minute. There were no prolonged pauses of over 2.5 seconds. Maximum heart rate was 143 beats per minute. During the study there were frequent episodes of sinus tachycardia. There were no significant or sustained supraventricular arrhythmias seen. No ventricular arrhythmias seen.   No significant bradyarrhythmias, AV blocks, or prolonged pauses seen. There were occasional PVCs and rare PACs. Calculated PVC burden of 0.3%. The patient reported no symptomatic episodes. IMPRESSION: This 48-hour Holter monitor reveals predominantly sinus tachycardia with an average heart rate of 114 bpm.  No significant arrhythmias were seen. Thank you for allowing me to participate in your patient's cardiac care. Should you have questions, please do not hesitate to contact me. Lisset Hooks DO, [facility], [address] [facility] Heart and Vascular [place] [facility]       IMPRESSION AND SUGGESTION:  Bilateral pneumonia  Covid-19 infection with possible bacterial coinfection  Sepsis secondary to above  Lung cancer on chemotherapy  Acute COPD exacerbation  Acute respiratory failure with hypoxia secondary to above  Myasthenia gravis  Diarrhea rule out C. difficile versus other etiology    He is on IV Zosyn and vancomycin. Patient is afebrile. Leukocytosis can be due to Decadron. Also need to rule out C. difficile. He denies having any nausea or diarrhea. He does have a swelling in the leg which is negative for DVT. Currently on room air O2 saturation 96%. If C. difficile is negative may discharge home on p.o. antibiotic tomorrow and follow-up with pulmonary in 1 week as outpatient. NOTE: This report was transcribed using voice recognition software. Every effort was made to ensure accuracy; however, inadvertent computerized transcription errors may be present. Pursuant to the emergency declaration under the [place] [address] waiver authority and the [employer] Act, this Virtual  Visit was conducted, with patient's consent, to reduce the patient's risk of exposure to COVID-19 and provide continuity of care for an established patient.      Services were provided through a video synchronous discussion virtually to substitute for

## 2023-11-14 NOTE — PROGRESS NOTES
She is feeling better. Less cough and congestion. No chest pain. No abdominal pain. No nausea or vomiting. Resolution of his diarrhea. ROS: 12 system review otherwise is negative for acute signs or symptoms over the last 24 hrs. Exam:   General appearance: alert, appears stated age and cooperative, mild respiratory distress, tachypneic, respirations about 20, cachectic and frail. Skin: Skin color, texture, turgor normal. No rashes or lesions  HEENT: Head: Normocephalic, no lesions, without obvious abnormality. Neck: no adenopathy, no carotid bruit, no JVD, supple, symmetrical, trachea midline, and thyroid not enlarged, symmetric, no tenderness/mass/nodules  Lungs:  Bilateral rhonchi and wheezing, noticeable improvement since yesterday. Heart: regular rate and rhythm, S1, S2 normal, no murmur, click, rub or gallop  Abdomen: soft, non-tender; bowel sounds normal; no masses,  no organomegaly  Extremities: Trace pitting edema in both legs. Neurologic: Mental status: Alert, oriented, thought content appropriate. Mild to moderate functional loss needing assist transferring from ER cart to bed. Assessment and plan:     *Pneumonia which is likely mixed between healthcare associated pneumonia given his recent admission to [place], COVID-19 pneumonia, obstructive pneumonia given his history of lung cancer. *Acute COPD exacerbation. *Lung cancer for which patient is receiving chemotherapy     *Sepsis present on admission. Please refer to sepsis initial evaluation and management completed in the emergency room department. Patient had received appropriate blood culture and fluid management. Repeat lactic acid is normal.  Patient has appropriate perfusion. *COVID-19 positivity. Patient tested positive for for COVID-19 about 14 days ago. Patient never been vaccinated for COVID. *Acute on chronic hypoxic respiratory failure. Likely caused by above. No PE seen on CTA.      *Myasthenia

## 2023-11-14 NOTE — PROGRESS NOTES
Facility/Department: [facility] MED SURG UNIT  Physical Therapy Initial Assessment    Name: Jem Kate  : 89  MRN: [id number]  Date of Service: 2023    Discharge Recommendations:  Continue to assess pending progress, Home with assist PRN          Patient Diagnosis(es): The primary encounter diagnosis was Hypoxia. Diagnoses of COVID, Positive D dimer, and COVID-19 virus infection were also pertinent to this visit. Past Medical History:  has a past medical history of Atypical chest pain, BPH (benign prostatic hypertrophy), CAD (coronary artery disease), Cataracts, bilateral, Chronic low back pain, COPD (chronic obstructive pulmonary disease) (HCC), DM2 (diabetes mellitus, type 2) ([address]), Duodenitis, Erectile dysfunction, Fatty infiltration of liver, GERD (gastroesophageal reflux disease), History of melanoma excision, History of tobacco use, HTN (hypertension), Hyperlipidemia, Malignant melanoma of face ([address]), Malignant melanoma of skin of face ([address]), MG (myasthenia gravis) ([address]), Nephrolithiasis, Ocular myasthenia gravis ([address]), Osteoarthritis of multiple joints, Perennial allergic rhinitis, Personal history of colonic polyps, Pseudophakia of both eyes, Stage 3a chronic kidney disease ([address]), Status post coronary artery bypass grafts x 5, Tobacco abuse, Type 2 diabetes mellitus with microalbuminuria, without long-term current use of insulin ([address]), and Varicose veins of both lower extremities. Past Surgical History:  has a past surgical history that includes Upper gastrointestinal endoscopy; Colonoscopy (); Cardiac catheterization; Colonoscopy (); Tonsillectomy (); Cataract removal with implant (Left, ); Cataract removal with implant (Right, ); Skin cancer excision (Left, 2017); Mohs surgery (Left, 2017); Coronary artery bypass graft (2018); Arm Surgery (Right, 10/25/2022); bronchoscopy (N/A, 2023); Lung removal, partial (Right, 2023);  Thoracoscopy (Right,

## 2023-11-15 ENCOUNTER — TELEPHONE (OUTPATIENT)
Dept: FAMILY MEDICINE CLINIC | Age: 78
End: 2023-11-15

## 2023-11-15 LAB
ABO + RH BLD: NORMAL
ALBUMIN SERPL-MCNC: 2.7 G/DL (ref 3.5–4.6)
ALP SERPL-CCNC: 83 U/L (ref 35–104)
ALT SERPL-CCNC: 18 U/L (ref 0–41)
ANION GAP SERPL CALCULATED.3IONS-SCNC: 7 MEQ/L (ref 9–15)
AST SERPL-CCNC: 12 U/L (ref 0–40)
BASOPHILS # BLD: 0 K/UL (ref 0–0.1)
BASOPHILS # BLD: 0 K/UL (ref 0–0.1)
BASOPHILS NFR BLD: 0.1 % (ref 0.2–1.2)
BASOPHILS NFR BLD: 0.1 % (ref 0.2–1.2)
BILIRUB DIRECT SERPL-MCNC: <0.2 MG/DL (ref 0–0.4)
BILIRUB INDIRECT SERPL-MCNC: ABNORMAL MG/DL (ref 0–0.6)
BILIRUB SERPL-MCNC: 0.3 MG/DL (ref 0.2–0.7)
BLD GP AB SCN SERPL QL: NORMAL
BLOOD BANK DISPENSE STATUS: NORMAL
BLOOD BANK DISPENSE STATUS: NORMAL
BLOOD BANK PRODUCT CODE: NORMAL
BLOOD BANK PRODUCT CODE: NORMAL
BPU ID: NORMAL
BPU ID: NORMAL
BUN SERPL-MCNC: 52 MG/DL (ref 8–23)
C DIFF TOX A+B STL QL IA: NORMAL
CALCIUM SERPL-MCNC: 9.4 MG/DL (ref 8.5–9.9)
CHLORIDE SERPL-SCNC: 100 MEQ/L (ref 95–107)
CO2 SERPL-SCNC: 24 MEQ/L (ref 20–31)
CREAT SERPL-MCNC: 1.06 MG/DL (ref 0.7–1.2)
DESCRIPTION BLOOD BANK: NORMAL
DESCRIPTION BLOOD BANK: NORMAL
EOSINOPHIL # BLD: 0 K/UL (ref 0–0.5)
EOSINOPHIL # BLD: 0 K/UL (ref 0–0.5)
EOSINOPHIL NFR BLD: 0 % (ref 0.8–7)
EOSINOPHIL NFR BLD: 0 % (ref 0.8–7)
ERYTHROCYTE [DISTWIDTH] IN BLOOD BY AUTOMATED COUNT: 16 % (ref 11.6–14.4)
ERYTHROCYTE [DISTWIDTH] IN BLOOD BY AUTOMATED COUNT: 16 % (ref 11.6–14.4)
GLUCOSE BLD-MCNC: 132 MG/DL (ref 70–99)
GLUCOSE BLD-MCNC: 261 MG/DL (ref 70–99)
GLUCOSE BLD-MCNC: 371 MG/DL (ref 70–99)
GLUCOSE BLD-MCNC: 68 MG/DL (ref 70–99)
GLUCOSE BLD-MCNC: 77 MG/DL (ref 70–99)
GLUCOSE SERPL-MCNC: 221 MG/DL (ref 70–99)
HCT VFR BLD AUTO: 19.1 % (ref 42–52)
HCT VFR BLD AUTO: 20.6 % (ref 42–52)
HCT VFR BLD AUTO: 21.5 % (ref 42–52)
HGB BLD-MCNC: 6.3 G/DL (ref 13.7–17.5)
HGB BLD-MCNC: 6.7 G/DL (ref 13.7–17.5)
HGB BLD-MCNC: 7.1 G/DL (ref 13.7–17.5)
IMM GRANULOCYTES # BLD: 0.2 K/UL
IMM GRANULOCYTES # BLD: 0.3 K/UL
IMM GRANULOCYTES NFR BLD: 1.3 %
IMM GRANULOCYTES NFR BLD: 1.5 %
LDH SERPL-CCNC: 295 U/L (ref 135–225)
LYMPHOCYTES # BLD: 0.3 K/UL (ref 1.3–3.6)
LYMPHOCYTES # BLD: 0.4 K/UL (ref 1.3–3.6)
LYMPHOCYTES NFR BLD: 1.8 %
LYMPHOCYTES NFR BLD: 1.9 %
MCH RBC QN AUTO: 32.7 PG (ref 25.7–32.2)
MCH RBC QN AUTO: 33.2 PG (ref 25.7–32.2)
MCHC RBC AUTO-ENTMCNC: 32.5 % (ref 32.3–36.5)
MCHC RBC AUTO-ENTMCNC: 33 % (ref 32.3–36.5)
MCV RBC AUTO: 100.5 FL (ref 79–92.2)
MCV RBC AUTO: 100.5 FL (ref 79–92.2)
MONOCYTES # BLD: 0.2 K/UL (ref 0.3–0.8)
MONOCYTES # BLD: 0.3 K/UL (ref 0.3–0.8)
MONOCYTES NFR BLD: 1.4 % (ref 5.3–12.2)
MONOCYTES NFR BLD: 1.4 % (ref 5.3–12.2)
NEUTROPHILS # BLD: 16 K/UL (ref 1.8–5.4)
NEUTROPHILS # BLD: 19.4 K/UL (ref 1.8–5.4)
NEUTS SEG NFR BLD: 95.1 % (ref 34–67.9)
NEUTS SEG NFR BLD: 95.4 % (ref 34–67.9)
PERFORMED ON: ABNORMAL
PERFORMED ON: NORMAL
PLATELET # BLD AUTO: 180 K/UL (ref 163–337)
PLATELET # BLD AUTO: 184 K/UL (ref 163–337)
POTASSIUM SERPL-SCNC: 4.8 MEQ/L (ref 3.4–4.9)
PROT SERPL-MCNC: 5 G/DL (ref 6.3–8)
RBC # BLD AUTO: 1.9 M/UL (ref 4.63–6.08)
RBC # BLD AUTO: 2.05 M/UL (ref 4.63–6.08)
SODIUM SERPL-SCNC: 131 MEQ/L (ref 135–144)
WBC # BLD AUTO: 16.8 K/UL (ref 4.2–9)
WBC # BLD AUTO: 20.4 K/UL (ref 4.2–9)

## 2023-11-15 PROCEDURE — 2500000003 HC RX 250 WO HCPCS: Performed by: INTERNAL MEDICINE

## 2023-11-15 PROCEDURE — 86900 BLOOD TYPING SEROLOGIC ABO: CPT

## 2023-11-15 PROCEDURE — A4216 STERILE WATER/SALINE, 10 ML: HCPCS | Performed by: INTERNAL MEDICINE

## 2023-11-15 PROCEDURE — 97110 THERAPEUTIC EXERCISES: CPT

## 2023-11-15 PROCEDURE — 6360000002 HC RX W HCPCS: Performed by: INTERNAL MEDICINE

## 2023-11-15 PROCEDURE — 6370000000 HC RX 637 (ALT 250 FOR IP): Performed by: INTERNAL MEDICINE

## 2023-11-15 PROCEDURE — 85018 HEMOGLOBIN: CPT

## 2023-11-15 PROCEDURE — 97530 THERAPEUTIC ACTIVITIES: CPT

## 2023-11-15 PROCEDURE — 36430 TRANSFUSION BLD/BLD COMPNT: CPT

## 2023-11-15 PROCEDURE — 99222 1ST HOSP IP/OBS MODERATE 55: CPT | Performed by: INTERNAL MEDICINE

## 2023-11-15 PROCEDURE — 86920 COMPATIBILITY TEST SPIN: CPT

## 2023-11-15 PROCEDURE — 94640 AIRWAY INHALATION TREATMENT: CPT

## 2023-11-15 PROCEDURE — 1210000000 HC MED SURG R&B

## 2023-11-15 PROCEDURE — 36415 COLL VENOUS BLD VENIPUNCTURE: CPT

## 2023-11-15 PROCEDURE — 2580000003 HC RX 258: Performed by: INTERNAL MEDICINE

## 2023-11-15 PROCEDURE — 83615 LACTATE (LD) (LDH) ENZYME: CPT

## 2023-11-15 PROCEDURE — 82274 ASSAY TEST FOR BLOOD FECAL: CPT

## 2023-11-15 PROCEDURE — C9113 INJ PANTOPRAZOLE SODIUM, VIA: HCPCS | Performed by: INTERNAL MEDICINE

## 2023-11-15 PROCEDURE — 86850 RBC ANTIBODY SCREEN: CPT

## 2023-11-15 PROCEDURE — 86901 BLOOD TYPING SEROLOGIC RH(D): CPT

## 2023-11-15 PROCEDURE — 80076 HEPATIC FUNCTION PANEL: CPT

## 2023-11-15 PROCEDURE — 30233N1 TRANSFUSION OF NONAUTOLOGOUS RED BLOOD CELLS INTO PERIPHERAL VEIN, PERCUTANEOUS APPROACH: ICD-10-PCS | Performed by: INTERNAL MEDICINE

## 2023-11-15 PROCEDURE — 85014 HEMATOCRIT: CPT

## 2023-11-15 PROCEDURE — P9016 RBC LEUKOCYTES REDUCED: HCPCS

## 2023-11-15 PROCEDURE — 85025 COMPLETE CBC W/AUTO DIFF WBC: CPT

## 2023-11-15 PROCEDURE — 83010 ASSAY OF HAPTOGLOBIN QUANT: CPT

## 2023-11-15 PROCEDURE — 97116 GAIT TRAINING THERAPY: CPT

## 2023-11-15 PROCEDURE — 80048 BASIC METABOLIC PNL TOTAL CA: CPT

## 2023-11-15 RX ORDER — METOCLOPRAMIDE HYDROCHLORIDE 5 MG/ML
5 INJECTION INTRAMUSCULAR; INTRAVENOUS EVERY 4 HOURS PRN
Status: CANCELLED | OUTPATIENT
Start: 2023-11-15

## 2023-11-15 RX ORDER — ALOGLIPTIN 12.5 MG/1
12.5 TABLET, FILM COATED ORAL DAILY
Status: CANCELLED | OUTPATIENT
Start: 2023-11-16

## 2023-11-15 RX ORDER — DIPHENOXYLATE HYDROCHLORIDE AND ATROPINE SULFATE 2.5; .025 MG/1; MG/1
1 TABLET ORAL 3 TIMES DAILY
Status: CANCELLED | OUTPATIENT
Start: 2023-11-15

## 2023-11-15 RX ORDER — METOPROLOL SUCCINATE 25 MG/1
25 TABLET, EXTENDED RELEASE ORAL DAILY
Status: CANCELLED | OUTPATIENT
Start: 2023-11-16

## 2023-11-15 RX ORDER — TAMSULOSIN HYDROCHLORIDE 0.4 MG/1
0.8 CAPSULE ORAL DAILY
Status: CANCELLED | OUTPATIENT
Start: 2023-11-16

## 2023-11-15 RX ORDER — SODIUM CHLORIDE, SODIUM LACTATE, POTASSIUM CHLORIDE, CALCIUM CHLORIDE 600; 310; 30; 20 MG/100ML; MG/100ML; MG/100ML; MG/100ML
INJECTION, SOLUTION INTRAVENOUS CONTINUOUS
Status: DISCONTINUED | OUTPATIENT
Start: 2023-11-15 | End: 2023-11-16 | Stop reason: HOSPADM

## 2023-11-15 RX ORDER — SODIUM CHLORIDE 9 MG/ML
INJECTION, SOLUTION INTRAVENOUS PRN
Status: DISCONTINUED | OUTPATIENT
Start: 2023-11-15 | End: 2023-11-16 | Stop reason: HOSPADM

## 2023-11-15 RX ORDER — SODIUM CHLORIDE 9 MG/ML
INJECTION, SOLUTION INTRAVENOUS PRN
Status: CANCELLED | OUTPATIENT
Start: 2023-11-15

## 2023-11-15 RX ORDER — CODEINE PHOSPHATE AND GUAIFENESIN 10; 100 MG/5ML; MG/5ML
10 SOLUTION ORAL EVERY 8 HOURS
Status: CANCELLED | OUTPATIENT
Start: 2023-11-15

## 2023-11-15 RX ORDER — SODIUM CHLORIDE, SODIUM LACTATE, POTASSIUM CHLORIDE, CALCIUM CHLORIDE 600; 310; 30; 20 MG/100ML; MG/100ML; MG/100ML; MG/100ML
INJECTION, SOLUTION INTRAVENOUS CONTINUOUS
Status: CANCELLED | OUTPATIENT
Start: 2023-11-15 | End: 2023-11-17

## 2023-11-15 RX ORDER — INSULIN LISPRO 100 [IU]/ML
0-8 INJECTION, SOLUTION INTRAVENOUS; SUBCUTANEOUS
Status: CANCELLED | OUTPATIENT
Start: 2023-11-16

## 2023-11-15 RX ORDER — CALCIUM CARBONATE 500 MG/1
500 TABLET, CHEWABLE ORAL 3 TIMES DAILY PRN
Status: CANCELLED | OUTPATIENT
Start: 2023-11-15

## 2023-11-15 RX ORDER — DIPHENOXYLATE HYDROCHLORIDE AND ATROPINE SULFATE 2.5; .025 MG/1; MG/1
1 TABLET ORAL 3 TIMES DAILY
Status: DISCONTINUED | OUTPATIENT
Start: 2023-11-15 | End: 2023-11-16 | Stop reason: HOSPADM

## 2023-11-15 RX ORDER — INSULIN LISPRO 100 [IU]/ML
0-4 INJECTION, SOLUTION INTRAVENOUS; SUBCUTANEOUS NIGHTLY
Status: CANCELLED | OUTPATIENT
Start: 2023-11-15

## 2023-11-15 RX ORDER — ASCORBIC ACID 500 MG
1000 TABLET ORAL DAILY
Status: CANCELLED | OUTPATIENT
Start: 2023-11-16

## 2023-11-15 RX ORDER — DIPHENOXYLATE HYDROCHLORIDE AND ATROPINE SULFATE 2.5; .025 MG/1; MG/1
1 TABLET ORAL 3 TIMES DAILY PRN
Status: CANCELLED | OUTPATIENT
Start: 2023-11-15

## 2023-11-15 RX ORDER — GLIPIZIDE 5 MG/1
5 TABLET ORAL 2 TIMES DAILY WITH MEALS
Status: DISCONTINUED | OUTPATIENT
Start: 2023-11-16 | End: 2023-11-16 | Stop reason: HOSPADM

## 2023-11-15 RX ORDER — INSULIN GLARGINE 100 [IU]/ML
20 INJECTION, SOLUTION SUBCUTANEOUS NIGHTLY
Status: CANCELLED | OUTPATIENT
Start: 2023-11-15

## 2023-11-15 RX ORDER — DEXTROSE MONOHYDRATE 100 MG/ML
INJECTION, SOLUTION INTRAVENOUS CONTINUOUS PRN
Status: CANCELLED | OUTPATIENT
Start: 2023-11-15

## 2023-11-15 RX ORDER — CHOLECALCIFEROL (VITAMIN D3) 125 MCG
1000 CAPSULE ORAL DAILY
Status: CANCELLED | OUTPATIENT
Start: 2023-11-16

## 2023-11-15 RX ADMIN — DOXYCYCLINE 100 MG: 100 INJECTION, POWDER, LYOPHILIZED, FOR SOLUTION INTRAVENOUS at 08:40

## 2023-11-15 RX ADMIN — PIPERACILLIN AND TAZOBACTAM 3375 MG: 3; .375 INJECTION, POWDER, LYOPHILIZED, FOR SOLUTION INTRAVENOUS at 02:12

## 2023-11-15 RX ADMIN — PIPERACILLIN AND TAZOBACTAM 3375 MG: 3; .375 INJECTION, POWDER, LYOPHILIZED, FOR SOLUTION INTRAVENOUS at 10:43

## 2023-11-15 RX ADMIN — INSULIN LISPRO 4 UNITS: 100 INJECTION, SOLUTION INTRAVENOUS; SUBCUTANEOUS at 09:13

## 2023-11-15 RX ADMIN — DIPHENOXYLATE HYDROCHLORIDE AND ATROPINE SULFATE 1 TABLET: 2.5; .025 TABLET ORAL at 08:52

## 2023-11-15 RX ADMIN — PIPERACILLIN AND TAZOBACTAM 3375 MG: 3; .375 INJECTION, POWDER, LYOPHILIZED, FOR SOLUTION INTRAVENOUS at 18:29

## 2023-11-15 RX ADMIN — IPRATROPIUM BROMIDE AND ALBUTEROL 1 PUFF: 20; 100 SPRAY, METERED RESPIRATORY (INHALATION) at 18:20

## 2023-11-15 RX ADMIN — INSULIN LISPRO 8 UNITS: 100 INJECTION, SOLUTION INTRAVENOUS; SUBCUTANEOUS at 12:10

## 2023-11-15 RX ADMIN — DOXYCYCLINE 100 MG: 100 INJECTION, POWDER, LYOPHILIZED, FOR SOLUTION INTRAVENOUS at 21:58

## 2023-11-15 RX ADMIN — METOPROLOL SUCCINATE 25 MG: 25 TABLET, EXTENDED RELEASE ORAL at 08:41

## 2023-11-15 RX ADMIN — INSULIN LISPRO 7 UNITS: 100 INJECTION, SOLUTION INTRAVENOUS; SUBCUTANEOUS at 17:54

## 2023-11-15 RX ADMIN — PANTOPRAZOLE SODIUM 40 MG: 40 INJECTION, POWDER, FOR SOLUTION INTRAVENOUS at 21:30

## 2023-11-15 RX ADMIN — INSULIN LISPRO 7 UNITS: 100 INJECTION, SOLUTION INTRAVENOUS; SUBCUTANEOUS at 09:12

## 2023-11-15 RX ADMIN — SODIUM CHLORIDE, POTASSIUM CHLORIDE, SODIUM LACTATE AND CALCIUM CHLORIDE: 600; 310; 30; 20 INJECTION, SOLUTION INTRAVENOUS at 21:52

## 2023-11-15 RX ADMIN — IPRATROPIUM BROMIDE AND ALBUTEROL 1 PUFF: 20; 100 SPRAY, METERED RESPIRATORY (INHALATION) at 11:00

## 2023-11-15 RX ADMIN — Medication 1000 MCG: at 08:41

## 2023-11-15 RX ADMIN — TAMSULOSIN HYDROCHLORIDE 0.8 MG: 0.4 CAPSULE ORAL at 08:41

## 2023-11-15 RX ADMIN — PANTOPRAZOLE SODIUM 40 MG: 40 INJECTION, POWDER, FOR SOLUTION INTRAVENOUS at 08:52

## 2023-11-15 RX ADMIN — ALOGLIPTIN 12.5 MG: 12.5 TABLET, FILM COATED ORAL at 08:41

## 2023-11-15 RX ADMIN — INSULIN LISPRO 7 UNITS: 100 INJECTION, SOLUTION INTRAVENOUS; SUBCUTANEOUS at 12:10

## 2023-11-15 RX ADMIN — GUAIFENESIN AND CODEINE PHOSPHATE 10 ML: 100; 10 SOLUTION ORAL at 10:11

## 2023-11-15 RX ADMIN — DIPHENOXYLATE HYDROCHLORIDE AND ATROPINE SULFATE 1 TABLET: 2.5; .025 TABLET ORAL at 15:00

## 2023-11-15 RX ADMIN — GUAIFENESIN AND CODEINE PHOSPHATE 10 ML: 100; 10 SOLUTION ORAL at 17:54

## 2023-11-15 RX ADMIN — GLIPIZIDE 10 MG: 5 TABLET ORAL at 08:41

## 2023-11-15 RX ADMIN — ANTACID TABLETS 500 MG: 500 TABLET, CHEWABLE ORAL at 15:00

## 2023-11-15 RX ADMIN — GUAIFENESIN AND CODEINE PHOSPHATE 10 ML: 100; 10 SOLUTION ORAL at 02:11

## 2023-11-15 RX ADMIN — IPRATROPIUM BROMIDE AND ALBUTEROL 1 PUFF: 20; 100 SPRAY, METERED RESPIRATORY (INHALATION) at 06:06

## 2023-11-15 RX ADMIN — DIPHENOXYLATE HYDROCHLORIDE AND ATROPINE SULFATE 1 TABLET: 2.5; .025 TABLET ORAL at 21:30

## 2023-11-15 RX ADMIN — OXYCODONE HYDROCHLORIDE AND ACETAMINOPHEN 1000 MG: 500 TABLET ORAL at 08:41

## 2023-11-15 ASSESSMENT — PAIN SCALES - GENERAL: PAINLEVEL_OUTOF10: 0

## 2023-11-15 NOTE — CONSULTS
1.    Successful placement of a temporary central venous dialysis catheter in the right internal jugular vein for dialysis. Radiation dose: 4.18 mGy Additional clinical data: Agent has a left upper extremity AV fistula for dialysis. Fistula failed during dialysis and was unable to be repaired percutaneously. Procedure: 1. Ultrasound guidance for vascular access. 2.   Fluoroscopic guidance for placement of a central line. 3.   Placement of a central venous line using the right internal jugular vein. Body of Report: Pre-procedure evaluation confirmed that the patient was an appropriate candidate for conscious sedation. Adequate sedation was maintained during the entire procedure. Vital signs, pulse oximetry, and response to verbal commands were monitored and recorded by the nurse throughout the procedure and the recovery period. The flow sheet was placed in the medical record including the medications and dosages used. The patient returned to baseline neurologic and physiologic status prior to leaving the department. No immediate sedation related complications were noted. Medication for conscious sedation was administered via IV route. Informed and written consent was obtained from the patient following discussion of risks, benefits and alternatives to this procedure. The was patient placed supine on the angiographic table. The patient's neck and chest were then prepped and draped in  normal sterile fashion. A small amount of local lidocaine anesthesia was injected subcutaneously. Ultrasound was used to study the jugular vein we intended to use prior to accessing it. The vein was patent. Using ultrasound access, puncture was made of the right internal jugular vein using a 21 GA needle. A wire was advanced into the IVC, a short  incision was made at the puncture site and serial dilatation performed. The catheter was then advanced over the wire.  The tip of the catheter lies at the SVC/right atrial patent. Using ultrasound access, puncture was made of the right internal jugular vein using a 21 GA needle. A wire was advanced into the IVC, a short  incision was made at the puncture site and serial dilatation performed. The catheter was then advanced over the wire. The tip of the catheter lies at the SVC/right atrial junction. The line flushes and aspirates appropriately. The catheter lines were both flushed with 10 cc of normal saline, and then blocked with 100 units/cc heparin. The catheter was sutured to the skin with nylon suture, and sterile bandaging was placed. Echo (TTE) Complete    Result Date: 10/17/2023    Left Ventricle: Normal left ventricular systolic function with a visually estimated EF of 50 - 55%. Left ventricle size is normal. Normal wall thickness. Normal wall motion. Diastolic dysfunction present with normal LV EF. Mitral Valve: Mild annular calcification of the mitral valve. Impression:   66 y.o. male admitted with shortness of breath and productive cough, diagnosed as having in addition to COVID-19 infection in the setting of lung cancer on chemo/immunotherapy and acute elevation of COPD. In addition has myasthenia gravis, cachexia frailty and failure to thrive. Dark black stool concerning for melena with significant drop in hemoglobin from 10-6. S/p blood transfusion. Eliquis on hold, additionally on low-dose aspirin. Stool studies pending  Hemolysis work-up pending  Plan:   - Likely Upper GI source with differential diagnosis including PUD vs gastropathy vs. gastritis   -Continue with IV PPI twice daily  - Keep patient on clears,  EGD next  - Reglan IV X1 30 min before EGD  - Upper endoscopy +/- intervention to be performed 11/16/2023 in AM  - Monitor H/H and transfuse accordingly  - Hold Eliquis  - Check INR  - Continue IVF for hypotension  - Check H pylori Ab, eradicate if positive  - Hold ASA, Avoid NSAID's    Comments:      Thank you for allowing us to

## 2023-11-15 NOTE — PROGRESS NOTES
Patient reported that his diarrhea is back and now the stool color is dark, nearly black. The patient strongly denied any abdominal pain. No nausea or vomiting. No hematemesis. Much improvement of his shortness of breath. ROS: 12 system review otherwise is negative for acute signs or symptoms over the last 24 hrs. Exam:   General appearance: alert, appears stated age and cooperative, mild respiratory distress, tachypneic, respirations about 20, cachectic and frail. Skin: Skin color, texture, turgor normal. No rashes or lesions  HEENT: Head: Normocephalic, no lesions, without obvious abnormality. Neck: no adenopathy, no carotid bruit, no JVD, supple, symmetrical, trachea midline, and thyroid not enlarged, symmetric, no tenderness/mass/nodules  Lungs: Much improvement and resolution of bilateral wheezing or rhonchi. Heart: regular rate and rhythm, S1, S2 normal, no murmur, click, rub or gallop  Abdomen: soft, non-tender; bowel sounds normal; no masses,  no organomegaly  Extremities: Trace pitting edema in both legs. Neurologic: Mental status: Alert, oriented, thought content appropriate. Mild to moderate functional loss needing assist transferring from ER cart to bed. Assessment and plan:     *Pneumonia which is likely mixed between healthcare associated pneumonia given his recent admission to [facility], COVID-19 pneumonia, obstructive pneumonia given his history of lung cancer. *Acute COPD exacerbation. *Lung cancer for which patient is receiving chemotherapy     *Sepsis present on admission. Please refer to sepsis initial evaluation and management completed in the emergency room department. Patient had received appropriate blood culture and fluid management. Repeat lactic acid is normal.  Patient has appropriate perfusion. *COVID-19 positivity. Patient tested positive for for COVID-19 about 14 days ago. Patient never been vaccinated for COVID.      *Acute on chronic hypoxic respiratory failure. Likely caused by above. No PE seen on CTA. *Myasthenia gravis. No evidence of decompensation. *Cachexia, frailty, failure to thrive. *Diarrhea without any abdominal pain. This could be caused by viral or bacterial enteritis, side effect to chemotherapy and immunotherapy, COVID-19 infection, C. difficile infection and others. Stool cultures and analysis thus far is negative. *Anemia, significant drop in his hemoglobin between 10 down to 6.5. Blood pressure continues to be stable. Patient reported now that his stool is black. Patient is on Eliquis. Hold Eliquis, check stool Hemoccult, start patient on Protonix 40 mg twice a day. Also requested blood work to rule out the possibility of hemolysis given his recent chemo/immunotherapy. I requested LDH, haptoglobin, bilirubin and reticulocyte count. I also requested GI consultation for the possible need of scope. I also requested 1 unit of RBC transfusion. *Multiple other medical issues not listed above. *Diabetes, hyperglycemia caused by acute illness as well as Decadron. Increase his insulin coverage. Continue preadmission oral diabetes meds. *Few other medical issues not listed above. Continue antibiotic and respiratory care as being done. Anemia work-up as listed above. SCD for DVT prophylaxis. Hold Eliquis.

## 2023-11-15 NOTE — PROGRESS NOTES
Physical Therapy  Facility/Department: [facility] MED SURG UNIT  Daily Treatment Note  NAME: Brianna Amaya  :   MRN: [id number]    Date of Service: 11/15/2023    Discharge Recommendations:  Continue to assess pending progress, Home with assist PRN        Patient Diagnosis(es): The primary encounter diagnosis was Hypoxia. Diagnoses of COVID, Positive D dimer, and COVID-19 virus infection were also pertinent to this visit. Assessment   Assessment: (P) Pt.  tolerates and reports moderate difficulty ambulating up to 30 feet with quick fatigue and SOB. Pt. currently on room air with SPO2 % during intervention. Ambulated pt with second trial 15 feet to sit up for meal time in recliner. Pt. follows with completing 10 to 15 reps of B LE ther ex for strength and endurance. Moreover, pt. reports feeling really weak after last chemo treatment and thats why I came here. Activity Tolerance: (P) Patient limited by fatigue;Patient limited by endurance; Patient tolerated treatment well     Plan    Physical Therapy Plan  General Plan: (P) 5-7 times per week  Current Treatment Recommendations: (P) Strengthening;Balance training;Functional mobility training;Gait training; Endurance training;Stair training;Home exercise program;Pain management     Restrictions        Subjective    Subjective  Subjective: (P) Pt. in bed at arrival. Room air. SpO2 97%. Objective   Vitals     Transfer Training  Transfer Training: (P) Yes  Sit to Stand: (P) Stand-by assistance;Contact-guard assistance  Stand to Sit: (P) Stand-by assistance;Contact-guard assistance  Stand Pivot Transfers: (P) Contact-guard assistance  Gait Training  Gait Training: (P) Yes  Gait  Overall Level of Assistance: (P) Contact-guard assistance (with I.V. pole follow.)  Distance (ft): (P)  (30'x 1, 15' x 1)  Assistive Device: (P) Walker, rolling  Gait Abnormalities: (P)  (Limited by SOB and quick fatigue. Pt. reports moderate work to ambulate 30 feet.  SpO2 100% HR fatigue. Pt. reports moderate work to ambulate 30 feet. SpO2 100%  bpm post giat distance. )     PT Exercises  A/AROM Exercises: (P) Seated ther ex B LE: DF/PF x 15', LAQ x 10', Hip march x 15', Hip abduction x 10'             Goals  Short Term Goals  Time Frame for Short Term Goals: 1-3 days  Short Term Goal 1: Indep with bed mobility  Short Term Goal 2: Indep with transfers  Short Term Goal 3: Pt able to ambulate with AD for 50'x 1 or > with safe gait pattern  Short Term Goal 4: Pt able to perform 10 reps BLE  Short Term Goal 5: Pt able to ambulate up/down 2 steps with on HR and supervision for safe entry into his home  Long Term Goals  Time Frame for Long Term Goals : same as STGs  Patient Goals   Patient Goals :  To be able to go home tomorrow    Education       Therapy Time   Individual Concurrent Group Co-treatment   Time In  1120         Time Out  1200         Minutes  [address] .[phone]

## 2023-11-15 NOTE — PROGRESS NOTES
1540 Started transfusion with dual signoff nurse Virginia Sailvic. Vital signs taken and charted. 1555 Patient had no reactions. Alert and oriented x 4. Post 15 minute wait period completed, new vitals taken and charted.

## 2023-11-15 NOTE — CONSENT
Informed Consent for Blood Component Transfusion Note    I have discussed with the patient the rationale for blood component transfusion; its benefits in treating or preventing fatigue, organ damage, or death; and its risk which includes mild transfusion reactions, rare risk of blood borne infection, or more serious but rare reactions. I have discussed the alternatives to transfusion, including the risk and consequences of not receiving transfusion. The patient had an opportunity to ask questions and had agreed to proceed with transfusion of blood components.     Electronically signed by Saskia Herring MD on 11/15/23 at 9:33 AM EST

## 2023-11-15 NOTE — CARE COORDINATION
Dr Nevin Holter is aware that this patient will be transferring by ambulance to the [facility], 11/16/2023. He is to arrive by 8:00am. Dr Nevin Holter will be entering sign and held orders to release, after his return to us, from his EGD. S/w Diane[facility] aware that pt is Covid +. Pick-up time is arranged for 7:00am, 11/16/23.

## 2023-11-15 NOTE — PROGRESS NOTES
0021 Pt is A&O x 4. Cooperative with staff and care. Able to use call light to make needs known. Denies pain and shows no s/s of distress or discomfort. SOB continues on exertion, but improved per pt. Vitals assessed and SPO2 95% on RA. No further complaints voiced. Call light within reach. Safety maintained. 3426 Lab notified this nurse with critical hemaglobin. Dr. Rika Suárez notified. No further complaints voiced. Call light within reach. Safety maintained.

## 2023-11-15 NOTE — TELEPHONE ENCOUNTER
SHAHABI PCP    Pt spouse cancel AWV appt on 11/16, pt is in hospital  at [facility]

## 2023-11-15 NOTE — PROGRESS NOTES
Chart reviewed. Patient presented to the ED with complains of diarrhea, emesis and fatigue. Patient was admitted to [facility] with respiratory failure. Patient's care discussed in daily quality rounds. Patient is reported to have a history of lung CA and receives chemotherapy. Pulmonary on consult. Patient was evaluated by PT and therapy recommending home with prn assist upon DC. Patient reported to have critical hemoglobin and had transfusion on this date. GI on consult. Plan is for patient to transfer to [facility] on 11/16/23 for  EDG procedure. [facility] RN House supervisor reports that transportation has been arranged to pick patient up from [facility] at 7:00 am on 11/16.   SS to continue to follow while patient is at [facility]

## 2023-11-16 ENCOUNTER — PREP FOR PROCEDURE (OUTPATIENT)
Dept: GASTROENTEROLOGY | Age: 78
End: 2023-11-16

## 2023-11-16 ENCOUNTER — ANESTHESIA EVENT (OUTPATIENT)
Dept: ENDOSCOPY | Age: 78
End: 2023-11-16
Payer: MEDICARE

## 2023-11-16 ENCOUNTER — HOSPITAL ENCOUNTER (OUTPATIENT)
Age: 78
Setting detail: OUTPATIENT SURGERY
Discharge: ANOTHER HOSPITAL | End: 2023-11-16
Attending: INTERNAL MEDICINE | Admitting: INTERNAL MEDICINE
Payer: MEDICARE

## 2023-11-16 ENCOUNTER — ANESTHESIA (OUTPATIENT)
Dept: ENDOSCOPY | Age: 78
End: 2023-11-16
Payer: MEDICARE

## 2023-11-16 ENCOUNTER — HOSPITAL ENCOUNTER (INPATIENT)
Age: 78
LOS: 1 days | Discharge: HOME HEALTH | DRG: 871 | End: 2023-11-17
Attending: INTERNAL MEDICINE | Admitting: INTERNAL MEDICINE
Payer: MEDICARE

## 2023-11-16 VITALS
RESPIRATION RATE: 16 BRPM | OXYGEN SATURATION: 96 % | SYSTOLIC BLOOD PRESSURE: 118 MMHG | HEART RATE: 90 BPM | HEIGHT: 71 IN | DIASTOLIC BLOOD PRESSURE: 56 MMHG | WEIGHT: 187.17 LBS | TEMPERATURE: 97.9 F | BODY MASS INDEX: 26.2 KG/M2

## 2023-11-16 VITALS
RESPIRATION RATE: 20 BRPM | WEIGHT: 187.2 LBS | OXYGEN SATURATION: 95 % | SYSTOLIC BLOOD PRESSURE: 144 MMHG | DIASTOLIC BLOOD PRESSURE: 50 MMHG | HEIGHT: 71 IN | TEMPERATURE: 97.9 F | HEART RATE: 107 BPM | BODY MASS INDEX: 26.21 KG/M2

## 2023-11-16 DIAGNOSIS — R80.9 TYPE 2 DIABETES MELLITUS WITH MICROALBUMINURIA, WITHOUT LONG-TERM CURRENT USE OF INSULIN (HCC): Chronic | ICD-10-CM

## 2023-11-16 DIAGNOSIS — J18.9 PNEUMONIA OF RIGHT UPPER LOBE DUE TO INFECTIOUS ORGANISM: ICD-10-CM

## 2023-11-16 DIAGNOSIS — E11.29 TYPE 2 DIABETES MELLITUS WITH MICROALBUMINURIA, WITHOUT LONG-TERM CURRENT USE OF INSULIN (HCC): Chronic | ICD-10-CM

## 2023-11-16 DIAGNOSIS — K59.1 FUNCTIONAL DIARRHEA: Primary | ICD-10-CM

## 2023-11-16 DIAGNOSIS — K21.9 GASTROESOPHAGEAL REFLUX DISEASE, UNSPECIFIED WHETHER ESOPHAGITIS PRESENT: ICD-10-CM

## 2023-11-16 LAB
GLUCOSE BLD-MCNC: 157 MG/DL (ref 70–99)
GLUCOSE BLD-MCNC: 167 MG/DL (ref 70–99)
GLUCOSE BLD-MCNC: 75 MG/DL (ref 70–99)
GLUCOSE BLD-MCNC: 76 MG/DL (ref 70–99)
HCT VFR BLD AUTO: 24.3 % (ref 42–52)
HCT VFR BLD AUTO: 24.6 % (ref 42–52)
HEMOCCULT STL QL IA: ABNORMAL
HGB BLD-MCNC: 7.9 G/DL (ref 13.7–17.5)
HGB BLD-MCNC: 8.2 G/DL (ref 13.7–17.5)
PERFORMED ON: ABNORMAL
PERFORMED ON: ABNORMAL
PERFORMED ON: NORMAL
PERFORMED ON: NORMAL

## 2023-11-16 PROCEDURE — 2580000003 HC RX 258: Performed by: INTERNAL MEDICINE

## 2023-11-16 PROCEDURE — 3700000000 HC ANESTHESIA ATTENDED CARE: Performed by: INTERNAL MEDICINE

## 2023-11-16 PROCEDURE — 7100000010 HC PHASE II RECOVERY - FIRST 15 MIN: Performed by: INTERNAL MEDICINE

## 2023-11-16 PROCEDURE — 85014 HEMATOCRIT: CPT

## 2023-11-16 PROCEDURE — 6360000002 HC RX W HCPCS: Performed by: INTERNAL MEDICINE

## 2023-11-16 PROCEDURE — 6370000000 HC RX 637 (ALT 250 FOR IP): Performed by: INTERNAL MEDICINE

## 2023-11-16 PROCEDURE — 6360000002 HC RX W HCPCS: Performed by: NURSE ANESTHETIST, CERTIFIED REGISTERED

## 2023-11-16 PROCEDURE — 85018 HEMOGLOBIN: CPT

## 2023-11-16 PROCEDURE — C9113 INJ PANTOPRAZOLE SODIUM, VIA: HCPCS | Performed by: INTERNAL MEDICINE

## 2023-11-16 PROCEDURE — 94640 AIRWAY INHALATION TREATMENT: CPT

## 2023-11-16 PROCEDURE — 36415 COLL VENOUS BLD VENIPUNCTURE: CPT

## 2023-11-16 PROCEDURE — 3700000001 HC ADD 15 MINUTES (ANESTHESIA): Performed by: INTERNAL MEDICINE

## 2023-11-16 PROCEDURE — 2500000003 HC RX 250 WO HCPCS: Performed by: INTERNAL MEDICINE

## 2023-11-16 PROCEDURE — A4216 STERILE WATER/SALINE, 10 ML: HCPCS | Performed by: INTERNAL MEDICINE

## 2023-11-16 PROCEDURE — 1210000000 HC MED SURG R&B

## 2023-11-16 PROCEDURE — 43239 EGD BIOPSY SINGLE/MULTIPLE: CPT | Performed by: INTERNAL MEDICINE

## 2023-11-16 PROCEDURE — 3609017100 HC EGD: Performed by: INTERNAL MEDICINE

## 2023-11-16 PROCEDURE — 88312 SPECIAL STAINS GROUP 1: CPT

## 2023-11-16 PROCEDURE — 88305 TISSUE EXAM BY PATHOLOGIST: CPT

## 2023-11-16 PROCEDURE — 88342 IMHCHEM/IMCYTCHM 1ST ANTB: CPT

## 2023-11-16 PROCEDURE — 7100000011 HC PHASE II RECOVERY - ADDTL 15 MIN: Performed by: INTERNAL MEDICINE

## 2023-11-16 PROCEDURE — 2709999900 HC NON-CHARGEABLE SUPPLY: Performed by: INTERNAL MEDICINE

## 2023-11-16 RX ORDER — MAGNESIUM HYDROXIDE 1200 MG/15ML
LIQUID ORAL PRN
Status: DISCONTINUED | OUTPATIENT
Start: 2023-11-16 | End: 2023-11-16 | Stop reason: ALTCHOICE

## 2023-11-16 RX ORDER — SODIUM CHLORIDE 0.9 % (FLUSH) 0.9 %
5-40 SYRINGE (ML) INJECTION EVERY 12 HOURS SCHEDULED
Status: DISCONTINUED | OUTPATIENT
Start: 2023-11-16 | End: 2023-11-16 | Stop reason: HOSPADM

## 2023-11-16 RX ORDER — SODIUM CHLORIDE 9 MG/ML
INJECTION, SOLUTION INTRAVENOUS PRN
Status: CANCELLED | OUTPATIENT
Start: 2023-11-16

## 2023-11-16 RX ORDER — ALOGLIPTIN 12.5 MG/1
12.5 TABLET, FILM COATED ORAL DAILY
Status: DISCONTINUED | OUTPATIENT
Start: 2023-11-16 | End: 2023-11-17 | Stop reason: HOSPADM

## 2023-11-16 RX ORDER — SODIUM CHLORIDE 9 MG/ML
INJECTION, SOLUTION INTRAVENOUS CONTINUOUS
Status: DISCONTINUED | OUTPATIENT
Start: 2023-11-16 | End: 2023-11-16 | Stop reason: HOSPADM

## 2023-11-16 RX ORDER — INSULIN LISPRO 100 [IU]/ML
0-8 INJECTION, SOLUTION INTRAVENOUS; SUBCUTANEOUS
Status: DISCONTINUED | OUTPATIENT
Start: 2023-11-16 | End: 2023-11-17 | Stop reason: HOSPADM

## 2023-11-16 RX ORDER — METOPROLOL SUCCINATE 25 MG/1
25 TABLET, EXTENDED RELEASE ORAL DAILY
Status: DISCONTINUED | OUTPATIENT
Start: 2023-11-17 | End: 2023-11-17 | Stop reason: HOSPADM

## 2023-11-16 RX ORDER — DIPHENOXYLATE HYDROCHLORIDE AND ATROPINE SULFATE 2.5; .025 MG/1; MG/1
1 TABLET ORAL 3 TIMES DAILY PRN
Status: DISCONTINUED | OUTPATIENT
Start: 2023-11-16 | End: 2023-11-17 | Stop reason: HOSPADM

## 2023-11-16 RX ORDER — SODIUM CHLORIDE 9 MG/ML
INJECTION, SOLUTION INTRAVENOUS CONTINUOUS
Status: CANCELLED | OUTPATIENT
Start: 2023-11-16

## 2023-11-16 RX ORDER — CHOLECALCIFEROL (VITAMIN D3) 125 MCG
1000 CAPSULE ORAL DAILY
Status: DISCONTINUED | OUTPATIENT
Start: 2023-11-16 | End: 2023-11-17 | Stop reason: HOSPADM

## 2023-11-16 RX ORDER — DEXTROSE MONOHYDRATE 100 MG/ML
INJECTION, SOLUTION INTRAVENOUS CONTINUOUS PRN
Status: DISCONTINUED | OUTPATIENT
Start: 2023-11-16 | End: 2023-11-17 | Stop reason: HOSPADM

## 2023-11-16 RX ORDER — SODIUM CHLORIDE 9 MG/ML
INJECTION, SOLUTION INTRAVENOUS PRN
Status: DISCONTINUED | OUTPATIENT
Start: 2023-11-16 | End: 2023-11-17 | Stop reason: HOSPADM

## 2023-11-16 RX ORDER — INSULIN LISPRO 100 [IU]/ML
4 INJECTION, SOLUTION INTRAVENOUS; SUBCUTANEOUS
Status: DISCONTINUED | OUTPATIENT
Start: 2023-11-16 | End: 2023-11-16 | Stop reason: HOSPADM

## 2023-11-16 RX ORDER — CALCIUM CARBONATE 500 MG/1
500 TABLET, CHEWABLE ORAL 3 TIMES DAILY PRN
Status: DISCONTINUED | OUTPATIENT
Start: 2023-11-16 | End: 2023-11-17 | Stop reason: HOSPADM

## 2023-11-16 RX ORDER — CODEINE PHOSPHATE AND GUAIFENESIN 10; 100 MG/5ML; MG/5ML
10 SOLUTION ORAL EVERY 8 HOURS
Status: DISCONTINUED | OUTPATIENT
Start: 2023-11-16 | End: 2023-11-17 | Stop reason: HOSPADM

## 2023-11-16 RX ORDER — SODIUM CHLORIDE 0.9 % (FLUSH) 0.9 %
5-40 SYRINGE (ML) INJECTION PRN
Status: DISCONTINUED | OUTPATIENT
Start: 2023-11-16 | End: 2023-11-16 | Stop reason: HOSPADM

## 2023-11-16 RX ORDER — ASCORBIC ACID 500 MG
1000 TABLET ORAL DAILY
Status: DISCONTINUED | OUTPATIENT
Start: 2023-11-16 | End: 2023-11-17 | Stop reason: HOSPADM

## 2023-11-16 RX ORDER — SODIUM CHLORIDE 0.9 % (FLUSH) 0.9 %
5-40 SYRINGE (ML) INJECTION EVERY 12 HOURS SCHEDULED
Status: CANCELLED | OUTPATIENT
Start: 2023-11-16

## 2023-11-16 RX ORDER — TAMSULOSIN HYDROCHLORIDE 0.4 MG/1
0.8 CAPSULE ORAL DAILY
Status: DISCONTINUED | OUTPATIENT
Start: 2023-11-16 | End: 2023-11-17 | Stop reason: HOSPADM

## 2023-11-16 RX ORDER — METOCLOPRAMIDE HYDROCHLORIDE 5 MG/ML
5 INJECTION INTRAMUSCULAR; INTRAVENOUS EVERY 4 HOURS PRN
Status: DISCONTINUED | OUTPATIENT
Start: 2023-11-16 | End: 2023-11-17 | Stop reason: HOSPADM

## 2023-11-16 RX ORDER — INSULIN GLARGINE 100 [IU]/ML
20 INJECTION, SOLUTION SUBCUTANEOUS NIGHTLY
Status: DISCONTINUED | OUTPATIENT
Start: 2023-11-16 | End: 2023-11-17 | Stop reason: HOSPADM

## 2023-11-16 RX ORDER — FLUCONAZOLE 2 MG/ML
200 INJECTION, SOLUTION INTRAVENOUS EVERY 24 HOURS
Status: DISCONTINUED | OUTPATIENT
Start: 2023-11-16 | End: 2023-11-17 | Stop reason: HOSPADM

## 2023-11-16 RX ORDER — DIPHENOXYLATE HYDROCHLORIDE AND ATROPINE SULFATE 2.5; .025 MG/1; MG/1
1 TABLET ORAL 3 TIMES DAILY
Status: DISCONTINUED | OUTPATIENT
Start: 2023-11-16 | End: 2023-11-17 | Stop reason: HOSPADM

## 2023-11-16 RX ORDER — SODIUM CHLORIDE 9 MG/ML
INJECTION, SOLUTION INTRAVENOUS PRN
Status: DISCONTINUED | OUTPATIENT
Start: 2023-11-16 | End: 2023-11-16 | Stop reason: HOSPADM

## 2023-11-16 RX ORDER — SODIUM CHLORIDE, SODIUM LACTATE, POTASSIUM CHLORIDE, CALCIUM CHLORIDE 600; 310; 30; 20 MG/100ML; MG/100ML; MG/100ML; MG/100ML
INJECTION, SOLUTION INTRAVENOUS CONTINUOUS
Status: DISCONTINUED | OUTPATIENT
Start: 2023-11-16 | End: 2023-11-16 | Stop reason: HOSPADM

## 2023-11-16 RX ORDER — INSULIN LISPRO 100 [IU]/ML
0-4 INJECTION, SOLUTION INTRAVENOUS; SUBCUTANEOUS NIGHTLY
Status: DISCONTINUED | OUTPATIENT
Start: 2023-11-16 | End: 2023-11-17 | Stop reason: HOSPADM

## 2023-11-16 RX ORDER — SODIUM CHLORIDE, SODIUM LACTATE, POTASSIUM CHLORIDE, CALCIUM CHLORIDE 600; 310; 30; 20 MG/100ML; MG/100ML; MG/100ML; MG/100ML
INJECTION, SOLUTION INTRAVENOUS CONTINUOUS
Status: DISCONTINUED | OUTPATIENT
Start: 2023-11-16 | End: 2023-11-16

## 2023-11-16 RX ORDER — ONDANSETRON 2 MG/ML
4 INJECTION INTRAMUSCULAR; INTRAVENOUS
Status: DISCONTINUED | OUTPATIENT
Start: 2023-11-16 | End: 2023-11-16 | Stop reason: HOSPADM

## 2023-11-16 RX ORDER — PROPOFOL 10 MG/ML
INJECTION, EMULSION INTRAVENOUS PRN
Status: DISCONTINUED | OUTPATIENT
Start: 2023-11-16 | End: 2023-11-16 | Stop reason: SDUPTHER

## 2023-11-16 RX ORDER — SIMETHICONE 20 MG/.3ML
EMULSION ORAL PRN
Status: DISCONTINUED | OUTPATIENT
Start: 2023-11-16 | End: 2023-11-16 | Stop reason: ALTCHOICE

## 2023-11-16 RX ADMIN — TAMSULOSIN HYDROCHLORIDE 0.8 MG: 0.4 CAPSULE ORAL at 14:37

## 2023-11-16 RX ADMIN — ANTACID TABLETS 500 MG: 500 TABLET, CHEWABLE ORAL at 14:37

## 2023-11-16 RX ADMIN — SODIUM CHLORIDE 1000 ML: 9 INJECTION, SOLUTION INTRAVENOUS at 08:30

## 2023-11-16 RX ADMIN — ALOGLIPTIN 12.5 MG: 12.5 TABLET, FILM COATED ORAL at 14:37

## 2023-11-16 RX ADMIN — FLUCONAZOLE 200 MG: 200 INJECTION, SOLUTION INTRAVENOUS at 17:06

## 2023-11-16 RX ADMIN — INSULIN GLARGINE 20 UNITS: 100 INJECTION, SOLUTION SUBCUTANEOUS at 22:16

## 2023-11-16 RX ADMIN — IPRATROPIUM BROMIDE AND ALBUTEROL 1 PUFF: 20; 100 SPRAY, METERED RESPIRATORY (INHALATION) at 18:11

## 2023-11-16 RX ADMIN — SODIUM CHLORIDE 200 MG: 9 INJECTION, SOLUTION INTRAVENOUS at 15:55

## 2023-11-16 RX ADMIN — SODIUM CHLORIDE: 9 INJECTION, SOLUTION INTRAVENOUS at 10:23

## 2023-11-16 RX ADMIN — DOXYCYCLINE 100 MG: 100 INJECTION, POWDER, LYOPHILIZED, FOR SOLUTION INTRAVENOUS at 14:39

## 2023-11-16 RX ADMIN — DIPHENOXYLATE HYDROCHLORIDE AND ATROPINE SULFATE 1 TABLET: 2.5; .025 TABLET ORAL at 14:37

## 2023-11-16 RX ADMIN — PIPERACILLIN AND TAZOBACTAM 3375 MG: 3; .375 INJECTION, POWDER, LYOPHILIZED, FOR SOLUTION INTRAVENOUS at 22:17

## 2023-11-16 RX ADMIN — PIPERACILLIN AND TAZOBACTAM 3375 MG: 3; .375 INJECTION, POWDER, LYOPHILIZED, FOR SOLUTION INTRAVENOUS at 02:37

## 2023-11-16 RX ADMIN — IPRATROPIUM BROMIDE AND ALBUTEROL 1 PUFF: 20; 100 SPRAY, METERED RESPIRATORY (INHALATION) at 14:32

## 2023-11-16 RX ADMIN — PANTOPRAZOLE SODIUM 40 MG: 40 INJECTION, POWDER, FOR SOLUTION INTRAVENOUS at 14:37

## 2023-11-16 RX ADMIN — PROPOFOL 100 MG: 10 INJECTION, EMULSION INTRAVENOUS at 10:16

## 2023-11-16 RX ADMIN — GUAIFENESIN AND CODEINE PHOSPHATE 10 ML: 100; 10 SOLUTION ORAL at 02:40

## 2023-11-16 RX ADMIN — DIPHENOXYLATE HYDROCHLORIDE AND ATROPINE SULFATE 1 TABLET: 2.5; .025 TABLET ORAL at 22:17

## 2023-11-16 RX ADMIN — PIPERACILLIN AND TAZOBACTAM 3375 MG: 3; .375 INJECTION, POWDER, LYOPHILIZED, FOR SOLUTION INTRAVENOUS at 18:36

## 2023-11-16 RX ADMIN — OXYCODONE HYDROCHLORIDE AND ACETAMINOPHEN 1000 MG: 500 TABLET ORAL at 14:37

## 2023-11-16 RX ADMIN — IPRATROPIUM BROMIDE AND ALBUTEROL 1 PUFF: 20; 100 SPRAY, METERED RESPIRATORY (INHALATION) at 06:11

## 2023-11-16 RX ADMIN — METOPROLOL SUCCINATE 25 MG: 25 TABLET, EXTENDED RELEASE ORAL at 07:05

## 2023-11-16 RX ADMIN — GUAIFENESIN AND CODEINE PHOSPHATE 10 ML: 100; 10 SOLUTION ORAL at 17:07

## 2023-11-16 RX ADMIN — PROPOFOL 50 MG: 10 INJECTION, EMULSION INTRAVENOUS at 10:18

## 2023-11-16 RX ADMIN — Medication 1000 MCG: at 14:37

## 2023-11-16 ASSESSMENT — PAIN SCALES - GENERAL
PAINLEVEL_OUTOF10: 0
PAINLEVEL_OUTOF10: 3

## 2023-11-16 ASSESSMENT — PAIN DESCRIPTION - LOCATION: LOCATION: BACK

## 2023-11-16 ASSESSMENT — ENCOUNTER SYMPTOMS: SHORTNESS OF BREATH: 1

## 2023-11-16 ASSESSMENT — PAIN - FUNCTIONAL ASSESSMENT: PAIN_FUNCTIONAL_ASSESSMENT: 0-10

## 2023-11-16 ASSESSMENT — PAIN DESCRIPTION - DESCRIPTORS: DESCRIPTORS: ACHING

## 2023-11-16 NOTE — DISCHARGE SUMMARY
Discharge date 11/16/2023. Patient is not officially being discharged. He is being discharged from [facility] to facilitate him being transported to [facility] to have an EGD given his heme positive stool, hemoglobin drop, suspect upper GI bleed. Discharge diagnosis    *Pneumonia which is likely mixed between healthcare associated pneumonia given his recent admission to [facility], COVID-19 pneumonia, obstructive pneumonia given his history of lung cancer. *Acute COPD exacerbation. *Lung cancer for which patient is receiving chemotherapy     *Sepsis present on admission. Please refer to sepsis initial evaluation and management completed in the emergency room department. Patient had received appropriate blood culture and fluid management. Repeat lactic acid is normal.  Patient has appropriate perfusion. *COVID-19 positivity. Patient tested positive for for COVID-19 about 14 days ago. Patient never been vaccinated for COVID. *Acute on chronic hypoxic respiratory failure. Likely caused by above. No PE seen on CTA. *Myasthenia gravis. No evidence of decompensation. *Cachexia, frailty, failure to thrive. *Diarrhea without any abdominal pain. This could be caused by viral or bacterial enteritis, side effect to chemotherapy and immunotherapy, COVID-19 infection, C. difficile infection and others. Stool cultures and analysis thus far is negative. *GI bleed is suspected. Suspect upper. Patient had received 2 units of RBC transfusion. Is off Eliquis and aspirin. He is going to [facility] for EGD in the coming back. *Multiple other medical issues not listed above. *Diabetes, hyperglycemia caused by acute illness as well as Decadron. Increase his insulin coverage. Continue preadmission oral diabetes meds. *Few other medical issues not listed above.     Discharge medications  Please refer to discharge on admission tab for details on his transfer

## 2023-11-16 NOTE — PLAN OF CARE
Problem: Discharge Planning  Goal: Discharge to home or other facility with appropriate resources  11/15/2023 2323 by Jasiel Colunga RN  Outcome: Progressing  11/15/2023 1127 by Mikal Geiger RN  Outcome: Progressing     Problem: Pain  Goal: Verbalizes/displays adequate comfort level or baseline comfort level  11/15/2023 2323 by Jasiel Colunga RN  Outcome: Progressing  11/15/2023 1127 by Mikal Geiger RN  Outcome: Progressing     Problem: Safety - Adult  Goal: Free from fall injury  11/15/2023 2323 by Jasiel Colunga RN  Outcome: Progressing  11/15/2023 1127 by Mikal Geiger RN  Outcome: Progressing     Problem: ABCDS Injury Assessment  Goal: Absence of physical injury  11/15/2023 2323 by Jasiel Colunga RN  Outcome: Progressing  11/15/2023 1127 by Mikal Geiger RN  Outcome: Progressing     Problem: Chronic Conditions and Co-morbidities  Goal: Patient's chronic conditions and co-morbidity symptoms are monitored and maintained or improved  11/15/2023 2323 by Jasiel Colunga RN  Outcome: Progressing  11/15/2023 1127 by Mikal Geiger RN  Outcome: Progressing

## 2023-11-16 NOTE — ANESTHESIA PRE PROCEDURE
Department of Anesthesiology  Preprocedure Note       Name:  Herminio Del Real   Age:  66 y.o.  :  1945                                          MRN:  [id number]         Date:  2023      Surgeon: Sofie Quezada):  Mamie Jaffe MD    Procedure: Procedure(s):  EGD DIAGNOSTIC ONLY    Medications prior to admission:   Prior to Admission medications    Medication Sig Start Date End Date Taking? Authorizing Provider   insulin glargine (LANTUS SOLOSTAR) 100 UNIT/ML injection pen Inject 20 Units into the skin nightly 23   Wm Singh MD   furosemide (LASIX) 20 MG tablet Take 1 tablet by mouth daily 11/10/23   Quan Harp MD   methylPREDNISolone (MEDROL DOSEPACK) 4 MG tablet Take by mouth. 23   CHEMO Pierre CNP   zinc 50 MG TABS tablet Take 1 tablet by mouth daily 23  CHEMO Pierre CNP   HYDROcodone-acetaminophen (NORCO) 5-325 MG per tablet Take 1 tablet by mouth 2 times daily as needed for Pain for up to 30 days. Intended supply: 30 days. Take lowest dose possible to manage pain. Call if no bowel movement in 3 days.  Max Daily Amount: 2 tablets 23  CHEMO Pierre CNP   potassium chloride (KLOR-CON M) 10 MEQ extended release tablet Take 1 tablet by mouth daily 10/16/23   Vincent Vora, DO   metoprolol succinate (TOPROL XL) 50 MG extended release tablet Take 1 tablet by mouth daily 10/12/23   Yoon Prado MD   ipratropium (ATROVENT) 0.06 % nasal spray 2 sprays by Nasal route 3 times daily 23   Yoon Prado MD   apixaban (ELIQUIS) 5 MG TABS tablet Take 1 tablet by mouth 2 times daily 8/15/23   Quan Harp MD   fluticasone-umeclidin-vilant (TRELEGY ELLIPTA) 862-29.7-56 MCG/ACT AEPB inhaler Inhale 1 puff into the lungs daily 8/3/23   Quan Harp MD   tamsulosin (FLOMAX) 0.4 MG capsule Take 2 capsules by mouth daily 23   Yoon Prado MD   mirtazapine (REMERON) 7.5 MG tablet Take 1 tablet by mouth

## 2023-11-16 NOTE — PROGRESS NOTES
0325 Pt is A&O x 4. Cooperative with staff and care. Able to alert staff to make needs known. Denies pain and shows no s/s of distress or discomfort. Blood transfusion completed. No adverse reactions noted. No further complaints voiced. Call light within reach. Safety maintained. 9811 Report given to Saumya Patel. Pt's condition and vitals reviewed. No further questions asked. Pt shows no s/s of distress before transport. Physicians at bedside. No further complaints voiced.

## 2023-11-16 NOTE — PROGRESS NOTES
[facility] EMERGENCY [facility] contacted to check on  second unit of PRBC, ordered at [facility], they don not have second unit ready, but will set one up and send it over when available.

## 2023-11-17 ENCOUNTER — CARE COORDINATION (OUTPATIENT)
Dept: CARE COORDINATION | Age: 78
End: 2023-11-17

## 2023-11-17 VITALS
TEMPERATURE: 98.2 F | BODY MASS INDEX: 27.29 KG/M2 | DIASTOLIC BLOOD PRESSURE: 56 MMHG | OXYGEN SATURATION: 96 % | HEART RATE: 101 BPM | RESPIRATION RATE: 18 BRPM | SYSTOLIC BLOOD PRESSURE: 134 MMHG | HEIGHT: 70 IN | WEIGHT: 190.6 LBS

## 2023-11-17 PROBLEM — J18.9 PNEUMONIA DUE TO INFECTIOUS AGENT: Status: ACTIVE | Noted: 2023-11-17

## 2023-11-17 LAB
ANION GAP SERPL CALCULATED.3IONS-SCNC: 9 MEQ/L (ref 9–15)
BASOPHILS # BLD: 0 K/UL (ref 0–0.1)
BASOPHILS NFR BLD: 0.2 % (ref 0.2–1.2)
BUN SERPL-MCNC: 22 MG/DL (ref 8–23)
CALCIUM SERPL-MCNC: 8.9 MG/DL (ref 8.5–9.9)
CHLORIDE SERPL-SCNC: 101 MEQ/L (ref 95–107)
CO2 SERPL-SCNC: 23 MEQ/L (ref 20–31)
CREAT SERPL-MCNC: 1.05 MG/DL (ref 0.7–1.2)
EOSINOPHIL # BLD: 0.1 K/UL (ref 0–0.5)
EOSINOPHIL NFR BLD: 0.9 % (ref 0.8–7)
ERYTHROCYTE [DISTWIDTH] IN BLOOD BY AUTOMATED COUNT: 17.2 % (ref 11.6–14.4)
GLUCOSE BLD-MCNC: 143 MG/DL (ref 70–99)
GLUCOSE BLD-MCNC: 310 MG/DL (ref 70–99)
GLUCOSE SERPL-MCNC: 92 MG/DL (ref 70–99)
HAPTOGLOB SERPL-MCNC: 327 MG/DL (ref 30–200)
HCT VFR BLD AUTO: 23.9 % (ref 42–52)
HGB BLD-MCNC: 8 G/DL (ref 13.7–17.5)
IMM GRANULOCYTES # BLD: 0.2 K/UL
IMM GRANULOCYTES NFR BLD: 1.6 %
LYMPHOCYTES # BLD: 0.8 K/UL (ref 1.3–3.6)
LYMPHOCYTES NFR BLD: 5.2 %
MCH RBC QN AUTO: 32 PG (ref 25.7–32.2)
MCHC RBC AUTO-ENTMCNC: 33.5 % (ref 32.3–36.5)
MCV RBC AUTO: 95.6 FL (ref 79–92.2)
MONOCYTES # BLD: 0.6 K/UL (ref 0.3–0.8)
MONOCYTES NFR BLD: 4.2 % (ref 5.3–12.2)
NEUTROPHILS # BLD: 13 K/UL (ref 1.8–5.4)
NEUTS SEG NFR BLD: 87.9 % (ref 34–67.9)
PERFORMED ON: ABNORMAL
PERFORMED ON: ABNORMAL
PLATELET # BLD AUTO: 168 K/UL (ref 163–337)
POTASSIUM SERPL-SCNC: 4.3 MEQ/L (ref 3.4–4.9)
RBC # BLD AUTO: 2.5 M/UL (ref 4.63–6.08)
SODIUM SERPL-SCNC: 133 MEQ/L (ref 135–144)
WBC # BLD AUTO: 14.8 K/UL (ref 4.2–9)

## 2023-11-17 PROCEDURE — A4216 STERILE WATER/SALINE, 10 ML: HCPCS | Performed by: INTERNAL MEDICINE

## 2023-11-17 PROCEDURE — 85025 COMPLETE CBC W/AUTO DIFF WBC: CPT

## 2023-11-17 PROCEDURE — 2580000003 HC RX 258: Performed by: INTERNAL MEDICINE

## 2023-11-17 PROCEDURE — 94640 AIRWAY INHALATION TREATMENT: CPT

## 2023-11-17 PROCEDURE — 6370000000 HC RX 637 (ALT 250 FOR IP): Performed by: INTERNAL MEDICINE

## 2023-11-17 PROCEDURE — 2500000003 HC RX 250 WO HCPCS: Performed by: INTERNAL MEDICINE

## 2023-11-17 PROCEDURE — 36415 COLL VENOUS BLD VENIPUNCTURE: CPT

## 2023-11-17 PROCEDURE — C9113 INJ PANTOPRAZOLE SODIUM, VIA: HCPCS | Performed by: INTERNAL MEDICINE

## 2023-11-17 PROCEDURE — 6360000002 HC RX W HCPCS: Performed by: INTERNAL MEDICINE

## 2023-11-17 PROCEDURE — 80048 BASIC METABOLIC PNL TOTAL CA: CPT

## 2023-11-17 RX ORDER — DOXYCYCLINE HYCLATE 100 MG
100 TABLET ORAL 2 TIMES DAILY
Qty: 8 TABLET | Refills: 0 | Status: SHIPPED | OUTPATIENT
Start: 2023-11-17 | End: 2023-11-21

## 2023-11-17 RX ORDER — FERROUS SULFATE 325(65) MG
325 TABLET ORAL 2 TIMES DAILY
Qty: 180 TABLET | Refills: 1 | Status: SHIPPED | OUTPATIENT
Start: 2023-11-17

## 2023-11-17 RX ORDER — GLIPIZIDE 10 MG/1
5 TABLET ORAL 2 TIMES DAILY
Refills: 0 | COMMUNITY
Start: 2023-11-17

## 2023-11-17 RX ORDER — ACETAMINOPHEN 500 MG
1000 TABLET ORAL EVERY 12 HOURS PRN
Refills: 0 | COMMUNITY
Start: 2023-11-17

## 2023-11-17 RX ORDER — AMOXICILLIN AND CLAVULANATE POTASSIUM 875; 125 MG/1; MG/1
1 TABLET, FILM COATED ORAL 2 TIMES DAILY
Qty: 20 TABLET | Refills: 0 | Status: SHIPPED | OUTPATIENT
Start: 2023-11-17 | End: 2023-11-27

## 2023-11-17 RX ORDER — FLUCONAZOLE 100 MG/1
100 TABLET ORAL DAILY
Qty: 7 TABLET | Refills: 0 | Status: SHIPPED | OUTPATIENT
Start: 2023-11-17 | End: 2023-11-24

## 2023-11-17 RX ORDER — PANTOPRAZOLE SODIUM 40 MG/1
40 TABLET, DELAYED RELEASE ORAL SEE ADMIN INSTRUCTIONS
Qty: 60 TABLET | Refills: 2 | Status: SHIPPED | OUTPATIENT
Start: 2023-11-17

## 2023-11-17 RX ORDER — DIPHENOXYLATE HYDROCHLORIDE AND ATROPINE SULFATE 2.5; .025 MG/1; MG/1
1 TABLET ORAL 2 TIMES DAILY PRN
Qty: 15 TABLET | Refills: 0 | Status: SHIPPED | OUTPATIENT
Start: 2023-11-17 | End: 2023-11-29

## 2023-11-17 RX ADMIN — METOPROLOL SUCCINATE 25 MG: 25 TABLET, EXTENDED RELEASE ORAL at 08:35

## 2023-11-17 RX ADMIN — DOXYCYCLINE 100 MG: 100 INJECTION, POWDER, LYOPHILIZED, FOR SOLUTION INTRAVENOUS at 04:01

## 2023-11-17 RX ADMIN — IPRATROPIUM BROMIDE AND ALBUTEROL 1 PUFF: 20; 100 SPRAY, METERED RESPIRATORY (INHALATION) at 06:15

## 2023-11-17 RX ADMIN — SODIUM CHLORIDE 200 MG: 9 INJECTION, SOLUTION INTRAVENOUS at 10:07

## 2023-11-17 RX ADMIN — Medication 1000 MCG: at 08:36

## 2023-11-17 RX ADMIN — OXYCODONE HYDROCHLORIDE AND ACETAMINOPHEN 1000 MG: 500 TABLET ORAL at 08:36

## 2023-11-17 RX ADMIN — IPRATROPIUM BROMIDE AND ALBUTEROL 1 PUFF: 20; 100 SPRAY, METERED RESPIRATORY (INHALATION) at 11:35

## 2023-11-17 RX ADMIN — PANTOPRAZOLE SODIUM 40 MG: 40 INJECTION, POWDER, FOR SOLUTION INTRAVENOUS at 05:26

## 2023-11-17 RX ADMIN — TAMSULOSIN HYDROCHLORIDE 0.8 MG: 0.4 CAPSULE ORAL at 08:35

## 2023-11-17 RX ADMIN — FLUCONAZOLE 200 MG: 200 INJECTION, SOLUTION INTRAVENOUS at 10:27

## 2023-11-17 RX ADMIN — ALOGLIPTIN 12.5 MG: 12.5 TABLET, FILM COATED ORAL at 08:36

## 2023-11-17 RX ADMIN — GUAIFENESIN AND CODEINE PHOSPHATE 10 ML: 100; 10 SOLUTION ORAL at 05:26

## 2023-11-17 RX ADMIN — INSULIN LISPRO 6 UNITS: 100 INJECTION, SOLUTION INTRAVENOUS; SUBCUTANEOUS at 11:54

## 2023-11-17 RX ADMIN — DIPHENOXYLATE HYDROCHLORIDE AND ATROPINE SULFATE 1 TABLET: 2.5; .025 TABLET ORAL at 08:35

## 2023-11-17 NOTE — PLAN OF CARE
Problem: Discharge Planning  Goal: Discharge to home or other facility with appropriate resources  Outcome: Progressing  Flowsheets (Taken 11/17/2023 0229)  Discharge to home or other facility with appropriate resources:   Identify barriers to discharge with patient and caregiver   Arrange for needed discharge resources and transportation as appropriate   Refer to discharge planning if patient needs post-hospital services based on physician order or complex needs related to functional status, cognitive ability or social support system     Problem: Safety - Adult  Goal: Free from fall injury  Outcome: Progressing

## 2023-11-17 NOTE — DISCHARGE SUMMARY
Hospital Medicine Discharge Summary    Otis Oconnor  :    MRN:  [id number]    Admit date:  2023  Discharge date:  2023    Admitting Physician:  Maria Del Rosario Hidalgo MD  Primary Care Physician:  Uday Dumont MD      Discharge Diagnoses:      *Pneumonia which is likely mixed between healthcare associated pneumonia given his recent admission to [facility], COVID-19 pneumonia, obstructive pneumonia given his history of lung cancer. Much improved in the resolution of his symptoms. Patient will be discharged home on oral antibiotic. *Acute COPD exacerbation. Resolved. *Lung cancer for which patient is receiving chemotherapy. Patient is to follow-up with his pulmonologist and oncologist.     *Sepsis present on admission. Resolved. Please refer to sepsis initial evaluation and management completed in the emergency room department. Patient had received appropriate blood culture and fluid management. Repeat lactic acid is normal.  Patient has appropriate perfusion. *COVID-19 positivity. Patient tested positive for for COVID-19 about 14 days ago. Patient never been vaccinated for COVID. *Acute on chronic hypoxic respiratory failure. Likely caused by above. No PE seen on CTA. Resolved. Patient is back to room air. *Myasthenia gravis. No evidence of decompensation. *Cachexia, frailty, failure to thrive. *Diarrhea without any abdominal pain. Resolved. This could be caused by viral or bacterial enteritis, side effect to chemotherapy and immunotherapy, COVID-19 infection, C. difficile infection and others. Stool cultures and analysis thus far is negative. *GI bleed. Patient had an EGD which showed gastric ulcer, duodenal ulcer and gastropathy. No active bleed. GI recommended patient to be off Eliquis and aspirin for 7 days. PPI twice a day for 2 weeks then once a day. Patient is to follow-up with Dr. Nicolasa Reyes to review pending biopsy report.     *Acute

## 2023-11-17 NOTE — DISCHARGE INSTRUCTIONS
Follow up with Dr. Prisca Schwab in the next 7 days or sooner if needed. Please return to ER or call 911 if you develop any significant signs or symptoms. I may or may not have addressed all of your symptoms, medical issues,  Illnesses, or all of the abnormal blood work or imaging therefore please ask your PCP and other specialists to obtain [facility] record entirely to follow up on all of your symptoms, illnesses, abnormal labs, imaging and findings that I have and have not addressed during your hospitalization. Please follow-up with Dr. Maribel Werner to review pending biopsy report. Please do not take Eliquis and aspirin for 7 days. Please discuss with your primary care doctor the benefit and risk of taking these 2 medications in view of your risk of blood clot versus the risk of recurrence of bleed. Please check your blood sugar 3 times a day and document result on the log. Please provide log to your primary care doctor or diabetes specialist for review. Please call your doctor if your blood sugar is creeping up above 300 or dropping below 100. Please use the sugar tablet if your blood sugar drops below 80. Discharging you from the hospital does not mean that your medical care ends here and now. You may still need to have additional work up, investigation, monitoring, surveillance, and treatment to be handled from this point on by in the out patient setting by  out patient providers including your PCP, Specialists and other healthcare providers. For medication questions, contact your retail pharmacy and your PCP. Your medical team at [facility] ([facility]) appreciates the opportunity to work with you to get well!     Nael Alas MD

## 2023-11-17 NOTE — DISCHARGE INSTR - COC
Continuity of Care Form    Patient Name: Maikol Almaraz   :    MRN:  [id number]    Admit date:  2023  Discharge date:  ***    Code Status Order: DNR-CCA   Advance Directives:     Admitting Physician:  Susanna Rankin MD  PCP: Melba Alvarez MD    Discharging Nurse: [facility] Unit/Room#: 2995/5193-63  Discharging Unit Phone Number: ***    Emergency Contact:   Extended Emergency Contact Information  Primary Emergency Contact: [facility] of [address] Phone: [phone]  Mobile Phone: [phone]  Relation: Spouse    Past Surgical History:  Past Surgical History:   Procedure Laterality Date    ARM SURGERY Right 10/25/2022    BRONCHOSCOPY N/A 2023    NAVIGATIONAL BRONCHOSCOPY performed by Tigre Moss MD at [facility] N/A 2023    BRONCHOSCOPY ENDOBRONCHIAL ULTRASOUND performed by Tigre Moss MD at [address] [place] [address]      [address] [place] [address] Right 1992    COLONOSCOPY  2009    tubular adenomas    COLONOSCOPY  2013    diverticulosis, polyps, 5y repeat (DR NAVAS)    CORONARY ARTERY BYPASS GRAFT  07/02/2018    x5 (DR JETER @ [facility])    IR NONTUNNELED VASCULAR CATHETER Right 10/18/2023    Medcomp 11.5 F x 15 cm Raulerson Duo-Flow IJ double lumen catheter (LOT# [id number],  exp 2027) placed by Dr. Patrick Contreras    IR NONTUNNELED VASCULAR CATHETER  10/18/2023    IR NONTUNNELED VASCULAR CATHETER 10/18/2023 MLOZ SPECIAL PROCEDURE    LUNG REMOVAL, PARTIAL Right 2023    right thoracoscopic upper lobectomy, mediastinal lymph node dissection (DR PIKE)    MOHS SURGERY Left 2017    melanoma of left ear (DR GODWIN)    SKIN CANCER EXCISION Left 2017    THORACOSCOPY Right 2023    right thoracoscopic upper lobectomy performed by Franc Dowling MD at [address] [place] [address]      duodenitis/ poss early signs of celiac Weight:   Wt Readings from Last 1 Encounters:   23 86.5 kg (190 lb 9.6 oz)     Mental Status:  {IP PT MENTAL STATUS:}    IV Access:  { JANIA IV ACCESS:405293133}    Nursing Mobility/ADLs:  Walking   {CHP DME LCOP:923370209}  Transfer  {CHP DME NKRR:628899717}  Bathing  {CHP DME ZLLR:567931902}  Dressing  {CHP DME LQBK:500375079}  Toileting  {CHP DME JKLS:017101158}  Feeding  {CHP DME MFHP:234418578}  Med Admin  {CHP DME IWJF:935899408}  Med Delivery   { JANIA MED Delivery:904395821}    Wound Care Documentation and Therapy:        Elimination:  Continence: Bowel: {YES / VU:55115}  Bladder: {YES / B}  Urinary Catheter: {Urinary Catheter:682375258}   Colostomy/Ileostomy/Ileal Conduit: {YES / TP:65502}       Date of Last BM: ***    Intake/Output Summary (Last 24 hours) at 2023 1106  Last data filed at 2023 0216  Gross per 24 hour   Intake 480 ml   Output 500 ml   Net -20 ml     I/O last 3 completed shifts:   In: 18 [P.O.:480]  Out: 500 [Urine:500]    Safety Concerns:     [address] [place] [address] Safety Concerns:039326252}    Impairments/Disabilities:      [address] [place] [address] Impairments/Disabilities:272601072}    Nutrition Therapy:  Current Nutrition Therapy:   [address] [place] [address] Diet List:808970186}    Routes of Feeding: {P DME Other Feedings:577335484}  Liquids: {Slp liquid thickness:65460}  Daily Fluid Restriction: {CHP DME Yes amt example:349420746}  Last Modified Barium Swallow with Video (Video Swallowing Test): {Done Not Done MJRT:225045264}    Treatments at the Time of Hospital Discharge:   Respiratory Treatments: ***  Oxygen Therapy:  {Therapy; copd oxygen:16112}  Ventilator:    {[facility] Vent SMYA:440404226}    Rehab Therapies: {THERAPEUTIC INTERVENTION:9998010449}  Weight Bearing Status/Restrictions: [address] CC Weight Bearin}  Other Medical Equipment (for information only, NOT a DME order):  {EQUIPMENT:739619484}  Other Treatments: ***    Patient's personal belongings (please select all that are sent with

## 2023-11-17 NOTE — CARE COORDINATION
This ACM reviewed chart.   Patient in hospital for pneumonia, diarrhea, COPD exacerbation  Will follow up after discharge

## 2023-11-17 NOTE — PROGRESS NOTES
[facility] Initial Discharge Assessment    Met with Patient to discuss discharge plan. PCP: Brandon Garcia MD                                  Date of Last Visit: \" a couple weeks ago\"     If no PCP, list provided? N/A    Discharge Planning    Living Arrangements: independently at home    Who do you live with? Spouse     Who helps you with your care:  self    If lives at home:     Do you have any barriers navigating in your home? No- ranch home     Patient can perform ADL? Yes    Current Services (outpatient and in home) :  None    Dialysis: No    Is transportation available to get to your appointments? Yes    DME Equipment:  yes - cane, walker, shower chair, railing, grab bars, wheelchair. Electric scooter. Patient ident    Respiratory equipment: None    Respiratory provider:  no     Pharmacy:  yes - [facility] and [address] [place] [address] Road in [address] [place] [address] to afford cost of medication: Yes    Does patient feel safe at home? Yes    Does patient identify any home going needs? Yes, home health care    Patient agreeable to [facility] [address]? Yes, [place] [facility] [address]    Patient agreeable to SNF/Rehab? N/A    Other discharge needs identified? N/A    Was Freedom of Choice Provided? Yes    Initial Discharge Plan? (Note: please see concurrent daily documentation for any updates after initial note). Chart reviewed. Patient's care discussed in daily quality rounds. Patient was admitted to [facility] with pneumonia and received IV antibiotics. Patient also reported to have received blood transfusion, along with EGD testing at [facility] on 11/16. Plan is for patient to DC back home with spouse on this date with oral antibiotics. This  met with patient, spouse and adult son to discuss help at home needs. Patient reports feeling much better and anxious to get home as soon as possible. Patient identifies no DME needs at this time.   Patient does report experiencing some weakness from being in the hospital.  Discussed [facility] and help at home needs. Patient identifies spouse as supportive and able to assist with care needs prn. Patient's spouse present and reports that she believes skilled [facility] is a good idea. Freedom of choice provided. Patient agreeable with [facility] and has identified [facility] as agency of choice. This  contacted [facility] and provided new referral to Lady Muniz covering for [facility]. Lady Muniz reports that start of care will be Tuesday 11/21. This  reviewed above with Dr. Avelino Wolfe whom is agreeable with [facility] and delayed start of care date. Lady Muniz updated that Dr. Avelino Wolfe has approved delayed start of care as late as 11/21. Lady Muniz confirms [facility] can follow patient upon DC. Patient and spouse updated and agreeable with [facility] start of care being as late as 11/21. No further DC or help at home needs identified at this time. Family plans to transport patient home in private vehicle. JANIA completed.       Electronically signed by JOSE LUIS Otoole on 11/17/2023 at 10:51 AM

## 2023-11-18 LAB
BACTERIA BLD CULT ORG #2: NORMAL
BACTERIA BLD CULT: NORMAL
BACTERIA SPEC RESP CULT: ABNORMAL
ORGANISM: ABNORMAL
ORGANISM: ABNORMAL

## 2023-11-20 ENCOUNTER — CARE COORDINATION (OUTPATIENT)
Dept: CASE MANAGEMENT | Age: 78
End: 2023-11-20

## 2023-11-20 ENCOUNTER — CLINICAL DOCUMENTATION ONLY (OUTPATIENT)
Facility: CLINIC | Age: 78
End: 2023-11-20

## 2023-11-20 NOTE — CARE COORDINATION
Remote Patient Monitoring Note  Date/Time:  11/20/2023 2:14 PM  Patient Current Location: Home: [address] [place] [id number]  Pt discharged from the hospital.    RPM RESUMED. Called to OLIVER Tovar. RN  She will call to tomorrow to update. Background: ENROLLED IN RPM for DM COPD and HTN  Plan/Follow Up: Will continue to review, monitor and address alerts with follow up based on severity of symptoms and risk factors.

## 2023-11-21 ENCOUNTER — CARE COORDINATION (OUTPATIENT)
Dept: CASE MANAGEMENT | Age: 78
End: 2023-11-21

## 2023-11-21 ENCOUNTER — OFFICE VISIT (OUTPATIENT)
Dept: PALLATIVE CARE | Age: 78
End: 2023-11-21
Payer: MEDICARE

## 2023-11-21 ENCOUNTER — CARE COORDINATION (OUTPATIENT)
Dept: CARE COORDINATION | Age: 78
End: 2023-11-21

## 2023-11-21 VITALS
SYSTOLIC BLOOD PRESSURE: 132 MMHG | HEART RATE: 100 BPM | DIASTOLIC BLOOD PRESSURE: 62 MMHG | OXYGEN SATURATION: 93 % | TEMPERATURE: 98.8 F

## 2023-11-21 DIAGNOSIS — R53.1 WEAKNESS: Primary | ICD-10-CM

## 2023-11-21 DIAGNOSIS — G89.3 NEOPLASM RELATED PAIN: ICD-10-CM

## 2023-11-21 DIAGNOSIS — R60.0 LOCALIZED EDEMA: ICD-10-CM

## 2023-11-21 DIAGNOSIS — Z51.5 PALLIATIVE CARE ENCOUNTER: ICD-10-CM

## 2023-11-21 DIAGNOSIS — F41.8 ANXIETY ABOUT HEALTH: ICD-10-CM

## 2023-11-21 DIAGNOSIS — J44.9 CHRONIC OBSTRUCTIVE PULMONARY DISEASE, UNSPECIFIED COPD TYPE (HCC): ICD-10-CM

## 2023-11-21 PROCEDURE — G8417 CALC BMI ABV UP PARAM F/U: HCPCS

## 2023-11-21 PROCEDURE — 3078F DIAST BP <80 MM HG: CPT

## 2023-11-21 PROCEDURE — G8484 FLU IMMUNIZE NO ADMIN: HCPCS

## 2023-11-21 PROCEDURE — 1036F TOBACCO NON-USER: CPT

## 2023-11-21 PROCEDURE — 99417 PROLNG OP E/M EACH 15 MIN: CPT

## 2023-11-21 PROCEDURE — 1123F ACP DISCUSS/DSCN MKR DOCD: CPT

## 2023-11-21 PROCEDURE — 3075F SYST BP GE 130 - 139MM HG: CPT

## 2023-11-21 PROCEDURE — 99349 HOME/RES VST EST MOD MDM 40: CPT

## 2023-11-21 RX ORDER — BUSPIRONE HYDROCHLORIDE 10 MG/1
10 TABLET ORAL 2 TIMES DAILY
COMMUNITY

## 2023-11-21 RX ORDER — PHENOL 1.4 %
1 AEROSOL, SPRAY (ML) MUCOUS MEMBRANE NIGHTLY PRN
COMMUNITY

## 2023-11-21 ASSESSMENT — ENCOUNTER SYMPTOMS
BACK PAIN: 1
TROUBLE SWALLOWING: 0
SHORTNESS OF BREATH: 1
COUGH: 1
GASTROINTESTINAL NEGATIVE: 1
WHEEZING: 1

## 2023-11-21 ASSESSMENT — VISUAL ACUITY: OU: 1

## 2023-11-21 ASSESSMENT — PATIENT HEALTH QUESTIONNAIRE - PHQ9
SUM OF ALL RESPONSES TO PHQ QUESTIONS 1-9: 2
SUM OF ALL RESPONSES TO PHQ QUESTIONS 1-9: 2
SUM OF ALL RESPONSES TO PHQ9 QUESTIONS 1 & 2: 2
SUM OF ALL RESPONSES TO PHQ QUESTIONS 1-9: 2
1. LITTLE INTEREST OR PLEASURE IN DOING THINGS: 1
2. FEELING DOWN, DEPRESSED OR HOPELESS: 1
SUM OF ALL RESPONSES TO PHQ QUESTIONS 1-9: 2

## 2023-11-21 NOTE — PROGRESS NOTES
Subjective:      Patient Id: Seen Erving Late at  home in [facility] , for follow up Palliative Care visit. He was accompanied to the appointment by: Wife Brigid. Chief Complaint   Patient presents with    Follow-up    COPD    Other     Recent hospitalization and D/C home        HPI         Enedina Lewis is a 66 y.o. male was seen and evaluated palliative care for symptom management related to COPD, back pain and non-small cell lung cancer. Erving Late has complex medical history that includes non-small cell cancer of right lung S/P right upper lobectomy, COPD, T2DM, myasthenia gravis, chronic low back pain, CAD, coronary artery bypass graft x5, HTN, osteoarthritis, tobacco abuse, HLD, GERD. Home visit is necessary in lieu of office due to significant frailty and high symptom burden from comorbid illnesses. Patient complains of lung cancer, neoplasm pain, sputum color change and fatigue. Patient follows with PCP, pulmonology, neurology, oncology, urology and cardiology. Patient was recently seen and evaluated at [facility] related to COPD exacerbation with pneumonia & sepsis. Patient also experienced anemia and found to have a gastric ulcer. General: Upon entering the room I find the patient  ambulating without device, alert and oriented x4, calm, cooperative and in NAD. COPD/SOB: Patient continuing to take antibiotics as previously prescribed during hospitalization. Patient's sputum color has returned to baseline clear/opaque and thick. Patient continues to experience BACA and decreased endurance. Patient using oxygen nightly related to increased SOB. Patient takes medications and treatments as ordered. Patient working with therapy at home. Lung cancer: Patient with noted return of his right lung cancer with lymph node involvement (Squamous cell carcinoma stage 1b) with oncology and now with liver involvement/metastases. Patient unable to tolerate previous chemotherapy treatment.  Patient

## 2023-11-21 NOTE — CARE COORDINATION
Date/Time:  11/21/2023 11:14 AM  LPN attempted to reach family by telephone regarding yellow alert in remote patient monitoring program. Left HIPPA compliant message requesting a return call. Will attempt to reach patient again. RPM resumed 11/20/23   Call to pt's wife January Goldman to update.  Left message  Enrolled in RPM for DM COPD HTN  MESSAGE sent to [phone] RN

## 2023-11-22 ENCOUNTER — CARE COORDINATION (OUTPATIENT)
Dept: CASE MANAGEMENT | Age: 78
End: 2023-11-22

## 2023-11-22 NOTE — CARE COORDINATION
Date/Time:  11/22/2023 2:10 PM  LPN attempted to reach family by telephone regarding yellow alert in remote patient monitoring program. Left HIPPA compliant message requesting a return call. Will attempt to reach patient again. RPM YELLOW alert for no metrics x 3 days. Called to pt 3 consecutive days. No answer no return call.   RPM placed on pause   Update to ACM

## 2023-11-25 DIAGNOSIS — I10 PRIMARY HYPERTENSION: Chronic | ICD-10-CM

## 2023-11-27 ENCOUNTER — OFFICE VISIT (OUTPATIENT)
Dept: PULMONOLOGY | Age: 78
End: 2023-11-27
Payer: MEDICARE

## 2023-11-27 VITALS
TEMPERATURE: 97.1 F | HEART RATE: 96 BPM | RESPIRATION RATE: 18 BRPM | HEIGHT: 71 IN | OXYGEN SATURATION: 95 % | BODY MASS INDEX: 26.58 KG/M2 | SYSTOLIC BLOOD PRESSURE: 95 MMHG | DIASTOLIC BLOOD PRESSURE: 56 MMHG

## 2023-11-27 DIAGNOSIS — E87.79 OTHER HYPERVOLEMIA: ICD-10-CM

## 2023-11-27 DIAGNOSIS — C34.91 SQUAMOUS CELL CARCINOMA LUNG, RIGHT (HCC): ICD-10-CM

## 2023-11-27 DIAGNOSIS — J44.9 CHRONIC OBSTRUCTIVE PULMONARY DISEASE, UNSPECIFIED COPD TYPE (HCC): ICD-10-CM

## 2023-11-27 DIAGNOSIS — G70.00 MYASTHENIA GRAVIS (HCC): ICD-10-CM

## 2023-11-27 DIAGNOSIS — R91.8 PULMONARY INFILTRATE: Primary | ICD-10-CM

## 2023-11-27 DIAGNOSIS — I26.99 PULMONARY EMBOLISM AND INFARCTION (HCC): ICD-10-CM

## 2023-11-27 DIAGNOSIS — Z09 HOSPITAL DISCHARGE FOLLOW-UP: ICD-10-CM

## 2023-11-27 PROCEDURE — 1111F DSCHRG MED/CURRENT MED MERGE: CPT | Performed by: INTERNAL MEDICINE

## 2023-11-27 PROCEDURE — 3078F DIAST BP <80 MM HG: CPT | Performed by: INTERNAL MEDICINE

## 2023-11-27 PROCEDURE — G8427 DOCREV CUR MEDS BY ELIG CLIN: HCPCS | Performed by: INTERNAL MEDICINE

## 2023-11-27 PROCEDURE — 99215 OFFICE O/P EST HI 40 MIN: CPT | Performed by: INTERNAL MEDICINE

## 2023-11-27 PROCEDURE — 3074F SYST BP LT 130 MM HG: CPT | Performed by: INTERNAL MEDICINE

## 2023-11-27 PROCEDURE — 1036F TOBACCO NON-USER: CPT | Performed by: INTERNAL MEDICINE

## 2023-11-27 PROCEDURE — 1123F ACP DISCUSS/DSCN MKR DOCD: CPT | Performed by: INTERNAL MEDICINE

## 2023-11-27 PROCEDURE — G8417 CALC BMI ABV UP PARAM F/U: HCPCS | Performed by: INTERNAL MEDICINE

## 2023-11-27 PROCEDURE — 3023F SPIROM DOC REV: CPT | Performed by: INTERNAL MEDICINE

## 2023-11-27 PROCEDURE — G8484 FLU IMMUNIZE NO ADMIN: HCPCS | Performed by: INTERNAL MEDICINE

## 2023-11-27 RX ORDER — PREDNISONE 10 MG/1
40 TABLET ORAL DAILY
Qty: 120 TABLET | Refills: 1 | Status: SHIPPED | OUTPATIENT
Start: 2023-11-27 | End: 2024-01-26

## 2023-11-27 RX ORDER — FUROSEMIDE 40 MG/1
40 TABLET ORAL DAILY
Qty: 60 TABLET | Refills: 1 | Status: SHIPPED | OUTPATIENT
Start: 2023-11-27

## 2023-11-27 NOTE — PROGRESS NOTES
test done today no hypoxia on exertion  Stress test shows normal myocardial perfusion  Cytology, station station R4 and R10 positive squamous cell carcinoma  Assessment and Plan       Diagnosis Orders   1. Pulmonary infiltrate  predniSONE (DELTASONE) 10 MG tablet    furosemide (LASIX) 40 MG tablet    Basic Metabolic Panel      2. Hospital discharge follow-up  [place] DISCHARGE MEDS RECONCILED W/ CURRENT OUTPATIENT MED LIST      3. Chronic obstructive pulmonary disease, unspecified COPD type ([address])        4. Pulmonary embolism and infarction ([address])        5. Squamous cell carcinoma lung, right (HCC)        6. Other hypervolemia        7. Myasthenia gravis ([address])          Worsening shortness of breath, likely multifactorial  Secondary to recent COVID-19 infection and pneumonia, possible drug-induced pneumonitis  Volume overload  And COPD  We will start treatment with Lasix 40 mg daily, repeat BMP next week for monitoring  Prednisone 40 mg p.o. daily until seen by me  Squamous cell carcinoma T2 a N0 M0 stage Ib with right upper lobe lobectomy January 2023, n with   recurrence, patient follows up with oncology  Continue cardioprotective medications  Myasthenia gravis management per neurology,?   Paraneoplastic  Yearly flu shot  Recent GI bleed, Eliquis currently on hold, per GI note needs to place on hold for 1 to 2 weeks, he will resume Eliquis on Monday next      Orders Placed This Encounter   Procedures    Basic Metabolic Panel     Standing Status:   Future     Standing Expiration Date:   11/27/2024         Orders Placed This Encounter   Medications    predniSONE (DELTASONE) 10 MG tablet     Sig: Take 4 tablets by mouth daily     Dispense:  120 tablet     Refill:  1    furosemide (LASIX) 40 MG tablet     Sig: Take 1 tablet by mouth daily     Dispense:  60 tablet     Refill:  1       Patient instructed to gargle swish and spit after each use of inhaled corticosteroids         Discussed with patient the importance of exercise

## 2023-11-27 NOTE — TELEPHONE ENCOUNTER
Comments:     Last Office Visit (last PCP visit):   11/1/2023    Next Visit Date:  Future Appointments   Date Time Provider Department Center   11/27/2023  1:30 PM Hunter Granados MD [facility]   11/29/2023 12:20 PM Aristeo Tang,  [facility]   1/9/2024  2:00 PM Stephen Prado APRN - CNP [facility]   2/20/2024  2:30 PM Phil Bryant MD [facility] -   2/22/2024  1:00 PM Michi Duran MD [facility]   8/6/2024 11:20 AM Aristeo Tang DO [facility]       **If hasn't been seen in over a year OR hasn't followed up according to last diabetes/ADHD visit, make appointment for patient before sending refill to provider.    Rx requested:  Requested Prescriptions     Pending Prescriptions Disp Refills    losartan (COZAAR) 25 MG tablet [Pharmacy Med Name: Losartan Potassium 25 MG Oral Tablet] 90 tablet 3     Sig: TAKE 1 TABLET BY MOUTH DAILY

## 2023-11-28 ENCOUNTER — CARE COORDINATION (OUTPATIENT)
Dept: PRIMARY CARE CLINIC | Age: 78
End: 2023-11-28

## 2023-11-28 ENCOUNTER — CARE COORDINATION (OUTPATIENT)
Dept: CASE MANAGEMENT | Age: 78
End: 2023-11-28

## 2023-11-28 ENCOUNTER — CARE COORDINATION (OUTPATIENT)
Dept: CARE COORDINATION | Age: 78
End: 2023-11-28

## 2023-11-28 DIAGNOSIS — R80.9 TYPE 2 DIABETES MELLITUS WITH MICROALBUMINURIA, WITHOUT LONG-TERM CURRENT USE OF INSULIN (HCC): Chronic | ICD-10-CM

## 2023-11-28 DIAGNOSIS — E11.29 TYPE 2 DIABETES MELLITUS WITH MICROALBUMINURIA, WITHOUT LONG-TERM CURRENT USE OF INSULIN (HCC): Chronic | ICD-10-CM

## 2023-11-28 DIAGNOSIS — J44.1 CHRONIC OBSTRUCTIVE PULMONARY DISEASE WITH ACUTE EXACERBATION (HCC): Chronic | ICD-10-CM

## 2023-11-28 DIAGNOSIS — I10 PRIMARY HYPERTENSION: Primary | Chronic | ICD-10-CM

## 2023-11-28 RX ORDER — LOSARTAN POTASSIUM 25 MG/1
25 TABLET ORAL DAILY
Qty: 90 TABLET | Refills: 0 | Status: SHIPPED | OUTPATIENT
Start: 2023-11-28

## 2023-11-28 ASSESSMENT — ENCOUNTER SYMPTOMS: DYSPNEA ASSOCIATED WITH: MINIMAL EXERTION

## 2023-11-28 NOTE — PROGRESS NOTES
Remote Patient Order Discontinued    Received request from Yang Arita RN  to discontinue order for remote patient monitoring of COPD, Diabetes, and HTN and order completed.

## 2023-11-28 NOTE — CARE COORDINATION
CCSS placed call to patient to arrange RPM kit  through [facility]. Left VM. Reviewed with patient how to pack equipment in original packing. Verified patient's availability to schedule UPS  time. UPS  time requested. Anticipated  date range 2 to 4 business days.

## 2023-11-28 NOTE — CARE COORDINATION
Remote Patient Monitoring Graduation      Date/Time:  11/28/2023 3:21 PM  Patient Current Location: Home: [address] [place] [phone]  Patient has graduated from the Remote Patient Monitoring program on 11/28/2023. RPM goals have been met at this time. Patient has been provided instruction on process to return RPM equipment and RPM has been deactivated. Patient has ACM's contact information for any further questions, concerns, or needs.
patient to discuss his concerns with oncology dr. Pedrito Phillips on 12/8 appt. Advised patient to write down questions and needs and take log with to appt. Voiced understanding. Patient discussed chemo and effects on body and concerns if another round is taken. Again advised to discuss with Dr. Pedrito Phillips. Advised to report any new or worsening symptoms to PCP, pulm, or ACM. Go back to ED if worsening sob or CP. Voiced understanding. Discussed RPM.  Patient wants to discontinue due to numerous health appts and health concerns. ACM will message RPM team    11/29 cardio  12/4 pulm  12/8 oncology dr Pedrito Phillips  1/9 palliative care  2/20 neuro  Encourage low sodium, low sugar, low carb diet  Continue oxygen  Continue prednisone and ATB until gone    Offered patient enrollment in the Remote Patient Monitoring (RPM) program for in-home monitoring: Yes, patient already enrolled. Lab Results       None            Care Coordination Interventions    Referral from Primary Care Provider: Yes  Suggested Interventions and Community Resources  Diabetes Education: Completed (Comment: 8/4/23)  Fall Risk Prevention: Completed (Comment: 8/4/23)  Life Skills Coaching: Not Started (Comment: 8/4/23)  Registered Dietician: Not Started (Comment: 8/4/23)  Social Work: Not Started (Comment: 8/4/23)  Zone Management Tools: Completed (Comment: mailed 8/4/23)          Goals Addressed                   This Visit's Progress     Conditions and Symptoms   On track     I will schedule office visits, as directed by my provider. I will keep my appointment or reschedule if I have to cancel. I will notify my provider of any barriers to my plan of care. I will follow my Zone Management tool to seek urgent or emergent care. I will notify my provider of any symptoms that indicate a worsening of my condition.     Barriers: overwhelmed by complexity of regimen and stress  Plan for overcoming my barriers: will work with ACM towards goals and ACM

## 2023-11-29 ENCOUNTER — TELEPHONE (OUTPATIENT)
Dept: CARDIOLOGY CLINIC | Age: 78
End: 2023-11-29

## 2023-11-29 ENCOUNTER — OFFICE VISIT (OUTPATIENT)
Dept: CARDIOLOGY CLINIC | Age: 78
End: 2023-11-29
Payer: MEDICARE

## 2023-11-29 VITALS
DIASTOLIC BLOOD PRESSURE: 60 MMHG | BODY MASS INDEX: 26.6 KG/M2 | WEIGHT: 190 LBS | HEIGHT: 71 IN | HEART RATE: 101 BPM | SYSTOLIC BLOOD PRESSURE: 102 MMHG | OXYGEN SATURATION: 93 %

## 2023-11-29 DIAGNOSIS — I10 ESSENTIAL HYPERTENSION: Primary | ICD-10-CM

## 2023-11-29 DIAGNOSIS — G70.00 MYASTHENIA GRAVIS (HCC): Primary | ICD-10-CM

## 2023-11-29 PROCEDURE — 1036F TOBACCO NON-USER: CPT | Performed by: INTERNAL MEDICINE

## 2023-11-29 PROCEDURE — 1111F DSCHRG MED/CURRENT MED MERGE: CPT | Performed by: INTERNAL MEDICINE

## 2023-11-29 PROCEDURE — 99214 OFFICE O/P EST MOD 30 MIN: CPT | Performed by: INTERNAL MEDICINE

## 2023-11-29 PROCEDURE — 1123F ACP DISCUSS/DSCN MKR DOCD: CPT | Performed by: INTERNAL MEDICINE

## 2023-11-29 PROCEDURE — 3078F DIAST BP <80 MM HG: CPT | Performed by: INTERNAL MEDICINE

## 2023-11-29 PROCEDURE — 3074F SYST BP LT 130 MM HG: CPT | Performed by: INTERNAL MEDICINE

## 2023-11-29 PROCEDURE — G8417 CALC BMI ABV UP PARAM F/U: HCPCS | Performed by: INTERNAL MEDICINE

## 2023-11-29 PROCEDURE — G8484 FLU IMMUNIZE NO ADMIN: HCPCS | Performed by: INTERNAL MEDICINE

## 2023-11-29 PROCEDURE — 93000 ELECTROCARDIOGRAM COMPLETE: CPT | Performed by: INTERNAL MEDICINE

## 2023-11-29 PROCEDURE — G8427 DOCREV CUR MEDS BY ELIG CLIN: HCPCS | Performed by: INTERNAL MEDICINE

## 2023-11-29 RX ORDER — POTASSIUM CHLORIDE 20 MEQ/1
20 TABLET, EXTENDED RELEASE ORAL DAILY
Qty: 90 TABLET | Refills: 1 | Status: SHIPPED | OUTPATIENT
Start: 2023-11-29

## 2023-11-29 NOTE — TELEPHONE ENCOUNTER
Hi Patient was here today in cardiology would like to see a new Neurologist if you could place a referral for another doctor please call mile when that is completed.   [phone] mile

## 2023-11-29 NOTE — TELEPHONE ENCOUNTER
Patient is seen by Dr. Gaston Dunn through [facility] who is a neurologist/neuromuscular specialist and is managing his myasthenia gravis. If he cannot follow up with her for general neurologic follow-up then I have placed an external referral to [facility] neurology since patient is requesting to move away from [facility]. Please help patient arrange.

## 2023-11-29 NOTE — PATIENT INSTRUCTIONS
Continue current cardiac medications including Lasix 40 mg Lasix daily. Add KCl 20 mEq daily. Compression stockings during the day and take off at night. Keep legs elevated when off your feet. Follow up in 3 months, sooner if needed.

## 2023-11-29 NOTE — PROGRESS NOTES
spray by Nasal route daily 1 Bottle 0    albuterol sulfate  (90 Base) MCG/ACT inhaler Inhale 2 puffs into the lungs every 6 hours as needed for Wheezing 3 Inhaler 0    Handicap Placard MISC by Does not apply route DX: COPD (J44.9), primary osteoarthritis involving multiple joints (M15.0), myasthenia gravis with exacerbation (G70.01)     EXPIRES: 05/2024 2 each 0    azaTHIOprine (IMURAN) 50 MG tablet Take 1 tablet by mouth Take 2 tablets by mouth in the morning and 1 tablet in the evening. Cyanocobalamin (VITAMIN B 12 PO) Take 1,000 mg by mouth daily      Blood Glucose Monitoring Suppl CHENCHO Test once daily. Dx: E11.29 1 Device 0    Lancets MISC Test once daily. Dx: E11.29 100 each 3    ascorbic acid (VITAMIN C) 500 MG tablet Take 2 tablets by mouth daily      CINNAMON PO Take 500 mg by mouth 2 times daily. No current facility-administered medications for this visit. Allergies: Invokana [canagliflozin], Metformin and related, and Other     Review of Systems   General: Fatigued times  Cardiovascular: See HPI. No orthopnea or PND. Respiratory: Dyspnea is present with mild degrees of activity.  + COPD.  + Lung cancer with history of right upper lobe lung resection. Gastrointestinal: No melena or hematochezia. Genitourinary: No hematuria. Hematological: No easy bruising or bleeding on Eliquis. History of PEs, on Eliquis. Vascular: + Lower extremity edema. No claudication. History of varicose veins/vein stripping. Neurological: No TIA or CVA symptoms. No paresthesias.  + Myasthenic gravis  Musculoskeletal: No chest wall pain. Psychiatric: No anxiety. *All other systems were reviewed and found to be negative unless otherwise noted in the HPI*    Physical Examination: His height is 1.803 m (5' 11\") and weight is 86.2 kg (190 lb). His blood pressure is 102/60 and his pulse is 101 (abnormal). His oxygen saturation is 93%. He he is alert and oriented to person, place, and time.  No

## 2023-12-01 ENCOUNTER — HOSPITAL ENCOUNTER (OUTPATIENT)
Dept: LAB | Age: 78
Discharge: HOME | End: 2023-12-01
Payer: MEDICARE

## 2023-12-01 DIAGNOSIS — R91.8 PULMONARY INFILTRATE: ICD-10-CM

## 2023-12-01 LAB
ANION GAP SERPL CALCULATED.3IONS-SCNC: 10 MEQ/L (ref 9–15)
BUN SERPL-MCNC: 10 MG/DL (ref 8–23)
CALCIUM SERPL-MCNC: 8.7 MG/DL (ref 8.5–9.9)
CHLORIDE SERPL-SCNC: 97 MEQ/L (ref 95–107)
CO2 SERPL-SCNC: 29 MEQ/L (ref 20–31)
CREAT SERPL-MCNC: 1.18 MG/DL (ref 0.7–1.2)
GLUCOSE SERPL-MCNC: 195 MG/DL (ref 70–99)
POTASSIUM SERPL-SCNC: 3.6 MEQ/L (ref 3.4–4.9)
SODIUM SERPL-SCNC: 136 MEQ/L (ref 135–144)

## 2023-12-01 PROCEDURE — 80048 BASIC METABOLIC PNL TOTAL CA: CPT

## 2023-12-01 PROCEDURE — 36415 COLL VENOUS BLD VENIPUNCTURE: CPT

## 2023-12-04 ENCOUNTER — OFFICE VISIT (OUTPATIENT)
Dept: PULMONOLOGY | Age: 78
End: 2023-12-04
Payer: MEDICARE

## 2023-12-04 VITALS
SYSTOLIC BLOOD PRESSURE: 113 MMHG | HEART RATE: 99 BPM | RESPIRATION RATE: 16 BRPM | DIASTOLIC BLOOD PRESSURE: 66 MMHG | TEMPERATURE: 97 F | HEIGHT: 71 IN | OXYGEN SATURATION: 93 % | BODY MASS INDEX: 26.5 KG/M2

## 2023-12-04 DIAGNOSIS — G70.00 MYASTHENIA GRAVIS (HCC): ICD-10-CM

## 2023-12-04 DIAGNOSIS — J44.9 CHRONIC OBSTRUCTIVE PULMONARY DISEASE, UNSPECIFIED COPD TYPE (HCC): Primary | ICD-10-CM

## 2023-12-04 DIAGNOSIS — F51.04 PSYCHOPHYSIOLOGICAL INSOMNIA: ICD-10-CM

## 2023-12-04 DIAGNOSIS — C34.91 SQUAMOUS CELL CARCINOMA LUNG, RIGHT (HCC): ICD-10-CM

## 2023-12-04 DIAGNOSIS — I26.99 PULMONARY EMBOLISM AND INFARCTION (HCC): ICD-10-CM

## 2023-12-04 PROCEDURE — 3078F DIAST BP <80 MM HG: CPT | Performed by: INTERNAL MEDICINE

## 2023-12-04 PROCEDURE — 1036F TOBACCO NON-USER: CPT | Performed by: INTERNAL MEDICINE

## 2023-12-04 PROCEDURE — G8417 CALC BMI ABV UP PARAM F/U: HCPCS | Performed by: INTERNAL MEDICINE

## 2023-12-04 PROCEDURE — 1123F ACP DISCUSS/DSCN MKR DOCD: CPT | Performed by: INTERNAL MEDICINE

## 2023-12-04 PROCEDURE — 3074F SYST BP LT 130 MM HG: CPT | Performed by: INTERNAL MEDICINE

## 2023-12-04 PROCEDURE — 3023F SPIROM DOC REV: CPT | Performed by: INTERNAL MEDICINE

## 2023-12-04 PROCEDURE — G8427 DOCREV CUR MEDS BY ELIG CLIN: HCPCS | Performed by: INTERNAL MEDICINE

## 2023-12-04 PROCEDURE — 99215 OFFICE O/P EST HI 40 MIN: CPT | Performed by: INTERNAL MEDICINE

## 2023-12-04 PROCEDURE — 1111F DSCHRG MED/CURRENT MED MERGE: CPT | Performed by: INTERNAL MEDICINE

## 2023-12-04 PROCEDURE — G8484 FLU IMMUNIZE NO ADMIN: HCPCS | Performed by: INTERNAL MEDICINE

## 2023-12-04 RX ORDER — ZOLPIDEM TARTRATE 10 MG/1
5 TABLET ORAL NIGHTLY PRN
Qty: 14 TABLET | Refills: 0 | Status: SHIPPED | OUTPATIENT
Start: 2023-12-04 | End: 2024-01-01

## 2023-12-04 RX ORDER — PREDNISONE 10 MG/1
10 TABLET ORAL DAILY
Qty: 30 TABLET | Refills: 3 | Status: SHIPPED | OUTPATIENT
Start: 2023-12-04 | End: 2024-04-02

## 2023-12-04 NOTE — PROGRESS NOTES
normal.         Imaging studies reviewed and interpreted by me     CT chest November 2023, shows multilobar infiltrates right hilar mass seems slightly improved compared to previous CAT scan    Stress test shows normal myocardial perfusion    Cytology, station station R4 and R10 positive squamous cell carcinoma        Assessment and Plan       Diagnosis Orders   1. Chronic obstructive pulmonary disease, unspecified COPD type (HCC)  predniSONE (DELTASONE) 10 MG tablet      2. Psychophysiological insomnia  zolpidem (AMBIEN) 10 MG tablet      3. Pulmonary embolism and infarction ([address])        4. Squamous cell carcinoma lung, right ([address])        5. Myasthenia gravis ([address])          COPD, symptoms not controlled, improved with prednisone treatment, will continue Trelegy, as needed albuterol, taper of prednisone to maintenance 10 mg daily. Systemic steroid, high risk medication, will need monitoring, will plan starting bisphosphonate, and vitamin D, and checking DEXA scan on follow-up. Discussed with the patient is agreeable  Continue Lasix and maintain euvolemic  Has insomnia, likely due to anxiety, will start Ambien, he will discuss with PCP  Squamous cell carcinoma T2 a N0 M0 stage Ib with right upper lobe lobectomy January 2023, n with   recurrence, patient follows up with oncology  Continue cardioprotective medications  Myasthenia gravis management per neurology,? Paraneoplastic  Yearly flu shot  Resume Eliquis, patient has a history of PE, he was seen by GI during his recent hospitalization with recommendation to resume Eliquis in 7 days. No orders of the defined types were placed in this encounter. Orders Placed This Encounter   Medications    zolpidem (AMBIEN) 10 MG tablet     Sig: Take 0.5 tablets by mouth nightly as needed for Sleep for up to 28 days.  Max Daily Amount: 5 mg     Dispense:  14 tablet     Refill:  0    predniSONE (DELTASONE) 10 MG tablet     Sig: Take 1 tablet by mouth daily

## 2023-12-13 ENCOUNTER — CARE COORDINATION (OUTPATIENT)
Dept: CARE COORDINATION | Age: 78
End: 2023-12-13

## 2023-12-13 DIAGNOSIS — I10 PRIMARY HYPERTENSION: Chronic | ICD-10-CM

## 2023-12-13 RX ORDER — METOPROLOL SUCCINATE 50 MG/1
50 TABLET, EXTENDED RELEASE ORAL DAILY
Qty: 90 TABLET | Refills: 1 | Status: SHIPPED | OUTPATIENT
Start: 2023-12-13 | End: 2024-01-31 | Stop reason: SDUPTHER

## 2023-12-13 ASSESSMENT — ENCOUNTER SYMPTOMS: DYSPNEA ASSOCIATED WITH: MINIMAL EXERTION

## 2023-12-13 NOTE — TELEPHONE ENCOUNTER
Comments:     Last Office Visit (last PCP visit):   11/1/2023    Next Visit Date:  Future Appointments   Date Time Provider Department Center   12/19/2023  2:45 PM Jer Conde MD [facility]   1/2/2024  1:00 PM Hunter Granados MD [facility]   1/9/2024  2:00 PM Stephen Prado APRN - CNP [facility]   2/20/2024  2:30 PM Phil Bryant MD [facility] -   2/22/2024  1:00 PM Michi Duran MD [facility]   2/27/2024 10:00 AM Aristeo Tang, DO [facility]   8/6/2024 11:20 AM Aristeo Tang, DO [facility]       **If hasn't been seen in over a year OR hasn't followed up according to last diabetes/ADHD visit, make appointment for patient before sending refill to provider.    Rx requested:  Requested Prescriptions     Pending Prescriptions Disp Refills    metoprolol succinate (TOPROL XL) 50 MG extended release tablet [Pharmacy Med Name: Metoprolol Succinate ER 50 MG Oral Tablet Extended Release 24 Hour] 90 tablet 3     Sig: TAKE 1 TABLET BY MOUTH DAILY

## 2023-12-13 NOTE — CARE COORDINATION
8/4/23)  Social Work: Not Started (Comment: 8/4/23)  Zone Management Tools: Completed (Comment: mailed 8/4/23)          Goals Addressed                   This Visit's Progress     Conditions and Symptoms   On track     I will schedule office visits, as directed by my provider. I will keep my appointment or reschedule if I have to cancel. I will notify my provider of any barriers to my plan of care. I will follow my Zone Management tool to seek urgent or emergent care. I will notify my provider of any symptoms that indicate a worsening of my condition. Barriers: overwhelmed by complexity of regimen and stress  Plan for overcoming my barriers: will work with ACM towards goals and ACM will provide education and resources  Confidence: 10/10  Anticipated Goal Completion Date: 11/10/23         Self Monitoring   On track     Self-Monitored Blood Glucose - I will check my blood sugar Fasting blood sugar and blood sugars ac & HS  I will notify my provider of any trends of increasing or decreasing blood sugars over a 1 month period. I will notify my provider if I have any blood sugar readings less than 70 more than 2 times a month. Patient Reported Blood Glucose No flowsheet data found.     Barriers: overwhelmed by complexity of regimen and stress  Plan for overcoming my barriers: will work with ACM towards goals and will monitor BS and keep log to report to endo appt  Confidence: 9/10  Anticipated Goal Completion Date: 11/10/23                Future Appointments   Date Time Provider [address]   12/19/2023  2:45 PM Mary Gutierrez MD [address] [place] [address]   1/2/2024  1:00 PM Sara Spencer MD [address] [facility]   1/9/2024  2:00 PM CHEMO Escalante - CNP [facility]   2/20/2024  2:30 PM Lulu Ruiz MD [facility] [address] [facility] Neurology -   2/22/2024  1:00 PM Lulu Velasquez MD [facility]   2/27/2024 10:00 AM Mickie Mayfield DO [facility] EMERGENCY [facility] [place]   8/6/2024 11:20 AM [place]

## 2023-12-22 DIAGNOSIS — R35.1 BENIGN PROSTATIC HYPERPLASIA WITH NOCTURIA: Chronic | ICD-10-CM

## 2023-12-22 DIAGNOSIS — N40.1 BENIGN PROSTATIC HYPERPLASIA WITH NOCTURIA: Chronic | ICD-10-CM

## 2023-12-22 NOTE — TELEPHONE ENCOUNTER
Comments:     Last Office Visit (last PCP visit):   11/1/2023    Next Visit Date:  Future Appointments   Date Time Provider [address]   1/2/2024  1:00 PM Abdiaziz Murphy MD [facility]   1/9/2024  2:00 PM CHEOM Thakur - CNP [facility]   2/20/2024  2:30 PM Mildred Kramer  [address] Neurology -   2/22/2024  1:00 PM MD [facility]   2/27/2024 10:00 AM Letty Blanton DO [facility] EMERGENCY [facility] [place]   8/6/2024 11:20 AM Marouf, Ferrell Boast, DO [address]       **If hasn't been seen in over a year OR hasn't followed up according to last diabetes/ADHD visit, make appointment for patient before sending refill to provider.     Rx requested:  Requested Prescriptions     Pending Prescriptions Disp Refills    tamsulosin (FLOMAX) 0.4 MG capsule [Pharmacy Med Name: Tamsulosin HCl 0.4 MG Oral Capsule] 180 capsule 3     Sig: TAKE 2 CAPSULES BY MOUTH DAILY

## 2023-12-26 RX ORDER — TAMSULOSIN HYDROCHLORIDE 0.4 MG/1
0.8 CAPSULE ORAL DAILY
Qty: 180 CAPSULE | Refills: 0 | Status: SHIPPED | OUTPATIENT
Start: 2023-12-26

## 2024-01-05 ENCOUNTER — CARE COORDINATION (OUTPATIENT)
Dept: CARE COORDINATION | Age: 79
End: 2024-01-05

## 2024-01-05 ASSESSMENT — ENCOUNTER SYMPTOMS: DYSPNEA ASSOCIATED WITH: MINIMAL EXERTION

## 2024-01-05 NOTE — CARE COORDINATION
(Nutrition/Exercise/Self-Monitoring): No   Have you ever had to go to the ED for symptoms of low blood sugar?: No       Other symptoms causing concern   Do you have hyperglycemia symptoms?: No   Do you have hypoglycemia symptoms?: No   Last Blood Sugar Value: 141   Blood Sugar Monitoring Regimen: Morning Fasting, Before Meals   Blood Sugar Trends: No Change        ,   COPD Assessment    Does the patient understand envrionmental exposure?: Yes  Is the patient able to verbalize Rescue vs. Long Acting medications?: Yes  Does the patient have a nebulizer?: No  Does the patient use a space with inhaled medications?: No     Other symptoms causing concern         Symptoms:  COPD associated increased fatigue: Pos (Comment: chemo for lung cancer)      Symptom course: stable  Breathlessness: minimal exertion  Increase use of rapid acting/rescue inhaled medications?: No  Change in chronic cough?: No/At Baseline  Change in sputum?: No/At Baseline  Sputum characteristics: Clear  Self Monitoring - SaO2: No  Have you had a recent diagnosis of pneumonia either by PCP or at a hospital?: No     ,   General Assessment         , No linked episodes, and This patient was permanently screened out of Care Coordination on 1/5/2024 for the following reason:

## 2024-01-09 ENCOUNTER — OFFICE VISIT (OUTPATIENT)
Dept: PALLATIVE CARE | Age: 79
End: 2024-01-09
Payer: MEDICARE

## 2024-01-09 VITALS
OXYGEN SATURATION: 95 % | HEART RATE: 99 BPM | RESPIRATION RATE: 16 BRPM | TEMPERATURE: 97.9 F | SYSTOLIC BLOOD PRESSURE: 130 MMHG | DIASTOLIC BLOOD PRESSURE: 62 MMHG

## 2024-01-09 DIAGNOSIS — Z78.9 IMPAIRED MOBILITY AND ADLS: ICD-10-CM

## 2024-01-09 DIAGNOSIS — C34.91 NON-SMALL CELL CANCER OF RIGHT LUNG (HCC): ICD-10-CM

## 2024-01-09 DIAGNOSIS — Z74.09 IMPAIRED MOBILITY AND ADLS: ICD-10-CM

## 2024-01-09 DIAGNOSIS — J44.9 CHRONIC OBSTRUCTIVE PULMONARY DISEASE, UNSPECIFIED COPD TYPE (HCC): ICD-10-CM

## 2024-01-09 DIAGNOSIS — F41.8 ANXIETY ABOUT HEALTH: ICD-10-CM

## 2024-01-09 DIAGNOSIS — Z51.5 PALLIATIVE CARE ENCOUNTER: ICD-10-CM

## 2024-01-09 DIAGNOSIS — R53.1 WEAKNESS: Primary | ICD-10-CM

## 2024-01-09 PROCEDURE — 99349 HOME/RES VST EST MOD MDM 40: CPT

## 2024-01-09 PROCEDURE — 3078F DIAST BP <80 MM HG: CPT

## 2024-01-09 PROCEDURE — 3075F SYST BP GE 130 - 139MM HG: CPT

## 2024-01-09 PROCEDURE — 1123F ACP DISCUSS/DSCN MKR DOCD: CPT

## 2024-01-09 PROCEDURE — G8484 FLU IMMUNIZE NO ADMIN: HCPCS

## 2024-01-09 PROCEDURE — 1036F TOBACCO NON-USER: CPT

## 2024-01-09 PROCEDURE — G8417 CALC BMI ABV UP PARAM F/U: HCPCS

## 2024-01-09 RX ORDER — M-VIT,TX,IRON,MINS/CALC/FOLIC 27MG-0.4MG
1 TABLET ORAL DAILY
COMMUNITY

## 2024-01-09 RX ORDER — PHENOL 1.4 %
1 AEROSOL, SPRAY (ML) MUCOUS MEMBRANE
COMMUNITY

## 2024-01-09 ASSESSMENT — PATIENT HEALTH QUESTIONNAIRE - PHQ9
SUM OF ALL RESPONSES TO PHQ QUESTIONS 1-9: 0
1. LITTLE INTEREST OR PLEASURE IN DOING THINGS: 0
SUM OF ALL RESPONSES TO PHQ9 QUESTIONS 1 & 2: 0
SUM OF ALL RESPONSES TO PHQ QUESTIONS 1-9: 0
SUM OF ALL RESPONSES TO PHQ QUESTIONS 1-9: 0
2. FEELING DOWN, DEPRESSED OR HOPELESS: 0
SUM OF ALL RESPONSES TO PHQ QUESTIONS 1-9: 0

## 2024-01-09 ASSESSMENT — ENCOUNTER SYMPTOMS
TROUBLE SWALLOWING: 0
SHORTNESS OF BREATH: 0
COUGH: 0
BACK PAIN: 1
GASTROINTESTINAL NEGATIVE: 1
WHEEZING: 0

## 2024-01-09 ASSESSMENT — VISUAL ACUITY: OU: 1

## 2024-01-09 NOTE — PROGRESS NOTES
Musculoskeletal:         General: Swelling (BLE) present. No deformity.      Cervical back: Full passive range of motion without pain, normal range of motion and neck supple. No edema, erythema or signs of trauma. No pain with movement.      Right lower le+ Pitting Edema present.      Left lower le+ Pitting Edema present.   Feet:      Right foot:      Skin integrity: Skin integrity normal.      Left foot:      Skin integrity: Skin integrity normal.   Skin:     General: Skin is warm and dry.      Findings: Bruising present. No rash or wound.      Nails: There is no clubbing.   Neurological:      General: No focal deficit present.      Mental Status: He is alert, oriented to person, place, and time and easily aroused. Mental status is at baseline.      GCS: GCS eye subscore is 4. GCS verbal subscore is 5. GCS motor subscore is 6.      Motor: Weakness present.      Gait: Gait normal.   Psychiatric:         Attention and Perception: Attention and perception normal.         Mood and Affect: Mood and affect normal.         Speech: Speech normal.         Behavior: Behavior normal. Behavior is cooperative.         Thought Content: Thought content normal. Thought content does not include homicidal or suicidal ideation.         Cognition and Memory: Cognition and memory normal.         Judgment: Judgment normal.         Assessment and Plan:      1. Weakness  2. Impaired mobility and ADLs  -Patient expressed concerns with his continuing weakness of his bilateral lower extremities and recent fall on [place] night around midnight and having to utilize EMS for lift assist.  -Offered additional therapy to aid with strengthening, patient declined at this time.  -Encouraged ambulation, stretching and exercise as tolerated  -Continue current level of activity to maintain current quality of life.  -Exercise and stretch as tolerated to decrease joint stiffening, pain and to maintain range of motion.  -Continue to use

## 2024-01-15 ENCOUNTER — PATIENT MESSAGE (OUTPATIENT)
Dept: FAMILY MEDICINE CLINIC | Age: 79
End: 2024-01-15

## 2024-01-15 DIAGNOSIS — N40.1 BENIGN PROSTATIC HYPERPLASIA WITH NOCTURIA: Chronic | ICD-10-CM

## 2024-01-15 DIAGNOSIS — F51.04 PSYCHOPHYSIOLOGICAL INSOMNIA: Primary | ICD-10-CM

## 2024-01-15 DIAGNOSIS — R35.1 BENIGN PROSTATIC HYPERPLASIA WITH NOCTURIA: Chronic | ICD-10-CM

## 2024-01-15 RX ORDER — TAMSULOSIN HYDROCHLORIDE 0.4 MG/1
0.8 CAPSULE ORAL DAILY
Qty: 180 CAPSULE | Refills: 1 | Status: SHIPPED | OUTPATIENT
Start: 2024-01-15

## 2024-01-15 RX ORDER — ZOLPIDEM TARTRATE 10 MG/1
5 TABLET ORAL NIGHTLY PRN
Qty: 14 TABLET | Refills: 0 | Status: SHIPPED | OUTPATIENT
Start: 2024-01-15 | End: 2024-02-12

## 2024-01-15 NOTE — TELEPHONE ENCOUNTER
Comments:     Last Office Visit (last PCP visit):   11/1/2023    Next Visit Date:  Future Appointments   Date Time Provider Department Center   2/12/2024  2:00 PM Stephen Prado APRN - CNP [facility]   2/20/2024  2:30 PM Phil Bryant MD [facility] -   2/22/2024  1:00 PM Michi Duran MD [facility]   2/27/2024 10:00 AM Aristeo Tang, DO [facility]   8/6/2024 11:20 AM Aristeo Tang, DO [facility]       **If hasn't been seen in over a year OR hasn't followed up according to last diabetes/ADHD visit, make appointment for patient before sending refill to provider.    Rx requested:  Requested Prescriptions     Pending Prescriptions Disp Refills    tamsulosin (FLOMAX) 0.4 MG capsule 180 capsule 0     Sig: Take 2 capsules by mouth daily

## 2024-01-15 NOTE — TELEPHONE ENCOUNTER
From: Anshu Bassett  To: Dr. Cam Prado  Sent: 1/15/2024 2:39 PM EST  Subject: Tamsulosin 0.4 mg    Would you please submit a new subcription for the above med to [facility].    nk you so much

## 2024-01-19 NOTE — TELEPHONE ENCOUNTER
10/30/23- I called patient's spouse Pierre Bobo this afternoon to check in on Cat Bailey. She said that his diarrhea and vomiting have gotten better. She explains that he is very weak. I asked if he has fallen, she says no that he has been using the rollator around the house. He is able to stay hydrated. I asked if there were any other concerning symptoms he is still experiencing and she said no just that he is very very weak. I encouraged her to give us a call if any new or worsening symptoms occur.         ----- Message -----  From: CHEMO Diamond CNP  Sent: 10/27/2023   3:39 PM EDT  To: Alvarado Bhatt RN    Soledad,  If diarrhea persists and is not controlled by Imodium, will need to rule out C. difficile. I will place a order for C. difficile and if diarrhea continues please obtain a sample and take to [facility] lab. Also if patient has uncontrollable diarrhea and cannot keep fluids down, becomes weak, low blood pressure or diarrhea continues palliative care would recommend that he be further evaluated for dehydration. Please call and update patient and patient's wife. Thank you,  Jorge Dorman  ----- Message -----  From: Alvarado Bhatt RN  Sent: 10/27/2023  12:58 PM EDT  To: CHEMO Diamond CNP    Yes  ----- Message -----  From: CHEMO Diamond CNP  Sent: 10/27/2023  12:27 PM EDT  To: Rossy Gonzalez,  Did she say if he was taking the 2 tabs to start and then 1 after? Jorge Dorman  ----- Message -----  From: Alvarado Bhatt RN  Sent: 10/27/2023  10:05 AM EDT  To: CHEMO Diamond CNP    Spoke with spouse this morning, she said that he has actually been taking imodium for the last two days and it hasn't helped (she did not mention this yesterday when I asked what he has taken OTC). He continued to have diarrhea through the evening and into the night.  He is currently sleeping.    ----- Message -----  From: CHEMO Diamond CNP  Sent: 10/26/2023   7:24 PM EDT  To: Alvarado Bhatt, Result sent to [facility].

## 2024-01-22 ENCOUNTER — OFFICE VISIT (OUTPATIENT)
Dept: ORTHOPEDIC SURGERY | Facility: CLINIC | Age: 79
End: 2024-01-22
Payer: MEDICARE

## 2024-01-22 VITALS — HEIGHT: 70 IN | BODY MASS INDEX: 27.2 KG/M2 | WEIGHT: 190 LBS

## 2024-01-22 DIAGNOSIS — M70.21 OLECRANON BURSITIS OF RIGHT ELBOW: Primary | ICD-10-CM

## 2024-01-22 PROCEDURE — 99213 OFFICE O/P EST LOW 20 MIN: CPT | Performed by: STUDENT IN AN ORGANIZED HEALTH CARE EDUCATION/TRAINING PROGRAM

## 2024-01-22 PROCEDURE — 1125F AMNT PAIN NOTED PAIN PRSNT: CPT | Performed by: STUDENT IN AN ORGANIZED HEALTH CARE EDUCATION/TRAINING PROGRAM

## 2024-01-22 PROCEDURE — 1159F MED LIST DOCD IN RCRD: CPT | Performed by: STUDENT IN AN ORGANIZED HEALTH CARE EDUCATION/TRAINING PROGRAM

## 2024-01-22 RX ORDER — SULFAMETHOXAZOLE AND TRIMETHOPRIM 800; 160 MG/1; MG/1
1 TABLET ORAL 2 TIMES DAILY
Qty: 28 TABLET | Refills: 0 | Status: SHIPPED | OUTPATIENT
Start: 2024-01-22 | End: 2024-02-09 | Stop reason: SDUPTHER

## 2024-01-22 RX ORDER — CEPHALEXIN 500 MG/1
500 CAPSULE ORAL 3 TIMES DAILY
Qty: 42 CAPSULE | Refills: 0 | Status: SHIPPED | OUTPATIENT
Start: 2024-01-22 | End: 2024-02-09 | Stop reason: SDUPTHER

## 2024-01-22 ASSESSMENT — PAIN - FUNCTIONAL ASSESSMENT: PAIN_FUNCTIONAL_ASSESSMENT: 0-10

## 2024-01-22 ASSESSMENT — PAIN SCALES - GENERAL: PAINLEVEL_OUTOF10: 3

## 2024-01-22 NOTE — PROGRESS NOTES
Chief Complaint   Patient presents with    Right Elbow - Follow-up     Open repair right elbow triceps   DOI- 9/25/22 fell at home   DOS - 10/25/22 1 year 3 month        HPI  Patient presents today for follow up of his right elbow.  Patient is status post open right triceps tendon repair date of surgery 10/25/2022 about 1 year prior.  Had unremarkable postoperative course was doing very well no issues.  Currently receiving chemotherapy for stage IV lung cancer.  The past several weeks time he has developed some drainage about the right elbow.  Clear in nature denies any fevers chills or malaise.       Physical exam  General: Alert and oriented to place, person, and time.  No acute distress and breathing comfortably; pleasant and cooperative with the examination.  Extremity:  Focused examination right elbow: Small sinus tract about the olecranon bursa.  No surrounding erythema able to flex and extend elbow without difficulty good triceps tendon strength no pain with resisted extension.  No pain with supination pronation flexion or extension.  Nontender palpation medial lateral epicondyles.    Diagnostics:  No results found.     Procedures  Procedures     Assessment:  78-year-old male with draining sinus tract about the olecranon consistent with low-grade surgical site infection versus olecranon bursitis    Treatment plan:  Will provide oral antibiotic namely Bactrim Keflex  Will proceed with antibiotics for 2 weeks time and repeat evaluation at that point in time.  Given patient's current medical status and history of chemotherapy will continue with conservative treatment.  Discussed that at this point time my goal is to keep patient out of the hospital is much as possible therefore we will likely proceed with oral antibiotic therapy for as long as possible as long as there was not worsening infection.  Discussed with patient that he is clearly immunocompromise right now and that I do think he would benefit from  oral antibiotics.  Discussed signs and symptoms to monitor for  Will return to clinic in 2 weeks time for repeat evaluation  All of the patient's questions concerns answered

## 2024-01-26 ENCOUNTER — CARE COORDINATION (OUTPATIENT)
Dept: CARE COORDINATION | Age: 79
End: 2024-01-26

## 2024-01-26 ASSESSMENT — ENCOUNTER SYMPTOMS: DYSPNEA ASSOCIATED WITH: MINIMAL EXERTION

## 2024-01-26 NOTE — CARE COORDINATION
Assessment    Do you have any symptoms that are causing concern?: Yes  Progression since Onset: Gradually Worsening  Reported Symptoms: Weakness, Fatigue (Comment: chemo yesterday)     , No linked episodes, and This patient was permanently screened out of Care Coordination on 1/26/2024 for the following reason:

## 2024-01-28 ENCOUNTER — OFFICE VISIT (OUTPATIENT)
Dept: FAMILY MEDICINE CLINIC | Age: 79
End: 2024-01-28

## 2024-01-28 VITALS
BODY MASS INDEX: 25.94 KG/M2 | TEMPERATURE: 97.4 F | OXYGEN SATURATION: 98 % | DIASTOLIC BLOOD PRESSURE: 60 MMHG | HEART RATE: 96 BPM | WEIGHT: 186 LBS | SYSTOLIC BLOOD PRESSURE: 104 MMHG

## 2024-01-28 DIAGNOSIS — J32.9 VIRAL SINUSITIS: Primary | ICD-10-CM

## 2024-01-28 DIAGNOSIS — B97.89 VIRAL SINUSITIS: Primary | ICD-10-CM

## 2024-01-28 RX ORDER — SULFAMETHOXAZOLE AND TRIMETHOPRIM 800; 160 MG/1; MG/1
1 TABLET ORAL
COMMUNITY
Start: 2024-01-22 | End: 2024-02-05

## 2024-01-28 RX ORDER — PREDNISONE 20 MG/1
20 TABLET ORAL 2 TIMES DAILY
Qty: 10 TABLET | Refills: 0 | Status: SHIPPED | OUTPATIENT
Start: 2024-01-28 | End: 2024-02-02

## 2024-01-28 RX ORDER — CEPHALEXIN 500 MG/1
500 CAPSULE ORAL EVERY 8 HOURS
COMMUNITY
Start: 2024-01-22 | End: 2024-02-05

## 2024-01-30 ENCOUNTER — PATIENT MESSAGE (OUTPATIENT)
Dept: FAMILY MEDICINE CLINIC | Age: 79
End: 2024-01-30

## 2024-01-30 DIAGNOSIS — I10 PRIMARY HYPERTENSION: Chronic | ICD-10-CM

## 2024-01-31 RX ORDER — METOPROLOL SUCCINATE 50 MG/1
50 TABLET, EXTENDED RELEASE ORAL DAILY
Qty: 90 TABLET | Refills: 1 | Status: SHIPPED | OUTPATIENT
Start: 2024-01-31

## 2024-01-31 NOTE — TELEPHONE ENCOUNTER
Comments:     Last Office Visit (last PCP visit):   11/1/2023    Next Visit Date:  Future Appointments   Date Time Provider Department Center   2/12/2024  2:00 PM Stephen Prado APRN - CNP [facility]   2/20/2024  2:30 PM Phil Bryant MD [facility] Neurology -   2/22/2024  1:00 PM Michi Duran MD [facility]   2/27/2024 10:00 AM Aristeo Tang, DO [facility]   8/6/2024 11:20 AM Aristeo Tang,  [facility]       **If hasn't been seen in over a year OR hasn't followed up according to last diabetes/ADHD visit, make appointment for patient before sending refill to provider.    Rx requested:  Requested Prescriptions     Pending Prescriptions Disp Refills    metoprolol succinate (TOPROL XL) 50 MG extended release tablet 90 tablet 1     Sig: Take 1 tablet by mouth daily

## 2024-02-01 ENCOUNTER — HOSPITAL ENCOUNTER (OUTPATIENT)
Dept: GENERAL RADIOLOGY | Age: 79
Discharge: HOME | End: 2024-02-03
Payer: MEDICARE

## 2024-02-01 ENCOUNTER — HOSPITAL ENCOUNTER (OUTPATIENT)
Age: 79
Discharge: HOME | End: 2024-02-03
Payer: MEDICARE

## 2024-02-01 ENCOUNTER — OFFICE VISIT (OUTPATIENT)
Dept: FAMILY MEDICINE CLINIC | Age: 79
End: 2024-02-01

## 2024-02-01 VITALS
WEIGHT: 186 LBS | SYSTOLIC BLOOD PRESSURE: 106 MMHG | OXYGEN SATURATION: 95 % | BODY MASS INDEX: 26.04 KG/M2 | DIASTOLIC BLOOD PRESSURE: 62 MMHG | HEIGHT: 71 IN | TEMPERATURE: 97.7 F | HEART RATE: 98 BPM

## 2024-02-01 DIAGNOSIS — R05.9 COUGH, UNSPECIFIED TYPE: ICD-10-CM

## 2024-02-01 DIAGNOSIS — R06.2 WHEEZING: ICD-10-CM

## 2024-02-01 DIAGNOSIS — J06.9 VIRAL URI: Primary | ICD-10-CM

## 2024-02-01 DIAGNOSIS — R80.9 TYPE 2 DIABETES MELLITUS WITH MICROALBUMINURIA, WITHOUT LONG-TERM CURRENT USE OF INSULIN (HCC): Chronic | ICD-10-CM

## 2024-02-01 DIAGNOSIS — R09.82 POST-NASAL DRAINAGE: ICD-10-CM

## 2024-02-01 DIAGNOSIS — E11.29 TYPE 2 DIABETES MELLITUS WITH MICROALBUMINURIA, WITHOUT LONG-TERM CURRENT USE OF INSULIN (HCC): Chronic | ICD-10-CM

## 2024-02-01 DIAGNOSIS — R09.81 NASAL CONGESTION: ICD-10-CM

## 2024-02-01 LAB
INFLUENZA A ANTIBODY: NORMAL
INFLUENZA B ANTIBODY: NORMAL
Lab: NORMAL
PERFORMING INSTRUMENT: NORMAL
QC PASS/FAIL: NORMAL
RSV ANTIGEN: NORMAL
SARS-COV-2, POC: NORMAL

## 2024-02-01 PROCEDURE — 71046 X-RAY EXAM CHEST 2 VIEWS: CPT

## 2024-02-01 ASSESSMENT — ENCOUNTER SYMPTOMS
ABDOMINAL PAIN: 0
RHINORRHEA: 0
TROUBLE SWALLOWING: 0
COUGH: 1
SORE THROAT: 0
DIARRHEA: 0
SINUS PRESSURE: 0
NAUSEA: 0
SINUS PAIN: 0
SHORTNESS OF BREATH: 1
WHEEZING: 0
CHEST TIGHTNESS: 0
VOMITING: 0

## 2024-02-01 NOTE — RESULT ENCOUNTER NOTE
Please notify patient and wife that recent chest x-ray is reported as negative for acute findings.  There is no fluid on the lungs, signs of lung collapse, or suggestions of pneumonia.

## 2024-02-01 NOTE — PROGRESS NOTES
Anshu Bassett (: 1945) is a 78 y.o. male, Established patient, who presents today for:    Chief Complaint   Patient presents with    URI     F/u from  visit 1..24  Pt presents with head cold that is going into his lungs.  Pt states he is not feeling any better. Pt states cough, wheezing, chest congestion x 1 week. Currently taking 2 antibiotics          ASSESSMENT/PLAN:    1. Viral URI  Comments:  Improving since recent visit.  Neg flu/COVID/RSV testing. Patient will use ipratropium nasal spray TID and continue with oral hydration. See below.  2. Cough, unspecified type  Comments:  Will obtain stat CXR to R/O LRTI or new onset effusion.  Orders:  -     XR CHEST STANDARD (2 VW); Future  3. Wheezing  Comments:  See above. Instructions given to go to ER for any new onset fevers/chills, worsening productive cough, worsening dyspnea, or low SpO2 on home pulse oximetry.  Orders:  -     POCT COVID-19, Antigen  -     POCT Influenza A/B  -     POCT RSV  -     XR CHEST STANDARD (2 VW); Future  4. Nasal congestion  Comments:  Patient to restart ipratropium nasal spray TID for management. Has medication at home.  5. Post-nasal drainage  Comments:  Patient to restart ipratropium nasal spray TID for management. Has medication at home.  6. Type 2 diabetes mellitus with microalbuminuria, without long-term current use of insulin ([facility])  -     Dulaglutide 3 MG/0.5ML SOPN; Inject 3 mg into the skin once a week, Disp-6 mL, R-1Normal      Return for Annual Wellness Visit in 1-2 Months.       SUBJECTIVE/OBJECTIVE:    HPI    Patient presents for acute visit regarding acute respiratory symptoms.  They were seen at [facility] on 2024 with complaints of nasal congestion, cough, sinus pressure, sore throat, and dyspnea.  Patient came to the visit already on a course of Bactrim and Keflex for 5 days (day 5 of 14) secondary to olecranon bursitis in the right elbow treated by orthopedics.  Documented vital signs

## 2024-02-05 ENCOUNTER — OFFICE VISIT (OUTPATIENT)
Dept: PULMONOLOGY | Age: 79
End: 2024-02-05
Payer: MEDICARE

## 2024-02-05 ENCOUNTER — APPOINTMENT (OUTPATIENT)
Dept: ORTHOPEDIC SURGERY | Facility: CLINIC | Age: 79
End: 2024-02-05
Payer: MEDICARE

## 2024-02-05 ENCOUNTER — OFFICE VISIT (OUTPATIENT)
Dept: ENDOCRINOLOGY | Age: 79
End: 2024-02-05
Payer: MEDICARE

## 2024-02-05 VITALS
SYSTOLIC BLOOD PRESSURE: 103 MMHG | HEART RATE: 94 BPM | HEIGHT: 71 IN | WEIGHT: 196 LBS | OXYGEN SATURATION: 93 % | BODY MASS INDEX: 27.44 KG/M2 | DIASTOLIC BLOOD PRESSURE: 61 MMHG

## 2024-02-05 VITALS
HEART RATE: 91 BPM | DIASTOLIC BLOOD PRESSURE: 62 MMHG | TEMPERATURE: 97 F | HEIGHT: 71 IN | RESPIRATION RATE: 18 BRPM | BODY MASS INDEX: 27.13 KG/M2 | SYSTOLIC BLOOD PRESSURE: 132 MMHG | WEIGHT: 193.8 LBS | OXYGEN SATURATION: 94 %

## 2024-02-05 DIAGNOSIS — E29.1 HYPOGONADISM MALE: Primary | ICD-10-CM

## 2024-02-05 DIAGNOSIS — J44.9 CHRONIC OBSTRUCTIVE PULMONARY DISEASE, UNSPECIFIED COPD TYPE (HCC): ICD-10-CM

## 2024-02-05 DIAGNOSIS — B37.42 CANDIDAL BALANITIS: ICD-10-CM

## 2024-02-05 DIAGNOSIS — G70.00 MYASTHENIA GRAVIS (HCC): ICD-10-CM

## 2024-02-05 DIAGNOSIS — R06.02 SOB (SHORTNESS OF BREATH): Primary | ICD-10-CM

## 2024-02-05 DIAGNOSIS — I26.99 PULMONARY EMBOLISM AND INFARCTION (HCC): ICD-10-CM

## 2024-02-05 DIAGNOSIS — C34.91 SQUAMOUS CELL CARCINOMA LUNG, RIGHT (HCC): ICD-10-CM

## 2024-02-05 PROCEDURE — 99213 OFFICE O/P EST LOW 20 MIN: CPT | Performed by: INTERNAL MEDICINE

## 2024-02-05 PROCEDURE — 1123F ACP DISCUSS/DSCN MKR DOCD: CPT | Performed by: INTERNAL MEDICINE

## 2024-02-05 PROCEDURE — G8417 CALC BMI ABV UP PARAM F/U: HCPCS | Performed by: INTERNAL MEDICINE

## 2024-02-05 PROCEDURE — G8484 FLU IMMUNIZE NO ADMIN: HCPCS | Performed by: INTERNAL MEDICINE

## 2024-02-05 PROCEDURE — 3074F SYST BP LT 130 MM HG: CPT | Performed by: INTERNAL MEDICINE

## 2024-02-05 PROCEDURE — 99215 OFFICE O/P EST HI 40 MIN: CPT | Performed by: INTERNAL MEDICINE

## 2024-02-05 PROCEDURE — G8427 DOCREV CUR MEDS BY ELIG CLIN: HCPCS | Performed by: INTERNAL MEDICINE

## 2024-02-05 PROCEDURE — 3078F DIAST BP <80 MM HG: CPT | Performed by: INTERNAL MEDICINE

## 2024-02-05 PROCEDURE — 1036F TOBACCO NON-USER: CPT | Performed by: INTERNAL MEDICINE

## 2024-02-05 PROCEDURE — 3075F SYST BP GE 130 - 139MM HG: CPT | Performed by: INTERNAL MEDICINE

## 2024-02-05 PROCEDURE — 3023F SPIROM DOC REV: CPT | Performed by: INTERNAL MEDICINE

## 2024-02-05 RX ORDER — FLUCONAZOLE 150 MG/1
TABLET ORAL
Qty: 3 TABLET | Refills: 1 | Status: SHIPPED | OUTPATIENT
Start: 2024-02-05 | End: 2024-02-08 | Stop reason: SDUPTHER

## 2024-02-05 RX ORDER — NYSTATIN 100000 U/G
CREAM TOPICAL
Qty: 15 G | Refills: 1 | Status: SHIPPED | OUTPATIENT
Start: 2024-02-05

## 2024-02-05 NOTE — PROGRESS NOTES
2024    Assessment:       Diagnosis Orders   1. Hypogonadism male        2. Candidal balanitis                PLAN:     Orders Placed This Encounter   Procedures    Testosterone, free, total     Standing Status:   Future     Standing Expiration Date:   2025     Orders Placed This Encounter   Medications    fluconazole (DIFLUCAN) 150 MG tablet     Si po for for 3 days     Dispense:  3 tablet     Refill:  1    nystatin (MYCOSTATIN) 291655 UNIT/GM cream     Sig: Apply topically 2 times daily.     Dispense:  15 g     Refill:  1     Start patient on Diflucan and Mycostatin cream if symptoms do not improve see urology    Subjective:     Chief Complaint   Patient presents with    Other     Growth on end of penis would like checked and given cream for it.  Patient states he stopped the Testosterone a while back.     Vitals:    24 1427   BP: 103/61   Pulse: 94   SpO2: 93%   Weight: 88.9 kg (196 lb)   Height: 1.803 m (5' 10.98\")     Wt Readings from Last 3 Encounters:   24 88.9 kg (196 lb)   24 84.4 kg (186 lb)   24 84.4 kg (186 lb)     BP Readings from Last 3 Encounters:   24 103/61   24 106/62   24 104/60     Follow-up on hypogonadism patient currently not on testosterone replacement  Complains of penile discharge possible yeast infection wants a cream patient also has cough congestion plan to see pulmonologist    Other  This is a new (penile discharge) problem. The current episode started in the past 7 days. The problem occurs constantly. Associated symptoms include a rash.     Past Medical History:   Diagnosis Date    Atypical chest pain 2012    BPH (benign prostatic hypertrophy)     CAD (coronary artery disease)     Cataracts, bilateral     Chronic low back pain     COPD (chronic obstructive pulmonary disease) (HCC) 2006    DM2 (diabetes mellitus, type 2) (HCC)     Duodenitis     Erectile dysfunction 3/6/2017    Fatty infiltration of liver 2014    GERD

## 2024-02-05 NOTE — PROGRESS NOTES
Subjective:     Anshu Bassett is a 78 y.o. male who complains today of:     Chief Complaint   Patient presents with    Follow-up     COPD and More increased Shortness of Breath    Cough     Congestion       HPI  Patient presents for shortness of breath  2/5/2024  Presents for follow-up, still short of breath, he is currently taking prednisone 20 mg daily, no coughing, no chest pain, no worsening lower extremity edema, he did not tolerate his last chemotherapy, no fever, weight is stable, no nasal congestion or postnasal drip.  12/4/2023  Slightly better, he is not using oxygen frequently now, still short of breath but slightly improved, no coughing, no chest pain, no fever no chills, lower extremity edema improved, no heartburn, no nasal congestion or postnasal drip, he will be starting chemotherapy on Friday.      11/27/2023  Patient presents post hospital discharge, was recently hospitalized for Pseudomonas pneumonia, candidiasis, COVID-19, and volume overload.  Discharged home, has not been feeling good, symptoms not improved, still short of breath on minimal exertion, unable to do any of his activities, swelling is getting worse he is using Lasix for his wife's supply, no coughing, no fever no chills, no chest pain.  He has been using his wife's home oxygen as well.  He has underlying myasthenia gravis  History of PE Eliquis is on hold due to GI bleed he underwent EGD during his hospitalization.  He finished his first round of chemotherapy last month  11/7/2023  Presents after recent discharge from the hospital, he was recently hospitalized for myasthenia gravis, discharged home on Lasix, unfortunately I was not informed of his hospitalization, he does not feel good, he reported orthopnea, increased lower extremity edema, denies chest pain, no coughing, no wheezing, no nasal congestion or postnasal drip, he will be starting chemotherapy on Friday.      8/3/2023  He has dyspnea on exertion, muscle strength is

## 2024-02-06 ENCOUNTER — PATIENT MESSAGE (OUTPATIENT)
Dept: FAMILY MEDICINE CLINIC | Age: 79
End: 2024-02-06

## 2024-02-06 DIAGNOSIS — E11.29 TYPE 2 DIABETES MELLITUS WITH MICROALBUMINURIA, WITHOUT LONG-TERM CURRENT USE OF INSULIN (HCC): Chronic | ICD-10-CM

## 2024-02-06 DIAGNOSIS — R80.9 TYPE 2 DIABETES MELLITUS WITH MICROALBUMINURIA, WITHOUT LONG-TERM CURRENT USE OF INSULIN (HCC): Chronic | ICD-10-CM

## 2024-02-06 DIAGNOSIS — I10 PRIMARY HYPERTENSION: Chronic | ICD-10-CM

## 2024-02-06 RX ORDER — GLIPIZIDE 5 MG/1
5 TABLET ORAL 2 TIMES DAILY
Qty: 180 TABLET | Refills: 1 | Status: SHIPPED | OUTPATIENT
Start: 2024-02-06

## 2024-02-06 RX ORDER — LOSARTAN POTASSIUM 25 MG/1
25 TABLET ORAL DAILY
Qty: 90 TABLET | Refills: 1 | Status: SHIPPED | OUTPATIENT
Start: 2024-02-06

## 2024-02-06 NOTE — TELEPHONE ENCOUNTER
Comments:     Last Office Visit (last PCP visit):   2/1/2024    Next Visit Date:  Future Appointments   Date Time Provider Department Center   2/12/2024  2:00 PM Stephen Prado APRN - CNP [facility]   2/20/2024  2:30 PM Phil Bryant MD [facility] -   2/26/2024  1:00 PM Hunter Granados MD [facility]   2/27/2024 10:00 AM Aristeo Tang, DO [facility]   4/2/2024 11:00 AM Cam Prado MD [facility]   5/6/2024  2:00 PM Michi Duran MD [facility]   8/6/2024 11:20 AM Aristeo Tang, DO [facility]       **If hasn't been seen in over a year OR hasn't followed up according to last diabetes/ADHD visit, make appointment for patient before sending refill to provider.    Rx requested:  Requested Prescriptions     Pending Prescriptions Disp Refills    losartan (COZAAR) 25 MG tablet 90 tablet 0     Sig: Take 1 tablet by mouth daily    glipiZIDE (GLUCOTROL) 10 MG tablet 90 tablet 0     Sig: Take 0.5 tablets by mouth 2 times daily

## 2024-02-06 NOTE — TELEPHONE ENCOUNTER
From: Anshu Bassett  To: Dr. Cam Prado  Sent: 2/6/2024 11:26 AM EST  Subject: Precription refill    Please submit prescription refills to [facility] for:  Losartan Tab 25 mg  Glipizide 10 mg    Thanks so much.

## 2024-02-06 NOTE — TELEPHONE ENCOUNTER
Chronic refills have been sent to mail order pharmacy.  Please notify patient and wife that I changed his glipizide to a 5 mg tablet so he did not have to cut tablets in half.  It will be easier to simply take 1 tablet twice a day of the lower dose glipizide (his previous dosing was 10 mg tablet cut in half twice a day).

## 2024-02-08 ENCOUNTER — TELEPHONE (OUTPATIENT)
Dept: ENDOCRINOLOGY | Age: 79
End: 2024-02-08

## 2024-02-08 RX ORDER — FLUCONAZOLE 150 MG/1
TABLET ORAL
Qty: 3 TABLET | Refills: 1 | Status: SHIPPED | OUTPATIENT
Start: 2024-02-08 | End: 2024-02-14 | Stop reason: SDUPTHER

## 2024-02-08 NOTE — TELEPHONE ENCOUNTER
Patient states that he would like a refill on Diflucan. He states that the infection is almost cleared, but he thinks three more days would be best. He would like that sent to [facility] in [place]. Please advise. Thanks!

## 2024-02-09 ENCOUNTER — OFFICE VISIT (OUTPATIENT)
Dept: ORTHOPEDIC SURGERY | Facility: CLINIC | Age: 79
End: 2024-02-09
Payer: MEDICARE

## 2024-02-09 VITALS — BODY MASS INDEX: 27.2 KG/M2 | HEIGHT: 70 IN | WEIGHT: 190 LBS

## 2024-02-09 DIAGNOSIS — M70.21 OLECRANON BURSITIS OF RIGHT ELBOW: ICD-10-CM

## 2024-02-09 PROCEDURE — 1159F MED LIST DOCD IN RCRD: CPT | Performed by: STUDENT IN AN ORGANIZED HEALTH CARE EDUCATION/TRAINING PROGRAM

## 2024-02-09 PROCEDURE — 99214 OFFICE O/P EST MOD 30 MIN: CPT | Performed by: STUDENT IN AN ORGANIZED HEALTH CARE EDUCATION/TRAINING PROGRAM

## 2024-02-09 PROCEDURE — 1125F AMNT PAIN NOTED PAIN PRSNT: CPT | Performed by: STUDENT IN AN ORGANIZED HEALTH CARE EDUCATION/TRAINING PROGRAM

## 2024-02-09 RX ORDER — SULFAMETHOXAZOLE AND TRIMETHOPRIM 800; 160 MG/1; MG/1
1 TABLET ORAL 2 TIMES DAILY
Qty: 28 TABLET | Refills: 0 | Status: SHIPPED | OUTPATIENT
Start: 2024-02-09 | End: 2024-02-23

## 2024-02-09 RX ORDER — CEPHALEXIN 500 MG/1
500 CAPSULE ORAL 3 TIMES DAILY
Qty: 42 CAPSULE | Refills: 0 | Status: SHIPPED | OUTPATIENT
Start: 2024-02-09 | End: 2024-02-23

## 2024-02-09 ASSESSMENT — PAIN SCALES - GENERAL: PAINLEVEL_OUTOF10: 4

## 2024-02-09 ASSESSMENT — PAIN - FUNCTIONAL ASSESSMENT: PAIN_FUNCTIONAL_ASSESSMENT: 0-10

## 2024-02-12 ENCOUNTER — OFFICE VISIT (OUTPATIENT)
Dept: PALLATIVE CARE | Age: 79
End: 2024-02-12
Payer: MEDICARE

## 2024-02-12 VITALS
HEART RATE: 63 BPM | TEMPERATURE: 97.9 F | DIASTOLIC BLOOD PRESSURE: 68 MMHG | SYSTOLIC BLOOD PRESSURE: 132 MMHG | RESPIRATION RATE: 18 BRPM | OXYGEN SATURATION: 94 %

## 2024-02-12 DIAGNOSIS — J44.9 CHRONIC OBSTRUCTIVE PULMONARY DISEASE, UNSPECIFIED COPD TYPE (HCC): ICD-10-CM

## 2024-02-12 DIAGNOSIS — R30.0 DYSURIA: Primary | ICD-10-CM

## 2024-02-12 DIAGNOSIS — Z51.5 PALLIATIVE CARE ENCOUNTER: ICD-10-CM

## 2024-02-12 DIAGNOSIS — C34.91 NON-SMALL CELL CANCER OF RIGHT LUNG (HCC): ICD-10-CM

## 2024-02-12 DIAGNOSIS — Z91.81 HISTORY OF RECENT FALL: ICD-10-CM

## 2024-02-12 PROCEDURE — 3075F SYST BP GE 130 - 139MM HG: CPT

## 2024-02-12 PROCEDURE — 1036F TOBACCO NON-USER: CPT

## 2024-02-12 PROCEDURE — 3078F DIAST BP <80 MM HG: CPT

## 2024-02-12 PROCEDURE — G8417 CALC BMI ABV UP PARAM F/U: HCPCS

## 2024-02-12 PROCEDURE — 1123F ACP DISCUSS/DSCN MKR DOCD: CPT

## 2024-02-12 PROCEDURE — 99349 HOME/RES VST EST MOD MDM 40: CPT

## 2024-02-12 PROCEDURE — G8484 FLU IMMUNIZE NO ADMIN: HCPCS

## 2024-02-13 DIAGNOSIS — Z51.5 PALLIATIVE CARE ENCOUNTER: ICD-10-CM

## 2024-02-13 DIAGNOSIS — R30.0 DYSURIA: ICD-10-CM

## 2024-02-13 LAB
BACTERIA URNS QL MICRO: NEGATIVE /HPF
BILIRUB UR QL STRIP: NEGATIVE
CLARITY UR: CLEAR
COLOR UR: YELLOW
EPI CELLS #/AREA URNS AUTO: ABNORMAL /HPF (ref 0–5)
GLUCOSE UR STRIP-MCNC: 100 MG/DL
HGB UR QL STRIP: NEGATIVE
HYALINE CASTS #/AREA URNS AUTO: ABNORMAL /HPF (ref 0–5)
KETONES UR STRIP-MCNC: NEGATIVE MG/DL
LEUKOCYTE ESTERASE UR QL STRIP: ABNORMAL
NITRITE UR QL STRIP: NEGATIVE
PH UR STRIP: 6.5 [PH] (ref 5–9)
PROT UR STRIP-MCNC: NEGATIVE MG/DL
RBC #/AREA URNS AUTO: ABNORMAL /HPF (ref 0–5)
SP GR UR STRIP: 1.01 (ref 1–1.03)
UROBILINOGEN UR STRIP-ACNC: 0.2 E.U./DL
WBC #/AREA URNS AUTO: ABNORMAL /HPF (ref 0–5)

## 2024-02-14 ENCOUNTER — TELEPHONE (OUTPATIENT)
Dept: FAMILY MEDICINE CLINIC | Age: 79
End: 2024-02-14

## 2024-02-14 ENCOUNTER — OFFICE VISIT (OUTPATIENT)
Dept: FAMILY MEDICINE CLINIC | Age: 79
End: 2024-02-14

## 2024-02-14 ENCOUNTER — PATIENT MESSAGE (OUTPATIENT)
Dept: FAMILY MEDICINE CLINIC | Age: 79
End: 2024-02-14

## 2024-02-14 VITALS
WEIGHT: 195 LBS | SYSTOLIC BLOOD PRESSURE: 124 MMHG | BODY MASS INDEX: 27.3 KG/M2 | DIASTOLIC BLOOD PRESSURE: 62 MMHG | OXYGEN SATURATION: 97 % | TEMPERATURE: 97.3 F | HEIGHT: 71 IN | HEART RATE: 96 BPM

## 2024-02-14 DIAGNOSIS — N47.6 BALANOPOSTHITIS: Primary | ICD-10-CM

## 2024-02-14 DIAGNOSIS — N40.1 BENIGN PROSTATIC HYPERPLASIA WITH NOCTURIA: Chronic | ICD-10-CM

## 2024-02-14 DIAGNOSIS — R35.1 BENIGN PROSTATIC HYPERPLASIA WITH NOCTURIA: Chronic | ICD-10-CM

## 2024-02-14 RX ORDER — CLOTRIMAZOLE 1 %
CREAM (GRAM) TOPICAL
Qty: 60 G | Refills: 0 | Status: SHIPPED | OUTPATIENT
Start: 2024-02-14 | End: 2024-02-28

## 2024-02-14 RX ORDER — SULFAMETHOXAZOLE AND TRIMETHOPRIM 800; 160 MG/1; MG/1
1 TABLET ORAL 2 TIMES DAILY
Qty: 14 TABLET | Refills: 0 | Status: SHIPPED | OUTPATIENT
Start: 2024-02-14 | End: 2024-02-21

## 2024-02-14 RX ORDER — FLUCONAZOLE 150 MG/1
150 TABLET ORAL
Qty: 3 TABLET | Refills: 0 | Status: SHIPPED | OUTPATIENT
Start: 2024-02-14 | End: 2024-02-23

## 2024-02-14 NOTE — TELEPHONE ENCOUNTER
From: Anshu Bassett  To: Dr. Cam Prado  Sent: 2/14/2024 2:38 PM EST  Subject: Referral    Just letting you know we have an appointment at the [facility] in [place] with a nurse practioner, on Monday Feb 19th at 9:00 am.

## 2024-02-14 NOTE — ASSESSMENT & PLAN NOTE
Will cover for bacterial and fungal components of infection. Urgent urology referral given, will have office staff help with scheduling ASAP. Pt instructed to go to ER for any difficulty with urination, worsening pain, worsening swelling, or any F/C/S.

## 2024-02-14 NOTE — TELEPHONE ENCOUNTER
Pt spouse called Dr Lo office and was not able to get an appt til April with either Dr Lo or Manny . I called office  and spoke with Cliff who mentioned that a  new referral is needed as Dr Lo or Manny LATIF do not treat  pt for what the referral is for ..     Please send a new external referral to [facility]  Dr Sofiya Wu   Phone ,[phone],  Fax [phone] [address] [address]

## 2024-02-14 NOTE — TELEPHONE ENCOUNTER
New urgent referral placed in chart. Please notify patient and wife - we need to help them schedule ASAP visit.

## 2024-02-14 NOTE — PROGRESS NOTES
Anshu Bassett (: 1945) is a 78 y.o. male, Established patient, who presents today for:    Chief Complaint   Patient presents with    Yeast Infection     Painful yeast infection ongoing 1 week         ASSESSMENT/PLAN:    1. Balanoposthitis  Assessment & Plan:  Will cover for bacterial and fungal components of infection. Urgent urology referral given, will have office staff help with scheduling ASAP. Pt instructed to go to ER for any difficulty with urination, worsening pain, worsening swelling, or any F/C/S.    Orders:  -     fluconazole (DIFLUCAN) 150 MG tablet; Take 1 tablet by mouth every 72 hours for 9 days, Disp-3 tablet, R-0Normal  -     clotrimazole (LOTRIMIN AF) 1 % cream; Apply topically 2 times daily., Disp-60 g, R-0, Normal  -     Valentín Arrieta MD, Urology, [place]  -     sulfamethoxazole-trimethoprim (BACTRIM DS) 800-160 MG per tablet; Take 1 tablet by mouth 2 times daily for 7 days, Disp-14 tablet, R-0Normal      Return if symptoms worsen or fail to improve, for KEEP NEXT SCHEDULED VISIT.       SUBJECTIVE/OBJECTIVE:    HPI    Patient reports 1.5-week history of painful irritation to the tip of the penis.  There is reported intermittent bleeding and persistent burning sensation.  Patient reports being given a course of topical nystatin and oral fluconazole x 3 days with some mild improvement, but no resolution. There is now purulent weeping drainage from the head of the penis and patient reports inability to retract the foreskin. There is reported burning on urination without urinary frequency, urgency, hematuria, or bladder pressure. Patient denies any fevers/chills/sweats, nausea, vomiting, or diarrhea.     Review of Systems   Constitutional:  Positive for fatigue. Negative for chills, diaphoresis and fever.   Respiratory:  Negative for shortness of breath.    Gastrointestinal:  Negative for diarrhea, nausea and vomiting.   Genitourinary:  Positive for dysuria, penile pain and

## 2024-02-15 LAB — BACTERIA UR CULT: NORMAL

## 2024-02-15 RX ORDER — TAMSULOSIN HYDROCHLORIDE 0.4 MG/1
0.8 CAPSULE ORAL DAILY
Qty: 180 CAPSULE | Refills: 1 | Status: SHIPPED | OUTPATIENT
Start: 2024-02-15

## 2024-02-15 NOTE — TELEPHONE ENCOUNTER
Comments:     Last Office Visit (last PCP visit):   2/14/2024    Next Visit Date:  Future Appointments   Date Time Provider Department Center   2/20/2024  2:30 PM Phil Bryant MD [facility] Neurology -   2/22/2024  1:00 PM [facility] CT ROOM 1 MAL  CT [facility] Fac RAD   2/22/2024  2:00 PM [facility] CT ROOM 1 MAL  CT [facility] Fac RAD   2/26/2024  1:00 PM Hunter Granados MD [facility]   2/27/2024 10:00 AM Aristeo Tang, DO [facility]   2/27/2024  3:00 PM Stephen Prado APRN - CNP PC [facility] PHYS [facility]   2/29/2024 10:00 AM [facility] ROOM 1 [facility]   4/2/2024 11:00 AM Cam Prado MD [facility]   5/6/2024  2:00 PM Michi Duran MD [facility]   8/6/2024 11:20 AM Aristeo Tang, DO [facility]       **If hasn't been seen in over a year OR hasn't followed up according to last diabetes/ADHD visit, make appointment for patient before sending refill to provider.    Rx requested:  Requested Prescriptions     Pending Prescriptions Disp Refills    tamsulosin (FLOMAX) 0.4 MG capsule 180 capsule 1     Sig: Take 2 capsules by mouth daily

## 2024-02-15 NOTE — TELEPHONE ENCOUNTER
I'm not sure how this could be the case. Patient's neurologist is through [facility] (Dr. Caceres), his last visit there with her was 12/18/23.     If for some reason in the new year [facility] is not covered anymore then ok to try [facility] urology. Please help patient find a urologist and then ok to pend/sign referral and I will co-sign. You can attach it to diagnosis on this message - balanoposthitis.    Also, if [facility] is no longer covered we will need to find him a new neurologist (preferably neuromuscular specialist who is well versed in treating myesthenia gravis). Please help him find this if this is the case. Ok to pend or sign this referral as well and I will co-sign.

## 2024-02-15 NOTE — TELEPHONE ENCOUNTER
I spoke to patient his insurance does not cover [facility]. He wants to know if there is somewhere else we can send the referral?

## 2024-02-20 ENCOUNTER — PATIENT MESSAGE (OUTPATIENT)
Dept: FAMILY MEDICINE CLINIC | Age: 79
End: 2024-02-20

## 2024-02-20 DIAGNOSIS — J44.9 CHRONIC OBSTRUCTIVE PULMONARY DISEASE, UNSPECIFIED COPD TYPE (HCC): ICD-10-CM

## 2024-02-20 DIAGNOSIS — N47.6 BALANOPOSTHITIS: ICD-10-CM

## 2024-02-20 RX ORDER — FLUTICASONE FUROATE, UMECLIDINIUM BROMIDE AND VILANTEROL TRIFENATATE 100; 62.5; 25 UG/1; UG/1; UG/1
1 POWDER RESPIRATORY (INHALATION) DAILY
Qty: 2 EACH | Refills: 0 | COMMUNITY
Start: 2024-02-20

## 2024-02-21 ENCOUNTER — TELEPHONE (OUTPATIENT)
Dept: PALLATIVE CARE | Age: 79
End: 2024-02-21

## 2024-02-21 DIAGNOSIS — J44.1 COPD WITH ACUTE EXACERBATION (HCC): Primary | ICD-10-CM

## 2024-02-21 DIAGNOSIS — Z51.5 PALLIATIVE CARE ENCOUNTER: ICD-10-CM

## 2024-02-21 DIAGNOSIS — R30.0 DYSURIA: ICD-10-CM

## 2024-02-21 DIAGNOSIS — R31.9 HEMATURIA, UNSPECIFIED TYPE: ICD-10-CM

## 2024-02-21 DIAGNOSIS — R06.02 INCREASING SHORTNESS OF BREATH: ICD-10-CM

## 2024-02-21 RX ORDER — METHYLPREDNISOLONE 4 MG/1
TABLET ORAL
Qty: 1 KIT | Refills: 0 | Status: SHIPPED | OUTPATIENT
Start: 2024-02-21

## 2024-02-21 RX ORDER — FLUCONAZOLE 150 MG/1
150 TABLET ORAL
Qty: 3 TABLET | Refills: 0 | OUTPATIENT
Start: 2024-02-21 | End: 2024-03-01

## 2024-02-21 NOTE — TELEPHONE ENCOUNTER
From: Anshu Bassett  To: Dr. Cam Prado  Sent: 2/20/2024 7:52 PM EST  Subject: Floconazole 150 mg    Would you please submit a refill request for this med to the [facility]?

## 2024-02-21 NOTE — TELEPHONE ENCOUNTER
Called patient and he explained that he is having increased difficulty of breathing/SOB with rest and exertion.  Patient is relying more on supplemental oxygen than previous.  Patient denies any infectious process including but not limited to abnormal sputum production, increased sputum production, fevers or increased fatigue.  Patient also complained of continued dysuria, frequency and hematuria.  Patient did follow-up with [facility] urologist, however, patient unsure of which provider to call first.  Instructed patient to start on Medrol Dosepak/COPD ER pack steroid portion to aid with increased shortness of breath.  Offered additional urinalysis as previous UA resulted with increased leukocytes or proceed to emergency room with worsening of symptoms.  Patient opted to trial urinalysis with culture and will proceed to ER if symptoms worsen.  Patient and patient's wife appreciative of information given and time spent.  Palliative care will continue to monitor.    Electronically signed by CHEMO Tesfaye CNP on 2/21/2024 at 2:13 PM  Collaborating physician Dr. Cornell    Please note this report is partially produced by using speech recognition hardware.  It may contain errors related to the system, including grammar, punctuation and spelling as well as words and phrases that may seem inaccurate.  For any questions or concerns feel free to contact me for clarification.

## 2024-02-21 NOTE — TELEPHONE ENCOUNTER
Comments:     Last Office Visit (last PCP visit):   2/14/2024    Next Visit Date:  Future Appointments   Date Time Provider Department Center   2/22/2024  1:00 PM [facility] CT ROOM 1 MAL  CT [facility] Fac RAD   2/22/2024  2:00 PM [facility] CT ROOM 1 [facility]  [facility] RAD   2/27/2024 10:00 AM Aristeo Tang, DO [facility]   2/27/2024  3:00 PM Stephen Prado APRN - CNP PC [facility] PHYS [facility]   3/4/2024  1:00 PM Hunter Granados MD [facility]   3/12/2024 12:15 PM [facility] ROOM 1 [facility]FT [facility] Center   4/2/2024 11:00 AM Cam Prado MD [facility]   5/6/2024  2:00 PM Michi Duran MD [facility]   8/6/2024 11:20 AM Aristeo Tang, DO [facility]       **If hasn't been seen in over a year OR hasn't followed up according to last diabetes/ADHD visit, make appointment for patient before sending refill to provider.    Rx requested:  Requested Prescriptions     Pending Prescriptions Disp Refills    fluconazole (DIFLUCAN) 150 MG tablet 3 tablet 0     Sig: Take 1 tablet by mouth every 72 hours for 9 days

## 2024-02-21 NOTE — TELEPHONE ENCOUNTER
Patient calling in with complaints of burning sensation while trying to urinate, bleeding when urinating,oliguria, and also yeast. He states these symptoms has been going on for at least a week.  He also states he has trouble breathing that has been going on for three days. He denies any fevers, chills, pain, or tightness in his chest. I did advise patient he should go to the ER so he can be evaluated for his SOB and other symptoms as well. He would like to speak with pall care provider first. If able can you reach out to the patient with medical advice.

## 2024-02-22 ENCOUNTER — HOSPITAL ENCOUNTER (OUTPATIENT)
Dept: CT IMAGING | Age: 79
Discharge: HOME | End: 2024-02-24
Attending: INTERNAL MEDICINE
Payer: MEDICARE

## 2024-02-22 DIAGNOSIS — C34.11 MALIGNANT NEOPLASM OF UPPER LOBE OF RIGHT LUNG (HCC): ICD-10-CM

## 2024-02-22 DIAGNOSIS — R06.02 SOB (SHORTNESS OF BREATH): ICD-10-CM

## 2024-02-22 DIAGNOSIS — C77.1 SECONDARY AND UNSPECIFIED MALIGNANT NEOPLASM OF INTRATHORACIC LYMPH NODES (HCC): ICD-10-CM

## 2024-02-22 DIAGNOSIS — C78.7 SECONDARY MALIGNANT NEOPLASM OF LIVER (HCC): ICD-10-CM

## 2024-02-22 PROCEDURE — 71275 CT ANGIOGRAPHY CHEST: CPT

## 2024-02-22 PROCEDURE — 74177 CT ABD & PELVIS W/CONTRAST: CPT

## 2024-02-22 PROCEDURE — 6360000004 HC RX CONTRAST MEDICATION: Performed by: INTERNAL MEDICINE

## 2024-02-22 RX ADMIN — IOPAMIDOL 75 ML: 755 INJECTION, SOLUTION INTRAVENOUS at 13:14

## 2024-02-22 RX ADMIN — IOPAMIDOL 18 ML: 755 INJECTION, SOLUTION INTRAVENOUS at 12:22

## 2024-02-24 ENCOUNTER — INPATIENT HOSPITAL (OUTPATIENT)
Dept: URBAN - METROPOLITAN AREA HOSPITAL 50 | Facility: HOSPITAL | Age: 79
End: 2024-02-24
Payer: COMMERCIAL

## 2024-02-24 DIAGNOSIS — D50.0 IRON DEFICIENCY ANEMIA SECONDARY TO BLOOD LOSS (CHRONIC): ICD-10-CM

## 2024-02-24 DIAGNOSIS — C78.7 SECONDARY MALIGNANT NEOPLASM OF LIVER AND INTRAHEPATIC BILE: ICD-10-CM

## 2024-02-24 DIAGNOSIS — C34.90 MALIGNANT NEOPLASM OF UNSPECIFIED PART OF UNSPECIFIED BRONCH: ICD-10-CM

## 2024-02-24 DIAGNOSIS — D61.818 OTHER PANCYTOPENIA: ICD-10-CM

## 2024-02-24 DIAGNOSIS — J44.9 CHRONIC OBSTRUCTIVE PULMONARY DISEASE, UNSPECIFIED: ICD-10-CM

## 2024-02-24 DIAGNOSIS — K21.9 GASTRO-ESOPHAGEAL REFLUX DISEASE WITHOUT ESOPHAGITIS: ICD-10-CM

## 2024-02-24 DIAGNOSIS — R19.5 OTHER FECAL ABNORMALITIES: ICD-10-CM

## 2024-02-24 PROCEDURE — 99222 1ST HOSP IP/OBS MODERATE 55: CPT | Performed by: INTERNAL MEDICINE

## 2024-02-25 ENCOUNTER — INPATIENT HOSPITAL (OUTPATIENT)
Dept: URBAN - METROPOLITAN AREA HOSPITAL 50 | Facility: HOSPITAL | Age: 79
End: 2024-02-25

## 2024-02-25 DIAGNOSIS — C34.90 MALIGNANT NEOPLASM OF UNSPECIFIED PART OF UNSPECIFIED BRONCH: ICD-10-CM

## 2024-02-25 DIAGNOSIS — K21.9 GASTRO-ESOPHAGEAL REFLUX DISEASE WITHOUT ESOPHAGITIS: ICD-10-CM

## 2024-02-25 DIAGNOSIS — J44.9 CHRONIC OBSTRUCTIVE PULMONARY DISEASE, UNSPECIFIED: ICD-10-CM

## 2024-02-25 DIAGNOSIS — D61.818 OTHER PANCYTOPENIA: ICD-10-CM

## 2024-02-25 DIAGNOSIS — R19.5 OTHER FECAL ABNORMALITIES: ICD-10-CM

## 2024-02-25 DIAGNOSIS — D50.0 IRON DEFICIENCY ANEMIA SECONDARY TO BLOOD LOSS (CHRONIC): ICD-10-CM

## 2024-02-25 DIAGNOSIS — C78.7 SECONDARY MALIGNANT NEOPLASM OF LIVER AND INTRAHEPATIC BILE: ICD-10-CM

## 2024-02-25 PROCEDURE — 99233 SBSQ HOSP IP/OBS HIGH 50: CPT | Mod: FS | Performed by: NURSE PRACTITIONER

## 2024-02-26 ENCOUNTER — INPATIENT HOSPITAL (OUTPATIENT)
Dept: URBAN - METROPOLITAN AREA HOSPITAL 50 | Facility: HOSPITAL | Age: 79
End: 2024-02-26
Payer: COMMERCIAL

## 2024-02-26 DIAGNOSIS — D12.4 BENIGN NEOPLASM OF DESCENDING COLON: ICD-10-CM

## 2024-02-26 DIAGNOSIS — K57.30 DIVERTICULOSIS OF LARGE INTESTINE WITHOUT PERFORATION OR ABS: ICD-10-CM

## 2024-02-26 DIAGNOSIS — D12.2 BENIGN NEOPLASM OF ASCENDING COLON: ICD-10-CM

## 2024-02-26 DIAGNOSIS — K31.89 OTHER DISEASES OF STOMACH AND DUODENUM: ICD-10-CM

## 2024-02-26 DIAGNOSIS — D12.3 BENIGN NEOPLASM OF TRANSVERSE COLON: ICD-10-CM

## 2024-02-26 DIAGNOSIS — D50.0 IRON DEFICIENCY ANEMIA SECONDARY TO BLOOD LOSS (CHRONIC): ICD-10-CM

## 2024-02-26 PROCEDURE — 43239 EGD BIOPSY SINGLE/MULTIPLE: CPT | Performed by: INTERNAL MEDICINE

## 2024-02-27 DIAGNOSIS — R80.9 TYPE 2 DIABETES MELLITUS WITH MICROALBUMINURIA, WITHOUT LONG-TERM CURRENT USE OF INSULIN (HCC): ICD-10-CM

## 2024-02-27 DIAGNOSIS — E11.29 TYPE 2 DIABETES MELLITUS WITH MICROALBUMINURIA, WITHOUT LONG-TERM CURRENT USE OF INSULIN (HCC): ICD-10-CM

## 2024-02-27 PROCEDURE — 45385 COLONOSCOPY W/LESION REMOVAL: CPT | Performed by: INTERNAL MEDICINE

## 2024-02-27 RX ORDER — INSULIN GLARGINE 100 [IU]/ML
20 INJECTION, SOLUTION SUBCUTANEOUS NIGHTLY
Qty: 20 ML | Refills: 1 | Status: SHIPPED | OUTPATIENT
Start: 2024-02-27

## 2024-02-27 NOTE — TELEPHONE ENCOUNTER
Comments:     Last Office Visit (last PCP visit):   2/14/2024    Next Visit Date:  Future Appointments   Date Time Provider Department Center   2/27/2024 10:00 AM Aristeo Tang, DO [facility]   3/1/2024  3:00 PM Stephen Prado APRN - CNP PC [facility] PHYS [facility]   3/4/2024  1:00 PM Hunter Granados MD [facility]   3/12/2024 12:15 PM [facility]FT ROOM 1 [facility] PFT [facility] Center   4/2/2024 11:00 AM Cam Prado MD [facility]   5/6/2024  2:00 PM Michi Duran MD [facility]   8/6/2024 11:20 AM Aristeo Tang,  [facility]       **If hasn't been seen in over a year OR hasn't followed up according to last diabetes/ADHD visit, make appointment for patient before sending refill to provider.    Rx requested:  Requested Prescriptions     Pending Prescriptions Disp Refills    LANTUS SOLOSTAR 100 UNIT/ML injection pen [Pharmacy Med Name: Lantus SoloStar 100 UNIT/ML Subcutaneous Solution Pen-injector] 30 mL 3     Sig: INJECT SUBCUTANEOUSLY 20 UNITS  EVERY NIGHT

## 2024-03-01 ENCOUNTER — OFFICE VISIT (OUTPATIENT)
Dept: PALLATIVE CARE | Age: 79
End: 2024-03-01
Payer: MEDICARE

## 2024-03-01 DIAGNOSIS — J44.9 CHRONIC OBSTRUCTIVE PULMONARY DISEASE, UNSPECIFIED COPD TYPE (HCC): ICD-10-CM

## 2024-03-01 DIAGNOSIS — R60.9 PERIPHERAL EDEMA: ICD-10-CM

## 2024-03-01 DIAGNOSIS — Z51.5 PALLIATIVE CARE ENCOUNTER: ICD-10-CM

## 2024-03-01 DIAGNOSIS — F32.9 REACTIVE DEPRESSION: ICD-10-CM

## 2024-03-01 DIAGNOSIS — G47.09 OTHER INSOMNIA: Primary | ICD-10-CM

## 2024-03-01 DIAGNOSIS — R06.09 DOE (DYSPNEA ON EXERTION): ICD-10-CM

## 2024-03-01 PROBLEM — R47.1 DYSARTHRIA: Status: RESOLVED | Noted: 2023-07-07 | Resolved: 2024-03-01

## 2024-03-01 PROBLEM — U07.1 COVID-19 VIRUS INFECTION: Status: RESOLVED | Noted: 2023-10-25 | Resolved: 2024-03-01

## 2024-03-01 PROBLEM — J18.9 PNEUMONIA DUE TO INFECTIOUS AGENT: Status: RESOLVED | Noted: 2023-11-17 | Resolved: 2024-03-01

## 2024-03-01 PROBLEM — Z20.822 CLOSE EXPOSURE TO COVID-19 VIRUS: Status: RESOLVED | Noted: 2021-04-24 | Resolved: 2024-03-01

## 2024-03-01 PROBLEM — R13.19 ESOPHAGEAL DYSPHAGIA: Status: RESOLVED | Noted: 2023-10-19 | Resolved: 2024-03-01

## 2024-03-01 PROCEDURE — G8484 FLU IMMUNIZE NO ADMIN: HCPCS

## 2024-03-01 PROCEDURE — 1123F ACP DISCUSS/DSCN MKR DOCD: CPT

## 2024-03-01 PROCEDURE — 99349 HOME/RES VST EST MOD MDM 40: CPT

## 2024-03-01 PROCEDURE — G8417 CALC BMI ABV UP PARAM F/U: HCPCS

## 2024-03-01 PROCEDURE — 1036F TOBACCO NON-USER: CPT

## 2024-03-01 RX ORDER — TRAZODONE HYDROCHLORIDE 50 MG/1
25 TABLET ORAL NIGHTLY
Qty: 15 TABLET | Refills: 0 | Status: SHIPPED | OUTPATIENT
Start: 2024-03-01 | End: 2024-03-31

## 2024-03-01 RX ORDER — FLUTICASONE FUROATE, UMECLIDINIUM BROMIDE AND VILANTEROL TRIFENATATE 100; 62.5; 25 UG/1; UG/1; UG/1
1 POWDER RESPIRATORY (INHALATION) DAILY
Qty: 2 EACH | Refills: 0 | COMMUNITY
Start: 2024-03-01

## 2024-03-01 ASSESSMENT — ENCOUNTER SYMPTOMS
SHORTNESS OF BREATH: 0
TROUBLE SWALLOWING: 0
GASTROINTESTINAL NEGATIVE: 1
COUGH: 0
WHEEZING: 0
BACK PAIN: 1
COLOR CHANGE: 1

## 2024-03-01 ASSESSMENT — VISUAL ACUITY: OU: 1

## 2024-03-01 NOTE — PROGRESS NOTES
weight loss with this issue.  -Offered medication trazodone to aid with insomnia and depression, patient and patient's wife agreeable to plan.  -Ordered trazodone 25 mg tablet p.o. nightly for 30 days.  Instructed patient and patient's wife to take medication routinely to receive antidepressant properties, patient and patient's wife state understanding.  -Recommended close follow-up with no initiation of medication, patient and patient's wife agreeable to plan.  -Patient denies any SI or HI during interaction.  -Palliative care will continue to monitor with close follow-up appointment.  - traZODone (DESYREL) 50 MG tablet; Take 0.5 tablets by mouth nightly Take 1/2 tablet nightly for 30 days.  Dispense: 15 tablet; Refill: 0    3. BACA (dyspnea on exertion)  4. Peripheral edema  -Patient was most recently hospitalized for increased shortness of breath.  Patient was found to be anemic and to have bleeding polyps that were removed during hospitalization.  Patient then received 2 units of blood and 1 unit of platelets per conversation.  -Patient presents with increased bilateral lower extremity edema, left upper extremity edema and slight facial edema.  -Patient states he is  increasingly short of breath after exertion.  -Patient was previously using supplemental oxygen last night to aid with shortness of breath.  However, patient states that his symptoms have rapidly improved and he is now not using any supplemental oxygen.  -Expressed concern for fluid overload if patient' symptoms do not continue to improve.  Instructed patient and patient's wife to call office on Mondays and update with current symptoms.  -Palliative care would consider increased of patient's furosemide/Lasix diuretic for 3-5 days if patient's symptoms continue and they do not improve.  Patient and patient's wife state understanding.  -Recommended close follow-up appointment for this issue as well, patient and patient's wife state understanding and

## 2024-03-02 ENCOUNTER — TELEPHONE (OUTPATIENT)
Dept: MEDSURG UNIT | Age: 79
End: 2024-03-02

## 2024-03-04 NOTE — TELEPHONE ENCOUNTER
On call provider. Patient calling concerned that his blood counts are dropping again. He reports increased SOB from baseline and wants to know if there is somewhere he can have his labs checked right now without going to the ER. Advised that all outpatient labs are currently closed and would advise patient to go to ER. Hx. Of recent hospitalization in the setting of GI bleed.

## 2024-03-05 ENCOUNTER — PATIENT MESSAGE (OUTPATIENT)
Dept: FAMILY MEDICINE CLINIC | Age: 79
End: 2024-03-05

## 2024-03-05 NOTE — TELEPHONE ENCOUNTER
From: Anshu Bassett  To: Dr. Cam Prado  Sent: 3/5/2024 8:39 AM EST  Subject: Appointment    Was recently discharged from the [facility] in [place] and need to schedule a follow up appointment. Having issues with swelling in hands and arms unable to urinate. Please schedule an appointment for me asap.

## 2024-03-05 NOTE — TELEPHONE ENCOUNTER
Called pt to get more information and he could barely speak he was so SOB. He handed the phone to his wife who told me he has only urinated a few drops since last night and that he has bilateral arm and hand swelling. I advised that he go to the ER. Wife says she will take him back to [facility] in [place].

## 2024-03-05 NOTE — TELEPHONE ENCOUNTER
Noted. Agreed that he needs seen in ER if having difficulty with speaking in complete sentences due to shortness of breath. From reviewing chart it looks like there have been two separate admissions since most recent visit for pancytopenia with need for transfusion.

## 2024-03-06 DIAGNOSIS — E11.29 TYPE 2 DIABETES MELLITUS WITH MICROALBUMINURIA, WITHOUT LONG-TERM CURRENT USE OF INSULIN (HCC): Chronic | ICD-10-CM

## 2024-03-06 DIAGNOSIS — R80.9 TYPE 2 DIABETES MELLITUS WITH MICROALBUMINURIA, WITHOUT LONG-TERM CURRENT USE OF INSULIN (HCC): Chronic | ICD-10-CM

## 2024-03-06 NOTE — TELEPHONE ENCOUNTER
Peter from [facility]     Free Style lite test strips there is  no frequency -  Please send over  a new script with frequency and Pharm asked that you note in script it must  be medicare B compliant

## 2024-03-07 RX ORDER — BLOOD-GLUCOSE METER
KIT MISCELLANEOUS
Qty: 100 EACH | Refills: 5 | Status: SHIPPED | OUTPATIENT
Start: 2024-03-07

## 2024-03-11 ENCOUNTER — OFFICE VISIT (OUTPATIENT)
Dept: FAMILY MEDICINE CLINIC | Age: 79
End: 2024-03-11
Payer: MEDICARE

## 2024-03-11 ENCOUNTER — HOSPITAL ENCOUNTER (OUTPATIENT)
Dept: LAB | Age: 79
Discharge: HOME | End: 2024-03-11
Payer: MEDICARE

## 2024-03-11 VITALS
TEMPERATURE: 97.6 F | SYSTOLIC BLOOD PRESSURE: 120 MMHG | HEART RATE: 82 BPM | DIASTOLIC BLOOD PRESSURE: 68 MMHG | OXYGEN SATURATION: 92 %

## 2024-03-11 DIAGNOSIS — D64.9 ANEMIA, UNSPECIFIED TYPE: ICD-10-CM

## 2024-03-11 DIAGNOSIS — I25.119 CORONARY ARTERY DISEASE INVOLVING NATIVE CORONARY ARTERY OF NATIVE HEART WITH ANGINA PECTORIS (HCC): ICD-10-CM

## 2024-03-11 DIAGNOSIS — D68.9 COAGULOPATHY (HCC): ICD-10-CM

## 2024-03-11 DIAGNOSIS — C34.91 NON-SMALL CELL CANCER OF RIGHT LUNG (HCC): ICD-10-CM

## 2024-03-11 DIAGNOSIS — N18.31 STAGE 3A CHRONIC KIDNEY DISEASE (HCC): ICD-10-CM

## 2024-03-11 DIAGNOSIS — I10 PRIMARY HYPERTENSION: Chronic | ICD-10-CM

## 2024-03-11 DIAGNOSIS — R06.09 DYSPNEA ON EXERTION: ICD-10-CM

## 2024-03-11 DIAGNOSIS — R06.09 DYSPNEA ON EXERTION: Primary | ICD-10-CM

## 2024-03-11 DIAGNOSIS — I50.9 CONGESTIVE HEART FAILURE, UNSPECIFIED HF CHRONICITY, UNSPECIFIED HEART FAILURE TYPE (HCC): ICD-10-CM

## 2024-03-11 DIAGNOSIS — J44.9 CHRONIC OBSTRUCTIVE PULMONARY DISEASE, UNSPECIFIED COPD TYPE (HCC): ICD-10-CM

## 2024-03-11 PROBLEM — J96.01 ACUTE HYPOXEMIC RESPIRATORY FAILURE (HCC): Status: RESOLVED | Noted: 2023-11-13 | Resolved: 2024-03-11

## 2024-03-11 LAB
ANION GAP SERPL CALCULATED.3IONS-SCNC: 13 MEQ/L (ref 9–15)
ANISOCYTOSIS BLD QL SMEAR: ABNORMAL
BASOPHILS # BLD: 0 K/UL (ref 0–0.2)
BASOPHILS NFR BLD: 0.5 %
BNP BLD-MCNC: 637 PG/ML
BUN SERPL-MCNC: 18 MG/DL (ref 8–23)
CALCIUM SERPL-MCNC: 9.5 MG/DL (ref 8.5–9.9)
CHLORIDE SERPL-SCNC: 97 MEQ/L (ref 95–107)
CO2 SERPL-SCNC: 29 MEQ/L (ref 20–31)
CREAT SERPL-MCNC: 1.35 MG/DL (ref 0.7–1.2)
EOSINOPHIL # BLD: 0 K/UL (ref 0–0.7)
EOSINOPHIL NFR BLD: 0.2 %
ERYTHROCYTE [DISTWIDTH] IN BLOOD BY AUTOMATED COUNT: 21.4 % (ref 11.5–14.5)
GLUCOSE SERPL-MCNC: 198 MG/DL (ref 70–99)
HCT VFR BLD AUTO: 33.9 % (ref 42–52)
HGB BLD-MCNC: 10.2 G/DL (ref 14–18)
LYMPHOCYTES # BLD: 0.1 K/UL (ref 1–4.8)
LYMPHOCYTES NFR BLD: 4.2 %
MACROCYTES BLD QL SMEAR: ABNORMAL
MCH RBC QN AUTO: 32.1 PG (ref 27–31.3)
MCHC RBC AUTO-ENTMCNC: 30.1 % (ref 33–37)
MCV RBC AUTO: 106.6 FL (ref 79–92.2)
MONOCYTES # BLD: 0.8 K/UL (ref 0.2–0.8)
MONOCYTES NFR BLD: 5.7 %
MYELOCYTES NFR BLD MANUAL: 1 %
NEUTROPHILS # BLD: 11.8 K/UL (ref 1.4–6.5)
NEUTS SEG NFR BLD: 92 %
PLATELET # BLD AUTO: 345 K/UL (ref 130–400)
PLATELET BLD QL SMEAR: ADEQUATE
POLYCHROMASIA BLD QL SMEAR: ABNORMAL
POTASSIUM SERPL-SCNC: 4.7 MEQ/L (ref 3.4–4.9)
RBC # BLD AUTO: 3.18 M/UL (ref 4.7–6.1)
SODIUM SERPL-SCNC: 139 MEQ/L (ref 135–144)
VARIANT LYMPHS NFR BLD: 1 %
WBC # BLD AUTO: 12.7 K/UL (ref 4.8–10.8)

## 2024-03-11 PROCEDURE — 83880 ASSAY OF NATRIURETIC PEPTIDE: CPT

## 2024-03-11 PROCEDURE — 3078F DIAST BP <80 MM HG: CPT | Performed by: FAMILY MEDICINE

## 2024-03-11 PROCEDURE — 36415 COLL VENOUS BLD VENIPUNCTURE: CPT

## 2024-03-11 PROCEDURE — 1123F ACP DISCUSS/DSCN MKR DOCD: CPT | Performed by: FAMILY MEDICINE

## 2024-03-11 PROCEDURE — 80048 BASIC METABOLIC PNL TOTAL CA: CPT

## 2024-03-11 PROCEDURE — 85025 COMPLETE CBC W/AUTO DIFF WBC: CPT

## 2024-03-11 PROCEDURE — G8417 CALC BMI ABV UP PARAM F/U: HCPCS | Performed by: FAMILY MEDICINE

## 2024-03-11 PROCEDURE — G8427 DOCREV CUR MEDS BY ELIG CLIN: HCPCS | Performed by: FAMILY MEDICINE

## 2024-03-11 PROCEDURE — G8484 FLU IMMUNIZE NO ADMIN: HCPCS | Performed by: FAMILY MEDICINE

## 2024-03-11 PROCEDURE — 99214 OFFICE O/P EST MOD 30 MIN: CPT | Performed by: FAMILY MEDICINE

## 2024-03-11 PROCEDURE — 1036F TOBACCO NON-USER: CPT | Performed by: FAMILY MEDICINE

## 2024-03-11 PROCEDURE — 3074F SYST BP LT 130 MM HG: CPT | Performed by: FAMILY MEDICINE

## 2024-03-11 PROCEDURE — 3023F SPIROM DOC REV: CPT | Performed by: FAMILY MEDICINE

## 2024-03-11 RX ORDER — METOPROLOL TARTRATE 75 MG/1
1 TABLET, FILM COATED ORAL EVERY 12 HOURS
COMMUNITY
Start: 2024-02-27

## 2024-03-11 ASSESSMENT — ENCOUNTER SYMPTOMS
ANAL BLEEDING: 0
TROUBLE SWALLOWING: 0
BACK PAIN: 1
ABDOMINAL PAIN: 0
COLOR CHANGE: 1
CHEST TIGHTNESS: 0
ABDOMINAL DISTENTION: 0
COUGH: 0
SHORTNESS OF BREATH: 0
WHEEZING: 0
RECTAL PAIN: 0
CONSTIPATION: 0
WHEEZING: 0
GASTROINTESTINAL NEGATIVE: 1
COUGH: 0
BLOOD IN STOOL: 0
DIARRHEA: 0
VOMITING: 0
NAUSEA: 0
SHORTNESS OF BREATH: 1

## 2024-03-11 ASSESSMENT — VISUAL ACUITY: OU: 1

## 2024-03-11 NOTE — ASSESSMENT & PLAN NOTE
Will follow blood counts to ensure there is no acute change in hemoglobin level suggestive of acute bleeding.

## 2024-03-11 NOTE — ASSESSMENT & PLAN NOTE
With suspected metastases to the liver.  Patient established with pulmonology and hematology/oncology for ongoing management.  Palliative care is involved to help with quality of life issues and comfort. We will continue to monitor peripherally over time.

## 2024-03-11 NOTE — ASSESSMENT & PLAN NOTE
No wheezing or worsening cough to suggest airway exacerbation.  Patient instructed to continue with current dose of Trelegy and as needed use of albuterol.

## 2024-03-11 NOTE — ASSESSMENT & PLAN NOTE
Clinical diagnosis based on reported orthopnea in the setting of lower extremity edema and noted pulmonary congestion on recent chest x-ray.  Patient has upcoming visit with cardiology in the next week for further evaluation.  We will continue with aggressive diuresis with monitoring of BNP levels and renal function.

## 2024-03-11 NOTE — ASSESSMENT & PLAN NOTE
Stable renal function on most recent hospital testing.  We will continue to follow lab work over time in the setting of needed diuresis.

## 2024-03-11 NOTE — ASSESSMENT & PLAN NOTE
No reported chest discomfort.  Patient instructed to continue with current dose of rosuvastatin.

## 2024-03-11 NOTE — ASSESSMENT & PLAN NOTE
Likely related to components of CHF based on history, recent CXR with congestion, and elevated BNP. Will diurese and re-establish with cardiology.  Patient and wife are unclear as to what dose of furosemide is currently being taken at home.  He is currently using his current tablet of furosemide twice daily.  Instructions were given to check medication bottles at home and call the office with dosing currently being taken.  If patient is only on 20 mg of Lasix twice daily we will increase dosing to 40 mg for more aggressive diuresis in the setting of noted lower extremity edema.  Instructions were given to go to the emergency room for any acutely worsening dyspnea at rest or progressively worsening dyspnea on exertion with worsening edema despite medication. We will follow BNP and renal function over time.

## 2024-03-11 NOTE — ASSESSMENT & PLAN NOTE
Blood pressure within normal limits today in the office.  Patient instructed to continue with current doses of losartan and metoprolol.

## 2024-03-11 NOTE — PROGRESS NOTES
Appearance: Normal appearance.   Cardiovascular:      Rate and Rhythm: Normal rate and regular rhythm.   Pulmonary:      Effort: Pulmonary effort is normal. No tachypnea, accessory muscle usage or respiratory distress.      Breath sounds: Normal breath sounds. No decreased air movement. No decreased breath sounds, wheezing, rhonchi or rales.   Abdominal:      General: Bowel sounds are normal.      Palpations: Abdomen is soft.      Tenderness: There is no abdominal tenderness. There is no guarding or rebound.   Musculoskeletal:      Right lower leg: Edema (1-2+ to knee) present.      Left lower leg: Edema (1-2+ to knee) present.   Skin:     Findings: No rash.   Neurological:      Mental Status: He is alert and oriented to person, place, and time.   Psychiatric:         Mood and Affect: Mood normal.         Behavior: Behavior normal.         Thought Content: Thought content normal.         Ortho Exam (If Applicable)              An electronic signature was used to authenticate this note.     KAREN FITZPATRICK MD

## 2024-03-11 NOTE — RESULT ENCOUNTER NOTE
Stable WBC 12.7, stable Hgb 10.2, stable creatinine 1.35, stable GFR 53.4, improved   See MyChart encounter dated 03/11/2024 for discussion with patient

## 2024-03-12 ENCOUNTER — OFFICE VISIT (OUTPATIENT)
Dept: PALLATIVE CARE | Age: 79
End: 2024-03-12
Payer: MEDICARE

## 2024-03-12 ENCOUNTER — HOSPITAL ENCOUNTER (OUTPATIENT)
Dept: PULMONOLOGY | Age: 79
Discharge: HOME | End: 2024-03-12
Payer: MEDICARE

## 2024-03-12 VITALS
OXYGEN SATURATION: 95 % | SYSTOLIC BLOOD PRESSURE: 119 MMHG | WEIGHT: 203 LBS | HEART RATE: 84 BPM | RESPIRATION RATE: 16 BRPM | DIASTOLIC BLOOD PRESSURE: 61 MMHG | BODY MASS INDEX: 28.31 KG/M2 | TEMPERATURE: 97.8 F

## 2024-03-12 DIAGNOSIS — R06.02 SOB (SHORTNESS OF BREATH): ICD-10-CM

## 2024-03-12 DIAGNOSIS — J44.9 CHRONIC OBSTRUCTIVE PULMONARY DISEASE, UNSPECIFIED COPD TYPE (HCC): ICD-10-CM

## 2024-03-12 DIAGNOSIS — F32.9 REACTIVE DEPRESSION: ICD-10-CM

## 2024-03-12 DIAGNOSIS — G47.09 OTHER INSOMNIA: Primary | ICD-10-CM

## 2024-03-12 DIAGNOSIS — Z51.5 PALLIATIVE CARE ENCOUNTER: ICD-10-CM

## 2024-03-12 DIAGNOSIS — Z71.9 HEALTH EDUCATION/COUNSELING: ICD-10-CM

## 2024-03-12 LAB
BASE EXCESS ARTERIAL: 6 (ref -3–3)
HCO3 ARTERIAL: 30.2 MMOL/L (ref 21–29)
O2 SAT, ARTERIAL: 96 % (ref 93–100)
PCO2 ARTERIAL: 43 MM HG (ref 35–45)
PERFORMED ON: ABNORMAL
PH ARTERIAL: 7.46 (ref 7.35–7.45)
PO2 ARTERIAL: 79 MM HG (ref 75–108)
POC SAMPLE TYPE: ABNORMAL
TCO2 ARTERIAL: 32 MMOL/L (ref 21–32)

## 2024-03-12 PROCEDURE — 1036F TOBACCO NON-USER: CPT

## 2024-03-12 PROCEDURE — 3074F SYST BP LT 130 MM HG: CPT

## 2024-03-12 PROCEDURE — 94729 DIFFUSING CAPACITY: CPT

## 2024-03-12 PROCEDURE — 82803 BLOOD GASES ANY COMBINATION: CPT

## 2024-03-12 PROCEDURE — 3078F DIAST BP <80 MM HG: CPT

## 2024-03-12 PROCEDURE — G8484 FLU IMMUNIZE NO ADMIN: HCPCS

## 2024-03-12 PROCEDURE — G8427 DOCREV CUR MEDS BY ELIG CLIN: HCPCS

## 2024-03-12 PROCEDURE — 6360000002 HC RX W HCPCS

## 2024-03-12 PROCEDURE — 1123F ACP DISCUSS/DSCN MKR DOCD: CPT

## 2024-03-12 PROCEDURE — 99215 OFFICE O/P EST HI 40 MIN: CPT

## 2024-03-12 PROCEDURE — G8417 CALC BMI ABV UP PARAM F/U: HCPCS

## 2024-03-12 PROCEDURE — 94060 EVALUATION OF WHEEZING: CPT

## 2024-03-12 PROCEDURE — 94726 PLETHYSMOGRAPHY LUNG VOLUMES: CPT

## 2024-03-12 PROCEDURE — 36600 WITHDRAWAL OF ARTERIAL BLOOD: CPT

## 2024-03-12 RX ORDER — TRAZODONE HYDROCHLORIDE 50 MG/1
50 TABLET ORAL NIGHTLY
Qty: 30 TABLET | Refills: 0 | Status: SHIPPED | OUTPATIENT
Start: 2024-03-12 | End: 2024-04-11

## 2024-03-12 RX ORDER — ALBUTEROL SULFATE 2.5 MG/3ML
SOLUTION RESPIRATORY (INHALATION)
Status: COMPLETED
Start: 2024-03-12 | End: 2024-03-12

## 2024-03-12 RX ADMIN — ALBUTEROL SULFATE 2.5 MG: 2.5 SOLUTION RESPIRATORY (INHALATION) at 12:42

## 2024-03-12 NOTE — PROGRESS NOTES
evaluation and examination, ordering medications, and documenting in the medical record.     CHEMO Tesfaye - CNP    Collaborating physician: Dr. Cornell     Please note this report is partially produced by using speech recognition hardware.  It may contain errors related to the system, including grammar, punctuation and spelling as well as words and phrases that may seem inaccurate.  For any questions or concerns feel free to contact me for clarification.

## 2024-03-18 ENCOUNTER — OFFICE VISIT (OUTPATIENT)
Dept: PULMONOLOGY | Age: 79
End: 2024-03-18
Payer: MEDICARE

## 2024-03-18 VITALS
HEART RATE: 77 BPM | RESPIRATION RATE: 16 BRPM | DIASTOLIC BLOOD PRESSURE: 69 MMHG | SYSTOLIC BLOOD PRESSURE: 125 MMHG | BODY MASS INDEX: 28.25 KG/M2 | WEIGHT: 201.8 LBS | HEIGHT: 71 IN | OXYGEN SATURATION: 99 % | TEMPERATURE: 97 F

## 2024-03-18 DIAGNOSIS — R91.8 PULMONARY INFILTRATE: ICD-10-CM

## 2024-03-18 DIAGNOSIS — Z79.52 CURRENT CHRONIC USE OF SYSTEMIC STEROIDS: ICD-10-CM

## 2024-03-18 DIAGNOSIS — I26.99 PULMONARY EMBOLISM AND INFARCTION (HCC): ICD-10-CM

## 2024-03-18 DIAGNOSIS — C34.91 SQUAMOUS CELL CARCINOMA LUNG, RIGHT (HCC): ICD-10-CM

## 2024-03-18 DIAGNOSIS — R60.0 EDEMA, LOWER EXTREMITY: Primary | ICD-10-CM

## 2024-03-18 DIAGNOSIS — J44.9 CHRONIC OBSTRUCTIVE PULMONARY DISEASE, UNSPECIFIED COPD TYPE (HCC): ICD-10-CM

## 2024-03-18 DIAGNOSIS — G70.00 MYASTHENIA GRAVIS (HCC): ICD-10-CM

## 2024-03-18 PROCEDURE — 1036F TOBACCO NON-USER: CPT | Performed by: INTERNAL MEDICINE

## 2024-03-18 PROCEDURE — 3023F SPIROM DOC REV: CPT | Performed by: INTERNAL MEDICINE

## 2024-03-18 PROCEDURE — G8417 CALC BMI ABV UP PARAM F/U: HCPCS | Performed by: INTERNAL MEDICINE

## 2024-03-18 PROCEDURE — G8427 DOCREV CUR MEDS BY ELIG CLIN: HCPCS | Performed by: INTERNAL MEDICINE

## 2024-03-18 PROCEDURE — 3078F DIAST BP <80 MM HG: CPT | Performed by: INTERNAL MEDICINE

## 2024-03-18 PROCEDURE — 1123F ACP DISCUSS/DSCN MKR DOCD: CPT | Performed by: INTERNAL MEDICINE

## 2024-03-18 PROCEDURE — 99215 OFFICE O/P EST HI 40 MIN: CPT | Performed by: INTERNAL MEDICINE

## 2024-03-18 PROCEDURE — G8484 FLU IMMUNIZE NO ADMIN: HCPCS | Performed by: INTERNAL MEDICINE

## 2024-03-18 PROCEDURE — 3074F SYST BP LT 130 MM HG: CPT | Performed by: INTERNAL MEDICINE

## 2024-03-18 RX ORDER — FUROSEMIDE 20 MG/1
40 TABLET ORAL DAILY
Qty: 40 TABLET | Refills: 0 | Status: SHIPPED | OUTPATIENT
Start: 2024-03-18 | End: 2024-04-07

## 2024-03-18 RX ORDER — FUROSEMIDE 40 MG/1
40 TABLET ORAL DAILY
Qty: 60 TABLET | Refills: 1 | Status: SHIPPED | OUTPATIENT
Start: 2024-03-18

## 2024-03-18 RX ORDER — IBANDRONATE SODIUM 150 MG/1
150 TABLET, FILM COATED ORAL
Qty: 3 TABLET | Refills: 3 | Status: SHIPPED | OUTPATIENT
Start: 2024-03-18

## 2024-03-18 RX ORDER — OMEPRAZOLE 20 MG/1
20 TABLET, DELAYED RELEASE ORAL DAILY
Qty: 30 TABLET | Refills: 3 | Status: SHIPPED | OUTPATIENT
Start: 2024-03-18

## 2024-03-18 NOTE — PROGRESS NOTES
Subjective:     Anshu Bassett is a 78 y.o. male who complains today of:     Chief Complaint   Patient presents with    Follow-up     6 Week F/U for COPD    Results     ABG, PFT and CTA Chest       HPI  Patient presents for shortness of breath    3/18/2024  Presents for follow-up, he is doing good, symptoms improved, still has dyspnea on minimal exertion however in general improved, he is on 10 mg daily of prednisone, no coughing, no chest pain, weight is stable, no nasal congestion or postnasal drip and no heartburn.+ lower ext edema .  On Lasix 40 mg p.o. daily, was recently hospitalized at [facility] for volume overload    2/5/2024  Presents for follow-up, still short of breath, he is currently taking prednisone 20 mg daily, no coughing, no chest pain, no worsening lower extremity edema, he did not tolerate his last chemotherapy, no fever, weight is stable, no nasal congestion or postnasal drip.  12/4/2023  Slightly better, he is not using oxygen frequently now, still short of breath but slightly improved, no coughing, no chest pain, no fever no chills, lower extremity edema improved, no heartburn, no nasal congestion or postnasal drip, he will be starting chemotherapy on Friday.      11/27/2023  Patient presents post hospital discharge, was recently hospitalized for Pseudomonas pneumonia, candidiasis, COVID-19, and volume overload.  Discharged home, has not been feeling good, symptoms not improved, still short of breath on minimal exertion, unable to do any of his activities, swelling is getting worse he is using Lasix for his wife's supply, no coughing, no fever no chills, no chest pain.  He has been using his wife's home oxygen as well.  He has underlying myasthenia gravis  History of PE Eliquis is on hold due to GI bleed he underwent EGD during his hospitalization.  He finished his first round of chemotherapy last month  11/7/2023  Presents after recent discharge from the hospital, he was recently hospitalized for

## 2024-03-19 ENCOUNTER — OFFICE VISIT (OUTPATIENT)
Dept: CARDIOLOGY CLINIC | Age: 79
End: 2024-03-19
Payer: MEDICARE

## 2024-03-19 VITALS
SYSTOLIC BLOOD PRESSURE: 120 MMHG | HEART RATE: 87 BPM | BODY MASS INDEX: 27.86 KG/M2 | HEIGHT: 71 IN | WEIGHT: 199 LBS | OXYGEN SATURATION: 94 % | RESPIRATION RATE: 16 BRPM | DIASTOLIC BLOOD PRESSURE: 60 MMHG

## 2024-03-19 DIAGNOSIS — I10 ESSENTIAL HYPERTENSION: Primary | ICD-10-CM

## 2024-03-19 PROCEDURE — G8484 FLU IMMUNIZE NO ADMIN: HCPCS | Performed by: INTERNAL MEDICINE

## 2024-03-19 PROCEDURE — 3078F DIAST BP <80 MM HG: CPT | Performed by: INTERNAL MEDICINE

## 2024-03-19 PROCEDURE — 3074F SYST BP LT 130 MM HG: CPT | Performed by: INTERNAL MEDICINE

## 2024-03-19 PROCEDURE — G8427 DOCREV CUR MEDS BY ELIG CLIN: HCPCS | Performed by: INTERNAL MEDICINE

## 2024-03-19 PROCEDURE — 99214 OFFICE O/P EST MOD 30 MIN: CPT | Performed by: INTERNAL MEDICINE

## 2024-03-19 PROCEDURE — G8417 CALC BMI ABV UP PARAM F/U: HCPCS | Performed by: INTERNAL MEDICINE

## 2024-03-19 PROCEDURE — 1036F TOBACCO NON-USER: CPT | Performed by: INTERNAL MEDICINE

## 2024-03-19 PROCEDURE — 93000 ELECTROCARDIOGRAM COMPLETE: CPT | Performed by: INTERNAL MEDICINE

## 2024-03-19 PROCEDURE — 1123F ACP DISCUSS/DSCN MKR DOCD: CPT | Performed by: INTERNAL MEDICINE

## 2024-03-25 ENCOUNTER — OFFICE VISIT (OUTPATIENT)
Dept: PALLATIVE CARE | Age: 79
End: 2024-03-25
Payer: MEDICARE

## 2024-03-25 VITALS
HEART RATE: 82 BPM | DIASTOLIC BLOOD PRESSURE: 68 MMHG | OXYGEN SATURATION: 94 % | RESPIRATION RATE: 16 BRPM | SYSTOLIC BLOOD PRESSURE: 124 MMHG | TEMPERATURE: 98.6 F

## 2024-03-25 DIAGNOSIS — Z51.5 PALLIATIVE CARE ENCOUNTER: ICD-10-CM

## 2024-03-25 DIAGNOSIS — G47.09 OTHER INSOMNIA: Primary | ICD-10-CM

## 2024-03-25 DIAGNOSIS — F32.9 REACTIVE DEPRESSION: ICD-10-CM

## 2024-03-25 DIAGNOSIS — J44.9 CHRONIC OBSTRUCTIVE PULMONARY DISEASE, UNSPECIFIED COPD TYPE (HCC): ICD-10-CM

## 2024-03-25 PROCEDURE — 3078F DIAST BP <80 MM HG: CPT

## 2024-03-25 PROCEDURE — 3074F SYST BP LT 130 MM HG: CPT

## 2024-03-25 PROCEDURE — 1036F TOBACCO NON-USER: CPT

## 2024-03-25 PROCEDURE — 99349 HOME/RES VST EST MOD MDM 40: CPT

## 2024-03-25 PROCEDURE — G8417 CALC BMI ABV UP PARAM F/U: HCPCS

## 2024-03-25 PROCEDURE — G8484 FLU IMMUNIZE NO ADMIN: HCPCS

## 2024-03-25 PROCEDURE — 1123F ACP DISCUSS/DSCN MKR DOCD: CPT

## 2024-03-25 RX ORDER — TRAZODONE HYDROCHLORIDE 100 MG/1
100 TABLET ORAL NIGHTLY
Qty: 30 TABLET | Refills: 0 | Status: SHIPPED | OUTPATIENT
Start: 2024-03-25 | End: 2024-04-24

## 2024-03-25 ASSESSMENT — PATIENT HEALTH QUESTIONNAIRE - PHQ9
SUM OF ALL RESPONSES TO PHQ QUESTIONS 1-9: 1
2. FEELING DOWN, DEPRESSED OR HOPELESS: SEVERAL DAYS
1. LITTLE INTEREST OR PLEASURE IN DOING THINGS: NOT AT ALL
SUM OF ALL RESPONSES TO PHQ9 QUESTIONS 1 & 2: 1

## 2024-03-25 NOTE — PROGRESS NOTES
Subjective:      Patient Id: Seen Anshu at home in [place], for follow up Palliative Care visit.  He was accompanied to the appointment by: Wife Brigid.    Chief Complaint   Patient presents with    Follow-up    Insomnia    COPD        HPI       Anshu Bassett is a 78 y.o. male was seen and evaluated palliative care for symptom management related to COPD, back pain and non-small cell lung cancer. Anshu has complex medical history that includes non-small cell cancer of right lung S/P right upper lobectomy, COPD, T2DM, myasthenia gravis, chronic low back pain, CAD, coronary artery bypass graft x5, HTN, osteoarthritis, tobacco abuse, HLD, GERD. Home visit is necessary in lieu of office due to significant frailty and high symptom burden from comorbid illnesses.     Patient complaint of insomnia and depression with slight improvement.  Patient follows with PCP, pulmonology, neurology, oncology and cardiology.     General: Upon entering the room I find the patient  ambulating with rollator device, alert and oriented x4, calm, cooperative and in NAD.     Sleep: Patient's sleep has slightly improved after recent trail of Trazodone 50 mg and will increase medication to trazodone 100 mg nightly.  Patient previously used Melatonin 10 mg nightly with no success as a sleep aid.     Mood:  Patient reports slight improvement with recent addition of medication trazodone with depression.  Patient reports taking BuSpar 10 mg BID. Patient was previously very active and has some anxiety with this as well. No current S/S of SI, depression or HI.     COPD/SOB: Patient COPD symptoms currently at baseline with decreased swelling during interaction.  Patient denies any increased shortness of breath, abnormal sputum color, abnormal sputum amount/production or chest pain during interaction.  Patient using oxygen nightly related to increased SOB. Patient takes medications and treatments as ordered.     Lung cancer: Patient's cancer treatment 
2018

## 2024-03-27 ENCOUNTER — COMMUNITY CARE MANAGEMENT (OUTPATIENT)
Facility: CLINIC | Age: 79
End: 2024-03-27

## 2024-03-28 ENCOUNTER — HOSPITAL ENCOUNTER (OUTPATIENT)
Dept: WOMENS IMAGING | Age: 79
Discharge: HOME | End: 2024-03-30
Attending: INTERNAL MEDICINE
Payer: MEDICARE

## 2024-03-28 ENCOUNTER — HOSPITAL ENCOUNTER (OUTPATIENT)
Dept: LAB | Age: 79
Discharge: HOME | End: 2024-03-28
Attending: INTERNAL MEDICINE
Payer: MEDICARE

## 2024-03-28 DIAGNOSIS — Z79.52 CURRENT CHRONIC USE OF SYSTEMIC STEROIDS: ICD-10-CM

## 2024-03-28 DIAGNOSIS — R60.0 EDEMA, LOWER EXTREMITY: ICD-10-CM

## 2024-03-28 LAB
ALBUMIN SERPL-MCNC: 3.8 G/DL (ref 3.5–4.6)
ANION GAP SERPL CALCULATED.3IONS-SCNC: 10 MEQ/L (ref 9–15)
BUN SERPL-MCNC: 16 MG/DL (ref 8–23)
CALCIUM SERPL-MCNC: 9.4 MG/DL (ref 8.5–9.9)
CHLORIDE SERPL-SCNC: 97 MEQ/L (ref 95–107)
CO2 SERPL-SCNC: 29 MEQ/L (ref 20–31)
CREAT SERPL-MCNC: 1.16 MG/DL (ref 0.7–1.2)
GLUCOSE SERPL-MCNC: 224 MG/DL (ref 70–99)
PHOSPHATE SERPL-MCNC: 4.1 MG/DL (ref 2.3–4.8)
POTASSIUM SERPL-SCNC: 4.6 MEQ/L (ref 3.4–4.9)
SODIUM SERPL-SCNC: 136 MEQ/L (ref 135–144)

## 2024-03-28 PROCEDURE — 80069 RENAL FUNCTION PANEL: CPT

## 2024-03-28 PROCEDURE — 36415 COLL VENOUS BLD VENIPUNCTURE: CPT

## 2024-03-28 PROCEDURE — 77080 DXA BONE DENSITY AXIAL: CPT

## 2024-04-02 RX ORDER — POTASSIUM CHLORIDE 20 MEQ/1
20 TABLET, EXTENDED RELEASE ORAL DAILY
Qty: 90 TABLET | Refills: 3 | Status: SHIPPED | OUTPATIENT
Start: 2024-04-02

## 2024-04-02 ASSESSMENT — ENCOUNTER SYMPTOMS
COUGH: 0
WHEEZING: 0
BACK PAIN: 1
TROUBLE SWALLOWING: 0
COLOR CHANGE: 1
GASTROINTESTINAL NEGATIVE: 1

## 2024-04-02 ASSESSMENT — VISUAL ACUITY: OU: 1

## 2024-04-02 NOTE — TELEPHONE ENCOUNTER
Requesting medication refill. Please approve or deny this request.    Rx requested:  Requested Prescriptions     Pending Prescriptions Disp Refills    potassium chloride (KLOR-CON M) 20 MEQ extended release tablet [Pharmacy Med Name: Potassium Chloride Joseline ER 20 MEQ Oral Tablet Extended Release] 90 tablet 3     Sig: TAKE 1 TABLET BY MOUTH DAILY         Last Office Visit:   3/19/2024      Next Visit Date:  Future Appointments   Date Time Provider Department Center   4/3/2024  3:00 PM Stephen Prado APRN - CNP PC [facility]   4/5/2024 11:30 AM Cam Prado MD [facility]   4/22/2024  3:00 PM Stephen Prado APRN - CNP PC [facility] PH[facility]   5/6/2024  2:00 PM Michi Duran MD [facility]   5/22/2024  1:15 PM Hunter Granados MD [facility]   7/15/2024  1:00 PM [facility] ROOM 1 [facility] RAD   8/6/2024 11:20 AM Aristeo Tang, DO [facility]   9/24/2024 11:00 AM Aristeo Tang, DO [facility]               Last refill 11/29/2024. Please approve or deny.

## 2024-04-02 NOTE — PROGRESS NOTES
have any questions or requests.    Total Time  39 mins      Total time includes reviewing labs and tests, reviewing history, medications, and allergies, counseling patient, performing medically appropriate evaluation and examination, ordering medications, and documenting in the medical record.     CHEMO Tesfaye - CNP    Collaborating physician: Dr. Cornell     Please note this report is partially produced by using speech recognition hardware.  It may contain errors related to the system, including grammar, punctuation and spelling as well as words and phrases that may seem inaccurate.  For any questions or concerns feel free to contact me for clarification.

## 2024-04-03 ENCOUNTER — OFFICE VISIT (OUTPATIENT)
Dept: PALLATIVE CARE | Age: 79
End: 2024-04-03
Payer: MEDICARE

## 2024-04-03 ENCOUNTER — TELEPHONE (OUTPATIENT)
Dept: PULMONOLOGY | Age: 79
End: 2024-04-03

## 2024-04-03 VITALS
TEMPERATURE: 98.6 F | HEART RATE: 53 BPM | DIASTOLIC BLOOD PRESSURE: 72 MMHG | RESPIRATION RATE: 16 BRPM | OXYGEN SATURATION: 92 % | SYSTOLIC BLOOD PRESSURE: 122 MMHG

## 2024-04-03 DIAGNOSIS — F32.9 REACTIVE DEPRESSION: ICD-10-CM

## 2024-04-03 DIAGNOSIS — Z51.5 PALLIATIVE CARE ENCOUNTER: ICD-10-CM

## 2024-04-03 DIAGNOSIS — G47.09 OTHER INSOMNIA: Primary | ICD-10-CM

## 2024-04-03 DIAGNOSIS — M25.50 ARTHRALGIA, UNSPECIFIED JOINT: ICD-10-CM

## 2024-04-03 PROCEDURE — 3074F SYST BP LT 130 MM HG: CPT

## 2024-04-03 PROCEDURE — 1123F ACP DISCUSS/DSCN MKR DOCD: CPT

## 2024-04-03 PROCEDURE — G8417 CALC BMI ABV UP PARAM F/U: HCPCS

## 2024-04-03 PROCEDURE — 3078F DIAST BP <80 MM HG: CPT

## 2024-04-03 PROCEDURE — 99348 HOME/RES VST EST LOW MDM 30: CPT

## 2024-04-03 PROCEDURE — 1036F TOBACCO NON-USER: CPT

## 2024-04-03 RX ORDER — MAGNESIUM OXIDE/MAG AA CHELATE 300 MG
2 CAPSULE ORAL NIGHTLY
COMMUNITY

## 2024-04-05 ENCOUNTER — OFFICE VISIT (OUTPATIENT)
Dept: FAMILY MEDICINE CLINIC | Age: 79
End: 2024-04-05

## 2024-04-05 VITALS
BODY MASS INDEX: 28.8 KG/M2 | TEMPERATURE: 97.5 F | OXYGEN SATURATION: 92 % | WEIGHT: 201.2 LBS | DIASTOLIC BLOOD PRESSURE: 68 MMHG | HEART RATE: 91 BPM | SYSTOLIC BLOOD PRESSURE: 114 MMHG | HEIGHT: 70 IN

## 2024-04-05 DIAGNOSIS — D64.9 ANEMIA, UNSPECIFIED TYPE: ICD-10-CM

## 2024-04-05 DIAGNOSIS — N18.31 STAGE 3A CHRONIC KIDNEY DISEASE (HCC): ICD-10-CM

## 2024-04-05 DIAGNOSIS — Z00.00 MEDICARE ANNUAL WELLNESS VISIT, SUBSEQUENT: Primary | ICD-10-CM

## 2024-04-05 DIAGNOSIS — Z79.01 CHRONIC ANTICOAGULATION: ICD-10-CM

## 2024-04-05 DIAGNOSIS — E11.29 TYPE 2 DIABETES MELLITUS WITH MICROALBUMINURIA, WITHOUT LONG-TERM CURRENT USE OF INSULIN (HCC): Chronic | ICD-10-CM

## 2024-04-05 DIAGNOSIS — I25.119 CORONARY ARTERY DISEASE INVOLVING NATIVE CORONARY ARTERY OF NATIVE HEART WITH ANGINA PECTORIS (HCC): Chronic | ICD-10-CM

## 2024-04-05 DIAGNOSIS — I50.9 CONGESTIVE HEART FAILURE, UNSPECIFIED HF CHRONICITY, UNSPECIFIED HEART FAILURE TYPE (HCC): ICD-10-CM

## 2024-04-05 DIAGNOSIS — E55.9 VITAMIN D DEFICIENCY: ICD-10-CM

## 2024-04-05 DIAGNOSIS — C34.91 NON-SMALL CELL CANCER OF RIGHT LUNG (HCC): ICD-10-CM

## 2024-04-05 DIAGNOSIS — I10 PRIMARY HYPERTENSION: Chronic | ICD-10-CM

## 2024-04-05 DIAGNOSIS — J44.9 CHRONIC OBSTRUCTIVE PULMONARY DISEASE, UNSPECIFIED COPD TYPE (HCC): Chronic | ICD-10-CM

## 2024-04-05 DIAGNOSIS — G70.00 MYASTHENIA GRAVIS (HCC): ICD-10-CM

## 2024-04-05 DIAGNOSIS — R80.9 TYPE 2 DIABETES MELLITUS WITH MICROALBUMINURIA, WITHOUT LONG-TERM CURRENT USE OF INSULIN (HCC): Chronic | ICD-10-CM

## 2024-04-05 DIAGNOSIS — C22.9 MALIGNANT NEOPLASM OF LIVER, UNSPECIFIED LIVER MALIGNANCY TYPE (HCC): ICD-10-CM

## 2024-04-05 DIAGNOSIS — E78.2 MIXED HYPERLIPIDEMIA: Chronic | ICD-10-CM

## 2024-04-05 PROBLEM — Z86.711 HISTORY OF PULMONARY EMBOLUS (PE): Status: ACTIVE | Noted: 2024-02-23

## 2024-04-05 ASSESSMENT — PATIENT HEALTH QUESTIONNAIRE - PHQ9
SUM OF ALL RESPONSES TO PHQ QUESTIONS 1-9: 0
2. FEELING DOWN, DEPRESSED OR HOPELESS: NOT AT ALL
SUM OF ALL RESPONSES TO PHQ QUESTIONS 1-9: 0
SUM OF ALL RESPONSES TO PHQ QUESTIONS 1-9: 0
1. LITTLE INTEREST OR PLEASURE IN DOING THINGS: NOT AT ALL
6. FEELING BAD ABOUT YOURSELF - OR THAT YOU ARE A FAILURE OR HAVE LET YOURSELF OR YOUR FAMILY DOWN: NOT AT ALL
SUM OF ALL RESPONSES TO PHQ9 QUESTIONS 1 & 2: 0
8. MOVING OR SPEAKING SO SLOWLY THAT OTHER PEOPLE COULD HAVE NOTICED. OR THE OPPOSITE, BEING SO FIGETY OR RESTLESS THAT YOU HAVE BEEN MOVING AROUND A LOT MORE THAN USUAL: NOT AT ALL
9. THOUGHTS THAT YOU WOULD BE BETTER OFF DEAD, OR OF HURTING YOURSELF: NOT AT ALL
4. FEELING TIRED OR HAVING LITTLE ENERGY: NOT AT ALL
5. POOR APPETITE OR OVEREATING: NOT AT ALL
SUM OF ALL RESPONSES TO PHQ QUESTIONS 1-9: 0
10. IF YOU CHECKED OFF ANY PROBLEMS, HOW DIFFICULT HAVE THESE PROBLEMS MADE IT FOR YOU TO DO YOUR WORK, TAKE CARE OF THINGS AT HOME, OR GET ALONG WITH OTHER PEOPLE: NOT DIFFICULT AT ALL
7. TROUBLE CONCENTRATING ON THINGS, SUCH AS READING THE NEWSPAPER OR WATCHING TELEVISION: NOT AT ALL
3. TROUBLE FALLING OR STAYING ASLEEP: NOT AT ALL

## 2024-04-05 NOTE — PATIENT INSTRUCTIONS
porcelain surfaces are extremely slippery.    Make sure bathroom rugs are non-skid or tape them firmly to the floor. Bathtubs should have at least one, preferably two, grab bars, firmly attached to structural supports in the wall. (Do not use built-in soap holders or glass shower doors as grab bars.)    Tub seats fitted with non-slip material on the legs allow you to wash sitting down. For people with limited mobility, bathtub transfer benches allow you to slide safely into the tub.    Raised toilet seats and toilet safety rails are helpful for those with knee or hip problems.    In the Bedroom    Make sure you use a nightlight and that the area around your bed is clear of potential obstacles.    Be careful with electric blankets and never go to sleep with a heating pad, which can cause serious burns even if on a low setting.    Use fire-resistant mattress covers and pillows, and NEVER smoke in bed.    Keep a phone next to the bed that is programmed to dial 911 at the push of a button.      If you have a chronic condition, you may want to sign on with an automatic call-in service. Typically the system includes a small pendant that connects directly to an emergency medical voice-response system. You should also make arrangements to stay in contact with someonefriend, neighbor, family memberon a regular schedule.   Fire Prevention   According to the National S.A.F.E. (Smoke Alarms for Every) Home Foundation, senior citizens are one of the two highest risk groups for death and serious injuries due to residential fires.   When cooking, wear short-sleeved items, never a bulky long-sleeved robe.    The [facility]'s Safety Checklist for Older Consumers emphasizes the importance of checking basements, garages, workshops and storage areas for fire hazards, such as volatile liquids, piles of old rags or clothing and overloaded circuits.    Never smoke in bed or when lying down on a couch or recliner chair.    Small portable

## 2024-04-05 NOTE — PROGRESS NOTES
Medicare Annual Wellness Visit    Anshu Bassett is here for Medicare AWV    Assessment & Plan   1. Medicare annual wellness visit, subsequent  2. Primary hypertension  -     CBC with Auto Differential; Future  -     Comprehensive Metabolic Panel; Future  3. Congestive heart failure, unspecified HF chronicity, unspecified heart failure type (HCC)  -     CBC with Auto Differential; Future  -     Comprehensive Metabolic Panel; Future  4. Coronary artery disease involving native coronary artery of native heart with angina pectoris (HCC)  -     CBC with Auto Differential; Future  -     Lipid Panel; Future  5. Type 2 diabetes mellitus with microalbuminuria, without long-term current use of insulin (HCC)  -     Comprehensive Metabolic Panel; Future  -     Lipid Panel; Future  -     Hemoglobin A1C; Future  -     Microalbumin / Creatinine Urine Ratio; Future  6. Chronic obstructive pulmonary disease, unspecified COPD type (HCC)  -     CBC with Auto Differential; Future  7. Non-small cell cancer of right lung (HCC)  -     CBC with Auto Differential; Future  8. Malignant neoplasm of liver, unspecified liver malignancy type (HCC)  -     CBC with Auto Differential; Future  -     Comprehensive Metabolic Panel; Future  9. Myasthenia gravis (HCC)  10. Stage 3a chronic kidney disease (HCC)  -     CBC with Auto Differential; Future  -     Comprehensive Metabolic Panel; Future  11. Vitamin D deficiency  -     Vitamin D 25 Hydroxy; Future  12. Anemia, unspecified type  -     CBC with Auto Differential; Future  -     Comprehensive Metabolic Panel; Future  13. Chronic anticoagulation  -     CBC with Auto Differential; Future  14. Mixed hyperlipidemia  -     Comprehensive Metabolic Panel; Future  -     Lipid Panel; Future        Recommendations for Preventive Services Due: see orders and patient instructions/AVS.  Recommended screening schedule for the next 5-10 years is provided to the patient in written form: see Patient

## 2024-04-08 ENCOUNTER — PATIENT MESSAGE (OUTPATIENT)
Dept: FAMILY MEDICINE CLINIC | Age: 79
End: 2024-04-08

## 2024-04-08 DIAGNOSIS — R80.9 TYPE 2 DIABETES MELLITUS WITH MICROALBUMINURIA, WITHOUT LONG-TERM CURRENT USE OF INSULIN (HCC): Chronic | ICD-10-CM

## 2024-04-08 DIAGNOSIS — E11.29 TYPE 2 DIABETES MELLITUS WITH MICROALBUMINURIA, WITHOUT LONG-TERM CURRENT USE OF INSULIN (HCC): Chronic | ICD-10-CM

## 2024-04-14 DIAGNOSIS — Z51.5 PALLIATIVE CARE ENCOUNTER: ICD-10-CM

## 2024-04-14 DIAGNOSIS — G47.09 OTHER INSOMNIA: ICD-10-CM

## 2024-04-15 ENCOUNTER — OFFICE (OUTPATIENT)
Dept: URBAN - METROPOLITAN AREA CLINIC 27 | Facility: CLINIC | Age: 79
End: 2024-04-15

## 2024-04-15 VITALS
WEIGHT: 200 LBS | DIASTOLIC BLOOD PRESSURE: 56 MMHG | HEART RATE: 78 BPM | SYSTOLIC BLOOD PRESSURE: 103 MMHG | TEMPERATURE: 98.3 F | HEIGHT: 71 IN

## 2024-04-15 DIAGNOSIS — K21.9 GASTRO-ESOPHAGEAL REFLUX DISEASE WITHOUT ESOPHAGITIS: ICD-10-CM

## 2024-04-15 DIAGNOSIS — C34.90 MALIGNANT NEOPLASM OF UNSPECIFIED PART OF UNSPECIFIED BRONCH: ICD-10-CM

## 2024-04-15 DIAGNOSIS — R19.5 OTHER FECAL ABNORMALITIES: ICD-10-CM

## 2024-04-15 DIAGNOSIS — D36.9 BENIGN NEOPLASM, UNSPECIFIED SITE: ICD-10-CM

## 2024-04-15 DIAGNOSIS — Z79.01 LONG TERM (CURRENT) USE OF ANTICOAGULANTS: ICD-10-CM

## 2024-04-15 DIAGNOSIS — D69.6 THROMBOCYTOPENIA, UNSPECIFIED: ICD-10-CM

## 2024-04-15 DIAGNOSIS — D64.9 ANEMIA, UNSPECIFIED: ICD-10-CM

## 2024-04-15 PROCEDURE — 99214 OFFICE O/P EST MOD 30 MIN: CPT | Performed by: NURSE PRACTITIONER

## 2024-04-15 RX ORDER — TRAZODONE HYDROCHLORIDE 100 MG/1
100 TABLET ORAL NIGHTLY
Qty: 30 TABLET | Refills: 11 | Status: SHIPPED | OUTPATIENT
Start: 2024-04-15

## 2024-04-22 ENCOUNTER — OFFICE VISIT (OUTPATIENT)
Dept: PALLATIVE CARE | Age: 79
End: 2024-04-22
Payer: MEDICARE

## 2024-04-22 VITALS
DIASTOLIC BLOOD PRESSURE: 70 MMHG | RESPIRATION RATE: 18 BRPM | HEART RATE: 88 BPM | TEMPERATURE: 98 F | SYSTOLIC BLOOD PRESSURE: 128 MMHG | OXYGEN SATURATION: 97 %

## 2024-04-22 DIAGNOSIS — F32.9 REACTIVE DEPRESSION: Primary | ICD-10-CM

## 2024-04-22 DIAGNOSIS — G89.3 NEOPLASM RELATED PAIN: ICD-10-CM

## 2024-04-22 DIAGNOSIS — S41.101D OPEN WOUND OF RIGHT UPPER ARM, SUBSEQUENT ENCOUNTER: ICD-10-CM

## 2024-04-22 DIAGNOSIS — Z51.5 PALLIATIVE CARE ENCOUNTER: ICD-10-CM

## 2024-04-22 DIAGNOSIS — C34.91 NON-SMALL CELL CANCER OF RIGHT LUNG (HCC): ICD-10-CM

## 2024-04-22 DIAGNOSIS — R45.89 ANXIETY ABOUT HEALTH: ICD-10-CM

## 2024-04-22 PROCEDURE — 99350 HOME/RES VST EST HIGH MDM 60: CPT

## 2024-04-22 PROCEDURE — G8417 CALC BMI ABV UP PARAM F/U: HCPCS

## 2024-04-22 PROCEDURE — 3074F SYST BP LT 130 MM HG: CPT

## 2024-04-22 PROCEDURE — 1123F ACP DISCUSS/DSCN MKR DOCD: CPT

## 2024-04-22 PROCEDURE — 1036F TOBACCO NON-USER: CPT

## 2024-04-22 PROCEDURE — 3078F DIAST BP <80 MM HG: CPT

## 2024-04-22 RX ORDER — BUSPIRONE HYDROCHLORIDE 10 MG/1
10 TABLET ORAL 2 TIMES DAILY
Qty: 180 TABLET | Refills: 0 | Status: SHIPPED | OUTPATIENT
Start: 2024-04-22 | End: 2024-07-21

## 2024-04-22 NOTE — PROGRESS NOTES
wife requested medication refill during interaction.  -Patient reported taking medication at least daily to aid with mood/anxiety.  Patient reports much pill burden and occasionally forgets to take morning dose.  -Gave additional education and reinforced importance of taking medication as ordered if mood worsens.  -Patient denies any SI, HI or unmanaged anxiety during interaction.  -Palliative care will continue to monitor  - busPIRone (BUSPAR) 10 MG tablet; Take 1 tablet by mouth in the morning and at bedtime  Dispense: 180 tablet; Refill: 0    3. Neoplasm related pain  -Worsening  -Plan: Maximize use of current OTC medications Tylenol 500 mg tablets x 2 up to 3 times daily, encouraged use of Norco 5/325 mg tablet twice daily as needed (patient may break tablet in half), Voltaren gel topical application 4 times daily as needed up to 2 areas daily, heating pad, muscle rubs and exercise as tolerated.  -Encouraged ambulation, stretching and exercise as tolerated  -Continue current level of activity to maintain current quality of life.  -Exercise and stretch as tolerated to decrease joint stiffening, pain and to maintain range of motion.  -Continue to use durable medical equipment to prevent falls and promote safety.  -Encouraged frequent repositioning and turning to prevent pressure injuries or skin breakdown.  -Patient denies utilizing previously ordered Norco pain medication during interaction.  -Palliative care will continue to monitor.    4. Non-small cell cancer of right lung (HCC)  -Stable  -Plan:Continue with current plan of care as directed by specialist and take chemotherapy as directed and continue to follow with PCP and oncologist as recommended/advised.   -Provided much information, education and gave encouragement during interaction related to patient's current condition with reported decrease in size of cancer.  Spent much time on this subject area and all questions were answered during

## 2024-04-25 NOTE — TELEPHONE ENCOUNTER
Testosterone supplementation is managed by endo office (Dr. Arabella Melendez). They should be contacted for testosterone order since they will be following result and adjusting treatment based on results. Attending note (Steven): 43-year-old male with no reported medical comorbidities complaining of lower back pain for the past 1 week.  States began after he was going up and down a ladder but denies any fall or direct trauma to back.  States worse when he bends over or trying to sit/stand up from bed improved with lying flat.  No weakness or numbness in lower extremities.  No loss of control of bowels or bladder though has had intermittent constipation chronically this is unchanged.  No fever no IVDA no history of back surgeries or procedures.

## 2024-04-30 ENCOUNTER — OFFICE (OUTPATIENT)
Dept: URBAN - METROPOLITAN AREA CLINIC 26 | Facility: CLINIC | Age: 79
End: 2024-04-30
Payer: COMMERCIAL

## 2024-04-30 DIAGNOSIS — K92.2 GASTROINTESTINAL HEMORRHAGE, UNSPECIFIED: ICD-10-CM

## 2024-04-30 DIAGNOSIS — R19.5 OTHER FECAL ABNORMALITIES: ICD-10-CM

## 2024-04-30 DIAGNOSIS — K63.5 POLYP OF COLON: ICD-10-CM

## 2024-04-30 PROCEDURE — 91110 GI TRC IMG INTRAL ESOPH-ILE: CPT | Performed by: INTERNAL MEDICINE

## 2024-05-06 ENCOUNTER — OFFICE VISIT (OUTPATIENT)
Dept: PALLATIVE CARE | Age: 79
End: 2024-05-06
Payer: MEDICARE

## 2024-05-06 VITALS
HEART RATE: 72 BPM | RESPIRATION RATE: 16 BRPM | OXYGEN SATURATION: 90 % | SYSTOLIC BLOOD PRESSURE: 130 MMHG | DIASTOLIC BLOOD PRESSURE: 72 MMHG | TEMPERATURE: 96.9 F

## 2024-05-06 DIAGNOSIS — Z51.5 PALLIATIVE CARE ENCOUNTER: ICD-10-CM

## 2024-05-06 DIAGNOSIS — C34.91 NON-SMALL CELL CANCER OF RIGHT LUNG (HCC): ICD-10-CM

## 2024-05-06 DIAGNOSIS — R53.0 NEOPLASTIC MALIGNANT RELATED FATIGUE: Primary | ICD-10-CM

## 2024-05-06 DIAGNOSIS — G47.09 OTHER INSOMNIA: ICD-10-CM

## 2024-05-06 PROCEDURE — G8417 CALC BMI ABV UP PARAM F/U: HCPCS

## 2024-05-06 PROCEDURE — 3075F SYST BP GE 130 - 139MM HG: CPT

## 2024-05-06 PROCEDURE — 1036F TOBACCO NON-USER: CPT

## 2024-05-06 PROCEDURE — 1123F ACP DISCUSS/DSCN MKR DOCD: CPT

## 2024-05-06 PROCEDURE — 99348 HOME/RES VST EST LOW MDM 30: CPT

## 2024-05-06 PROCEDURE — 3078F DIAST BP <80 MM HG: CPT

## 2024-05-06 ASSESSMENT — ENCOUNTER SYMPTOMS
WHEEZING: 0
COLOR CHANGE: 1
TROUBLE SWALLOWING: 0
SHORTNESS OF BREATH: 0
COUGH: 0
BACK PAIN: 1
GASTROINTESTINAL NEGATIVE: 1

## 2024-05-06 ASSESSMENT — PATIENT HEALTH QUESTIONNAIRE - PHQ9
SUM OF ALL RESPONSES TO PHQ QUESTIONS 1-9: 0
SUM OF ALL RESPONSES TO PHQ QUESTIONS 1-9: 0
SUM OF ALL RESPONSES TO PHQ9 QUESTIONS 1 & 2: 0
SUM OF ALL RESPONSES TO PHQ QUESTIONS 1-9: 0
2. FEELING DOWN, DEPRESSED OR HOPELESS: NOT AT ALL
SUM OF ALL RESPONSES TO PHQ QUESTIONS 1-9: 0
1. LITTLE INTEREST OR PLEASURE IN DOING THINGS: NOT AT ALL

## 2024-05-06 ASSESSMENT — VISUAL ACUITY: OU: 1

## 2024-05-06 NOTE — PROGRESS NOTES
lower leg: Pitting Edema present.   Feet:      Right foot:      Skin integrity: Skin integrity normal.      Left foot:      Skin integrity: Skin integrity normal.   Skin:     General: Skin is warm and dry.      Coloration: Skin is pale.      Findings: Bruising, erythema (BLE) and wound (Right elbow, open wound) present. No rash.      Nails: There is no clubbing.   Neurological:      General: No focal deficit present.      Mental Status: He is alert, oriented to person, place, and time and easily aroused. Mental status is at baseline.      Motor: Weakness present.      Gait: Gait normal.   Psychiatric:         Attention and Perception: Attention and perception normal.         Mood and Affect: Mood and affect normal.         Speech: Speech normal.         Behavior: Behavior normal. Behavior is cooperative.         Thought Content: Thought content normal. Thought content does not include homicidal or suicidal ideation.         Cognition and Memory: Cognition and memory normal.         Judgment: Judgment normal.         Assessment and Plan:      1. Neoplastic malignant related fatigue  -Continuing  -Plan: Encouraged patient to get adequate sunlight, exercise, engage in social activities, eat healthy diet, take medications as advised and continue to follow with PCP and oncologist.  Patient plans to start taking oral nascent iodine in effort to improve fatigue and mental wellbeing.  -Palliative care will continue to monitor.    2. Non-small cell cancer of right lung (HCC)  -Improving  -Plan: Encouraged patient to continue to follow with PCP, oncology and continue chemotherapy/treatment for lung cancer.  -Patient received good news from oncologist team that his current cancer is shrinking from previous interaction.  Encouraged patient to continue to maintain a positive attitude/outlook on current illness and continue to take OTC supplements as he feels this is helping.  -Palliative care provided active listening, presence

## 2024-05-19 DIAGNOSIS — R91.8 PULMONARY INFILTRATE: ICD-10-CM

## 2024-05-20 DIAGNOSIS — Z79.52 CURRENT CHRONIC USE OF SYSTEMIC STEROIDS: ICD-10-CM

## 2024-05-20 RX ORDER — OMEPRAZOLE 20 MG/1
20 CAPSULE, DELAYED RELEASE ORAL DAILY
Qty: 90 CAPSULE | Refills: 3 | OUTPATIENT
Start: 2024-05-20

## 2024-05-20 RX ORDER — PANTOPRAZOLE SODIUM 40 MG/1
40 TABLET, DELAYED RELEASE ORAL DAILY
Qty: 90 TABLET | Refills: 3 | Status: SHIPPED | OUTPATIENT
Start: 2024-05-20

## 2024-05-20 RX ORDER — FUROSEMIDE 40 MG/1
40 TABLET ORAL DAILY
Qty: 90 TABLET | Refills: 3 | Status: SHIPPED | OUTPATIENT
Start: 2024-05-20

## 2024-05-20 NOTE — TELEPHONE ENCOUNTER
Rx requested:  Requested Prescriptions     Pending Prescriptions Disp Refills    furosemide (LASIX) 40 MG tablet [Pharmacy Med Name: Furosemide 40 MG Oral Tablet] 90 tablet 3     Sig: TAKE 1 TABLET BY MOUTH DAILY       Last Office Visit:   3/18/2024      Next Visit Date:  Future Appointments   Date Time Provider Department Center   5/21/2024  3:00 PM Stephen Prado APRN - CNP [facility]   5/22/2024  1:15 PM Hunter Granados MD [place] [facility]   7/15/2024  1:00 PM [facility] ROOM 1 [facility]   8/6/2024 11:20 AM Aristeo Tang DO [facility]   9/5/2024 10:45 AM Cam Prado MD [facility]  [facility]   9/24/2024 11:00 AM Aristeo Tang DO [facility]

## 2024-05-20 NOTE — TELEPHONE ENCOUNTER
Requesting medication refill. Please approve or deny this request.    Rx requested:  Requested Prescriptions     Pending Prescriptions Disp Refills    pantoprazole (PROTONIX) 40 MG tablet 30 tablet 3     Sig: Take 1 tablet by mouth daily         Last Office Visit:   3/19/2024      Next Visit Date:  Future Appointments   Date Time Provider Department Center   5/21/2024  3:00 PM Stephen Prado APRN - CNP [facility]   5/22/2024  1:15 PM Hunter Granados MD [place] [facility] [place]   7/15/2024  1:00 PM [facility] ROOM 1 [facility]  CT [facility] RAD   8/6/2024 11:20 AM Aristeo Tang, DO [facility]   9/5/2024 10:45 AM Cam Prado MD [facility]   9/24/2024 11:00 AM Aristeo Tang, DO [facility]               Last refill 12/19/2023. Please approve or deny.

## 2024-05-20 NOTE — TELEPHONE ENCOUNTER
Rx requested:  Requested Prescriptions     Pending Prescriptions Disp Refills    omeprazole (PRILOSEC) 20 MG delayed release capsule [Pharmacy Med Name: Omeprazole 20 MG Oral Capsule Delayed Release] 90 capsule 3     Sig: TAKE 1 CAPSULE BY MOUTH DAILY       Last Office Visit:   3/18/2024      Next Visit Date:  Future Appointments   Date Time Provider Department Center   5/21/2024  3:00 PM Stephen Prado APRN - CNP [facility]   5/22/2024  1:15 PM Hunter Granados MD [place] [facility]   7/15/2024  1:00 PM [facility] RAD   8/6/2024 11:20 AM Aristeo Tang DO [facility]   9/5/2024 10:45 AM Cam Prado MD [facility]  [facility]   9/24/2024 11:00 AM Aristeo Tang DO [facility]

## 2024-05-21 ENCOUNTER — OFFICE VISIT (OUTPATIENT)
Dept: PALLATIVE CARE | Age: 79
End: 2024-05-21
Payer: MEDICARE

## 2024-05-21 VITALS
HEART RATE: 85 BPM | TEMPERATURE: 98.5 F | DIASTOLIC BLOOD PRESSURE: 68 MMHG | OXYGEN SATURATION: 95 % | RESPIRATION RATE: 16 BRPM | SYSTOLIC BLOOD PRESSURE: 126 MMHG

## 2024-05-21 DIAGNOSIS — J44.9 CHRONIC OBSTRUCTIVE PULMONARY DISEASE, UNSPECIFIED COPD TYPE (HCC): ICD-10-CM

## 2024-05-21 DIAGNOSIS — G47.09 OTHER INSOMNIA: Primary | ICD-10-CM

## 2024-05-21 DIAGNOSIS — Z51.5 PALLIATIVE CARE ENCOUNTER: ICD-10-CM

## 2024-05-21 PROCEDURE — 99348 HOME/RES VST EST LOW MDM 30: CPT

## 2024-05-21 PROCEDURE — G8417 CALC BMI ABV UP PARAM F/U: HCPCS

## 2024-05-21 PROCEDURE — 3074F SYST BP LT 130 MM HG: CPT

## 2024-05-21 PROCEDURE — 1123F ACP DISCUSS/DSCN MKR DOCD: CPT

## 2024-05-21 PROCEDURE — 3078F DIAST BP <80 MM HG: CPT

## 2024-05-21 PROCEDURE — 1036F TOBACCO NON-USER: CPT

## 2024-05-21 RX ORDER — CLOTRIMAZOLE AND BETAMETHASONE DIPROPIONATE 10; .64 MG/G; MG/G
1 CREAM TOPICAL 2 TIMES DAILY
COMMUNITY
Start: 2024-05-20

## 2024-05-21 RX ORDER — TRAZODONE HYDROCHLORIDE 100 MG/1
150 TABLET ORAL NIGHTLY
Qty: 135 TABLET | Refills: 0 | Status: SHIPPED | OUTPATIENT
Start: 2024-05-21 | End: 2024-08-19

## 2024-05-21 ASSESSMENT — VISUAL ACUITY: OU: 1

## 2024-05-21 ASSESSMENT — ENCOUNTER SYMPTOMS
SHORTNESS OF BREATH: 0
TROUBLE SWALLOWING: 0
GASTROINTESTINAL NEGATIVE: 1
BACK PAIN: 1
WHEEZING: 0
COLOR CHANGE: 1
COUGH: 0

## 2024-05-21 NOTE — PROGRESS NOTES
-Palliative care will continue to monitor  - traZODone (DESYREL) 100 MG tablet; Take 1.5 tablets by mouth nightly TAKE 1.5 TABLETS BY MOUTH EVERY  NIGHT  Dispense: 135 tablet; Refill: 0    2. Chronic obstructive pulmonary disease, unspecified COPD type (HCC)  -stable  -Plan: Continue COPD directed therapies that are managed by another provider/speciality and continue to follow with primary care provider and pulmonologist as recommended/advised.   -Encouraged ambulation, stretching and exercise as tolerated  -Continue current level of activity to maintain current quality of life.  -Exercise and stretch as tolerated to decrease joint stiffening, pain and to maintain range of motion.  -Continue to use durable medical equipment to prevent falls and promote safety.  -Encouraged frequent repositioning and turning to prevent pressure injuries or skin breakdown.  -Call for increased SOB, cough, sputum production, excessive fatigue, fever, chills, or myalgias.   -Activity as tolerated.   -DO NOT SMOKE WITH HOME OXYGEN as it can cause an explosion.   -Advised pacing, energy conservation measures including frequent rests; maintain adequate nutrition and hydration.   -Rinse out mouth after inhaler use.   -COPD ER PACK in place: Call prior to initializing.    -Palliative care will continue to monitor    3. Palliative care encounter  -Discussed symptom management related to chronic disease/condition. Provided emotional support and active listening. Patient understands and is agreeable to current plan.  -Palliative care will continue to work with and follow patient.  - traZODone (DESYREL) 100 MG tablet; Take 1.5 tablets by mouth nightly TAKE 1.5 TABLETS BY MOUTH EVERY  NIGHT  Dispense: 135 tablet; Refill: 0      Medications Discontinued During This Encounter   Medication Reason    traZODone (DESYREL) 100 MG tablet REORDER       Discussed with patient/surrogate: POC, Treatment risks/benefits, and alternatives. All questions were

## 2024-05-22 ENCOUNTER — OFFICE VISIT (OUTPATIENT)
Dept: PULMONOLOGY | Age: 79
End: 2024-05-22
Payer: MEDICARE

## 2024-05-22 ENCOUNTER — HOSPITAL ENCOUNTER (OUTPATIENT)
Age: 79
Discharge: HOME | End: 2024-05-22
Payer: MEDICARE

## 2024-05-22 VITALS
HEIGHT: 71 IN | OXYGEN SATURATION: 93 % | WEIGHT: 202.8 LBS | HEART RATE: 99 BPM | SYSTOLIC BLOOD PRESSURE: 124 MMHG | DIASTOLIC BLOOD PRESSURE: 62 MMHG | BODY MASS INDEX: 28.39 KG/M2

## 2024-05-22 DIAGNOSIS — I26.99 PULMONARY EMBOLISM AND INFARCTION (HCC): ICD-10-CM

## 2024-05-22 DIAGNOSIS — C34.91 SQUAMOUS CELL CARCINOMA LUNG, RIGHT (HCC): ICD-10-CM

## 2024-05-22 DIAGNOSIS — G47.30 SLEEP APNEA, UNSPECIFIED TYPE: ICD-10-CM

## 2024-05-22 DIAGNOSIS — J44.9 CHRONIC OBSTRUCTIVE PULMONARY DISEASE, UNSPECIFIED COPD TYPE (HCC): Primary | ICD-10-CM

## 2024-05-22 DIAGNOSIS — G70.00 MYASTHENIA GRAVIS (HCC): ICD-10-CM

## 2024-05-22 DIAGNOSIS — Z79.52 CURRENT CHRONIC USE OF SYSTEMIC STEROIDS: ICD-10-CM

## 2024-05-22 LAB
BASE EXCESS ARTERIAL: 2 (ref -3–3)
CALCIUM IONIZED: 1.28 MMOL/L (ref 1.12–1.32)
GLUCOSE BLD-MCNC: 167 MG/DL (ref 70–99)
HCO3 ARTERIAL: 26.4 MMOL/L (ref 21–29)
HCT VFR BLD AUTO: 32 % (ref 41–53)
HGB BLD CALC-MCNC: 10.9 GM/DL (ref 13.5–17.5)
LACTATE: 2.49 MMOL/L (ref 0.4–2)
O2 SAT, ARTERIAL: 96 % (ref 93–100)
PCO2 ARTERIAL: 39 MM HG (ref 35–45)
PERFORMED ON: ABNORMAL
PH ARTERIAL: 7.44 (ref 7.35–7.45)
PO2 ARTERIAL: 76 MM HG (ref 75–108)
POC CHLORIDE: 98 MEQ/L (ref 99–110)
POC CREATININE: 1.3 MG/DL (ref 0.8–1.3)
POC SAMPLE TYPE: ABNORMAL
POTASSIUM SERPL-SCNC: 4 MEQ/L (ref 3.5–5.1)
SODIUM BLD-SCNC: 135 MEQ/L (ref 136–145)
TCO2 ARTERIAL: 28 MMOL/L (ref 21–32)

## 2024-05-22 PROCEDURE — G8427 DOCREV CUR MEDS BY ELIG CLIN: HCPCS | Performed by: INTERNAL MEDICINE

## 2024-05-22 PROCEDURE — 83605 ASSAY OF LACTIC ACID: CPT

## 2024-05-22 PROCEDURE — 1036F TOBACCO NON-USER: CPT | Performed by: INTERNAL MEDICINE

## 2024-05-22 PROCEDURE — 82565 ASSAY OF CREATININE: CPT

## 2024-05-22 PROCEDURE — 82803 BLOOD GASES ANY COMBINATION: CPT

## 2024-05-22 PROCEDURE — 84295 ASSAY OF SERUM SODIUM: CPT

## 2024-05-22 PROCEDURE — 82435 ASSAY OF BLOOD CHLORIDE: CPT

## 2024-05-22 PROCEDURE — 3023F SPIROM DOC REV: CPT | Performed by: INTERNAL MEDICINE

## 2024-05-22 PROCEDURE — 3074F SYST BP LT 130 MM HG: CPT | Performed by: INTERNAL MEDICINE

## 2024-05-22 PROCEDURE — 3078F DIAST BP <80 MM HG: CPT | Performed by: INTERNAL MEDICINE

## 2024-05-22 PROCEDURE — 84132 ASSAY OF SERUM POTASSIUM: CPT

## 2024-05-22 PROCEDURE — 99214 OFFICE O/P EST MOD 30 MIN: CPT | Performed by: INTERNAL MEDICINE

## 2024-05-22 PROCEDURE — 82330 ASSAY OF CALCIUM: CPT

## 2024-05-22 PROCEDURE — 36600 WITHDRAWAL OF ARTERIAL BLOOD: CPT

## 2024-05-22 PROCEDURE — G8417 CALC BMI ABV UP PARAM F/U: HCPCS | Performed by: INTERNAL MEDICINE

## 2024-05-22 PROCEDURE — 85014 HEMATOCRIT: CPT

## 2024-05-22 PROCEDURE — 1123F ACP DISCUSS/DSCN MKR DOCD: CPT | Performed by: INTERNAL MEDICINE

## 2024-05-22 RX ORDER — DOXYCYCLINE HYCLATE 100 MG
100 TABLET ORAL 2 TIMES DAILY
Qty: 10 TABLET | Refills: 0 | Status: SHIPPED | OUTPATIENT
Start: 2024-05-22 | End: 2024-05-27

## 2024-05-22 RX ORDER — PREDNISONE 10 MG/1
40 TABLET ORAL DAILY
Qty: 20 TABLET | Refills: 0 | Status: SHIPPED | OUTPATIENT
Start: 2024-05-22 | End: 2024-05-27

## 2024-05-22 RX ORDER — DOXYCYCLINE HYCLATE 100 MG
100 TABLET ORAL 2 TIMES DAILY
Qty: 10 TABLET | Refills: 0 | Status: SHIPPED | OUTPATIENT
Start: 2024-05-22 | End: 2024-05-22 | Stop reason: SDUPTHER

## 2024-05-22 RX ORDER — PREDNISONE 10 MG/1
40 TABLET ORAL DAILY
Qty: 20 TABLET | Refills: 0 | Status: SHIPPED | OUTPATIENT
Start: 2024-05-22 | End: 2024-05-22 | Stop reason: SDUPTHER

## 2024-05-22 NOTE — PROGRESS NOTES
Subjective:     Anshu Bassett is a 79 y.o. male who complains today of:     Chief Complaint   Patient presents with    Follow-up     Pt presents today for his 2 month follow up. Pt states that he is beating this disease. Right now he is very very tired. He states that his body is fighting hard against his cancer as well. He says he is putting up a good fight.        HPI  Patient presents for COPD    5/22/2024  Presents for follow-up, he has dyspnea on exertion but symptoms better controlled, he recently started having sore throat and mild cough, his wife has URI symptoms, no chest pain, lower extremity edema is at baseline and controlled, he reports symptoms of shortness of breath while asleep, he has daytime tiredness.  No heartburn, no nasal congestion or postnasal drip.  Weight is stable    3/18/2024  Presents for follow-up, he is doing good, symptoms improved, still has dyspnea on minimal exertion however in general improved, he is on 10 mg daily of prednisone, no coughing, no chest pain, weight is stable, no nasal congestion or postnasal drip and no heartburn.+ lower ext edema .  On Lasix 40 mg p.o. daily, was recently hospitalized at [facility] for volume overload    2/5/2024  Presents for follow-up, still short of breath, he is currently taking prednisone 20 mg daily, no coughing, no chest pain, no worsening lower extremity edema, he did not tolerate his last chemotherapy, no fever, weight is stable, no nasal congestion or postnasal drip.  12/4/2023  Slightly better, he is not using oxygen frequently now, still short of breath but slightly improved, no coughing, no chest pain, no fever no chills, lower extremity edema improved, no heartburn, no nasal congestion or postnasal drip, he will be starting chemotherapy on Friday.      11/27/2023  Patient presents post hospital discharge, was recently hospitalized for Pseudomonas pneumonia, candidiasis, COVID-19, and volume overload.  Discharged home, has not been feeling

## 2024-05-28 RX ORDER — PANTOPRAZOLE SODIUM 40 MG/1
40 TABLET, DELAYED RELEASE ORAL DAILY
Qty: 90 TABLET | Refills: 3 | OUTPATIENT
Start: 2024-05-28

## 2024-05-28 NOTE — TELEPHONE ENCOUNTER
Requesting medication refill. Please approve or deny this request.    Rx requested:  Requested Prescriptions     Pending Prescriptions Disp Refills    pantoprazole (PROTONIX) 40 MG tablet 90 tablet 3     Sig: Take 1 tablet by mouth daily         Last Office Visit:   3/19/2024      Next Visit Date:  Future Appointments   Date Time Provider Department Center   6/24/2024  2:00 PM Stephen Prado APRN - CNP [facility]   7/15/2024  1:00 PM [facility] ROOM 1 [facility]   8/6/2024 11:20 AM Aristeo Tang, DO [facility]   8/26/2024  1:15 PM Hunter Granados MD [facility] [facility]   9/5/2024 10:45 AM Cam Praod MD [facility]   9/24/2024 11:00 AM Aristeo Tang DO [facility]

## 2024-05-31 ENCOUNTER — TRANSCRIBE ORDERS (OUTPATIENT)
Dept: ADMINISTRATIVE | Age: 79
End: 2024-05-31

## 2024-05-31 DIAGNOSIS — C78.7 SECONDARY MALIGNANT NEOPLASM OF LIVER (HCC): Primary | ICD-10-CM

## 2024-06-14 ENCOUNTER — PATIENT MESSAGE (OUTPATIENT)
Dept: CARDIOLOGY CLINIC | Age: 79
End: 2024-06-14

## 2024-06-14 RX ORDER — POTASSIUM CHLORIDE 20 MEQ/1
20 TABLET, EXTENDED RELEASE ORAL DAILY
Qty: 90 TABLET | Refills: 3 | Status: SHIPPED | OUTPATIENT
Start: 2024-06-14

## 2024-06-14 NOTE — TELEPHONE ENCOUNTER
Requesting medication refill. Please approve or deny this request.    Rx requested:  Requested Prescriptions     Pending Prescriptions Disp Refills    potassium chloride (KLOR-CON M) 20 MEQ extended release tablet 90 tablet 3     Sig: Take 1 tablet by mouth daily    apixaban (ELIQUIS) 5 MG TABS tablet 180 tablet 3     Sig: Take 1 tablet by mouth 2 times daily         Last Office Visit:   3/19/2024      Next Visit Date:  Future Appointments   Date Time Provider Department Center   6/20/2024 10:00 AM [facility] ROOM 1 [facility] RAD   6/24/2024  2:00 PM Stephen Prado APRN - CNP [facility]   7/15/2024  1:00 PM [facility] ROOM 1 [facility] RAD   8/6/2024 11:20 AM Aristeo Tang, DO [facility]   8/26/2024  1:15 PM Hunter Granados MD [place] [facility] [place]   9/5/2024 10:45 AM Cam Prado MD [facility]   9/24/2024 11:00 AM Aristeo Tang, DO [facility]

## 2024-06-14 NOTE — TELEPHONE ENCOUNTER
From: Anshu Bassett  To: Dr. Aristeo Tang  Sent: 6/14/2024 10:41 AM EDT  Subject: Prescriptions    Please submit a new prescription for Potassium 20 meq and [place] to [facility].    Thank you so much.

## 2024-06-20 ENCOUNTER — HOSPITAL ENCOUNTER (OUTPATIENT)
Dept: CT IMAGING | Age: 79
Discharge: HOME | End: 2024-06-22
Payer: MEDICARE

## 2024-06-20 DIAGNOSIS — C78.7 SECONDARY MALIGNANT NEOPLASM OF LIVER (HCC): ICD-10-CM

## 2024-06-20 PROCEDURE — 71260 CT THORAX DX C+: CPT

## 2024-06-20 PROCEDURE — 74177 CT ABD & PELVIS W/CONTRAST: CPT

## 2024-06-20 PROCEDURE — 6360000004 HC RX CONTRAST MEDICATION: Performed by: NURSE PRACTITIONER

## 2024-06-20 RX ADMIN — IOPAMIDOL 75 ML: 755 INJECTION, SOLUTION INTRAVENOUS at 10:46

## 2024-06-20 RX ADMIN — IOPAMIDOL 18 ML: 755 INJECTION, SOLUTION INTRAVENOUS at 09:35

## 2024-06-23 DIAGNOSIS — R91.8 PULMONARY INFILTRATE: ICD-10-CM

## 2024-06-23 DIAGNOSIS — I10 PRIMARY HYPERTENSION: Chronic | ICD-10-CM

## 2024-06-24 ENCOUNTER — OFFICE VISIT (OUTPATIENT)
Dept: PALLATIVE CARE | Age: 79
End: 2024-06-24
Payer: MEDICARE

## 2024-06-24 VITALS
TEMPERATURE: 98.3 F | RESPIRATION RATE: 18 BRPM | DIASTOLIC BLOOD PRESSURE: 68 MMHG | SYSTOLIC BLOOD PRESSURE: 124 MMHG | HEART RATE: 74 BPM | OXYGEN SATURATION: 94 %

## 2024-06-24 DIAGNOSIS — Z51.5 PALLIATIVE CARE ENCOUNTER: ICD-10-CM

## 2024-06-24 DIAGNOSIS — G47.09 OTHER INSOMNIA: Primary | ICD-10-CM

## 2024-06-24 DIAGNOSIS — R53.1 WEAKNESS: ICD-10-CM

## 2024-06-24 DIAGNOSIS — R45.89 ANXIETY ABOUT HEALTH: ICD-10-CM

## 2024-06-24 PROCEDURE — 1123F ACP DISCUSS/DSCN MKR DOCD: CPT

## 2024-06-24 PROCEDURE — 3078F DIAST BP <80 MM HG: CPT

## 2024-06-24 PROCEDURE — 3074F SYST BP LT 130 MM HG: CPT

## 2024-06-24 PROCEDURE — G8417 CALC BMI ABV UP PARAM F/U: HCPCS

## 2024-06-24 PROCEDURE — 1036F TOBACCO NON-USER: CPT

## 2024-06-24 PROCEDURE — 99349 HOME/RES VST EST MOD MDM 40: CPT

## 2024-06-24 RX ORDER — HYDROXYZINE HYDROCHLORIDE 25 MG/1
25 TABLET, FILM COATED ORAL EVERY 8 HOURS PRN
Qty: 42 TABLET | Refills: 0 | Status: SHIPPED | OUTPATIENT
Start: 2024-06-24 | End: 2024-07-08

## 2024-06-24 RX ORDER — PREDNISONE 10 MG/1
10 TABLET ORAL DAILY
COMMUNITY
Start: 2024-06-10

## 2024-06-24 RX ORDER — LOSARTAN POTASSIUM 25 MG/1
25 TABLET ORAL DAILY
Qty: 90 TABLET | Refills: 1 | Status: SHIPPED | OUTPATIENT
Start: 2024-06-24

## 2024-06-24 ASSESSMENT — ENCOUNTER SYMPTOMS
GASTROINTESTINAL NEGATIVE: 1
BACK PAIN: 1
SHORTNESS OF BREATH: 0
TROUBLE SWALLOWING: 0
COUGH: 0
COLOR CHANGE: 1
WHEEZING: 0

## 2024-06-24 ASSESSMENT — VISUAL ACUITY: OU: 1

## 2024-06-24 NOTE — TELEPHONE ENCOUNTER
Rx requested:  Requested Prescriptions     Pending Prescriptions Disp Refills    furosemide (LASIX) 40 MG tablet 90 tablet 3     Sig: Take 1 tablet by mouth daily       Last Office Visit:   5/22/2024      Next Visit Date:  Future Appointments   Date Time Provider Department Center   6/24/2024  2:00 PM Stephen Prado APRN - CNP [facility]   7/15/2024  1:00 PM [facility] ROOM 1 [facility] RAD   8/6/2024 11:20 AM Aristeo Tang DO [facility]   8/26/2024  1:15 PM Hunter Granados MD [place] [facility]   9/5/2024 10:45 AM Cam Prado MD [facility]   9/24/2024 11:00 AM Aristeo Tang DO [facility]

## 2024-06-24 NOTE — PROGRESS NOTES
Subjective:      Patient Id: Seen Anshu at home in [place], for follow up Palliative Care visit.  He was accompanied to the appointment by: wife Pat.    Chief Complaint   Patient presents with    Follow-up    Insomnia      HPI       Anshu Bassett is a 79 y.o. male was seen and evaluated palliative care for symptom management related to COPD and non-small cell lung cancer. Anshu has complex medical history that includes non-small cell cancer of right lung S/P right upper lobectomy, COPD, T2DM, myasthenia gravis, chronic low back pain, CAD, coronary artery bypass graft x5, HTN, osteoarthritis, former tobacco use, HLD, GERD. Home visit is necessary in lieu of office due to significant frailty and high symptom burden from comorbid illnesses.     Patient with continued complaint of insomnia, increasing weakness and ongoing right elbow wound.  Patient follows with PCP, pulmonology, neurology, oncology, urology and cardiology.     General: Upon entering the room I find the patient  ambulating with rollator DME, alert and oriented x4, calm, cooperative and in NAD.     Weakness: Patient expressed concern as he has been growing increasingly weaker and had a near fall a few weeks ago. Patient continues to work with neurology for his Myasthenia Gravis and is now on daily Prednisone 10 mg daily with some improvement. Patient has upper extremity and lower extremity weakness. Patient has stated that his legs are the weaker than his arms.     Lung cancer: Patient now takes chemotherapy on 2 weeks off 2 weeks per conversation.  Patient continues to experience fatigue while on current chemotherapy regimen.  Patient continues to follow with oncology, take oral chemotherapy as recommended and utilize OTC holistic supplements.  Patient continues to remain positive during appointment.     Skin: Patient has continued right elbow weeping area that is open.  Patient and patient's wife apply DSD to right elbow every 2 days.  Patient

## 2024-06-24 NOTE — TELEPHONE ENCOUNTER
Comments:     Last Office Visit (last PCP visit):   4/5/2024    Next Visit Date:  Future Appointments   Date Time Provider Department Center   6/24/2024  2:00 PM Stephen Prado APRN - CNP [facility] [facility]   7/15/2024  1:00 PM [facility] CT ROOM 1 [facility]  CT [facility] Fac RAD   8/6/2024 11:20 AM Aristeo Tang DO [facility]   8/26/2024  1:15 PM Hunter Granados MD [place] [facility]   9/5/2024 10:45 AM Cam Prado MD [facility]   9/24/2024 11:00 AM Aristeo Tang DO [facility]         Rx requested:  Requested Prescriptions     Pending Prescriptions Disp Refills    losartan (COZAAR) 25 MG tablet 90 tablet 1     Sig: Take 1 tablet by mouth daily

## 2024-06-25 RX ORDER — FUROSEMIDE 40 MG/1
40 TABLET ORAL DAILY
Qty: 90 TABLET | Refills: 3 | Status: SHIPPED | OUTPATIENT
Start: 2024-06-25

## 2024-06-28 ENCOUNTER — TELEPHONE (OUTPATIENT)
Dept: NEUROLOGY | Age: 79
End: 2024-06-28

## 2024-06-28 NOTE — TELEPHONE ENCOUNTER
FYI    Office note from Dr. Clark (Hematology/Oncology)is attached to this encounter for your viewing

## 2024-07-01 DIAGNOSIS — E78.2 MIXED HYPERLIPIDEMIA: Chronic | ICD-10-CM

## 2024-07-01 DIAGNOSIS — I25.119 CORONARY ARTERY DISEASE INVOLVING NATIVE CORONARY ARTERY OF NATIVE HEART WITH ANGINA PECTORIS (HCC): Primary | Chronic | ICD-10-CM

## 2024-07-01 RX ORDER — ROSUVASTATIN CALCIUM 20 MG/1
20 TABLET, COATED ORAL NIGHTLY
Qty: 90 TABLET | Refills: 3 | Status: SHIPPED | OUTPATIENT
Start: 2024-07-01

## 2024-07-01 NOTE — TELEPHONE ENCOUNTER
Comments:     Last Office Visit (last PCP visit):   3/19/2024    Next Visit Date:  Future Appointments   Date Time Provider Department Center   7/12/2024 11:00 AM Ros[facility]que[facility]   7/12/2024 12:00 PM Laverne Pisano, PT [facility]   7/15/2024  1:00 PM [facility] CT ROOM 1 [facility]  CT [facility] RAD   7/22/2024  2:00 PM Stephen Prado, CHEMO - CNP PC [facility] PHYS [facility]   8/6/2024 11:20 AM Aristeo Tang, DO [facility]   8/26/2024  1:15 PM Hunter Granados MD [place] [facility]   9/5/2024 10:45 AM Cam Prado MD [facility]   9/24/2024 11:00 AM Aristeo Tang, DO [facility]       **If hasn't been seen in over a year OR hasn't followed up according to last diabetes/ADHD visit, make appointment for patient before sending refill to provider.    Rx requested:  Requested Prescriptions     Pending Prescriptions Disp Refills    rosuvastatin (CRESTOR) 20 MG tablet [Pharmacy Med Name: Rosuvastatin Calcium 20 MG Oral Tablet] 90 tablet 3     Sig: TAKE 1 TABLET BY MOUTH AT NIGHT

## 2024-07-10 ENCOUNTER — TELEPHONE (OUTPATIENT)
Dept: INTERVENTIONAL RADIOLOGY/VASCULAR | Age: 79
End: 2024-07-10

## 2024-07-10 NOTE — TELEPHONE ENCOUNTER
Spoke with Anshu Bassett to move up appt from 7/22/24 for consult of port placement- to newly r/s'd date 7/17/24 @ 10:30am with Manuel OCHOA located in [address] MEDICAL OFFICE [facility] [address].    Pt aware- voiced understanding

## 2024-07-11 ENCOUNTER — HOSPITAL ENCOUNTER (OUTPATIENT)
Dept: SLEEP CENTER | Age: 79
Discharge: HOME | End: 2024-07-13
Payer: MEDICARE

## 2024-07-11 PROCEDURE — 95806 SLEEP STUDY UNATT&RESP EFFT: CPT

## 2024-07-12 ENCOUNTER — HOSPITAL ENCOUNTER (OUTPATIENT)
Dept: OCCUPATIONAL THERAPY | Age: 79
Setting detail: THERAPIES SERIES
Discharge: HOME | End: 2024-07-12
Payer: MEDICARE

## 2024-07-12 ENCOUNTER — HOSPITAL ENCOUNTER (OUTPATIENT)
Dept: PHYSICAL THERAPY | Age: 79
Setting detail: THERAPIES SERIES
Discharge: HOME | End: 2024-07-12
Payer: MEDICARE

## 2024-07-12 PROCEDURE — 97530 THERAPEUTIC ACTIVITIES: CPT

## 2024-07-12 PROCEDURE — 97110 THERAPEUTIC EXERCISES: CPT

## 2024-07-12 PROCEDURE — 97162 PT EVAL MOD COMPLEX 30 MIN: CPT

## 2024-07-12 PROCEDURE — 97166 OT EVAL MOD COMPLEX 45 MIN: CPT

## 2024-07-12 ASSESSMENT — PAIN DESCRIPTION - PAIN TYPE: TYPE: CHRONIC PAIN

## 2024-07-12 ASSESSMENT — 9 HOLE PEG TEST
TEST_RESULT: IMPAIRED
TEST_RESULT: IMPAIRED
TESTTIME_SECONDS: 42.94
TESTTIME_SECONDS: 44.9

## 2024-07-12 ASSESSMENT — PAIN DESCRIPTION - ORIENTATION: ORIENTATION: RIGHT

## 2024-07-12 ASSESSMENT — PAIN DESCRIPTION - LOCATION: LOCATION: BACK;LEG

## 2024-07-12 ASSESSMENT — PAIN DESCRIPTION - DESCRIPTORS: DESCRIPTORS: ACHING;DISCOMFORT

## 2024-07-12 ASSESSMENT — PAIN SCALES - GENERAL: PAINLEVEL_OUTOF10: 3

## 2024-07-12 NOTE — PROGRESS NOTES
Physical Therapy: Initial Evaluation    Patient: Anshu Bassett (79 y.o. male)   Examination Date: 2024  Plan of Care Certification Period: 2024 to 24      :  1945 ;    Confirmed: Yes MRN: [id number]  CSN: [id number]   Insurance: Payor: MEDICARE / Plan: MEDICARE PART A AND B / Product Type: *No Product type* /   Insurance ID: [id number] - (Medicare) Secondary Insurance (if applicable): [facility]   Referring Physician: Stephen Prado APRN - CNP Jeremy Bursely, CNP   PCP: Cam Prado MD Visits to Date/Visits Approved: 1 / 10    No Show/Cancelled Appts: 0      Medical Diagnosis: Unspecified abnormalities of gait and mobility [R26.9]  Weakness [R53.1] gait instabiliyt, weakness LE  Treatment Diagnosis: gait instability, dec endurance, dec strength     PERTINENT MEDICAL HISTORY           Medical History: Chart Reviewed: Yes   Past Medical History:   Diagnosis Date    Atypical chest pain 2012    BPH (benign prostatic hypertrophy)     CAD (coronary artery disease)     Cataracts, bilateral     Chronic low back pain     COPD (chronic obstructive pulmonary disease) ([facility]) 2006    COVID-19 virus infection 10/25/2023    DM2 (diabetes mellitus, type 2) ([facility])     Duodenitis     Dysarthria 2023    Erectile dysfunction 2017    Fatty infiltration of liver 2014    GERD (gastroesophageal reflux disease)     History of melanoma excision 2017    Left ear 2017    History of tobacco use 2006    HTN (hypertension)     Hyperlipidemia     Malignant melanoma of face (HCC)     Malignant melanoma of skin of face (HCC)     RT ear , LT ear 2017    MG (myasthenia gravis) ([facility])     Nephrolithiasis     Ocular myasthenia gravis (HCC) 2018    Osteoarthritis of multiple joints     hands, hips    Perennial allergic rhinitis     Personal history of colonic polyps     Pseudophakia of both eyes     Stage 3a chronic kidney disease (HCC) 2022    Status

## 2024-07-15 ENCOUNTER — TELEPHONE (OUTPATIENT)
Dept: FAMILY MEDICINE CLINIC | Age: 79
End: 2024-07-15

## 2024-07-15 NOTE — TELEPHONE ENCOUNTER
Patient asking if PCP can check on cheaper alternative for  januvia , cost is $400.     Please advise

## 2024-07-16 ENCOUNTER — COMMUNITY CARE MANAGEMENT (OUTPATIENT)
Facility: CLINIC | Age: 79
End: 2024-07-16

## 2024-07-16 ENCOUNTER — HOSPITAL ENCOUNTER (OUTPATIENT)
Dept: PHYSICAL THERAPY | Age: 79
Setting detail: THERAPIES SERIES
Discharge: HOME | End: 2024-07-16
Payer: MEDICARE

## 2024-07-16 ENCOUNTER — HOSPITAL ENCOUNTER (OUTPATIENT)
Dept: OCCUPATIONAL THERAPY | Age: 79
Setting detail: THERAPIES SERIES
Discharge: HOME | End: 2024-07-16
Payer: MEDICARE

## 2024-07-16 PROCEDURE — 97110 THERAPEUTIC EXERCISES: CPT

## 2024-07-16 PROCEDURE — 97530 THERAPEUTIC ACTIVITIES: CPT

## 2024-07-16 ASSESSMENT — PAIN DESCRIPTION - LOCATION: LOCATION: BACK

## 2024-07-16 ASSESSMENT — PAIN SCALES - GENERAL
PAINLEVEL_OUTOF10: 2
PAINLEVEL_OUTOF10: 3

## 2024-07-16 ASSESSMENT — PAIN DESCRIPTION - ORIENTATION: ORIENTATION: RIGHT

## 2024-07-16 ASSESSMENT — PAIN DESCRIPTION - DESCRIPTORS: DESCRIPTORS: ACHING

## 2024-07-16 ASSESSMENT — PAIN DESCRIPTION - PAIN TYPE: TYPE: CHRONIC PAIN

## 2024-07-16 NOTE — PROGRESS NOTES
Physical Therapy: Daily Note   Patient: Anshu Bassett (79 y.o. male)   Examination Date: 2024  Plan of Care/Certification Expiration Date: 24    Progress Note Counter: Recheck completed with plans to continue 1-2/ week for 4-6 weeks to progress with POC (Pt to continue 1x per week); 23: Pt reports being dx again with right lung cancer on 23 and will be starting chemo next week. Pt unsure of how he is going to feel so going to place pt on hold and pt to call in a few weeks with update and if able to return to PT or not.     :  1945 # of Visits since SOC:   Visit count could not be calculated. Make sure you are using a visit which is associated with an episode.   MRN: [id number]  CSN: [id number] Start of Care Date:   No linked episodes   Insurance: Payor: MEDICARE / Plan: MEDICARE PART A AND B / Product Type: *No Product type* /   Insurance ID: [id number] - (Medicare) Secondary Insurance (if applicable): [facility]   Referring Physician: Stephen Prado APRN - CNP Jeremy Bursely, CNP   PCP: Cam Prado MD Visits to Date/Visits Approved: 2 / 10    No Show/Cancelled Appts: 0 / 0     Medical Diagnosis: Weakness [R53.1]  Limitation of activities due to disability [Z73.6]    Treatment Diagnosis: gait instability, dec endurance, dec strength        SUBJECTIVE EXAMINATION   Pain Level: Pain Screening  Patient Currently in Pain: Yes  Pain Assessment: 0-10  Pain Level: 3  Best Pain Level: 3  Pain Type: Chronic pain  Pain Location: Back  Pain Orientation: Right  Pain Descriptors: Aching    Patient Comments: Subjective: Pt. reported that he got a new bed and it is too low and he is unable to get in and out of bed. He has been sleeping in his reclining chair. He also goes to get a port placed tomorrow.    HEP Compliance: Good        OBJECTIVE EXAMINATION   Restrictions:  Restrictions/Precautions: Fall Risk (on chemo, supposed to get a port 24)   Required Braces or Orthoses?: No   No

## 2024-07-16 NOTE — PLAN OF CARE
TREATMENT PLAN   REQUIRES OT FOLLOW-UP: Yes  Type: Outpatient  Treatment Initiated : Yes  Plan  Plan Frequency: x1-2  Plan Weeks: 4-6  Current Treatment Recommendations: Strengthening, ROM, Balance training, Functional mobility training, Positioning, Equipment evaluation, education, & procurement, Home management training, Coordination training, Endurance training, Patient/Caregiver education & training, Self-Care / ADL, Safety education & training       Therapy Time  Individual Time In:  11:00am  Individual Time Out:  11:50am  Minutes:  50          Electronically signed by Rosalind Mcqueen, [id number]  on 7/16/2024 at 12:02 PM   POC NOTE

## 2024-07-16 NOTE — PROGRESS NOTES
7-16-24 Patient has been using his wifes glucometer as his does not work any more. Please consider ordering a new glucometer for him. Thank you. Emmanuel HITCHCOCK

## 2024-07-17 ENCOUNTER — OFFICE VISIT (OUTPATIENT)
Dept: INTERVENTIONAL RADIOLOGY/VASCULAR | Age: 79
End: 2024-07-17
Payer: MEDICARE

## 2024-07-17 VITALS
HEART RATE: 74 BPM | BODY MASS INDEX: 28.28 KG/M2 | RESPIRATION RATE: 17 BRPM | DIASTOLIC BLOOD PRESSURE: 50 MMHG | SYSTOLIC BLOOD PRESSURE: 106 MMHG | HEIGHT: 71 IN | OXYGEN SATURATION: 95 % | WEIGHT: 202 LBS

## 2024-07-17 DIAGNOSIS — Z85.118 HISTORY OF LUNG CANCER: Primary | ICD-10-CM

## 2024-07-17 DIAGNOSIS — J44.9 CHRONIC OBSTRUCTIVE PULMONARY DISEASE, UNSPECIFIED COPD TYPE (HCC): ICD-10-CM

## 2024-07-17 DIAGNOSIS — Z90.2 S/P LOBECTOMY OF LUNG: ICD-10-CM

## 2024-07-17 DIAGNOSIS — Z79.01 CHRONIC ANTICOAGULATION: ICD-10-CM

## 2024-07-17 PROCEDURE — 1123F ACP DISCUSS/DSCN MKR DOCD: CPT | Performed by: NURSE PRACTITIONER

## 2024-07-17 PROCEDURE — 3074F SYST BP LT 130 MM HG: CPT | Performed by: NURSE PRACTITIONER

## 2024-07-17 PROCEDURE — G8427 DOCREV CUR MEDS BY ELIG CLIN: HCPCS | Performed by: NURSE PRACTITIONER

## 2024-07-17 PROCEDURE — 99204 OFFICE O/P NEW MOD 45 MIN: CPT | Performed by: NURSE PRACTITIONER

## 2024-07-17 PROCEDURE — 1036F TOBACCO NON-USER: CPT | Performed by: NURSE PRACTITIONER

## 2024-07-17 PROCEDURE — 3078F DIAST BP <80 MM HG: CPT | Performed by: NURSE PRACTITIONER

## 2024-07-17 PROCEDURE — G8417 CALC BMI ABV UP PARAM F/U: HCPCS | Performed by: NURSE PRACTITIONER

## 2024-07-17 RX ORDER — FLUTICASONE FUROATE, UMECLIDINIUM BROMIDE AND VILANTEROL TRIFENATATE 100; 62.5; 25 UG/1; UG/1; UG/1
1 POWDER RESPIRATORY (INHALATION) DAILY
Qty: 2 EACH | Refills: 0 | COMMUNITY
Start: 2024-07-17

## 2024-07-17 NOTE — PROGRESS NOTES
VASCULAR MEDICINE AND INTERVENTIONAL RADIOLOGY DEPARTMENT:     Chief Complaint   Patient presents with    New Patient     Consult- port placement     HPI: Anshu Bassett, a male of 79 y.o. came to the office 7/17/2024, accompanied by his wife whom he allows to be present during interview/exam, referred by Dr. Durant, for Mediport placement.  Patient diagnosed with right lung squamous cell carcinoma via bronchoscopy July 2023, started chemotherapy 9/2023.  Noted to have liver and right-sided mediastinal metastasis.  S/p RUL lobectomy with Dr. Apodaca.  Additional history of hospitalization for myasthenia gravis requiring plasmapheresis, following with Dr. Caceres for this.  Oncology referred to IR for port placement.  States he is to start a new chemotherapy Monday, July 22, 2024.  Denies chest pain.   Denies dyspnea at rest. Does report dyspnea w/exertion, follows with pulmonary for this, reports inhalers do help.  PMH includes BPH, HTN, chronic low back pain, CAD, venous insufficiency, GERD, abnormal LFTs, fatty liver, HLD, osteoarthritis, COPD, type 2 diabetes, pulmonary embolism, atrial fibrillation  Takes Eliquis daily, follow with Dr. Tang.  He denies allergy to antibiotic, although on patient allergy there is mention of a list in media (found 11/30/2018) of antibiotics he was told he should not have due to his history of myasthenia gravis.    Family History   Problem Relation Age of Onset    Heart Disease Mother     Heart Disease Brother     Diabetes Maternal Grandfather     Colon Cancer Neg Hx        Past Surgical History:   Procedure Laterality Date    ARM SURGERY Right 10/25/2022    BRONCHOSCOPY N/A 01/06/2023    NAVIGATIONAL [place] [facility] [place]    [place]ONCHOSCOPY N/A 07/19/2023    BRONCHOSCOPY ENDOBRONCHIAL ULTRASOUND [place] [facility] [place]    CARDIAC CATHETERIZATION      #17    CATARACT REMOVAL WITH IMPLANT Left 1992    CATARACT REMOVAL WITH

## 2024-07-21 DIAGNOSIS — R80.9 TYPE 2 DIABETES MELLITUS WITH MICROALBUMINURIA, WITHOUT LONG-TERM CURRENT USE OF INSULIN (HCC): Chronic | ICD-10-CM

## 2024-07-21 DIAGNOSIS — E11.29 TYPE 2 DIABETES MELLITUS WITH MICROALBUMINURIA, WITHOUT LONG-TERM CURRENT USE OF INSULIN (HCC): Chronic | ICD-10-CM

## 2024-07-21 RX ORDER — GLIPIZIDE 5 MG/1
5 TABLET ORAL 2 TIMES DAILY
Qty: 180 TABLET | Refills: 1 | Status: SHIPPED | OUTPATIENT
Start: 2024-07-21

## 2024-07-22 DIAGNOSIS — G47.09 OTHER INSOMNIA: ICD-10-CM

## 2024-07-22 DIAGNOSIS — Z51.5 PALLIATIVE CARE ENCOUNTER: ICD-10-CM

## 2024-07-22 RX ORDER — TRAZODONE HYDROCHLORIDE 100 MG/1
TABLET ORAL
Qty: 135 TABLET | Refills: 3 | Status: SHIPPED | OUTPATIENT
Start: 2024-07-22

## 2024-07-23 ENCOUNTER — TELEPHONE (OUTPATIENT)
Dept: CARDIOLOGY CLINIC | Age: 79
End: 2024-07-23

## 2024-07-23 ENCOUNTER — HOSPITAL ENCOUNTER (OUTPATIENT)
Dept: PHYSICAL THERAPY | Age: 79
Setting detail: THERAPIES SERIES
Discharge: HOME | End: 2024-07-23
Payer: MEDICARE

## 2024-07-23 ENCOUNTER — HOSPITAL ENCOUNTER (OUTPATIENT)
Dept: OCCUPATIONAL THERAPY | Age: 79
Setting detail: THERAPIES SERIES
Discharge: HOME | End: 2024-07-23
Payer: MEDICARE

## 2024-07-23 ENCOUNTER — TELEPHONE (OUTPATIENT)
Dept: INTERVENTIONAL RADIOLOGY/VASCULAR | Age: 79
End: 2024-07-23

## 2024-07-23 NOTE — PROGRESS NOTES
Occupational Therapy: Daily Note   Patient: Anshu Bassett (79 y.o. male)   Examination Date: 2024  Certification Period Expiration Date: 24    No data recorded   :  1945 # of Visits since SOC:   Visit count could not be calculated. Make sure you are using a visit which is associated with an episode.   MRN: [id number]  CSN: [id number] Start of Care Date:   No linked episodes   Insurance: Payor: MEDICARE / Plan: MEDICARE PART A AND B / Product Type: *No Product type* /   Insurance ID: [id number] - (Medicare) Secondary Insurance (if applicable): [employer]   Insurance Information:     Referring Physician: Stephen Prado APRN - CNP     PCP: Cam Prado MD Visits to Date/Visits Approved: 2 / 10    No Show/Cancelled Appts: 0      Medical Diagnosis: Weakness [R53.1]  Limitation of activities due to disability [Z73.6]    Treatment Diagnosis: Decreased ADL/IADL performance d/t weakness, CA, impaired AROM        Pt cx appointment d/t wife could not bring him, wife is injured.    Therapy Time  Individual Time In:  11:55am  Individual Time Out:  12:00pm  Minutes:  5          Electronically signed by Rosalind Mcqueen, [id number]  on 2024 at 12:26 PM   POC NOTE

## 2024-07-23 NOTE — PROGRESS NOTES
Physical Therapy  Daily Treatment Note  Date: 2024  Patient Name: Anshu Bassett  MRN: [id number]     :   1945    Referring Physician: Stephen Prado APRN -* Stephen Shah CNP   PCP: Cam Prado MD    Medical Diagnosis: Weakness [R53.1]  Limitation of activities due to disability [Z73.6]    Treatment Diagnosis: gait instability, dec endurance, dec strength      Insurance: Payor: MEDICARE / Plan: MEDICARE PART A AND B / Product Type: *No Product type* /   Insurance ID: [id number] - (Medicare)    Subjective:   General  Referring Provider (secondary): Stephen Shah CNP  PT Visit Information  Onset Date: 24  Total # of Visits Approved: 10  Total # of Visits to Date: 2  Plan of Care/Certification Expiration Date: 24  No Show: 0  Canceled Appointment: 1  Referring Provider (secondary): Stephen Shah CNP  Subjective  Subjective: Pt. cancelled wife cannot drive thinks she tore something going to Ortho.       Therapy Time:   Individual Concurrent Group Co-treatment   Time In           Time Out           Minutes  0 cx                 Sofiya Wilkerson PTA

## 2024-07-23 NOTE — TELEPHONE ENCOUNTER
SPOKE WITH ROWENA; SHE STATES \"PATIENT WILL NOT BE PROCEEDING WITH MEDIPORT PLACEMENT. PATIENT WILL BE GETTING PICC LINE. \"

## 2024-07-24 ENCOUNTER — COMMUNITY CARE MANAGEMENT (OUTPATIENT)
Facility: CLINIC | Age: 79
End: 2024-07-24

## 2024-07-24 ENCOUNTER — TELEPHONE (OUTPATIENT)
Dept: FAMILY MEDICINE CLINIC | Age: 79
End: 2024-07-24

## 2024-07-24 NOTE — TELEPHONE ENCOUNTER
Pt wife called bc his condition has become critical and he is in need of a referral for home healthcare. The cancer as well as the kidney center have both placed referrals and pt is in need of one from his PCP. ASAP. Pt health has declined tremendously. Pt wife would like a call back. Please Advise.

## 2024-07-24 NOTE — PROGRESS NOTES
RN attempted to contact PCP office to request HHC order as patients condition has deteriorated. No answer. Please consider order for home health care as patient has had multiple falls recently, has a sudden decrease in endurance, strength, PO intake, and loss of ability to perform ADL's. Thank you. Emmanuel FORD [phone]  
of when to notify provider of s/sx to prevent ED/Hospitalizations  Patient without any questions regarding his medications, no current immediate symptoms and/or resource needs at  this time. Patient educated on symptoms to monitor for and when to notify provider/RNCM.  Patient has agreed to continue with phone follow up visits again in 2 weeks to discuss Care Plan goals, assess any  needs/barriers, and for RNCM to continue to provide resources and education regarding CKD. Next f/u unscheduled  with patient 8-7-24. RN created note to PCP requesting [facility] order. Patient verbalized understanding of education discussed today during  call. Patient provided the phone number for [facility] RNCM if any needs/changes/concerns occur  before next follow up appt scheduled. Ayo HITCHCOCK [phone] [email or web address].

## 2024-07-25 NOTE — TELEPHONE ENCOUNTER
Called home number, mobile number, and wife's mobile number. No answers on all lines. I have left messages for callback to discuss. Will await patient/wife contact.     TO STAFF: Please call patient/wife to get more information. What order is needed? What homecare services are needed? Patient is also established with palliative care. If health is significantly declining do they want hospice team referral?

## 2024-07-25 NOTE — TELEPHONE ENCOUNTER
Called and spoke with Brigid, Tc wife. Brigid stated that Anshu is unable to do things alone, he needs help with the most basic things currently. She does not believe this is \"End Of Life' but she will ask Palliative care to ask their opinions on Hospice but she believe it will be of great help with him.

## 2024-07-27 DIAGNOSIS — N40.1 BENIGN PROSTATIC HYPERPLASIA WITH NOCTURIA: Chronic | ICD-10-CM

## 2024-07-27 DIAGNOSIS — R35.1 BENIGN PROSTATIC HYPERPLASIA WITH NOCTURIA: Chronic | ICD-10-CM

## 2024-07-28 ENCOUNTER — APPOINTMENT (OUTPATIENT)
Dept: RADIOLOGY | Facility: HOSPITAL | Age: 79
End: 2024-07-28
Payer: MEDICARE

## 2024-07-28 ENCOUNTER — APPOINTMENT (OUTPATIENT)
Dept: CARDIOLOGY | Facility: HOSPITAL | Age: 79
End: 2024-07-28
Payer: MEDICARE

## 2024-07-28 ENCOUNTER — HOSPITAL ENCOUNTER (EMERGENCY)
Facility: HOSPITAL | Age: 79
Discharge: HOME | End: 2024-07-28
Attending: STUDENT IN AN ORGANIZED HEALTH CARE EDUCATION/TRAINING PROGRAM
Payer: MEDICARE

## 2024-07-28 VITALS
WEIGHT: 191 LBS | TEMPERATURE: 97.2 F | RESPIRATION RATE: 18 BRPM | HEIGHT: 71 IN | OXYGEN SATURATION: 91 % | BODY MASS INDEX: 26.74 KG/M2 | SYSTOLIC BLOOD PRESSURE: 111 MMHG | HEART RATE: 107 BPM | DIASTOLIC BLOOD PRESSURE: 63 MMHG

## 2024-07-28 DIAGNOSIS — R53.1 WEAKNESS: ICD-10-CM

## 2024-07-28 DIAGNOSIS — N17.9 AKI (ACUTE KIDNEY INJURY) (CMS-HCC): ICD-10-CM

## 2024-07-28 DIAGNOSIS — D64.9 ANEMIA, UNSPECIFIED TYPE: ICD-10-CM

## 2024-07-28 DIAGNOSIS — R79.89 ELEVATED TROPONIN: ICD-10-CM

## 2024-07-28 DIAGNOSIS — C22.9 MALIGNANT NEOPLASM OF LIVER, UNSPECIFIED LIVER MALIGNANCY TYPE (MULTI): ICD-10-CM

## 2024-07-28 DIAGNOSIS — M54.50 LOW BACK PAIN WITHOUT SCIATICA, UNSPECIFIED BACK PAIN LATERALITY, UNSPECIFIED CHRONICITY: ICD-10-CM

## 2024-07-28 DIAGNOSIS — S32.020A COMPRESSION FRACTURE OF L2 VERTEBRA, INITIAL ENCOUNTER (MULTI): ICD-10-CM

## 2024-07-28 DIAGNOSIS — E16.2 HYPOGLYCEMIA: ICD-10-CM
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
## 2024-07-28 LAB
ALBUMIN SERPL BCP-MCNC: 3.3 G/DL (ref 3.4–5)
ALP SERPL-CCNC: 173 U/L (ref 33–136)
ALT SERPL W P-5'-P-CCNC: 49 U/L (ref 10–52)
ANION GAP SERPL CALC-SCNC: 8 MMOL/L (ref 10–20)
AST SERPL W P-5'-P-CCNC: 55 U/L (ref 9–39)
ATRIAL RATE: 106 BPM
ATRIAL RATE: 97 BPM
BASOPHILS # BLD AUTO: 0.02 X10*3/UL (ref 0–0.1)
BASOPHILS NFR BLD AUTO: 0.2 %
BILIRUB SERPL-MCNC: 0.4 MG/DL (ref 0–1.2)
BUN SERPL-MCNC: 25 MG/DL (ref 6–23)
CALCIUM SERPL-MCNC: 12.4 MG/DL (ref 8.6–10.3)
CARDIAC TROPONIN I PNL SERPL HS: 29 NG/L (ref 0–20)
CARDIAC TROPONIN I PNL SERPL HS: 31 NG/L (ref 0–20)
CHLORIDE SERPL-SCNC: 96 MMOL/L (ref 98–107)
CO2 SERPL-SCNC: 32 MMOL/L (ref 21–32)
CREAT SERPL-MCNC: 1.37 MG/DL (ref 0.5–1.3)
EGFRCR SERPLBLD CKD-EPI 2021: 52 ML/MIN/1.73M*2
EOSINOPHIL # BLD AUTO: 0.14 X10*3/UL (ref 0–0.4)
EOSINOPHIL NFR BLD AUTO: 1.2 %
ERYTHROCYTE [DISTWIDTH] IN BLOOD BY AUTOMATED COUNT: 16.3 % (ref 11.5–14.5)
GLUCOSE BLD MANUAL STRIP-MCNC: 114 MG/DL (ref 74–99)
GLUCOSE BLD MANUAL STRIP-MCNC: 67 MG/DL (ref 74–99)
GLUCOSE SERPL-MCNC: 66 MG/DL (ref 74–99)
HCT VFR BLD AUTO: 33.6 % (ref 41–52)
HGB BLD-MCNC: 10.9 G/DL (ref 13.5–17.5)
HOLD SPECIMEN: NORMAL
IMM GRANULOCYTES # BLD AUTO: 0.07 X10*3/UL (ref 0–0.5)
IMM GRANULOCYTES NFR BLD AUTO: 0.6 % (ref 0–0.9)
LYMPHOCYTES # BLD AUTO: 0.74 X10*3/UL (ref 0.8–3)
LYMPHOCYTES NFR BLD AUTO: 6.3 %
MAGNESIUM SERPL-MCNC: 1.89 MG/DL (ref 1.6–2.4)
MCH RBC QN AUTO: 32.3 PG (ref 26–34)
MCHC RBC AUTO-ENTMCNC: 32.4 G/DL (ref 32–36)
MCV RBC AUTO: 100 FL (ref 80–100)
MONOCYTES # BLD AUTO: 0.74 X10*3/UL (ref 0.05–0.8)
MONOCYTES NFR BLD AUTO: 6.3 %
NEUTROPHILS # BLD AUTO: 10.07 X10*3/UL (ref 1.6–5.5)
NEUTROPHILS NFR BLD AUTO: 85.4 %
NRBC BLD-RTO: 0 /100 WBCS (ref 0–0)
P AXIS: 40 DEGREES
P AXIS: 52 DEGREES
P OFFSET: 186 MS
P OFFSET: 198 MS
P ONSET: 127 MS
P ONSET: 133 MS
PLATELET # BLD AUTO: 135 X10*3/UL (ref 150–450)
POTASSIUM SERPL-SCNC: 4 MMOL/L (ref 3.5–5.3)
PR INTERVAL: 168 MS
PR INTERVAL: 182 MS
PROT SERPL-MCNC: 6.6 G/DL (ref 6.4–8.2)
Q ONSET: 211 MS
Q ONSET: 224 MS
QRS COUNT: 16 BEATS
QRS COUNT: 18 BEATS
QRS DURATION: 104 MS
QRS DURATION: 96 MS
QT INTERVAL: 350 MS
QT INTERVAL: 356 MS
QTC CALCULATION(BAZETT): 452 MS
QTC CALCULATION(BAZETT): 464 MS
QTC FREDERICIA: 417 MS
QTC FREDERICIA: 422 MS
R AXIS: -13 DEGREES
R AXIS: 6 DEGREES
RBC # BLD AUTO: 3.37 X10*6/UL (ref 4.5–5.9)
SARS-COV-2 RNA RESP QL NAA+PROBE: NOT DETECTED
SODIUM SERPL-SCNC: 132 MMOL/L (ref 136–145)
T AXIS: 13 DEGREES
T AXIS: 36 DEGREES
T OFFSET: 386 MS
T OFFSET: 402 MS
VENTRICULAR RATE: 106 BPM
VENTRICULAR RATE: 97 BPM
WBC # BLD AUTO: 11.8 X10*3/UL (ref 4.4–11.3)

## 2024-07-28 PROCEDURE — 74177 CT ABD & PELVIS W/CONTRAST: CPT

## 2024-07-28 PROCEDURE — 72131 CT LUMBAR SPINE W/O DYE: CPT | Mod: RCN | Performed by: RADIOLOGY

## 2024-07-28 PROCEDURE — 71260 CT THORAX DX C+: CPT | Mod: FOREIGN READ | Performed by: RADIOLOGY

## 2024-07-28 PROCEDURE — 72128 CT CHEST SPINE W/O DYE: CPT | Mod: RCN

## 2024-07-28 PROCEDURE — 2550000001 HC RX 255 CONTRASTS: Performed by: STUDENT IN AN ORGANIZED HEALTH CARE EDUCATION/TRAINING PROGRAM

## 2024-07-28 PROCEDURE — 72125 CT NECK SPINE W/O DYE: CPT | Performed by: RADIOLOGY

## 2024-07-28 PROCEDURE — 2500000001 HC RX 250 WO HCPCS SELF ADMINISTERED DRUGS (ALT 637 FOR MEDICARE OP): Performed by: STUDENT IN AN ORGANIZED HEALTH CARE EDUCATION/TRAINING PROGRAM

## 2024-07-28 PROCEDURE — 84484 ASSAY OF TROPONIN QUANT: CPT | Performed by: STUDENT IN AN ORGANIZED HEALTH CARE EDUCATION/TRAINING PROGRAM

## 2024-07-28 PROCEDURE — 72128 CT CHEST SPINE W/O DYE: CPT | Mod: RCN | Performed by: RADIOLOGY

## 2024-07-28 PROCEDURE — 72125 CT NECK SPINE W/O DYE: CPT

## 2024-07-28 PROCEDURE — 82947 ASSAY GLUCOSE BLOOD QUANT: CPT | Mod: 59

## 2024-07-28 PROCEDURE — 93005 ELECTROCARDIOGRAM TRACING: CPT

## 2024-07-28 PROCEDURE — 99285 EMERGENCY DEPT VISIT HI MDM: CPT | Mod: 25,CS

## 2024-07-28 PROCEDURE — 70450 CT HEAD/BRAIN W/O DYE: CPT | Performed by: RADIOLOGY

## 2024-07-28 PROCEDURE — 85025 COMPLETE CBC W/AUTO DIFF WBC: CPT | Performed by: STUDENT IN AN ORGANIZED HEALTH CARE EDUCATION/TRAINING PROGRAM

## 2024-07-28 PROCEDURE — 72131 CT LUMBAR SPINE W/O DYE: CPT | Mod: RCN

## 2024-07-28 PROCEDURE — 87635 SARS-COV-2 COVID-19 AMP PRB: CPT | Performed by: STUDENT IN AN ORGANIZED HEALTH CARE EDUCATION/TRAINING PROGRAM

## 2024-07-28 PROCEDURE — 74177 CT ABD & PELVIS W/CONTRAST: CPT | Mod: FOREIGN READ | Performed by: RADIOLOGY

## 2024-07-28 PROCEDURE — 80053 COMPREHEN METABOLIC PANEL: CPT | Performed by: STUDENT IN AN ORGANIZED HEALTH CARE EDUCATION/TRAINING PROGRAM

## 2024-07-28 PROCEDURE — 36415 COLL VENOUS BLD VENIPUNCTURE: CPT | Performed by: STUDENT IN AN ORGANIZED HEALTH CARE EDUCATION/TRAINING PROGRAM

## 2024-07-28 PROCEDURE — 83735 ASSAY OF MAGNESIUM: CPT | Performed by: STUDENT IN AN ORGANIZED HEALTH CARE EDUCATION/TRAINING PROGRAM

## 2024-07-28 PROCEDURE — 70450 CT HEAD/BRAIN W/O DYE: CPT

## 2024-07-28 ASSESSMENT — COLUMBIA-SUICIDE SEVERITY RATING SCALE - C-SSRS
6. HAVE YOU EVER DONE ANYTHING, STARTED TO DO ANYTHING, OR PREPARED TO DO ANYTHING TO END YOUR LIFE?: NO
2. HAVE YOU ACTUALLY HAD ANY THOUGHTS OF KILLING YOURSELF?: NO
1. IN THE PAST MONTH, HAVE YOU WISHED YOU WERE DEAD OR WISHED YOU COULD GO TO SLEEP AND NOT WAKE UP?: NO

## 2024-07-28 ASSESSMENT — LIFESTYLE VARIABLES
EVER FELT BAD OR GUILTY ABOUT YOUR DRINKING: NO
EVER HAD A DRINK FIRST THING IN THE MORNING TO STEADY YOUR NERVES TO GET RID OF A HANGOVER: NO
HAVE YOU EVER FELT YOU SHOULD CUT DOWN ON YOUR DRINKING: NO
HAVE PEOPLE ANNOYED YOU BY CRITICIZING YOUR DRINKING: NO
TOTAL SCORE: 0

## 2024-07-28 ASSESSMENT — PAIN - FUNCTIONAL ASSESSMENT: PAIN_FUNCTIONAL_ASSESSMENT: 0-10

## 2024-07-28 ASSESSMENT — PAIN SCALES - GENERAL: PAINLEVEL_OUTOF10: 2

## 2024-07-28 NOTE — ED TRIAGE NOTES
Pt to ED by squad for c/o fall from standing when getting out of his chair.  Per report did not hit head, no LOC.  Respirations even and unlabored.  No acute distress noted at this time.  Denies CP and SOB.  A&Ox4.  VSS.

## 2024-07-28 NOTE — ED PROVIDER NOTES
"HPI   Chief Complaint   Patient presents with    Fall     No LOC, Did not hit head       Patient is a 79-year-old male presenting to the emergency department complaints of fall.  Patient states that he got out of his chair and his legs felt weak and he ended up falling into TV and then onto the floor.  Patient does have Eliquis on his medical history but he is uncertain of the medications that he is currently taking.  He does endorse lower back pain.  He denies any headache, vision changes, chest pain, difficulty breathing, abdominal pain, nausea, vomiting, diarrhea.  Patient does have a productive cough which he states he has had for \"30 years\".      History provided by:  Patient and medical records          Patient History   No past medical history on file.  No past surgical history on file.  No family history on file.  Social History     Tobacco Use    Smoking status: Not on file    Smokeless tobacco: Not on file   Substance Use Topics    Alcohol use: Not on file    Drug use: Not on file       Physical Exam   ED Triage Vitals [07/28/24 0814]   Temperature Heart Rate Respirations BP   36.2 °C (97.2 °F) 96 18 134/75      Pulse Ox Temp src Heart Rate Source Patient Position   (!) 92 % -- -- --      BP Location FiO2 (%)     -- --       Physical Exam  Vitals and nursing note reviewed.   Constitutional:       General: He is not in acute distress.     Appearance: Normal appearance. He is not ill-appearing, toxic-appearing or diaphoretic.   HENT:      Head: Normocephalic and atraumatic.      Nose: Nose normal.      Mouth/Throat:      Mouth: Mucous membranes are moist.      Pharynx: No oropharyngeal exudate or posterior oropharyngeal erythema.   Eyes:      General: No scleral icterus.     Extraocular Movements: Extraocular movements intact.      Pupils: Pupils are equal, round, and reactive to light.   Cardiovascular:      Rate and Rhythm: Normal rate and regular rhythm.      Pulses: Normal pulses.      Heart sounds: " "Normal heart sounds. No murmur heard.     No friction rub. No gallop.   Pulmonary:      Effort: Pulmonary effort is normal. No respiratory distress.      Breath sounds: Normal breath sounds. No stridor. No wheezing, rhonchi or rales.   Chest:      Chest wall: No tenderness.   Abdominal:      General: Abdomen is flat. There is no distension.      Palpations: Abdomen is soft. There is no mass.      Tenderness: There is no abdominal tenderness. There is no guarding.      Hernia: No hernia is present.   Musculoskeletal:         General: No swelling, tenderness, deformity or signs of injury. Normal range of motion.      Cervical back: Normal range of motion and neck supple. No rigidity.   Skin:     General: Skin is warm and dry.      Capillary Refill: Capillary refill takes less than 2 seconds.      Coloration: Skin is not jaundiced or pale.      Findings: Bruising present. No erythema or rash.      Comments: Skin tear to the right flank.  Bruising to the right posterior torso.  Patient has bandage \"old wound\" to the right elbow.  Patient denies this being from the fall today.   Neurological:      General: No focal deficit present.      Mental Status: He is alert and oriented to person, place, and time. Mental status is at baseline.   Psychiatric:         Mood and Affect: Mood normal.         Behavior: Behavior normal.           ED Course & MDM   Diagnoses as of 07/28/24 1417   Fall, initial encounter   Weakness   Hypoglycemia   Anemia, unspecified type   KIRILL (acute kidney injury) (CMS-HCC)   Low back pain without sciatica, unspecified back pain laterality, unspecified chronicity   Compression fracture of L2 vertebra, initial encounter (Multi)   Elevated troponin   Malignant neoplasm of liver, unspecified liver malignancy type (Multi)                       [place] Coma Scale Score: 15                        Medical Decision Making  Patient is a 79-year-old male presenting to the emergency department for complaints of " weakness and a fall.  Due to the weakness cardiac evaluation was ordered.  Due to the fall with the complaint of back pain with the bruising and abrasion to the torso pan scan CTs were ordered to evaluate for traumatic injury.    Labs reviewed.  Patient does have increasing BUN and creatinine from previous as well as elevated calcium.  Patient does have a history of metastatic hepatocellular carcinoma.  Patient's glucose was low and the patient was given something to eat with improvement of his blood sugar.  Troponins were elevated at 31 and 29 but no ischemic changes noted on EKG.  Patient does have anemia with hemoglobin of 10.9 but no reported signs of bleeding.  CT head was negative for acute intracranial abnormalities.  CT C-spine without any acute fractures or subluxations.  CT T-spine with no acute fractures or malalignments.  Degenerative changes noted.  CT L-spine with acute to subacute L2 compression deformity with multilevel degenerative changes and some central canal narrowing.  CT chest abdomen pelvis with findings consistent with metastatic liver disease.  Secretions noted in the trachea with enlarged paratracheal lymph nodes.  There is diverticulosis without evidence of diverticulitis.  Patient does have nodules in his kidneys.  Patient family at bedside were advised of laboratory and imaging findings and I recommended admission.  Patient was adamant that he did not want to be admitted to the hospital and knows that he has terminal liver cancer and would prefer to spend his remaining days at home interacting with his family.  Myself and the family did discuss and encouraged patient to stay into the hospital at least 1 night and he declined.  Patient is not under the influence of any intoxicating substances and has capacity to make his own decisions.  Given this the patient was discharged home to follow-up with his doctors in the care of his family but was advised that he could return to the hospital  for further evaluation for any reason at any time.  Patient family verbalized understanding of all instructions given to them.  Return precautions were given.  All questions answered.  Patient was appreciative care and agreeable to discharge.    Amount and/or Complexity of Data Reviewed  Labs: ordered. Decision-making details documented in ED Course.  Radiology: ordered. Decision-making details documented in ED Course.  ECG/medicine tests: independent interpretation performed.     Details: 0842 hrs.: Sinus rhythm with a ventricular rate of 97 bpm.  QRS interval 96 ms.  QTc 452 ms.  Normal axis.  No acute ischemic injury pattern appreciated.  1056 hrs.: Sinus rhythm with a ventricular rate of 106 bpm.  QRS of 104 ms.  QTc 464 ms.  No acute ischemic injury pattern appreciated.  Baseline artifact noted.      Labs Reviewed   CBC WITH AUTO DIFFERENTIAL - Abnormal       Result Value    WBC 11.8 (*)     nRBC 0.0      RBC 3.37 (*)     Hemoglobin 10.9 (*)     Hematocrit 33.6 (*)           MCH 32.3      MCHC 32.4      RDW 16.3 (*)     Platelets 135 (*)     Neutrophils % 85.4      Immature Granulocytes %, Automated 0.6      Lymphocytes % 6.3      Monocytes % 6.3      Eosinophils % 1.2      Basophils % 0.2      Neutrophils Absolute 10.07 (*)     Immature Granulocytes Absolute, Automated 0.07      Lymphocytes Absolute 0.74 (*)     Monocytes Absolute 0.74      Eosinophils Absolute 0.14      Basophils Absolute 0.02     COMPREHENSIVE METABOLIC PANEL - Abnormal    Glucose 66 (*)     Sodium 132 (*)     Potassium 4.0      Chloride 96 (*)     Bicarbonate 32      Anion Gap 8 (*)     Urea Nitrogen 25 (*)     Creatinine 1.37 (*)     eGFR 52 (*)     Calcium 12.4 (*)     Albumin 3.3 (*)     Alkaline Phosphatase 173 (*)     Total Protein 6.6      AST 55 (*)     Bilirubin, Total 0.4      ALT 49     SERIAL TROPONIN-INITIAL - Abnormal    Troponin I, High Sensitivity 31 (*)     Narrative:     Less than 99th percentile of normal range  cutoff-  Female and children under 18 years old <14 ng/L; Male <21 ng/L: Negative  Repeat testing should be performed if clinically indicated.     Female and children under 18 years old 14-50 ng/L; Male 21-50 ng/L:  Consistent with possible cardiac damage and possible increased clinical   risk. Serial measurements may help to assess extent of myocardial damage.     >50 ng/L: Consistent with cardiac damage, increased clinical risk and  myocardial infarction. Serial measurements may help assess extent of   myocardial damage.      NOTE: Children less than 1 year old may have higher baseline troponin   levels and results should be interpreted in conjunction with the overall   clinical context.     NOTE: Troponin I testing is performed using a different   testing methodology at [facility] than at other   [facility]. Direct result comparisons should only   be made within the same method.   SERIAL TROPONIN, 1 HOUR - Abnormal    Troponin I, High Sensitivity 29 (*)     Narrative:     Less than 99th percentile of normal range cutoff-  Female and children under 18 years old <14 ng/L; Male <21 ng/L: Negative  Repeat testing should be performed if clinically indicated.     Female and children under 18 years old 14-50 ng/L; Male 21-50 ng/L:  Consistent with possible cardiac damage and possible increased clinical   risk. Serial measurements may help to assess extent of myocardial damage.     >50 ng/L: Consistent with cardiac damage, increased clinical risk and  myocardial infarction. Serial measurements may help assess extent of   myocardial damage.      NOTE: Children less than 1 year old may have higher baseline troponin   levels and results should be interpreted in conjunction with the overall   clinical context.     NOTE: Troponin I testing is performed using a different   testing methodology at [facility] than at other   [facility]. Direct result comparisons should only   be made within  the same method.   POCT GLUCOSE - Abnormal    POCT Glucose 67 (*)    POCT GLUCOSE - Abnormal    POCT Glucose 114 (*)    MAGNESIUM - Normal    Magnesium 1.89     SARS-COV-2 PCR - Normal    Coronavirus 2019, PCR Not Detected      Narrative:     This assay has received FDA Emergency Use Authorization (EUA) and is only authorized for the duration of time that circumstances exist to justify the authorization of the emergency use of in vitro diagnostic tests for the detection of SARS-CoV-2 virus and/or diagnosis of COVID-19 infection under section 564(b)(1) of the Act, 21 U.S.C. 360bbb-3(b)(1). This assay is an in vitro diagnostic nucleic acid amplification test for the qualitative detection of SARS-CoV-2 from nasopharyngeal specimens and has been validated for use at [facility] [place]. Negative results do not preclude COVID-19 infections and should not be used as the sole basis for diagnosis, treatment, or other management decisions.     TROPONIN SERIES- (INITIAL, 1 HR)    Narrative:     The following orders were created for panel order Troponin I Series, High Sensitivity (0, 1 HR).  Procedure                               Abnormality         Status                     ---------                               -----------         ------                     Troponin I, High Sensiti...[[id number]]  Abnormal            Final result               Troponin, High Sensitivi...[[id number]]  Abnormal            Final result                 Please view results for these tests on the individual orders.   GRAY TOP    Extra Tube Hold for add-ons.     URINALYSIS WITH REFLEX CULTURE AND MICROSCOPIC    Narrative:     The following orders were created for panel order Urinalysis with Reflex Culture and Microscopic.  Procedure                               Abnormality         Status                     ---------                               -----------         ------                     Urinalysis with Reflex C...[[id number]]                                                  Extra Urine Gray Tube[[id number]]                                                         Please view results for these tests on the individual orders.   URINALYSIS WITH REFLEX CULTURE AND MICROSCOPIC   EXTRA URINE GRAY TUBE     CT head wo IV contrast   Final Result   No evidence of acute cortical infarct or intracranial hemorrhage.        MACRO:   None             Signed by: Sathish Munoz 7/28/2024 10:54 AM   Dictation workstation:   [id number]      CT cervical spine wo IV contrast   Final Result   1.  No CT evidence of acute fracture.   2.  Degenerative changes throughout the cervical spine, as above.        Signed by: Sathish Munoz 7/28/2024 10:59 AM   Dictation workstation:   [id number]      CT thoracic spine wo IV contrast   Final Result   Thoracic spine:   No acute fracture or malalignment.  Degenerative changes as described   which appear greater at T10-11.   Lumbar spine:   Superior endplate compression deformity at L2 and mild compression   deformity concerning for acute to subacute fracture.   Significant multilevel degenerative changes with narrowing of the   central canal and neural foramina which appear greater at L4-5.   Signed by Timothy Gill, DO      CT lumbar spine wo IV contrast   Final Result   Thoracic spine:   No acute fracture or malalignment.  Degenerative changes as described   which appear greater at T10-11.   Lumbar spine:   Superior endplate compression deformity at L2 and mild compression   deformity concerning for acute to subacute fracture.   Significant multilevel degenerative changes with narrowing of the   central canal and neural foramina which appear greater at L4-5.   Signed by Timothy Gill, DO      CT chest abdomen pelvis w IV contrast   Final Result   Chest:   No acute traumatic injury.   Enlarged right paratracheal and subcarinal lymph nodes.   Emphysematous changes without acute process.   Secretions within the trachea.    Coronary artery calcifications are a marker for coronary artery   disease.   Sequela of prior granulomatous process.   Abdomen:   No acute traumatic injury.   Evidence of extensive hepatic metastasis.  Please correlate for   history of malignancy.   Hyperdense nodules right kidney.  Recommend ultrasound for further   evaluation.   Sequela of prior granulomatous process.   Nonobstructive renal calculi.   Pelvis:   No acute traumatic injury.   Sigmoid colon diverticulosis without evidence of diverticulitis.   Signed by Timothy Gill DO            Procedure  Procedures     Ian Jerome DO  07/28/24 [id number]

## 2024-07-28 NOTE — DISCHARGE INSTRUCTIONS
Please follow up with your Primary Care Provider (PCP) regarding your ED visit.  Please utilize palliative and hospice care resources.  Seek immediate medical attention if you develop: worsening chest pain, new chest pain, nausea, vomiting, weakness, numbness, tingling, excessive sweating, shortness of breath, difficulty breathing, loss of motion in your arms or legs, or any new or worsening symptoms.  Seek care immediately for fever, new weakness, new numbness, loss of control of bowels or bladder, or if you are unable to have a bowel movement or urinate. Talk to your doctor if not improving. Use gentle stretching and gentle home massage for pain.  These documents have a lot of useful information! Please read them, so you know what to expect, what you can do for yourself at home, and also to know concerning signs warrant a return to the Emergency Department for additional evaluation.  You are welcome back any time. Thank you for entrusting your care to us, I hope we made your visit as pleasant as possible. Wishing you well!    Dr. Jerome

## 2024-07-29 ENCOUNTER — OFFICE VISIT (OUTPATIENT)
Dept: PALLATIVE CARE | Age: 79
End: 2024-07-29
Payer: MEDICARE

## 2024-07-29 VITALS
OXYGEN SATURATION: 97 % | RESPIRATION RATE: 14 BRPM | DIASTOLIC BLOOD PRESSURE: 62 MMHG | HEART RATE: 90 BPM | TEMPERATURE: 97.5 F | SYSTOLIC BLOOD PRESSURE: 118 MMHG

## 2024-07-29 DIAGNOSIS — R45.89 ANXIETY ABOUT HEALTH: ICD-10-CM

## 2024-07-29 DIAGNOSIS — R53.1 WEAKNESS GENERALIZED: ICD-10-CM

## 2024-07-29 DIAGNOSIS — R29.6 FREQUENT FALLS: Primary | ICD-10-CM

## 2024-07-29 DIAGNOSIS — Z71.89 GOALS OF CARE, COUNSELING/DISCUSSION: ICD-10-CM

## 2024-07-29 DIAGNOSIS — Z51.5 PALLIATIVE CARE ENCOUNTER: ICD-10-CM

## 2024-07-29 DIAGNOSIS — R63.4 WEIGHT LOSS: ICD-10-CM

## 2024-07-29 DIAGNOSIS — R63.0 DECREASED APPETITE: ICD-10-CM

## 2024-07-29 PROCEDURE — 99350 HOME/RES VST EST HIGH MDM 60: CPT

## 2024-07-29 PROCEDURE — 1036F TOBACCO NON-USER: CPT

## 2024-07-29 PROCEDURE — G8417 CALC BMI ABV UP PARAM F/U: HCPCS

## 2024-07-29 PROCEDURE — 3074F SYST BP LT 130 MM HG: CPT

## 2024-07-29 PROCEDURE — 1123F ACP DISCUSS/DSCN MKR DOCD: CPT

## 2024-07-29 PROCEDURE — 3078F DIAST BP <80 MM HG: CPT

## 2024-07-29 RX ORDER — MEGESTROL ACETATE 40 MG/ML
200 SUSPENSION ORAL DAILY
Qty: 240 ML | Refills: 0 | Status: SHIPPED | OUTPATIENT
Start: 2024-07-29

## 2024-07-29 RX ORDER — BUSPIRONE HYDROCHLORIDE 10 MG/1
10 TABLET ORAL 2 TIMES DAILY
Qty: 180 TABLET | Refills: 0 | Status: SHIPPED | OUTPATIENT
Start: 2024-07-29 | End: 2024-10-27

## 2024-07-29 RX ORDER — BUSPIRONE HYDROCHLORIDE 10 MG/1
10 TABLET ORAL 2 TIMES DAILY
COMMUNITY
End: 2024-07-29 | Stop reason: SDUPTHER

## 2024-07-29 RX ORDER — TAMSULOSIN HYDROCHLORIDE 0.4 MG/1
0.8 CAPSULE ORAL DAILY
Qty: 180 CAPSULE | Refills: 3 | Status: SHIPPED | OUTPATIENT
Start: 2024-07-29

## 2024-07-29 ASSESSMENT — ENCOUNTER SYMPTOMS
WHEEZING: 0
GASTROINTESTINAL NEGATIVE: 1
COUGH: 0
COLOR CHANGE: 1
TROUBLE SWALLOWING: 0
SHORTNESS OF BREATH: 0
BACK PAIN: 1

## 2024-07-29 ASSESSMENT — VISUAL ACUITY: OU: 1

## 2024-07-29 NOTE — PROGRESS NOTES
activity to maintain current quality of life.  -Exercise and stretch as tolerated to decrease joint stiffening, pain and to maintain range of motion.  -Continue to use durable medical equipment to prevent falls and promote safety.  -Encouraged frequent repositioning and turning to prevent pressure injuries or skin breakdown.  -Palliative care will continue to monitor  - Ambulatory referral to Home Health  - megestrol (MEGACE) 40 MG/ML suspension; Take 5 mLs by mouth daily  Dispense: 240 mL; Refill: 0    3. Decreased appetite  4. Weight loss  -New  -Plan: Ordered Megace 5 mL by mouth daily for 30 days, small frequent high-calorie meals, provided information and education related to adequate protein sources, consuming 2-3 Ensure/protein supplementation drinks daily and following with PCP as recommended.  -Patient has not been able to take current cancer treatment related to increased fatigue and weakness.  Encouraged patient and patient's wife to follow-up with oncologist and inform their provider of this continued issue/concern.  -All questions and concerns were addressed during interaction.  -Recommended close follow-up with new initiation of medication of 2 weeks.  -Palliative care will continue to monitor.  - megestrol (MEGACE) 40 MG/ML suspension; Take 5 mLs by mouth daily  Dispense: 240 mL; Refill: 0    5. Anxiety about health  -Managed  -Plan: Patient's wife requested medication refill of BuSpar 10 mg tablet twice daily, sent medication refill to requested pharmacy.  Discontinued hydroxyzine as patient became increasingly confused and lethargic with medication per conversation with patient's wife.  Encouraged patient to continue to take medications as advised and follow with PCP and other specialty providers as recommended.  -Palliative care will continue to monitor.  - busPIRone (BUSPAR) 10 MG tablet; Take 1 tablet by mouth in the morning and at bedtime  Dispense: 180 tablet; Refill: 0    6. Goals of care,

## 2024-07-29 NOTE — TELEPHONE ENCOUNTER
Comments:     Last Office Visit (last PCP visit):   4/5/2024    Next Visit Date:  Future Appointments   Date Time Provider Department Center   7/29/2024  3:00 PM Stephen Prado APRN - CNP [facility]   8/2/2024  9:00 AM [facility] ROOM 1 [facility]   8/6/2024 11:20 AM Aristeo Tang, DO [facility]   8/26/2024  1:15 PM Hunter Granados MD [facility]   9/5/2024 10:45 AM Cam Prado MD [facility]   9/24/2024 11:00 AM Aristeo Tang,  [facility]       **If hasn't been seen in over a year OR hasn't followed up according to last diabetes/ADHD visit, make appointment for patient before sending refill to provider.    Rx requested:  Requested Prescriptions     Pending Prescriptions Disp Refills    tamsulosin (FLOMAX) 0.4 MG capsule [Pharmacy Med Name: Tamsulosin HCl 0.4 MG Oral Capsule] 180 capsule 3     Sig: TAKE 2 CAPSULES BY MOUTH DAILY

## 2024-08-02 ENCOUNTER — APPOINTMENT (OUTPATIENT)
Dept: RADIOLOGY | Facility: HOSPITAL | Age: 79
DRG: 641 | End: 2024-08-02
Payer: MEDICARE

## 2024-08-02 ENCOUNTER — HOSPITAL ENCOUNTER (INPATIENT)
Facility: HOSPITAL | Age: 79
LOS: 2 days | Discharge: NURSING, REHAB OR LONG-TERM CARE | DRG: 641 | End: 2024-08-04
Attending: STUDENT IN AN ORGANIZED HEALTH CARE EDUCATION/TRAINING PROGRAM | Admitting: STUDENT IN AN ORGANIZED HEALTH CARE EDUCATION/TRAINING PROGRAM
Payer: MEDICARE

## 2024-08-02 ENCOUNTER — APPOINTMENT (OUTPATIENT)
Dept: CARDIOLOGY | Facility: HOSPITAL | Age: 79
DRG: 641 | End: 2024-08-02
Payer: MEDICARE

## 2024-08-02 DIAGNOSIS — E87.1 HYPONATREMIA: ICD-10-CM

## 2024-08-02 DIAGNOSIS — R29.6 FREQUENT FALLS: ICD-10-CM

## 2024-08-02 DIAGNOSIS — M54.50 MIDLINE LOW BACK PAIN WITHOUT SCIATICA, UNSPECIFIED CHRONICITY: Primary | ICD-10-CM

## 2024-08-02 DIAGNOSIS — R53.1 WEAKNESS: ICD-10-CM

## 2024-08-02 DIAGNOSIS — E83.52 HYPERCALCEMIA: ICD-10-CM

## 2024-08-02 PROBLEM — R26.2 AMBULATORY DYSFUNCTION: Status: ACTIVE | Noted: 2024-08-02

## 2024-08-02 LAB
ALBUMIN SERPL BCP-MCNC: 3 G/DL (ref 3.4–5)
ALP SERPL-CCNC: 168 U/L (ref 33–136)
ALT SERPL W P-5'-P-CCNC: 31 U/L (ref 10–52)
ANION GAP SERPL CALC-SCNC: 10 MMOL/L (ref 10–20)
ANION GAP SERPL CALC-SCNC: 11 MMOL/L (ref 10–20)
AST SERPL W P-5'-P-CCNC: 48 U/L (ref 9–39)
ATRIAL RATE: 100 BPM
BASOPHILS # BLD AUTO: 0.03 X10*3/UL (ref 0–0.1)
BASOPHILS # BLD AUTO: 0.04 X10*3/UL (ref 0–0.1)
BASOPHILS NFR BLD AUTO: 0.3 %
BASOPHILS NFR BLD AUTO: 0.4 %
BILIRUB SERPL-MCNC: 0.5 MG/DL (ref 0–1.2)
BUN SERPL-MCNC: 30 MG/DL (ref 6–23)
BUN SERPL-MCNC: 36 MG/DL (ref 6–23)
CALCIUM SERPL-MCNC: 13.1 MG/DL (ref 8.6–10.3)
CALCIUM SERPL-MCNC: 13.2 MG/DL (ref 8.6–10.3)
CARDIAC TROPONIN I PNL SERPL HS: 44 NG/L (ref 0–20)
CARDIAC TROPONIN I PNL SERPL HS: 51 NG/L (ref 0–20)
CHLORIDE SERPL-SCNC: 96 MMOL/L (ref 98–107)
CHLORIDE SERPL-SCNC: 97 MMOL/L (ref 98–107)
CO2 SERPL-SCNC: 27 MMOL/L (ref 21–32)
CO2 SERPL-SCNC: 29 MMOL/L (ref 21–32)
CREAT SERPL-MCNC: 1.52 MG/DL (ref 0.5–1.3)
CREAT SERPL-MCNC: 1.72 MG/DL (ref 0.5–1.3)
EGFRCR SERPLBLD CKD-EPI 2021: 40 ML/MIN/1.73M*2
EGFRCR SERPLBLD CKD-EPI 2021: 46 ML/MIN/1.73M*2
EOSINOPHIL # BLD AUTO: 0.17 X10*3/UL (ref 0–0.4)
EOSINOPHIL # BLD AUTO: 0.2 X10*3/UL (ref 0–0.4)
EOSINOPHIL NFR BLD AUTO: 1.8 %
EOSINOPHIL NFR BLD AUTO: 1.8 %
ERYTHROCYTE [DISTWIDTH] IN BLOOD BY AUTOMATED COUNT: 16.5 % (ref 11.5–14.5)
ERYTHROCYTE [DISTWIDTH] IN BLOOD BY AUTOMATED COUNT: 16.7 % (ref 11.5–14.5)
GLUCOSE BLD MANUAL STRIP-MCNC: 178 MG/DL (ref 74–99)
GLUCOSE BLD MANUAL STRIP-MCNC: 237 MG/DL (ref 74–99)
GLUCOSE BLD MANUAL STRIP-MCNC: 94 MG/DL (ref 74–99)
GLUCOSE SERPL-MCNC: 116 MG/DL (ref 74–99)
GLUCOSE SERPL-MCNC: 89 MG/DL (ref 74–99)
HCT VFR BLD AUTO: 30.5 % (ref 41–52)
HCT VFR BLD AUTO: 33.9 % (ref 41–52)
HGB BLD-MCNC: 10 G/DL (ref 13.5–17.5)
HGB BLD-MCNC: 11 G/DL (ref 13.5–17.5)
HOLD SPECIMEN: NORMAL
IMM GRANULOCYTES # BLD AUTO: 0.04 X10*3/UL (ref 0–0.5)
IMM GRANULOCYTES # BLD AUTO: 0.04 X10*3/UL (ref 0–0.5)
IMM GRANULOCYTES NFR BLD AUTO: 0.4 % (ref 0–0.9)
IMM GRANULOCYTES NFR BLD AUTO: 0.4 % (ref 0–0.9)
LACTATE SERPL-SCNC: 1.2 MMOL/L (ref 0.4–2)
LYMPHOCYTES # BLD AUTO: 0.67 X10*3/UL (ref 0.8–3)
LYMPHOCYTES # BLD AUTO: 0.88 X10*3/UL (ref 0.8–3)
LYMPHOCYTES NFR BLD AUTO: 7 %
LYMPHOCYTES NFR BLD AUTO: 8 %
MAGNESIUM SERPL-MCNC: 2.08 MG/DL (ref 1.6–2.4)
MCH RBC QN AUTO: 31.8 PG (ref 26–34)
MCH RBC QN AUTO: 32.1 PG (ref 26–34)
MCHC RBC AUTO-ENTMCNC: 32.4 G/DL (ref 32–36)
MCHC RBC AUTO-ENTMCNC: 32.8 G/DL (ref 32–36)
MCV RBC AUTO: 98 FL (ref 80–100)
MCV RBC AUTO: 98 FL (ref 80–100)
MONOCYTES # BLD AUTO: 0.73 X10*3/UL (ref 0.05–0.8)
MONOCYTES # BLD AUTO: 0.9 X10*3/UL (ref 0.05–0.8)
MONOCYTES NFR BLD AUTO: 7.6 %
MONOCYTES NFR BLD AUTO: 8.2 %
NEUTROPHILS # BLD AUTO: 7.92 X10*3/UL (ref 1.6–5.5)
NEUTROPHILS # BLD AUTO: 8.96 X10*3/UL (ref 1.6–5.5)
NEUTROPHILS NFR BLD AUTO: 81.3 %
NEUTROPHILS NFR BLD AUTO: 82.8 %
NRBC BLD-RTO: 0 /100 WBCS (ref 0–0)
NRBC BLD-RTO: 0 /100 WBCS (ref 0–0)
P AXIS: 42 DEGREES
P OFFSET: 182 MS
P ONSET: 130 MS
PLATELET # BLD AUTO: 133 X10*3/UL (ref 150–450)
PLATELET # BLD AUTO: 133 X10*3/UL (ref 150–450)
POTASSIUM SERPL-SCNC: 3.9 MMOL/L (ref 3.5–5.3)
POTASSIUM SERPL-SCNC: 4.1 MMOL/L (ref 3.5–5.3)
PR INTERVAL: 162 MS
PROT SERPL-MCNC: 6.5 G/DL (ref 6.4–8.2)
Q ONSET: 211 MS
QRS COUNT: 16 BEATS
QRS DURATION: 102 MS
QT INTERVAL: 340 MS
QTC CALCULATION(BAZETT): 438 MS
QTC FREDERICIA: 403 MS
R AXIS: -42 DEGREES
RBC # BLD AUTO: 3.12 X10*6/UL (ref 4.5–5.9)
RBC # BLD AUTO: 3.46 X10*6/UL (ref 4.5–5.9)
SODIUM SERPL-SCNC: 129 MMOL/L (ref 136–145)
SODIUM SERPL-SCNC: 133 MMOL/L (ref 136–145)
T AXIS: 85 DEGREES
T OFFSET: 381 MS
VENTRICULAR RATE: 100 BPM
WBC # BLD AUTO: 11 X10*3/UL (ref 4.4–11.3)
WBC # BLD AUTO: 9.6 X10*3/UL (ref 4.4–11.3)

## 2024-08-02 PROCEDURE — 2500000001 HC RX 250 WO HCPCS SELF ADMINISTERED DRUGS (ALT 637 FOR MEDICARE OP): Performed by: STUDENT IN AN ORGANIZED HEALTH CARE EDUCATION/TRAINING PROGRAM

## 2024-08-02 PROCEDURE — 72131 CT LUMBAR SPINE W/O DYE: CPT | Performed by: STUDENT IN AN ORGANIZED HEALTH CARE EDUCATION/TRAINING PROGRAM

## 2024-08-02 PROCEDURE — 2500000002 HC RX 250 W HCPCS SELF ADMINISTERED DRUGS (ALT 637 FOR MEDICARE OP, ALT 636 FOR OP/ED): Performed by: STUDENT IN AN ORGANIZED HEALTH CARE EDUCATION/TRAINING PROGRAM

## 2024-08-02 PROCEDURE — 2500000001 HC RX 250 WO HCPCS SELF ADMINISTERED DRUGS (ALT 637 FOR MEDICARE OP): Performed by: NURSE PRACTITIONER

## 2024-08-02 PROCEDURE — 84484 ASSAY OF TROPONIN QUANT: CPT | Performed by: STUDENT IN AN ORGANIZED HEALTH CARE EDUCATION/TRAINING PROGRAM

## 2024-08-02 PROCEDURE — 1210000001 HC SEMI-PRIVATE ROOM DAILY

## 2024-08-02 PROCEDURE — 99285 EMERGENCY DEPT VISIT HI MDM: CPT

## 2024-08-02 PROCEDURE — 85025 COMPLETE CBC W/AUTO DIFF WBC: CPT | Performed by: STUDENT IN AN ORGANIZED HEALTH CARE EDUCATION/TRAINING PROGRAM

## 2024-08-02 PROCEDURE — 80053 COMPREHEN METABOLIC PANEL: CPT | Performed by: STUDENT IN AN ORGANIZED HEALTH CARE EDUCATION/TRAINING PROGRAM

## 2024-08-02 PROCEDURE — 99233 SBSQ HOSP IP/OBS HIGH 50: CPT | Performed by: STUDENT IN AN ORGANIZED HEALTH CARE EDUCATION/TRAINING PROGRAM

## 2024-08-02 PROCEDURE — 96361 HYDRATE IV INFUSION ADD-ON: CPT

## 2024-08-02 PROCEDURE — 83605 ASSAY OF LACTIC ACID: CPT | Performed by: STUDENT IN AN ORGANIZED HEALTH CARE EDUCATION/TRAINING PROGRAM

## 2024-08-02 PROCEDURE — 80048 BASIC METABOLIC PNL TOTAL CA: CPT | Mod: CCI | Performed by: STUDENT IN AN ORGANIZED HEALTH CARE EDUCATION/TRAINING PROGRAM

## 2024-08-02 PROCEDURE — 70450 CT HEAD/BRAIN W/O DYE: CPT

## 2024-08-02 PROCEDURE — 93005 ELECTROCARDIOGRAM TRACING: CPT

## 2024-08-02 PROCEDURE — 97161 PT EVAL LOW COMPLEX 20 MIN: CPT | Mod: GP | Performed by: PHYSICAL THERAPIST

## 2024-08-02 PROCEDURE — 83735 ASSAY OF MAGNESIUM: CPT | Performed by: STUDENT IN AN ORGANIZED HEALTH CARE EDUCATION/TRAINING PROGRAM

## 2024-08-02 PROCEDURE — 71045 X-RAY EXAM CHEST 1 VIEW: CPT | Performed by: STUDENT IN AN ORGANIZED HEALTH CARE EDUCATION/TRAINING PROGRAM

## 2024-08-02 PROCEDURE — 99222 1ST HOSP IP/OBS MODERATE 55: CPT | Performed by: NURSE PRACTITIONER

## 2024-08-02 PROCEDURE — 82947 ASSAY GLUCOSE BLOOD QUANT: CPT

## 2024-08-02 PROCEDURE — 70450 CT HEAD/BRAIN W/O DYE: CPT | Performed by: STUDENT IN AN ORGANIZED HEALTH CARE EDUCATION/TRAINING PROGRAM

## 2024-08-02 PROCEDURE — 96374 THER/PROPH/DIAG INJ IV PUSH: CPT

## 2024-08-02 PROCEDURE — 36415 COLL VENOUS BLD VENIPUNCTURE: CPT | Performed by: STUDENT IN AN ORGANIZED HEALTH CARE EDUCATION/TRAINING PROGRAM

## 2024-08-02 PROCEDURE — 97165 OT EVAL LOW COMPLEX 30 MIN: CPT | Mod: GO

## 2024-08-02 PROCEDURE — 2500000004 HC RX 250 GENERAL PHARMACY W/ HCPCS (ALT 636 FOR OP/ED): Performed by: STUDENT IN AN ORGANIZED HEALTH CARE EDUCATION/TRAINING PROGRAM

## 2024-08-02 PROCEDURE — 72131 CT LUMBAR SPINE W/O DYE: CPT

## 2024-08-02 PROCEDURE — 71045 X-RAY EXAM CHEST 1 VIEW: CPT

## 2024-08-02 RX ORDER — CALCITONIN SALMON 200 [USP'U]/ML
4 INJECTION, SOLUTION INTRAMUSCULAR; SUBCUTANEOUS 2 TIMES DAILY
Status: DISCONTINUED | OUTPATIENT
Start: 2024-08-02 | End: 2024-08-02

## 2024-08-02 RX ORDER — AZATHIOPRINE 50 MG/1
50 TABLET ORAL 2 TIMES DAILY
Status: DISCONTINUED | OUTPATIENT
Start: 2024-08-02 | End: 2024-08-04 | Stop reason: HOSPADM

## 2024-08-02 RX ORDER — TAMSULOSIN HYDROCHLORIDE 0.4 MG/1
0.4 CAPSULE ORAL
Status: DISCONTINUED | OUTPATIENT
Start: 2024-08-02 | End: 2024-08-04 | Stop reason: HOSPADM

## 2024-08-02 RX ORDER — HYDROMORPHONE HYDROCHLORIDE 0.2 MG/ML
0.2 INJECTION INTRAMUSCULAR; INTRAVENOUS; SUBCUTANEOUS
Status: DISCONTINUED | OUTPATIENT
Start: 2024-08-02 | End: 2024-08-04 | Stop reason: HOSPADM

## 2024-08-02 RX ORDER — CHLORHEXIDINE GLUCONATE ORAL RINSE 1.2 MG/ML
15 SOLUTION DENTAL AS NEEDED
COMMUNITY

## 2024-08-02 RX ORDER — ROSUVASTATIN CALCIUM 20 MG/1
1 TABLET, COATED ORAL NIGHTLY
COMMUNITY

## 2024-08-02 RX ORDER — FUROSEMIDE 20 MG/1
20 TABLET ORAL DAILY
COMMUNITY
Start: 2024-03-18

## 2024-08-02 RX ORDER — DEXTROSE 50 % IN WATER (D50W) INTRAVENOUS SYRINGE
25
Status: DISCONTINUED | OUTPATIENT
Start: 2024-08-02 | End: 2024-08-04 | Stop reason: HOSPADM

## 2024-08-02 RX ORDER — OXYCODONE HYDROCHLORIDE 5 MG/1
5 TABLET ORAL EVERY 6 HOURS PRN
Status: DISCONTINUED | OUTPATIENT
Start: 2024-08-02 | End: 2024-08-04 | Stop reason: HOSPADM

## 2024-08-02 RX ORDER — ZINC GLUCONATE 50 MG
1 TABLET ORAL DAILY
COMMUNITY

## 2024-08-02 RX ORDER — POTASSIUM CHLORIDE 20 MEQ/1
20 TABLET, EXTENDED RELEASE ORAL
COMMUNITY
Start: 2024-06-14

## 2024-08-02 RX ORDER — PREDNISONE 10 MG/1
10 TABLET ORAL DAILY
Status: DISCONTINUED | OUTPATIENT
Start: 2024-08-02 | End: 2024-08-04 | Stop reason: HOSPADM

## 2024-08-02 RX ORDER — MEGESTROL ACETATE 40 MG/ML
5 SUSPENSION ORAL DAILY
COMMUNITY
Start: 2024-07-29

## 2024-08-02 RX ORDER — HYDROXYZINE HYDROCHLORIDE 25 MG/1
25 TABLET, FILM COATED ORAL EVERY 8 HOURS PRN
COMMUNITY
Start: 2024-06-24

## 2024-08-02 RX ORDER — DIPHENOXYLATE HYDROCHLORIDE AND ATROPINE SULFATE 2.5; .025 MG/1; MG/1
1 TABLET ORAL 4 TIMES DAILY PRN
Status: DISCONTINUED | OUTPATIENT
Start: 2024-08-02 | End: 2024-08-04 | Stop reason: HOSPADM

## 2024-08-02 RX ORDER — LIDOCAINE AND PRILOCAINE 25; 25 MG/G; MG/G
1 CREAM TOPICAL
COMMUNITY
Start: 2024-07-11

## 2024-08-02 RX ORDER — DIPHENOXYLATE HYDROCHLORIDE AND ATROPINE SULFATE 2.5; .025 MG/1; MG/1
1 TABLET ORAL 4 TIMES DAILY PRN
COMMUNITY
Start: 2023-11-17

## 2024-08-02 RX ORDER — METOPROLOL SUCCINATE 50 MG/1
50 TABLET, EXTENDED RELEASE ORAL DAILY
Status: DISCONTINUED | OUTPATIENT
Start: 2024-08-02 | End: 2024-08-04 | Stop reason: HOSPADM

## 2024-08-02 RX ORDER — PANTOPRAZOLE SODIUM 40 MG/1
40 TABLET, DELAYED RELEASE ORAL EVERY EVENING
COMMUNITY
Start: 2023-12-19

## 2024-08-02 RX ORDER — SODIUM CHLORIDE 9 MG/ML
125 INJECTION, SOLUTION INTRAVENOUS CONTINUOUS
Status: DISCONTINUED | OUTPATIENT
Start: 2024-08-02 | End: 2024-08-04

## 2024-08-02 RX ORDER — BUSPIRONE HYDROCHLORIDE 5 MG/1
10 TABLET ORAL 2 TIMES DAILY
Status: DISCONTINUED | OUTPATIENT
Start: 2024-08-02 | End: 2024-08-04 | Stop reason: HOSPADM

## 2024-08-02 RX ORDER — TAMSULOSIN HYDROCHLORIDE 0.4 MG/1
0.4 CAPSULE ORAL
COMMUNITY

## 2024-08-02 RX ORDER — TRAZODONE HYDROCHLORIDE 100 MG/1
100 TABLET ORAL NIGHTLY
COMMUNITY
Start: 2024-05-21

## 2024-08-02 RX ORDER — PREDNISONE 10 MG/1
10 TABLET ORAL DAILY
COMMUNITY

## 2024-08-02 RX ORDER — FLUTICASONE FUROATE AND VILANTEROL 100; 25 UG/1; UG/1
1 POWDER RESPIRATORY (INHALATION)
Status: DISCONTINUED | OUTPATIENT
Start: 2024-08-02 | End: 2024-08-04 | Stop reason: HOSPADM

## 2024-08-02 RX ORDER — CHOLECALCIFEROL (VITAMIN D3) 25 MCG
1000 TABLET ORAL
COMMUNITY

## 2024-08-02 RX ORDER — CALCITONIN SALMON 200 [USP'U]/ML
4 INJECTION, SOLUTION INTRAMUSCULAR; SUBCUTANEOUS 2 TIMES DAILY
Status: DISPENSED | OUTPATIENT
Start: 2024-08-02 | End: 2024-08-03

## 2024-08-02 RX ORDER — ASCORBIC ACID 500 MG
1 CAPSULE ORAL 2 TIMES DAILY
COMMUNITY

## 2024-08-02 RX ORDER — BUSPIRONE HYDROCHLORIDE 10 MG/1
10 TABLET ORAL 2 TIMES DAILY
COMMUNITY

## 2024-08-02 RX ORDER — ACETAMINOPHEN 500 MG
10 TABLET ORAL NIGHTLY PRN
Status: DISCONTINUED | OUTPATIENT
Start: 2024-08-02 | End: 2024-08-04 | Stop reason: HOSPADM

## 2024-08-02 RX ORDER — NITROGLYCERIN 0.4 MG/1
0.4 TABLET SUBLINGUAL EVERY 5 MIN PRN
COMMUNITY

## 2024-08-02 RX ORDER — SELENIUM 200 MCG
1 TABLET ORAL NIGHTLY
COMMUNITY

## 2024-08-02 RX ORDER — METOPROLOL SUCCINATE 50 MG/1
50 TABLET, EXTENDED RELEASE ORAL DAILY
COMMUNITY

## 2024-08-02 RX ORDER — INSULIN LISPRO 100 [IU]/ML
0-10 INJECTION, SOLUTION INTRAVENOUS; SUBCUTANEOUS
Status: DISCONTINUED | OUTPATIENT
Start: 2024-08-02 | End: 2024-08-04 | Stop reason: HOSPADM

## 2024-08-02 RX ORDER — LANOLIN ALCOHOL/MO/W.PET/CERES
1000 CREAM (GRAM) TOPICAL DAILY
COMMUNITY

## 2024-08-02 RX ORDER — GLIPIZIDE 5 MG/1
5 TABLET ORAL
COMMUNITY

## 2024-08-02 RX ORDER — FLUTICASONE FUROATE, UMECLIDINIUM BROMIDE AND VILANTEROL TRIFENATATE 100; 62.5; 25 UG/1; UG/1; UG/1
1 POWDER RESPIRATORY (INHALATION) DAILY
COMMUNITY

## 2024-08-02 RX ORDER — FLUTICASONE PROPIONATE 50 MCG
1 SPRAY, SUSPENSION (ML) NASAL DAILY
COMMUNITY

## 2024-08-02 RX ORDER — PROCHLORPERAZINE MALEATE 10 MG
1 TABLET ORAL EVERY 6 HOURS PRN
COMMUNITY
Start: 2024-05-29

## 2024-08-02 RX ORDER — DEXTROSE 50 % IN WATER (D50W) INTRAVENOUS SYRINGE
12.5
Status: DISCONTINUED | OUTPATIENT
Start: 2024-08-02 | End: 2024-08-04 | Stop reason: HOSPADM

## 2024-08-02 RX ORDER — ROSUVASTATIN CALCIUM 20 MG/1
20 TABLET, COATED ORAL NIGHTLY
Status: DISCONTINUED | OUTPATIENT
Start: 2024-08-02 | End: 2024-08-04 | Stop reason: HOSPADM

## 2024-08-02 RX ORDER — SITAGLIPTIN 100 MG/1
100 TABLET, FILM COATED ORAL 2 TIMES DAILY
COMMUNITY

## 2024-08-02 RX ORDER — AZATHIOPRINE 50 MG/1
50 TABLET ORAL 2 TIMES DAILY
COMMUNITY

## 2024-08-02 RX ORDER — POLYETHYLENE GLYCOL 3350 17 G/17G
17 POWDER, FOR SOLUTION ORAL DAILY PRN
Status: DISCONTINUED | OUTPATIENT
Start: 2024-08-02 | End: 2024-08-04 | Stop reason: HOSPADM

## 2024-08-02 RX ORDER — TRAZODONE HYDROCHLORIDE 50 MG/1
100 TABLET ORAL NIGHTLY
Status: DISCONTINUED | OUTPATIENT
Start: 2024-08-02 | End: 2024-08-04 | Stop reason: HOSPADM

## 2024-08-02 RX ORDER — FERROUS SULFATE 325(65) MG
325 TABLET ORAL 2 TIMES DAILY
COMMUNITY

## 2024-08-02 RX ADMIN — SODIUM CHLORIDE 1000 ML: 9 INJECTION, SOLUTION INTRAVENOUS at 02:39

## 2024-08-02 RX ADMIN — BUSPIRONE HYDROCHLORIDE 10 MG: 5 TABLET ORAL at 12:44

## 2024-08-02 RX ADMIN — APIXABAN 5 MG: 5 TABLET, FILM COATED ORAL at 20:47

## 2024-08-02 RX ADMIN — ZOLEDRONIC ACID 4 MG: 4 INJECTION, SOLUTION, CONCENTRATE INTRAVENOUS at 16:40

## 2024-08-02 RX ADMIN — DIPHENOXYLATE HYDROCHLORIDE AND ATROPINE SULFATE 1 TABLET: 2.5; .025 TABLET ORAL at 12:44

## 2024-08-02 RX ADMIN — AZATHIOPRINE 50 MG: 50 TABLET ORAL at 20:47

## 2024-08-02 RX ADMIN — BUSPIRONE HYDROCHLORIDE 10 MG: 5 TABLET ORAL at 20:47

## 2024-08-02 RX ADMIN — HYDROMORPHONE HYDROCHLORIDE 0.5 MG: 1 INJECTION, SOLUTION INTRAMUSCULAR; INTRAVENOUS; SUBCUTANEOUS at 02:39

## 2024-08-02 RX ADMIN — TAMSULOSIN HYDROCHLORIDE 0.4 MG: 0.4 CAPSULE ORAL at 12:44

## 2024-08-02 RX ADMIN — CALCITONIN SALMON 320 UNITS: 200 INJECTION, SOLUTION INTRAMUSCULAR; SUBCUTANEOUS at 16:40

## 2024-08-02 RX ADMIN — INSULIN LISPRO 4 UNITS: 100 INJECTION, SOLUTION INTRAVENOUS; SUBCUTANEOUS at 20:52

## 2024-08-02 RX ADMIN — PREDNISONE 10 MG: 10 TABLET ORAL at 12:44

## 2024-08-02 RX ADMIN — SODIUM CHLORIDE, POTASSIUM CHLORIDE, SODIUM LACTATE AND CALCIUM CHLORIDE 1000 ML: 600; 310; 30; 20 INJECTION, SOLUTION INTRAVENOUS at 04:41

## 2024-08-02 RX ADMIN — TRAZODONE HYDROCHLORIDE 100 MG: 50 TABLET ORAL at 20:47

## 2024-08-02 RX ADMIN — ROSUVASTATIN CALCIUM 20 MG: 20 TABLET, FILM COATED ORAL at 20:47

## 2024-08-02 RX ADMIN — OXYCODONE HYDROCHLORIDE 5 MG: 5 TABLET ORAL at 16:03

## 2024-08-02 RX ADMIN — APIXABAN 5 MG: 5 TABLET, FILM COATED ORAL at 12:44

## 2024-08-02 RX ADMIN — METOPROLOL SUCCINATE 50 MG: 50 TABLET, EXTENDED RELEASE ORAL at 12:44

## 2024-08-02 RX ADMIN — AZATHIOPRINE 50 MG: 50 TABLET ORAL at 14:02

## 2024-08-02 RX ADMIN — SODIUM CHLORIDE 125 ML/HR: 9 INJECTION, SOLUTION INTRAVENOUS at 14:01

## 2024-08-02 SDOH — SOCIAL STABILITY: SOCIAL INSECURITY: DO YOU FEEL ANYONE HAS EXPLOITED OR TAKEN ADVANTAGE OF YOU FINANCIALLY OR OF YOUR PERSONAL PROPERTY?: NO

## 2024-08-02 SDOH — SOCIAL STABILITY: SOCIAL INSECURITY: ARE THERE ANY APPARENT SIGNS OF INJURIES/BEHAVIORS THAT COULD BE RELATED TO ABUSE/NEGLECT?: NO

## 2024-08-02 SDOH — SOCIAL STABILITY: SOCIAL INSECURITY: ABUSE: ADULT

## 2024-08-02 SDOH — SOCIAL STABILITY: SOCIAL INSECURITY: WERE YOU ABLE TO COMPLETE ALL THE BEHAVIORAL HEALTH SCREENINGS?: YES

## 2024-08-02 SDOH — SOCIAL STABILITY: SOCIAL INSECURITY: HAVE YOU HAD THOUGHTS OF HARMING ANYONE ELSE?: NO

## 2024-08-02 SDOH — SOCIAL STABILITY: SOCIAL INSECURITY: HAVE YOU HAD ANY THOUGHTS OF HARMING ANYONE ELSE?: NO

## 2024-08-02 SDOH — SOCIAL STABILITY: SOCIAL INSECURITY: ARE YOU OR HAVE YOU BEEN THREATENED OR ABUSED PHYSICALLY, EMOTIONALLY, OR SEXUALLY BY ANYONE?: NO

## 2024-08-02 SDOH — SOCIAL STABILITY: SOCIAL INSECURITY: DOES ANYONE TRY TO KEEP YOU FROM HAVING/CONTACTING OTHER FRIENDS OR DOING THINGS OUTSIDE YOUR HOME?: NO

## 2024-08-02 SDOH — SOCIAL STABILITY: SOCIAL INSECURITY: HAS ANYONE EVER THREATENED TO HURT YOUR FAMILY OR YOUR PETS?: NO

## 2024-08-02 SDOH — SOCIAL STABILITY: SOCIAL INSECURITY: DO YOU FEEL UNSAFE GOING BACK TO THE PLACE WHERE YOU ARE LIVING?: NO

## 2024-08-02 ASSESSMENT — ENCOUNTER SYMPTOMS
DIARRHEA: 0
FEVER: 0
HEMATURIA: 0
WEAKNESS: 1
FLANK PAIN: 0
FREQUENCY: 0
ABDOMINAL PAIN: 0
CHILLS: 0
VOMITING: 0
CONSTIPATION: 0
DYSURIA: 0
PALPITATIONS: 0
NAUSEA: 0
SHORTNESS OF BREATH: 0

## 2024-08-02 ASSESSMENT — ACTIVITIES OF DAILY LIVING (ADL)
BATHING: NEEDS ASSISTANCE
GROOMING: NEEDS ASSISTANCE
BATHING_ASSISTANCE: MODERATE
DRESSING YOURSELF: NEEDS ASSISTANCE
HEARING - LEFT EAR: HEARING AID
HEARING - RIGHT EAR: HEARING AID
JUDGMENT_ADEQUATE_SAFELY_COMPLETE_DAILY_ACTIVITIES: YES
ASSISTIVE_DEVICE: WALKER
WALKS IN HOME: NEEDS ASSISTANCE
PATIENT'S MEMORY ADEQUATE TO SAFELY COMPLETE DAILY ACTIVITIES?: YES
FEEDING YOURSELF: INDEPENDENT
TOILETING: NEEDS ASSISTANCE
LACK_OF_TRANSPORTATION: NO
ADEQUATE_TO_COMPLETE_ADL: YES

## 2024-08-02 ASSESSMENT — COGNITIVE AND FUNCTIONAL STATUS - GENERAL
MOBILITY SCORE: 15
MOVING TO AND FROM BED TO CHAIR: A LITTLE
DRESSING REGULAR UPPER BODY CLOTHING: A LITTLE
STANDING UP FROM CHAIR USING ARMS: A LOT
MOBILITY SCORE: 10
WALKING IN HOSPITAL ROOM: A LOT
TURNING FROM BACK TO SIDE WHILE IN FLAT BAD: A LOT
WALKING IN HOSPITAL ROOM: TOTAL
CLIMB 3 TO 5 STEPS WITH RAILING: TOTAL
DAILY ACTIVITIY SCORE: 20
DRESSING REGULAR LOWER BODY CLOTHING: A LITTLE
PERSONAL GROOMING: A LITTLE
TOILETING: A LOT
DRESSING REGULAR LOWER BODY CLOTHING: A LOT
DAILY ACTIVITIY SCORE: 16
MOVING FROM LYING ON BACK TO SITTING ON SIDE OF FLAT BED WITH BEDRAILS: A LOT
STANDING UP FROM CHAIR USING ARMS: A LITTLE
TURNING FROM BACK TO SIDE WHILE IN FLAT BAD: A LITTLE
PATIENT BASELINE BEDBOUND: NO
DRESSING REGULAR UPPER BODY CLOTHING: A LITTLE
HELP NEEDED FOR BATHING: A LITTLE
MOVING FROM LYING ON BACK TO SITTING ON SIDE OF FLAT BED WITH BEDRAILS: A LITTLE
CLIMB 3 TO 5 STEPS WITH RAILING: TOTAL
TOILETING: A LITTLE
MOVING TO AND FROM BED TO CHAIR: A LOT
HELP NEEDED FOR BATHING: A LOT

## 2024-08-02 ASSESSMENT — PAIN DESCRIPTION - LOCATION
LOCATION: BACK
LOCATION: BACK

## 2024-08-02 ASSESSMENT — PAIN SCALES - GENERAL
PAINLEVEL_OUTOF10: 0 - NO PAIN
PAINLEVEL_OUTOF10: 4
PAINLEVEL_OUTOF10: 8
PAINLEVEL_OUTOF10: 7
PAINLEVEL_OUTOF10: 3
PAINLEVEL_OUTOF10: 0 - NO PAIN
PAINLEVEL_OUTOF10: 4
PAINLEVEL_OUTOF10: 5 - MODERATE PAIN

## 2024-08-02 ASSESSMENT — PAIN - FUNCTIONAL ASSESSMENT
PAIN_FUNCTIONAL_ASSESSMENT: UNABLE TO SELF-REPORT
PAIN_FUNCTIONAL_ASSESSMENT: 0-10

## 2024-08-02 ASSESSMENT — LIFESTYLE VARIABLES
HOW MANY STANDARD DRINKS CONTAINING ALCOHOL DO YOU HAVE ON A TYPICAL DAY: PATIENT DOES NOT DRINK
AUDIT-C TOTAL SCORE: 0
EVER FELT BAD OR GUILTY ABOUT YOUR DRINKING: NO
HAVE YOU EVER FELT YOU SHOULD CUT DOWN ON YOUR DRINKING: NO
SKIP TO QUESTIONS 9-10: 1
HOW OFTEN DO YOU HAVE 6 OR MORE DRINKS ON ONE OCCASION: NEVER
AUDIT-C TOTAL SCORE: 0
EVER HAD A DRINK FIRST THING IN THE MORNING TO STEADY YOUR NERVES TO GET RID OF A HANGOVER: NO
TOTAL SCORE: 0
HAVE PEOPLE ANNOYED YOU BY CRITICIZING YOUR DRINKING: NO
HOW OFTEN DO YOU HAVE A DRINK CONTAINING ALCOHOL: NEVER

## 2024-08-02 ASSESSMENT — PATIENT HEALTH QUESTIONNAIRE - PHQ9
1. LITTLE INTEREST OR PLEASURE IN DOING THINGS: NOT AT ALL
2. FEELING DOWN, DEPRESSED OR HOPELESS: NOT AT ALL
SUM OF ALL RESPONSES TO PHQ9 QUESTIONS 1 & 2: 0

## 2024-08-02 ASSESSMENT — PAIN DESCRIPTION - ORIENTATION
ORIENTATION: LOWER
ORIENTATION: LOWER

## 2024-08-02 ASSESSMENT — PAIN DESCRIPTION - PAIN TYPE: TYPE: ACUTE PAIN

## 2024-08-02 NOTE — PROGRESS NOTES
Medical Group Progress Note  ASSESSMENT & PLAN:     Mechanical fall  Generalized weakness  - No acute injury seen on imaging  - PT/OT recommending acute rehab    Hypercalcemia secondary to malignancy  - Calcium 13.2 today  - Aggressive hydration with normal saline, IV calcitonin x 2 doses, IV zoledronic acid x 1  - Continue daily calcium checks    Acute hyponatremia  - Sodium 133 today, 120 on admission  - Continue normal saline as per above    Myasthenia gravis, not in flare  - Continue home prednisone and azathioprine daily  - Outpatient follow-up with patient's primary neurologist at [facility]    BPH  Hyperlipidemia  Essential hypertension  CAD with previous CABG  - Continue home medications as reconciled    Metastatic lung cancer with metastases to the liver  - Outpatient follow-up with patient primary oncologist    CKD stage III    VTE prophylaxis: Home apixaban      ---Of note, this documentation is completed using the Dragon Dictation system (voice recognition software). There may be spelling and/or grammatical errors that were not corrected prior to final submission.---    Marco Hayden MD    SUBJECTIVE     Patient was seen and examined bedside this morning was admitted overnight after a fall.  States that his back pain is stable.    OBJECTIVE:     Last Recorded Vitals:  Vitals:    08/02/24 0849 08/02/24 0931 08/02/24 0937 08/02/24 1106   BP: 125/65 142/79     BP Location: Left arm      Patient Position: Lying      Pulse: 88 103  (!) 121   Resp: 16      Temp:  35.6 °C (96.1 °F)     TempSrc:       SpO2: 98% 93%     Weight:   79.5 kg (175 lb 4.3 oz)    Height:         Last I/O:  I/O last 3 completed shifts:  In: 1999 (23.2 mL/kg) [IV Piggyback:1999]  Out: - (0 mL/kg)   Weight: 86.2 kg     Physical Exam  Vitals reviewed.   Constitutional:       General: He is not in acute distress.     Appearance: Normal appearance.      Comments: Chronically frail in appearance   HENT:      Head: Normocephalic and atraumatic.    Eyes:      Extraocular Movements: Extraocular movements intact.      Conjunctiva/sclera: Conjunctivae normal.   Cardiovascular:      Rate and Rhythm: Normal rate and regular rhythm.      Pulses: Normal pulses.      Heart sounds: Normal heart sounds.   Pulmonary:      Effort: Pulmonary effort is normal.      Breath sounds: Normal breath sounds.   Abdominal:      General: Bowel sounds are normal. There is no distension.      Palpations: Abdomen is soft.      Tenderness: There is no abdominal tenderness. There is no guarding.   Musculoskeletal:         General: No swelling or tenderness. Normal range of motion.      Cervical back: Normal range of motion and neck supple.   Neurological:      General: No focal deficit present.      Mental Status: He is alert and oriented to person, place, and time. Mental status is at baseline.   Psychiatric:         Mood and Affect: Mood normal.         Behavior: Behavior normal.     Inpatient Medications:  apixaban, 5 mg, oral, BID  azaTHIOprine, 50 mg, oral, BID  busPIRone, 10 mg, oral, BID  calcitonin, 4 Units/kg, intramuscular, BID  tiotropium, 2 puff, inhalation, Daily   And  fluticasone furoate-vilanteroL, 1 puff, inhalation, Daily  insulin lispro, 0-10 Units, subcutaneous, Before meals & nightly  metoprolol succinate XL, 50 mg, oral, Daily  predniSONE, 10 mg, oral, Daily  rosuvastatin, 20 mg, oral, Nightly  tamsulosin, 0.4 mg, oral, Daily with lunch  traZODone, 100 mg, oral, Nightly  zoledronic acid, 4 mg, intravenous, Once    PRN Medications  PRN medications: dextrose, dextrose, diphenoxylate-atropine, glucagon, glucagon, HYDROmorphone, melatonin, oxyCODONE, polyethylene glycol  Continuous Medications:  sodium chloride 0.9%, 125 mL/hr, Last Rate: 125 mL/hr (08/02/24 1401)      LABS AND IMAGING:     Labs:  Results from last 7 days   Lab Units 08/02/24  1138 08/02/24  0224 07/28/24  0833   WBC AUTO x10*3/uL 9.6 11.0 11.8*   RBC AUTO x10*6/uL 3.46* 3.12* 3.37*   HEMOGLOBIN g/dL  11.0* 10.0* 10.9*   HEMATOCRIT % 33.9* 30.5* 33.6*   MCV fL 98 98 100   MCH pg 31.8 32.1 32.3   MCHC g/dL 32.4 32.8 32.4   RDW % 16.5* 16.7* 16.3*   PLATELETS AUTO x10*3/uL 133* 133* 135*     Results from last 7 days   Lab Units 08/02/24  1137 08/02/24  0224 07/28/24  0833   SODIUM mmol/L 133* 129* 132*   POTASSIUM mmol/L 3.9 4.1 4.0   CHLORIDE mmol/L 97* 96* 96*   CO2 mmol/L 29 27 32   BUN mg/dL 30* 36* 25*   CREATININE mg/dL 1.52* 1.72* 1.37*   GLUCOSE mg/dL 89 116* 66*   PROTEIN TOTAL g/dL  --  6.5 6.6   CALCIUM mg/dL 13.2* 13.1* 12.4*   BILIRUBIN TOTAL mg/dL  --  0.5 0.4   ALK PHOS U/L  --  168* 173*   AST U/L  --  48* 55*   ALT U/L  --  31 49     Results from last 7 days   Lab Units 08/02/24  0224 07/28/24  0833   MAGNESIUM mg/dL 2.08 1.89     Results from last 7 days   Lab Units 08/02/24  0317 08/02/24  0224 07/28/24  1040   TROPHS ng/L 51* 44* 29*     Imaging:  ECG 12 lead  Normal sinus rhythm  Left axis deviation  Nonspecific ST abnormality  Abnormal ECG  When compared with ECG of 28-JUL-2024 10:56,  ST no longer depressed in Lateral leads  See ED provider note for full interpretation and clinical correlation  Confirmed by Halie Dao ([id number]) on 8/2/2024 10:53:13 AM  XR chest 1 view  Narrative: Interpreted By:  Atul García,   STUDY:  XR CHEST 1 VIEW;  8/2/2024 3:13 am      INDICATION:  Signs/Symptoms:Chest Pain.      COMPARISON:  Chest CT 07/20/2024      ACCESSION NUMBER(S):  [id number]      ORDERING CLINICIAN:  DOUGLAS FERNANDEZ      FINDINGS:          CARDIOMEDIASTINAL SILHOUETTE:  Cardiomediastinal silhouette is stable in size and configuration.      LUNGS:  Low lung volumes. No pulmonary consolidations. No pneumothorax or  pleural effusions.      ABDOMEN:  No remarkable upper abdominal findings.      BONES:  No acute osseous abnormality. Severe glenohumeral osteoarthritis with  superior subluxation of the bilateral humeral heads. Cerclage wires  are intact.      Impression: No acute  cardiopulmonary process.      MACRO:  None      Signed by: Atul García 8/2/2024 3:23 AM  Dictation workstation:   [id number]  CT lumbar spine wo IV contrast  Narrative: Interpreted By:  Atul Garcaí,   STUDY:  CT LUMBAR SPINE WO IV CONTRAST;  8/2/2024 3:04 am      INDICATION:  Signs/Symptoms:fall, back pain.      COMPARISON:  CT lumbar spine 07/28/2024      ACCESSION NUMBER(S):  [id number]      ORDERING CLINICIAN:  DOUGLAS FERNANDEZ      TECHNIQUE:  Axial noncontrast images of the lumbar spine with coronal and  sagittal reconstructed images.      FINDINGS:  ALIGNMENT: No traumatic subluxation.  VERTEBRAE: The bones are diffusely demineralized limiting evaluation  for subtle nondisplaced fractures. No new fractures identified.  Similar cortical buckling at the sacrococcygeal junction. Similar  subacute compression fracture of the L2 vertebral body superior  endplate (206/47) with minimal height loss, unchanged compared to  07/28/2024. SPINAL CANAL: Extensive multilevel degenerative changes  resulting in multifocal areas of central canal and neural foraminal  narrowing as detailed on CT 07/28/2024. PREVERTEBRAL SOFT TISSUES: No  prevertebral soft tissue swelling.      OTHER FINDINGS: Nodular hyperplasia of the adrenal glands. Bilateral  nonobstructive renal calculi. Severe aortic atherosclerotic  calcification. No new aneurysmal dilation. Dystrophic calcifications  in the prostate. Extensive sigmoid diverticulosis.      Impression: 1. No new fractures detected. No traumatic subluxations. Evaluation  for subtle nondisplaced fractures is limited due to severe osteopenia.  2. Unchanged subacute L2 superior endplate compression deformity with  similar height loss compared to 07/28/2024.  3. Extensive degenerative changes throughout the spine as detailed on  CT 07/28/2020.          MACRO:  None      Signed by: Atul García 8/2/2024 3:22 AM  Dictation workstation:   [id number]  CT head wo IV contrast  Narrative:  Interpreted By:  Atul García,   STUDY:  CT HEAD WO IV CONTRAST;  8/2/2024 3:04 am      INDICATION:  Signs/Symptoms:fall.      COMPARISON:  Head CT 07/28/2024      ACCESSION NUMBER(S):  [id number]      ORDERING CLINICIAN:  DOUGLAS FERNANDEZ      TECHNIQUE:  Axial non-contrast CT images of the head. Coronal and sagittal images  were reconstructed.      FINDINGS:  BRAIN PARENCHYMA: Sub-cortical and periventricular hypoattenuating  foci, likely sequelae of chronic ischemic microangiopathy. Gray-white  matter differentiation is preserved.      VENTRICLES: The ventricles and sulci are within normal limits in size  for brain volume. No midline shift. EXTRA-AXIAL SPACES: No abnormal  extraaxial fluid collection. EXTRACRANIAL SOFT TISSUES: Within normal  limits. PARANASAL SINUSES: Layering fluid and frothy contents in the  right maxillary sinus with maxillary sinus hyperostosis, likely  subacute or chronic sinusitis, similar to prior. MASTOIDS: Mastoid  air cells are aerated. CALVARIUM: No depressed skull fracture. No  destructive osseous lesion. VESSELS: Calcification of the cavernous  carotids and vertebral arteries.      OTHER FINDINGS: Bilateral lens replacements.      Impression: No acute intracranial abnormality.      Additional chronic findings are stable as detailed above.      MACRO:  None      Signed by: Atul García 8/2/2024 3:14 AM  Dictation workstation:   [id number]

## 2024-08-02 NOTE — PROGRESS NOTES
Physical Therapy    Physical Therapy Evaluation    Patient Name: Timothy Varela  MRN: [id number]  Today's Date: 8/2/2024   Time Calculation  Start Time: 1105  Stop Time: 1129  Time Calculation (min): 24 min  923/923-B    Assessment/Plan   PT Assessment  PT Assessment Results: Decreased strength, Decreased endurance, Impaired balance, Decreased mobility, Pain, Impaired judgement, Decreased safety awareness  Rehab Prognosis: Good  Barriers to Discharge: Pain, requires MOD A  for transfers and minimal amb. High fall risk.  Evaluation/Treatment Tolerance: Patient limited by fatigue, Patient limited by pain  Medical Staff Made Aware: Yes  Strengths: Support of Caregivers, Living arrangement secure, Housing layout  Barriers to Participation: Comorbidities, Insight into problems  End of Session Communication: Bedside nurse  Assessment Comment: Pt. has had multiple falls and a recent L2 fracture. Pt. curently requires MOD A for safe transfers and amb. Pt. is a high fall risk. Strongly recommmend continued therapy at a moderate intensity level. Pt. is at a HIGH risk to fall again at home.  IP OR SWING BED PT PLAN  Inpatient or Swing Bed: Inpatient  PT Plan  Treatment/Interventions: Bed mobility, Transfer training, Gait training, Balance training, Strengthening, Endurance training, Therapeutic exercise  PT Plan: Ongoing PT  PT Frequency: 3 times per week  PT Discharge Recommendations: Moderate intensity level of continued care  Equipment Recommended upon Discharge:  (TBD)  PT Recommended Transfer Status: Assist x1, Assistive device  Physical Therapy eval completed per MD requisition. P.T. recommendations as outlined above. Recommend D/C from acute care when medically appropriate as deemed by medical staff.    Subjective       General Visit Information:  General  Reason for Referral: impaired mobility  Referred By: CAMILO Berman CNP (PT 8/2); Dr. Hayden (OT 8/2)  Past Medical History Relevant to Rehab: includes: COPD, HTN, CAD, CKD,  DM, CABG x 5, melanoma, myasthenia gravis, metastaoc lung CA with liver mets, s/p R upper lobectomy, was on chemo but not currently  Family/Caregiver Present: No  Co-Treatment: OT  Co-Treatment Reason: Pt. seen with OT to maximize safety and function  Prior to Session Communication: Bedside nurse  Patient Position Received: Bed, 3 rail up, Alarm on  Preferred Learning Style: auditory, verbal  General Comment: Pt. is a 80yo who [place] [facility] [place] following a fall with c/o LBP. Per ED note, Pt. has had multiple falls at home recently in which EMS has been called to get Pt. up.      [place]. [place] [facility] [place] P[place] for a fall and L-spine CT at that time was (+) Superior endplate compression deformity at L2 and mild compression  deformity concerning for acute to subacute fracture.     Imaging on 8/2: Head CT (-) acute findings  CXR (-) acute findings, (+) severe OA B glenohumeral joints with B superior subluxation of humeral heads  L-spine CT (+) No new fractures detected. No traumatic subluxations, (+) severe osteopenia, (+) unchanged subacute L2 superior endplate compression deformity with  similar height loss compared to 07/28/2024.     Hgb (8/2) 10.0    Na (8/2) 129    Ca(8/2) 13.1    Dx: LBP, hyponatremia, hypercalemia, amb dysfunction, weakness, falls.    Home Living:  Home Living  Home Living Comments: Pt. lives with spouse in a 2 level house with 2 SHEMAR with HR. Bed/bath on 1st floor with walkin shower with a seat and grab bars. Laundry on 1st floor. 2L O2 hs.    Prior Level of Function:  Prior Function Per Pt/Caregiver Report  Prior Function Comments: Pt. amb with RW PTA. Pt initially stated he was I with ADLs but then later stated his wife assisted with dressing and bathing due to significant B shoulder limitations. Spouse completed IADLs PTA. Pt. reported 3-4 falls in last 3 months. Pt. drove PTA.    Precautions:  Precautions  Medical Precautions: Fall precautions  Precautions Comment: Per EMR:  High fall risk      Objective     Pain:  Pain Assessment  Pain Assessment: 0-10  0-10 (Numeric) Pain Score: 4  Pain Type: Acute pain  Pain Location: Back  Pain Orientation: Right, Lower  Pain Interventions: Repositioned    Cognition:  Cognition  Overall Cognitive Status: Within Functional Limits  Orientation Level: Disoriented to situation  Safety/Judgement: Exceptions to WFL  Complex Functional Tasks: Moderate  Insight: Moderate  Flexibility of Thought: Reduced flexibility  Planning: Reduced planning skills  Organization: Moderately disorganized  Processing Speed: Delayed    General Assessments:  General Observation  General Observation: Tele, 4L O2   Activity Tolerance  Endurance: Decreased tolerance for upright activites                 Dynamic Sitting Balance  Dynamic Sitting-Comments: Fair+ static and dynamic sitting balance  Dynamic Standing Balance  Dynamic Standing-Comments: Poor static and dynamic standing balance    Functional Assessments:     Bed Mobility  Bed Mobility: Yes  Bed Mobility 1  Bed Mobility 1: Supine to sitting  Level of Assistance 1: Moderate assistance, +2, Moderate verbal cues  Bed Mobility Comments 1: A to maneuver LEs over EOB and to elevate trunk from bed via logroll  Transfers  Transfer: Yes  Transfer 1  Technique 1: Sit to stand  Transfer Device 1:  (FWW)  Transfer Level of Assistance 1: Moderate assistance, Maximum verbal cues  Trials/Comments 1: A for lifting, transferring weight over feet, steadying. VC's for hand placement and to stop scooting forward at the EOB. Pt. appeared restless and impulsive and repeatedly scooted foarward to the EOB getting too close to the edge.  Pt. was incontinent of a large amount of urie once standing and required total A to manage urinal while standing.  Transfers 2  Technique 2: Stand to sit  Transfer Device 2:  (FWW)  Transfer Level of Assistance 2: Minimum assistance, Minimal verbal cues  Trials/Comments 2: A to control descent. VC's for hand  placement.  Ambulation/Gait Training  Ambulation/Gait Training Performed: Yes  Ambulation/Gait Training 1  Surface 1: Level tile  Device 1: Rolling walker  Assistance 1: Moderate assistance, Moderate verbal cues  Quality of Gait 1: Narrow base of support (slow, step-to gait with shortened step lengths, posterior lean.)  Comments/Distance (ft) 1: 8'; A for balance, safety, maneuvering FWW. VC's for directions and safety.  Stairs  Stairs: No       Extremity/Trunk Assessments:  RUE   RUE :  (Trace sholuder elevation refer to OT eval for details)  LUE   LUE:  (Trace sholuder elevation refer to OT eval for details)  RLE   RLE :  (AROM WFL, strength 4-/5)  LLE   LLE :  (AROM WFL, strength 4-/5)    Outcome Measures:     [facility] Basic Mobility  Turning from your back to your side while in a flat bed without using bedrails: A lot  Moving from lying on your back to sitting on the side of a flat bed without using bedrails: A lot  Moving to and from bed to chair (including a wheelchair): A lot  Standing up from a chair using your arms (e.g. wheelchair or bedside chair): A lot  To walk in hospital room: Total  Climbing 3-5 steps with railing: Total  Basic Mobility - Total Score: 10                                        Goals:  Encounter Problems       Encounter Problems (Active)       PT Problem       Pt. will transfer supine/sit with MIN A x 1 (Progressing)       Start:  08/02/24    Expected End:  08/16/24            Pt. will transfer sit/stand with FWW with MIN A x 1 (Progressing)       Start:  08/02/24    Expected End:  08/16/24            Pt.will ambulate 30' with FWW with MIN A x 1 (Progressing)       Start:  08/02/24    Expected End:  08/16/24            Pt. will perform 2 x 15 B LE AROM exercises  (Not Progressing)       Start:  08/02/24    Expected End:  08/16/24            Pt. will amb up/down 2 steps with HR and MOD A x 1 (Not Progressing)       Start:  08/02/24    Expected End:  08/16/24                           Education Documentation  Mobility Training, taught by Mumtaz Alvarez, PT at 8/2/2024  1:10 PM.  Learner: Patient  Readiness: Acceptance  Method: Explanation  Response: Needs Reinforcement  Comment: Role of PT, transfers, amb, safety, PT POC, logrolling

## 2024-08-02 NOTE — ED PROVIDER NOTES
HPI   Chief Complaint   Patient presents with    Back Pain    Fall       Patient is a 79-year-old male with a history of metastatic lung cancer not currently undergoing chemotherapy who presents emergency department for complaints of weakness and a fall.  Patient has had multiple falls requiring multiple lift assist from EMS.  Patient was seen in the emergency department 4 days ago for a fall and was recommended admission however patient declined.  He was noted to have a lumbar compression fracture at that time.  Patient states that he had an additional fall where he slid out of his chair today which is what prompted him to return to the emergency department.  He expresses continued weakness.  Patient does undergo care via palliative care and has considered hospice but has not excepted their care yet.  No other complaints noted.      History provided by:  Patient, medical records and relative          Patient History   No past medical history on file.  No past surgical history on file.  No family history on file.  Social History     Tobacco Use    Smoking status: Not on file    Smokeless tobacco: Not on file   Substance Use Topics    Alcohol use: Not on file    Drug use: Not on file       Physical Exam   ED Triage Vitals [08/02/24 0212]   Temperature Heart Rate Respirations BP   36 °C (96.8 °F) (!) 105 16 122/71      Pulse Ox Temp Source Heart Rate Source Patient Position   94 % Temporal Monitor Lying      BP Location FiO2 (%)     Left arm 33 %       Physical Exam  Vitals and nursing note reviewed.   Constitutional:       General: He is not in acute distress.     Appearance: Normal appearance. He is ill-appearing. He is not toxic-appearing or diaphoretic.   HENT:      Head: Normocephalic and atraumatic.      Nose: Nose normal.      Mouth/Throat:      Mouth: Mucous membranes are dry.      Pharynx: No oropharyngeal exudate or posterior oropharyngeal erythema.   Eyes:      General: No scleral icterus.     Extraocular  Palliative care notified of consult. Movements: Extraocular movements intact.      Pupils: Pupils are equal, round, and reactive to light.   Cardiovascular:      Rate and Rhythm: Normal rate and regular rhythm.      Pulses: Normal pulses.      Heart sounds: Normal heart sounds. No murmur heard.     No friction rub. No gallop.   Pulmonary:      Effort: Pulmonary effort is normal. No respiratory distress.      Breath sounds: Normal breath sounds. No stridor. No wheezing, rhonchi or rales.   Chest:      Chest wall: No tenderness.   Abdominal:      General: Abdomen is flat. There is no distension.      Palpations: Abdomen is soft. There is no mass.      Tenderness: There is no abdominal tenderness. There is no guarding.      Hernia: No hernia is present.   Musculoskeletal:         General: Tenderness present. No swelling, deformity or signs of injury.      Cervical back: Normal range of motion and neck supple. No rigidity.   Skin:     General: Skin is warm and dry.      Capillary Refill: Capillary refill takes less than 2 seconds.      Coloration: Skin is not jaundiced or pale.      Findings: No bruising, erythema, lesion or rash.   Neurological:      General: No focal deficit present.      Mental Status: He is alert and oriented to person, place, and time. Mental status is at baseline.   Psychiatric:         Mood and Affect: Mood normal.         Behavior: Behavior normal.           ED Course & MDM   Diagnoses as of 08/02/24 0619   Midline low back pain without sciatica, unspecified chronicity   Hyponatremia   Hypercalcemia   Weakness   Frequent falls                       Estefania Coma Scale Score: 15                        Medical Decision Making  Patient is 79-year-old male presenting to the emergency department with complaints of weakness falls and low back pain.  Believe the patient's back pain is most likely related to his previously noted lumbar compression fracture however he had an additional fall and CT scan was ordered for further evaluation  along with cardiac workup given his history of CAD as well as weakness.  No ischemic changes noted to patient's EKG however he does have elevated tropes at 44 with repeat of 51.  Patient's Trope was elevated at 29 5 days ago.  Lactate level was normal.  Magnesium levels normal.  CBC without leukocytosis and baseline anemia and baseline thrombocytopenia.  Metabolic panel reviewed with hyponatremia and hypercalcemia present.  IV fluids were ordered.  Chest x-ray had no acute cardiopulmonary processes.  CT lumbar spine without any new findings when compared to previous.  Unchanged L2 compression fracture.  CT head negative for acute intracranial abnormalities.  Given the patient's continued falls, hypercalcemia, weakness, elevated tropes recommended admission.  Patient and family are in agreement with this.  Hospital service was contacted and the case discussed the patient was accepted for admission.    Amount and/or Complexity of Data Reviewed  Labs: ordered. Decision-making details documented in ED Course.  Radiology: ordered. Decision-making details documented in ED Course.  ECG/medicine tests: independent interpretation performed.     Details: 0234 hours: Sinus rhythm with a ventricular rate of 100 bpm.  QRS in 102 ms.  QTc 438 ms.  Left axis deviation noted.  No acute ischemic injury pattern appreciated.      Labs Reviewed   CBC WITH AUTO DIFFERENTIAL - Abnormal       Result Value    WBC 11.0      nRBC 0.0      RBC 3.12 (*)     Hemoglobin 10.0 (*)     Hematocrit 30.5 (*)     MCV 98      MCH 32.1      MCHC 32.8      RDW 16.7 (*)     Platelets 133 (*)     Neutrophils % 81.3      Immature Granulocytes %, Automated 0.4      Lymphocytes % 8.0      Monocytes % 8.2      Eosinophils % 1.8      Basophils % 0.3      Neutrophils Absolute 8.96 (*)     Immature Granulocytes Absolute, Automated 0.04      Lymphocytes Absolute 0.88      Monocytes Absolute 0.90 (*)     Eosinophils Absolute 0.20      Basophils Absolute 0.03      COMPREHENSIVE METABOLIC PANEL - Abnormal    Glucose 116 (*)     Sodium 129 (*)     Potassium 4.1      Chloride 96 (*)     Bicarbonate 27      Anion Gap 10      Urea Nitrogen 36 (*)     Creatinine 1.72 (*)     eGFR 40 (*)     Calcium 13.1 (*)     Albumin 3.0 (*)     Alkaline Phosphatase 168 (*)     Total Protein 6.5      AST 48 (*)     Bilirubin, Total 0.5      ALT 31     SERIAL TROPONIN-INITIAL - Abnormal    Troponin I, High Sensitivity 44 (*)     Narrative:     Less than 99th percentile of normal range cutoff-  Female and children under 18 years old <14 ng/L; Male <21 ng/L: Negative  Repeat testing should be performed if clinically indicated.     Female and children under 18 years old 14-50 ng/L; Male 21-50 ng/L:  Consistent with possible cardiac damage and possible increased clinical   risk. Serial measurements may help to assess extent of myocardial damage.     >50 ng/L: Consistent with cardiac damage, increased clinical risk and  myocardial infarction. Serial measurements may help assess extent of   myocardial damage.      NOTE: Children less than 1 year old may have higher baseline troponin   levels and results should be interpreted in conjunction with the overall   clinical context.     NOTE: Troponin I testing is performed using a different   testing methodology at [facility] than at other   [facility]. Direct result comparisons should only   be made within the same method.   SERIAL TROPONIN, 1 HOUR - Abnormal    Troponin I, High Sensitivity 51 (*)     Narrative:     Less than 99th percentile of normal range cutoff-  Female and children under 18 years old <14 ng/L; Male <21 ng/L: Negative  Repeat testing should be performed if clinically indicated.     Female and children under 18 years old 14-50 ng/L; Male 21-50 ng/L:  Consistent with possible cardiac damage and possible increased clinical   risk. Serial measurements may help to assess extent of myocardial damage.     >50 ng/L:  Consistent with cardiac damage, increased clinical risk and  myocardial infarction. Serial measurements may help assess extent of   myocardial damage.      NOTE: Children less than 1 year old may have higher baseline troponin   levels and results should be interpreted in conjunction with the overall   clinical context.     NOTE: Troponin I testing is performed using a different   testing methodology at [facility] than at other   [facility]. Direct result comparisons should only   be made within the same method.   MAGNESIUM - Normal    Magnesium 2.08     LACTATE - Normal    Lactate 1.2      Narrative:     Venipuncture immediately after or during the administration of Metamizole may lead to falsely low results. Testing should be performed immediately  prior to Metamizole dosing.   TROPONIN SERIES- (INITIAL, 1 HR)    Narrative:     The following orders were created for panel order Troponin I Series, High Sensitivity (0, 1 HR).  Procedure                               Abnormality         Status                     ---------                               -----------         ------                     Troponin I, High Sensiti...[[id number]]  Abnormal            Final result               Troponin, High Sensitivi...[[id number]]  Abnormal            Final result                 Please view results for these tests on the individual orders.   URINALYSIS WITH REFLEX CULTURE AND MICROSCOPIC    Narrative:     The following orders were created for panel order Urinalysis with Reflex Culture and Microscopic.  Procedure                               Abnormality         Status                     ---------                               -----------         ------                     Urinalysis with Reflex C...[[id number]]                                                 Extra Urine Gray Tube[[id number]]                                                         Please view results for these tests on the individual orders.    URINALYSIS WITH REFLEX CULTURE AND MICROSCOPIC   EXTRA URINE GRAY TUBE     XR chest 1 view   Final Result   No acute cardiopulmonary process.        MACRO:   None        Signed by: Atul García 8/2/2024 3:23 AM   Dictation workstation:   [id number]      CT lumbar spine wo IV contrast   Final Result   1. No new fractures detected. No traumatic subluxations. Evaluation   for subtle nondisplaced fractures is limited due to severe osteopenia.   2. Unchanged subacute L2 superior endplate compression deformity with   similar height loss compared to 07/28/2024.   3. Extensive degenerative changes throughout the spine as detailed on   CT 07/28/2020.             MACRO:   None        Signed by: Atul García 8/2/2024 3:22 AM   Dictation workstation:   [id number]      CT head wo IV contrast   Final Result   No acute intracranial abnormality.        Additional chronic findings are stable as detailed above.        MACRO:   None        Signed by: Atul García 8/2/2024 3:14 AM   Dictation workstation:   [id number]            Procedure  Procedures     Ian Jerome DO  08/02/24 0619

## 2024-08-02 NOTE — PROGRESS NOTES
08/02/24 1139   Discharge Planning   Living Arrangements Spouse/significant other   Support Systems Spouse/significant other   Assistance Needed needs assist w/ ADLS and IADLS. has 3-4 falls since april. wears O@ at HS. uses a rollator. drives -he reports. multiple falls. no medication barriers.   Type of Residence Private residence  (2 story bathroom 1st flr)   Number of Stairs to Enter Residence 2   Home or Post Acute Services Post acute facilities (Rehab/SNF/etc)   Type of Post Acute Facility Services Rehab   Expected Discharge Disposition IRF   Does the patient need discharge transport arranged? Yes  (O2)   RoundTrip coordination needed? Yes     Rounded w/ physical tx. Pt admittd tot he floor this shift. Met w/ pt and spouse/dtr/family. Discussed needs. Options given. After pt and family discussed, he agreed to placement at [facility].  [facility] accepted. Pt was given brochure and Liaison came later to meet w/ them. Pt has elevated calcium level. No pd today. Adod tomorrow. Discussed w/ Dr atwood.   )2 has been on continuously. Pt's outpt chemo has been placed on hold d/t weakness/spouse.

## 2024-08-02 NOTE — PROGRESS NOTES
Occupational Therapy    Evaluation    Patient Name: Timothy Varela  MRN: [id number]  Today's Date: 8/2/2024  Time Calculation  Start Time: 1106  Stop Time: 1130  Time Calculation (min): 24 min      Assessment:  OT Assessment: Decreased strength, balance, safety, and independence with transfers and ADLs. Pt. at high risk for falls. Would benefit from continued OT to address  Prognosis: Good  Barriers to Discharge: Inaccessible home environment  End of Session Communication: Bedside nurse  End of Session Patient Position: Up in chair, Alarm on  OT Assessment Results: Decreased ADL status, Decreased safe judgment during ADL, Decreased endurance, Decreased functional mobility  Prognosis: Good  Barriers to Discharge: Inaccessible home environment  Plan:  Treatment Interventions: ADL retraining, Functional transfer training, Endurance training  OT Frequency: 2 times per week  OT Discharge Recommendations: Moderate intensity level of continued care  OT - OK to Discharge: Yes (when medically stable/cleared)      Subjective   Current Problem:  1. Midline low back pain without sciatica, unspecified chronicity        2. Hyponatremia        3. Hypercalcemia        4. Weakness        5. Frequent falls          General:  General  Reason for Referral: ADL impairment  Referred By: CAMILO Berman CNP (PT 8/2); Dr. Hayden (OT 8/2)  Past Medical History Relevant to Rehab: includes: COPD, HTN, CAD, CKD, DM, CABG x 5, melanoma, myasthenia gravis, metastaoc lung CA with liver mets, s/p R upper lobectomy, was on chemo but not currently  Family/Caregiver Present: No  Co-Treatment: PT  Co-Treatment Reason: to maximize pt. safetty  Prior to Session Communication: Bedside nurse  Patient Position Received: Bed, 3 rail up  General Comment: Pt. is a 80yo who [place] [facility] [place] following a fall with c/o LBP. Per ED note, Pt. has had multipel falls at home recently in which EMS has been called to get Pt. up. Pt. presented [place] [facility] 4 days PTA  for a fall and L-spine CT at that time was (+) Superior endplate compression deformity at L2 and mild compression  deformity concerning for acute to subacute fracture.  Imaging on 8/2: Head CT (-) acute findings  CXR (-) acute findings, (+) severe OA B glenohumeral joints with B superior subluxation of humeral heads  L-spine CT (+) No new fractures detected. No traumatic subluxations, (+) severe osteopenia, (+) unchanged subacute L2 superior endplate compression deformity with  similar height loss compared to 07/28/2024.   Hgb (8/2) 10.0  Na (8/2) 129  Ca(8/2) 13.1  Dx: LBP, hyponatremia, hypercalemia, amb dysfunction, weakness, falls.  Precautions:  Medical Precautions: Fall precautions  Vital Signs:  Heart Rate: (!) 121 (sitting EOB)  Pain:  Pain Assessment  Pain Assessment: 0-10  0-10 (Numeric) Pain Score: 4  Pain Type: Acute pain  Pain Location: Back  Pain Orientation: Right, Lower    Objective   Cognition:  Overall Cognitive Status: Within Functional Limits (oriented, however noted confusion during conversation and command following throughout evaluation.)  Safety/Judgement: Exceptions to WFL  Complex Functional Tasks: Moderate  Insight: Moderate  Flexibility of Thought: Reduced flexibility  Planning: Reduced planning skills    Home Living:  Home Living Comments: Pt. lives with spouse in a 2 level house with 2 SHEMAR with HR. Bed/bath on 1st floor with walkin shower with a seat and grab bars. Laundry on 1st floor. 2L O2 hs.  Prior Function:  Prior Function Comments: Pt. amb with RW PTA. Pt initially stated he was I with ADLs but then later stated his wife assisted with dressing and bathing due to significant B shoulder limitations. Spouse completed IADLs PTA. Pt. reported 3-4 falls in last 3 months. Pt. drove PTA.    ADL:  Eating Assistance: Independent  Grooming Assistance: Minimal  Bathing Assistance: Moderate  UE Dressing Assistance: Minimal  LE Dressing Assistance: Moderate  Toileting Assistance with Device:  Moderate  Activity Tolerance:  Endurance: Decreased tolerance for upright activites  Bed Mobility/Transfers: Bed Mobility  Bed Mobility: Yes  Bed Mobility 1  Bed Mobility 1: Supine to sitting  Level of Assistance 1: Moderate assistance  Bed Mobility Comments 1: Assist to log roll to L side for sup to sit. Assist to bring LEs off edge of bed and lift trunk to upright sit    Transfers  Transfer: Yes  Transfer 1  Technique 1: Sit to stand  Transfer Device 1: Walker  Transfer Level of Assistance 1: Moderate assistance  Trials/Comments 1: Assist to lift, steady, balance with WW. Verbal cues to push from bed surface. pt. demonstrates reaching for and placing hands on different areas of walker  Transfers 2  Technique 2: Stand pivot  Transfer Device 2: Walker  Transfer Level of Assistance 2: Moderate assistance  Trials/Comments 2: Assist to steady/balance bed>chair. chair placed closeby for safety    Standing Balance:  Dynamic Standing Balance  Dynamic Standing-Comments: poor +     Strength:  Strength Comments: 3+/5 elbow flexion/extension, gross grasp. 2+/5 MMT B shoulders    Extremities: RUE   RUE :  (extremely limited shoulder flexion ; pt. reports is chronic. WNL elbow/wrist/hand) and LUE   LUE:  (extremely limited shoulder flexion ; pt. reports is chronic. WNL elbow/wrist/hand)      Outcome Measures:[facility] Daily Activity  Putting on and taking off regular lower body clothing: A lot  Bathing (including washing, rinsing, drying): A lot  Putting on and taking off regular upper body clothing: A little  Toileting, which includes using toilet, bedpan or urinal: A lot  Taking care of personal grooming such as brushing teeth: A little  Eating Meals: None  Daily Activity - Total Score: 16      Education Documentation  ADL Training, taught by Valencia Fritz OT at 8/2/2024  1:24 PM.  Learner: Patient  Readiness: Acceptance  Method: Explanation  Response: Verbalizes Understanding, Needs Reinforcement    IP  EDUCATION:  Education  Individual(s) Educated: Patient  Education Provided: Fall precautons, Risk and benefits of OT discussed with patient or other, POC discussed and agreed upon    Goals:  Encounter Problems       Encounter Problems (Active)       OT Goals       CGA for all functional transfers  (Progressing)       Start:  08/02/24    Expected End:  08/16/24            CGA for LB dressing  (Progressing)       Start:  08/02/24    Expected End:  08/16/24            CGA for toileting tasks and clothing mgmt  (Progressing)       Start:  08/02/24    Expected End:  08/16/24            Fair + dyn standing balance during ADLs and functional activities  (Progressing)       Start:  08/02/24    Expected End:  08/16/24

## 2024-08-02 NOTE — H&P
History Of Present Illness  Timothy Varela is a 79 y.o. male with a past medical history of HTN, DMII, CAD s/p prior CABG x5, COPD, CKD, melanoma, lung cancer w/ liver metastasis s/p right upper lobectomy (1/2023), myasthenia gravis who presented to the ED after a fall. The patient is currently lethargic after receiving dilaudid and reported to be typically disoriented after waking per daughter present at bedside. They report several falls recently, with increasing weakness. The patient had stopped chemotherapy for cancer due to being unable to tolerate chemo, and is currently being reassessed for restarting. Family reports the patient has seemed to have greatly increased thirst recently and has been drinking a large amount of water. They are not aware of any complaints of fever, chills, chest pain, shortness of breath, nausea, or vomiting.      Past Medical History  No past medical history on file.    Surgical History  No past surgical history on file.     Social History  He has no history on file for tobacco use, alcohol use, and drug use.    Family History  No family history on file.     Allergies  Canagliflozin and Metformin    Review of Systems   Constitutional:  Negative for chills and fever.   Respiratory:  Negative for shortness of breath.    Cardiovascular:  Negative for chest pain and palpitations.   Gastrointestinal:  Negative for abdominal pain, constipation, diarrhea, nausea and vomiting.   Genitourinary:  Negative for dysuria, flank pain, frequency, hematuria and urgency.   Neurological:  Positive for weakness.   All other systems reviewed and are negative.       Physical Exam  Vitals reviewed.   HENT:      Head: Normocephalic and atraumatic.   Cardiovascular:      Rate and Rhythm: Normal rate and regular rhythm.      Heart sounds: Normal heart sounds.   Pulmonary:      Effort: Pulmonary effort is normal.      Breath sounds: Normal air entry.   Abdominal:      General: Bowel sounds are normal.       "Palpations: Abdomen is soft.      Tenderness: There is no abdominal tenderness.   Musculoskeletal:         General: Signs of injury (skin tear right elbow) present. No deformity.   Skin:     General: Skin is warm and dry.   Neurological:      General: No focal deficit present.      Mental Status: He is lethargic and disoriented.      Motor: Weakness present.   Psychiatric:         Mood and Affect: Mood normal.         Behavior: Behavior normal.          Last Recorded Vitals  Blood pressure 126/73, pulse 99, temperature 36 °C (96.8 °F), temperature source Temporal, resp. rate 17, height 1.803 m (5' 11\"), weight 86.2 kg (190 lb), SpO2 96%.    Relevant Results      Lab Results   Component Value Date    WBC 11.0 08/02/2024    HGB 10.0 (L) 08/02/2024    HCT 30.5 (L) 08/02/2024    MCV 98 08/02/2024     (L) 08/02/2024     Lab Results   Component Value Date    GLUCOSE 116 (H) 08/02/2024    CALCIUM 13.1 (HH) 08/02/2024     (L) 08/02/2024    K 4.1 08/02/2024    CO2 27 08/02/2024    CL 96 (L) 08/02/2024    BUN 36 (H) 08/02/2024    CREATININE 1.72 (H) 08/02/2024     XR chest 1 view  Narrative: Interpreted By:  Atul García,   STUDY:  XR CHEST 1 VIEW;  8/2/2024 3:13 am      INDICATION:  Signs/Symptoms:Chest Pain.      COMPARISON:  Chest CT 07/20/2024      ACCESSION NUMBER(S):  [id number]      ORDERING CLINICIAN:  DOUGLAS FERNANDEZ      FINDINGS:          CARDIOMEDIASTINAL SILHOUETTE:  Cardiomediastinal silhouette is stable in size and configuration.      LUNGS:  Low lung volumes. No pulmonary consolidations. No pneumothorax or  pleural effusions.      ABDOMEN:  No remarkable upper abdominal findings.      BONES:  No acute osseous abnormality. Severe glenohumeral osteoarthritis with  superior subluxation of the bilateral humeral heads. Cerclage wires  are intact.      Impression: No acute cardiopulmonary process.      MACRO:  None      Signed by: Atul García 8/2/2024 3:23 AM  Dictation workstation:   " [id number]  CT lumbar spine wo IV contrast  Narrative: Interpreted By:  Atul García,   STUDY:  CT LUMBAR SPINE WO IV CONTRAST;  8/2/2024 3:04 am      INDICATION:  Signs/Symptoms:fall, back pain.      COMPARISON:  CT lumbar spine 07/28/2024      ACCESSION NUMBER(S):  [id number]      ORDERING CLINICIAN:  DOUGLAS FERNANDEZ      TECHNIQUE:  Axial noncontrast images of the lumbar spine with coronal and  sagittal reconstructed images.      FINDINGS:  ALIGNMENT: No traumatic subluxation.  VERTEBRAE: The bones are diffusely demineralized limiting evaluation  for subtle nondisplaced fractures. No new fractures identified.  Similar cortical buckling at the sacrococcygeal junction. Similar  subacute compression fracture of the L2 vertebral body superior  endplate (206/47) with minimal height loss, unchanged compared to  07/28/2024. SPINAL CANAL: Extensive multilevel degenerative changes  resulting in multifocal areas of central canal and neural foraminal  narrowing as detailed on CT 07/28/2024. PREVERTEBRAL SOFT TISSUES: No  prevertebral soft tissue swelling.      OTHER FINDINGS: Nodular hyperplasia of the adrenal glands. Bilateral  nonobstructive renal calculi. Severe aortic atherosclerotic  calcification. No new aneurysmal dilation. Dystrophic calcifications  in the prostate. Extensive sigmoid diverticulosis.      Impression: 1. No new fractures detected. No traumatic subluxations. Evaluation  for subtle nondisplaced fractures is limited due to severe osteopenia.  2. Unchanged subacute L2 superior endplate compression deformity with  similar height loss compared to 07/28/2024.  3. Extensive degenerative changes throughout the spine as detailed on  CT 07/28/2020.          MACRO:  None      Signed by: Atul García 8/2/2024 3:22 AM  Dictation workstation:   [id number]  CT head wo IV contrast  Narrative: Interpreted By:  Atul García,   STUDY:  CT HEAD WO IV CONTRAST;  8/2/2024 3:04 am       INDICATION:  Signs/Symptoms:fall.      COMPARISON:  Head CT 07/28/2024      ACCESSION NUMBER(S):  [id number]      ORDERING CLINICIAN:  DOUGLAS FERNANDEZ      TECHNIQUE:  Axial non-contrast CT images of the head. Coronal and sagittal images  were reconstructed.      FINDINGS:  BRAIN PARENCHYMA: Sub-cortical and periventricular hypoattenuating  foci, likely sequelae of chronic ischemic microangiopathy. Gray-white  matter differentiation is preserved.      VENTRICLES: The ventricles and sulci are within normal limits in size  for brain volume. No midline shift. EXTRA-AXIAL SPACES: No abnormal  extraaxial fluid collection. EXTRACRANIAL SOFT TISSUES: Within normal  limits. PARANASAL SINUSES: Layering fluid and frothy contents in the  right maxillary sinus with maxillary sinus hyperostosis, likely  subacute or chronic sinusitis, similar to prior. MASTOIDS: Mastoid  air cells are aerated. CALVARIUM: No depressed skull fracture. No  destructive osseous lesion. VESSELS: Calcification of the cavernous  carotids and vertebral arteries.      OTHER FINDINGS: Bilateral lens replacements.      Impression: No acute intracranial abnormality.      Additional chronic findings are stable as detailed above.      MACRO:  None      Signed by: Atul García 8/2/2024 3:14 AM  Dictation workstation:   [id number]    ED Medication Administration from 08/02/2024 0208 to 08/02/2024 0418         Date/Time Order Dose Route Action Action by     08/02/2024 0239 EDT HYDROmorphone (Dilaudid) injection 0.5 mg 0.5 mg intravenous Given TIGIST Marley     08/02/2024 0239 EDT sodium chloride 0.9 % bolus 1,000 mL 1,000 mL intravenous New Bag TIGIST Marley               Assessment/Plan   Principal Problem:    Ambulatory dysfunction      #Weakness  #Frequent falls  -No acute injury on imaging  -PT, Falls precautions  -Wife to arrive later today to discuss hospice versus C  -Could also be 2/2 hypercalcemia and hyponatremia    #Hypercalcemia  #Hyponatremia  #KIRILL  on CKD  -Suspect HyperCa ISO malignancy  -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

## 2024-08-03 LAB
ANION GAP SERPL CALC-SCNC: 9 MMOL/L (ref 10–20)
BASOPHILS # BLD AUTO: 0.02 X10*3/UL (ref 0–0.1)
BASOPHILS NFR BLD AUTO: 0.2 %
BUN SERPL-MCNC: 22 MG/DL (ref 6–23)
CALCIUM SERPL-MCNC: 12 MG/DL (ref 8.6–10.3)
CHLORIDE SERPL-SCNC: 102 MMOL/L (ref 98–107)
CO2 SERPL-SCNC: 28 MMOL/L (ref 21–32)
CREAT SERPL-MCNC: 1.25 MG/DL (ref 0.5–1.3)
EGFRCR SERPLBLD CKD-EPI 2021: 59 ML/MIN/1.73M*2
EOSINOPHIL # BLD AUTO: 0.1 X10*3/UL (ref 0–0.4)
EOSINOPHIL NFR BLD AUTO: 0.9 %
ERYTHROCYTE [DISTWIDTH] IN BLOOD BY AUTOMATED COUNT: 16.7 % (ref 11.5–14.5)
GLUCOSE BLD MANUAL STRIP-MCNC: 164 MG/DL (ref 74–99)
GLUCOSE BLD MANUAL STRIP-MCNC: 170 MG/DL (ref 74–99)
GLUCOSE BLD MANUAL STRIP-MCNC: 275 MG/DL (ref 74–99)
GLUCOSE BLD MANUAL STRIP-MCNC: 282 MG/DL (ref 74–99)
GLUCOSE BLD MANUAL STRIP-MCNC: 308 MG/DL (ref 74–99)
GLUCOSE SERPL-MCNC: 155 MG/DL (ref 74–99)
HCT VFR BLD AUTO: 33.1 % (ref 41–52)
HGB BLD-MCNC: 10.5 G/DL (ref 13.5–17.5)
HOLD SPECIMEN: NORMAL
IMM GRANULOCYTES # BLD AUTO: 0.06 X10*3/UL (ref 0–0.5)
IMM GRANULOCYTES NFR BLD AUTO: 0.5 % (ref 0–0.9)
LYMPHOCYTES # BLD AUTO: 0.53 X10*3/UL (ref 0.8–3)
LYMPHOCYTES NFR BLD AUTO: 4.6 %
MAGNESIUM SERPL-MCNC: 1.67 MG/DL (ref 1.6–2.4)
MCH RBC QN AUTO: 31.4 PG (ref 26–34)
MCHC RBC AUTO-ENTMCNC: 31.7 G/DL (ref 32–36)
MCV RBC AUTO: 99 FL (ref 80–100)
MONOCYTES # BLD AUTO: 0.76 X10*3/UL (ref 0.05–0.8)
MONOCYTES NFR BLD AUTO: 6.7 %
NEUTROPHILS # BLD AUTO: 9.94 X10*3/UL (ref 1.6–5.5)
NEUTROPHILS NFR BLD AUTO: 87.1 %
NRBC BLD-RTO: 0 /100 WBCS (ref 0–0)
PLATELET # BLD AUTO: 142 X10*3/UL (ref 150–450)
POTASSIUM SERPL-SCNC: 3.8 MMOL/L (ref 3.5–5.3)
RBC # BLD AUTO: 3.34 X10*6/UL (ref 4.5–5.9)
SODIUM SERPL-SCNC: 135 MMOL/L (ref 136–145)
WBC # BLD AUTO: 11.4 X10*3/UL (ref 4.4–11.3)

## 2024-08-03 PROCEDURE — 80048 BASIC METABOLIC PNL TOTAL CA: CPT | Performed by: STUDENT IN AN ORGANIZED HEALTH CARE EDUCATION/TRAINING PROGRAM

## 2024-08-03 PROCEDURE — 83735 ASSAY OF MAGNESIUM: CPT | Performed by: STUDENT IN AN ORGANIZED HEALTH CARE EDUCATION/TRAINING PROGRAM

## 2024-08-03 PROCEDURE — 2500000002 HC RX 250 W HCPCS SELF ADMINISTERED DRUGS (ALT 637 FOR MEDICARE OP, ALT 636 FOR OP/ED): Performed by: STUDENT IN AN ORGANIZED HEALTH CARE EDUCATION/TRAINING PROGRAM

## 2024-08-03 PROCEDURE — 2500000004 HC RX 250 GENERAL PHARMACY W/ HCPCS (ALT 636 FOR OP/ED): Performed by: STUDENT IN AN ORGANIZED HEALTH CARE EDUCATION/TRAINING PROGRAM

## 2024-08-03 PROCEDURE — 2500000001 HC RX 250 WO HCPCS SELF ADMINISTERED DRUGS (ALT 637 FOR MEDICARE OP): Performed by: STUDENT IN AN ORGANIZED HEALTH CARE EDUCATION/TRAINING PROGRAM

## 2024-08-03 PROCEDURE — 85025 COMPLETE CBC W/AUTO DIFF WBC: CPT | Performed by: STUDENT IN AN ORGANIZED HEALTH CARE EDUCATION/TRAINING PROGRAM

## 2024-08-03 PROCEDURE — 82947 ASSAY GLUCOSE BLOOD QUANT: CPT

## 2024-08-03 PROCEDURE — 99232 SBSQ HOSP IP/OBS MODERATE 35: CPT | Performed by: STUDENT IN AN ORGANIZED HEALTH CARE EDUCATION/TRAINING PROGRAM

## 2024-08-03 PROCEDURE — 1210000001 HC SEMI-PRIVATE ROOM DAILY

## 2024-08-03 PROCEDURE — 36415 COLL VENOUS BLD VENIPUNCTURE: CPT | Performed by: STUDENT IN AN ORGANIZED HEALTH CARE EDUCATION/TRAINING PROGRAM

## 2024-08-03 RX ORDER — CALCITONIN SALMON 200 [USP'U]/ML
4 INJECTION, SOLUTION INTRAMUSCULAR; SUBCUTANEOUS ONCE
Status: COMPLETED | OUTPATIENT
Start: 2024-08-03 | End: 2024-08-03

## 2024-08-03 RX ORDER — MEGESTROL ACETATE 40 MG/ML
40 SUSPENSION ORAL DAILY
Status: DISCONTINUED | OUTPATIENT
Start: 2024-08-03 | End: 2024-08-03

## 2024-08-03 RX ADMIN — APIXABAN 5 MG: 5 TABLET, FILM COATED ORAL at 08:38

## 2024-08-03 RX ADMIN — INSULIN LISPRO 8 UNITS: 100 INJECTION, SOLUTION INTRAVENOUS; SUBCUTANEOUS at 16:23

## 2024-08-03 RX ADMIN — ROSUVASTATIN CALCIUM 20 MG: 20 TABLET, FILM COATED ORAL at 20:30

## 2024-08-03 RX ADMIN — SODIUM CHLORIDE 125 ML/HR: 9 INJECTION, SOLUTION INTRAVENOUS at 00:53

## 2024-08-03 RX ADMIN — APIXABAN 5 MG: 5 TABLET, FILM COATED ORAL at 20:31

## 2024-08-03 RX ADMIN — SODIUM CHLORIDE 125 ML/HR: 9 INJECTION, SOLUTION INTRAVENOUS at 20:35

## 2024-08-03 RX ADMIN — INSULIN LISPRO 2 UNITS: 100 INJECTION, SOLUTION INTRAVENOUS; SUBCUTANEOUS at 10:59

## 2024-08-03 RX ADMIN — PREDNISONE 10 MG: 10 TABLET ORAL at 08:38

## 2024-08-03 RX ADMIN — BUSPIRONE HYDROCHLORIDE 10 MG: 5 TABLET ORAL at 08:38

## 2024-08-03 RX ADMIN — INSULIN LISPRO 6 UNITS: 100 INJECTION, SOLUTION INTRAVENOUS; SUBCUTANEOUS at 20:31

## 2024-08-03 RX ADMIN — TRAZODONE HYDROCHLORIDE 100 MG: 50 TABLET ORAL at 20:31

## 2024-08-03 RX ADMIN — AZATHIOPRINE 50 MG: 50 TABLET ORAL at 08:38

## 2024-08-03 RX ADMIN — TAMSULOSIN HYDROCHLORIDE 0.4 MG: 0.4 CAPSULE ORAL at 10:55

## 2024-08-03 RX ADMIN — BUSPIRONE HYDROCHLORIDE 10 MG: 5 TABLET ORAL at 20:30

## 2024-08-03 RX ADMIN — METOPROLOL SUCCINATE 50 MG: 50 TABLET, EXTENDED RELEASE ORAL at 08:38

## 2024-08-03 RX ADMIN — CALCITONIN SALMON 320 UNITS: 200 INJECTION, SOLUTION INTRAMUSCULAR; SUBCUTANEOUS at 15:02

## 2024-08-03 RX ADMIN — SODIUM CHLORIDE 125 ML/HR: 9 INJECTION, SOLUTION INTRAVENOUS at 10:56

## 2024-08-03 RX ADMIN — INSULIN LISPRO 2 UNITS: 100 INJECTION, SOLUTION INTRAVENOUS; SUBCUTANEOUS at 06:39

## 2024-08-03 RX ADMIN — AZATHIOPRINE 50 MG: 50 TABLET ORAL at 20:33

## 2024-08-03 ASSESSMENT — COGNITIVE AND FUNCTIONAL STATUS - GENERAL
PERSONAL GROOMING: A LITTLE
MOVING TO AND FROM BED TO CHAIR: A LOT
DRESSING REGULAR UPPER BODY CLOTHING: A LITTLE
DAILY ACTIVITIY SCORE: 16
WALKING IN HOSPITAL ROOM: TOTAL
TOILETING: A LOT
STANDING UP FROM CHAIR USING ARMS: A LOT
DRESSING REGULAR LOWER BODY CLOTHING: A LOT
HELP NEEDED FOR BATHING: A LOT
MOBILITY SCORE: 10
CLIMB 3 TO 5 STEPS WITH RAILING: TOTAL
MOVING FROM LYING ON BACK TO SITTING ON SIDE OF FLAT BED WITH BEDRAILS: A LOT
TURNING FROM BACK TO SIDE WHILE IN FLAT BAD: A LOT

## 2024-08-03 ASSESSMENT — PAIN SCALES - GENERAL: PAINLEVEL_OUTOF10: 2

## 2024-08-03 NOTE — CARE PLAN
The patient's goals for the shift include      The clinical goals for the shift include Improvement in mobility while maintaining safety.

## 2024-08-03 NOTE — PROGRESS NOTES
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  - Calcium 12 today  - Aggressive hydration with NS, IV calcitonin x 1 dose, IV zoledronic acid x 1  - Continue daily calcium checks    Acute hyponatremia - resolved    Myasthenia gravis, not in flare  - Continue home prednisone and azathioprine daily  - Outpatient follow-up with patient's primary neurologist at [facility]    BPH  Hyperlipidemia  Essential hypertension  CAD with previous CABG  - Continue home medications as reconciled    Metastatic lung cancer with metastases to the liver  - Outpatient follow-up with patient primary oncologist    CKD stage III    VTE prophylaxis: Home apixaban    Disposition/Daily update: Calcium improved after zoledronic acid and calcitonin with IV fluids yesterday to 12.0 this morning.  Watch another 24 hours with continued improvement of the calcium and sodium.  Plan for discharge to acute rehab tomorrow.      ---Of note, this documentation is completed using the Dragon Dictation system (voice recognition software). There may be spelling and/or grammatical errors that were not corrected prior to final submission.---    Marco Hayden MD    SUBJECTIVE     Patient was seen and examined bedside this morning.  States his back pain is stable from yesterday.  Had no other complaints for me.  Is in agreement for rehab placement after our discussion as well.    OBJECTIVE:     Last Recorded Vitals:  Vitals:    08/02/24 1517 08/1945 08/03/24 0025 08/03/24 0710   BP: 116/56 136/67 150/79 165/79   BP Location:   Left arm    Patient Position:  Sitting Sitting    Pulse: 90 88 85 85   Resp:  26     Temp: 36.4 °C (97.5 °F) 36.3 °C (97.3 °F) 35.8 °C (96.4 °F) 35.7 °C (96.3 °F)   TempSrc:   Temporal    SpO2: 97% 92% 93% 93%   Weight:       Height:         Last I/O:  I/O last 3 completed  shifts:  In: 2613.6 (32.9 mL/kg) [I.V.:614.6 (7.7 mL/kg); IV Piggyback:1999]  Out: 3150 (39.6 mL/kg) [Urine:3150 (1.1 mL/kg/hr)]  Weight: 79.5 kg     Physical Exam  Vitals reviewed.   Constitutional:       General: He is not in acute distress.     Appearance: Normal appearance.      Comments: Chronically frail in appearance   HENT:      Head: Normocephalic and atraumatic.   Eyes:      Extraocular Movements: Extraocular movements intact.      Conjunctiva/sclera: Conjunctivae normal.   Cardiovascular:      Rate and Rhythm: Normal rate and regular rhythm.      Pulses: Normal pulses.      Heart sounds: Normal heart sounds.   Pulmonary:      Effort: Pulmonary effort is normal.      Breath sounds: Normal breath sounds.   Abdominal:      General: Bowel sounds are normal. There is no distension.      Palpations: Abdomen is soft.      Tenderness: There is no abdominal tenderness. There is no guarding.   Musculoskeletal:         General: No swelling or tenderness. Normal range of motion.      Cervical back: Normal range of motion and neck supple.   Neurological:      General: No focal deficit present.      Mental Status: He is alert and oriented to person, place, and time. Mental status is at baseline.   Psychiatric:         Mood and Affect: Mood normal.         Behavior: Behavior normal.     Inpatient Medications:  apixaban, 5 mg, oral, BID  azaTHIOprine, 50 mg, oral, BID  busPIRone, 10 mg, oral, BID  calcitonin, 4 Units/kg, intramuscular, BID  tiotropium, 2 puff, inhalation, Daily   And  fluticasone furoate-vilanteroL, 1 puff, inhalation, Daily  insulin lispro, 0-10 Units, subcutaneous, Before meals & nightly  metoprolol succinate XL, 50 mg, oral, Daily  predniSONE, 10 mg, oral, Daily  rosuvastatin, 20 mg, oral, Nightly  tamsulosin, 0.4 mg, oral, Daily with lunch  traZODone, 100 mg, oral, Nightly    PRN Medications  PRN medications: dextrose, dextrose, diphenoxylate-atropine, glucagon, glucagon, HYDROmorphone, melatonin,  oxyCODONE, polyethylene glycol  Continuous Medications:  sodium chloride 0.9%, 125 mL/hr, Last Rate: 125 mL/hr (08/03/24 1056)      LABS AND IMAGING:     Labs:  Results from last 7 days   Lab Units 08/03/24  0519 08/02/24 1138 08/02/24 0224   WBC AUTO x10*3/uL 11.4* 9.6 11.0   RBC AUTO x10*6/uL 3.34* 3.46* 3.12*   HEMOGLOBIN g/dL 10.5* 11.0* 10.0*   HEMATOCRIT % 33.1* 33.9* 30.5*   MCV fL 99 98 98   MCH pg 31.4 31.8 32.1   MCHC g/dL 31.7* 32.4 32.8   RDW % 16.7* 16.5* 16.7*   PLATELETS AUTO x10*3/uL 142* 133* 133*     Results from last 7 days   Lab Units 08/03/24  0519 08/02/24  1137 08/02/24 0224 07/28/24  0833   SODIUM mmol/L 135* 133* 129* 132*   POTASSIUM mmol/L 3.8 3.9 4.1 4.0   CHLORIDE mmol/L 102 97* 96* 96*   CO2 mmol/L 28 29 27 32   BUN mg/dL 22 30* 36* 25*   CREATININE mg/dL 1.25 1.52* 1.72* 1.37*   GLUCOSE mg/dL 155* 89 116* 66*   PROTEIN TOTAL g/dL  --   --  6.5 6.6   CALCIUM mg/dL 12.0* 13.2* 13.1* 12.4*   BILIRUBIN TOTAL mg/dL  --   --  0.5 0.4   ALK PHOS U/L  --   --  168* 173*   AST U/L  --   --  48* 55*   ALT U/L  --   --  31 49     Results from last 7 days   Lab Units 08/03/24  0519 08/02/24 0224 07/28/24  0833   MAGNESIUM mg/dL 1.67 2.08 1.89     Results from last 7 days   Lab Units 08/02/24  0317 08/02/24 0224 07/28/24  1040   TROPHS ng/L 51* 44* 29*     Imaging:  ECG 12 lead  Normal sinus rhythm  Left axis deviation  Nonspecific ST abnormality  Abnormal ECG  When compared with ECG of 28-JUL-2024 10:56,  ST no longer depressed in Lateral leads  See ED provider note for full interpretation and clinical correlation  Confirmed by Halie Dao ([id number]) on 8/2/2024 10:53:13 AM  XR chest 1 view  Narrative: Interpreted By:  Atul García,   STUDY:  XR CHEST 1 VIEW;  8/2/2024 3:13 am      INDICATION:  Signs/Symptoms:Chest Pain.      COMPARISON:  Chest CT 07/20/2024      ACCESSION NUMBER(S):  [id number]      ORDERING CLINICIAN:  DOUGLAS FERNANDEZ      FINDINGS:          CARDIOMEDIASTINAL  SILHOUETTE:  Cardiomediastinal silhouette is stable in size and configuration.      LUNGS:  Low lung volumes. No pulmonary consolidations. No pneumothorax or  pleural effusions.      ABDOMEN:  No remarkable upper abdominal findings.      BONES:  No acute osseous abnormality. Severe glenohumeral osteoarthritis with  superior subluxation of the bilateral humeral heads. Cerclage wires  are intact.      Impression: No acute cardiopulmonary process.      MACRO:  None      Signed by: Atul García 8/2/2024 3:23 AM  Dictation workstation:   [id number]  CT lumbar spine wo IV contrast  Narrative: Interpreted By:  Atul García,   STUDY:  CT LUMBAR SPINE WO IV CONTRAST;  8/2/2024 3:04 am      INDICATION:  Signs/Symptoms:fall, back pain.      COMPARISON:  CT lumbar spine 07/28/2024      ACCESSION NUMBER(S):  [id number]      ORDERING CLINICIAN:  DOUGLAS FERNANDEZ      TECHNIQUE:  Axial noncontrast images of the lumbar spine with coronal and  sagittal reconstructed images.      FINDINGS:  ALIGNMENT: No traumatic subluxation.  VERTEBRAE: The bones are diffusely demineralized limiting evaluation  for subtle nondisplaced fractures. No new fractures identified.  Similar cortical buckling at the sacrococcygeal junction. Similar  subacute compression fracture of the L2 vertebral body superior  endplate (206/47) with minimal height loss, unchanged compared to  07/28/2024. SPINAL CANAL: Extensive multilevel degenerative changes  resulting in multifocal areas of central canal and neural foraminal  narrowing as detailed on CT 07/28/2024. PREVERTEBRAL SOFT TISSUES: No  prevertebral soft tissue swelling.      OTHER FINDINGS: Nodular hyperplasia of the adrenal glands. Bilateral  nonobstructive renal calculi. Severe aortic atherosclerotic  calcification. No new aneurysmal dilation. Dystrophic calcifications  in the prostate. Extensive sigmoid diverticulosis.      Impression: 1. No new fractures detected. No traumatic subluxations.  Evaluation  for subtle nondisplaced fractures is limited due to severe osteopenia.  2. Unchanged subacute L2 superior endplate compression deformity with  similar height loss compared to 07/28/2024.  3. Extensive degenerative changes throughout the spine as detailed on  CT 07/28/2020.          MACRO:  None      Signed by: Atul García 8/2/2024 3:22 AM  Dictation workstation:   [id number]  CT head wo IV contrast  Narrative: Interpreted By:  Atul García,   STUDY:  CT HEAD WO IV CONTRAST;  8/2/2024 3:04 am      INDICATION:  Signs/Symptoms:fall.      COMPARISON:  Head CT 07/28/2024      ACCESSION NUMBER(S):  [id number]      ORDERING CLINICIAN:  DOUGLAS FERNANDEZ      TECHNIQUE:  Axial non-contrast CT images of the head. Coronal and sagittal images  were reconstructed.      FINDINGS:  BRAIN PARENCHYMA: Sub-cortical and periventricular hypoattenuating  foci, likely sequelae of chronic ischemic microangiopathy. Gray-white  matter differentiation is preserved.      VENTRICLES: The ventricles and sulci are within normal limits in size  for brain volume. No midline shift. EXTRA-AXIAL SPACES: No abnormal  extraaxial fluid collection. EXTRACRANIAL SOFT TISSUES: Within normal  limits. PARANASAL SINUSES: Layering fluid and frothy contents in the  right maxillary sinus with maxillary sinus hyperostosis, likely  subacute or chronic sinusitis, similar to prior. MASTOIDS: Mastoid  air cells are aerated. CALVARIUM: No depressed skull fracture. No  destructive osseous lesion. VESSELS: Calcification of the cavernous  carotids and vertebral arteries.      OTHER FINDINGS: Bilateral lens replacements.      Impression: No acute intracranial abnormality.      Additional chronic findings are stable as detailed above.      MACRO:  None      Signed by: Atul Garíca 8/2/2024 3:14 AM  Dictation workstation:   [id number]

## 2024-08-03 NOTE — CARE PLAN
The patient's goals for the shift include      The clinical goals for the shift include pt will remain safe through shift

## 2024-08-04 VITALS
WEIGHT: 175.27 LBS | HEART RATE: 81 BPM | TEMPERATURE: 96.3 F | HEIGHT: 71 IN | RESPIRATION RATE: 15 BRPM | BODY MASS INDEX: 24.54 KG/M2 | DIASTOLIC BLOOD PRESSURE: 58 MMHG | OXYGEN SATURATION: 96 % | SYSTOLIC BLOOD PRESSURE: 116 MMHG

## 2024-08-04 LAB
ANION GAP SERPL CALC-SCNC: 9 MMOL/L (ref 10–20)
BASOPHILS # BLD AUTO: 0.03 X10*3/UL (ref 0–0.1)
BASOPHILS NFR BLD AUTO: 0.3 %
BUN SERPL-MCNC: 18 MG/DL (ref 6–23)
CALCIUM SERPL-MCNC: 10.3 MG/DL (ref 8.6–10.3)
CHLORIDE SERPL-SCNC: 104 MMOL/L (ref 98–107)
CO2 SERPL-SCNC: 29 MMOL/L (ref 21–32)
CREAT SERPL-MCNC: 1.12 MG/DL (ref 0.5–1.3)
EGFRCR SERPLBLD CKD-EPI 2021: 67 ML/MIN/1.73M*2
EOSINOPHIL # BLD AUTO: 0.22 X10*3/UL (ref 0–0.4)
EOSINOPHIL NFR BLD AUTO: 1.9 %
ERYTHROCYTE [DISTWIDTH] IN BLOOD BY AUTOMATED COUNT: 16.3 % (ref 11.5–14.5)
GLUCOSE BLD MANUAL STRIP-MCNC: 131 MG/DL (ref 74–99)
GLUCOSE BLD MANUAL STRIP-MCNC: 137 MG/DL (ref 74–99)
GLUCOSE SERPL-MCNC: 124 MG/DL (ref 74–99)
HCT VFR BLD AUTO: 32.1 % (ref 41–52)
HGB BLD-MCNC: 10.1 G/DL (ref 13.5–17.5)
HOLD SPECIMEN: NORMAL
IMM GRANULOCYTES # BLD AUTO: 0.07 X10*3/UL (ref 0–0.5)
IMM GRANULOCYTES NFR BLD AUTO: 0.6 % (ref 0–0.9)
LYMPHOCYTES # BLD AUTO: 0.68 X10*3/UL (ref 0.8–3)
LYMPHOCYTES NFR BLD AUTO: 5.7 %
MAGNESIUM SERPL-MCNC: 1.45 MG/DL (ref 1.6–2.4)
MCH RBC QN AUTO: 31.4 PG (ref 26–34)
MCHC RBC AUTO-ENTMCNC: 31.5 G/DL (ref 32–36)
MCV RBC AUTO: 100 FL (ref 80–100)
MONOCYTES # BLD AUTO: 0.83 X10*3/UL (ref 0.05–0.8)
MONOCYTES NFR BLD AUTO: 7 %
NEUTROPHILS # BLD AUTO: 10 X10*3/UL (ref 1.6–5.5)
NEUTROPHILS NFR BLD AUTO: 84.5 %
NRBC BLD-RTO: 0 /100 WBCS (ref 0–0)
PHOSPHATE SERPL-MCNC: 1.1 MG/DL (ref 2.5–4.9)
PLATELET # BLD AUTO: 135 X10*3/UL (ref 150–450)
POTASSIUM SERPL-SCNC: 4.3 MMOL/L (ref 3.5–5.3)
RBC # BLD AUTO: 3.22 X10*6/UL (ref 4.5–5.9)
SODIUM SERPL-SCNC: 138 MMOL/L (ref 136–145)
WBC # BLD AUTO: 11.8 X10*3/UL (ref 4.4–11.3)

## 2024-08-04 PROCEDURE — 83735 ASSAY OF MAGNESIUM: CPT | Performed by: STUDENT IN AN ORGANIZED HEALTH CARE EDUCATION/TRAINING PROGRAM

## 2024-08-04 PROCEDURE — 80048 BASIC METABOLIC PNL TOTAL CA: CPT | Performed by: STUDENT IN AN ORGANIZED HEALTH CARE EDUCATION/TRAINING PROGRAM

## 2024-08-04 PROCEDURE — 36415 COLL VENOUS BLD VENIPUNCTURE: CPT | Performed by: INTERNAL MEDICINE

## 2024-08-04 PROCEDURE — 85025 COMPLETE CBC W/AUTO DIFF WBC: CPT | Performed by: STUDENT IN AN ORGANIZED HEALTH CARE EDUCATION/TRAINING PROGRAM

## 2024-08-04 PROCEDURE — 99239 HOSP IP/OBS DSCHRG MGMT >30: CPT | Performed by: STUDENT IN AN ORGANIZED HEALTH CARE EDUCATION/TRAINING PROGRAM

## 2024-08-04 PROCEDURE — 82947 ASSAY GLUCOSE BLOOD QUANT: CPT

## 2024-08-04 PROCEDURE — 83970 ASSAY OF PARATHORMONE: CPT | Mod: ELYLAB | Performed by: INTERNAL MEDICINE

## 2024-08-04 PROCEDURE — 2500000004 HC RX 250 GENERAL PHARMACY W/ HCPCS (ALT 636 FOR OP/ED): Performed by: STUDENT IN AN ORGANIZED HEALTH CARE EDUCATION/TRAINING PROGRAM

## 2024-08-04 PROCEDURE — 2500000001 HC RX 250 WO HCPCS SELF ADMINISTERED DRUGS (ALT 637 FOR MEDICARE OP): Performed by: NURSE PRACTITIONER

## 2024-08-04 PROCEDURE — 2500000002 HC RX 250 W HCPCS SELF ADMINISTERED DRUGS (ALT 637 FOR MEDICARE OP, ALT 636 FOR OP/ED): Performed by: STUDENT IN AN ORGANIZED HEALTH CARE EDUCATION/TRAINING PROGRAM

## 2024-08-04 PROCEDURE — 84100 ASSAY OF PHOSPHORUS: CPT | Performed by: INTERNAL MEDICINE

## 2024-08-04 PROCEDURE — 36415 COLL VENOUS BLD VENIPUNCTURE: CPT | Performed by: STUDENT IN AN ORGANIZED HEALTH CARE EDUCATION/TRAINING PROGRAM

## 2024-08-04 PROCEDURE — 2500000001 HC RX 250 WO HCPCS SELF ADMINISTERED DRUGS (ALT 637 FOR MEDICARE OP): Performed by: STUDENT IN AN ORGANIZED HEALTH CARE EDUCATION/TRAINING PROGRAM

## 2024-08-04 RX ORDER — MAGNESIUM SULFATE HEPTAHYDRATE 40 MG/ML
4 INJECTION, SOLUTION INTRAVENOUS ONCE
Status: COMPLETED | OUTPATIENT
Start: 2024-08-04 | End: 2024-08-04

## 2024-08-04 RX ADMIN — METOPROLOL SUCCINATE 50 MG: 50 TABLET, EXTENDED RELEASE ORAL at 09:46

## 2024-08-04 RX ADMIN — MAGNESIUM SULFATE HEPTAHYDRATE 4 G: 40 INJECTION, SOLUTION INTRAVENOUS at 09:00

## 2024-08-04 RX ADMIN — SODIUM CHLORIDE 125 ML/HR: 9 INJECTION, SOLUTION INTRAVENOUS at 06:20

## 2024-08-04 RX ADMIN — TIOTROPIUM BROMIDE INHALATION SPRAY 2 PUFF: 3.12 SPRAY, METERED RESPIRATORY (INHALATION) at 09:46

## 2024-08-04 RX ADMIN — OXYCODONE HYDROCHLORIDE 5 MG: 5 TABLET ORAL at 00:18

## 2024-08-04 RX ADMIN — PREDNISONE 10 MG: 10 TABLET ORAL at 09:46

## 2024-08-04 RX ADMIN — FLUTICASONE FUROATE AND VILANTEROL TRIFENATATE 1 PUFF: 100; 25 POWDER RESPIRATORY (INHALATION) at 09:46

## 2024-08-04 RX ADMIN — TAMSULOSIN HYDROCHLORIDE 0.4 MG: 0.4 CAPSULE ORAL at 11:00

## 2024-08-04 RX ADMIN — APIXABAN 5 MG: 5 TABLET, FILM COATED ORAL at 09:46

## 2024-08-04 RX ADMIN — AZATHIOPRINE 50 MG: 50 TABLET ORAL at 09:46

## 2024-08-04 RX ADMIN — BUSPIRONE HYDROCHLORIDE 10 MG: 5 TABLET ORAL at 09:46

## 2024-08-04 ASSESSMENT — COGNITIVE AND FUNCTIONAL STATUS - GENERAL
MOBILITY SCORE: 13
PERSONAL GROOMING: A LITTLE
HELP NEEDED FOR BATHING: A LOT
MOVING TO AND FROM BED TO CHAIR: A LITTLE
TOILETING: A LOT
TURNING FROM BACK TO SIDE WHILE IN FLAT BAD: A LOT
STANDING UP FROM CHAIR USING ARMS: A LITTLE
DAILY ACTIVITIY SCORE: 16
MOVING FROM LYING ON BACK TO SITTING ON SIDE OF FLAT BED WITH BEDRAILS: A LOT
DRESSING REGULAR UPPER BODY CLOTHING: A LITTLE
CLIMB 3 TO 5 STEPS WITH RAILING: TOTAL
WALKING IN HOSPITAL ROOM: A LOT
DRESSING REGULAR LOWER BODY CLOTHING: A LOT

## 2024-08-04 ASSESSMENT — PAIN SCALES - GENERAL: PAINLEVEL_OUTOF10: 7

## 2024-08-04 ASSESSMENT — PAIN - FUNCTIONAL ASSESSMENT: PAIN_FUNCTIONAL_ASSESSMENT: 0-10

## 2024-08-04 NOTE — DISCHARGE SUMMARY
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    Patient was found to be hypercalcemic as well.  Calcium of 13.2 on admission.  He was aggressively hydrated with IV fluids and treated with calcitonin IV x 2 doses, zoledronic acid x 1 dose as well.  Calcium continued to downtrend and was at 10.3 on day of discharge.  Patient myasthenia gravis, prednisone and azathioprine were continued, wound no concerns for a flare.    Patient with PT/OT and deemed a candidate for acute rehab.    Patient is being discharged to acute rehab today at this time.  Advised family at bedside to follow-up with patient's primary oncology team to discuss admission, malignancy related hypercalcemia along with long-term prognosis.  Will also follow-up with patient's PCP to discuss hospitalization.    Total time spent on discharge: >30min      ---Of note, this documentation is completed using the Dragon Dictation system (voice recognition software). There may be spelling and/or grammatical errors that were not corrected prior to final submission.---    Marco Hayden MD    DISCHARGE PHYSICAL EXAM     Last Recorded Vitals:  Vitals:    08/03/24 1808 08/03/24 1930 08/04/24 0017 08/04/24 0718   BP: 133/57 133/68 161/71 140/67   BP Location:       Patient Position:  Sitting Sitting    Pulse: 86 84 85 92   Resp:  18 18    Temp: 36.2 °C (97.2 °F) 36.5 °C (97.7 °F) 36.4 °C (97.5 °F) 35.8 °C (96.4 °F)   TempSrc:  Temporal Temporal    SpO2:  96% 95% 97%    Weight:       Height:         Physical Exam  Vitals reviewed.   Constitutional:       General: He is not in acute distress.     Appearance: Normal appearance.      Comments: Chronically frail in appearance   HENT:      Head: Normocephalic and atraumatic.   Eyes:      Extraocular Movements: Extraocular movements intact.      Conjunctiva/sclera: Conjunctivae normal.   Cardiovascular:      Rate and Rhythm: Normal rate and regular rhythm.      Pulses: Normal pulses.      Heart sounds: Normal heart sounds.   Pulmonary:      Effort: Pulmonary effort is normal.      Breath sounds: Normal breath sounds.   Abdominal:      General: Bowel sounds are normal. There is no distension.      Palpations: Abdomen is soft.      Tenderness: There is no abdominal tenderness. There is no guarding.   Musculoskeletal:         General: No swelling or tenderness. Normal range of motion.      Cervical back: Normal range of motion and neck supple.   Neurological:      General: No focal deficit present.      Mental Status: He is alert and oriented to person, place, and time. Mental status is at baseline.   Psychiatric:         Mood and Affect: Mood normal.         Behavior: Behavior normal.       DISCHARGE MEDICATIONS        Your medication list        CONTINUE taking these medications        Instructions Last Dose Given Next Dose Due   apixaban 5 mg tablet  Commonly known as: Eliquis           ascorbic acid (vitamin C) 1,000 mg capsule           azaTHIOprine 50 mg tablet  Commonly known as: Imuran           busPIRone 10 mg tablet  Commonly known as: Buspar           chlorhexidine 0.12 % solution  Commonly known as: Peridex           cholecalciferol 25 MCG (1000 UT) tablet  Commonly known as: Vitamin D-3           COLLAGEN SKIN RENEWAL ORAL           cyanocobalamin 1,000 mcg tablet  Commonly known as: Vitamin B-12           diphenoxylate-atropine 2.5-0.025 mg tablet  Commonly known as: Lomotil           ferrous sulfate (325 mg ferrous sulfate)  tablet           fluticasone 50 mcg/actuation nasal spray  Commonly known as: Flonase           furosemide 20 mg tablet  Commonly known as: Lasix           glipiZIDE 5 mg tablet  Commonly known as: Glucotrol           hydrOXYzine HCL 25 mg tablet  Commonly known as: Atarax           Januvia 100 mg tablet  Generic drug: SITagliptin phosphate           lidocaine-prilocaine 2.5-2.5 % cream  Commonly known as: Emla           MAGNESIUM OXIDE ORAL           megestrol 400 mg/10 mL (40 mg/mL) suspension  Commonly known as: Megace           metoprolol succinate XL 50 mg 24 hr tablet  Commonly known as: Toprol-XL           nitroglycerin 0.4 mg SL tablet  Commonly known as: Nitrostat           pantoprazole 40 mg EC tablet  Commonly known as: ProtoNix           potassium chloride CR 20 mEq ER tablet  Commonly known as: Klor-Con M20           predniSONE 10 mg tablet  Commonly known as: Deltasone           PROBIOTIC ACIDOPHILUS ORAL           prochlorperazine 10 mg tablet  Commonly known as: Compazine           rosuvastatin 20 mg tablet  Commonly known as: Crestor           selenium 200 mcg tablet           tamsulosin 0.4 mg 24 hr capsule  Commonly known as: Flomax           traZODone 100 mg tablet  Commonly known as: Desyrel           Trelegy Ellipta 100-62.5-25 mcg blister with device  Generic drug: fluticasone-umeclidin-vilanter           zinc gluconate 50 mg tablet                  OUTPATIENT FOLLOW-UP     No future appointments.

## 2024-08-04 NOTE — PROGRESS NOTES
Pt planned for discharge today.   [facility] is able to accept.   Transport is set for 3 pm today.  Nursing aware and report number provided.  Met with family at bedside along with attending whom updated spouse and multiple family members of plan.  Family is aware transport is set today.

## 2024-08-04 NOTE — CARE PLAN
Problem: Skin  Goal: Prevent/manage excess moisture  Outcome: Progressing  Flowsheets (Taken 8/4/2024 0102)  Prevent/manage excess moisture: Cleanse incontinence/protect with barrier cream  Goal: Prevent/minimize sheer/friction injuries  Outcome: Progressing     Problem: Pain - Adult  Goal: Verbalizes/displays adequate comfort level or baseline comfort level  Outcome: Progressing     Problem: Safety - Adult  Goal: Free from fall injury  Outcome: Progressing     Problem: Discharge Planning  Goal: Discharge to home or other facility with appropriate resources  Outcome: Progressing     Problem: Chronic Conditions and Co-morbidities  Goal: Patient's chronic conditions and co-morbidity symptoms are monitored and maintained or improved  Outcome: Progressing     Problem: Fall/Injury  Goal: Not fall by end of shift  Outcome: Progressing  Goal: Be free from injury by end of the shift  Outcome: Progressing  Goal: Verbalize understanding of personal risk factors for fall in the hospital  Outcome: Progressing  Goal: Verbalize understanding of risk factor reduction measures to prevent injury from fall in the home  Outcome: Progressing  Goal: Use assistive devices by end of the shift  Outcome: Progressing  Goal: Pace activities to prevent fatigue by end of the shift  Outcome: Progressing      The clinical goals for the shift include Free from falls/injury.

## 2024-08-05 LAB — PTH-INTACT SERPL-MCNC: 17.3 PG/ML (ref 18.5–88)

## 2024-08-08 ENCOUNTER — TELEPHONE (OUTPATIENT)
Dept: PALLATIVE CARE | Age: 79
End: 2024-08-08

## 2024-08-08 NOTE — TELEPHONE ENCOUNTER
Brigid pt's wife called in to update you that Anshu is currently admitted to  rehab in [place] and will not be home for your follow up appt this upcoming Monday. Tentative d/c date is end of next week. They will give us a call when he is discharged to reschedule appt.

## 2024-08-19 ENCOUNTER — HOSPITAL ENCOUNTER (INPATIENT)
Facility: HOSPITAL | Age: 79
LOS: 3 days | Discharge: NURSING, REHAB OR LONG-TERM CARE | End: 2024-08-22
Attending: EMERGENCY MEDICINE | Admitting: INTERNAL MEDICINE
Payer: MEDICARE

## 2024-08-19 ENCOUNTER — LAB REQUISITION (OUTPATIENT)
Dept: LAB | Facility: HOSPITAL | Age: 79
End: 2024-08-19

## 2024-08-19 ENCOUNTER — APPOINTMENT (OUTPATIENT)
Dept: CARDIOLOGY | Facility: HOSPITAL | Age: 79
End: 2024-08-19
Payer: MEDICARE

## 2024-08-19 ENCOUNTER — APPOINTMENT (OUTPATIENT)
Dept: RADIOLOGY | Facility: HOSPITAL | Age: 79
End: 2024-08-19
Payer: MEDICARE

## 2024-08-19 ENCOUNTER — TELEPHONE (OUTPATIENT)
Dept: PALLATIVE CARE | Age: 79
End: 2024-08-19

## 2024-08-19 DIAGNOSIS — R79.89 ELEVATED TROPONIN: ICD-10-CM

## 2024-08-19 DIAGNOSIS — E86.0 ACUTE DEHYDRATION: ICD-10-CM

## 2024-08-19 DIAGNOSIS — A41.9 SEPSIS, DUE TO UNSPECIFIED ORGANISM, UNSPECIFIED WHETHER ACUTE ORGAN DYSFUNCTION PRESENT (MULTI): ICD-10-CM

## 2024-08-19 DIAGNOSIS — R07.9 CHEST PAIN, UNSPECIFIED TYPE: ICD-10-CM

## 2024-08-19 DIAGNOSIS — J15.9 BACTERIAL PNEUMONIA: ICD-10-CM

## 2024-08-19 DIAGNOSIS — E83.52 HYPERCALCEMIA: Primary | ICD-10-CM

## 2024-08-19 PROBLEM — R53.1 GENERALIZED WEAKNESS: Status: ACTIVE | Noted: 2024-08-19

## 2024-08-19 PROBLEM — Z85.118 HISTORY OF LUNG CANCER: Status: ACTIVE | Noted: 2024-08-19

## 2024-08-19 PROBLEM — Z95.1 HISTORY OF CORONARY ARTERY BYPASS GRAFT: Status: ACTIVE | Noted: 2024-08-19

## 2024-08-19 PROBLEM — N17.9 SEPSIS WITH ACUTE RENAL FAILURE (MULTI): Status: ACTIVE | Noted: 2024-08-19

## 2024-08-19 PROBLEM — G70.00 MYASTHENIA GRAVIS (MULTI): Status: ACTIVE | Noted: 2024-08-19

## 2024-08-19 PROBLEM — E87.1 HYPONATREMIA: Status: ACTIVE | Noted: 2024-08-19

## 2024-08-19 PROBLEM — R65.20 SEPSIS WITH ACUTE RENAL FAILURE (MULTI): Status: ACTIVE | Noted: 2024-08-19

## 2024-08-19 PROBLEM — R10.13 EPIGASTRIC PAIN: Status: ACTIVE | Noted: 2024-08-19

## 2024-08-19 LAB
ALBUMIN SERPL BCP-MCNC: 2.9 G/DL (ref 3.4–5)
ALP SERPL-CCNC: 358 U/L (ref 33–136)
ALT SERPL W P-5'-P-CCNC: 61 U/L (ref 10–52)
AMMONIA PLAS-SCNC: 26 UMOL/L (ref 16–53)
ANION GAP SERPL CALC-SCNC: 12 MMOL/L (ref 10–20)
APPEARANCE UR: CLEAR
AST SERPL W P-5'-P-CCNC: 53 U/L (ref 9–39)
BACTERIA #/AREA URNS AUTO: ABNORMAL /HPF
BASOPHILS # BLD AUTO: 0.02 X10*3/UL (ref 0–0.1)
BASOPHILS NFR BLD AUTO: 0.1 %
BILIRUB SERPL-MCNC: 0.3 MG/DL (ref 0–1.2)
BILIRUB UR STRIP.AUTO-MCNC: NEGATIVE MG/DL
BUN SERPL-MCNC: 22 MG/DL (ref 6–23)
CA-I BLD-SCNC: 1.58 MMOL/L (ref 1.1–1.33)
CALCIUM SERPL-MCNC: 10.1 MG/DL (ref 8.6–10.3)
CARDIAC TROPONIN I PNL SERPL HS: 59 NG/L (ref 0–20)
CARDIAC TROPONIN I PNL SERPL HS: 64 NG/L (ref 0–20)
CHLORIDE SERPL-SCNC: 104 MMOL/L (ref 98–107)
CO2 SERPL-SCNC: 20 MMOL/L (ref 21–32)
COLOR UR: ABNORMAL
CREAT SERPL-MCNC: 1.5 MG/DL (ref 0.5–1.3)
CRP SERPL-MCNC: 2.6 MG/DL
EGFRCR SERPLBLD CKD-EPI 2021: 47 ML/MIN/1.73M*2
EOSINOPHIL # BLD AUTO: 0.09 X10*3/UL (ref 0–0.4)
EOSINOPHIL NFR BLD AUTO: 0.6 %
ERYTHROCYTE [DISTWIDTH] IN BLOOD BY AUTOMATED COUNT: 18 % (ref 11.5–14.5)
ERYTHROCYTE [SEDIMENTATION RATE] IN BLOOD BY WESTERGREN METHOD: 40 MM/H (ref 0–20)
GLUCOSE SERPL-MCNC: 130 MG/DL (ref 74–99)
GLUCOSE UR STRIP.AUTO-MCNC: NORMAL MG/DL
GRAN CASTS #/AREA UR COMP ASSIST: ABNORMAL /LPF
HCT VFR BLD AUTO: 30.9 % (ref 41–52)
HGB BLD-MCNC: 10 G/DL (ref 13.5–17.5)
HOLD SPECIMEN: NORMAL
HOLD SPECIMEN: NORMAL
IMM GRANULOCYTES # BLD AUTO: 0.11 X10*3/UL (ref 0–0.5)
IMM GRANULOCYTES NFR BLD AUTO: 0.7 % (ref 0–0.9)
KETONES UR STRIP.AUTO-MCNC: NEGATIVE MG/DL
LACTATE SERPL-SCNC: 1.9 MMOL/L (ref 0.4–2)
LACTATE SERPL-SCNC: 2.1 MMOL/L (ref 0.4–2)
LACTATE SERPL-SCNC: 2.5 MMOL/L (ref 0.4–2)
LEUKOCYTE ESTERASE UR QL STRIP.AUTO: NEGATIVE
LYMPHOCYTES # BLD AUTO: 0.32 X10*3/UL (ref 0.8–3)
LYMPHOCYTES NFR BLD AUTO: 2.2 %
MCH RBC QN AUTO: 32.5 PG (ref 26–34)
MCHC RBC AUTO-ENTMCNC: 32.4 G/DL (ref 32–36)
MCV RBC AUTO: 100 FL (ref 80–100)
MONOCYTES # BLD AUTO: 0.79 X10*3/UL (ref 0.05–0.8)
MONOCYTES NFR BLD AUTO: 5.4 %
MUCOUS THREADS #/AREA URNS AUTO: ABNORMAL /LPF
NEUTROPHILS # BLD AUTO: 13.39 X10*3/UL (ref 1.6–5.5)
NEUTROPHILS NFR BLD AUTO: 91 %
NITRITE UR QL STRIP.AUTO: NEGATIVE
NRBC BLD-RTO: 0 /100 WBCS (ref 0–0)
PH UR STRIP.AUTO: 5.5 [PH]
PLATELET # BLD AUTO: 129 X10*3/UL (ref 150–450)
POTASSIUM SERPL-SCNC: 5.1 MMOL/L (ref 3.5–5.3)
PROT SERPL-MCNC: 6.2 G/DL (ref 6.4–8.2)
PROT UR STRIP.AUTO-MCNC: ABNORMAL MG/DL
RBC # BLD AUTO: 3.08 X10*6/UL (ref 4.5–5.9)
RBC # UR STRIP.AUTO: NEGATIVE /UL
RBC #/AREA URNS AUTO: ABNORMAL /HPF
SARS-COV-2 RNA RESP QL NAA+PROBE: NOT DETECTED
SODIUM SERPL-SCNC: 131 MMOL/L (ref 136–145)
SP GR UR STRIP.AUTO: 1.02
SQUAMOUS #/AREA URNS AUTO: ABNORMAL /HPF
UROBILINOGEN UR STRIP.AUTO-MCNC: NORMAL MG/DL
WBC # BLD AUTO: 14.7 X10*3/UL (ref 4.4–11.3)
WBC #/AREA URNS AUTO: ABNORMAL /HPF

## 2024-08-19 PROCEDURE — 2500000002 HC RX 250 W HCPCS SELF ADMINISTERED DRUGS (ALT 637 FOR MEDICARE OP, ALT 636 FOR OP/ED): Performed by: EMERGENCY MEDICINE

## 2024-08-19 PROCEDURE — 82140 ASSAY OF AMMONIA: CPT | Performed by: EMERGENCY MEDICINE

## 2024-08-19 PROCEDURE — 85025 COMPLETE CBC W/AUTO DIFF WBC: CPT | Performed by: EMERGENCY MEDICINE

## 2024-08-19 PROCEDURE — 36415 COLL VENOUS BLD VENIPUNCTURE: CPT | Performed by: EMERGENCY MEDICINE

## 2024-08-19 PROCEDURE — 71045 X-RAY EXAM CHEST 1 VIEW: CPT

## 2024-08-19 PROCEDURE — 99223 1ST HOSP IP/OBS HIGH 75: CPT | Performed by: INTERNAL MEDICINE

## 2024-08-19 PROCEDURE — 2500000004 HC RX 250 GENERAL PHARMACY W/ HCPCS (ALT 636 FOR OP/ED)

## 2024-08-19 PROCEDURE — 87635 SARS-COV-2 COVID-19 AMP PRB: CPT | Performed by: INTERNAL MEDICINE

## 2024-08-19 PROCEDURE — 71045 X-RAY EXAM CHEST 1 VIEW: CPT | Mod: FOREIGN READ | Performed by: RADIOLOGY

## 2024-08-19 PROCEDURE — 81001 URINALYSIS AUTO W/SCOPE: CPT | Performed by: EMERGENCY MEDICINE

## 2024-08-19 PROCEDURE — 87040 BLOOD CULTURE FOR BACTERIA: CPT | Mod: ELYLAB | Performed by: EMERGENCY MEDICINE

## 2024-08-19 PROCEDURE — 74176 CT ABD & PELVIS W/O CONTRAST: CPT | Performed by: STUDENT IN AN ORGANIZED HEALTH CARE EDUCATION/TRAINING PROGRAM

## 2024-08-19 PROCEDURE — 93005 ELECTROCARDIOGRAM TRACING: CPT

## 2024-08-19 PROCEDURE — 83605 ASSAY OF LACTIC ACID: CPT | Performed by: INTERNAL MEDICINE

## 2024-08-19 PROCEDURE — 83605 ASSAY OF LACTIC ACID: CPT | Performed by: EMERGENCY MEDICINE

## 2024-08-19 PROCEDURE — 74176 CT ABD & PELVIS W/O CONTRAST: CPT

## 2024-08-19 PROCEDURE — 71250 CT THORAX DX C-: CPT | Performed by: STUDENT IN AN ORGANIZED HEALTH CARE EDUCATION/TRAINING PROGRAM

## 2024-08-19 PROCEDURE — 85652 RBC SED RATE AUTOMATED: CPT | Performed by: INTERNAL MEDICINE

## 2024-08-19 PROCEDURE — 99232 SBSQ HOSP IP/OBS MODERATE 35: CPT | Performed by: STUDENT IN AN ORGANIZED HEALTH CARE EDUCATION/TRAINING PROGRAM

## 2024-08-19 PROCEDURE — 96361 HYDRATE IV INFUSION ADD-ON: CPT

## 2024-08-19 PROCEDURE — 2500000004 HC RX 250 GENERAL PHARMACY W/ HCPCS (ALT 636 FOR OP/ED): Performed by: EMERGENCY MEDICINE

## 2024-08-19 PROCEDURE — 84484 ASSAY OF TROPONIN QUANT: CPT | Performed by: EMERGENCY MEDICINE

## 2024-08-19 PROCEDURE — 84484 ASSAY OF TROPONIN QUANT: CPT | Performed by: INTERNAL MEDICINE

## 2024-08-19 PROCEDURE — 99285 EMERGENCY DEPT VISIT HI MDM: CPT | Mod: 25

## 2024-08-19 PROCEDURE — 82330 ASSAY OF CALCIUM: CPT | Performed by: EMERGENCY MEDICINE

## 2024-08-19 PROCEDURE — 1210000001 HC SEMI-PRIVATE ROOM DAILY

## 2024-08-19 PROCEDURE — 86140 C-REACTIVE PROTEIN: CPT | Performed by: INTERNAL MEDICINE

## 2024-08-19 PROCEDURE — 94640 AIRWAY INHALATION TREATMENT: CPT

## 2024-08-19 PROCEDURE — 80053 COMPREHEN METABOLIC PANEL: CPT | Performed by: EMERGENCY MEDICINE

## 2024-08-19 PROCEDURE — 96365 THER/PROPH/DIAG IV INF INIT: CPT

## 2024-08-19 RX ORDER — VANCOMYCIN HYDROCHLORIDE 1 G/20ML
INJECTION, POWDER, LYOPHILIZED, FOR SOLUTION INTRAVENOUS DAILY PRN
Status: DISCONTINUED | OUTPATIENT
Start: 2024-08-19 | End: 2024-08-19

## 2024-08-19 RX ORDER — FLUTICASONE FUROATE AND VILANTEROL 100; 25 UG/1; UG/1
1 POWDER RESPIRATORY (INHALATION)
Status: DISCONTINUED | OUTPATIENT
Start: 2024-08-20 | End: 2024-08-22 | Stop reason: HOSPADM

## 2024-08-19 RX ORDER — PANTOPRAZOLE SODIUM 40 MG/1
40 TABLET, DELAYED RELEASE ORAL DAILY
Status: DISCONTINUED | OUTPATIENT
Start: 2024-08-20 | End: 2024-08-22 | Stop reason: HOSPADM

## 2024-08-19 RX ORDER — ACETAMINOPHEN 650 MG/1
650 SUPPOSITORY RECTAL EVERY 4 HOURS PRN
Status: DISCONTINUED | OUTPATIENT
Start: 2024-08-19 | End: 2024-08-22 | Stop reason: HOSPADM

## 2024-08-19 RX ORDER — POLYETHYLENE GLYCOL 3350 17 G/17G
17 POWDER, FOR SOLUTION ORAL DAILY PRN
Status: DISCONTINUED | OUTPATIENT
Start: 2024-08-19 | End: 2024-08-22 | Stop reason: HOSPADM

## 2024-08-19 RX ORDER — AZATHIOPRINE 50 MG/1
50 TABLET ORAL DAILY
Status: DISCONTINUED | OUTPATIENT
Start: 2024-08-19 | End: 2024-08-22 | Stop reason: HOSPADM

## 2024-08-19 RX ORDER — ROSUVASTATIN CALCIUM 20 MG/1
20 TABLET, COATED ORAL NIGHTLY
Status: DISCONTINUED | OUTPATIENT
Start: 2024-08-19 | End: 2024-08-22 | Stop reason: HOSPADM

## 2024-08-19 RX ORDER — BUSPIRONE HYDROCHLORIDE 5 MG/1
10 TABLET ORAL 2 TIMES DAILY
Status: DISCONTINUED | OUTPATIENT
Start: 2024-08-19 | End: 2024-08-22 | Stop reason: HOSPADM

## 2024-08-19 RX ORDER — TAMSULOSIN HYDROCHLORIDE 0.4 MG/1
0.4 CAPSULE ORAL
Status: DISCONTINUED | OUTPATIENT
Start: 2024-08-20 | End: 2024-08-22 | Stop reason: HOSPADM

## 2024-08-19 RX ORDER — PANTOPRAZOLE SODIUM 40 MG/10ML
40 INJECTION, POWDER, LYOPHILIZED, FOR SOLUTION INTRAVENOUS DAILY
Status: DISCONTINUED | OUTPATIENT
Start: 2024-08-20 | End: 2024-08-22 | Stop reason: HOSPADM

## 2024-08-19 RX ORDER — ONDANSETRON 4 MG/1
4 TABLET, FILM COATED ORAL EVERY 8 HOURS PRN
Status: DISCONTINUED | OUTPATIENT
Start: 2024-08-19 | End: 2024-08-22 | Stop reason: HOSPADM

## 2024-08-19 RX ORDER — IPRATROPIUM BROMIDE AND ALBUTEROL SULFATE 2.5; .5 MG/3ML; MG/3ML
3 SOLUTION RESPIRATORY (INHALATION) ONCE
Status: COMPLETED | OUTPATIENT
Start: 2024-08-19 | End: 2024-08-19

## 2024-08-19 RX ORDER — PREDNISONE 10 MG/1
10 TABLET ORAL DAILY
Status: DISCONTINUED | OUTPATIENT
Start: 2024-08-20 | End: 2024-08-22 | Stop reason: HOSPADM

## 2024-08-19 RX ORDER — ONDANSETRON HYDROCHLORIDE 2 MG/ML
4 INJECTION, SOLUTION INTRAVENOUS EVERY 8 HOURS PRN
Status: DISCONTINUED | OUTPATIENT
Start: 2024-08-19 | End: 2024-08-22 | Stop reason: HOSPADM

## 2024-08-19 RX ORDER — VANCOMYCIN HYDROCHLORIDE 1 G/20ML
INJECTION, POWDER, LYOPHILIZED, FOR SOLUTION INTRAVENOUS DAILY PRN
Status: DISCONTINUED | OUTPATIENT
Start: 2024-08-19 | End: 2024-08-21

## 2024-08-19 RX ORDER — SODIUM CHLORIDE 9 MG/ML
75 INJECTION, SOLUTION INTRAVENOUS CONTINUOUS
Status: DISCONTINUED | OUTPATIENT
Start: 2024-08-19 | End: 2024-08-22 | Stop reason: HOSPADM

## 2024-08-19 RX ORDER — ACETAMINOPHEN 160 MG/5ML
650 SOLUTION ORAL EVERY 4 HOURS PRN
Status: DISCONTINUED | OUTPATIENT
Start: 2024-08-19 | End: 2024-08-22 | Stop reason: HOSPADM

## 2024-08-19 RX ORDER — TRAZODONE HYDROCHLORIDE 50 MG/1
100 TABLET ORAL NIGHTLY
Status: DISCONTINUED | OUTPATIENT
Start: 2024-08-19 | End: 2024-08-20

## 2024-08-19 RX ORDER — NITROGLYCERIN 0.4 MG/1
0.4 TABLET SUBLINGUAL EVERY 5 MIN PRN
Status: DISCONTINUED | OUTPATIENT
Start: 2024-08-19 | End: 2024-08-22 | Stop reason: HOSPADM

## 2024-08-19 RX ORDER — TALC
3 POWDER (GRAM) TOPICAL NIGHTLY PRN
Status: DISCONTINUED | OUTPATIENT
Start: 2024-08-19 | End: 2024-08-22 | Stop reason: HOSPADM

## 2024-08-19 RX ORDER — ACETAMINOPHEN 325 MG/1
650 TABLET ORAL EVERY 4 HOURS PRN
Status: DISCONTINUED | OUTPATIENT
Start: 2024-08-19 | End: 2024-08-22 | Stop reason: HOSPADM

## 2024-08-19 RX ORDER — UBIDECARENONE 75 MG
1000 CAPSULE ORAL DAILY
Status: DISCONTINUED | OUTPATIENT
Start: 2024-08-19 | End: 2024-08-22 | Stop reason: HOSPADM

## 2024-08-19 ASSESSMENT — COGNITIVE AND FUNCTIONAL STATUS - GENERAL
TURNING FROM BACK TO SIDE WHILE IN FLAT BAD: A LOT
DRESSING REGULAR LOWER BODY CLOTHING: A LOT
TOILETING: A LOT
MOVING FROM LYING ON BACK TO SITTING ON SIDE OF FLAT BED WITH BEDRAILS: A LOT
WALKING IN HOSPITAL ROOM: TOTAL
PERSONAL GROOMING: A LOT
STANDING UP FROM CHAIR USING ARMS: A LOT
HELP NEEDED FOR BATHING: A LOT
MOVING TO AND FROM BED TO CHAIR: A LOT
EATING MEALS: A LITTLE
DAILY ACTIVITIY SCORE: 13
DRESSING REGULAR UPPER BODY CLOTHING: A LOT
CLIMB 3 TO 5 STEPS WITH RAILING: TOTAL
MOBILITY SCORE: 10

## 2024-08-19 ASSESSMENT — LIFESTYLE VARIABLES
HAVE PEOPLE ANNOYED YOU BY CRITICIZING YOUR DRINKING: NO
EVER HAD A DRINK FIRST THING IN THE MORNING TO STEADY YOUR NERVES TO GET RID OF A HANGOVER: NO
HAVE YOU EVER FELT YOU SHOULD CUT DOWN ON YOUR DRINKING: NO
EVER FELT BAD OR GUILTY ABOUT YOUR DRINKING: NO
TOTAL SCORE: 0

## 2024-08-19 ASSESSMENT — PAIN - FUNCTIONAL ASSESSMENT
PAIN_FUNCTIONAL_ASSESSMENT: 0-10
PAIN_FUNCTIONAL_ASSESSMENT: 0-10

## 2024-08-19 ASSESSMENT — COLUMBIA-SUICIDE SEVERITY RATING SCALE - C-SSRS
2. HAVE YOU ACTUALLY HAD ANY THOUGHTS OF KILLING YOURSELF?: NO
6. HAVE YOU EVER DONE ANYTHING, STARTED TO DO ANYTHING, OR PREPARED TO DO ANYTHING TO END YOUR LIFE?: NO
1. IN THE PAST MONTH, HAVE YOU WISHED YOU WERE DEAD OR WISHED YOU COULD GO TO SLEEP AND NOT WAKE UP?: NO

## 2024-08-19 ASSESSMENT — PAIN SCALES - GENERAL
PAINLEVEL_OUTOF10: 3
PAINLEVEL_OUTOF10: 0 - NO PAIN

## 2024-08-19 NOTE — CONSULTS
Timothy Nichole, Age 79 is being initiated on pharmacy to dose vancomycin for possible sepsis/medical prophylaxis in the ED.  Patient is only ordered 1 dose of vancomycin therapy, as ED orders are not valid to continue when pt is transferred to a floor. Renal function is currently stable.    Patient will be given an initial dose of vancomycin 2 grams    Attending or ID Services must reorder if Vancomycin is to be continued.    Please contact the pharmacy at extension [phone] with any questions.    Reviewed and approved by YAKELIN HAYES on 8/19/24 at 7:18 PM.

## 2024-08-19 NOTE — ED PROVIDER NOTES
HPI   Chief Complaint   Patient presents with    Weakness, Gen       Patient is a pleasant 79-year-old male with metastatic lung cancer history of compression fracture from a fall in the lumbar spine, had surgery for the cancer, comes in complaining of generalized weakness difficulty sleeping unable to eat or drink much unable to get around.              Patient History   No past medical history on file.  No past surgical history on file.  No family history on file.  Social History     Tobacco Use    Smoking status: Former     Current packs/day: 1.00     Average packs/day: 1 pack/day for 1.6 years (1.6 ttl pk-yrs)     Types: Cigarettes     Start date: 2023    Smokeless tobacco: Never   Substance Use Topics    Alcohol use: Not on file    Drug use: Not on file       Physical Exam   ED Triage Vitals [08/19/24 1505]   Temperature Heart Rate Respirations BP   36.2 °C (97.2 °F) 96 18 97/57      SpO2 Temp src Heart Rate Source Patient Position   -- -- -- --      BP Location FiO2 (%)     -- --       Physical Exam  Vitals and nursing note reviewed.   Constitutional:       Appearance: He is ill-appearing.   Pulmonary:      Effort: Pulmonary effort is normal.   Abdominal:      Palpations: Abdomen is soft.   Musculoskeletal:         General: Tenderness present. Normal range of motion.      Comments: Tenderness to palpation lower lumbar area, no midline tenderness no step-off   Skin:     General: Skin is warm.   Neurological:      General: No focal deficit present.      Mental Status: He is alert and oriented to person, place, and time.      Cranial Nerves: No cranial nerve deficit.      Sensory: No sensory deficit.           ED Course & MDM   Diagnoses as of 08/19/24 1917   Hypercalcemia   Acute dehydration   Sepsis, due to unspecified organism, unspecified whether acute organ dysfunction present (Multi)                 No data recorded     [place] Coma Scale Score: 15 (08/19/24 1508 : Mora Pino RN)                            Medical Decision Making  EKG interpreted by me shows normal sinus rhythm rate of 95 normal QRS nonspecific ST changes  My differential diagnosis  Acute dehydration acute electrolyte imbalance acute UTI acute sepsis  I ordered CBC chemistry blood cultures lactate calcium and ionized calcium levels including urinalysis.  Further workup and management based on results of test ordered.  Patient CBC showed elevated white blood cell count with elevated serum lactate, patient's INS calcium was also elevated.  Urinalysis was unremarkable discussed with the hospitalist team and they recommended broad-spectrum antibiotics IV fluids and to be admitted to the service.  Labs Reviewed  CBC WITH AUTO DIFFERENTIAL - Abnormal     WBC                           14.7 (*)               nRBC                          0.0                    RBC                           3.08 (*)               Hemoglobin                    10.0 (*)               Hematocrit                    30.9 (*)               MCV                           100                    MCH                           32.5                   MCHC                          32.4                   RDW                           18.0 (*)               Platelets                     129 (*)                Neutrophils %                 91.0                   Immature Granulocytes %, Automated   0.7                    Lymphocytes %                 2.2                    Monocytes %                   5.4                    Eosinophils %                 0.6                    Basophils %                   0.1                    Neutrophils Absolute          13.39 (*)               Immature Granulocytes Absolute, Au*   0.11                   Lymphocytes Absolute          0.32 (*)               Monocytes Absolute            0.79                   Eosinophils Absolute          0.09                   Basophils Absolute            0.02                COMPREHENSIVE METABOLIC PANEL -  Abnormal     Glucose                       130 (*)                Sodium                        131 (*)                Potassium                     5.1                    Chloride                      104                    Bicarbonate                   20 (*)                 Anion Gap                     12                     Urea Nitrogen                 22                     Creatinine                    1.50 (*)               eGFR                          47 (*)                 Calcium                       10.1                   Albumin                       2.9 (*)                Alkaline Phosphatase          358 (*)                Total Protein                 6.2 (*)                AST                           53 (*)                 Bilirubin, Total              0.3                    ALT                           61 (*)              LACTATE - Abnormal     Lactate                       2.5 (*)                  Narrative: Venipuncture immediately after or during the administration of Metamizole may lead to falsely low results. Testing should be performed immediately                prior to Metamizole dosing.  TROPONIN I, HIGH SENSITIVITY - Abnormal     Troponin I, High Sensitivity   64 (*)                   Narrative: Less than 99th percentile of normal range cutoff-                Female and children under 18 years old <14 ng/L; Male <21 ng/L: Negative                Repeat testing should be performed if clinically indicated.                                 Female and children under 18 years old 14-50 ng/L; Male 21-50 ng/L:                Consistent with possible cardiac damage and possible increased clinical                 risk. Serial measurements may help to assess extent of myocardial damage.                                 >50 ng/L: Consistent with cardiac damage, increased clinical risk and                myocardial infarction. Serial measurements may help assess extent of                  myocardial damage.                                  NOTE: Children less than 1 year old may have higher baseline troponin                 levels and results should be interpreted in conjunction with the overall                 clinical context.                                 NOTE: Troponin I testing is performed using a different                 testing methodology at [facility] than at other                 [facility] hospitals. Direct result comparisons should only                 be made within the same method.  URINALYSIS WITH REFLEX CULTURE AND MICROSCOPIC - Abnormal     Color, Urine                                         Appearance, Urine             Clear                  Specific Gravity, Urine       1.019                  pH, Urine                     5.5                    Protein, Urine                50 (1+) (*)               Glucose, Urine                Normal                 Blood, Urine                  NEGATIVE                Ketones, Urine                NEGATIVE                Bilirubin, Urine              NEGATIVE                Urobilinogen, Urine           Normal                 Nitrite, Urine                NEGATIVE                Leukocyte Esterase, Urine     NEGATIVE             CALCIUM, IONIZED - Abnormal     POCT Calcium, Ionized         1.58 (*)            LACTATE - Abnormal     Lactate                       2.1 (*)                  Narrative: Venipuncture immediately after or during the administration of Metamizole may lead to falsely low results. Testing should be performed immediately                prior to Metamizole dosing.  URINALYSIS MICROSCOPIC WITH REFLEX CULTURE - Abnormal     WBC, Urine                    1-5                    RBC, Urine                    1-2                    Squamous Epithelial Cells, Urine                          Bacteria, Urine               1+ (*)                 Mucus, Urine                  FEW                    Fine Granular Casts,  Urine    2+ (*)              AMMONIA - Normal     Ammonia                       26                  BLOOD CULTURE  BLOOD CULTURE  URINALYSIS WITH REFLEX CULTURE AND MICROSCOPIC       Narrative: The following orders were created for panel order Urinalysis with Reflex Culture and Microscopic.                Procedure                               Abnormality         Status                                   ---------                               -----------         ------                                   Urinalysis with Reflex C...[[id number]]  Abnormal            Final result                             Extra Urine Gray Tube[[id number]]                            In process                                               Please view results for these tests on the individual orders.  EXTRA URINE GRAY TUBE  LACTATE     XR chest 1 view   Final Result    No acute cardiopulmonary disease.    Signed by Timothy Gill, DO              Procedure  Procedures     Ave Espitia MD  08/19/24 1917

## 2024-08-19 NOTE — H&P
History Of Present Illness  Timothy Varela is a 79 y.o. male presenting with generalized weakness, cough, not feeling well, poor appetite.  Patient was recently admitted for hypercalcemia, hyponatremia, KIRILL.  Patient was discharged to nursing home for rehab.  He was then found to have pneumonia and was placed on Levaquin for 7 days which he finished.  Patient reported poor appetite, significant weakness which is generalized and needs assistant even to sit in bed.  He also continued to cough and wheeze.  Patient had an episode of vomiting 2 days ago described as dark color, and had loose stool that subsided now.  Patient has myasthenia gravis needed immunotherapy recently.  He also had lung cancer status post right upper lobectomy unfortunately was metastasis to the liver.  He did receive chemotherapy.  Patient was on azathioprine and prednisone for MG.     ER course: Patient was awake, alert, oriented, looked ill.  His blood pressure was 97/57.  Patient had heart rate 96.  He was afebrile.  His labs shows lactic acidosis 2.5, and has leukocytosis 14.7.  His creatinine was 1.5.  Patient has elevated liver enzyme ALT 61 and AST 53.  His calcium level was normal today.  Patient continued to have mild hyponatremia 131.  Chest x-ray reported unremarkable for acute pathology.  Patient was given 1 L normal saline in the ER, and was given DuoNeb.  Then was started on antibiotics Zosyn and vancomycin for sepsis.     Past Medical History  Coronary artery disease status post CABG  Myasthenia gravis  Hypertension  Diabetes mellitus  Hyponatremia  Lung cancer  COPD  CKD  Melanoma    Surgical History  CABG   Right upper lobe lobectomy for lung cancer  Skin biopsy for melanoma  Tonsillectomy  Cataract removal     Social History  He reports that he has quit smoking. His smoking use included cigarettes. He started smoking about 19 months ago. He has a 1.6 pack-year smoking history. He has never used smokeless tobacco. No history on  file for alcohol use and drug use.    Family History  No family history on file.     Allergies  Canagliflozin and Metformin    Review of Systems  10/10 points review of system were conducted, negative except as above    Physical Exam  Constitutional:       Appearance: He is ill-appearing. He is not toxic-appearing or diaphoretic.   HENT:      Head: Normocephalic and atraumatic.      Nose: Nose normal.      Mouth/Throat:      Mouth: Mucous membranes are dry.   Eyes:      General: No scleral icterus.     Extraocular Movements: Extraocular movements intact.      Conjunctiva/sclera: Conjunctivae normal.      Pupils: Pupils are equal, round, and reactive to light.   Neck:      Vascular: No carotid bruit.   Cardiovascular:      Rate and Rhythm: Normal rate. Rhythm irregular.      Pulses: Normal pulses.      Heart sounds: Murmur heard.      No gallop.   Pulmonary:      Breath sounds: Wheezing, rhonchi and rales present.   Chest:      Chest wall: No tenderness.   Abdominal:      General: There is no distension.      Palpations: There is no mass.      Tenderness: There is no abdominal tenderness. There is no right CVA tenderness, left CVA tenderness, guarding or rebound.      Hernia: No hernia is present.   Musculoskeletal:         General: No swelling, tenderness, deformity or signs of injury.      Cervical back: Normal range of motion and neck supple. No rigidity or tenderness.      Right lower leg: No edema.      Left lower leg: No edema.   Lymphadenopathy:      Cervical: No cervical adenopathy.   Skin:     General: Skin is warm and dry.      Findings: No lesion or rash.   Neurological:      General: No focal deficit present.      Mental Status: He is alert and oriented to person, place, and time. Mental status is at baseline.   Psychiatric:         Mood and Affect: Mood normal.         Behavior: Behavior normal.          Last Recorded Vitals  Blood pressure 97/59, pulse 92, temperature 36.2 °C (97.2 °F), resp. rate 18,  "height 1.803 m (5' 11\"), weight 79.4 kg (175 lb), SpO2 95%.    Relevant Results  Scheduled medications  apixaban, 5 mg, oral, BID  azaTHIOprine, 50 mg, oral, Daily  busPIRone, 10 mg, oral, BID  [START ON 8/20/2024] cefepime, 1 g, intravenous, q12h  cyanocobalamin, 1,000 mcg, oral, Daily  [START ON 8/20/2024] tiotropium, 2 puff, inhalation, Daily   And  [START ON 8/20/2024] fluticasone furoate-vilanteroL, 1 puff, inhalation, Daily  methylPREDNISolone sodium succinate (PF), 125 mg, intravenous, Once  [START ON 8/20/2024] pantoprazole, 40 mg, oral, Daily   Or  [START ON 8/20/2024] pantoprazole, 40 mg, intravenous, Daily  predniSONE, 10 mg, oral, Daily  rosuvastatin, 20 mg, oral, Nightly  SITagliptin phosphate, 50 mg, oral, BID  sodium chloride, 1,000 mL, intravenous, Once  [START ON 8/20/2024] tamsulosin, 0.4 mg, oral, Daily with lunch  traZODone, 100 mg, oral, Nightly  vancomycin, 2,000 mg, intravenous, Once      Continuous medications  sodium chloride 0.9%, 75 mL/hr      PRN medications  PRN medications: acetaminophen **OR** acetaminophen **OR** acetaminophen, melatonin, nitroglycerin, ondansetron **OR** ondansetron, polyethylene glycol        Results for orders placed or performed during the hospital encounter of 08/19/24 (from the past 24 hour(s))   Electrocardiogram, 12-lead PRN ACS symptoms   Result Value Ref Range    Ventricular Rate 95 BPM    Atrial Rate 95 BPM    WV Interval 146 ms    QRS Duration 86 ms    QT Interval 336 ms    QTC Calculation(Bazett) 422 ms    P Axis 66 degrees    R Axis -15 degrees    T Axis 148 degrees    QRS Count 16 beats    Q Onset 213 ms    P Onset 140 ms    P Offset 194 ms    T Offset 381 ms    QTC Fredericia 391 ms   CBC and Auto Differential   Result Value Ref Range    WBC 14.7 (H) 4.4 - 11.3 x10*3/uL    nRBC 0.0 0.0 - 0.0 /100 WBCs    RBC 3.08 (L) 4.50 - 5.90 x10*6/uL    Hemoglobin 10.0 (L) 13.5 - 17.5 g/dL    Hematocrit 30.9 (L) 41.0 - 52.0 %     80 - 100 fL    MCH 32.5 26.0 " - 34.0 pg    MCHC 32.4 32.0 - 36.0 g/dL    RDW 18.0 (H) 11.5 - 14.5 %    Platelets 129 (L) 150 - 450 x10*3/uL    Neutrophils % 91.0 40.0 - 80.0 %    Immature Granulocytes %, Automated 0.7 0.0 - 0.9 %    Lymphocytes % 2.2 13.0 - 44.0 %    Monocytes % 5.4 2.0 - 10.0 %    Eosinophils % 0.6 0.0 - 6.0 %    Basophils % 0.1 0.0 - 2.0 %    Neutrophils Absolute 13.39 (H) 1.60 - 5.50 x10*3/uL    Immature Granulocytes Absolute, Automated 0.11 0.00 - 0.50 x10*3/uL    Lymphocytes Absolute 0.32 (L) 0.80 - 3.00 x10*3/uL    Monocytes Absolute 0.79 0.05 - 0.80 x10*3/uL    Eosinophils Absolute 0.09 0.00 - 0.40 x10*3/uL    Basophils Absolute 0.02 0.00 - 0.10 x10*3/uL   Comprehensive Metabolic Panel   Result Value Ref Range    Glucose 130 (H) 74 - 99 mg/dL    Sodium 131 (L) 136 - 145 mmol/L    Potassium 5.1 3.5 - 5.3 mmol/L    Chloride 104 98 - 107 mmol/L    Bicarbonate 20 (L) 21 - 32 mmol/L    Anion Gap 12 10 - 20 mmol/L    Urea Nitrogen 22 6 - 23 mg/dL    Creatinine 1.50 (H) 0.50 - 1.30 mg/dL    eGFR 47 (L) >60 mL/min/1.73m*2    Calcium 10.1 8.6 - 10.3 mg/dL    Albumin 2.9 (L) 3.4 - 5.0 g/dL    Alkaline Phosphatase 358 (H) 33 - 136 U/L    Total Protein 6.2 (L) 6.4 - 8.2 g/dL    AST 53 (H) 9 - 39 U/L    Bilirubin, Total 0.3 0.0 - 1.2 mg/dL    ALT 61 (H) 10 - 52 U/L   Lactate   Result Value Ref Range    Lactate 2.5 (H) 0.4 - 2.0 mmol/L   Troponin I, High Sensitivity   Result Value Ref Range    Troponin I, High Sensitivity 64 (HH) 0 - 20 ng/L   Ammonia   Result Value Ref Range    Ammonia 26 16 - 53 umol/L   Calcium, Ionized   Result Value Ref Range    POCT Calcium, Ionized 1.58 (H) 1.1 - 1.33 mmol/L   Light Blue Top   Result Value Ref Range    Extra Tube Hold for add-ons.    Lavender Top   Result Value Ref Range    Extra Tube Hold for add-ons.    Urinalysis with Reflex Culture and Microscopic   Result Value Ref Range    Color, Urine Light-Yellow Light-Yellow, Yellow, Dark-Yellow    Appearance, Urine Clear Clear    Specific Gravity,  Urine 1.019 1.005 - 1.035    pH, Urine 5.5 5.0, 5.5, 6.0, 6.5, 7.0, 7.5, 8.0    Protein, Urine 50 (1+) (A) NEGATIVE, 10 (TRACE), 20 (TRACE) mg/dL    Glucose, Urine Normal Normal mg/dL    Blood, Urine NEGATIVE NEGATIVE    Ketones, Urine NEGATIVE NEGATIVE mg/dL    Bilirubin, Urine NEGATIVE NEGATIVE    Urobilinogen, Urine Normal Normal mg/dL    Nitrite, Urine NEGATIVE NEGATIVE    Leukocyte Esterase, Urine NEGATIVE NEGATIVE   Urinalysis Microscopic   Result Value Ref Range    WBC, Urine 1-5 1-5, NONE /HPF    RBC, Urine 1-2 NONE, 1-2, 3-5 /HPF    Squamous Epithelial Cells, Urine 1-9 (SPARSE) Reference range not established. /HPF    Bacteria, Urine 1+ (A) NONE SEEN /HPF    Mucus, Urine FEW Reference range not established. /LPF    Fine Granular Casts, Urine 2+ (A) NONE /LPF   Lactate   Result Value Ref Range    Lactate 2.1 (H) 0.4 - 2.0 mmol/L   Sars-CoV-2 PCR   Result Value Ref Range    Coronavirus 2019, PCR Not Detected Not Detected       XR chest 1 view    Result Date: 8/19/2024  STUDY: Chest Radiograph;  8/19/2024 4:04 PM INDICATION: Weakness. COMPARISON: 8/2/2024 XR Chest. ACCESSION NUMBER(S): [id number] ORDERING CLINICIAN: JOSE CENTENO TECHNIQUE:  Frontal chest was obtained at 16:04 hours. FINDINGS: CARDIOMEDIASTINAL SILHOUETTE: Cardiomediastinal silhouette is normal in size and configuration. Patient status post mid sternotomy.  Prominence central pulmonary vasculature which appears stable.  LUNGS: Lungs are clear.  There is no pneumothorax or pleural effusion.  ABDOMEN: No remarkable upper abdominal findings.  BONES: No acute osseous changes.    No acute cardiopulmonary disease. Signed by Timothy Gill DO    Electrocardiogram, 12-lead PRN ACS symptoms    Result Date: 8/19/2024  Normal sinus rhythm Nonspecific ST and T wave abnormality Abnormal ECG When compared with ECG of 19-AUG-2024 15:13, (unconfirmed) No significant change was found    ECG 12 lead    Result Date: 8/2/2024  Normal sinus rhythm Left  axis deviation Nonspecific ST abnormality Abnormal ECG When compared with ECG of 28-JUL-2024 10:56, ST no longer depressed in Lateral leads See ED provider note for full interpretation and clinical correlation Confirmed by Halie Dao ([id number]) on 8/2/2024 10:53:13 AM    XR chest 1 view    Result Date: 8/2/2024  Interpreted By:  Atul García, STUDY: XR CHEST 1 VIEW;  8/2/2024 3:13 am   INDICATION: Signs/Symptoms:Chest Pain.   COMPARISON: Chest CT 07/20/2024   ACCESSION NUMBER(S): [id number]   ORDERING CLINICIAN: DOUGLAS FERNANDEZ   FINDINGS:     CARDIOMEDIASTINAL SILHOUETTE: Cardiomediastinal silhouette is stable in size and configuration.   LUNGS: Low lung volumes. No pulmonary consolidations. No pneumothorax or pleural effusions.   ABDOMEN: No remarkable upper abdominal findings.   BONES: No acute osseous abnormality. Severe glenohumeral osteoarthritis with superior subluxation of the bilateral humeral heads. Cerclage wires are intact.       No acute cardiopulmonary process.   MACRO: None   Signed by: Atul García 8/2/2024 3:23 AM Dictation workstation:   [id number]    CT lumbar spine wo IV contrast    Result Date: 8/2/2024  Interpreted By:  Atul García, STUDY: CT LUMBAR SPINE WO IV CONTRAST;  8/2/2024 3:04 am   INDICATION: Signs/Symptoms:fall, back pain.   COMPARISON: CT lumbar spine 07/28/2024   ACCESSION NUMBER(S): [id number]   ORDERING CLINICIAN: DOUGLAS FERNANDEZ   TECHNIQUE: Axial noncontrast images of the lumbar spine with coronal and sagittal reconstructed images.   FINDINGS: ALIGNMENT: No traumatic subluxation. VERTEBRAE: The bones are diffusely demineralized limiting evaluation for subtle nondisplaced fractures. No new fractures identified. Similar cortical buckling at the sacrococcygeal junction. Similar subacute compression fracture of the L2 vertebral body superior endplate (206/47) with minimal height loss, unchanged compared to 07/28/2024. SPINAL CANAL: Extensive multilevel  degenerative changes resulting in multifocal areas of central canal and neural foraminal narrowing as detailed on CT 07/28/2024. PREVERTEBRAL SOFT TISSUES: No prevertebral soft tissue swelling.   OTHER FINDINGS: Nodular hyperplasia of the adrenal glands. Bilateral nonobstructive renal calculi. Severe aortic atherosclerotic calcification. No new aneurysmal dilation. Dystrophic calcifications in the prostate. Extensive sigmoid diverticulosis.       1. No new fractures detected. No traumatic subluxations. Evaluation for subtle nondisplaced fractures is limited due to severe osteopenia. 2. Unchanged subacute L2 superior endplate compression deformity with similar height loss compared to 07/28/2024. 3. Extensive degenerative changes throughout the spine as detailed on CT 07/28/2020.     MACRO: None   Signed by: Atul García 8/2/2024 3:22 AM Dictation workstation:   [id number]    CT head wo IV contrast    Result Date: 8/2/2024  Interpreted By:  Atul García, STUDY: CT HEAD WO IV CONTRAST;  8/2/2024 3:04 am   INDICATION: Signs/Symptoms:fall.   COMPARISON: Head CT 07/28/2024   ACCESSION NUMBER(S): [id number]   ORDERING CLINICIAN: DOUGLAS FERNANDEZ   TECHNIQUE: Axial non-contrast CT images of the head. Coronal and sagittal images were reconstructed.   FINDINGS: BRAIN PARENCHYMA: Sub-cortical and periventricular hypoattenuating foci, likely sequelae of chronic ischemic microangiopathy. Gray-white matter differentiation is preserved.   VENTRICLES: The ventricles and sulci are within normal limits in size for brain volume. No midline shift. EXTRA-AXIAL SPACES: No abnormal extraaxial fluid collection. EXTRACRANIAL SOFT TISSUES: Within normal limits. PARANASAL SINUSES: Layering fluid and frothy contents in the right maxillary sinus with maxillary sinus hyperostosis, likely subacute or chronic sinusitis, similar to prior. MASTOIDS: Mastoid air cells are aerated. CALVARIUM: No depressed skull fracture. No destructive osseous  lesion. VESSELS: Calcification of the cavernous carotids and vertebral arteries.   OTHER FINDINGS: Bilateral lens replacements.       No acute intracranial abnormality.   Additional chronic findings are stable as detailed above.   MACRO: None   Signed by: Atul García 8/2/2024 3:14 AM Dictation workstation:   [id number]    ECG 12 lead    Result Date: 7/28/2024  Sinus tachycardia Left ventricular hypertrophy with repolarization abnormality Abnormal ECG When compared with ECG of 28-JUL-2024 08:42, (unconfirmed) Non-specific change in ST segment in Inferior leads See ED provider note for full interpretation and clinical correlation Confirmed by Halie Dixon ([id number]) on 7/28/2024 1:34:21 PM    ECG 12 lead    Result Date: 7/28/2024  Normal sinus rhythm Nonspecific ST abnormality Abnormal ECG When compared with ECG of 13-OCT-2022 12:40, Questionable change in QRS axis Non-specific change in ST segment in Inferior leads ST now depressed in Lateral leads Nonspecific T wave abnormality, improved in Lateral leads QT has lengthened See ED provider note for full interpretation and clinical correlation See ED provider note for full interpretation and clinical correlation Confirmed by Halie Dixon ([id number]) on 7/28/2024 1:33:58 PM    CT chest abdomen pelvis w IV contrast    Result Date: 7/28/2024  STUDY: CT Chest, Abdomen, and Pelvis with IV Contrast; 7/28/2024 10:34 AM INDICATION: Trauma/fall, bruising to right posterior chest wall, lower back. COMPARISON: None Available. ACCESSION NUMBER(S): [id number] ORDERING CLINICIAN: DOUGLAS FERNANDEZ TECHNIQUE: CT of the chest, abdomen, and pelvis was performed.  Contiguous axial images were obtained at 3 mm slice thickness through the chest, abdomen, and pelvis.  Coronal and sagittal reconstructions at 3 mm slice thickness were performed.  FINDINGS: CHEST: MEDIASTINUM: The heart is normal in size without pericardial effusion.  Central vascular structures opacify normally.   Patient is status post median sternotomy.  Coronary artery calcifications are a marker for coronary artery disease. LUNGS/PLEURA: There is no pleural effusion, pleural thickening, or pneumothorax. There are secretions in the trachea extending into the right mainstem bronchus. Lungs demonstrate emphysematous changes.  There is elevation right hemidiaphragm.  There is scarring in the superior segment left lower lobe. LYMPH NODES: There is enlarged right paratracheal lymph node measuring 2.5 x 1.8 cm (image 30 axial images).  There is enlarged subcarinal lymph node measuring 2.8 x 2.1 cm.  There are calcified mediastinal and right hilar lymph nodes. ABDOMEN:  LIVER: The liver is enlarged with contour abnormality and multiple masses concerning for metastatic disease.  Calcified granuloma are demonstrated within the liver.  BILE DUCTS: No intrahepatic or extrahepatic biliary ductal dilatation.  GALLBLADDER: The gallbladder is prominent with luminal gallstones. STOMACH: No abnormalities identified.  Calcified granuloma are demonstrated within the spleen.  PANCREAS: No masses or ductal dilatation.  SPLEEN: No splenomegaly or focal splenic lesion.  ADRENAL GLANDS: No thickening or nodules.  KIDNEYS AND URETERS: Kidneys are normal in size and location.  Bilateral nonobstructive renal calculi are demonstrated with dominant calculus lower pole left kidney measuring 8 mm.  Hyperdense nodule midpole right kidney measures 1.7 x 1.7 cm (image 109 axial images).  There is smaller focus lower pole right kidney measuring 6 mm.  Bilateral renal cysts are demonstrated.  PELVIS:  BLADDER: No abnormalities identified.  REPRODUCTIVE ORGANS: Prostate gland measures 4.8 cm. BOWEL: The appendix is normal.  There is moderate sigmoid colon diverticulosis without evidence of diverticulitis. VESSELS: No abnormalities identified.  Abdominal aorta demonstrates atherosclerotic changes.  PERITONEUM/RETROPERITONEUM/LYMPH NODES: No free fluid.  No  pneumoperitoneum. No lymphadenopathy.  ABDOMINAL WALL: No abnormalities identified. SOFT TISSUES: No abnormalities identified.  BONES: No acute fracture or aggressive osseous lesion.  There is disc space narrowing and endplate degenerative changes greater in the lumbar spine.  There is multilevel posterior disc osteophyte formation with narrowing of the central canal and neural foramen which appears greater at L3-4 and L4-5.    Chest: No acute traumatic injury. Enlarged right paratracheal and subcarinal lymph nodes. Emphysematous changes without acute process. Secretions within the trachea. Coronary artery calcifications are a marker for coronary artery disease. Sequela of prior granulomatous process. Abdomen: No acute traumatic injury. Evidence of extensive hepatic metastasis.  Please correlate for history of malignancy. Hyperdense nodules right kidney.  Recommend ultrasound for further evaluation. Sequela of prior granulomatous process. Nonobstructive renal calculi. Pelvis: No acute traumatic injury. Sigmoid colon diverticulosis without evidence of diverticulitis. Signed by Timothy Gill, DO    CT thoracic spine wo IV contrast    Result Date: 7/28/2024  STUDY: CT Thoracic Spine and Lumbar Spine without IV Contrast; 7/28/2024 10:36 AM INDICATION: Trauma/fall, back pain. COMPARISON: MR lumbar 11/8/2021.  XR lumbar 11/3/2021. ACCESSION NUMBER(S): [id number], [id number] ORDERING CLINICIAN: DOUGLAS FERNANDEZ TECHNIQUE:  CT of the thoracic spine and lumbar spine was performed without intravenous or intrathecal contrast.  Sagittal and coronal reconstructions were generated.  Automated mA/kV exposure control was utilized and patient examination was performed in strict accordance with principles of ALARA. FINDINGS: Thoracic spine: The alignment is anatomic.  There is no fracture or traumatic subluxation. The vertebral body heights are well maintained.  There is mild disc space narrowing and anterior marginal  osteophytes.  There is mild facet arthrosis.  There is small posterior disc osteophyte formation at T10-11 greater on the left contributing to mild central canal stenosis and left greater than right neural foraminal stenosis. The paravertebral soft tissues are within normal limits.  The visualized chest and abdomen are unremarkable.  There are calcified right hilar lymph nodes.  There is secretions within the trachea. Findings extend in the right mainstem bronchus. Lumbar spine: The alignment is anatomic.  There is no fracture or traumatic subluxation. There is superior endplate concavity at L2 with compression deformity and approximately 10-20% height loss and findings concerning for acute to subacute fracture along the superior endplate greater on the right.  There is multilevel disc desiccation and disc space narrowing with posterior disc osteophyte formation with advanced narrowing of the central canal and neural foramina greater at L4-5.  At L3-4 there is posterior disc osteophyte formation and mild to moderate narrowing of the central canal and advanced narrowing of the bilateral neural foramina. At L2-3 there is broad-based disc bulge and facet arthrosis.  There is at least mild narrowing of the central canal and advanced narrowing of the neural foramina greater on the left. At L5-S1 there is posterior disc osteophyte formation with mild narrowing of the central canal and advanced narrowing of the neural foramina greater on the right. The paravertebral soft tissues are within normal limits.  There are atherosclerotic changes of the abdominal aorta.  There is a fatty liver morphology.    Thoracic spine: No acute fracture or malalignment.  Degenerative changes as described which appear greater at T10-11. Lumbar spine: Superior endplate compression deformity at L2 and mild compression deformity concerning for acute to subacute fracture. Significant multilevel degenerative changes with narrowing of the central  canal and neural foramina which appear greater at L4-5. Signed by Timothy Gill, DO    CT lumbar spine wo IV contrast    Result Date: 7/28/2024  STUDY: CT Thoracic Spine and Lumbar Spine without IV Contrast; 7/28/2024 10:36 AM INDICATION: Trauma/fall, back pain. COMPARISON: MR lumbar 11/8/2021.  XR lumbar 11/3/2021. ACCESSION NUMBER(S): [id number], [id number] ORDERING CLINICIAN: DOUGLAS FERNANDEZ TECHNIQUE:  CT of the thoracic spine and lumbar spine was performed without intravenous or intrathecal contrast.  Sagittal and coronal reconstructions were generated.  Automated mA/kV exposure control was utilized and patient examination was performed in strict accordance with principles of ALARA. FINDINGS: Thoracic spine: The alignment is anatomic.  There is no fracture or traumatic subluxation. The vertebral body heights are well maintained.  There is mild disc space narrowing and anterior marginal osteophytes.  There is mild facet arthrosis.  There is small posterior disc osteophyte formation at T10-11 greater on the left contributing to mild central canal stenosis and left greater than right neural foraminal stenosis. The paravertebral soft tissues are within normal limits.  The visualized chest and abdomen are unremarkable.  There are calcified right hilar lymph nodes.  There is secretions within the trachea. Findings extend in the right mainstem bronchus. Lumbar spine: The alignment is anatomic.  There is no fracture or traumatic subluxation. There is superior endplate concavity at L2 with compression deformity and approximately 10-20% height loss and findings concerning for acute to subacute fracture along the superior endplate greater on the right.  There is multilevel disc desiccation and disc space narrowing with posterior disc osteophyte formation with advanced narrowing of the central canal and neural foramina greater at L4-5.  At L3-4 there is posterior disc osteophyte formation and mild to moderate  narrowing of the central canal and advanced narrowing of the bilateral neural foramina. At L2-3 there is broad-based disc bulge and facet arthrosis.  There is at least mild narrowing of the central canal and advanced narrowing of the neural foramina greater on the left. At L5-S1 there is posterior disc osteophyte formation with mild narrowing of the central canal and advanced narrowing of the neural foramina greater on the right. The paravertebral soft tissues are within normal limits.  There are atherosclerotic changes of the abdominal aorta.  There is a fatty liver morphology.    Thoracic spine: No acute fracture or malalignment.  Degenerative changes as described which appear greater at T10-11. Lumbar spine: Superior endplate compression deformity at L2 and mild compression deformity concerning for acute to subacute fracture. Significant multilevel degenerative changes with narrowing of the central canal and neural foramina which appear greater at L4-5. Signed by Timothy Gill, DO    CT cervical spine wo IV contrast    Result Date: 7/28/2024  Interpreted By:  Sathish Munoz, STUDY: CT CERVICAL SPINE WO IV CONTRAST; 7/28/2024 10:35 am   INDICATION: Signs/Symptoms:fall.   COMPARISON: None.   ACCESSION NUMBER(S): [id number]   ORDERING CLINICIAN: DOUGLAS FERNANDEZ   TECHNIQUE: Contiguous axial CT images were obtained at  2 mm slice thickness through the cervical spine without contrast administration. The images were then reconstructed in the coronal and sagittal planes.   FINDINGS: There is no CT evidence of acute fracture identified. No prevertebral soft tissue swelling is identified.   ALIGNMENT: The vertebral bodies are well aligned without evidence of subluxation.   VERTEBRAE/DISC SPACES: Moderate disc space and marginal osteophyte formation is seen C5-6 and C6-7 levels. Moderate to severe facet degenerative changes are seen on C2-3, C3-4 and C4-5 levels.   ADDITIONAL FINDINGS: Evaluation of the visualized  soft tissues of the neck is limited by the lack of intravenous contrast.  Within this limitation, no gross mass or lymphadenopathy is identified.       1.  No CT evidence of acute fracture. 2.  Degenerative changes throughout the cervical spine, as above.   Signed by: Sathish Munoz 7/28/2024 10:59 AM Dictation workstation:   [id number]    CT head wo IV contrast    Result Date: 7/28/2024  Interpreted By:  Sathish Munoz, STUDY: CT HEAD WO IV CONTRAST; 7/28/2024 10:35 am   INDICATION: Signs/Symptoms:fall on eliquis.   COMPARISON: None.   ACCESSION NUMBER(S): [id number]   ORDERING CLINICIAN: DOUGLAS FERNANDEZ   TECHNIQUE: Contiguous axial CT images were obtained through the head at 5 mm slice thickness without contrast administration.   FINDINGS: INTRACRANIAL: The ventricles, sulci and basal cisterns are within normal limits for size and configuration. The grey-white differentiation is intact. There is no mass effect or midline shift. There is no extraaxial fluid collection. There is no intracranial hemorrhage.  The calvarium is unremarkable.   EXTRACRANIAL: Visualized paranasal sinuses and mastoids are clear.       No evidence of acute cortical infarct or intracranial hemorrhage.   MACRO: None     Signed by: Sathish Munoz 7/28/2024 10:54 AM Dictation workstation:   [id number]         Assessment/Plan   Assessment & Plan  Elevated troponin    Sepsis with acute renal failure (Multi)    Epigastric pain    Hyponatremia    Myasthenia gravis (Multi)    Generalized weakness    History of coronary artery bypass graft    History of lung cancer    This patient presented with worsening weakness over the last month.  He had hyponatremia, hypercalcemia and KIRILL on prior admission.  He was treated for pneumonia with Levaquin.  Patient has myasthenia gravis currently on prednisone and azathioprine.    Patient did meet sepsis criteria including lactic acidosis, leukocytosis, tachycardia, low blood pressure, and KIRILL.  -Was given  vancomycin and Zosyn in the ER.  Will continue vancomycin, and switch to cefepime.  -Will give another bolus of IV fluid normal saline.  Also continue with normal saline especially with hyponatremia.  -Will obtain CT scan of the chest/abdomen/pelvis for further evaluation of sepsis source.  -Avoid antibiotics that might affect myasthenia gravis flareup including Levaquin.  -Neurology consult.  Very likely myasthenia gravis flareup.  -Obtain ESR and C-reactive protein  -Will give a dose of Solu-Medrol, and continue oral prednisone home dose.  -Obtain COVID test.   - Follow-up blood culture and urine culture.  -Repeat lactate.    -Trend troponin.    -Obtain echocardiogram.  Patient reported some chest pain lately, and has elevated troponin.  -Oncology evaluation.  Patient does have elevated liver enzyme and currently on azathioprine.  Might need to adjust medication.  -Continue Eliquis.  -Gentle IV fluid hydration for KIRILL.  -Pantoprazole for GI prophylaxis.  -DVT prophylaxis with Eliquis and SCD.      Ann Waters MD

## 2024-08-19 NOTE — TELEPHONE ENCOUNTER
Flakita from [facility] called to inform you that they cannot accept Anshu for the conditions listed on the referral. She states he must have a \"current acute issue\" such as Exacerbation from COPD for insurance to cover Home Care.

## 2024-08-20 ENCOUNTER — APPOINTMENT (OUTPATIENT)
Dept: CARDIOLOGY | Facility: HOSPITAL | Age: 79
End: 2024-08-20
Payer: MEDICARE

## 2024-08-20 LAB
ALBUMIN SERPL BCP-MCNC: 2.6 G/DL (ref 3.4–5)
ALP SERPL-CCNC: 323 U/L (ref 33–136)
ALT SERPL W P-5'-P-CCNC: 55 U/L (ref 10–52)
ANION GAP SERPL CALC-SCNC: 11 MMOL/L (ref 10–20)
AORTIC VALVE MEAN GRADIENT: 2 MMHG
AORTIC VALVE PEAK VELOCITY: 1 M/S
AST SERPL W P-5'-P-CCNC: 48 U/L (ref 9–39)
AV PEAK GRADIENT: 4 MMHG
AVA (PEAK VEL): 3.38 CM2
AVA (VTI): 3.11 CM2
BILIRUB SERPL-MCNC: 0.4 MG/DL (ref 0–1.2)
BUN SERPL-MCNC: 20 MG/DL (ref 6–23)
CALCIUM SERPL-MCNC: 9.6 MG/DL (ref 8.6–10.3)
CHLORIDE SERPL-SCNC: 104 MMOL/L (ref 98–107)
CO2 SERPL-SCNC: 20 MMOL/L (ref 21–32)
CREAT SERPL-MCNC: 1.22 MG/DL (ref 0.5–1.3)
EGFRCR SERPLBLD CKD-EPI 2021: 60 ML/MIN/1.73M*2
EJECTION FRACTION APICAL 4 CHAMBER: 60
EJECTION FRACTION: 60 %
ERYTHROCYTE [DISTWIDTH] IN BLOOD BY AUTOMATED COUNT: 17.7 % (ref 11.5–14.5)
GLUCOSE BLD MANUAL STRIP-MCNC: 143 MG/DL (ref 74–99)
GLUCOSE SERPL-MCNC: 88 MG/DL (ref 74–99)
HCT VFR BLD AUTO: 27.5 % (ref 41–52)
HGB BLD-MCNC: 8.9 G/DL (ref 13.5–17.5)
HOLD SPECIMEN: NORMAL
HOLD SPECIMEN: NORMAL
LEFT ATRIUM VOLUME AREA LENGTH INDEX BSA: 10.7 ML/M2
LEFT VENTRICLE INTERNAL DIMENSION DIASTOLE: 3.99 CM (ref 3.5–6)
LEFT VENTRICULAR OUTFLOW TRACT DIAMETER: 2.12 CM
LV EJECTION FRACTION BIPLANE: 60 %
MCH RBC QN AUTO: 31.1 PG (ref 26–34)
MCHC RBC AUTO-ENTMCNC: 32.4 G/DL (ref 32–36)
MCV RBC AUTO: 96 FL (ref 80–100)
MITRAL VALVE E/A RATIO: 0.51
NRBC BLD-RTO: 0 /100 WBCS (ref 0–0)
PLATELET # BLD AUTO: 107 X10*3/UL (ref 150–450)
POTASSIUM SERPL-SCNC: 5.3 MMOL/L (ref 3.5–5.3)
PROCALCITONIN SERPL-MCNC: 0.44 NG/ML
PROT SERPL-MCNC: 5.6 G/DL (ref 6.4–8.2)
RBC # BLD AUTO: 2.86 X10*6/UL (ref 4.5–5.9)
RIGHT VENTRICLE FREE WALL PEAK S': 14.3 CM/S
SODIUM SERPL-SCNC: 130 MMOL/L (ref 136–145)
TRICUSPID ANNULAR PLANE SYSTOLIC EXCURSION: 1.9 CM
VANCOMYCIN SERPL-MCNC: 13.6 UG/ML (ref 5–20)
VANCOMYCIN SERPL-MCNC: 18.2 UG/ML (ref 5–20)
WBC # BLD AUTO: 12.9 X10*3/UL (ref 4.4–11.3)

## 2024-08-20 PROCEDURE — 36415 COLL VENOUS BLD VENIPUNCTURE: CPT

## 2024-08-20 PROCEDURE — 2500000001 HC RX 250 WO HCPCS SELF ADMINISTERED DRUGS (ALT 637 FOR MEDICARE OP): Performed by: INTERNAL MEDICINE

## 2024-08-20 PROCEDURE — 82947 ASSAY GLUCOSE BLOOD QUANT: CPT

## 2024-08-20 PROCEDURE — 93306 TTE W/DOPPLER COMPLETE: CPT

## 2024-08-20 PROCEDURE — 84145 PROCALCITONIN (PCT): CPT | Mod: ELYLAB | Performed by: STUDENT IN AN ORGANIZED HEALTH CARE EDUCATION/TRAINING PROGRAM

## 2024-08-20 PROCEDURE — 2500000001 HC RX 250 WO HCPCS SELF ADMINISTERED DRUGS (ALT 637 FOR MEDICARE OP): Performed by: STUDENT IN AN ORGANIZED HEALTH CARE EDUCATION/TRAINING PROGRAM

## 2024-08-20 PROCEDURE — 2500000002 HC RX 250 W HCPCS SELF ADMINISTERED DRUGS (ALT 637 FOR MEDICARE OP, ALT 636 FOR OP/ED): Performed by: INTERNAL MEDICINE

## 2024-08-20 PROCEDURE — 1210000001 HC SEMI-PRIVATE ROOM DAILY

## 2024-08-20 PROCEDURE — 80202 ASSAY OF VANCOMYCIN: CPT

## 2024-08-20 PROCEDURE — 2500000004 HC RX 250 GENERAL PHARMACY W/ HCPCS (ALT 636 FOR OP/ED): Performed by: INTERNAL MEDICINE

## 2024-08-20 PROCEDURE — 93306 TTE W/DOPPLER COMPLETE: CPT | Performed by: INTERNAL MEDICINE

## 2024-08-20 PROCEDURE — 99232 SBSQ HOSP IP/OBS MODERATE 35: CPT | Performed by: STUDENT IN AN ORGANIZED HEALTH CARE EDUCATION/TRAINING PROGRAM

## 2024-08-20 PROCEDURE — 84075 ASSAY ALKALINE PHOSPHATASE: CPT | Performed by: INTERNAL MEDICINE

## 2024-08-20 PROCEDURE — 85027 COMPLETE CBC AUTOMATED: CPT | Performed by: INTERNAL MEDICINE

## 2024-08-20 PROCEDURE — 36415 COLL VENOUS BLD VENIPUNCTURE: CPT | Performed by: INTERNAL MEDICINE

## 2024-08-20 PROCEDURE — 99223 1ST HOSP IP/OBS HIGH 75: CPT | Performed by: PSYCHIATRY & NEUROLOGY

## 2024-08-20 PROCEDURE — 2500000004 HC RX 250 GENERAL PHARMACY W/ HCPCS (ALT 636 FOR OP/ED)

## 2024-08-20 RX ORDER — DIPHENHYDRAMINE HCL 25 MG
1 CAPSULE ORAL EVERY 6 HOURS PRN
COMMUNITY

## 2024-08-20 RX ORDER — INSULIN LISPRO 100 [IU]/ML
0-12 INJECTION, SOLUTION INTRAVENOUS; SUBCUTANEOUS
COMMUNITY

## 2024-08-20 RX ORDER — LEVOFLOXACIN 500 MG/1
1 TABLET, FILM COATED ORAL DAILY
COMMUNITY
End: 2024-08-22 | Stop reason: HOSPADM

## 2024-08-20 RX ORDER — ONDANSETRON 4 MG/1
1 TABLET, FILM COATED ORAL EVERY 8 HOURS PRN
COMMUNITY

## 2024-08-20 RX ORDER — IPRATROPIUM BROMIDE AND ALBUTEROL SULFATE 2.5; .5 MG/3ML; MG/3ML
3 SOLUTION RESPIRATORY (INHALATION) 3 TIMES DAILY
COMMUNITY

## 2024-08-20 RX ORDER — IPRATROPIUM BROMIDE AND ALBUTEROL SULFATE 2.5; .5 MG/3ML; MG/3ML
3 SOLUTION RESPIRATORY (INHALATION) EVERY 6 HOURS PRN
Status: DISCONTINUED | OUTPATIENT
Start: 2024-08-20 | End: 2024-08-22 | Stop reason: HOSPADM

## 2024-08-20 RX ORDER — ACETAMINOPHEN 500 MG
1 TABLET ORAL EVERY 4 HOURS PRN
COMMUNITY

## 2024-08-20 RX ORDER — TRAZODONE HYDROCHLORIDE 150 MG/1
150 TABLET ORAL NIGHTLY
Status: DISCONTINUED | OUTPATIENT
Start: 2024-08-20 | End: 2024-08-22 | Stop reason: HOSPADM

## 2024-08-20 RX ORDER — ZINC SULFATE 50(220)MG
50 CAPSULE ORAL DAILY
COMMUNITY

## 2024-08-20 RX ORDER — TRAZODONE HYDROCHLORIDE 150 MG/1
150 TABLET ORAL NIGHTLY
COMMUNITY

## 2024-08-20 RX ORDER — CALCIUM CARBONATE 300MG(750)
400 TABLET,CHEWABLE ORAL DAILY
COMMUNITY

## 2024-08-20 RX ORDER — ALPRAZOLAM 0.25 MG/1
0.25 TABLET ORAL EVERY 12 HOURS PRN
COMMUNITY
End: 2024-08-22 | Stop reason: HOSPADM

## 2024-08-20 ASSESSMENT — COGNITIVE AND FUNCTIONAL STATUS - GENERAL
STANDING UP FROM CHAIR USING ARMS: TOTAL
CLIMB 3 TO 5 STEPS WITH RAILING: TOTAL
STANDING UP FROM CHAIR USING ARMS: TOTAL
CLIMB 3 TO 5 STEPS WITH RAILING: TOTAL
MOVING FROM LYING ON BACK TO SITTING ON SIDE OF FLAT BED WITH BEDRAILS: A LOT
MOVING TO AND FROM BED TO CHAIR: TOTAL
EATING MEALS: A LITTLE
MOBILITY SCORE: 10
EATING MEALS: A LITTLE
CLIMB 3 TO 5 STEPS WITH RAILING: TOTAL
TOILETING: TOTAL
DRESSING REGULAR LOWER BODY CLOTHING: TOTAL
TURNING FROM BACK TO SIDE WHILE IN FLAT BAD: A LOT
DRESSING REGULAR UPPER BODY CLOTHING: A LOT
MOBILITY SCORE: 8
TURNING FROM BACK TO SIDE WHILE IN FLAT BAD: A LOT
PERSONAL GROOMING: A LOT
STANDING UP FROM CHAIR USING ARMS: A LOT
EATING MEALS: A LOT
WALKING IN HOSPITAL ROOM: TOTAL
HELP NEEDED FOR BATHING: A LOT
TOILETING: TOTAL
WALKING IN HOSPITAL ROOM: TOTAL
TOILETING: A LOT
DRESSING REGULAR UPPER BODY CLOTHING: A LOT
HELP NEEDED FOR BATHING: TOTAL
MOVING TO AND FROM BED TO CHAIR: TOTAL
MOVING TO AND FROM BED TO CHAIR: A LOT
MOVING FROM LYING ON BACK TO SITTING ON SIDE OF FLAT BED WITH BEDRAILS: A LOT
PERSONAL GROOMING: TOTAL
DRESSING REGULAR UPPER BODY CLOTHING: A LOT
DRESSING REGULAR LOWER BODY CLOTHING: A LOT
MOVING FROM LYING ON BACK TO SITTING ON SIDE OF FLAT BED WITH BEDRAILS: A LOT
DAILY ACTIVITIY SCORE: 9
PERSONAL GROOMING: A LOT
DRESSING REGULAR LOWER BODY CLOTHING: TOTAL
MOBILITY SCORE: 8
DAILY ACTIVITIY SCORE: 9
HELP NEEDED FOR BATHING: TOTAL
TURNING FROM BACK TO SIDE WHILE IN FLAT BAD: A LOT
DAILY ACTIVITIY SCORE: 13
WALKING IN HOSPITAL ROOM: TOTAL

## 2024-08-20 ASSESSMENT — PAIN SCALES - GENERAL
PAINLEVEL_OUTOF10: 0 - NO PAIN
PAINLEVEL_OUTOF10: 0 - NO PAIN

## 2024-08-20 ASSESSMENT — PAIN - FUNCTIONAL ASSESSMENT: PAIN_FUNCTIONAL_ASSESSMENT: 0-10

## 2024-08-20 ASSESSMENT — PAIN SCALES - WONG BAKER: WONGBAKER_NUMERICALRESPONSE: NO HURT

## 2024-08-20 ASSESSMENT — VISUAL ACUITY: METHOD_CF: 1

## 2024-08-20 NOTE — CARE PLAN
The patient's goals for the shift include  monitor electrolytes    The clinical goals for the shift include maintain safety    Over the shift, the patient did not make progress toward the following goals. Barriers to progression include dehydration. Recommendations to address these barriers include give fluids.

## 2024-08-20 NOTE — CONSULTS
Vancomycin Dosing by Pharmacy- INITIAL    Timothy Varela is a 79 y.o. year old male who Pharmacy has been consulted for vancomycin dosing for other sepsis . Based on the patient's indication and renal status this patient will be dosed based on a goal AUC of 500-600.     Renal function is currently stable.    Visit Vitals  BP 97/59   Pulse 92   Temp 36.2 °C (97.2 °F)   Resp 18        Lab Results   Component Value Date    CREATININE 1.50 (H) 2024    CREATININE 1.35 (H) 2024    CREATININE 1.18 2024    CREATININE 1.42 (H) 2024        Patient weight is as follows:   Vitals:    24 1505   Weight: 79.4 kg (175 lb)       Cultures:  No results found for the encounter in last 14 days.        No intake/output data recorded.  I/O during current shift:  I/O this shift:  In: 100 [IV Piggyback:100]  Out: -     Temp (24hrs), Av.2 °C (97.2 °F), Min:36.2 °C (97.2 °F), Max:36.2 °C (97.2 °F)         Assessment/Plan     Patient has already been given a loading dose of 2000 mg.  Will initiate vancomycin maintenance,  1250 mg every 24 hours.    This dosing regimen is predicted by [employer] to result in the following pharmacokinetic parameters:  Regimen: 1250 mg IV every 24 hours.  Start time: 08:17 on 2024  Exposure target: AUC24 (range)400-600 mg/L.hr   AUC24,ss: 596 mg/L.hr  Probability of AUC24 > 400: 88 %  Ctrough,ss: 19 mg/L  Probability of Ctrough,ss > 20: 45 %  Probability of nephrotoxicity (Lodise KUMAR ): 16 %      Follow-up level will be ordered on 24 at 0100 and 24 at 0600 unless clinically indicated sooner.  Will continue to monitor renal function daily while on vancomycin and order serum creatinine at least every 48 hours if not already ordered.  Follow for continued vancomycin needs, clinical response, and signs/symptoms of toxicity.       Zenobia Mack, DerrickD

## 2024-08-20 NOTE — PROGRESS NOTES
"ASSESSMENT & PLAN:       Myasthenia gravis, concern for acute flare  -Neuro consulted for evaluation; recs appreciated.  -Continued on prednisone 10 mg daily & imuran     Concern for sepsis with acute renal failure  -Sepsis criteria met with lactic acidosis, leukocytosis, tachycardia, low blood pressure, and KIRILL on presentation  -No clear source of infection identified. Will continue on vanc/cefepime for now  -Will follow cultures and will obtain procalcitonin for further ATB decisoin making, symptoms may be related to myasthenia flare    Troponin Elevations  -Mildly more elevated from baseline on presentation. Elevations in flat pattern however. TTE ordered for further evaluation. Will consider cards consultation based on results. Low suspicion for ACS given clinical appearance and reported history.     Hyponatremia  -Na 130. Will continue IV fluids and monitor.     Hx Lung cancer, CAD s/p CABG, Dm CKD  -Home meds resumed as applicable      VTE Prophylaxis: on eliquis      Yared Osborn MD    SUBJECTIVE     Patient had no acute events overnight.  Very unhappy this morning.  Expresses frustration regarding recurrent hospitalizations.  States he is wants to be home.  States that he is able to walk with mild assistance and feels like his normal self.  Denies any fevers chills or chest pain.  Further ROS was unremarkable.      OBJECTIVE:       Last Recorded Vitals:  Vitals:    08/19/24 1956 08/19/24 2145 08/20/24 0154 08/20/24 0833   BP: 97/59  105/58 121/65   BP Location:    Right arm   Patient Position:    Lying   Pulse: 92  84 91   Resp: 18   18   Temp:   37.1 °C (98.8 °F) 35.8 °C (96.4 °F)   TempSrc:    Temporal   SpO2: 95%  94% 94%   Weight:  78.9 kg (174 lb)     Height:  1.803 m (5' 10.99\")         Last I/O:  I/O last 3 completed shifts:  In: 343.8 (4.4 mL/kg) [I.V.:243.8 (3.1 mL/kg); IV Piggyback:100]  Out: - (0 mL/kg)   Weight: 78.9 kg     Physical Exam:  General: Ill-appearing lderly male in mild " distress  HEENT: Clear sclera, EOMI, trachea midline, moist mucous membranes  Respiratory: Equal chest rise, no retractions, expiratory wheezing  Abdomen: Soft, nontender, nondistended  Extremities: No cyanosis or clubbing appreciated  Neurological: Spontaneously moves all extremities, no dysarthria, cranial nerves grossly intact  Psychiatric: Appropriate mood and affect  Skin: Warm, dry    Inpatient Medications:  apixaban, 5 mg, oral, BID  azaTHIOprine, 50 mg, oral, Daily  busPIRone, 10 mg, oral, BID  cefepime, 1 g, intravenous, q12h  cyanocobalamin, 1,000 mcg, oral, Daily  tiotropium, 2 puff, inhalation, Daily   And  fluticasone furoate-vilanteroL, 1 puff, inhalation, Daily  pantoprazole, 40 mg, oral, Daily   Or  pantoprazole, 40 mg, intravenous, Daily  predniSONE, 10 mg, oral, Daily  rosuvastatin, 20 mg, oral, Nightly  SITagliptin phosphate, 50 mg, oral, BID  sodium chloride, 1,000 mL, intravenous, Once  tamsulosin, 0.4 mg, oral, Daily with lunch  traZODone, 100 mg, oral, Nightly  vancomycin, 1,250 mg, intravenous, q24h        PRN Medications  PRN medications: acetaminophen **OR** acetaminophen **OR** acetaminophen, melatonin, nitroglycerin, ondansetron **OR** ondansetron, polyethylene glycol, vancomycin    Continuous Medications:  sodium chloride 0.9%, 75 mL/hr, Last Rate: 75 mL/hr (08/20/24 0812)          LABS AND IMAGING:     Labs:  Results for orders placed or performed during the hospital encounter of 08/19/24 (from the past 24 hour(s))   Electrocardiogram, 12-lead PRN ACS symptoms   Result Value Ref Range    Ventricular Rate 95 BPM    Atrial Rate 95 BPM    MI Interval 146 ms    QRS Duration 86 ms    QT Interval 336 ms    QTC Calculation(Bazett) 422 ms    P Axis 66 degrees    R Axis -15 degrees    T Axis 148 degrees    QRS Count 16 beats    Q Onset 213 ms    P Onset 140 ms    P Offset 194 ms    T Offset 381 ms    QTC Fredericia 391 ms   Blood Culture    Specimen: Peripheral Venipuncture; Blood culture    Result Value Ref Range    Blood Culture Loaded on Instrument - Culture in progress    CBC and Auto Differential   Result Value Ref Range    WBC 14.7 (H) 4.4 - 11.3 x10*3/uL    nRBC 0.0 0.0 - 0.0 /100 WBCs    RBC 3.08 (L) 4.50 - 5.90 x10*6/uL    Hemoglobin 10.0 (L) 13.5 - 17.5 g/dL    Hematocrit 30.9 (L) 41.0 - 52.0 %     80 - 100 fL    MCH 32.5 26.0 - 34.0 pg    MCHC 32.4 32.0 - 36.0 g/dL    RDW 18.0 (H) 11.5 - 14.5 %    Platelets 129 (L) 150 - 450 x10*3/uL    Neutrophils % 91.0 40.0 - 80.0 %    Immature Granulocytes %, Automated 0.7 0.0 - 0.9 %    Lymphocytes % 2.2 13.0 - 44.0 %    Monocytes % 5.4 2.0 - 10.0 %    Eosinophils % 0.6 0.0 - 6.0 %    Basophils % 0.1 0.0 - 2.0 %    Neutrophils Absolute 13.39 (H) 1.60 - 5.50 x10*3/uL    Immature Granulocytes Absolute, Automated 0.11 0.00 - 0.50 x10*3/uL    Lymphocytes Absolute 0.32 (L) 0.80 - 3.00 x10*3/uL    Monocytes Absolute 0.79 0.05 - 0.80 x10*3/uL    Eosinophils Absolute 0.09 0.00 - 0.40 x10*3/uL    Basophils Absolute 0.02 0.00 - 0.10 x10*3/uL   Comprehensive Metabolic Panel   Result Value Ref Range    Glucose 130 (H) 74 - 99 mg/dL    Sodium 131 (L) 136 - 145 mmol/L    Potassium 5.1 3.5 - 5.3 mmol/L    Chloride 104 98 - 107 mmol/L    Bicarbonate 20 (L) 21 - 32 mmol/L    Anion Gap 12 10 - 20 mmol/L    Urea Nitrogen 22 6 - 23 mg/dL    Creatinine 1.50 (H) 0.50 - 1.30 mg/dL    eGFR 47 (L) >60 mL/min/1.73m*2    Calcium 10.1 8.6 - 10.3 mg/dL    Albumin 2.9 (L) 3.4 - 5.0 g/dL    Alkaline Phosphatase 358 (H) 33 - 136 U/L    Total Protein 6.2 (L) 6.4 - 8.2 g/dL    AST 53 (H) 9 - 39 U/L    Bilirubin, Total 0.3 0.0 - 1.2 mg/dL    ALT 61 (H) 10 - 52 U/L   Lactate   Result Value Ref Range    Lactate 2.5 (H) 0.4 - 2.0 mmol/L   Blood Culture    Specimen: Peripheral Venipuncture; Blood culture   Result Value Ref Range    Blood Culture Loaded on Instrument - Culture in progress    Troponin I, High Sensitivity   Result Value Ref Range    Troponin I, High Sensitivity 64 (HH)  0 - 20 ng/L   C-reactive protein   Result Value Ref Range    C-Reactive Protein 2.60 (H) <1.00 mg/dL   Ammonia   Result Value Ref Range    Ammonia 26 16 - 53 umol/L   Calcium, Ionized   Result Value Ref Range    POCT Calcium, Ionized 1.58 (H) 1.1 - 1.33 mmol/L   Light Blue Top   Result Value Ref Range    Extra Tube Hold for add-ons.    Lavender Top   Result Value Ref Range    Extra Tube Hold for add-ons.    Urinalysis with Reflex Culture and Microscopic   Result Value Ref Range    Color, Urine Light-Yellow Light-Yellow, Yellow, Dark-Yellow    Appearance, Urine Clear Clear    Specific Gravity, Urine 1.019 1.005 - 1.035    pH, Urine 5.5 5.0, 5.5, 6.0, 6.5, 7.0, 7.5, 8.0    Protein, Urine 50 (1+) (A) NEGATIVE, 10 (TRACE), 20 (TRACE) mg/dL    Glucose, Urine Normal Normal mg/dL    Blood, Urine NEGATIVE NEGATIVE    Ketones, Urine NEGATIVE NEGATIVE mg/dL    Bilirubin, Urine NEGATIVE NEGATIVE    Urobilinogen, Urine Normal Normal mg/dL    Nitrite, Urine NEGATIVE NEGATIVE    Leukocyte Esterase, Urine NEGATIVE NEGATIVE   Extra Urine Gray Tube   Result Value Ref Range    Extra Tube Hold for add-ons.    Urinalysis Microscopic   Result Value Ref Range    WBC, Urine 1-5 1-5, NONE /HPF    RBC, Urine 1-2 NONE, 1-2, 3-5 /HPF    Squamous Epithelial Cells, Urine 1-9 (SPARSE) Reference range not established. /HPF    Bacteria, Urine 1+ (A) NONE SEEN /HPF    Mucus, Urine FEW Reference range not established. /LPF    Fine Granular Casts, Urine 2+ (A) NONE /LPF   Lactate   Result Value Ref Range    Lactate 2.1 (H) 0.4 - 2.0 mmol/L   Sars-CoV-2 PCR   Result Value Ref Range    Coronavirus 2019, PCR Not Detected Not Detected   Sedimentation rate, automated   Result Value Ref Range    Sedimentation Rate 40 (H) 0 - 20 mm/h   Troponin I, High Sensitivity   Result Value Ref Range    Troponin I, High Sensitivity 59 (HH) 0 - 20 ng/L   Lactate   Result Value Ref Range    Lactate 1.9 0.4 - 2.0 mmol/L   Vancomycin   Result Value Ref Range    Vancomycin  18.2 5.0 - 20.0 ug/mL   CBC   Result Value Ref Range    WBC 12.9 (H) 4.4 - 11.3 x10*3/uL    nRBC 0.0 0.0 - 0.0 /100 WBCs    RBC 2.86 (L) 4.50 - 5.90 x10*6/uL    Hemoglobin 8.9 (L) 13.5 - 17.5 g/dL    Hematocrit 27.5 (L) 41.0 - 52.0 %    MCV 96 80 - 100 fL    MCH 31.1 26.0 - 34.0 pg    MCHC 32.4 32.0 - 36.0 g/dL    RDW 17.7 (H) 11.5 - 14.5 %    Platelets 107 (L) 150 - 450 x10*3/uL   Comprehensive metabolic panel   Result Value Ref Range    Glucose 88 74 - 99 mg/dL    Sodium 130 (L) 136 - 145 mmol/L    Potassium 5.3 3.5 - 5.3 mmol/L    Chloride 104 98 - 107 mmol/L    Bicarbonate 20 (L) 21 - 32 mmol/L    Anion Gap 11 10 - 20 mmol/L    Urea Nitrogen 20 6 - 23 mg/dL    Creatinine 1.22 0.50 - 1.30 mg/dL    eGFR 60 (L) >60 mL/min/1.73m*2    Calcium 9.6 8.6 - 10.3 mg/dL    Albumin 2.6 (L) 3.4 - 5.0 g/dL    Alkaline Phosphatase 323 (H) 33 - 136 U/L    Total Protein 5.6 (L) 6.4 - 8.2 g/dL    AST 48 (H) 9 - 39 U/L    Bilirubin, Total 0.4 0.0 - 1.2 mg/dL    ALT 55 (H) 10 - 52 U/L   Vancomycin   Result Value Ref Range    Vancomycin 13.6 5.0 - 20.0 ug/mL   SST TOP   Result Value Ref Range    Extra Tube Hold for add-ons.    Transthoracic Echo (TTE) Complete   Result Value Ref Range    AV mn grad 2.0 mmHg    AV pk randall 1.00 m/s    LV Biplane EF 60 %    LVOT diam 2.12 cm    MV E/A ratio 0.51     Tricuspid annular plane systolic excursion 1.9 cm    LA vol index A/L 10.7 ml/m2    LV EF 60 %    RV free wall pk S' 14.30 cm/s    LVIDd 3.99 cm    Aortic Valve Area by Continuity of Peak Velocity 3.38 cm2    AV pk grad 4.0 mmHg    Aortic Valve Area by Continuity of VTI 3.11 cm2    LV A4C EF 60.0         Imaging:  Transthoracic Echo (TTE) [place] [id number]   Tel [phone] Fax [phone]    TRANSTHORACIC ECHOCARDIOGRAM REPORT    Patient Name:      VERONICA BRADFORD        Reading Physician:    [id number] Tyrell Tran  DO  Study Date:        8/20/2024            Ordering Provider:    [id number] FELISHA CARVAJAL  MRN/PID:           [id number]             Fellow:  Accession#:        [id number]         Nurse:  Date of Birth/Age: 1945 / 79 years Sonographer:          Maddy Chao                                                                RDCS  Gender:            M                    Additional Staff:  Height:            177.80 cm            Admit Date:           8/19/2024  Weight:            78.93 kg             Admission Status:     Inpatient -                                                                Routine  BSA / BMI:         1.97 m2 / 24.97      Department Location:  [facility]/m2                                      Echo Lab  Blood Pressure: 105 /58 mmHg    Study Type:    TRANSTHORACIC ECHO (TTE) COMPLETE  Diagnosis/ICD: Elevated Troponin-R79.89  Indication:    Elevated Troponin  CPT Codes:     Echo Complete w Full [id number]    Patient History:  CABG:              Mulitivessel CABG.  Smoker:            Former.  Diabetes:          Yes  Pertinent History: CAD, HTN, COPD and Lung cancer.    Study Detail: The following Echo studies were performed: 2D, M-Mode, Doppler and                color flow. Technically challenging study due to poor acoustic                windows. The patient was awake.       PHYSICIAN INTERPRETATION:  Left Ventricle: The left ventricular systolic function is normal, with a visually estimated ejection fraction of 60%. There are no regional wall motion abnormalities. The left ventricular cavity size is normal. There is mild to moderate concentric left ventricular hypertrophy. Spectral Doppler shows an impaired relaxation pattern of left ventricular diastolic filling.  LV Wall Scoring:  All segments are normal.    Left Atrium: The left atrium is upper limits of normal in size.  Right Ventricle: The right  ventricle is normal in size. There is normal right ventricular global systolic function.  Right Atrium: The right atrium is normal in size.  Aortic Valve: The aortic valve appears structurally normal. The aortic valve appears tricuspid. There is mild aortic valve cusp calcification. There is no evidence of aortic valve stenosis.  The aortic valve dimensionless index is 0.88. There is no evidence of aortic valve regurgitation. The peak instantaneous gradient of the aortic valve is 4.0 mmHg. The mean gradient of the aortic valve is 2.0 mmHg.  Mitral Valve: The mitral valve is normal in structure. There is no evidence of mitral valve stenosis. There is normal mitral valve leaflet mobility. There is mild to moderate mitral annular calcification. There is no evidence of mitral valve regurgitation.  Tricuspid Valve: The tricuspid valve is structurally normal. There is normal tricuspid valve leaflet mobility. There is trace tricuspid regurgitation.  Pulmonic Valve: The pulmonic valve is not well visualized. There is no indication of pulmonic valve regurgitation.  Pericardium: There is no pericardial effusion noted.  Aorta: The aortic root is normal.  Pulmonary Artery: The pulmonary artery is not well visualized.  Systemic Veins: The inferior vena cava appears to be of normal size.  In comparison to the previous echocardiogram(s): There are no prior studies on this patient for comparison purposes.       CONCLUSIONS:   1. The left ventricular systolic function is normal, with a visually estimated ejection fraction of 60%.   2. Spectral Doppler shows an impaired relaxation pattern of left ventricular diastolic filling.   3. There is normal right ventricular global systolic function.   4. There is no evidence of mitral valve stenosis.   5. No evidence of mitral valve regurgitation.   6. Trace tricuspid regurgitation is visualized.   7. Aortic valve stenosis is not present.   8. The pulmonary artery is not well  visualized.    RECOMMENDATIONS:  Technically suboptimal and limited study, therefore accuracy of above interpretation could be substantially diminished. Clinical correlation is advised. Consider additional imaging modalities if clinically indicated.     QUANTITATIVE DATA SUMMARY:  2D MEASUREMENTS:                           Normal Ranges:  Ao Root d:     4.27 cm   (2.0-3.7cm)  LAs:           3.90 cm   (2.7-4.0cm)  IVSd:          1.25 cm   (0.6-1.1cm)  LVPWd:         1.12 cm   (0.6-1.1cm)  LVIDd:         3.99 cm   (3.9-5.9cm)  LVIDs:         2.73 cm  LV Mass Index: 82.2 g/m2  LV % FS        31.6 %    LA VOLUME:                                Normal Ranges:  LA Vol A4C:        21.1 ml    (22+/-6mL/m2)  LA Vol A2C:        20.9 ml  LA Vol BP:         21.0 ml  LA Vol Index A4C:  10.7ml/m2  LA Vol Index A2C:  10.6 ml/m2  LA Vol Index BP:   10.7 ml/m2  LA Area A4C:       10.7 cm2  LA Area A2C:       10.6 cm2  LA Major Axis A4C: 4.6 cm  LA Major Axis A2C: 4.6 cm  LA Volume Index:   10.5 ml/m2    RA VOLUME BY A/L METHOD:                                Normal Ranges:  RA Vol A4C:        24.3 ml    (8.3-19.5ml)  RA Vol Index A4C:  12.4 ml/m2  RA Area A4C:       11.6 cm2  RA Major Axis A4C: 4.7 cm    AORTA MEASUREMENTS:                     Normal Ranges:  Asc Ao, d: 3.46 cm (2.1-3.4cm)    LV SYSTOLIC FUNCTION BY 2D PLANIMETRY (MOD):                       Normal Ranges:  EF-A4C View:    60 % (>=55%)  EF-A2C View:    61 %  EF-Biplane:     60 %  EF-Visual:      60 %  LV EF Reported: 60 %    LV DIASTOLIC FUNCTION:                          Normal Ranges:  MV Peak E:    0.61 m/s  (0.7-1.2 m/s)  MV Peak A:    1.20 m/s  (0.42-0.7 m/s)  E/A Ratio:    0.50      (1.0-2.2)  MV e'         0.079 m/s (>8.0)  MV lateral e' 0.10 m/s  MV medial e'  0.06 m/s  E/e' Ratio:   7.69      (<8.0)    MITRAL VALVE:                  Normal Ranges:  MV DT: 213 msec (150-240msec)    AORTIC VALVE:                                    Normal Ranges:  AoV Vmax:                 1.00 m/s (<=1.7m/s)  AoV Peak P.0 mmHg (<20mmHg)  AoV Mean P.0 mmHg (1.7-11.5mmHg)  LVOT Max Hemal:            0.95 m/s (<=1.1m/s)  AoV VTI:                 18.60 cm (18-25cm)  LVOT VTI:                16.40 cm  LVOT Diameter:           2.12 cm  (1.8-2.4cm)  AoV Area, VTI:           3.11 cm2 (2.5-5.5cm2)  AoV Area,Vmax:           3.38 cm2 (2.5-4.5cm2)  AoV Dimensionless Index: 0.88       RIGHT VENTRICLE:  RV Basal 3.39 cm  RV Mid   2.52 cm  RV Major 5.8 cm  TAPSE:   18.5 mm  RV s'    0.14 m/s    TRICUSPID VALVE/RVSP:                    Normal Ranges:  IVC Diam: 1.32 cm    PULMONIC VALVE:                          Normal Ranges:  PV Accel Time: 106 msec (>120ms)  PV Max Hemal:    1.1 m/s  (0.6-0.9m/s)  PV Max P.9 mmHg       [id number] Tyrell Tran DO  Electronically signed on 2024 at 10:48:57 AM       Wall Scoring       ** Final **   \

## 2024-08-20 NOTE — PROGRESS NOTES
Vancomycin Dosing by Pharmacy- FOLLOW UP    Timothy Varela is a 79 y.o. year old male who Pharmacy has been consulted for vancomycin dosing for sepsis. Based on the patient's indication and renal status this patient is being dosed based on a goal AUC of 500-600.     Renal function is currently stable.    Current vancomycin dose: 1250 mg given every 24 hours    Estimated vancomycin AUC on current dose: 495 mg/L.hr     Visit Vitals  /58   Pulse 84   Temp 37.1 °C (98.8 °F)   Resp 18        Lab Results   Component Value Date    CREATININE 1.22 2024    CREATININE 1.50 (H) 2024    CREATININE 1.35 (H) 2024    CREATININE 1.18 2024        Patient weight is as follows:   Vitals:    24 2145   Weight: 78.9 kg (174 lb)       Cultures:  No results found for the encounter in last 14 days.       No intake/output data recorded.  I/O during current shift:  I/O this shift:  In: 343.8 [I.V.:243.8; IV Piggyback:100]  Out: -     Temp (24hrs), Av.7 °C (98 °F), Min:36.2 °C (97.2 °F), Max:37.1 °C (98.8 °F)      Assessment/Plan    Within goal AUC range. Continue current vancomycin regimen.    This dosing regimen is predicted by [employer] to result in the following pharmacokinetic parameters:  Loading dose: 2000 mg  Regimen: 1250 mg IV every 24 hours.  Start time: 08:17 on 2024  Exposure target: AUC24 (range)400-600 mg/L.hr   AUC24,ss: 495 mg/L.hr  Probability of AUC24 > 400: 81 %  Ctrough,ss: 15.8 mg/L  Probability of Ctrough,ss > 20: 22 %  Probability of nephrotoxicity (Lodise KUMAR ): 11 %      The next level will be obtained on  at 0500. May be obtained sooner if clinically indicated.   Will continue to monitor renal function daily while on vancomycin and order serum creatinine at least every 48 hours if not already ordered.  Follow for continued vancomycin needs, clinical response, and signs/symptoms of toxicity.       Gabriela Diamond, [facility]

## 2024-08-20 NOTE — CONSULTS
Inpatient consult to Neurology  Consult performed by: HAILEY Anaya-CNP  Consult ordered by: Ann Waters MD          History Of Present Illness  Timothy Varela is a 79 y.o. male presenting with increased weakness and PNA. He has a hx of MG and sees [facility]ist. He is taking Imuran and steroid daily, medications are administered by his wife. He was just discharged early August for hypercalcemia, hyponatremia, KIRILL. Patient was discharged to nursing home for rehab. He was then found to have pneumonia and was placed on Levaquin for 7 days which he finished. Patient reported poor appetite, significant weakness which is generalized and needs assistant even to sit in bed.   ED Course: EKG NSR VS 97/57 96 18 96%ra 36.2*C   Labs: lactic acidosis 2.5, and has leukocytosis 14.7.  His creatinine was 1.5.  Patient has elevated liver enzyme ALT 61 and AST 53.  His calcium level was normal today.  Patient continued to have mild hyponatremia 131.  Chest x-ray reported unremarkable for acute pathology. He was started on antibiotics and admitted for sepsis.   On exam, he is negative for ptosis, diplopia, dysphagia, cervical weakness, respiratory distress. He is positive for masked facies, generalized weakness throughout. He is answering questions appropriately, alert to person, place, time, no SOB, on RA.   Review of systems are negative unless otherwise specified in HPI.    Past Medical History  History reviewed. No pertinent past medical history.  Surgical History  History reviewed. No pertinent surgical history.  Social History  Social History     Tobacco Use    Smoking status: Former     Current packs/day: 1.00     Average packs/day: 1 pack/day for 1.6 years (1.6 ttl pk-yrs)     Types: Cigarettes     Start date: 2023    Smokeless tobacco: Never   Vaping Use    Vaping status: Never Used   Substance Use Topics    Alcohol use: Never    Drug use: Never     Allergies  Canagliflozin and Metformin  Medications Prior to  Admission   Medication Sig Dispense Refill Last Dose    apixaban (Eliquis) 5 mg tablet Take 1 tablet (5 mg) by mouth twice a day.       ascorbic acid, vitamin C, 1,000 mg capsule Take 1 capsule by mouth twice a day.       ascorbic acid/collagen hydr (COLLAGEN SKIN RENEWAL ORAL) Take 1 capsule by mouth 2 times a day.       azaTHIOprine (Imuran) 50 mg tablet Take 1 tablet (50 mg) by mouth twice a day.       busPIRone (Buspar) 10 mg tablet Take 1 tablet (10 mg) by mouth twice a day. 12:00 and 18:00.       chlorhexidine (Peridex) 0.12 % solution Use 15 mL in the mouth or throat if needed for wound care. Swish & Spit       cholecalciferol (Vitamin D-3) 25 MCG (1000 UT) tablet Take 1 tablet (1,000 Units) by mouth once daily in the evening. Take with meals.       cyanocobalamin (Vitamin B-12) 1,000 mcg tablet Take 1 tablet (1,000 mcg) by mouth once daily.       diphenoxylate-atropine (Lomotil) 2.5-0.025 mg tablet Take 1 tablet by mouth 4 times a day as needed.       ferrous sulfate, 325 mg ferrous sulfate, tablet Take 1 tablet by mouth twice a day.       fluticasone (Flonase) 50 mcg/actuation nasal spray Administer 1 spray into affected nostril(s) once daily.       fluticasone-umeclidin-vilanter (Trelegy Ellipta) 100-62.5-25 mcg blister with device Inhale 1 puff once daily.       furosemide (Lasix) 20 mg tablet Take 1 tablet (20 mg) by mouth once daily.       glipiZIDE (Glucotrol) 5 mg tablet Take 1 tablet (5 mg) by mouth 2 times a day before meals.       hydrOXYzine HCL (Atarax) 25 mg tablet Take 1 tablet (25 mg) by mouth every 8 hours if needed for anxiety (insomnia).       Januvia 100 mg tablet Take 1 tablet (100 mg) by mouth 2 times a day.       Lactobacillus acidophilus (PROBIOTIC ACIDOPHILUS ORAL) Take 1 capsule by mouth 2 times a day.       lidocaine-prilocaine (Emla) 2.5-2.5 % cream Apply 1 Application topically. One hour before accessing port       MAGNESIUM OXIDE ORAL Take 1 tablet by mouth once daily at bedtime.        megestrol 400 mg/10 mL (40 mg/mL) suspension Take 5 mL (200 mg) by mouth once daily.       metoprolol succinate XL (Toprol-XL) 50 mg 24 hr tablet Take 1 tablet (50 mg) by mouth once daily.       nitroglycerin (Nitrostat) 0.4 mg SL tablet Place 1 tablet (0.4 mg) under the tongue every 5 minutes if needed for chest pain. Up to 3 doses       pantoprazole (ProtoNix) 40 mg EC tablet Take 1 tablet (40 mg) by mouth once daily in the evening.       potassium chloride CR 20 mEq ER tablet Take 1 tablet (20 mEq) by mouth once daily at noon. Take with meals.       predniSONE (Deltasone) 10 mg tablet Take 1 tablet (10 mg) by mouth once daily.       prochlorperazine (Compazine) 10 mg tablet Take 1 tablet (10 mg) by mouth every 6 hours if needed for nausea.       rosuvastatin (Crestor) 20 mg tablet Take 1 tablet (20 mg) by mouth once daily at bedtime.       selenium 200 mcg tablet Take 1 tablet (200,000 mcg) by mouth once daily at bedtime.       tamsulosin (Flomax) 0.4 mg 24 hr capsule Take 1 capsule (0.4 mg) by mouth once daily at noon. Take with meals.       traZODone (Desyrel) 100 mg tablet Take 1 tablet (100 mg) by mouth once daily at bedtime.       zinc gluconate 50 mg tablet Take 1 tablet (50 mg) by mouth once daily.          Review of Systems  Neurological Exam  Mental Status  Awake. Oriented only to person, place and time. Recent and remote memory are intact. Speech is normal. Language is fluent with no aphasia. Attention and concentration are normal.    Cranial Nerves  CN II: Right visual acuity: Counts fingers. Left visual acuity: Counts fingers. Right normal visual field. Left normal visual field.  CN III, IV, VI: Extraocular movements intact bilaterally. No nystagmus.   Right pupil: 3 mm. Round. Reactive to light. Reactive to accommodation.   Left pupil: 3 mm. Round. Reactive to light. Reactive to accommodation.  CN V:  Right: Facial sensation is normal.  Left: Facial sensation is normal on the left.  CN  "VII:  Right: There is no facial weakness.  Left: There is no facial weakness.  CN VIII:  Right: Hearing is normal.  Left: Hearing is normal.  CN IX, X:  Right: Palate is normal.  Left: Palate is normal.  CN XI:  Right: Trapezius strength is normal.  Left: Trapezius strength is normal.  CN XII: Tongue midline without atrophy or fasciculations.    Motor  Normal muscle bulk throughout. Normal muscle tone. Strength is 5/5 in all four extremities except as noted.  Generalized weakness.    Sensory  Light touch is normal in upper and lower extremities.     Reflexes  Deep tendon reflexes are 2+ and symmetric in all four extremities.    Coordination  Right: Finger-to-nose normal.Left: Finger-to-nose normal.    Physical Exam  Constitutional:       General: He is awake.   HENT:      Right Ear: Hearing normal.      Left Ear: Hearing normal.   Eyes:      Extraocular Movements: EOM normal. No nystagmus.   Neurological:      Deep Tendon Reflexes: Reflexes are normal and symmetric.   Psychiatric:         Speech: Speech normal.         Last Recorded Vitals  Blood pressure 121/65, pulse 91, temperature 35.8 °C (96.4 °F), temperature source Temporal, resp. rate 18, height 1.803 m (5' 10.99\"), weight 78.9 kg (174 lb), SpO2 94%.    Relevant Results                    [place] Coma Scale  Best Eye Response: Spontaneous  Best Verbal Response: Oriented  Best Motor Response: Follows commands  [place] Coma Scale Score: 15                 I have personally reviewed the following imaging results Transthoracic Echo (TTE) Complete    Result Date: 8/20/2024          [place] [id number]  Tel [phone] Fax [phone] TRANSTHORACIC ECHOCARDIOGRAM REPORT Patient Name:      VERONICA Barrios Physician:    [id number] Tyrell Tran DO Study Date:        8/20/2024            Ordering Provider:    [id number] FELISHA CURTIS"                             ALENA MRN/PID:           [id number]             Fellow: Accession#:        [id number]         Nurse: Date of Birth/Age: 1945 / 79 years Sonographer:          Maddy Chao                                                               NAVA Gender:            M                    Additional Staff: Height:            177.80 cm            Admit Date:           8/19/2024 Weight:            78.93 kg             Admission Status:     Inpatient -                                                               Routine BSA / BMI:         1.97 m2 / 24.97      Department Location:  [place] [address]                                      Echo Lab Blood Pressure: 105 /58 mmHg Study Type:    TRANSTHORACIC ECHO (TTE) COMPLETE Diagnosis/ICD: Elevated Troponin-R79.89 Indication:    Elevated Troponin CPT Codes:     Echo Complete w Full [id number] Patient History: CABG:              Mulitivessel CABG. Smoker:            Former. Diabetes:          Yes Pertinent History: CAD, HTN, COPD and Lung cancer. Study Detail: The following Echo studies were performed: 2D, M-Mode, Doppler and               color flow. Technically challenging study due to poor acoustic               windows. The patient was awake.  PHYSICIAN INTERPRETATION: Left Ventricle: The left ventricular systolic function is normal, with a visually estimated ejection fraction of 60%. There are no regional wall motion abnormalities. The left ventricular cavity size is normal. There is mild to moderate concentric left ventricular hypertrophy. Spectral Doppler shows an impaired relaxation pattern of left ventricular diastolic filling. LV Wall Scoring: All segments are normal. Left Atrium: The left atrium is upper limits of normal in size. Right Ventricle: The right ventricle is normal in size. There is normal right ventricular global systolic function. Right Atrium: The right atrium is normal in size. Aortic Valve: The  aortic valve appears structurally normal. The aortic valve appears tricuspid. There is mild aortic valve cusp calcification. There is no evidence of aortic valve stenosis. The aortic valve dimensionless index is 0.88. There is no evidence of aortic valve regurgitation. The peak instantaneous gradient of the aortic valve is 4.0 mmHg. The mean gradient of the aortic valve is 2.0 mmHg. Mitral Valve: The mitral valve is normal in structure. There is no evidence of mitral valve stenosis. There is normal mitral valve leaflet mobility. There is mild to moderate mitral annular calcification. There is no evidence of mitral valve regurgitation. Tricuspid Valve: The tricuspid valve is structurally normal. There is normal tricuspid valve leaflet mobility. There is trace tricuspid regurgitation. Pulmonic Valve: The pulmonic valve is not well visualized. There is no indication of pulmonic valve regurgitation. Pericardium: There is no pericardial effusion noted. Aorta: The aortic root is normal. Pulmonary Artery: The pulmonary artery is not well visualized. Systemic Veins: The inferior vena cava appears to be of normal size. In comparison to the previous echocardiogram(s): There are no prior studies on this patient for comparison purposes.  CONCLUSIONS:  1. The left ventricular systolic function is normal, with a visually estimated ejection fraction of 60%.  2. Spectral Doppler shows an impaired relaxation pattern of left ventricular diastolic filling.  3. There is normal right ventricular global systolic function.  4. There is no evidence of mitral valve stenosis.  5. No evidence of mitral valve regurgitation.  6. Trace tricuspid regurgitation is visualized.  7. Aortic valve stenosis is not present.  8. The pulmonary artery is not well visualized. RECOMMENDATIONS: Technically suboptimal and limited study, therefore accuracy of above interpretation could be substantially diminished. Clinical correlation is advised. Consider  additional imaging modalities if clinically indicated.  QUANTITATIVE DATA SUMMARY: 2D MEASUREMENTS:                          Normal Ranges: Ao Root d:     4.27 cm   (2.0-3.7cm) LAs:           3.90 cm   (2.7-4.0cm) IVSd:          1.25 cm   (0.6-1.1cm) LVPWd:         1.12 cm   (0.6-1.1cm) LVIDd:         3.99 cm   (3.9-5.9cm) LVIDs:         2.73 cm LV Mass Index: 82.2 g/m2 LV % FS        31.6 % LA VOLUME:                               Normal Ranges: LA Vol A4C:        21.1 ml    (22+/-6mL/m2) LA Vol A2C:        20.9 ml LA Vol BP:         21.0 ml LA Vol Index A4C:  10.7ml/m2 LA Vol Index A2C:  10.6 ml/m2 LA Vol Index BP:   10.7 ml/m2 LA Area A4C:       10.7 cm2 LA Area A2C:       10.6 cm2 LA Major Axis A4C: 4.6 cm LA Major Axis A2C: 4.6 cm LA Volume Index:   10.5 ml/m2 RA VOLUME BY A/L METHOD:                               Normal Ranges: RA Vol A4C:        24.3 ml    (8.3-19.5ml) RA Vol Index A4C:  12.4 ml/m2 RA Area A4C:       11.6 cm2 RA Major Axis A4C: 4.7 cm AORTA MEASUREMENTS:                    Normal Ranges: Asc Ao, d: 3.46 cm (2.1-3.4cm) LV SYSTOLIC FUNCTION BY 2D PLANIMETRY (MOD):                      Normal Ranges: EF-A4C View:    60 % (>=55%) EF-A2C View:    61 % EF-Biplane:     60 % EF-Visual:      60 % LV EF Reported: 60 % LV DIASTOLIC FUNCTION:                         Normal Ranges: MV Peak E:    0.61 m/s  (0.7-1.2 m/s) MV Peak A:    1.20 m/s  (0.42-0.7 m/s) E/A Ratio:    0.50      (1.0-2.2) MV e'         0.079 m/s (>8.0) MV lateral e' 0.10 m/s MV medial e'  0.06 m/s E/e' Ratio:   7.69      (<8.0) MITRAL VALVE:                 Normal Ranges: MV DT: 213 msec (150-240msec) AORTIC VALVE:                                   Normal Ranges: AoV Vmax:                1.00 m/s (<=1.7m/s) AoV Peak P.0 mmHg (<20mmHg) AoV Mean P.0 mmHg (1.7-11.5mmHg) LVOT Max Hemal:            0.95 m/s (<=1.1m/s) AoV VTI:                 18.60 cm (18-25cm) LVOT VTI:                16.40 cm LVOT Diameter:            2.12 cm  (1.8-2.4cm) AoV Area, VTI:           3.11 cm2 (2.5-5.5cm2) AoV Area,Vmax:           3.38 cm2 (2.5-4.5cm2) AoV Dimensionless Index: 0.88  RIGHT VENTRICLE: RV Basal 3.39 cm RV Mid   2.52 cm RV Major 5.8 cm TAPSE:   18.5 mm RV s'    0.14 m/s TRICUSPID VALVE/RVSP:                   Normal Ranges: IVC Diam: 1.32 cm PULMONIC VALVE:                         Normal Ranges: PV Accel Time: 106 msec (>120ms) PV Max Hemal:    1.1 m/s  (0.6-0.9m/s) PV Max P.9 mmHg  [id number] Tyrell Tran DO Electronically signed on 2024 at 10:48:57 AM  Wall Scoring  ** Final **     CT chest abdomen pelvis wo IV contrast    Result Date: 2024  Interpreted By:  Atul García, STUDY: CT CHEST ABDOMEN PELVIS WO CONTRAST;  2024 8:06 pm   INDICATION: Signs/Symptoms:cough, vomiting, abdominal pain, sepsis, hs of lung cancer and myasthenia gravis.   COMPARISON: CTA chest abdomen pelvis 2020   ACCESSION NUMBER(S): [id number]   ORDERING CLINICIAN: FELISHA CARVAJAL   TECHNIQUE: Axial CT images of the chest, abdomen and pelvis with coronal and sagittal reconstructed images obtained without contrast.   FINDINGS: CHEST:   VESSELS: No aortic aneurysm. Extensive calcifications in the aorta and coronary arteries. HEART: Normal size.  No pericardial effusion. MEDIASTINUM AND YEIMY: Calcified mediastinal and hilar lymph nodes, likely sequelae of remote granulomatous disease. Additional enlarged mediastinal lymph nodes suspicious for metastatic disease, mildly increased in size compared to prior. For example, a right upper paratracheal lymph node measures 1.6 cm (201/18), previously 0.9 cm. A subcarinal lymph node measures 2.8 cm (201/47), previously 2.1 cm. LUNG, PLEURA, LARGE AIRWAYS: Small right pleural effusion, new compared to prior. No pneumothorax. Increased soft tissue thickening/cuffing surrounding the branches of the right mainstem bronchus (204/144) resulting in increased narrowing of the airways. Subtle tree-in-bud  nodularity in the right lower lobe and right middle lobe. Elevation of the right minor fissure, possibly sequelae of prior right upper lobe wedge resection. CHEST WALL AND LOWER NECK: Within normal limits. No acute osseous abnormality. Cerclage wires are intact. Diffuse osseous demineralization.   ABDOMEN:   BONES: Diffuse osseous demineralization. No acute fractures detected. Similar lucency through the superior endplate of the L2 vertebral body in keeping with subacute compression fracture. ABDOMINAL WALL: Lipomatous hypertrophy of the spermatic cords. Minimal edema in the bilateral thighs.   LIVER: Diffuse hypoattenuation throughout the liver parenchyma with multiple hypodense lesions, increased in conspicuity compared to 07/28/2024 suspicious for worsening metastatic disease. Granulomatous calcifications. BILE DUCTS: No biliary dilatation. GALLBLADDER: No calcified gallstones. No pericholecystic inflammatory changes. PANCREAS: Within normal limits. SPLEEN: Suspected hypoattenuating foci in the spleen, suspicious for metastatic disease although evaluation is limited in the absence of contrast. Granulomatous calcifications. ADRENALS: Within normal limits. KIDNEYS AND URETERS: Bilateral nonobstructing renal calculi. Redemonstration of subcentimeter cysts and additional indeterminate hyperdense cortical lesions, similar to prior. No hydroureter.   VESSELS: Atherosclerotic calcification of the aorta and its branches. No aneurysm. RETROPERITONEUM: Increased size of retroperitoneal and upper abdominal lymph nodes suspicious for metastatic disease. For example a portacaval lymph node measures up to 2.4 cm (201/101), previously 1.4 cm. Additionally, an aortocaval lymph node measures 0.9 cm (201/122), previously 0.8 cm.   PELVIS:   REPRODUCTIVE ORGANS: Prostatomegaly with dystrophic calcifications. BLADDER: Within normal limits.   BOWEL: Colonic diverticulosis. Nonobstructive. PERITONEUM: No ascites or free air, no fluid  collection.       Compared to 07/28/2024, findings are suspicious for worsening metastatic disease as demonstrated: 1. Increased size of presumed metastatic lymph nodes in the mediastinum and abdomen. 2. Increased size and number of hepatic metastases. 3. Suspected underlying splenic metastases, more conspicuous compared to prior exam but evaluation is limited without contrast. 4. Increased size of right perihilar soft tissue thickening resulting in narrowing of the right mainstem bronchus and its branches.   Additional noteworthy the findings are as follows: 1. Tree-in-bud nodularity in the right lower lobe. Differential diagnosis includes aspiration, bronchopneumonia, or endobronchial metastatic disease spread. 2. Small new right pleural effusion. 3. Stable subacute L2 compression fracture. 4. Stable indeterminate hyperdense renal lesions, possibly hemorrhagic cysts. 5. Bilateral nonobstructive renal calculi.   MACRO: None   Signed by: Atul García 8/19/2024 9:01 PM Dictation workstation:   [id number]    XR chest 1 view    Result Date: 8/19/2024  STUDY: Chest Radiograph;  8/19/2024 4:04 PM INDICATION: Weakness. COMPARISON: 8/2/2024 XR Chest. ACCESSION NUMBER(S): [id number] ORDERING CLINICIAN: JOSE CENTENO TECHNIQUE:  Frontal chest was obtained at 16:04 hours. FINDINGS: CARDIOMEDIASTINAL SILHOUETTE: Cardiomediastinal silhouette is normal in size and configuration. Patient status post mid sternotomy.  Prominence central pulmonary vasculature which appears stable.  LUNGS: Lungs are clear.  There is no pneumothorax or pleural effusion.  ABDOMEN: No remarkable upper abdominal findings.  BONES: No acute osseous changes.    No acute cardiopulmonary disease. Signed by Timothy Gill DO    Electrocardiogram, 12-lead PRN ACS symptoms    Result Date: 8/19/2024  Normal sinus rhythm Nonspecific ST and T wave abnormality Abnormal ECG When compared with ECG of 19-AUG-2024 15:13, (unconfirmed) No significant change  was found    ECG 12 lead    Result Date: 8/2/2024  Normal sinus rhythm Left axis deviation Nonspecific ST abnormality Abnormal ECG When compared with ECG of 28-JUL-2024 10:56, ST no longer depressed in Lateral leads See ED provider note for full interpretation and clinical correlation Confirmed by Halie Dao ([id number]) on 8/2/2024 10:53:13 AM    XR chest 1 view    Result Date: 8/2/2024  Interpreted By:  Atul García, STUDY: XR CHEST 1 VIEW;  8/2/2024 3:13 am   INDICATION: Signs/Symptoms:Chest Pain.   COMPARISON: Chest CT 07/20/2024   ACCESSION NUMBER(S): [id number]   ORDERING CLINICIAN: DOUGLAS FERNANDEZ   FINDINGS:     CARDIOMEDIASTINAL SILHOUETTE: Cardiomediastinal silhouette is stable in size and configuration.   LUNGS: Low lung volumes. No pulmonary consolidations. No pneumothorax or pleural effusions.   ABDOMEN: No remarkable upper abdominal findings.   BONES: No acute osseous abnormality. Severe glenohumeral osteoarthritis with superior subluxation of the bilateral humeral heads. Cerclage wires are intact.       No acute cardiopulmonary process.   MACRO: None   Signed by: Atul García 8/2/2024 3:23 AM Dictation workstation:   [id number]    CT lumbar spine wo IV contrast    Result Date: 8/2/2024  Interpreted By:  Atul García, STUDY: CT LUMBAR SPINE WO IV CONTRAST;  8/2/2024 3:04 am   INDICATION: Signs/Symptoms:fall, back pain.   COMPARISON: CT lumbar spine 07/28/2024   ACCESSION NUMBER(S): [id number]   ORDERING CLINICIAN: DOUGLAS FERNANDEZ   TECHNIQUE: Axial noncontrast images of the lumbar spine with coronal and sagittal reconstructed images.   FINDINGS: ALIGNMENT: No traumatic subluxation. VERTEBRAE: The bones are diffusely demineralized limiting evaluation for subtle nondisplaced fractures. No new fractures identified. Similar cortical buckling at the sacrococcygeal junction. Similar subacute compression fracture of the L2 vertebral body superior endplate (206/47) with minimal height loss,  unchanged compared to 07/28/2024. SPINAL CANAL: Extensive multilevel degenerative changes resulting in multifocal areas of central canal and neural foraminal narrowing as detailed on CT 07/28/2024. PREVERTEBRAL SOFT TISSUES: No prevertebral soft tissue swelling.   OTHER FINDINGS: Nodular hyperplasia of the adrenal glands. Bilateral nonobstructive renal calculi. Severe aortic atherosclerotic calcification. No new aneurysmal dilation. Dystrophic calcifications in the prostate. Extensive sigmoid diverticulosis.       1. No new fractures detected. No traumatic subluxations. Evaluation for subtle nondisplaced fractures is limited due to severe osteopenia. 2. Unchanged subacute L2 superior endplate compression deformity with similar height loss compared to 07/28/2024. 3. Extensive degenerative changes throughout the spine as detailed on CT 07/28/2020.     MACRO: None   Signed by: Atul García 8/2/2024 3:22 AM Dictation workstation:   [id number]    CT head wo IV contrast    Result Date: 8/2/2024  Interpreted By:  Atul García, STUDY: CT HEAD WO IV CONTRAST;  8/2/2024 3:04 am   INDICATION: Signs/Symptoms:fall.   COMPARISON: Head CT 07/28/2024   ACCESSION NUMBER(S): [id number]   ORDERING CLINICIAN: DOUGLAS FERNANDEZ   TECHNIQUE: Axial non-contrast CT images of the head. Coronal and sagittal images were reconstructed.   FINDINGS: BRAIN PARENCHYMA: Sub-cortical and periventricular hypoattenuating foci, likely sequelae of chronic ischemic microangiopathy. Gray-white matter differentiation is preserved.   VENTRICLES: The ventricles and sulci are within normal limits in size for brain volume. No midline shift. EXTRA-AXIAL SPACES: No abnormal extraaxial fluid collection. EXTRACRANIAL SOFT TISSUES: Within normal limits. PARANASAL SINUSES: Layering fluid and frothy contents in the right maxillary sinus with maxillary sinus hyperostosis, likely subacute or chronic sinusitis, similar to prior. MASTOIDS: Mastoid air cells are  aerated. CALVARIUM: No depressed skull fracture. No destructive osseous lesion. VESSELS: Calcification of the cavernous carotids and vertebral arteries.   OTHER FINDINGS: Bilateral lens replacements.       No acute intracranial abnormality.   Additional chronic findings are stable as detailed above.   MACRO: None   Signed by: Atul García 8/2/2024 3:14 AM Dictation workstation:   [id number]    ECG 12 lead    Result Date: 7/28/2024  Sinus tachycardia Left ventricular hypertrophy with repolarization abnormality Abnormal ECG When compared with ECG of 28-JUL-2024 08:42, (unconfirmed) Non-specific change in ST segment in Inferior leads See ED provider note for full interpretation and clinical correlation Confirmed by Halie Dixon ([id number]) on 7/28/2024 1:34:21 PM    ECG 12 lead    Result Date: 7/28/2024  Normal sinus rhythm Nonspecific ST abnormality Abnormal ECG When compared with ECG of 13-OCT-2022 12:40, Questionable change in QRS axis Non-specific change in ST segment in Inferior leads ST now depressed in Lateral leads Nonspecific T wave abnormality, improved in Lateral leads QT has lengthened See ED provider note for full interpretation and clinical correlation See ED provider note for full interpretation and clinical correlation Confirmed by Halie Dixon ([id number]) on 7/28/2024 1:33:58 PM    CT chest abdomen pelvis w IV contrast    Result Date: 7/28/2024  STUDY: CT Chest, Abdomen, and Pelvis with IV Contrast; 7/28/2024 10:34 AM INDICATION: Trauma/fall, bruising to right posterior chest wall, lower back. COMPARISON: None Available. ACCESSION NUMBER(S): [id number] ORDERING CLINICIAN: DOUGLAS FERNANDEZ TECHNIQUE: CT of the chest, abdomen, and pelvis was performed.  Contiguous axial images were obtained at 3 mm slice thickness through the chest, abdomen, and pelvis.  Coronal and sagittal reconstructions at 3 mm slice thickness were performed.  FINDINGS: CHEST: MEDIASTINUM: The heart is normal in size  without pericardial effusion.  Central vascular structures opacify normally.  Patient is status post median sternotomy.  Coronary artery calcifications are a marker for coronary artery disease. LUNGS/PLEURA: There is no pleural effusion, pleural thickening, or pneumothorax. There are secretions in the trachea extending into the right mainstem bronchus. Lungs demonstrate emphysematous changes.  There is elevation right hemidiaphragm.  There is scarring in the superior segment left lower lobe. LYMPH NODES: There is enlarged right paratracheal lymph node measuring 2.5 x 1.8 cm (image 30 axial images).  There is enlarged subcarinal lymph node measuring 2.8 x 2.1 cm.  There are calcified mediastinal and right hilar lymph nodes. ABDOMEN:  LIVER: The liver is enlarged with contour abnormality and multiple masses concerning for metastatic disease.  Calcified granuloma are demonstrated within the liver.  BILE DUCTS: No intrahepatic or extrahepatic biliary ductal dilatation.  GALLBLADDER: The gallbladder is prominent with luminal gallstones. STOMACH: No abnormalities identified.  Calcified granuloma are demonstrated within the spleen.  PANCREAS: No masses or ductal dilatation.  SPLEEN: No splenomegaly or focal splenic lesion.  ADRENAL GLANDS: No thickening or nodules.  KIDNEYS AND URETERS: Kidneys are normal in size and location.  Bilateral nonobstructive renal calculi are demonstrated with dominant calculus lower pole left kidney measuring 8 mm.  Hyperdense nodule midpole right kidney measures 1.7 x 1.7 cm (image 109 axial images).  There is smaller focus lower pole right kidney measuring 6 mm.  Bilateral renal cysts are demonstrated.  PELVIS:  BLADDER: No abnormalities identified.  REPRODUCTIVE ORGANS: Prostate gland measures 4.8 cm. BOWEL: The appendix is normal.  There is moderate sigmoid colon diverticulosis without evidence of diverticulitis. VESSELS: No abnormalities identified.  Abdominal aorta demonstrates  atherosclerotic changes.  PERITONEUM/RETROPERITONEUM/LYMPH NODES: No free fluid.  No pneumoperitoneum. No lymphadenopathy.  ABDOMINAL WALL: No abnormalities identified. SOFT TISSUES: No abnormalities identified.  BONES: No acute fracture or aggressive osseous lesion.  There is disc space narrowing and endplate degenerative changes greater in the lumbar spine.  There is multilevel posterior disc osteophyte formation with narrowing of the central canal and neural foramen which appears greater at L3-4 and L4-5.    Chest: No acute traumatic injury. Enlarged right paratracheal and subcarinal lymph nodes. Emphysematous changes without acute process. Secretions within the trachea. Coronary artery calcifications are a marker for coronary artery disease. Sequela of prior granulomatous process. Abdomen: No acute traumatic injury. Evidence of extensive hepatic metastasis.  Please correlate for history of malignancy. Hyperdense nodules right kidney.  Recommend ultrasound for further evaluation. Sequela of prior granulomatous process. Nonobstructive renal calculi. Pelvis: No acute traumatic injury. Sigmoid colon diverticulosis without evidence of diverticulitis. Signed by Timothy Gill, DO    CT thoracic spine wo IV contrast    Result Date: 7/28/2024  STUDY: CT Thoracic Spine and Lumbar Spine without IV Contrast; 7/28/2024 10:36 AM INDICATION: Trauma/fall, back pain. COMPARISON: MR lumbar 11/8/2021.  XR lumbar 11/3/2021. ACCESSION NUMBER(S): [id number], [id number] ORDERING CLINICIAN: DOUGLAS FERNANDEZ TECHNIQUE:  CT of the thoracic spine and lumbar spine was performed without intravenous or intrathecal contrast.  Sagittal and coronal reconstructions were generated.  Automated mA/kV exposure control was utilized and patient examination was performed in strict accordance with principles of ALARA. FINDINGS: Thoracic spine: The alignment is anatomic.  There is no fracture or traumatic subluxation. The vertebral body heights  are well maintained.  There is mild disc space narrowing and anterior marginal osteophytes.  There is mild facet arthrosis.  There is small posterior disc osteophyte formation at T10-11 greater on the left contributing to mild central canal stenosis and left greater than right neural foraminal stenosis. The paravertebral soft tissues are within normal limits.  The visualized chest and abdomen are unremarkable.  There are calcified right hilar lymph nodes.  There is secretions within the trachea. Findings extend in the right mainstem bronchus. Lumbar spine: The alignment is anatomic.  There is no fracture or traumatic subluxation. There is superior endplate concavity at L2 with compression deformity and approximately 10-20% height loss and findings concerning for acute to subacute fracture along the superior endplate greater on the right.  There is multilevel disc desiccation and disc space narrowing with posterior disc osteophyte formation with advanced narrowing of the central canal and neural foramina greater at L4-5.  At L3-4 there is posterior disc osteophyte formation and mild to moderate narrowing of the central canal and advanced narrowing of the bilateral neural foramina. At L2-3 there is broad-based disc bulge and facet arthrosis.  There is at least mild narrowing of the central canal and advanced narrowing of the neural foramina greater on the left. At L5-S1 there is posterior disc osteophyte formation with mild narrowing of the central canal and advanced narrowing of the neural foramina greater on the right. The paravertebral soft tissues are within normal limits.  There are atherosclerotic changes of the abdominal aorta.  There is a fatty liver morphology.    Thoracic spine: No acute fracture or malalignment.  Degenerative changes as described which appear greater at T10-11. Lumbar spine: Superior endplate compression deformity at L2 and mild compression deformity concerning for acute to subacute  fracture. Significant multilevel degenerative changes with narrowing of the central canal and neural foramina which appear greater at L4-5. Signed by Timothy Gill, DO    CT lumbar spine wo IV contrast    Result Date: 7/28/2024  STUDY: CT Thoracic Spine and Lumbar Spine without IV Contrast; 7/28/2024 10:36 AM INDICATION: Trauma/fall, back pain. COMPARISON: MR lumbar 11/8/2021.  XR lumbar 11/3/2021. ACCESSION NUMBER(S): [id number], [id number] ORDERING CLINICIAN: DOUGLAS FERNANDEZ TECHNIQUE:  CT of the thoracic spine and lumbar spine was performed without intravenous or intrathecal contrast.  Sagittal and coronal reconstructions were generated.  Automated mA/kV exposure control was utilized and patient examination was performed in strict accordance with principles of ALARA. FINDINGS: Thoracic spine: The alignment is anatomic.  There is no fracture or traumatic subluxation. The vertebral body heights are well maintained.  There is mild disc space narrowing and anterior marginal osteophytes.  There is mild facet arthrosis.  There is small posterior disc osteophyte formation at T10-11 greater on the left contributing to mild central canal stenosis and left greater than right neural foraminal stenosis. The paravertebral soft tissues are within normal limits.  The visualized chest and abdomen are unremarkable.  There are calcified right hilar lymph nodes.  There is secretions within the trachea. Findings extend in the right mainstem bronchus. Lumbar spine: The alignment is anatomic.  There is no fracture or traumatic subluxation. There is superior endplate concavity at L2 with compression deformity and approximately 10-20% height loss and findings concerning for acute to subacute fracture along the superior endplate greater on the right.  There is multilevel disc desiccation and disc space narrowing with posterior disc osteophyte formation with advanced narrowing of the central canal and neural foramina greater at  L4-5.  At L3-4 there is posterior disc osteophyte formation and mild to moderate narrowing of the central canal and advanced narrowing of the bilateral neural foramina. At L2-3 there is broad-based disc bulge and facet arthrosis.  There is at least mild narrowing of the central canal and advanced narrowing of the neural foramina greater on the left. At L5-S1 there is posterior disc osteophyte formation with mild narrowing of the central canal and advanced narrowing of the neural foramina greater on the right. The paravertebral soft tissues are within normal limits.  There are atherosclerotic changes of the abdominal aorta.  There is a fatty liver morphology.    Thoracic spine: No acute fracture or malalignment.  Degenerative changes as described which appear greater at T10-11. Lumbar spine: Superior endplate compression deformity at L2 and mild compression deformity concerning for acute to subacute fracture. Significant multilevel degenerative changes with narrowing of the central canal and neural foramina which appear greater at L4-5. Signed by Timothy Gill,     CT cervical spine wo IV contrast    Result Date: 7/28/2024  Interpreted By:  Sathish Munoz, STUDY: CT CERVICAL SPINE WO IV CONTRAST; 7/28/2024 10:35 am   INDICATION: Signs/Symptoms:fall.   COMPARISON: None.   ACCESSION NUMBER(S): [id number]   ORDERING CLINICIAN: DOUGLAS FERNANDEZ   TECHNIQUE: Contiguous axial CT images were obtained at  2 mm slice thickness through the cervical spine without contrast administration. The images were then reconstructed in the coronal and sagittal planes.   FINDINGS: There is no CT evidence of acute fracture identified. No prevertebral soft tissue swelling is identified.   ALIGNMENT: The vertebral bodies are well aligned without evidence of subluxation.   VERTEBRAE/DISC SPACES: Moderate disc space and marginal osteophyte formation is seen C5-6 and C6-7 levels. Moderate to severe facet degenerative changes are seen on  C2-3, C3-4 and C4-5 levels.   ADDITIONAL FINDINGS: Evaluation of the visualized soft tissues of the neck is limited by the lack of intravenous contrast.  Within this limitation, no gross mass or lymphadenopathy is identified.       1.  No CT evidence of acute fracture. 2.  Degenerative changes throughout the cervical spine, as above.   Signed by: Sathish Munoz 7/28/2024 10:59 AM Dictation workstation:   [id number]    CT head wo IV contrast    Result Date: 7/28/2024  Interpreted By:  Sathish Munoz, STUDY: CT HEAD WO IV CONTRAST; 7/28/2024 10:35 am   INDICATION: Signs/Symptoms:fall on eliquis.   COMPARISON: None.   ACCESSION NUMBER(S): [id number]   ORDERING CLINICIAN: DOUGLAS FERNANDEZ   TECHNIQUE: Contiguous axial CT images were obtained through the head at 5 mm slice thickness without contrast administration.   FINDINGS: INTRACRANIAL: The ventricles, sulci and basal cisterns are within normal limits for size and configuration. The grey-white differentiation is intact. There is no mass effect or midline shift. There is no extraaxial fluid collection. There is no intracranial hemorrhage.  The calvarium is unremarkable.   EXTRACRANIAL: Visualized paranasal sinuses and mastoids are clear.       No evidence of acute cortical infarct or intracranial hemorrhage.   MACRO: None     Signed by: Sathish Munzo 7/28/2024 10:54 AM Dictation workstation:   [id number]  .   Assessment/Plan   Assessment & Plan  Sepsis with acute renal failure (Multi)    Elevated troponin    Epigastric pain    Hyponatremia    Myasthenia gravis (Multi)    Generalized weakness    History of coronary artery bypass graft    History of lung cancer      Impression:  Generalized weakness in the setting of PNA  Hx of MG-on chronic steroids  Hx of Lung CA with right upper lobectomy w/liver mets  CAD s/p CABG, HTN, DM2  Recommendations:   Continue treating underlying PNA  Monitor for worsening respiratory status, ptosis, dysphagia and [place] [facility]  PT/OT        Rosetta Beard, APRN-CNP    Patient seen and examined.  Patient does not seem to be in any crisis.  Continue with current medical treatment and patient can follow-up with his neurologist at [facility]    Continue with medical treatment and okay to discharge when medically stable.    Due to technical limitations of voice recognition and human error, this note may not accurately reflect the care of the patient.    Gordo meredith md/neurology.

## 2024-08-20 NOTE — PROGRESS NOTES
08/20/24 0933   Discharge Planning   Living Arrangements Spouse/significant other   Support Systems Spouse/significant other   Assistance Needed PTA - lives with spouse in 2 story home, 2 SHEMAR and HR. First floor set up, bed/bath. Walk in shower with seat and GB. Reports at baseline he is independent and use a rollator. Needs spouses assistance with ADLS and IADLS. 2L oxygen HS.   Type of Residence Private residence   Number of Stairs to Enter Residence 2   Do you have animals or pets at home? No   Home or Post Acute Services Post acute facilities (Rehab/SNF/etc)   Type of Post Acute Facility Services Skilled nursing   Expected Discharge Disposition SNF   Does the patient need discharge transport arranged? Yes   RoundTrip coordination needed? Yes   Patient Choice   Provider Choice list and CMS website (https://medicare.gov/care-compare#search) for post-acute Quality and Resource Measure Data were provided and reviewed with: Patient;Family   Patient / Family choosing to utilize agency / facility established prior to hospitalization No     History of Lung CA s/p RU lobectomy with mets to Liver, chemo, Myasthenia Gravis, and COPD. Was recently admitted to [facility] for frequent falls with lumbar compression fracture and then dc to [facility]. Currently admitted for generalized weakness and decreased appetite. Possible Sepsis, started on Vanco and Zosyn IV. Per pt's dgt Viktoriya, pt was about to be dc from [place] but was sent to ER. Preference is for pt to dc home with [facility], was going to set it up through Dr Whitfield PTA. He is already active with [facility]. PT/OT eval pending.

## 2024-08-21 LAB
ANION GAP SERPL CALC-SCNC: 11 MMOL/L (ref 10–20)
BASOPHILS # BLD AUTO: 0.02 X10*3/UL (ref 0–0.1)
BASOPHILS NFR BLD AUTO: 0.1 %
BUN SERPL-MCNC: 24 MG/DL (ref 6–23)
CALCIUM SERPL-MCNC: 9.5 MG/DL (ref 8.6–10.3)
CHLORIDE SERPL-SCNC: 103 MMOL/L (ref 98–107)
CO2 SERPL-SCNC: 22 MMOL/L (ref 21–32)
CREAT SERPL-MCNC: 1.18 MG/DL (ref 0.5–1.3)
EGFRCR SERPLBLD CKD-EPI 2021: 63 ML/MIN/1.73M*2
EOSINOPHIL # BLD AUTO: 0.01 X10*3/UL (ref 0–0.4)
EOSINOPHIL NFR BLD AUTO: 0.1 %
ERYTHROCYTE [DISTWIDTH] IN BLOOD BY AUTOMATED COUNT: 17.2 % (ref 11.5–14.5)
GLUCOSE BLD MANUAL STRIP-MCNC: 116 MG/DL (ref 74–99)
GLUCOSE BLD MANUAL STRIP-MCNC: 129 MG/DL (ref 74–99)
GLUCOSE BLD MANUAL STRIP-MCNC: 137 MG/DL (ref 74–99)
GLUCOSE BLD MANUAL STRIP-MCNC: 96 MG/DL (ref 74–99)
GLUCOSE SERPL-MCNC: 113 MG/DL (ref 74–99)
HCT VFR BLD AUTO: 31.1 % (ref 41–52)
HGB BLD-MCNC: 10.1 G/DL (ref 13.5–17.5)
HOLD SPECIMEN: NORMAL
HOLD SPECIMEN: NORMAL
IMM GRANULOCYTES # BLD AUTO: 0.11 X10*3/UL (ref 0–0.5)
IMM GRANULOCYTES NFR BLD AUTO: 0.8 % (ref 0–0.9)
LYMPHOCYTES # BLD AUTO: 0.37 X10*3/UL (ref 0.8–3)
LYMPHOCYTES NFR BLD AUTO: 2.7 %
MAGNESIUM SERPL-MCNC: 2.02 MG/DL (ref 1.6–2.4)
MCH RBC QN AUTO: 32 PG (ref 26–34)
MCHC RBC AUTO-ENTMCNC: 32.5 G/DL (ref 32–36)
MCV RBC AUTO: 98 FL (ref 80–100)
MONOCYTES # BLD AUTO: 0.89 X10*3/UL (ref 0.05–0.8)
MONOCYTES NFR BLD AUTO: 6.4 %
NEUTROPHILS # BLD AUTO: 12.54 X10*3/UL (ref 1.6–5.5)
NEUTROPHILS NFR BLD AUTO: 89.9 %
NRBC BLD-RTO: 0 /100 WBCS (ref 0–0)
PLATELET # BLD AUTO: 117 X10*3/UL (ref 150–450)
POTASSIUM SERPL-SCNC: 4.4 MMOL/L (ref 3.5–5.3)
RBC # BLD AUTO: 3.16 X10*6/UL (ref 4.5–5.9)
SODIUM SERPL-SCNC: 132 MMOL/L (ref 136–145)
VANCOMYCIN SERPL-MCNC: 17.3 UG/ML (ref 5–20)
WBC # BLD AUTO: 13.9 X10*3/UL (ref 4.4–11.3)

## 2024-08-21 PROCEDURE — 99232 SBSQ HOSP IP/OBS MODERATE 35: CPT | Performed by: PSYCHIATRY & NEUROLOGY

## 2024-08-21 PROCEDURE — 97161 PT EVAL LOW COMPLEX 20 MIN: CPT | Mod: GP | Performed by: PHYSICAL THERAPIST

## 2024-08-21 PROCEDURE — 2500000001 HC RX 250 WO HCPCS SELF ADMINISTERED DRUGS (ALT 637 FOR MEDICARE OP): Performed by: STUDENT IN AN ORGANIZED HEALTH CARE EDUCATION/TRAINING PROGRAM

## 2024-08-21 PROCEDURE — 2500000001 HC RX 250 WO HCPCS SELF ADMINISTERED DRUGS (ALT 637 FOR MEDICARE OP): Performed by: INTERNAL MEDICINE

## 2024-08-21 PROCEDURE — 97165 OT EVAL LOW COMPLEX 30 MIN: CPT | Mod: GO

## 2024-08-21 PROCEDURE — 80202 ASSAY OF VANCOMYCIN: CPT

## 2024-08-21 PROCEDURE — 80048 BASIC METABOLIC PNL TOTAL CA: CPT | Performed by: STUDENT IN AN ORGANIZED HEALTH CARE EDUCATION/TRAINING PROGRAM

## 2024-08-21 PROCEDURE — 1210000001 HC SEMI-PRIVATE ROOM DAILY

## 2024-08-21 PROCEDURE — 99232 SBSQ HOSP IP/OBS MODERATE 35: CPT | Performed by: STUDENT IN AN ORGANIZED HEALTH CARE EDUCATION/TRAINING PROGRAM

## 2024-08-21 PROCEDURE — 82947 ASSAY GLUCOSE BLOOD QUANT: CPT

## 2024-08-21 PROCEDURE — 36415 COLL VENOUS BLD VENIPUNCTURE: CPT

## 2024-08-21 PROCEDURE — 2500000002 HC RX 250 W HCPCS SELF ADMINISTERED DRUGS (ALT 637 FOR MEDICARE OP, ALT 636 FOR OP/ED): Performed by: INTERNAL MEDICINE

## 2024-08-21 PROCEDURE — 2500000004 HC RX 250 GENERAL PHARMACY W/ HCPCS (ALT 636 FOR OP/ED): Performed by: INTERNAL MEDICINE

## 2024-08-21 PROCEDURE — 94640 AIRWAY INHALATION TREATMENT: CPT

## 2024-08-21 PROCEDURE — 83735 ASSAY OF MAGNESIUM: CPT | Performed by: STUDENT IN AN ORGANIZED HEALTH CARE EDUCATION/TRAINING PROGRAM

## 2024-08-21 PROCEDURE — 85025 COMPLETE CBC W/AUTO DIFF WBC: CPT | Performed by: STUDENT IN AN ORGANIZED HEALTH CARE EDUCATION/TRAINING PROGRAM

## 2024-08-21 RX ORDER — IPRATROPIUM BROMIDE AND ALBUTEROL SULFATE 2.5; .5 MG/3ML; MG/3ML
3 SOLUTION RESPIRATORY (INHALATION) ONCE
Status: COMPLETED | OUTPATIENT
Start: 2024-08-21 | End: 2024-08-21

## 2024-08-21 SDOH — SOCIAL STABILITY: SOCIAL INSECURITY: WITHIN THE LAST YEAR, HAVE YOU BEEN HUMILIATED OR EMOTIONALLY ABUSED IN OTHER WAYS BY YOUR PARTNER OR EX-PARTNER?: NO

## 2024-08-21 SDOH — SOCIAL STABILITY: SOCIAL INSECURITY: HAVE YOU HAD THOUGHTS OF HARMING ANYONE ELSE?: NO

## 2024-08-21 SDOH — SOCIAL STABILITY: SOCIAL INSECURITY: DO YOU FEEL ANYONE HAS EXPLOITED OR TAKEN ADVANTAGE OF YOU FINANCIALLY OR OF YOUR PERSONAL PROPERTY?: NO

## 2024-08-21 SDOH — SOCIAL STABILITY: SOCIAL INSECURITY
WITHIN THE LAST YEAR, HAVE YOU BEEN KICKED, HIT, SLAPPED, OR OTHERWISE PHYSICALLY HURT BY YOUR PARTNER OR EX-PARTNER?: NO

## 2024-08-21 SDOH — ECONOMIC STABILITY: FOOD INSECURITY: WITHIN THE PAST 12 MONTHS, THE FOOD YOU BOUGHT JUST DIDN'T LAST AND YOU DIDN'T HAVE MONEY TO GET MORE.: NEVER TRUE

## 2024-08-21 SDOH — SOCIAL STABILITY: SOCIAL INSECURITY: DO YOU FEEL UNSAFE GOING BACK TO THE PLACE WHERE YOU ARE LIVING?: NO

## 2024-08-21 SDOH — SOCIAL STABILITY: SOCIAL NETWORK: HOW OFTEN DO YOU ATTEND CHURCH OR RELIGIOUS SERVICES?: 1 TO 4 TIMES PER YEAR

## 2024-08-21 SDOH — HEALTH STABILITY: MENTAL HEALTH
HOW OFTEN DO YOU NEED TO HAVE SOMEONE HELP YOU WHEN YOU READ INSTRUCTIONS, PAMPHLETS, OR OTHER WRITTEN MATERIAL FROM YOUR DOCTOR OR PHARMACY?: NEVER

## 2024-08-21 SDOH — SOCIAL STABILITY: SOCIAL INSECURITY: HAVE YOU HAD ANY THOUGHTS OF HARMING ANYONE ELSE?: NO

## 2024-08-21 SDOH — SOCIAL STABILITY: SOCIAL NETWORK: HOW OFTEN DO YOU GET TOGETHER WITH FRIENDS OR RELATIVES?: ONCE A WEEK

## 2024-08-21 SDOH — SOCIAL STABILITY: SOCIAL NETWORK
DO YOU BELONG TO ANY CLUBS OR ORGANIZATIONS SUCH AS CHURCH GROUPS UNIONS, FRATERNAL OR ATHLETIC GROUPS, OR SCHOOL GROUPS?: PATIENT DECLINED

## 2024-08-21 SDOH — SOCIAL STABILITY: SOCIAL INSECURITY: WITHIN THE LAST YEAR, HAVE YOU BEEN AFRAID OF YOUR PARTNER OR EX-PARTNER?: NO

## 2024-08-21 SDOH — SOCIAL STABILITY: SOCIAL INSECURITY
WITHIN THE LAST YEAR, HAVE TO BEEN RAPED OR FORCED TO HAVE ANY KIND OF SEXUAL ACTIVITY BY YOUR PARTNER OR EX-PARTNER?: NO

## 2024-08-21 SDOH — SOCIAL STABILITY: SOCIAL INSECURITY: HAS ANYONE EVER THREATENED TO HURT YOUR FAMILY OR YOUR PETS?: NO

## 2024-08-21 SDOH — ECONOMIC STABILITY: FOOD INSECURITY: WITHIN THE PAST 12 MONTHS, YOU WORRIED THAT YOUR FOOD WOULD RUN OUT BEFORE YOU GOT MONEY TO BUY MORE.: NEVER TRUE

## 2024-08-21 SDOH — SOCIAL STABILITY: SOCIAL NETWORK: HOW OFTEN DO YOU ATTENT MEETINGS OF THE CLUB OR ORGANIZATION YOU BELONG TO?: PATIENT DECLINED

## 2024-08-21 SDOH — SOCIAL STABILITY: SOCIAL NETWORK: IN A TYPICAL WEEK, HOW MANY TIMES DO YOU TALK ON THE PHONE WITH FAMILY, FRIENDS, OR NEIGHBORS?: ONCE A WEEK

## 2024-08-21 SDOH — ECONOMIC STABILITY: INCOME INSECURITY: IN THE PAST 12 MONTHS, HAS THE ELECTRIC, GAS, OIL, OR WATER COMPANY THREATENED TO SHUT OFF SERVICE IN YOUR HOME?: NO

## 2024-08-21 SDOH — SOCIAL STABILITY: SOCIAL INSECURITY: DOES ANYONE TRY TO KEEP YOU FROM HAVING/CONTACTING OTHER FRIENDS OR DOING THINGS OUTSIDE YOUR HOME?: NO

## 2024-08-21 SDOH — HEALTH STABILITY: MENTAL HEALTH
STRESS IS WHEN SOMEONE FEELS TENSE, NERVOUS, ANXIOUS, OR CAN'T SLEEP AT NIGHT BECAUSE THEIR MIND IS TROUBLED. HOW STRESSED ARE YOU?: RATHER MUCH

## 2024-08-21 SDOH — SOCIAL STABILITY: SOCIAL INSECURITY: ABUSE: ADULT

## 2024-08-21 SDOH — SOCIAL STABILITY: SOCIAL NETWORK: ARE YOU MARRIED, WIDOWED, DIVORCED, SEPARATED, NEVER MARRIED, OR LIVING WITH A PARTNER?: PATIENT DECLINED

## 2024-08-21 SDOH — SOCIAL STABILITY: SOCIAL INSECURITY: ARE THERE ANY APPARENT SIGNS OF INJURIES/BEHAVIORS THAT COULD BE RELATED TO ABUSE/NEGLECT?: NO

## 2024-08-21 SDOH — SOCIAL STABILITY: SOCIAL INSECURITY: WERE YOU ABLE TO COMPLETE ALL THE BEHAVIORAL HEALTH SCREENINGS?: YES

## 2024-08-21 SDOH — SOCIAL STABILITY: SOCIAL INSECURITY: ARE YOU OR HAVE YOU BEEN THREATENED OR ABUSED PHYSICALLY, EMOTIONALLY, OR SEXUALLY BY ANYONE?: NO

## 2024-08-21 ASSESSMENT — PATIENT HEALTH QUESTIONNAIRE - PHQ9
2. FEELING DOWN, DEPRESSED OR HOPELESS: NOT AT ALL
1. LITTLE INTEREST OR PLEASURE IN DOING THINGS: NOT AT ALL
SUM OF ALL RESPONSES TO PHQ9 QUESTIONS 1 & 2: 0

## 2024-08-21 ASSESSMENT — COGNITIVE AND FUNCTIONAL STATUS - GENERAL
HELP NEEDED FOR BATHING: A LOT
DRESSING REGULAR UPPER BODY CLOTHING: A LOT
TOILETING: A LOT
STANDING UP FROM CHAIR USING ARMS: A LOT
PATIENT BASELINE BEDBOUND: UNABLE TO ASSESS AT THIS TIME
MOBILITY SCORE: 10
TURNING FROM BACK TO SIDE WHILE IN FLAT BAD: A LOT
PERSONAL GROOMING: A LITTLE
DRESSING REGULAR LOWER BODY CLOTHING: A LOT
EATING MEALS: A LITTLE
PERSONAL GROOMING: A LOT
DRESSING REGULAR UPPER BODY CLOTHING: A LOT
DRESSING REGULAR LOWER BODY CLOTHING: A LOT
EATING MEALS: A LITTLE
STANDING UP FROM CHAIR USING ARMS: A LOT
HELP NEEDED FOR BATHING: A LOT
WALKING IN HOSPITAL ROOM: A LOT
MOVING TO AND FROM BED TO CHAIR: A LOT
MOVING TO AND FROM BED TO CHAIR: A LOT
DRESSING REGULAR UPPER BODY CLOTHING: A LITTLE
WALKING IN HOSPITAL ROOM: TOTAL
DRESSING REGULAR LOWER BODY CLOTHING: A LOT
PERSONAL GROOMING: A LITTLE
MOVING TO AND FROM BED TO CHAIR: A LOT
DAILY ACTIVITIY SCORE: 13
STANDING UP FROM CHAIR USING ARMS: A LOT
MOVING FROM LYING ON BACK TO SITTING ON SIDE OF FLAT BED WITH BEDRAILS: A LOT
HELP NEEDED FOR BATHING: A LOT
MOVING FROM LYING ON BACK TO SITTING ON SIDE OF FLAT BED WITH BEDRAILS: A LOT
TURNING FROM BACK TO SIDE WHILE IN FLAT BAD: A LOT
MOBILITY SCORE: 12
CLIMB 3 TO 5 STEPS WITH RAILING: TOTAL
TOILETING: A LOT
TOILETING: A LOT
TURNING FROM BACK TO SIDE WHILE IN FLAT BAD: A LOT
DAILY ACTIVITIY SCORE: 16
CLIMB 3 TO 5 STEPS WITH RAILING: TOTAL
MOBILITY SCORE: 10
MOVING FROM LYING ON BACK TO SITTING ON SIDE OF FLAT BED WITH BEDRAILS: A LOT
CLIMB 3 TO 5 STEPS WITH RAILING: A LOT
WALKING IN HOSPITAL ROOM: TOTAL
DAILY ACTIVITIY SCORE: 14

## 2024-08-21 ASSESSMENT — PAIN SCALES - GENERAL
PAINLEVEL_OUTOF10: 4
PAINLEVEL_OUTOF10: 0 - NO PAIN

## 2024-08-21 ASSESSMENT — LIFESTYLE VARIABLES
HOW MANY STANDARD DRINKS CONTAINING ALCOHOL DO YOU HAVE ON A TYPICAL DAY: PATIENT DOES NOT DRINK
HOW OFTEN DO YOU HAVE 6 OR MORE DRINKS ON ONE OCCASION: NEVER
SKIP TO QUESTIONS 9-10: 1
AUDIT-C TOTAL SCORE: 0
HOW OFTEN DO YOU HAVE A DRINK CONTAINING ALCOHOL: NEVER
PRESCIPTION_ABUSE_PAST_12_MONTHS: NO
AUDIT-C TOTAL SCORE: 0
SUBSTANCE_ABUSE_PAST_12_MONTHS: NO

## 2024-08-21 ASSESSMENT — ACTIVITIES OF DAILY LIVING (ADL)
HEARING - LEFT EAR: DIFFICULTY WITH NOISE
FEEDING YOURSELF: NEEDS ASSISTANCE
TOILETING: NEEDS ASSISTANCE
PATIENT'S MEMORY ADEQUATE TO SAFELY COMPLETE DAILY ACTIVITIES?: YES
JUDGMENT_ADEQUATE_SAFELY_COMPLETE_DAILY_ACTIVITIES: YES
WALKS IN HOME: DEPENDENT
BATHING: NEEDS ASSISTANCE
LACK_OF_TRANSPORTATION: NO
BATHING_ASSISTANCE: MODERATE
GROOMING: NEEDS ASSISTANCE
HEARING - RIGHT EAR: DIFFICULTY WITH NOISE
DRESSING YOURSELF: NEEDS ASSISTANCE
ADEQUATE_TO_COMPLETE_ADL: YES

## 2024-08-21 ASSESSMENT — PAIN - FUNCTIONAL ASSESSMENT
PAIN_FUNCTIONAL_ASSESSMENT: 0-10

## 2024-08-21 NOTE — PROGRESS NOTES
TCC has spoke to pts dgt, son, and spouse on separate occasions. They all have many medical/clinical questions that TCC is unable to answer. Dr Osborn is aware and to talk with family. TCC did explain home care and the process to set up once we have therapy evals for recommendations.

## 2024-08-21 NOTE — PROGRESS NOTES
Physical Therapy    Physical Therapy Evaluation    Patient Name: Timothy Varela  MRN: [id number]  Today's Date: 8/21/2024   Time Calculation  Start Time: 1150  Stop Time: 1204  Time Calculation (min): 14 min  1022/1022-A    Assessment/Plan   PT Assessment  PT Assessment Results: Decreased strength, Decreased endurance, Impaired balance, Decreased mobility, Impaired judgement, Decreased safety awareness  Rehab Prognosis: Good  Barriers to Discharge: Requires A for transfers, amb, decreased insight into deficits  Evaluation/Treatment Tolerance: Patient limited by fatigue  Medical Staff Made Aware: Yes  Strengths: Support of Caregivers, Living arrangement secure  Barriers to Participation: Comorbidities  End of Session Communication: Bedside nurse  Assessment Comment: Pt. is weak and deconditioned He requirs A for transfers and amb. Recommend continued therapy in acute care followed by therapy at a moderate intensity level following D/C.  End of Session Patient Position: Bed, 3 rail up, Alarm off, not on at start of session (Call light within reach)  IP OR SWING BED PT PLAN  Inpatient or Swing Bed: Inpatient  PT Plan  Treatment/Interventions: Bed mobility, Transfer training, Gait training, Balance training, Strengthening, Endurance training, Therapeutic exercise  PT Plan: Ongoing PT  PT Frequency: 3 times per week  PT Discharge Recommendations: Moderate intensity level of continued care  PT Recommended Transfer Status: Assist x1, Assistive device  Physical Therapy eval completed per MD requisition. P.T. recommendations as outlined above. Recommend D/C from acute care when medically appropriate as deemed by medical staff.    Subjective       General Visit Information:  General  Reason for Referral: impaired mobility  Referred By: t  Past Medical History Relevant to Rehab: includes: COPD, HTN, CAD, CKD, DM, myasthenia gravis, CABG, R upper lobectomy, metastatic lung CA, liver mets, melanoma, hyponatremia, L2 compression  fracture  Family/Caregiver Present: Yes  Caregiver Feedback: Family present and stepped out for session.  Co-Treatment: OT  Co-Treatment Reason: Pt. seen with OT to maximize safety and function  Prior to Session Communication: Bedside nurse  Patient Position Received: Bed, 3 rail up, Alarm off, not on at start of session  Preferred Learning Style: auditory, verbal  General Comment: Pt. is a 80yo who presented to [facility] ED on 8/19/2024 with c/o weakness, emesis, cough decreased oral intake.  In ED, BP 97/57, WBC = 14.7, Hgb = 10  Troponin = 64    CXR (8/19) (-) acute findings    Chest/ABD/Pelvic CT (8/19): findings are suspicious for worsening  metastatic disease as demonstrated:  1. Increased size of presumed metastatic lymph nodes in the  mediastinum and abdomen.  2. Increased size and number of hepatic metastases.  3. Suspected underlying splenic metastases, more conspicuous compared  to prior exam but evaluation is limited without contrast.  4. Increased size of right perihilar soft tissue thickening resulting  in narrowing of the right mainstem bronchus and its branches.  Na (8/21) 130 trending down  Hgb (8/21) 8.9 trending down   Dx: hypercalcemia, dehydration, sepsis    Home Living:  Home Living  Home Living Comments: Pt. lives with spouse in a 2 level house with 2 SHEMAR with HR. Bed/bath on 1st floor with walkin shower with a seat and grab bars. Laundry on 1st floor.    Prior Level of Function:  Prior Function Per Pt/Caregiver Report  Prior Function Comments: Pt. amb with RW PTA. Pt. required A with ADLs PTA. Wife completed IADLs PTA. Pt. reported 3-4 falls in last 3 months and drove PTA according to [facility] PT eval on 8/2/2024. Pt. wears 2L O2 hs.    Precautions:  Precautions  Medical Precautions:  (I&O; Activity order: No restrictions)  Precautions Comment: Per EMR: High fall risk    Vital Signs:  Vital Signs  Heart Rate:  (126-130)  SpO2: 99 % (On 1L O2)  Objective     Pain:  Pain Assessment  Pain Assessment:  0-10  0-10 (Numeric) Pain Score: 0 - No pain    Cognition:  Cognition  Overall Cognitive Status: Within Functional Limits  Orientation Level: Disoriented to situation  Cognition Comments: Pt. was very abrupt and easily agiated during session.    General Assessments:  General Observation  General Observation: 1L O2, Purewick, tele, IV   Activity Tolerance  Endurance: Decreased tolerance for upright activites                 Dynamic Sitting Balance  Dynamic Sitting-Comments: Good static and dynamic sitting balance  Dynamic Standing Balance  Dynamic Standing-Comments: Poor+ static and dynamic standing balance    Functional Assessments:     Bed Mobility  Bed Mobility: Yes  Bed Mobility 1  Bed Mobility 1: Supine to sitting  Level of Assistance 1: Moderate assistance  Bed Mobility Comments 1: A to maneuver LEs over EOB and to elevate trunk from bed  Bed Mobility 2  Bed Mobility  2: Sitting to supine  Level of Assistance 2: Minimum assistance  Bed Mobility Comments 2: A to lift LEs onto bed  Transfers  Transfer: Yes  Transfer 1  Technique 1: Sit to stand  Transfer Device 1:  (FWW)  Transfer Level of Assistance 1: Moderate assistance  Trials/Comments 1: A for lifting, transferring weight over feet, steadying. VC's for hand placement  Transfers 2  Technique 2: Stand to sit  Transfer Device 2:  (FWW)  Transfer Level of Assistance 2: Minimum assistance  Trials/Comments 2: A to control descent. VC's for hand placement.  Ambulation/Gait Training  Ambulation/Gait Training Performed: Yes  Ambulation/Gait Training 1  Surface 1: Level tile  Device 1: Rolling walker  Assistance 1: Moderate assistance  Quality of Gait 1: Narrow base of support (slow step-to gait pattern with shortened step lengths, flexed posture)  Comments/Distance (ft) 1: 5' x 2; A for balance, safety, maneuvering FWW  Stairs  Stairs: No       Extremity/Trunk Assessments:        RLE   RLE :  (AROM WFL, strength grossly 4-/5)  LLE   LLE :  (AROM WFL, strength grossly  4-/5)    Outcome Measures:     [facility] Basic Mobility  Turning from your back to your side while in a flat bed without using bedrails: A lot  Moving from lying on your back to sitting on the side of a flat bed without using bedrails: A lot  Moving to and from bed to chair (including a wheelchair): A lot  Standing up from a chair using your arms (e.g. wheelchair or bedside chair): A lot  To walk in hospital room: Total  Climbing 3-5 steps with railing: Total  Basic Mobility - Total Score: 10                                                             Goals:  Encounter Problems       Encounter Problems (Active)       PT Problem       Pt. will transfer supine/sit with MIN A x 1 (Progressing)       Start:  08/21/24    Expected End:  09/04/24            Pt. will transfer sit/stand with FWW with MIN A x 1 (Progressing)       Start:  08/21/24    Expected End:  09/04/24            Pt.will ambulate 40' with FWW with Min A x 1 (Progressing)       Start:  08/21/24    Expected End:  09/04/24            Pt. will amb up/down 2 steps with HR and cane with MOD A x 1 (Not Progressing)       Start:  08/21/24    Expected End:  09/04/24            Pt. will perform 2 x 15 B LE AROM exercises  (Not Progressing)       Start:  08/21/24    Expected End:  09/04/24                          Education Documentation  Mobility Training, taught by Mumtaz Alvarez, PT at 8/21/2024  3:01 PM.  Learner: Patient  Readiness: Acceptance  Method: Explanation  Response: Verbalizes Understanding, Needs Reinforcement  Comment: Role of PT, transfers, amb, safety, PT POC

## 2024-08-21 NOTE — PROGRESS NOTES
Vancomycin Dosing by Pharmacy- FOLLOW UP    Timothy Varela is a 79 y.o. year old male who Pharmacy has been consulted for vancomycin dosing for other sepsis . Based on the patient's indication and renal status this patient is being dosed based on a goal AUC of 500-600.     Renal function is currently stable.    Current vancomycin dose: 1250 mg given every 24 hours    Estimated vancomycin AUC on current dose: 486 mg/L.hr     Visit Vitals  /73 (BP Location: Right arm, Patient Position: Lying)   Pulse 105   Temp 36.8 °C (98.2 °F) (Temporal)   Resp 17        Lab Results   Component Value Date    CREATININE 1.22 2024    CREATININE 1.50 (H) 2024    CREATININE 1.35 (H) 2024    CREATININE 1.18 2024        Patient weight is as follows:   Vitals:    24 0429   Weight: 82.2 kg (181 lb 3.5 oz)       Cultures:  No results found for the encounter in last 14 days.       I/O last 3 completed shifts:  In: 3343.8 (40.7 mL/kg) [P.O.:1180; I.V.:1763.8 (21.5 mL/kg); IV Piggyback:400]  Out: 1400 (17 mL/kg) [Urine:1400 (0.5 mL/kg/hr)]  Weight: 82.2 kg   I/O during current shift:  No intake/output data recorded.    Temp (24hrs), Av °C (96.8 °F), Min:35.5 °C (95.9 °F), Max:36.8 °C (98.2 °F)      Assessment/Plan    Within goal AUC range. Continue current vancomycin regimen.    This dosing regimen is predicted by [employer] to result in the following pharmacokinetic parameters:  Loading dose: N/A  Regimen: 1250 mg IV every 24 hours.  Start time: 20:00 on 2024  Exposure target: AUC24 (range)400-600 mg/L.hr   AUC24,ss: 486 mg/L.hr  Probability of AUC24 > 400: 82 %  Ctrough,ss: 15.6 mg/L  Probability of Ctrough,ss > 20: 18 %  Probability of nephrotoxicity (Lodise KUMAR ): 11 %      The next level will be obtained on 2024 at 0500. May be obtained sooner if clinically indicated.   Will continue to monitor renal function daily while on vancomycin and order serum creatinine at least every 48 hours  if not already ordered.  Follow for continued vancomycin needs, clinical response, and signs/symptoms of toxicity.       Antonieta De Leon, [facility] [place]

## 2024-08-21 NOTE — CONSULTS
"Nutrition Initial Assessment:   Nutrition Assessment         Patient is a 79 y.o. male presenting with sepsis with acute renal failure      Nutrition History:  Energy Intake: Poor < 50 %  Food and Nutrient History: RD consulted for MST = 2. Has a history of metastatic lung cancer s/p right upper lobectomy and chemo. Recently admitted and discharged to [facility]. Son-in-law at bedside. Both pt and family report poor intakes over the past month or so 2/2 feeling unwell. Son-in-law said UBW used to be 190 lbs though over the \"last few months\" has been closer to 170-180 lbs. Wt records show has had 5% wt loss x 1 month, which is significant. Has been eating bites at meals and ice cream at rehab facility. Nursing documentation shows has been eating 10-25% at meals. Son-in-law requesting Easy to Chew diet 2/2 fatigue with chewing foods, pt agreeable. Is also agreeable to Magic Cup TID, chocolate flavor.  Vitamin/Herbal Supplement Use: vitamin C, vitamin D, vitamin B12, ferrous sulfate, probiotics, Mag-Ox, potassium chloride, zinc sulfate. Of note is on Megestrol per home medication list.  Food Allergies/Intolerances:  None  GI Symptoms: None  Oral Problems: Chewing difficulty       Anthropometrics:  Height: 180.3 cm (5' 10.99\")   Weight: 82.2 kg (181 lb 3.5 oz)   BMI (Calculated): 25.29             Weight History:     Wt Readings from Last 10 Encounters:   08/21/24 82.2 kg (181 lb 3.5 oz)   08/02/24 79.5 kg (175 lb 4.3 oz)   07/28/24 86.6 kg (191 lb)   02/09/24 86.2 kg (190 lb)   01/22/24 86.2 kg (190 lb)   08/04/22 86.6 kg (190 lb 14.7 oz)   07/08/22 86.6 kg (190 lb 14.7 oz)   11/03/21 86.2 kg (190 lb)   06/17/20 87.5 kg (193 lb)         Weight Change %:  Significant Weight Loss: Yes  Interpretation of Weight Loss: 5% in 1 month    Nutrition Focused Physical Exam Findings:    Subcutaneous Fat Loss:   Orbital Fat Pads: Mild-Moderate (slight dark circles and slight hollowing)  Buccal Fat Pads: Mild-Moderate (flat " cheeks, minimal bounce)  Triceps: Mild-Moderate (less than ample fat tissue)  Ribs: Defer  Muscle Wasting:  Temporalis: Mild-Moderate (slight depression)  Pectoralis (Clavicular Region): Mild-Moderate (some protrusion of clavicle)  Deltoid/Trapezius: Mild-Moderate (slight protrusion of acromion process)  Interosseous: Mild-Moderate (slightly depressed area between thumb and forefinger)  Trapezius/Infraspinatus/Supraspinatus (Scapular Region): Defer  Quadriceps: Defer  Gastrocnemius: Defer  Edema:  Edema: +1 trace  Edema Location: BLE per nursing assessment  Physical Findings:  Skin: Negative (for pressure injuries)    Nutrition Significant Labs:  BMP Trend:   Results from last 7 days   Lab Units 08/21/24 0426 08/20/24 0428 08/19/24  1546 08/19/24  0520   GLUCOSE mg/dL 113* 88 130* 89   CALCIUM mg/dL 9.5 9.6 10.1 10.1   SODIUM mmol/L 132* 130* 131* 133*   POTASSIUM mmol/L 4.4 5.3 5.1 4.6   CO2 mmol/L 22 20* 20* 21   CHLORIDE mmol/L 103 104 104 105   BUN mg/dL 24* 20 22 21   CREATININE mg/dL 1.18 1.22 1.50* 1.35*    , Renal Lab Trend:   Results from last 7 days   Lab Units 08/21/24 0426 08/20/24 0428 08/19/24  1546 08/19/24  0520   POTASSIUM mmol/L 4.4 5.3 5.1 4.6   SODIUM mmol/L 132* 130* 131* 133*   MAGNESIUM mg/dL 2.02  --   --   --    EGFR mL/min/1.73m*2 63 60* 47* 53*   BUN mg/dL 24* 20 22 21   CREATININE mg/dL 1.18 1.22 1.50* 1.35*        Nutrition Specific Medications:  Reviewed    I/O:   Last BM Date: 08/21/24; Stool Appearance: Formed (08/21/24 1225)    Dietary Orders (From admission, onward)       Start     Ordered    08/21/24 1136  Adult diet Regular; Easy to chew  Diet effective now        Question Answer Comment   Diet type Regular    Texture Easy to chew        08/21/24 1136    08/21/24 1136  Oral nutritional supplements  Until discontinued        Comments: chocolate   Question Answer Comment   Deliver with All meals    Select supplement: Magic Cup        08/21/24 1136                     Estimated  Needs:   Total Energy Estimated Needs (kCal): 2260 kCal  Method for Estimating Needs: 8110-6576 kcal (25-30 kcal/kg)  Total Protein Estimated Needs (g): 110 g  Method for Estimating Needs: 99-123g (1.2-1.5 g/kg)  Total Fluid Estimated Needs (mL): 2260 mL  Method for Estimating Needs: 1 ml/kcal or per MD        Nutrition Diagnosis   Malnutrition Diagnosis  Patient has Malnutrition Diagnosis: Yes  Diagnosis Status: New  Malnutrition Diagnosis: Moderate malnutrition related to chronic disease or condition  As Evidenced by: reports meeting <75% of estimated energy needs for at least 1 month, 5% wt loss x 1 month, mild-moderate muscle losses, mild-moderate fat losses    Nutrition Diagnosis  Patient has Nutrition Diagnosis: Yes  Diagnosis Status (1): New  Nutrition Diagnosis 1: Increased nutrient needs  Related to (1): physiological causes increasing nutrient needs  As Evidenced by (1): catabolic illness       Nutrition Interventions/Recommendations         Nutrition Prescription:  Individualized Nutrition Prescription Provided for : Regular, Easy to Chew diet. Magic Cup TID. Allow family to bring food from home.        Nutrition Interventions:   Interventions: Meals and snacks, Medical food supplement  Goal: Consumes 3 meals per day  Medical Food Supplement: Commercial beverage  Goal: Magic cup TID (290 kcal, 9 g protein per serving)    Collaboration and Referral of Nutrition Care: Other (Comment)  Goal: son-in-law at bedside    Nutrition Education:      Discussed importance of adequate nutrition. Reviewed roles of oral nutritional supplements.    Nutrition Monitoring and Evaluation   Food/Nutrient Related History Monitoring  Monitoring and Evaluation Plan: Energy intake, Amount of food, Fluid intake  Energy Intake: Estimated energy intake  Criteria: Pt meets >75% of estimated energy needs  Fluid Intake: Estimated fluid intake  Criteria: Meets >75% of estimated fluid needs  Amount of Food: Estimated amout of food,  Medical food intake  Criteria: Pt consumes >50% of meals and supplements    Body Composition/Growth/Weight History  Monitoring and Evaluation Plan: Weight  Weight: Measured weight  Criteria: Maintains stable weight    Biochemical Data, Medical Tests and Procedures  Monitoring and Evaluation Plan: Glucose/endocrine profile, Electrolyte/renal panel  Electrolyte and Renal Panel: Sodium, Potassium, Phosphorus  Criteria: Electrolytes WNL  Glucose/Endocrine Profile: Glucose, casual  Criteria: BG within desirable range              Time Spent (min): 45 minutes

## 2024-08-21 NOTE — PROGRESS NOTES
ASSESSMENT & PLAN:       Concern for sepsis with acute renal failure  Bacterial Pneumonia  -Sepsis criteria met with lactic acidosis, leukocytosis, tachycardia, low blood pressure, and KIRILL on presentation  -Will cover for pneumonia given tree in but opacities on imaging as well as elevated procalcitionin (0.44).   -Vancomycin Dc'd. Will continue on cefepime and monitor    Myasthenia gravis  -Neuro consulted for evaluation; recs appreciated. Discussed case with Dr. Page who had low suspicion for acute flare. Advised continued management of suspected infection and continued follow-up with neurologist at [facility].  -Continued on prednisone 10 mg daily & imuran   -PT/OT eval appreciated.     Troponin Elevations  -Mildly more elevated from baseline on presentation. Elevations in flat pattern however. Likely secondary to impaired renal clearance/demand.   -TTE ordered for further evaluation. Revealed preserved EF without RWMA or significant valvular disease appreciated. Will defer further workup.     Hyponatremia  -Na 130 -> 132. Will continue IV fluids and monitor.     Hx Lung cancer, CAD s/p CABG, Dm CKD  -Home meds resumed as applicable      VTE Prophylaxis: on eliquis      Yared Osborn MD    SUBJECTIVE     Patient had no acute events overnight.  Accompanied by son-in-law at bedside.  Patient denies any fevers chills or chest discomfort.  Denies any abdominal pain.  Further ROS was unremarkable.      OBJECTIVE:       Last Recorded Vitals:  Vitals:    08/20/24 1947 08/20/24 2311 08/21/24 0429 08/21/24 0758   BP: 130/63 119/62 141/73 144/77   BP Location: Right arm Right arm Right arm Left arm   Patient Position: Lying Lying Lying Lying   Pulse: 100 102 105 (!) 113   Resp: 17 17 17    Temp: 35.7 °C (96.3 °F) 35.5 °C (95.9 °F) 36.8 °C (98.2 °F) 36.3 °C (97.3 °F)   TempSrc: Temporal Temporal Temporal Temporal   SpO2: 95% 96% 96% 95%   Weight:   82.2 kg (181 lb 3.5 oz)    Height:           Last I/O:  I/O last 3  completed shifts:  In: 3343.8 (40.7 mL/kg) [P.O.:1180; I.V.:1763.8 (21.5 mL/kg); IV Piggyback:400]  Out: 1400 (17 mL/kg) [Urine:1400 (0.5 mL/kg/hr)]  Weight: 82.2 kg     Physical Exam:  General: Ill-appearing lderly male in mild distress  HEENT: Clear sclera, EOMI, trachea midline, moist mucous membranes  Respiratory: Equal chest rise, no retractions, expiratory wheezing  Abdomen: Soft, nontender, nondistended  Extremities: No cyanosis or clubbing appreciated  Neurological: Spontaneously moves all extremities, no dysarthria, cranial nerves grossly intact  Psychiatric: Appropriate mood and affect  Skin: Warm, dry    Inpatient Medications:  apixaban, 5 mg, oral, BID  azaTHIOprine, 50 mg, oral, Daily  busPIRone, 10 mg, oral, BID  cefepime, 1 g, intravenous, q12h  cyanocobalamin, 1,000 mcg, oral, Daily  tiotropium, 2 puff, inhalation, Daily   And  fluticasone furoate-vilanteroL, 1 puff, inhalation, Daily  pantoprazole, 40 mg, oral, Daily   Or  pantoprazole, 40 mg, intravenous, Daily  predniSONE, 10 mg, oral, Daily  rosuvastatin, 20 mg, oral, Nightly  SITagliptin phosphate, 50 mg, oral, BID  sodium chloride, 1,000 mL, intravenous, Once  tamsulosin, 0.4 mg, oral, Daily with lunch  traZODone, 150 mg, oral, Nightly        PRN Medications  PRN medications: acetaminophen **OR** acetaminophen **OR** acetaminophen, ipratropium-albuteroL, melatonin, nitroglycerin, ondansetron **OR** ondansetron, polyethylene glycol    Continuous Medications:  sodium chloride 0.9%, 75 mL/hr, Last Rate: 75 mL/hr (08/21/24 1138)          LABS AND IMAGING:     Labs:  Results for orders placed or performed during the hospital encounter of 08/19/24 (from the past 24 hour(s))   POCT GLUCOSE   Result Value Ref Range    POCT Glucose 143 (H) 74 - 99 mg/dL   Lavender Top   Result Value Ref Range    Extra Tube Hold for add-ons.    SST TOP   Result Value Ref Range    Extra Tube Hold for add-ons.    CBC and Auto Differential   Result Value Ref Range    WBC  13.9 (H) 4.4 - 11.3 x10*3/uL    nRBC 0.0 0.0 - 0.0 /100 WBCs    RBC 3.16 (L) 4.50 - 5.90 x10*6/uL    Hemoglobin 10.1 (L) 13.5 - 17.5 g/dL    Hematocrit 31.1 (L) 41.0 - 52.0 %    MCV 98 80 - 100 fL    MCH 32.0 26.0 - 34.0 pg    MCHC 32.5 32.0 - 36.0 g/dL    RDW 17.2 (H) 11.5 - 14.5 %    Platelets 117 (L) 150 - 450 x10*3/uL    Neutrophils % 89.9 40.0 - 80.0 %    Immature Granulocytes %, Automated 0.8 0.0 - 0.9 %    Lymphocytes % 2.7 13.0 - 44.0 %    Monocytes % 6.4 2.0 - 10.0 %    Eosinophils % 0.1 0.0 - 6.0 %    Basophils % 0.1 0.0 - 2.0 %    Neutrophils Absolute 12.54 (H) 1.60 - 5.50 x10*3/uL    Immature Granulocytes Absolute, Automated 0.11 0.00 - 0.50 x10*3/uL    Lymphocytes Absolute 0.37 (L) 0.80 - 3.00 x10*3/uL    Monocytes Absolute 0.89 (H) 0.05 - 0.80 x10*3/uL    Eosinophils Absolute 0.01 0.00 - 0.40 x10*3/uL    Basophils Absolute 0.02 0.00 - 0.10 x10*3/uL   Vancomycin   Result Value Ref Range    Vancomycin 17.3 5.0 - 20.0 ug/mL   Magnesium   Result Value Ref Range    Magnesium 2.02 1.60 - 2.40 mg/dL   Basic Metabolic Panel   Result Value Ref Range    Glucose 113 (H) 74 - 99 mg/dL    Sodium 132 (L) 136 - 145 mmol/L    Potassium 4.4 3.5 - 5.3 mmol/L    Chloride 103 98 - 107 mmol/L    Bicarbonate 22 21 - 32 mmol/L    Anion Gap 11 10 - 20 mmol/L    Urea Nitrogen 24 (H) 6 - 23 mg/dL    Creatinine 1.18 0.50 - 1.30 mg/dL    eGFR 63 >60 mL/min/1.73m*2    Calcium 9.5 8.6 - 10.3 mg/dL   POCT GLUCOSE   Result Value Ref Range    POCT Glucose 116 (H) 74 - 99 mg/dL   POCT GLUCOSE   Result Value Ref Range    POCT Glucose 96 74 - 99 mg/dL        Imaging:  Transthoracic Echo (TTE) [place] [id number]   Tel [phone] Fax [phone]    TRANSTHORACIC ECHOCARDIOGRAM REPORT    Patient Name:      VERONICA Barrios Physician:    [id number] Tyrell Tran DO  Study Date:        8/20/2024             Ordering Provider:    [id number] FELISHA CARVAJAL  MRN/PID:           [id number]             Fellow:  Accession#:        [id number]         Nurse:  Date of Birth/Age: 1945 / 79 years Sonographer:          Maddy Chao RDCS  Gender:            M                    Additional Staff:  Height:            177.80 cm            Admit Date:           8/19/2024  Weight:            78.93 kg             Admission Status:     Inpatient -                                                                Routine  BSA / BMI:         1.97 m2 / 24.97      Department Location:  [place] [address]                                      Echo Lab  Blood Pressure: 105 /58 mmHg    Study Type:    TRANSTHORACIC ECHO (TTE) COMPLETE  Diagnosis/ICD: Elevated Troponin-R79.89  Indication:    Elevated Troponin  CPT Codes:     Echo Complete w Full [id number]    Patient History:  CABG:              Mulitivessel CABG.  Smoker:            Former.  Diabetes:          Yes  Pertinent History: CAD, HTN, COPD and Lung cancer.    Study Detail: The following Echo studies were performed: 2D, M-Mode, Doppler and                color flow. Technically challenging study due to poor acoustic                windows. The patient was awake.       PHYSICIAN INTERPRETATION:  Left Ventricle: The left ventricular systolic function is normal, with a visually estimated ejection fraction of 60%. There are no regional wall motion abnormalities. The left ventricular cavity size is normal. There is mild to moderate concentric left ventricular hypertrophy. Spectral Doppler shows an impaired relaxation pattern of left ventricular diastolic filling.  LV Wall Scoring:  All segments are normal.    Left Atrium: The left atrium is upper limits of normal in size.  Right Ventricle: The right ventricle is normal in size. There is normal  right ventricular global systolic function.  Right Atrium: The right atrium is normal in size.  Aortic Valve: The aortic valve appears structurally normal. The aortic valve appears tricuspid. There is mild aortic valve cusp calcification. There is no evidence of aortic valve stenosis.  The aortic valve dimensionless index is 0.88. There is no evidence of aortic valve regurgitation. The peak instantaneous gradient of the aortic valve is 4.0 mmHg. The mean gradient of the aortic valve is 2.0 mmHg.  Mitral Valve: The mitral valve is normal in structure. There is no evidence of mitral valve stenosis. There is normal mitral valve leaflet mobility. There is mild to moderate mitral annular calcification. There is no evidence of mitral valve regurgitation.  Tricuspid Valve: The tricuspid valve is structurally normal. There is normal tricuspid valve leaflet mobility. There is trace tricuspid regurgitation.  Pulmonic Valve: The pulmonic valve is not well visualized. There is no indication of pulmonic valve regurgitation.  Pericardium: There is no pericardial effusion noted.  Aorta: The aortic root is normal.  Pulmonary Artery: The pulmonary artery is not well visualized.  Systemic Veins: The inferior vena cava appears to be of normal size.  In comparison to the previous echocardiogram(s): There are no prior studies on this patient for comparison purposes.       CONCLUSIONS:   1. The left ventricular systolic function is normal, with a visually estimated ejection fraction of 60%.   2. Spectral Doppler shows an impaired relaxation pattern of left ventricular diastolic filling.   3. There is normal right ventricular global systolic function.   4. There is no evidence of mitral valve stenosis.   5. No evidence of mitral valve regurgitation.   6. Trace tricuspid regurgitation is visualized.   7. Aortic valve stenosis is not present.   8. The pulmonary artery is not well visualized.    RECOMMENDATIONS:  Technically suboptimal and  limited study, therefore accuracy of above interpretation could be substantially diminished. Clinical correlation is advised. Consider additional imaging modalities if clinically indicated.     QUANTITATIVE DATA SUMMARY:  2D MEASUREMENTS:                           Normal Ranges:  Ao Root d:     4.27 cm   (2.0-3.7cm)  LAs:           3.90 cm   (2.7-4.0cm)  IVSd:          1.25 cm   (0.6-1.1cm)  LVPWd:         1.12 cm   (0.6-1.1cm)  LVIDd:         3.99 cm   (3.9-5.9cm)  LVIDs:         2.73 cm  LV Mass Index: 82.2 g/m2  LV % FS        31.6 %    LA VOLUME:                                Normal Ranges:  LA Vol A4C:        21.1 ml    (22+/-6mL/m2)  LA Vol A2C:        20.9 ml  LA Vol BP:         21.0 ml  LA Vol Index A4C:  10.7ml/m2  LA Vol Index A2C:  10.6 ml/m2  LA Vol Index BP:   10.7 ml/m2  LA Area A4C:       10.7 cm2  LA Area A2C:       10.6 cm2  LA Major Axis A4C: 4.6 cm  LA Major Axis A2C: 4.6 cm  LA Volume Index:   10.5 ml/m2    RA VOLUME BY A/L METHOD:                                Normal Ranges:  RA Vol A4C:        24.3 ml    (8.3-19.5ml)  RA Vol Index A4C:  12.4 ml/m2  RA Area A4C:       11.6 cm2  RA Major Axis A4C: 4.7 cm    AORTA MEASUREMENTS:                     Normal Ranges:  Asc Ao, d: 3.46 cm (2.1-3.4cm)    LV SYSTOLIC FUNCTION BY 2D PLANIMETRY (MOD):                       Normal Ranges:  EF-A4C View:    60 % (>=55%)  EF-A2C View:    61 %  EF-Biplane:     60 %  EF-Visual:      60 %  LV EF Reported: 60 %    LV DIASTOLIC FUNCTION:                          Normal Ranges:  MV Peak E:    0.61 m/s  (0.7-1.2 m/s)  MV Peak A:    1.20 m/s  (0.42-0.7 m/s)  E/A Ratio:    0.50      (1.0-2.2)  MV e'         0.079 m/s (>8.0)  MV lateral e' 0.10 m/s  MV medial e'  0.06 m/s  E/e' Ratio:   7.69      (<8.0)    MITRAL VALVE:                  Normal Ranges:  MV DT: 213 msec (150-240msec)    AORTIC VALVE:                                    Normal Ranges:  AoV Vmax:                1.00 m/s (<=1.7m/s)  AoV Peak PG:              4.0 mmHg (<20mmHg)  AoV Mean P.0 mmHg (1.7-11.5mmHg)  LVOT Max Hemal:            0.95 m/s (<=1.1m/s)  AoV VTI:                 18.60 cm (18-25cm)  LVOT VTI:                16.40 cm  LVOT Diameter:           2.12 cm  (1.8-2.4cm)  AoV Area, VTI:           3.11 cm2 (2.5-5.5cm2)  AoV Area,Vmax:           3.38 cm2 (2.5-4.5cm2)  AoV Dimensionless Index: 0.88       RIGHT VENTRICLE:  RV Basal 3.39 cm  RV Mid   2.52 cm  RV Major 5.8 cm  TAPSE:   18.5 mm  RV s'    0.14 m/s    TRICUSPID VALVE/RVSP:                    Normal Ranges:  IVC Diam: 1.32 cm    PULMONIC VALVE:                          Normal Ranges:  PV Accel Time: 106 msec (>120ms)  PV Max Hemal:    1.1 m/s  (0.6-0.9m/s)  PV Max P.9 mmHg       [id number] Tyrell Tran DO  Electronically signed on 2024 at 10:48:57 AM       Wall Scoring       ** Final **   \

## 2024-08-21 NOTE — PROGRESS NOTES
"Timothy Varela is a 79 y.o. male on day 2 of admission presenting with Sepsis with acute renal failure (Multi).    Subjective   Patient is feeling a lot better.  No new weakness or numbness and according the son-in-law was at bedside's patient's symptoms have been the same for last 6 months to a year.  Patient has been seeing a neurologist every 6 months at the [facility]           Last Recorded Vitals  Blood pressure 144/77, pulse (!) 113, temperature 36.3 °C (97.3 °F), temperature source Temporal, resp. rate 17, height 1.803 m (5' 10.99\"), weight 82.2 kg (181 lb 3.5 oz), SpO2 95%.    Physical Exam/Neurological Exam    Relevant Results  Neurological Exam  Mental Status  Awake. Oriented only to person, place and time. Recent and remote memory are intact. Speech is normal. Language is fluent with no aphasia. Attention and concentration are normal.     Cranial Nerves  CN II: Right visual acuity: Counts fingers. Left visual acuity: Counts fingers. Right normal visual field. Left normal visual field.  CN III, IV, VI: Extraocular movements intact bilaterally. No nystagmus.   Right pupil: 3 mm. Round. Reactive to light. Reactive to accommodation.   Left pupil: 3 mm. Round. Reactive to light. Reactive to accommodation.  CN V:  Right: Facial sensation is normal.  Left: Facial sensation is normal on the left.  CN VII:  Right: There is no facial weakness.  Left: There is no facial weakness.  CN VIII:  Right: Hearing is normal.  Left: Hearing is normal.  CN IX, X:  Right: Palate is normal.  Left: Palate is normal.  CN XI:  Right: Trapezius strength is normal.  Left: Trapezius strength is normal.  CN XII: Tongue midline without atrophy or fasciculations.     Motor  Normal muscle bulk throughout. Normal muscle tone. Strength is 5/5 in all four extremities except as noted.  Generalized weakness.     Sensory  Light touch is normal in upper and lower extremities.      Reflexes  Deep tendon reflexes are 2+ and symmetric in all " four extremities.     Coordination  Right: Finger-to-nose normal.Left: Finger-to-nose normal.     Physical Exam  Constitutional:       General: He is awake.   HENT:      Right Ear: Hearing normal.      Left Ear: Hearing normal.   Eyes:      Extraocular Movements: EOM normal. No nystagmus.   Neurological:      Deep Tendon Reflexes: Reflexes are normal and symmetric.   Psychiatric:         Speech: Speech normal.                  Barnwell Coma Scale  Best Eye Response: Spontaneous  Best Verbal Response: Oriented (forgetful at times)  Best Motor Response: Follows commands  Estefania Coma Scale Score: 15                Transthoracic Echo (TTE) Complete    Result Date: 8/20/2024          [place] [address]  Tel [phone] Fax [phone] TRANSTHORACIC ECHOCARDIOGRAM REPORT Patient Name:      VERONICA Barrios Physician:    [id number] Tyrell Tran DO Study Date:        8/20/2024            Ordering Provider:    [id number] FELISHA CARVAJAL MRN/PID:           [id number]             Fellow: Accession#:        [id number]         Nurse: Date of Birth/Age: 1945 / 79 years Sonographer:          Maddy Chao                                                               [facility] Gender:            M                    Additional Staff: Height:            177.80 cm            Admit Date:           8/19/2024 Weight:            78.93 kg             Admission Status:     Inpatient -                                                               Routine BSA / BMI:         1.97 m2 / 24.97      Department Location:  [place] [address]                                      Echo Lab Blood Pressure: 105 /58 mmHg Study Type:    TRANSTHORACIC ECHO (TTE) COMPLETE Diagnosis/ICD: Elevated Troponin-R79.89 Indication:    Elevated Troponin CPT Codes:     Echo  Complete w Full Doppler-[id number] Patient History: CABG:              Mulitivessel CABG. Smoker:            Former. Diabetes:          Yes Pertinent History: CAD, HTN, COPD and Lung cancer. Study Detail: The following Echo studies were performed: 2D, M-Mode, Doppler and               color flow. Technically challenging study due to poor acoustic               windows. The patient was awake.  PHYSICIAN INTERPRETATION: Left Ventricle: The left ventricular systolic function is normal, with a visually estimated ejection fraction of 60%. There are no regional wall motion abnormalities. The left ventricular cavity size is normal. There is mild to moderate concentric left ventricular hypertrophy. Spectral Doppler shows an impaired relaxation pattern of left ventricular diastolic filling. LV Wall Scoring: All segments are normal. Left Atrium: The left atrium is upper limits of normal in size. Right Ventricle: The right ventricle is normal in size. There is normal right ventricular global systolic function. Right Atrium: The right atrium is normal in size. Aortic Valve: The aortic valve appears structurally normal. The aortic valve appears tricuspid. There is mild aortic valve cusp calcification. There is no evidence of aortic valve stenosis. The aortic valve dimensionless index is 0.88. There is no evidence of aortic valve regurgitation. The peak instantaneous gradient of the aortic valve is 4.0 mmHg. The mean gradient of the aortic valve is 2.0 mmHg. Mitral Valve: The mitral valve is normal in structure. There is no evidence of mitral valve stenosis. There is normal mitral valve leaflet mobility. There is mild to moderate mitral annular calcification. There is no evidence of mitral valve regurgitation. Tricuspid Valve: The tricuspid valve is structurally normal. There is normal tricuspid valve leaflet mobility. There is trace tricuspid regurgitation. Pulmonic Valve: The pulmonic valve is not well visualized. There is no  indication of pulmonic valve regurgitation. Pericardium: There is no pericardial effusion noted. Aorta: The aortic root is normal. Pulmonary Artery: The pulmonary artery is not well visualized. Systemic Veins: The inferior vena cava appears to be of normal size. In comparison to the previous echocardiogram(s): There are no prior studies on this patient for comparison purposes.  CONCLUSIONS:  1. The left ventricular systolic function is normal, with a visually estimated ejection fraction of 60%.  2. Spectral Doppler shows an impaired relaxation pattern of left ventricular diastolic filling.  3. There is normal right ventricular global systolic function.  4. There is no evidence of mitral valve stenosis.  5. No evidence of mitral valve regurgitation.  6. Trace tricuspid regurgitation is visualized.  7. Aortic valve stenosis is not present.  8. The pulmonary artery is not well visualized. RECOMMENDATIONS: Technically suboptimal and limited study, therefore accuracy of above interpretation could be substantially diminished. Clinical correlation is advised. Consider additional imaging modalities if clinically indicated.  QUANTITATIVE DATA SUMMARY: 2D MEASUREMENTS:                          Normal Ranges: Ao Root d:     4.27 cm   (2.0-3.7cm) LAs:           3.90 cm   (2.7-4.0cm) IVSd:          1.25 cm   (0.6-1.1cm) LVPWd:         1.12 cm   (0.6-1.1cm) LVIDd:         3.99 cm   (3.9-5.9cm) LVIDs:         2.73 cm LV Mass Index: 82.2 g/m2 LV % FS        31.6 % LA VOLUME:                               Normal Ranges: LA Vol A4C:        21.1 ml    (22+/-6mL/m2) LA Vol A2C:        20.9 ml LA Vol BP:         21.0 ml LA Vol Index A4C:  10.7ml/m2 LA Vol Index A2C:  10.6 ml/m2 LA Vol Index BP:   10.7 ml/m2 LA Area A4C:       10.7 cm2 LA Area A2C:       10.6 cm2 LA Major Axis A4C: 4.6 cm LA Major Axis A2C: 4.6 cm LA Volume Index:   10.5 ml/m2 RA VOLUME BY A/L METHOD:                               Normal Ranges: RA Vol A4C:        24.3  ml    (8.3-19.5ml) RA Vol Index A4C:  12.4 ml/m2 RA Area A4C:       11.6 cm2 RA Major Axis A4C: 4.7 cm AORTA MEASUREMENTS:                    Normal Ranges: Asc Ao, d: 3.46 cm (2.1-3.4cm) LV SYSTOLIC FUNCTION BY 2D PLANIMETRY (MOD):                      Normal Ranges: EF-A4C View:    60 % (>=55%) EF-A2C View:    61 % EF-Biplane:     60 % EF-Visual:      60 % LV EF Reported: 60 % LV DIASTOLIC FUNCTION:                         Normal Ranges: MV Peak E:    0.61 m/s  (0.7-1.2 m/s) MV Peak A:    1.20 m/s  (0.42-0.7 m/s) E/A Ratio:    0.50      (1.0-2.2) MV e'         0.079 m/s (>8.0) MV lateral e' 0.10 m/s MV medial e'  0.06 m/s E/e' Ratio:   7.69      (<8.0) MITRAL VALVE:                 Normal Ranges: MV DT: 213 msec (150-240msec) AORTIC VALVE:                                   Normal Ranges: AoV Vmax:                1.00 m/s (<=1.7m/s) AoV Peak P.0 mmHg (<20mmHg) AoV Mean P.0 mmHg (1.7-11.5mmHg) LVOT Max Hemal:            0.95 m/s (<=1.1m/s) AoV VTI:                 18.60 cm (18-25cm) LVOT VTI:                16.40 cm LVOT Diameter:           2.12 cm  (1.8-2.4cm) AoV Area, VTI:           3.11 cm2 (2.5-5.5cm2) AoV Area,Vmax:           3.38 cm2 (2.5-4.5cm2) AoV Dimensionless Index: 0.88  RIGHT VENTRICLE: RV Basal 3.39 cm RV Mid   2.52 cm RV Major 5.8 cm TAPSE:   18.5 mm RV s'    0.14 m/s TRICUSPID VALVE/RVSP:                   Normal Ranges: IVC Diam: 1.32 cm PULMONIC VALVE:                         Normal Ranges: PV Accel Time: 106 msec (>120ms) PV Max Hemal:    1.1 m/s  (0.6-0.9m/s) PV Max P.9 mmHg  [id number] Tyrell Tran DO Electronically signed on 2024 at 10:48:57 AM  Wall Scoring  ** Final **     CT chest abdomen pelvis wo IV contrast    Result Date: 2024  Interpreted By:  Atul García, STUDY: CT CHEST ABDOMEN PELVIS WO CONTRAST;  2024 8:06 pm   INDICATION: Signs/Symptoms:cough, vomiting, abdominal pain, sepsis, hs of lung cancer and myasthenia gravis.    COMPARISON: CTA chest abdomen pelvis 07/28/2020   ACCESSION NUMBER(S): [id number]   ORDERING CLINICIAN: FELISHA CARVAJAL   TECHNIQUE: Axial CT images of the chest, abdomen and pelvis with coronal and sagittal reconstructed images obtained without contrast.   FINDINGS: CHEST:   VESSELS: No aortic aneurysm. Extensive calcifications in the aorta and coronary arteries. HEART: Normal size.  No pericardial effusion. MEDIASTINUM AND YEIMY: Calcified mediastinal and hilar lymph nodes, likely sequelae of remote granulomatous disease. Additional enlarged mediastinal lymph nodes suspicious for metastatic disease, mildly increased in size compared to prior. For example, a right upper paratracheal lymph node measures 1.6 cm (201/18), previously 0.9 cm. A subcarinal lymph node measures 2.8 cm (201/47), previously 2.1 cm. LUNG, PLEURA, LARGE AIRWAYS: Small right pleural effusion, new compared to prior. No pneumothorax. Increased soft tissue thickening/cuffing surrounding the branches of the right mainstem bronchus (204/144) resulting in increased narrowing of the airways. Subtle tree-in-bud nodularity in the right lower lobe and right middle lobe. Elevation of the right minor fissure, possibly sequelae of prior right upper lobe wedge resection. CHEST WALL AND LOWER NECK: Within normal limits. No acute osseous abnormality. Cerclage wires are intact. Diffuse osseous demineralization.   ABDOMEN:   BONES: Diffuse osseous demineralization. No acute fractures detected. Similar lucency through the superior endplate of the L2 vertebral body in keeping with subacute compression fracture. ABDOMINAL WALL: Lipomatous hypertrophy of the spermatic cords. Minimal edema in the bilateral thighs.   LIVER: Diffuse hypoattenuation throughout the liver parenchyma with multiple hypodense lesions, increased in conspicuity compared to 07/28/2024 suspicious for worsening metastatic disease. Granulomatous calcifications. BILE DUCTS: No biliary dilatation.  GALLBLADDER: No calcified gallstones. No pericholecystic inflammatory changes. PANCREAS: Within normal limits. SPLEEN: Suspected hypoattenuating foci in the spleen, suspicious for metastatic disease although evaluation is limited in the absence of contrast. Granulomatous calcifications. ADRENALS: Within normal limits. KIDNEYS AND URETERS: Bilateral nonobstructing renal calculi. Redemonstration of subcentimeter cysts and additional indeterminate hyperdense cortical lesions, similar to prior. No hydroureter.   VESSELS: Atherosclerotic calcification of the aorta and its branches. No aneurysm. RETROPERITONEUM: Increased size of retroperitoneal and upper abdominal lymph nodes suspicious for metastatic disease. For example a portacaval lymph node measures up to 2.4 cm (201/101), previously 1.4 cm. Additionally, an aortocaval lymph node measures 0.9 cm (201/122), previously 0.8 cm.   PELVIS:   REPRODUCTIVE ORGANS: Prostatomegaly with dystrophic calcifications. BLADDER: Within normal limits.   BOWEL: Colonic diverticulosis. Nonobstructive. PERITONEUM: No ascites or free air, no fluid collection.       Compared to 07/28/2024, findings are suspicious for worsening metastatic disease as demonstrated: 1. Increased size of presumed metastatic lymph nodes in the mediastinum and abdomen. 2. Increased size and number of hepatic metastases. 3. Suspected underlying splenic metastases, more conspicuous compared to prior exam but evaluation is limited without contrast. 4. Increased size of right perihilar soft tissue thickening resulting in narrowing of the right mainstem bronchus and its branches.   Additional noteworthy the findings are as follows: 1. Tree-in-bud nodularity in the right lower lobe. Differential diagnosis includes aspiration, bronchopneumonia, or endobronchial metastatic disease spread. 2. Small new right pleural effusion. 3. Stable subacute L2 compression fracture. 4. Stable indeterminate hyperdense renal lesions,  possibly hemorrhagic cysts. 5. Bilateral nonobstructive renal calculi.   MACRO: None   Signed by: Atul García 8/19/2024 9:01 PM Dictation workstation:   [id number]    XR chest 1 view    Result Date: 8/19/2024  STUDY: Chest Radiograph;  8/19/2024 4:04 PM INDICATION: Weakness. COMPARISON: 8/2/2024 XR Chest. ACCESSION NUMBER(S): [id number] ORDERING CLINICIAN: JOSE CENTENO TECHNIQUE:  Frontal chest was obtained at 16:04 hours. FINDINGS: CARDIOMEDIASTINAL SILHOUETTE: Cardiomediastinal silhouette is normal in size and configuration. Patient status post mid sternotomy.  Prominence central pulmonary vasculature which appears stable.  LUNGS: Lungs are clear.  There is no pneumothorax or pleural effusion.  ABDOMEN: No remarkable upper abdominal findings.  BONES: No acute osseous changes.    No acute cardiopulmonary disease. Signed by Timothy Gill, DO    Electrocardiogram, 12-lead PRN ACS symptoms    Result Date: 8/19/2024  Normal sinus rhythm Nonspecific ST and T wave abnormality Abnormal ECG When compared with ECG of 19-AUG-2024 15:13, (unconfirmed) No significant change was found       No EEG results found for the past 14 days                 Assessment/Plan   Myasthenia gravis which is stable.  Principal Problem:    Sepsis with acute renal failure (Multi)  Active Problems:    Elevated troponin    Epigastric pain    Hyponatremia    Myasthenia gravis (Multi)    Generalized weakness    History of coronary artery bypass graft    History of lung cancer  Plan.  Continue with current treatment and I am not sure this is myasthenia gravis versus Eaton-Lambert syndrome since patient had a history of CT of the lung.  Continue with current medication and patient can follow-up with his neurologist at [facility]    Call if any change in neurostatus    Okay to discharge when medically stable             Due to technical limitations of voice recognition and human error, this note may not accurately reflect the care of  the patient.     Gordo Page MD

## 2024-08-21 NOTE — PROGRESS NOTES
Occupational Therapy    Evaluation    Patient Name: Timothy Varela  MRN: [id number]  Today's Date: 8/21/2024  Time Calculation  Start Time: 1151  Stop Time: 1205  Time Calculation (min): 14 min        Assessment:  OT Assessment: impaired balance, generalized strength, endurance. Assist with ADLs. Pt. would benefit from continued OT to address deficits  Prognosis: Fair  Evaluation/Treatment Tolerance: Patient tolerated treatment well  End of Session Communication: Bedside nurse  End of Session Patient Position: Bed, 3 rail up, Alarm off, not on at start of session (family member returning to room)  OT Assessment Results: Decreased ADL status  Prognosis: Fair  Evaluation/Treatment Tolerance: Patient tolerated treatment well  Plan:  Treatment Interventions: ADL retraining, Functional transfer training, Endurance training  OT Frequency: 2 times per week  OT Discharge Recommendations: Moderate intensity level of continued care  OT - OK to Discharge: Yes (when medically stable/cleared)      Subjective   Current Problem:  1. Hypercalcemia        2. Acute dehydration        3. Sepsis, due to unspecified organism, unspecified whether acute organ dysfunction present (Multi)        4. Elevated troponin  Transthoracic Echo (TTE) Complete    Transthoracic Echo (TTE) Complete      5. Chest pain, unspecified type  Transthoracic Echo (TTE) Complete    Transthoracic Echo (TTE) Complete        General:  General  Reason for Referral: ADL impairment  Referred By: Irena OT/PT 8/20  Past Medical History Relevant to Rehab: includes: COPD, HTN, CAD, CKD, DM, myasthenia gravis, CABG, R upper lobectomy, metastatic lung CA, liver mets, melanoma, hyponatremia, L2 compression fracture  Family/Caregiver Present: Yes  Caregiver Feedback: family present. stepped out of room for evaluation.  Co-Treatment: PT  Co-Treatment Reason: to maximize pt. safety  Prior to Session Communication: Bedside nurse  Patient Position Received: Bed, 3 rail up, Alarm  off, not on at start of session  General Comment: Pt. is a 80yo who presented to [facility] ED on 8/19/2024 with c/o weakness, emesis, cough decreased oral intake.  In ED, BP 97/57, WBC = 14.7, Hgb = 10  Troponin = 64  CXR (8/19) (-) acute findings  Chest/ABD/Pelvic CT (8/19): findings are suspicious for worsening  metastatic disease as demonstrated:  1. Increased size of presumed metastatic lymph nodes in the  mediastinum and abdomen.  2. Increased size and number of hepatic metastases.  3. Suspected underlying splenic metastases, more conspicuous compared  to prior exam but evaluation is limited without contrast.  4. Increased size of right perihilar soft tissue thickening resulting  in narrowing of the right mainstem bronchus and its branches.  Na (8/21) 130 trending down  Hgb (8/21) 8.9 trending down Dx: hypercalcemia, dehydration, sepsis  Precautions:  Medical Precautions: Fall precautions  Vital Signs:  Heart Rate:  (126-131)  SpO2: 99 %  Pain:  Pain Assessment  Pain Assessment: 0-10  0-10 (Numeric) Pain Score: 0 - No pain    Objective   Cognition:  Overall Cognitive Status: Within Functional Limits  Orientation Level: Disoriented to situation  Cognition Comments: easily agitated    Home Living:  Home Living Comments: Pt. lives with spouse in a 2 level house with 2 SHEMAR with HR. Bed/bath on 1st floor with walkin shower with a seat and grab bars. Laundry on 1st floor.  Prior Function:  Prior Function Comments: Pt. amb with RW PTA. Pt. required A with ADLs PTA. Wife completed IADLs PTA. Pt. reported 3-4 falls in last 3 months and drove PTA according to [facility] PT danni on 8/2/2024. Pt. wears 2L O2 hs.    ADL:  Eating Assistance: Independent  Grooming Assistance: Minimal  Bathing Assistance: Moderate  UE Dressing Assistance: Minimal  LE Dressing Assistance: Moderate  Toileting Assistance with Device: Maximal  Activity Tolerance:  Endurance: Decreased tolerance for upright activites  Activity Tolerance Comments: SOB with  activity  Bed Mobility/Transfers: Bed Mobility  Bed Mobility: Yes  Bed Mobility 1  Bed Mobility 1: Supine to sitting  Level of Assistance 1: Moderate assistance  Bed Mobility Comments 1: assist with LEs and trunk to upright sit  Bed Mobility 2  Bed Mobility  2: Sitting to supine  Level of Assistance 2: Minimum assistance  Bed Mobility Comments 2: assist with LEs    Transfers  Transfer: Yes  Transfer 1  Technique 1: Sit to stand  Transfer Device 1: Walker  Transfer Level of Assistance 1: Moderate assistance  Trials/Comments 1: Pt. placing hands on walker prior to standing; assist to flex shoulders up to be able to reach walker. Assist to lift, steady, weight shift and baalnce over walker  Transfers 2  Technique 2: Stand pivot  Transfer Device 2: Walker  Transfer Level of Assistance 2: Moderate assistance  Trials/Comments 2: assist to balance, steady bed<>bsc  Standing Balance:  Dynamic Standing Balance  Dynamic Standing-Comments: Poor +   Strength:  Strength Comments: BUEs 3+/5 MMT  Hand Function:  Gross Grasp: Functional  Extremities: RUE   RUE :  (Limited shoulder flexion. Elbow/wrist/hand WFL) and LUE   LUE:  (Limited shoulder flexion. Elbow/wrist/hand WFL)  Outcome Measures:[facility] Daily Activity  Putting on and taking off regular lower body clothing: A lot  Bathing (including washing, rinsing, drying): A lot  Putting on and taking off regular upper body clothing: A little  Toileting, which includes using toilet, bedpan or urinal: A lot  Taking care of personal grooming such as brushing teeth: A little  Eating Meals: None  Daily Activity - Total Score: 16    Education Documentation  ADL Training, taught by Valencia Fritz OT at 8/21/2024  3:18 PM.  Learner: Patient  Readiness: Acceptance  Method: Explanation  Response: Verbalizes Understanding, Needs Reinforcement    IP EDUCATION:  Education  Individual(s) Educated: Patient  Education Provided: Fall precautons, POC discussed and agreed upon, Risk and  benefits of OT discussed with patient or other    Goals:  Encounter Problems       Encounter Problems (Active)       OT Goals       SBA for all functional transfers  (Progressing)       Start:  08/21/24    Expected End:  09/04/24            Min A for LB dressing; AE use as needed  (Progressing)       Start:  08/21/24    Expected End:  09/04/24            Min A for toileting tasks and clothing mgmt  (Progressing)       Start:  08/21/24    Expected End:  09/04/24            Fair dyn standing balance during ADLs and functional tasks  (Progressing)       Start:  08/21/24    Expected End:  09/04/24

## 2024-08-21 NOTE — CARE PLAN
The patient's goals for the shift include Labs WNL    The clinical goals for the shift include Labs WNL      Problem: Skin  Goal: Prevent/manage excess moisture  8/21/2024 0553 by Tamara Adrian RN  Outcome: Progressing  8/20/2024 2037 by Tamara Adrian RN  Flowsheets (Taken 8/20/2024 2037)  Prevent/manage excess moisture:   Use wicking fabric (obtain order)   Moisturize dry skin   Cleanse incontinence/protect with barrier cream   Monitor for/manage infection if present   Follow provider orders for dressing changes  Goal: Promote/optimize nutrition  8/21/2024 0553 by Tamara Adrian RN  Outcome: Progressing  8/20/2024 2037 by Tamara Adrian RN  Flowsheets (Taken 8/20/2024 2037)  Promote/optimize nutrition:   Offer water/supplements/favorite foods   Discuss with provider if NPO > 2 days   Assist with feeding   Reassess MST if dietician not consulted   Monitor/record intake including meals   Consume > 50% meals/supplements  Goal: Participates in plan/prevention/treatment measures  8/21/2024 0553 by Tamara Adrian RN  Outcome: Progressing  8/20/2024 2037 by Tamara Adrian RN  Flowsheets (Taken 8/20/2024 2037)  Participates in plan/prevention/treatment measures:   Increase activity/out of bed for meals   Discuss with provider PT/OT consult   Elevate heels  Goal: Prevent/minimize sheer/friction injuries  8/21/2024 0553 by Tamara Adrian RN  Outcome: Progressing  8/20/2024 2037 by Tamara Adrian RN  Flowsheets (Taken 8/20/2024 2037)  Prevent/minimize sheer/friction injuries:   Utilize specialty bed per algorithm   Use pull sheet   Turn/reposition every 2 hours/use positioning/transfer devices   Increase activity/out of bed for meals   HOB 30 degrees or less   Complete micro-shifts as needed if patient unable. Adjust patient position to relieve pressure points, not a full turn  Goal: Promote skin healing  8/21/2024 0553 by Tamara Adrian RN  Outcome: Progressing  8/20/2024 2037 by  Tamara Adrian, RN  Flowsheets (Taken 8/20/2024 2037)  Promote skin healing:   Turn/reposition every 2 hours/use positioning/transfer devices   Rotate device position/do not position patient on device   Protective dressings over bony prominences   Ensure correct size (line/device) and apply per  instructions   Assess skin/pad under line(s)/device(s)     Problem: Fall/Injury  Goal: Not fall by end of shift  Outcome: Progressing  Goal: Be free from injury by end of the shift  Outcome: Progressing  Goal: Verbalize understanding of personal risk factors for fall in the hospital  Outcome: Progressing  Goal: Verbalize understanding of risk factor reduction measures to prevent injury from fall in the home  Outcome: Progressing  Goal: Use assistive devices by end of the shift  Outcome: Progressing  Goal: Pace activities to prevent fatigue by end of the shift  Outcome: Progressing     Problem: Pain - Adult  Goal: Verbalizes/displays adequate comfort level or baseline comfort level  Outcome: Progressing     Problem: Safety - Adult  Goal: Free from fall injury  Outcome: Progressing     Problem: Discharge Planning  Goal: Discharge to home or other facility with appropriate resources  Outcome: Progressing     Problem: Chronic Conditions and Co-morbidities  Goal: Patient's chronic conditions and co-morbidity symptoms are monitored and maintained or improved  Outcome: Progressing     Problem: Nutrition  Goal: Less than 5 days NPO/clear liquids  Outcome: Progressing  Goal: Oral intake greater than 50%  Outcome: Progressing  Goal: Oral intake greater 75%  Outcome: Progressing  Goal: Consume prescribed supplement  Outcome: Progressing  Goal: Adequate PO fluid intake  Outcome: Progressing  Goal: Nutrition support goals are met within 48 hrs  Outcome: Progressing  Goal: Nutrition support is meeting 75% of nutrient needs  Outcome: Progressing  Goal: Tube feed tolerance  Outcome: Progressing  Goal: BG   mg/dL  Outcome: Progressing  Goal: Lab values WNL  Outcome: Progressing  Goal: Electrolytes WNL  Outcome: Progressing  Goal: Promote healing  Outcome: Progressing  Goal: Maintain stable weight  Outcome: Progressing  Goal: Reduce weight from edema/fluid  Outcome: Progressing  Goal: Gradual weight gain  Outcome: Progressing  Goal: Improve ostomy output  Outcome: Progressing

## 2024-08-22 ENCOUNTER — APPOINTMENT (OUTPATIENT)
Dept: CARDIOLOGY | Facility: HOSPITAL | Age: 79
End: 2024-08-22
Payer: MEDICARE

## 2024-08-22 VITALS
SYSTOLIC BLOOD PRESSURE: 107 MMHG | OXYGEN SATURATION: 96 % | RESPIRATION RATE: 18 BRPM | WEIGHT: 181.22 LBS | DIASTOLIC BLOOD PRESSURE: 59 MMHG | HEIGHT: 71 IN | BODY MASS INDEX: 25.37 KG/M2 | TEMPERATURE: 96.1 F | HEART RATE: 133 BPM

## 2024-08-22 LAB
ANION GAP SERPL CALC-SCNC: 11 MMOL/L (ref 10–20)
BASOPHILS # BLD AUTO: 0.02 X10*3/UL (ref 0–0.1)
BASOPHILS NFR BLD AUTO: 0.2 %
BUN SERPL-MCNC: 21 MG/DL (ref 6–23)
CALCIUM SERPL-MCNC: 9.4 MG/DL (ref 8.6–10.3)
CHLORIDE SERPL-SCNC: 105 MMOL/L (ref 98–107)
CO2 SERPL-SCNC: 21 MMOL/L (ref 21–32)
CREAT SERPL-MCNC: 1.07 MG/DL (ref 0.5–1.3)
EGFRCR SERPLBLD CKD-EPI 2021: 71 ML/MIN/1.73M*2
EOSINOPHIL # BLD AUTO: 0.09 X10*3/UL (ref 0–0.4)
EOSINOPHIL NFR BLD AUTO: 0.7 %
ERYTHROCYTE [DISTWIDTH] IN BLOOD BY AUTOMATED COUNT: 17.8 % (ref 11.5–14.5)
GLUCOSE SERPL-MCNC: 107 MG/DL (ref 74–99)
HCT VFR BLD AUTO: 30.4 % (ref 41–52)
HGB BLD-MCNC: 9.8 G/DL (ref 13.5–17.5)
HOLD SPECIMEN: NORMAL
IMM GRANULOCYTES # BLD AUTO: 0.17 X10*3/UL (ref 0–0.5)
IMM GRANULOCYTES NFR BLD AUTO: 1.4 % (ref 0–0.9)
LYMPHOCYTES # BLD AUTO: 0.5 X10*3/UL (ref 0.8–3)
LYMPHOCYTES NFR BLD AUTO: 4 %
MAGNESIUM SERPL-MCNC: 1.97 MG/DL (ref 1.6–2.4)
MCH RBC QN AUTO: 31.6 PG (ref 26–34)
MCHC RBC AUTO-ENTMCNC: 32.2 G/DL (ref 32–36)
MCV RBC AUTO: 98 FL (ref 80–100)
MONOCYTES # BLD AUTO: 0.89 X10*3/UL (ref 0.05–0.8)
MONOCYTES NFR BLD AUTO: 7.1 %
NEUTROPHILS # BLD AUTO: 10.91 X10*3/UL (ref 1.6–5.5)
NEUTROPHILS NFR BLD AUTO: 86.6 %
NRBC BLD-RTO: 0 /100 WBCS (ref 0–0)
PLATELET # BLD AUTO: 89 X10*3/UL (ref 150–450)
POTASSIUM SERPL-SCNC: 4.4 MMOL/L (ref 3.5–5.3)
RBC # BLD AUTO: 3.1 X10*6/UL (ref 4.5–5.9)
SODIUM SERPL-SCNC: 133 MMOL/L (ref 136–145)
WBC # BLD AUTO: 12.6 X10*3/UL (ref 4.4–11.3)

## 2024-08-22 PROCEDURE — 82565 ASSAY OF CREATININE: CPT | Performed by: STUDENT IN AN ORGANIZED HEALTH CARE EDUCATION/TRAINING PROGRAM

## 2024-08-22 PROCEDURE — 83735 ASSAY OF MAGNESIUM: CPT | Performed by: STUDENT IN AN ORGANIZED HEALTH CARE EDUCATION/TRAINING PROGRAM

## 2024-08-22 PROCEDURE — 97530 THERAPEUTIC ACTIVITIES: CPT | Mod: GP

## 2024-08-22 PROCEDURE — 93005 ELECTROCARDIOGRAM TRACING: CPT

## 2024-08-22 PROCEDURE — 2500000001 HC RX 250 WO HCPCS SELF ADMINISTERED DRUGS (ALT 637 FOR MEDICARE OP): Performed by: INTERNAL MEDICINE

## 2024-08-22 PROCEDURE — 99239 HOSP IP/OBS DSCHRG MGMT >30: CPT | Performed by: STUDENT IN AN ORGANIZED HEALTH CARE EDUCATION/TRAINING PROGRAM

## 2024-08-22 PROCEDURE — 93010 ELECTROCARDIOGRAM REPORT: CPT | Performed by: INTERNAL MEDICINE

## 2024-08-22 PROCEDURE — 2500000001 HC RX 250 WO HCPCS SELF ADMINISTERED DRUGS (ALT 637 FOR MEDICARE OP): Performed by: STUDENT IN AN ORGANIZED HEALTH CARE EDUCATION/TRAINING PROGRAM

## 2024-08-22 PROCEDURE — 85025 COMPLETE CBC W/AUTO DIFF WBC: CPT | Performed by: STUDENT IN AN ORGANIZED HEALTH CARE EDUCATION/TRAINING PROGRAM

## 2024-08-22 PROCEDURE — 2500000002 HC RX 250 W HCPCS SELF ADMINISTERED DRUGS (ALT 637 FOR MEDICARE OP, ALT 636 FOR OP/ED): Performed by: INTERNAL MEDICINE

## 2024-08-22 PROCEDURE — 2500000004 HC RX 250 GENERAL PHARMACY W/ HCPCS (ALT 636 FOR OP/ED): Performed by: INTERNAL MEDICINE

## 2024-08-22 PROCEDURE — 36415 COLL VENOUS BLD VENIPUNCTURE: CPT | Performed by: STUDENT IN AN ORGANIZED HEALTH CARE EDUCATION/TRAINING PROGRAM

## 2024-08-22 RX ORDER — AMOXICILLIN AND CLAVULANATE POTASSIUM 875; 125 MG/1; MG/1
1 TABLET, FILM COATED ORAL 2 TIMES DAILY
Start: 2024-08-22 | End: 2024-08-23

## 2024-08-22 RX ORDER — METOPROLOL TARTRATE 25 MG/1
25 TABLET, FILM COATED ORAL ONCE
Status: COMPLETED | OUTPATIENT
Start: 2024-08-22 | End: 2024-08-22

## 2024-08-22 RX ORDER — METOPROLOL TARTRATE 1 MG/ML
5 INJECTION, SOLUTION INTRAVENOUS ONCE
Status: DISCONTINUED | OUTPATIENT
Start: 2024-08-22 | End: 2024-08-22

## 2024-08-22 RX ORDER — METOPROLOL SUCCINATE 50 MG/1
50 TABLET, EXTENDED RELEASE ORAL DAILY
Status: DISCONTINUED | OUTPATIENT
Start: 2024-08-22 | End: 2024-08-22 | Stop reason: HOSPADM

## 2024-08-22 ASSESSMENT — COGNITIVE AND FUNCTIONAL STATUS - GENERAL
MOBILITY SCORE: 11
TOILETING: A LOT
MOVING TO AND FROM BED TO CHAIR: A LOT
DRESSING REGULAR LOWER BODY CLOTHING: A LOT
TURNING FROM BACK TO SIDE WHILE IN FLAT BAD: A LOT
DAILY ACTIVITIY SCORE: 13
DRESSING REGULAR UPPER BODY CLOTHING: A LOT
MOVING FROM LYING ON BACK TO SITTING ON SIDE OF FLAT BED WITH BEDRAILS: A LOT
EATING MEALS: A LITTLE
STANDING UP FROM CHAIR USING ARMS: A LOT
CLIMB 3 TO 5 STEPS WITH RAILING: TOTAL
HELP NEEDED FOR BATHING: A LOT
PERSONAL GROOMING: A LOT
WALKING IN HOSPITAL ROOM: A LOT

## 2024-08-22 ASSESSMENT — PAIN SCALES - GENERAL: PAINLEVEL_OUTOF10: 0 - NO PAIN

## 2024-08-22 ASSESSMENT — PAIN - FUNCTIONAL ASSESSMENT: PAIN_FUNCTIONAL_ASSESSMENT: 0-10

## 2024-08-22 NOTE — PROGRESS NOTES
08/22/24 1323   Discharge Planning   Home or Post Acute Services Post acute facilities (Rehab/SNF/etc)   Type of Post Acute Facility Services Rehab   Expected Discharge Disposition IRF   Does the patient need discharge transport arranged? Yes   RoundTrip coordination needed? Yes   Has discharge transport been arranged? Yes     T[facility] spoke with pt and his spouse, they have decided they prefer dc back to [facility] for further therapy needs. Referral sent in [facility] and they can accept back. Family would like to transport pt on their own. Dr Osborn updated. AR prefers transport for anytime after 3pm.

## 2024-08-22 NOTE — DISCHARGE SUMMARY
Medical Group Discharge Summary  DISCHARGE DIAGNOSIS     Bacterial pneumonia    HOSPITAL COURSE AND DETAILS     Concern for sepsis with acute renal failure  Bacterial Pneumonia  -Sepsis criteria met with lactic acidosis, leukocytosis, tachycardia, low blood pressure, and KIRILL on presentation  -Antibiotics were de-escalated to cover for pneumonia given tree in but opacities on imaging as well as elevated procalcitionin (0.44). Treated with cefepime in house and do complete 1 more day of augmentin to complete 5 day course on DC.     Myasthenia gravis  -Given worsening fatigue, Neurology was consulted for evaluation due to concerns of MG flare potentially triggered by outpatient use of levaquin. Levaquin was DCd while in hospital.   -Case was discussed with Dr. Page who had low suspicion for acute flare. Advised continued management of suspected infection and continued follow-up with neurologist at [facility].  -Continued on prednisone 10 mg daily & imuran as prescribed prior to admission. Will require follow-up with his primary neurologist for further medication titration.   -Continued PT/OT in hospital and to return to acute rehab on DC.      Troponin Elevations  Sinus Tachycardia  -Mildly more elevated from baseline on presentation 51 -> 64 -> 59. Elevations were in flat pattern however. Likely secondary to impaired renal clearance/demand from sinus tachycardia.   -TTE ordered for further evaluation. Revealed preserved EF without RWMA or significant valvular disease appreciated. Will defer further workup.   -Has had persistent tachycardia in house. Patient's home metoprolol was initially placed on hold however. Suspect is mediated by malignancy. Metoprolol resumed.      Hyponatremia  -Sodium improved with IV fluids     Hx Lung cancer, CAD s/p CABG, Dm, CKD, Afib  -Home continued as applicable       Patient was in stable condition on day of discharge without acute concerns.     35 minutes spent on  discharge. Time calculated includes bedside evaluation, councelling, and outpatient care coordination.       Yared Osborn MD    DISCHARGE PHYSICAL EXAM     Last Recorded Vitals:  Vitals:    08/21/24 2021 08/22/24 0320 08/22/24 0726 08/22/24 0952   BP:  149/70 116/59    BP Location:   Right arm    Patient Position:   Lying    Pulse:  (!) 117 (!) 116 (!) 134   Resp:   18    Temp:  35.9 °C (96.6 °F) 35.7 °C (96.3 °F)    TempSrc:   Temporal    SpO2: 95% 98% 98% 95%   Weight:       Height:           Physical Exam  General: Ill-appearing lderly male in mild distress  HEENT: Clear sclera, EOMI, trachea midline, moist mucous membranes  Respiratory: Equal chest rise, no retractions, expiratory wheezing  Abdomen: Soft, nontender, nondistended  Extremities: No cyanosis or clubbing appreciated  Neurological: Spontaneously moves all extremities, no dysarthria, cranial nerves grossly intact  Psychiatric: Appropriate mood and affect  Skin: Warm, dry    DISCHARGE MEDICATIONS        Your medication list        START taking these medications        Instructions Last Dose Given Next Dose Due   amoxicillin-pot clavulanate 875-125 mg tablet  Commonly known as: Augmentin      Take 1 tablet by mouth 2 times a day for 1 day.              CONTINUE taking these medications        Instructions Last Dose Given Next Dose Due   acetaminophen 500 mg tablet  Commonly known as: Tylenol           albuterol 90 mcg/actuation aerosol powdr breath activated inhaler           Allergy Relief(diphenhydramin) 25 mg capsule  Generic drug: diphenhydrAMINE           apixaban 5 mg tablet  Commonly known as: Eliquis           ascorbic acid (vitamin C) 1,000 mg capsule           azaTHIOprine 50 mg tablet  Commonly known as: Imuran           busPIRone 10 mg tablet  Commonly known as: Buspar           cholecalciferol 25 MCG (1000 UT) tablet  Commonly known as: Vitamin D-3           cyanocobalamin 1,000 mcg tablet  Commonly known as: Vitamin B-12            diphenoxylate-atropine 2.5-0.025 mg tablet  Commonly known as: Lomotil           ferrous sulfate (325 mg ferrous sulfate) tablet           fluticasone 50 mcg/actuation nasal spray  Commonly known as: Flonase           glipiZIDE 5 mg tablet  Commonly known as: Glucotrol           hydrOXYzine HCL 25 mg tablet  Commonly known as: Atarax           insulin lispro 100 unit/mL injection  Commonly known as: HumaLOG           ipratropium-albuteroL 0.5-2.5 mg/3 mL nebulizer solution  Commonly known as: Duo-Neb           Januvia 100 mg tablet  Generic drug: SITagliptin phosphate           lidocaine-prilocaine 2.5-2.5 % cream  Commonly known as: Emla           magnesium oxide 400 mg tablet  Commonly known as: Mag-Ox           megestrol 400 mg/10 mL (40 mg/mL) suspension  Commonly known as: Megace           nitroglycerin 0.4 mg SL tablet  Commonly known as: Nitrostat           ondansetron 4 mg tablet  Commonly known as: Zofran           pantoprazole 40 mg EC tablet  Commonly known as: ProtoNix           predniSONE 10 mg tablet  Commonly known as: Deltasone           PROBIOTIC ACIDOPHILUS ORAL           prochlorperazine 10 mg tablet  Commonly known as: Compazine           rosuvastatin 20 mg tablet  Commonly known as: Crestor           tamsulosin 0.4 mg 24 hr capsule  Commonly known as: Flomax           traZODone 150 mg tablet  Commonly known as: Desyrel           Trelegy Ellipta 100-62.5-25 mcg blister with device  Generic drug: fluticasone-umeclidin-vilanter           zinc sulfate 220 (50 Zn) MG capsule  Commonly known as: Zincate                  STOP taking these medications      furosemide 20 mg tablet  Commonly known as: Lasix        levoFLOXacin 500 mg tablet  Commonly known as: Levaquin                  Where to Get Your Medications        Information about where to get these medications is not yet available    Ask your nurse or doctor about these medications  amoxicillin-pot clavulanate 875-125 mg tablet            OUTPATIENT FOLLOW-UP     No future appointments.

## 2024-08-22 NOTE — CARE PLAN
Problem: Skin  Goal: Prevent/manage excess moisture  Outcome: Progressing  Goal: Promote/optimize nutrition  Outcome: Progressing  Goal: Participates in plan/prevention/treatment measures  Outcome: Progressing  Goal: Prevent/minimize sheer/friction injuries  Outcome: Progressing  Goal: Promote skin healing  Outcome: Progressing     Problem: Fall/Injury  Goal: Not fall by end of shift  Outcome: Progressing  Goal: Be free from injury by end of the shift  Outcome: Progressing  Goal: Verbalize understanding of personal risk factors for fall in the hospital  Outcome: Progressing  Goal: Verbalize understanding of risk factor reduction measures to prevent injury from fall in the home  Outcome: Progressing  Goal: Use assistive devices by end of the shift  Outcome: Progressing  Goal: Pace activities to prevent fatigue by end of the shift  Outcome: Progressing     Problem: Pain - Adult  Goal: Verbalizes/displays adequate comfort level or baseline comfort level  Outcome: Progressing     Problem: Safety - Adult  Goal: Free from fall injury  Outcome: Progressing     Problem: Discharge Planning  Goal: Discharge to home or other facility with appropriate resources  Outcome: Progressing     Problem: Chronic Conditions and Co-morbidities  Goal: Patient's chronic conditions and co-morbidity symptoms are monitored and maintained or improved  Outcome: Progressing     Problem: Nutrition  Goal: Improve ostomy output  Outcome: Progressing     Problem: Nutrition  Goal: Improve ostomy output  Outcome: Progressing     Problem: Nutrition  Goal: Less than 5 days NPO/clear liquids  Outcome: Progressing  Goal: Oral intake greater than 50%  Outcome: Progressing  Goal: Oral intake greater 75%  Outcome: Progressing  Goal: Consume prescribed supplement  Outcome: Progressing  Goal: Adequate PO fluid intake  Outcome: Progressing  Goal: Nutrition support goals are met within 48 hrs  Outcome: Progressing  Goal: Nutrition support is meeting 75% of  nutrient needs  Outcome: Progressing  Goal: Tube feed tolerance  Outcome: Progressing  Goal: BG  mg/dL  Outcome: Progressing  Goal: Lab values WNL  Outcome: Progressing  Goal: Electrolytes WNL  Outcome: Progressing  Goal: Promote healing  Outcome: Progressing  Goal: Maintain stable weight  Outcome: Progressing  Goal: Reduce weight from edema/fluid  Outcome: Progressing  Goal: Gradual weight gain  Outcome: Progressing            Problem: Nutrition  Goal:   Outcome: Progressing   The patient's goals for the shift include Labs WNL    The clinical goals for the shift include Labs WNL    Over the shift, the patient did not make progress toward the following goals. Barriers to progression include . Recommendations to address these barriers include .

## 2024-08-22 NOTE — CARE PLAN
[place] [facility] [place]. Shows acute signs and symptoms of MG crisis ie worsening respiratory status, ptosis, dysphagia. In the meantime, treat underlying PNA, PT/OT.    No further needs from neurology; okay for transfer or discharge as per primary team. Please contact if condition changes for re-eval.

## 2024-08-22 NOTE — PROGRESS NOTES
"Physical Therapy    Physical Therapy Treatment    Patient Name: Timothy Varela  MRN: [id number]  Today's Date: 8/22/2024  Time Calculation  Start Time: 0952  Stop Time: 1032  Time Calculation (min): 40 min     1022/1022-A    Assessment/Plan   PT Assessment  PT Assessment Results: Decreased strength, Decreased endurance, Impaired balance, Decreased mobility, Impaired judgement, Decreased safety awareness  Rehab Prognosis: Good  Barriers to Discharge: Requires A for transfers, amb, decreased insight into deficits  Evaluation/Treatment Tolerance: Patient limited by fatigue  Medical Staff Made Aware: Yes  Strengths: Support of Caregivers, Living arrangement secure  Barriers to Participation: Comorbidities  End of Session Communication: Bedside nurse  Assessment Comment: Pt with decreased activity tolerance during today's treatment session. He displays some self-limiting behaviors, frequently requesting to return to supine so he can \"breathe better\" despite PT education regarding breathing strategies and importance of sitting up out of bed. Pt requires mod A throughout treatment and encouragement to participate in mobility. He will continue to benefit from therapy to further address deconditioning and mobility deficits.  End of Session Patient Position: Bed, 3 rail up, Alarm off, caregiver present (RN present, call light in reach, all needs met.)     PT Plan  Treatment/Interventions: Bed mobility, Transfer training, Gait training, Balance training, Strengthening, Endurance training, Therapeutic exercise  PT Plan: Ongoing PT  PT Frequency: 3 times per week  PT Discharge Recommendations: Moderate intensity level of continued care  PT Recommended Transfer Status: Assist x1, Assistive device  PT - OK to Discharge: Yes (Physical Therapy eval completed per MD requisition. P.T. recommendations as outlined above. Recommend D/C from acute care when medically appropriate as deemed by medical staff.)    Current Problem:  Patient " "Active Problem List   Diagnosis    Ambulatory dysfunction    Midline low back pain without sciatica, unspecified chronicity    Hypercalcemia    Elevated troponin    Sepsis with acute renal failure (Multi)    Epigastric pain    Hyponatremia    Myasthenia gravis (Multi)    Generalized weakness    History of coronary artery bypass graft    History of lung cancer       General Visit Information:   PT  Visit  PT Received On: 08/22/24  General  Reason for Referral: impaired mobility  Referred By: Irena OT/PT 8/20  Past Medical History Relevant to Rehab: includes: COPD, HTN, CAD, CKD, DM, myasthenia gravis, CABG, R upper lobectomy, metastatic lung CA, liver mets, melanoma, hyponatremia, L2 compression fracture  Family/Caregiver Present: Yes (Wife and SUZAN present during initial bed mobility, however step out for the rest of treatment session.)  Patient Position Received: Bed, 3 rail up, Alarm off, not on at start of session (IV, purewick external catheter, 3L O2 via nc)  General Comment: Pt supine in bed, agreeable to PT treatment.  Subjective     Precautions:  Precautions  Medical Precautions: Fall precautions, Oxygen therapy device and L/min  Precautions Comment: Per EMR: High fall risk    Vital Signs:  Vital Signs  Heart Rate: (!) 134  SpO2: 95 %  Objective     Pain:  Pain Assessment  Pain Assessment: 0-10  0-10 (Numeric) Pain Score: 0 - No pain    Cognition:  Cognition  Cognition Comments: easily agitated      Activity Tolerance:  Activity Tolerance  Endurance: Decreased tolerance for upright activites  Activity Tolerance Comments: SOB with activity. Somewhat self-limiting.    Treatments:  Therapeutic Exercise  Therapeutic Exercise Performed: Yes (Pt completes 1 exercise sitting EOB before stating \"I'm laying back down\".)  Therapeutic Exercise Activity 1: Seated LAQ x5 BLE.        Bed Mobility  Bed Mobility: Yes  Bed Mobility 1  Bed Mobility 1: Supine to sitting  Level of Assistance 1: Moderate assistance  Bed Mobility " "Comments 1: x2 repetitions. Pt cued for use of bed rails and to move BLE's towards EOB. Stating \"I can't do it\" despite therapist encouragement.  Bed Mobility 2  Bed Mobility  2: Sitting to supine  Level of Assistance 2: Moderate assistance  Bed Mobility Comments 2: x2 repetitions. Assist to boost up in bed. cues for positioning.  Ambulation/Gait Training  Ambulation/Gait Training Performed: Yes  Ambulation/Gait Training 1  Surface 1: Level tile  Device 1: Rolling walker  Gait Support Devices: Gait belt  Assistance 1: Moderate assistance  Quality of Gait 1: Narrow base of support, Forward flexed posture, Shuffling gait, Inconsistent stride length  Comments/Distance (ft) 1: Pt ambulates ~3' bed > BSC > bed. Fatigues quickly and complains of feeling SOB, SpO2 remains at 95%. Instructed in pursed lip breathing.  Transfers  Transfer: Yes  Transfer 1  Technique 1: Sit to stand, Stand to sit  Transfer Device 1: Gait belt (FWW)  Transfer Level of Assistance 1: Moderate assistance  Trials/Comments 1: Assist for lift, cues for hand placement on FWW, and cues to lean forward. Pt tolerates standing for ~10 seconds prior to needing to sit back down. x2 reps.  Transfers 2  Technique 2: Stand pivot  Transfer Device 2: Gait belt (FWW)  Transfer Level of Assistance 2: Moderate assistance  Trials/Comments 2: Assist for balance and lift.  Stairs  Stairs: No       Outcome Measures:     [facility] Basic Mobility  Turning from your back to your side while in a flat bed without using bedrails: A lot  Moving from lying on your back to sitting on the side of a flat bed without using bedrails: A lot  Moving to and from bed to chair (including a wheelchair): A lot  Standing up from a chair using your arms (e.g. wheelchair or bedside chair): A lot  To walk in hospital room: A lot  Climbing 3-5 steps with railing: Total  Basic Mobility - Total Score: 11                                      Education Documentation  Mobility Training, taught by " Susanne Lopez, PT at 8/22/2024 11:20 AM.  Learner: Patient  Readiness: Acceptance  Method: Explanation  Response: Needs Reinforcement  Comment: Importance of early mobility and breathing strategies.         EDUCATION:     Encounter Problems       Encounter Problems (Active)       PT Problem       Pt. will transfer supine/sit with MIN A x 1 (Progressing)       Start:  08/21/24    Expected End:  09/04/24            Pt. will transfer sit/stand with FWW with MIN A x 1 (Progressing)       Start:  08/21/24    Expected End:  09/04/24            Pt.will ambulate 40' with FWW with Min A x 1 (Progressing)       Start:  08/21/24    Expected End:  09/04/24            Pt. will amb up/down 2 steps with HR and cane with MOD A x 1 (Not Progressing)       Start:  08/21/24    Expected End:  09/04/24            Pt. will perform 2 x 15 B LE AROM exercises  (Progressing)       Start:  08/21/24    Expected End:  09/04/24               Pain - Adult

## 2024-08-22 NOTE — PROGRESS NOTES
08/22/24 1555   Current Planned Discharge Disposition   Current Planned Discharge Disposition Rehab     Patient being discharged to [facility] today, [facility] arranged ambulance for 1700 pickup. Family is aware and agreeable with discharge. JACKI Vergara

## 2024-08-23 ENCOUNTER — COMMUNITY CARE MANAGEMENT (OUTPATIENT)
Facility: CLINIC | Age: 79
End: 2024-08-23

## 2024-08-23 LAB
ATRIAL RATE: 124 BPM
ATRIAL RATE: 95 BPM
P AXIS: 63 DEGREES
P AXIS: 66 DEGREES
P OFFSET: 194 MS
P OFFSET: 197 MS
P ONSET: 140 MS
P ONSET: 146 MS
PR INTERVAL: 134 MS
PR INTERVAL: 146 MS
Q ONSET: 213 MS
Q ONSET: 213 MS
QRS COUNT: 16 BEATS
QRS COUNT: 21 BEATS
QRS DURATION: 86 MS
QRS DURATION: 92 MS
QT INTERVAL: 314 MS
QT INTERVAL: 336 MS
QTC CALCULATION(BAZETT): 422 MS
QTC CALCULATION(BAZETT): 451 MS
QTC FREDERICIA: 391 MS
QTC FREDERICIA: 399 MS
R AXIS: -15 DEGREES
R AXIS: -4 DEGREES
T AXIS: 137 DEGREES
T AXIS: 148 DEGREES
T OFFSET: 370 MS
T OFFSET: 381 MS
VENTRICULAR RATE: 124 BPM
VENTRICULAR RATE: 95 BPM

## 2024-08-24 LAB
BACTERIA BLD CULT: NORMAL
BACTERIA BLD CULT: NORMAL

## 2024-08-28 ENCOUNTER — COMMUNITY CARE MANAGEMENT (OUTPATIENT)
Facility: CLINIC | Age: 79
End: 2024-08-28

## 2024-08-28 NOTE — PROGRESS NOTES
TCM Care Coordination Summary  08/24/24 Patient discharged home from [facility] 08/26/24. Spoke with patient's spouse Pat. Pat confirmed that patient discharged home with hospice and stated that they have no needs at this time.-EH DUARTE

## 2024-09-01 DIAGNOSIS — I10 PRIMARY HYPERTENSION: Chronic | ICD-10-CM

## 2024-09-03 RX ORDER — METOPROLOL SUCCINATE 50 MG/1
50 TABLET, EXTENDED RELEASE ORAL DAILY
Qty: 90 TABLET | Refills: 3 | OUTPATIENT
Start: 2024-09-03

## 2024-09-06 ENCOUNTER — TELEPHONE (OUTPATIENT)
Dept: PULMONOLOGY | Age: 79
End: 2024-09-06

## 2025-06-16 NOTE — TELEPHONE ENCOUNTER
From: Anshu Bassett  To: Dr. Cam Prado  Sent: 1/30/2024 7:55 PM EST  Subject: Metoprolol Succ ER Tab 50 mg    Would you please submit refill request to [facility] for the above prescription.    Thank you so much.   Detail Level: Detailed

## (undated) DEVICE — SINGLE PORT MANIFOLD: Brand: NEPTUNE 2

## (undated) DEVICE — ENDOSCOPIC TRAY TRNSPRT 20.5X16.5X4.1 IN RECYCL SUGAR PULP

## (undated) DEVICE — TOWEL,OR,DSP,ST,BLUE,STD,4/PK,20PK/CS: Brand: MEDLINE

## (undated) DEVICE — INTENDED FOR TISSUE SEPARATION, AND OTHER PROCEDURES THAT REQUIRE A SHARP SURGICAL BLADE TO PUNCTURE OR CUT.: Brand: BARD-PARKER ® CARBON RIB-BACK BLADES

## (undated) DEVICE — ELECTRODE PT RET AD L9FT HI MOIST COND ADH HYDRGEL CORDED

## (undated) DEVICE — SINGLE USE SUCTION VALVE MAJ-209: Brand: SINGLE USE SUCTION VALVE (STERILE)

## (undated) DEVICE — TUBING, SUCTION, 1/4" X 10', STRAIGHT: Brand: MEDLINE

## (undated) DEVICE — SINGLE USE BIOPSY VALVE MAJ-210: Brand: SINGLE USE BIOPSY VALVE (STERILE)

## (undated) DEVICE — NEPTUNE E-SEP SMOKE EVACUATION PENCIL, COATED, 70MM BLADE, PUSH BUTTON SWITCH: Brand: NEPTUNE E-SEP

## (undated) DEVICE — BLADE ES L6IN ELASTOMERIC COAT INSUL DURABLE BEND UPTO

## (undated) DEVICE — CONTAINER,SPECIMEN,OR STERILE,4OZ: Brand: MEDLINE

## (undated) DEVICE — SYRINGE, LUER SLIP, STERILE, 5ML: Brand: MEDLINE INDUSTRIES, INC.

## (undated) DEVICE — Device

## (undated) DEVICE — COVER,TABLE,44X90,STERILE: Brand: MEDLINE

## (undated) DEVICE — SYRINGE MED 30ML SLIP TIP BLNT FILL AND LUERLOCK DISP

## (undated) DEVICE — BRUSH ENDO CLN L90.5IN SHTH DIA1.7MM BRIST DIA5-7MM 2-6MM

## (undated) DEVICE — GLOVE ORANGE PI 8   MSG9080

## (undated) DEVICE — STOPCOCK 3-WAY 45 PSI LOW OFF ROT COLAR

## (undated) DEVICE — TROCAR ENDOSCP L12MM BLK NONCONDUCTIVE SL SFT REP DISP

## (undated) DEVICE — LABEL MED MINI W/ MARKER

## (undated) DEVICE — SYRINGE MED 5ML STD CLR PLAS N CTRL SLIP TIP DISP

## (undated) DEVICE — NEEDLE ASPIR 19GA HISTOLOGY FLX VIZISHOT 2

## (undated) DEVICE — ADAPTER TBNG DIA15MM SWVL FBROPT BRONCHSCP TERM 2 AXIS PEEP

## (undated) DEVICE — FLEXIBLE ENDOSCOPE: Brand: ASCOPE™ 4 BRONCHO REGULAR 5.0/2.2

## (undated) DEVICE — BAG RETRIEVAL SPECIMEN SUPERBAG 15 2XL NYLON ITRODUCER

## (undated) DEVICE — DRESSING,GAUZE,PETROLATUM,CURAD,1"X8",ST: Brand: CURAD

## (undated) DEVICE — AGENT HEMSTAT 3GM OXIDIZED REGENERATED CELOS ABSRB FOR CONT (ORDER MULTIPLES OF 5EA)

## (undated) DEVICE — FORCEPS BX L240CM JAW DIA2.8MM L CAP W/ NDL MIC MESH TOOTH

## (undated) DEVICE — COUNTER NDL 40 COUNT HLD 70 FOAM BLK ADH W/ MAG

## (undated) DEVICE — DRESSING GZ W1XL8IN COT XRFRM N ADH OVERWRAP CURAD

## (undated) DEVICE — SYRINGE MED 3ML CLR PLAS STD N CTRL LUERLOCK TIP DISP

## (undated) DEVICE — KIT,ANTI FOG,W/SPONGE & FLUID,SOFT PACK: Brand: MEDLINE

## (undated) DEVICE — RELOAD STPL H1-2.5XL35MM VASC THN TISS WHT B FRM NAT ARTC

## (undated) DEVICE — GOWN,SIRUS,NONRNF,SETINSLV,2XL,18/CS: Brand: MEDLINE

## (undated) DEVICE — PACK,BASIC: Brand: MEDLINE

## (undated) DEVICE — DRESSING COMP W4XL4IN N ADH PD W2.5XL2.5IN GZ BORDERED ADH

## (undated) DEVICE — UNIT DRNGE SGL COLL W/ INLINE CONN EXPR

## (undated) DEVICE — GLOVE ORANGE PI 7 1/2   MSG9075

## (undated) DEVICE — SYRINGE MED 10ML SLIP TIP BLNT FILL AND LUERLOCK DISP

## (undated) DEVICE — APPLICATOR MEDICATED 26 CC SOLUTION HI LT ORNG CHLORAPREP

## (undated) DEVICE — SUTURE PERMAHAND SZ 0 L30IN NONABSORBABLE BLK FSL L30MM 3/8 680H

## (undated) DEVICE — BRUSH ENDO COMBO

## (undated) DEVICE — SURGICEL ENDOSCP APPL

## (undated) DEVICE — STAPLER SKIN L320MM 35MM VASC TISS 12 FIRING B FRM PWR

## (undated) DEVICE — TUBING, SUCTION, 9/32" X 12', STRAIGHT: Brand: MEDLINE INDUSTRIES, INC.

## (undated) DEVICE — FORCEPS L110MM DIA1.7MM PREMARKED REINF SHFT

## (undated) DEVICE — NEEDLE ASPIR 21GA L700MM US GUID TREAT DST END FOR EFFICIENT

## (undated) DEVICE — GAUZE,SPONGE,4"X4",16PLY,XRAY,STRL,LF: Brand: MEDLINE

## (undated) DEVICE — PAD,NON-ADHERENT,3X8,STERILE,LF,1/PK: Brand: MEDLINE

## (undated) DEVICE — TUBE SET 96 MM 64 MM H2O PERISTALTIC STD AUX CHANNEL

## (undated) DEVICE — ECHELON 60MM REINFORCEMENT: Brand: ECHLEON

## (undated) DEVICE — SPONGE,LAP,18"X18",DLX,XR,ST,5/PK,40/PK: Brand: MEDLINE

## (undated) DEVICE — SUTURE VCRL SZ 4-0 L18IN ABSRB UD L19MM PS-2 3/8 CIR PRIM J496H

## (undated) DEVICE — SYRINGE MED 10ML LUERLOCK TIP W/O SFTY DISP

## (undated) DEVICE — TAPE,ELASTIC,FOAM,CURAD,3"X5.5YD,LF: Brand: CURAD

## (undated) DEVICE — SYRINGE MED 30ML STD CLR PLAS LUERSLIP TIP N CTRL DISP

## (undated) DEVICE — STAPLER INT L34CM 60MM LNG ENDOSCP ARTC PWR + ECHELON FLX

## (undated) DEVICE — DRAPE,LAP,CHOLE,W/TROUGHS,STERILE: Brand: MEDLINE

## (undated) DEVICE — Device: Brand: ENDO SMARTCAP

## (undated) DEVICE — BLOCK BITE CAREGUARD 60FR W/STRAP

## (undated) DEVICE — WARMER SCP 2 ANTIFOG LAP DISP

## (undated) DEVICE — GOWN,AURORA,NONREINFORCED,LARGE: Brand: MEDLINE

## (undated) DEVICE — COVER LT HNDL BLU PLAS

## (undated) DEVICE — BLADE ES ELASTOMERIC COAT INSUL DURABLE BEND UPTO 90DEG

## (undated) DEVICE — CONMED SCOPE SAVER BITE BLOCK, 20X27 MM: Brand: SCOPE SAVER

## (undated) DEVICE — PAD N ADH W3XL4IN POLY COT SFT PERF FLM EASILY CUT ABSRB

## (undated) DEVICE — HYPODERMIC SAFETY NEEDLE: Brand: MAGELLAN

## (undated) DEVICE — 4-PORT MANIFOLD: Brand: NEPTUNE 2

## (undated) DEVICE — CONNECTOR TBNG WHT PLAS SUCT STR 5IN1 LTWT W/ M CONN

## (undated) DEVICE — BRUSH CYTO FLX DISP SUPERDIMENSION

## (undated) DEVICE — RELOAD STPL L60MM H1.5-3.6MM REG TISS BLU GRIPPING SURF B

## (undated) DEVICE — BLOCK BITE 60FR W/ DENT RIM SCRIP ONLY

## (undated) DEVICE — CATHETER THOR 20FR L22IN PVC 4 EYELET STR ATRAUM

## (undated) DEVICE — MAGNETIC INSTR DRAPE 20X16: Brand: MEDLINE INDUSTRIES, INC.

## (undated) DEVICE — SUTURE VCRL SZ 3-0 L27IN ABSRB UD L26MM SH 1/2 CIR J416H

## (undated) DEVICE — ENDO CARRY-ON PROCEDURE KIT: Brand: ENDO CARRY-ON PROCEDURE KIT